# Patient Record
Sex: FEMALE | Race: WHITE | NOT HISPANIC OR LATINO | Employment: OTHER | URBAN - METROPOLITAN AREA
[De-identification: names, ages, dates, MRNs, and addresses within clinical notes are randomized per-mention and may not be internally consistent; named-entity substitution may affect disease eponyms.]

---

## 2017-01-04 ENCOUNTER — COMMUNICATION - HEALTHEAST (OUTPATIENT)
Dept: NURSING | Facility: CLINIC | Age: 62
End: 2017-01-04

## 2017-01-04 ENCOUNTER — AMBULATORY - HEALTHEAST (OUTPATIENT)
Dept: LAB | Facility: CLINIC | Age: 62
End: 2017-01-04

## 2017-01-04 DIAGNOSIS — Z95.2 S/P AVR: ICD-10-CM

## 2017-01-04 DIAGNOSIS — Z95.2 S/P AVR (AORTIC VALVE REPLACEMENT): ICD-10-CM

## 2017-01-05 ENCOUNTER — AMBULATORY - HEALTHEAST (OUTPATIENT)
Dept: NURSING | Facility: CLINIC | Age: 62
End: 2017-01-05

## 2017-01-05 DIAGNOSIS — Z95.2 S/P AVR: ICD-10-CM

## 2017-01-16 ENCOUNTER — COMMUNICATION - HEALTHEAST (OUTPATIENT)
Dept: FAMILY MEDICINE | Facility: CLINIC | Age: 62
End: 2017-01-16

## 2017-01-16 DIAGNOSIS — Z95.2 S/P AVR (AORTIC VALVE REPLACEMENT): ICD-10-CM

## 2017-01-16 DIAGNOSIS — Z79.01 LONG TERM (CURRENT) USE OF ANTICOAGULANTS: ICD-10-CM

## 2017-01-25 ENCOUNTER — COMMUNICATION - HEALTHEAST (OUTPATIENT)
Dept: NURSING | Facility: CLINIC | Age: 62
End: 2017-01-25

## 2017-01-25 ENCOUNTER — AMBULATORY - HEALTHEAST (OUTPATIENT)
Dept: LAB | Facility: CLINIC | Age: 62
End: 2017-01-25

## 2017-01-25 DIAGNOSIS — Z95.2 S/P AVR: ICD-10-CM

## 2017-01-25 DIAGNOSIS — Z95.2 S/P AVR (AORTIC VALVE REPLACEMENT): ICD-10-CM

## 2017-01-28 ENCOUNTER — COMMUNICATION - HEALTHEAST (OUTPATIENT)
Dept: FAMILY MEDICINE | Facility: CLINIC | Age: 62
End: 2017-01-28

## 2017-01-28 DIAGNOSIS — E03.9 HYPOTHYROIDISM: ICD-10-CM

## 2017-02-01 ENCOUNTER — COMMUNICATION - HEALTHEAST (OUTPATIENT)
Dept: FAMILY MEDICINE | Facility: CLINIC | Age: 62
End: 2017-02-01

## 2017-02-01 DIAGNOSIS — E78.5 HYPERLIPIDEMIA: ICD-10-CM

## 2017-02-10 ENCOUNTER — COMMUNICATION - HEALTHEAST (OUTPATIENT)
Dept: FAMILY MEDICINE | Facility: CLINIC | Age: 62
End: 2017-02-10

## 2017-02-10 DIAGNOSIS — G43.909 MIGRAINE: ICD-10-CM

## 2017-02-23 ENCOUNTER — AMBULATORY - HEALTHEAST (OUTPATIENT)
Dept: LAB | Facility: CLINIC | Age: 62
End: 2017-02-23

## 2017-02-23 ENCOUNTER — COMMUNICATION - HEALTHEAST (OUTPATIENT)
Dept: NURSING | Facility: CLINIC | Age: 62
End: 2017-02-23

## 2017-02-23 DIAGNOSIS — Z95.2 S/P AVR (AORTIC VALVE REPLACEMENT): ICD-10-CM

## 2017-02-23 DIAGNOSIS — Z95.2 S/P AVR: ICD-10-CM

## 2017-03-07 ENCOUNTER — COMMUNICATION - HEALTHEAST (OUTPATIENT)
Dept: FAMILY MEDICINE | Facility: CLINIC | Age: 62
End: 2017-03-07

## 2017-03-07 DIAGNOSIS — Z79.01 LONG TERM (CURRENT) USE OF ANTICOAGULANTS: ICD-10-CM

## 2017-03-07 DIAGNOSIS — Z95.2 S/P AVR: ICD-10-CM

## 2017-03-07 DIAGNOSIS — I10 ESSENTIAL HYPERTENSION, MALIGNANT: ICD-10-CM

## 2017-03-08 ENCOUNTER — COMMUNICATION - HEALTHEAST (OUTPATIENT)
Dept: FAMILY MEDICINE | Facility: CLINIC | Age: 62
End: 2017-03-08

## 2017-03-14 ENCOUNTER — OFFICE VISIT - HEALTHEAST (OUTPATIENT)
Dept: FAMILY MEDICINE | Facility: CLINIC | Age: 62
End: 2017-03-14

## 2017-03-14 DIAGNOSIS — J02.9 SORE THROAT: ICD-10-CM

## 2017-03-16 ENCOUNTER — COMMUNICATION - HEALTHEAST (OUTPATIENT)
Dept: NURSING | Facility: CLINIC | Age: 62
End: 2017-03-16

## 2017-03-24 ENCOUNTER — AMBULATORY - HEALTHEAST (OUTPATIENT)
Dept: LAB | Facility: CLINIC | Age: 62
End: 2017-03-24

## 2017-03-24 ENCOUNTER — OFFICE VISIT - HEALTHEAST (OUTPATIENT)
Dept: NURSING | Facility: CLINIC | Age: 62
End: 2017-03-24

## 2017-03-24 ENCOUNTER — COMMUNICATION - HEALTHEAST (OUTPATIENT)
Dept: NURSING | Facility: CLINIC | Age: 62
End: 2017-03-24

## 2017-03-24 DIAGNOSIS — Z95.2 S/P AVR (AORTIC VALVE REPLACEMENT): ICD-10-CM

## 2017-03-24 DIAGNOSIS — I05.9 MITRAL VALVE DISORDER: ICD-10-CM

## 2017-03-24 DIAGNOSIS — Z95.2 S/P AVR: ICD-10-CM

## 2017-03-24 DIAGNOSIS — E78.2 MIXED HYPERLIPIDEMIA: ICD-10-CM

## 2017-03-24 DIAGNOSIS — I71.019 DISSECTION OF THORACIC AORTA (H): ICD-10-CM

## 2017-03-24 DIAGNOSIS — G44.209 TENSION-TYPE HEADACHE, NOT INTRACTABLE, UNSPECIFIED CHRONICITY PATTERN: ICD-10-CM

## 2017-03-29 ENCOUNTER — COMMUNICATION - HEALTHEAST (OUTPATIENT)
Dept: FAMILY MEDICINE | Facility: CLINIC | Age: 62
End: 2017-03-29

## 2017-03-29 DIAGNOSIS — Z95.2 S/P AVR (AORTIC VALVE REPLACEMENT): ICD-10-CM

## 2017-04-05 ENCOUNTER — COMMUNICATION - HEALTHEAST (OUTPATIENT)
Dept: NURSING | Facility: CLINIC | Age: 62
End: 2017-04-05

## 2017-04-05 DIAGNOSIS — Z95.2 S/P AVR: ICD-10-CM

## 2017-04-10 ENCOUNTER — COMMUNICATION - HEALTHEAST (OUTPATIENT)
Dept: NURSING | Facility: CLINIC | Age: 62
End: 2017-04-10

## 2017-04-18 ENCOUNTER — COMMUNICATION - HEALTHEAST (OUTPATIENT)
Dept: NURSING | Facility: CLINIC | Age: 62
End: 2017-04-18

## 2017-04-18 ENCOUNTER — AMBULATORY - HEALTHEAST (OUTPATIENT)
Dept: LAB | Facility: CLINIC | Age: 62
End: 2017-04-18

## 2017-04-18 DIAGNOSIS — Z95.2 S/P AVR: ICD-10-CM

## 2017-04-25 ENCOUNTER — AMBULATORY - HEALTHEAST (OUTPATIENT)
Dept: LAB | Facility: CLINIC | Age: 62
End: 2017-04-25

## 2017-04-25 ENCOUNTER — COMMUNICATION - HEALTHEAST (OUTPATIENT)
Dept: NURSING | Facility: CLINIC | Age: 62
End: 2017-04-25

## 2017-04-25 DIAGNOSIS — Z95.2 S/P AVR: ICD-10-CM

## 2017-04-27 ENCOUNTER — COMMUNICATION - HEALTHEAST (OUTPATIENT)
Dept: FAMILY MEDICINE | Facility: CLINIC | Age: 62
End: 2017-04-27

## 2017-04-27 DIAGNOSIS — G43.909 MIGRAINE: ICD-10-CM

## 2017-05-02 ENCOUNTER — COMMUNICATION - HEALTHEAST (OUTPATIENT)
Dept: NURSING | Facility: CLINIC | Age: 62
End: 2017-05-02

## 2017-05-02 ENCOUNTER — AMBULATORY - HEALTHEAST (OUTPATIENT)
Dept: LAB | Facility: CLINIC | Age: 62
End: 2017-05-02

## 2017-05-02 DIAGNOSIS — Z95.2 S/P AVR: ICD-10-CM

## 2017-05-16 ENCOUNTER — COMMUNICATION - HEALTHEAST (OUTPATIENT)
Dept: FAMILY MEDICINE | Facility: CLINIC | Age: 62
End: 2017-05-16

## 2017-05-16 ENCOUNTER — COMMUNICATION - HEALTHEAST (OUTPATIENT)
Dept: NURSING | Facility: CLINIC | Age: 62
End: 2017-05-16

## 2017-05-16 ENCOUNTER — AMBULATORY - HEALTHEAST (OUTPATIENT)
Dept: LAB | Facility: CLINIC | Age: 62
End: 2017-05-16

## 2017-05-16 DIAGNOSIS — Z95.2 S/P AVR: ICD-10-CM

## 2017-05-16 DIAGNOSIS — Z79.01 LONG TERM (CURRENT) USE OF ANTICOAGULANTS: ICD-10-CM

## 2017-05-30 ENCOUNTER — COMMUNICATION - HEALTHEAST (OUTPATIENT)
Dept: NURSING | Facility: CLINIC | Age: 62
End: 2017-05-30

## 2017-05-30 ENCOUNTER — AMBULATORY - HEALTHEAST (OUTPATIENT)
Dept: LAB | Facility: CLINIC | Age: 62
End: 2017-05-30

## 2017-05-30 DIAGNOSIS — Z95.2 S/P AVR: ICD-10-CM

## 2017-06-08 ENCOUNTER — AMBULATORY - HEALTHEAST (OUTPATIENT)
Dept: LAB | Facility: CLINIC | Age: 62
End: 2017-06-08

## 2017-06-08 ENCOUNTER — COMMUNICATION - HEALTHEAST (OUTPATIENT)
Dept: NURSING | Facility: CLINIC | Age: 62
End: 2017-06-08

## 2017-06-08 DIAGNOSIS — Z95.2 S/P AVR: ICD-10-CM

## 2017-06-21 ENCOUNTER — COMMUNICATION - HEALTHEAST (OUTPATIENT)
Dept: FAMILY MEDICINE | Facility: CLINIC | Age: 62
End: 2017-06-21

## 2017-06-21 DIAGNOSIS — E03.9 HYPOTHYROIDISM: ICD-10-CM

## 2017-06-23 ENCOUNTER — COMMUNICATION - HEALTHEAST (OUTPATIENT)
Dept: FAMILY MEDICINE | Facility: CLINIC | Age: 62
End: 2017-06-23

## 2017-06-23 DIAGNOSIS — G43.909 MIGRAINE: ICD-10-CM

## 2017-06-26 ENCOUNTER — AMBULATORY - HEALTHEAST (OUTPATIENT)
Dept: LAB | Facility: CLINIC | Age: 62
End: 2017-06-26

## 2017-06-26 ENCOUNTER — COMMUNICATION - HEALTHEAST (OUTPATIENT)
Dept: NURSING | Facility: CLINIC | Age: 62
End: 2017-06-26

## 2017-06-26 DIAGNOSIS — Z95.2 S/P AVR: ICD-10-CM

## 2017-07-11 ENCOUNTER — COMMUNICATION - HEALTHEAST (OUTPATIENT)
Dept: NURSING | Facility: CLINIC | Age: 62
End: 2017-07-11

## 2017-07-11 ENCOUNTER — AMBULATORY - HEALTHEAST (OUTPATIENT)
Dept: LAB | Facility: CLINIC | Age: 62
End: 2017-07-11

## 2017-07-11 DIAGNOSIS — Z95.2 S/P AVR: ICD-10-CM

## 2017-08-02 ENCOUNTER — AMBULATORY - HEALTHEAST (OUTPATIENT)
Dept: LAB | Facility: CLINIC | Age: 62
End: 2017-08-02

## 2017-08-02 ENCOUNTER — COMMUNICATION - HEALTHEAST (OUTPATIENT)
Dept: NURSING | Facility: CLINIC | Age: 62
End: 2017-08-02

## 2017-08-02 DIAGNOSIS — Z95.2 S/P AVR: ICD-10-CM

## 2017-08-09 ENCOUNTER — COMMUNICATION - HEALTHEAST (OUTPATIENT)
Dept: FAMILY MEDICINE | Facility: CLINIC | Age: 62
End: 2017-08-09

## 2017-08-09 DIAGNOSIS — Z79.01 LONG TERM (CURRENT) USE OF ANTICOAGULANTS: ICD-10-CM

## 2017-08-09 DIAGNOSIS — Z95.2 S/P AVR: ICD-10-CM

## 2017-08-21 ENCOUNTER — AMBULATORY - HEALTHEAST (OUTPATIENT)
Dept: LAB | Facility: CLINIC | Age: 62
End: 2017-08-21

## 2017-08-21 ENCOUNTER — COMMUNICATION - HEALTHEAST (OUTPATIENT)
Dept: NURSING | Facility: CLINIC | Age: 62
End: 2017-08-21

## 2017-08-21 DIAGNOSIS — Z95.2 S/P AVR: ICD-10-CM

## 2017-08-26 ENCOUNTER — COMMUNICATION - HEALTHEAST (OUTPATIENT)
Dept: FAMILY MEDICINE | Facility: CLINIC | Age: 62
End: 2017-08-26

## 2017-08-26 DIAGNOSIS — G43.909 MIGRAINE: ICD-10-CM

## 2017-09-01 ENCOUNTER — COMMUNICATION - HEALTHEAST (OUTPATIENT)
Dept: FAMILY MEDICINE | Facility: CLINIC | Age: 62
End: 2017-09-01

## 2017-09-01 DIAGNOSIS — I10 HTN (HYPERTENSION): ICD-10-CM

## 2017-09-08 ENCOUNTER — AMBULATORY - HEALTHEAST (OUTPATIENT)
Dept: LAB | Facility: CLINIC | Age: 62
End: 2017-09-08

## 2017-09-08 ENCOUNTER — COMMUNICATION - HEALTHEAST (OUTPATIENT)
Dept: NURSING | Facility: CLINIC | Age: 62
End: 2017-09-08

## 2017-09-08 DIAGNOSIS — Z95.2 S/P AVR: ICD-10-CM

## 2017-10-06 ENCOUNTER — AMBULATORY - HEALTHEAST (OUTPATIENT)
Dept: NURSING | Facility: CLINIC | Age: 62
End: 2017-10-06

## 2017-10-06 ENCOUNTER — AMBULATORY - HEALTHEAST (OUTPATIENT)
Dept: LAB | Facility: CLINIC | Age: 62
End: 2017-10-06

## 2017-10-06 ENCOUNTER — COMMUNICATION - HEALTHEAST (OUTPATIENT)
Dept: NURSING | Facility: CLINIC | Age: 62
End: 2017-10-06

## 2017-10-06 DIAGNOSIS — Z95.2 S/P AVR: ICD-10-CM

## 2017-11-03 ENCOUNTER — COMMUNICATION - HEALTHEAST (OUTPATIENT)
Dept: NURSING | Facility: CLINIC | Age: 62
End: 2017-11-03

## 2017-11-03 ENCOUNTER — AMBULATORY - HEALTHEAST (OUTPATIENT)
Dept: LAB | Facility: CLINIC | Age: 62
End: 2017-11-03

## 2017-11-03 DIAGNOSIS — Z95.2 S/P AVR: ICD-10-CM

## 2017-11-05 ENCOUNTER — COMMUNICATION - HEALTHEAST (OUTPATIENT)
Dept: FAMILY MEDICINE | Facility: CLINIC | Age: 62
End: 2017-11-05

## 2017-11-05 DIAGNOSIS — G43.909 MIGRAINE: ICD-10-CM

## 2017-11-10 ENCOUNTER — COMMUNICATION - HEALTHEAST (OUTPATIENT)
Dept: FAMILY MEDICINE | Facility: CLINIC | Age: 62
End: 2017-11-10

## 2017-11-10 DIAGNOSIS — E78.5 HYPERLIPIDEMIA: ICD-10-CM

## 2017-11-13 ENCOUNTER — COMMUNICATION - HEALTHEAST (OUTPATIENT)
Dept: NURSING | Facility: CLINIC | Age: 62
End: 2017-11-13

## 2017-11-13 ENCOUNTER — AMBULATORY - HEALTHEAST (OUTPATIENT)
Dept: LAB | Facility: CLINIC | Age: 62
End: 2017-11-13

## 2017-11-13 DIAGNOSIS — Z95.2 S/P AVR: ICD-10-CM

## 2017-11-14 ENCOUNTER — OFFICE VISIT - HEALTHEAST (OUTPATIENT)
Dept: PODIATRY | Age: 62
End: 2017-11-14

## 2017-11-14 DIAGNOSIS — M20.5X1 HALLUX LIMITUS, RIGHT: ICD-10-CM

## 2017-11-14 ASSESSMENT — MIFFLIN-ST. JEOR: SCORE: 1092

## 2017-11-29 ENCOUNTER — COMMUNICATION - HEALTHEAST (OUTPATIENT)
Dept: NURSING | Facility: CLINIC | Age: 62
End: 2017-11-29

## 2017-11-29 ENCOUNTER — AMBULATORY - HEALTHEAST (OUTPATIENT)
Dept: LAB | Facility: CLINIC | Age: 62
End: 2017-11-29

## 2017-11-29 DIAGNOSIS — Z95.2 S/P AVR: ICD-10-CM

## 2017-12-01 ENCOUNTER — COMMUNICATION - HEALTHEAST (OUTPATIENT)
Dept: FAMILY MEDICINE | Facility: CLINIC | Age: 62
End: 2017-12-01

## 2017-12-01 DIAGNOSIS — Z79.01 LONG TERM CURRENT USE OF ANTICOAGULANT THERAPY: ICD-10-CM

## 2017-12-01 DIAGNOSIS — Z95.2 S/P AVR: ICD-10-CM

## 2017-12-04 ENCOUNTER — COMMUNICATION - HEALTHEAST (OUTPATIENT)
Dept: NURSING | Facility: CLINIC | Age: 62
End: 2017-12-04

## 2017-12-04 ENCOUNTER — AMBULATORY - HEALTHEAST (OUTPATIENT)
Dept: SCHEDULING | Facility: CLINIC | Age: 62
End: 2017-12-04

## 2017-12-04 ENCOUNTER — OFFICE VISIT - HEALTHEAST (OUTPATIENT)
Dept: FAMILY MEDICINE | Facility: CLINIC | Age: 62
End: 2017-12-04

## 2017-12-04 DIAGNOSIS — Z95.2 S/P AVR: ICD-10-CM

## 2017-12-04 DIAGNOSIS — E03.9 HYPOTHYROID: ICD-10-CM

## 2017-12-04 DIAGNOSIS — I10 ESSENTIAL HYPERTENSION: ICD-10-CM

## 2017-12-04 DIAGNOSIS — Z78.0 POST-MENOPAUSAL: ICD-10-CM

## 2017-12-04 DIAGNOSIS — G44.209 TENSION-TYPE HEADACHE, NOT INTRACTABLE, UNSPECIFIED CHRONICITY PATTERN: ICD-10-CM

## 2017-12-04 DIAGNOSIS — N83.202 CYST OF LEFT OVARY: ICD-10-CM

## 2017-12-04 DIAGNOSIS — E04.1 THYROID NODULE: ICD-10-CM

## 2017-12-04 DIAGNOSIS — Z12.31 VISIT FOR SCREENING MAMMOGRAM: ICD-10-CM

## 2017-12-04 DIAGNOSIS — Z00.00 HEALTHCARE MAINTENANCE: ICD-10-CM

## 2017-12-04 DIAGNOSIS — E03.9 HYPOTHYROIDISM, UNSPECIFIED TYPE: ICD-10-CM

## 2017-12-04 DIAGNOSIS — E78.2 MIXED HYPERLIPIDEMIA: ICD-10-CM

## 2017-12-04 DIAGNOSIS — M81.0 OSTEOPOROSIS WITHOUT CURRENT PATHOLOGICAL FRACTURE, UNSPECIFIED OSTEOPOROSIS TYPE: ICD-10-CM

## 2017-12-04 LAB
CHOLEST SERPL-MCNC: 161 MG/DL
FASTING STATUS PATIENT QL REPORTED: YES
HDLC SERPL-MCNC: 42 MG/DL
LDLC SERPL CALC-MCNC: 100 MG/DL
TRIGL SERPL-MCNC: 93 MG/DL

## 2017-12-04 ASSESSMENT — MIFFLIN-ST. JEOR: SCORE: 1025.21

## 2017-12-05 LAB — CANCER AG125 SERPL-ACNC: 33.6 U/ML (ref 0–35)

## 2017-12-06 ENCOUNTER — COMMUNICATION - HEALTHEAST (OUTPATIENT)
Dept: ENDOCRINOLOGY | Facility: CLINIC | Age: 62
End: 2017-12-06

## 2017-12-06 ENCOUNTER — HOSPITAL ENCOUNTER (OUTPATIENT)
Dept: ULTRASOUND IMAGING | Facility: HOSPITAL | Age: 62
Discharge: HOME | End: 2017-12-06
Attending: FAMILY MEDICINE

## 2017-12-06 DIAGNOSIS — N83.202 CYST OF LEFT OVARY: ICD-10-CM

## 2017-12-06 DIAGNOSIS — E03.9 HYPOTHYROIDISM, UNSPECIFIED TYPE: ICD-10-CM

## 2017-12-06 DIAGNOSIS — E04.1 THYROID NODULE: ICD-10-CM

## 2017-12-07 ENCOUNTER — COMMUNICATION - HEALTHEAST (OUTPATIENT)
Dept: FAMILY MEDICINE | Facility: CLINIC | Age: 62
End: 2017-12-07

## 2017-12-07 DIAGNOSIS — E04.1 THYROID NODULE: ICD-10-CM

## 2017-12-08 ENCOUNTER — COMMUNICATION - HEALTHEAST (OUTPATIENT)
Dept: FAMILY MEDICINE | Facility: CLINIC | Age: 62
End: 2017-12-08

## 2017-12-08 DIAGNOSIS — Z95.2 S/P AVR: ICD-10-CM

## 2017-12-08 DIAGNOSIS — Z79.01 LONG TERM CURRENT USE OF ANTICOAGULANT THERAPY: ICD-10-CM

## 2017-12-11 ENCOUNTER — COMMUNICATION - HEALTHEAST (OUTPATIENT)
Dept: FAMILY MEDICINE | Facility: CLINIC | Age: 62
End: 2017-12-11

## 2017-12-11 DIAGNOSIS — Z95.2 S/P AVR (AORTIC VALVE REPLACEMENT): ICD-10-CM

## 2017-12-13 ENCOUNTER — COMMUNICATION - HEALTHEAST (OUTPATIENT)
Dept: FAMILY MEDICINE | Facility: CLINIC | Age: 62
End: 2017-12-13

## 2017-12-13 DIAGNOSIS — E03.9 HYPOTHYROIDISM: ICD-10-CM

## 2017-12-14 ENCOUNTER — AMBULATORY - HEALTHEAST (OUTPATIENT)
Dept: SCHEDULING | Facility: CLINIC | Age: 62
End: 2017-12-14

## 2017-12-14 ENCOUNTER — OFFICE VISIT - HEALTHEAST (OUTPATIENT)
Dept: CARDIOLOGY | Facility: CLINIC | Age: 62
End: 2017-12-14

## 2017-12-14 ENCOUNTER — COMMUNICATION - HEALTHEAST (OUTPATIENT)
Dept: FAMILY MEDICINE | Facility: CLINIC | Age: 62
End: 2017-12-14

## 2017-12-14 DIAGNOSIS — I35.9 AORTIC VALVE DISORDER: ICD-10-CM

## 2017-12-14 DIAGNOSIS — Z95.2 S/P AVR: ICD-10-CM

## 2017-12-14 DIAGNOSIS — G43.909 MIGRAINE HEADACHE: ICD-10-CM

## 2017-12-14 DIAGNOSIS — Z47.1 AFTERCARE FOLLOWING JOINT REPLACEMENT: ICD-10-CM

## 2017-12-14 DIAGNOSIS — Z96.619: ICD-10-CM

## 2017-12-14 DIAGNOSIS — I71.019 DISSECTION OF THORACIC AORTA (H): ICD-10-CM

## 2017-12-14 ASSESSMENT — MIFFLIN-ST. JEOR: SCORE: 1027.93

## 2017-12-15 ENCOUNTER — HOSPITAL ENCOUNTER (OUTPATIENT)
Dept: ULTRASOUND IMAGING | Facility: HOSPITAL | Age: 62
Discharge: HOME | End: 2017-12-15
Attending: FAMILY MEDICINE

## 2017-12-15 DIAGNOSIS — E03.9 HYPOTHYROIDISM, UNSPECIFIED TYPE: ICD-10-CM

## 2017-12-15 DIAGNOSIS — E04.1 THYROID NODULE: ICD-10-CM

## 2017-12-18 ENCOUNTER — HOSPITAL ENCOUNTER (OUTPATIENT)
Dept: MAMMOGRAPHY | Facility: HOSPITAL | Age: 62
Discharge: HOME | End: 2017-12-18
Attending: FAMILY MEDICINE

## 2017-12-18 DIAGNOSIS — Z12.31 VISIT FOR SCREENING MAMMOGRAM: ICD-10-CM

## 2017-12-19 LAB
LAB AP CHARGES (HE HISTORICAL CONVERSION): ABNORMAL
LAB AP INITIAL CYTO EVAL (HE HISTORICAL CONVERSION): ABNORMAL
LAB MED GENERAL PATH INTERP (HE HISTORICAL CONVERSION): ABNORMAL
PATH REPORT.COMMENTS IMP SPEC: ABNORMAL
PATH REPORT.FINAL DX SPEC: ABNORMAL
PATH REPORT.MICROSCOPIC SPEC OTHER STN: ABNORMAL
PATH REPORT.RELEVANT HX SPEC: ABNORMAL
SPECIMEN DESCRIPTION: ABNORMAL

## 2017-12-21 ENCOUNTER — COMMUNICATION - HEALTHEAST (OUTPATIENT)
Dept: FAMILY MEDICINE | Facility: CLINIC | Age: 62
End: 2017-12-21

## 2017-12-21 DIAGNOSIS — E04.1 THYROID NODULE: ICD-10-CM

## 2017-12-22 ENCOUNTER — COMMUNICATION - HEALTHEAST (OUTPATIENT)
Dept: NURSING | Facility: CLINIC | Age: 62
End: 2017-12-22

## 2017-12-22 ENCOUNTER — AMBULATORY - HEALTHEAST (OUTPATIENT)
Dept: LAB | Facility: CLINIC | Age: 62
End: 2017-12-22

## 2017-12-22 DIAGNOSIS — Z95.2 S/P AVR: ICD-10-CM

## 2017-12-26 ENCOUNTER — HOSPITAL ENCOUNTER (OUTPATIENT)
Dept: CARDIOLOGY | Facility: HOSPITAL | Age: 62
Discharge: HOME | End: 2017-12-26
Attending: INTERNAL MEDICINE

## 2017-12-26 ENCOUNTER — HOSPITAL ENCOUNTER (OUTPATIENT)
Dept: CT IMAGING | Facility: HOSPITAL | Age: 62
Discharge: HOME | End: 2017-12-26
Attending: INTERNAL MEDICINE

## 2017-12-26 DIAGNOSIS — I35.9 AORTIC VALVE DISORDER: ICD-10-CM

## 2017-12-26 DIAGNOSIS — I71.019 DISSECTION OF THORACIC AORTA (H): ICD-10-CM

## 2017-12-26 DIAGNOSIS — Z95.2 S/P AVR: ICD-10-CM

## 2017-12-26 LAB
AORTIC VALVE MEAN VELOCITY: 221 CM/S
AV DIMENSIONLESS INDEX VTI: 0.3
AV MEAN GRADIENT: 22 MMHG
AV PEAK GRADIENT: 42 MMHG
AV VALVE AREA: 0.7 CM2
AV VELOCITY RATIO: 0.3
BSA FOR ECHO PROCEDURE: 1.53 M2
CV BLOOD PRESSURE: NORMAL MMHG
CV ECHO HEIGHT: 62.3 IN
CV ECHO WEIGHT: 117 LBS
DOP CALC AO PEAK VEL: 324 CM/S
DOP CALC AO VTI: 72.1 CM
DOP CALC LVOT AREA: 2.01 CM2
DOP CALC LVOT DIAMETER: 1.6 CM
DOP CALC LVOT PEAK VEL: 91.6 CM/S
DOP CALC LVOT STROKE VOLUME: 48.8 CM3
DOP CALCLVOT PEAK VEL VTI: 24.3 CM
EJECTION FRACTION: 52 % (ref 55–75)
FRACTIONAL SHORTENING: 35.2 % (ref 28–44)
INTERVENTRICULAR SEPTUM IN END DIASTOLE: 1.1 CM (ref 0.6–0.9)
IVS/PW RATIO: 1
LA AREA 1: 12.1 CM2
LA AREA 2: 13.4 CM2
LEFT ATRIUM LENGTH: 4.1 CM
LEFT ATRIUM SIZE: 3.6 CM
LEFT ATRIUM VOLUME INDEX: 22 ML/M2
LEFT ATRIUM VOLUME: 33.6 CM3
LEFT VENTRICLE DIASTOLIC VOLUME INDEX: 31.2 CM3/M2 (ref 34–74)
LEFT VENTRICLE DIASTOLIC VOLUME: 47.7 CM3 (ref 46–106)
LEFT VENTRICLE MASS INDEX: 89.3 G/M2
LEFT VENTRICLE SYSTOLIC VOLUME INDEX: 15 CM3/M2 (ref 11–31)
LEFT VENTRICLE SYSTOLIC VOLUME: 23 CM3 (ref 14–42)
LEFT VENTRICULAR INTERNAL DIMENSION IN DIASTOLE: 3.92 CM (ref 3.8–5.2)
LEFT VENTRICULAR INTERNAL DIMENSION IN SYSTOLE: 2.54 CM (ref 2.2–3.5)
LEFT VENTRICULAR MASS: 136.6 G
LEFT VENTRICULAR OUTFLOW TRACT MEAN GRADIENT: 2 MMHG
LEFT VENTRICULAR OUTFLOW TRACT MEAN VELOCITY: 65.4 CM/S
LEFT VENTRICULAR OUTFLOW TRACT PEAK GRADIENT: 3 MMHG
LEFT VENTRICULAR POSTERIOR WALL IN END DIASTOLE: 1.05 CM (ref 0.6–0.9)
LV STROKE VOLUME INDEX: 31.9 ML/M2
MITRAL VALVE E/A RATIO: 2.1
MV AVERAGE E/E' RATIO: 12.1 CM/S
MV DECELERATION TIME: 169 MS
MV E'TISSUE VEL-LAT: 8.03 CM/S
MV E'TISSUE VEL-MED: 5.8 CM/S
MV LATERAL E/E' RATIO: 10.4
MV MEDIAL E/E' RATIO: 14.4
MV PEAK A VELOCITY: 39.8 CM/S
MV PEAK E VELOCITY: 83.4 CM/S
NUC REST DIASTOLIC VOLUME INDEX: 1872 LBS
NUC REST SYSTOLIC VOLUME INDEX: 62.25 IN
TRICUSPID REGURGITATION PEAK PRESSURE GRADIENT: 19.5 MMHG
TRICUSPID VALVE ANULAR PLANE SYSTOLIC EXCURSION: 1.9 CM
TRICUSPID VALVE PEAK REGURGITANT VELOCITY: 221 CM/S

## 2017-12-26 ASSESSMENT — MIFFLIN-ST. JEOR: SCORE: 1027.93

## 2017-12-28 ENCOUNTER — AMBULATORY - HEALTHEAST (OUTPATIENT)
Dept: CARDIOLOGY | Facility: CLINIC | Age: 62
End: 2017-12-28

## 2017-12-28 DIAGNOSIS — I71.00 DISSECTION OF AORTA (H): ICD-10-CM

## 2018-01-02 ENCOUNTER — OFFICE VISIT - HEALTHEAST (OUTPATIENT)
Dept: ENDOCRINOLOGY | Facility: CLINIC | Age: 63
End: 2018-01-02

## 2018-01-02 ENCOUNTER — COMMUNICATION - HEALTHEAST (OUTPATIENT)
Dept: FAMILY MEDICINE | Facility: CLINIC | Age: 63
End: 2018-01-02

## 2018-01-02 ENCOUNTER — COMMUNICATION - HEALTHEAST (OUTPATIENT)
Dept: ENDOCRINOLOGY | Facility: CLINIC | Age: 63
End: 2018-01-02

## 2018-01-02 DIAGNOSIS — E04.1 THYROID NODULE: ICD-10-CM

## 2018-01-02 ASSESSMENT — MIFFLIN-ST. JEOR: SCORE: 1029.74

## 2018-01-05 ENCOUNTER — COMMUNICATION - HEALTHEAST (OUTPATIENT)
Dept: NURSING | Facility: CLINIC | Age: 63
End: 2018-01-05

## 2018-01-05 ENCOUNTER — AMBULATORY - HEALTHEAST (OUTPATIENT)
Dept: LAB | Facility: CLINIC | Age: 63
End: 2018-01-05

## 2018-01-05 DIAGNOSIS — Z95.2 S/P AVR: ICD-10-CM

## 2018-01-05 LAB — INR PPP: 3 (ref 0.9–1.1)

## 2018-01-11 ENCOUNTER — COMMUNICATION - HEALTHEAST (OUTPATIENT)
Dept: FAMILY MEDICINE | Facility: CLINIC | Age: 63
End: 2018-01-11

## 2018-01-11 DIAGNOSIS — G43.909 MIGRAINE: ICD-10-CM

## 2018-01-19 ENCOUNTER — AMBULATORY - HEALTHEAST (OUTPATIENT)
Dept: LAB | Facility: CLINIC | Age: 63
End: 2018-01-19

## 2018-01-19 ENCOUNTER — COMMUNICATION - HEALTHEAST (OUTPATIENT)
Dept: NURSING | Facility: CLINIC | Age: 63
End: 2018-01-19

## 2018-01-19 DIAGNOSIS — Z95.2 S/P AVR: ICD-10-CM

## 2018-01-19 LAB — INR PPP: 1.6 (ref 0.9–1.1)

## 2018-01-29 ENCOUNTER — COMMUNICATION - HEALTHEAST (OUTPATIENT)
Dept: INTERNAL MEDICINE | Facility: CLINIC | Age: 63
End: 2018-01-29

## 2018-01-29 ENCOUNTER — AMBULATORY - HEALTHEAST (OUTPATIENT)
Dept: LAB | Facility: CLINIC | Age: 63
End: 2018-01-29

## 2018-01-29 DIAGNOSIS — Z95.2 S/P AVR: ICD-10-CM

## 2018-01-29 LAB — INR PPP: 2.2 (ref 0.9–1.1)

## 2018-02-08 ENCOUNTER — COMMUNICATION - HEALTHEAST (OUTPATIENT)
Dept: FAMILY MEDICINE | Facility: CLINIC | Age: 63
End: 2018-02-08

## 2018-02-08 DIAGNOSIS — E78.5 HYPERLIPIDEMIA: ICD-10-CM

## 2018-02-12 ENCOUNTER — AMBULATORY - HEALTHEAST (OUTPATIENT)
Dept: LAB | Facility: CLINIC | Age: 63
End: 2018-02-12

## 2018-02-12 ENCOUNTER — COMMUNICATION - HEALTHEAST (OUTPATIENT)
Dept: NURSING | Facility: CLINIC | Age: 63
End: 2018-02-12

## 2018-02-12 DIAGNOSIS — Z95.2 S/P AVR: ICD-10-CM

## 2018-02-12 LAB — INR PPP: 1.7 (ref 0.9–1.1)

## 2018-02-22 ENCOUNTER — COMMUNICATION - HEALTHEAST (OUTPATIENT)
Dept: NURSING | Facility: CLINIC | Age: 63
End: 2018-02-22

## 2018-02-22 ENCOUNTER — AMBULATORY - HEALTHEAST (OUTPATIENT)
Dept: LAB | Facility: CLINIC | Age: 63
End: 2018-02-22

## 2018-02-22 DIAGNOSIS — Z95.2 S/P AVR: ICD-10-CM

## 2018-02-22 LAB — INR PPP: 2.2 (ref 0.9–1.1)

## 2018-03-12 ENCOUNTER — COMMUNICATION - HEALTHEAST (OUTPATIENT)
Dept: FAMILY MEDICINE | Facility: CLINIC | Age: 63
End: 2018-03-12

## 2018-03-12 DIAGNOSIS — I10 ESSENTIAL HYPERTENSION, MALIGNANT: ICD-10-CM

## 2018-03-15 ENCOUNTER — COMMUNICATION - HEALTHEAST (OUTPATIENT)
Dept: INTERNAL MEDICINE | Facility: CLINIC | Age: 63
End: 2018-03-15

## 2018-03-15 ENCOUNTER — COMMUNICATION - HEALTHEAST (OUTPATIENT)
Dept: NURSING | Facility: CLINIC | Age: 63
End: 2018-03-15

## 2018-03-15 ENCOUNTER — AMBULATORY - HEALTHEAST (OUTPATIENT)
Dept: LAB | Facility: CLINIC | Age: 63
End: 2018-03-15

## 2018-03-15 DIAGNOSIS — Z95.2 S/P AVR: ICD-10-CM

## 2018-03-15 LAB — INR PPP: 2.3 (ref 0.9–1.1)

## 2018-04-01 ENCOUNTER — COMMUNICATION - HEALTHEAST (OUTPATIENT)
Dept: FAMILY MEDICINE | Facility: CLINIC | Age: 63
End: 2018-04-01

## 2018-04-01 DIAGNOSIS — G43.909 MIGRAINE: ICD-10-CM

## 2018-04-12 ENCOUNTER — COMMUNICATION - HEALTHEAST (OUTPATIENT)
Dept: ANTICOAGULATION | Facility: CLINIC | Age: 63
End: 2018-04-12

## 2018-04-12 ENCOUNTER — AMBULATORY - HEALTHEAST (OUTPATIENT)
Dept: LAB | Facility: CLINIC | Age: 63
End: 2018-04-12

## 2018-04-12 DIAGNOSIS — Z95.2 S/P AVR: ICD-10-CM

## 2018-04-12 LAB — INR PPP: 2.2 (ref 0.9–1.1)

## 2018-05-08 ENCOUNTER — COMMUNICATION - HEALTHEAST (OUTPATIENT)
Dept: FAMILY MEDICINE | Facility: CLINIC | Age: 63
End: 2018-05-08

## 2018-05-08 DIAGNOSIS — Z79.01 LONG TERM CURRENT USE OF ANTICOAGULANT THERAPY: ICD-10-CM

## 2018-05-08 DIAGNOSIS — Z95.2 S/P AVR: ICD-10-CM

## 2018-05-10 ENCOUNTER — COMMUNICATION - HEALTHEAST (OUTPATIENT)
Dept: ANTICOAGULATION | Facility: CLINIC | Age: 63
End: 2018-05-10

## 2018-05-10 ENCOUNTER — AMBULATORY - HEALTHEAST (OUTPATIENT)
Dept: LAB | Facility: CLINIC | Age: 63
End: 2018-05-10

## 2018-05-10 DIAGNOSIS — Z95.2 S/P AVR: ICD-10-CM

## 2018-05-10 LAB — INR PPP: 2.9 (ref 0.9–1.1)

## 2018-05-14 ENCOUNTER — COMMUNICATION - HEALTHEAST (OUTPATIENT)
Dept: FAMILY MEDICINE | Facility: CLINIC | Age: 63
End: 2018-05-14

## 2018-05-14 DIAGNOSIS — I10 HTN (HYPERTENSION): ICD-10-CM

## 2018-05-22 ENCOUNTER — COMMUNICATION - HEALTHEAST (OUTPATIENT)
Dept: FAMILY MEDICINE | Facility: CLINIC | Age: 63
End: 2018-05-22

## 2018-05-22 DIAGNOSIS — Z95.2 S/P AVR (AORTIC VALVE REPLACEMENT): ICD-10-CM

## 2018-05-22 DIAGNOSIS — Z79.01 LONG TERM CURRENT USE OF ANTICOAGULANT THERAPY: ICD-10-CM

## 2018-05-22 DIAGNOSIS — Z95.2 S/P AVR: ICD-10-CM

## 2018-05-24 ENCOUNTER — COMMUNICATION - HEALTHEAST (OUTPATIENT)
Dept: ANTICOAGULATION | Facility: CLINIC | Age: 63
End: 2018-05-24

## 2018-05-24 ENCOUNTER — AMBULATORY - HEALTHEAST (OUTPATIENT)
Dept: LAB | Facility: CLINIC | Age: 63
End: 2018-05-24

## 2018-05-24 DIAGNOSIS — Z95.2 S/P AVR: ICD-10-CM

## 2018-05-24 LAB — INR PPP: 3.5 (ref 0.9–1.1)

## 2018-06-06 ENCOUNTER — AMBULATORY - HEALTHEAST (OUTPATIENT)
Dept: LAB | Facility: CLINIC | Age: 63
End: 2018-06-06

## 2018-06-06 ENCOUNTER — COMMUNICATION - HEALTHEAST (OUTPATIENT)
Dept: ANTICOAGULATION | Facility: CLINIC | Age: 63
End: 2018-06-06

## 2018-06-06 DIAGNOSIS — Z95.2 S/P AVR: ICD-10-CM

## 2018-06-06 LAB — INR PPP: 1.6 (ref 0.9–1.1)

## 2018-06-10 ENCOUNTER — COMMUNICATION - HEALTHEAST (OUTPATIENT)
Dept: FAMILY MEDICINE | Facility: CLINIC | Age: 63
End: 2018-06-10

## 2018-06-10 DIAGNOSIS — I10 ESSENTIAL HYPERTENSION, MALIGNANT: ICD-10-CM

## 2018-06-15 ENCOUNTER — OFFICE VISIT - HEALTHEAST (OUTPATIENT)
Dept: FAMILY MEDICINE | Facility: CLINIC | Age: 63
End: 2018-06-15

## 2018-06-15 DIAGNOSIS — M77.41 METATARSALGIA OF RIGHT FOOT: ICD-10-CM

## 2018-06-15 DIAGNOSIS — M21.611 BUNION, RIGHT: ICD-10-CM

## 2018-06-18 ENCOUNTER — AMBULATORY - HEALTHEAST (OUTPATIENT)
Dept: LAB | Facility: CLINIC | Age: 63
End: 2018-06-18

## 2018-06-18 ENCOUNTER — COMMUNICATION - HEALTHEAST (OUTPATIENT)
Dept: ANTICOAGULATION | Facility: CLINIC | Age: 63
End: 2018-06-18

## 2018-06-18 DIAGNOSIS — Z95.2 S/P AVR: ICD-10-CM

## 2018-06-18 LAB — INR PPP: 3.2 (ref 0.9–1.1)

## 2018-06-20 ENCOUNTER — RECORDS - HEALTHEAST (OUTPATIENT)
Dept: ADMINISTRATIVE | Facility: OTHER | Age: 63
End: 2018-06-20

## 2018-07-02 ENCOUNTER — COMMUNICATION - HEALTHEAST (OUTPATIENT)
Dept: ANTICOAGULATION | Facility: CLINIC | Age: 63
End: 2018-07-02

## 2018-07-02 ENCOUNTER — AMBULATORY - HEALTHEAST (OUTPATIENT)
Dept: LAB | Facility: CLINIC | Age: 63
End: 2018-07-02

## 2018-07-02 DIAGNOSIS — Z95.2 S/P AVR: ICD-10-CM

## 2018-07-02 LAB — INR PPP: 2 (ref 0.9–1.1)

## 2018-07-16 ENCOUNTER — COMMUNICATION - HEALTHEAST (OUTPATIENT)
Dept: FAMILY MEDICINE | Facility: CLINIC | Age: 63
End: 2018-07-16

## 2018-07-16 ENCOUNTER — AMBULATORY - HEALTHEAST (OUTPATIENT)
Dept: LAB | Facility: CLINIC | Age: 63
End: 2018-07-16

## 2018-07-16 ENCOUNTER — COMMUNICATION - HEALTHEAST (OUTPATIENT)
Dept: ANTICOAGULATION | Facility: CLINIC | Age: 63
End: 2018-07-16

## 2018-07-16 DIAGNOSIS — Z95.2 S/P AVR: ICD-10-CM

## 2018-07-16 DIAGNOSIS — G43.909 MIGRAINE: ICD-10-CM

## 2018-07-16 LAB — INR PPP: 5.2 (ref 0.9–1.1)

## 2018-07-20 ENCOUNTER — AMBULATORY - HEALTHEAST (OUTPATIENT)
Dept: LAB | Facility: CLINIC | Age: 63
End: 2018-07-20

## 2018-07-20 ENCOUNTER — COMMUNICATION - HEALTHEAST (OUTPATIENT)
Dept: ANTICOAGULATION | Facility: CLINIC | Age: 63
End: 2018-07-20

## 2018-07-20 DIAGNOSIS — Z95.2 S/P AVR: ICD-10-CM

## 2018-07-20 LAB — INR PPP: 1.5 (ref 0.9–1.1)

## 2018-07-24 ENCOUNTER — OFFICE VISIT - HEALTHEAST (OUTPATIENT)
Dept: PODIATRY | Facility: CLINIC | Age: 63
End: 2018-07-24

## 2018-07-24 ENCOUNTER — RECORDS - HEALTHEAST (OUTPATIENT)
Dept: GENERAL RADIOLOGY | Age: 63
End: 2018-07-24

## 2018-07-24 DIAGNOSIS — M20.10 HALLUX VALGUS, UNSPECIFIED LATERALITY: ICD-10-CM

## 2018-07-24 DIAGNOSIS — M20.10 HALLUX VALGUS (ACQUIRED), UNSPECIFIED FOOT: ICD-10-CM

## 2018-07-27 ENCOUNTER — AMBULATORY - HEALTHEAST (OUTPATIENT)
Dept: LAB | Facility: CLINIC | Age: 63
End: 2018-07-27

## 2018-07-27 ENCOUNTER — COMMUNICATION - HEALTHEAST (OUTPATIENT)
Dept: ANTICOAGULATION | Facility: CLINIC | Age: 63
End: 2018-07-27

## 2018-07-27 DIAGNOSIS — Z95.2 S/P AVR: ICD-10-CM

## 2018-07-27 LAB — INR PPP: 2 (ref 0.9–1.1)

## 2018-08-06 ENCOUNTER — COMMUNICATION - HEALTHEAST (OUTPATIENT)
Dept: ANTICOAGULATION | Facility: CLINIC | Age: 63
End: 2018-08-06

## 2018-08-06 ENCOUNTER — AMBULATORY - HEALTHEAST (OUTPATIENT)
Dept: LAB | Facility: CLINIC | Age: 63
End: 2018-08-06

## 2018-08-06 DIAGNOSIS — Z95.2 S/P AVR: ICD-10-CM

## 2018-08-06 LAB — INR PPP: 3.1 (ref 0.9–1.1)

## 2018-08-20 ENCOUNTER — COMMUNICATION - HEALTHEAST (OUTPATIENT)
Dept: ANTICOAGULATION | Facility: CLINIC | Age: 63
End: 2018-08-20

## 2018-08-20 ENCOUNTER — AMBULATORY - HEALTHEAST (OUTPATIENT)
Dept: LAB | Facility: CLINIC | Age: 63
End: 2018-08-20

## 2018-08-20 DIAGNOSIS — Z95.2 S/P AVR: ICD-10-CM

## 2018-08-20 LAB — INR PPP: 2.5 (ref 0.9–1.1)

## 2018-09-04 ENCOUNTER — AMBULATORY - HEALTHEAST (OUTPATIENT)
Dept: LAB | Facility: CLINIC | Age: 63
End: 2018-09-04

## 2018-09-04 ENCOUNTER — COMMUNICATION - HEALTHEAST (OUTPATIENT)
Dept: ANTICOAGULATION | Facility: CLINIC | Age: 63
End: 2018-09-04

## 2018-09-04 DIAGNOSIS — Z95.2 S/P AVR: ICD-10-CM

## 2018-09-04 LAB — INR PPP: 1.4 (ref 0.9–1.1)

## 2018-09-17 ENCOUNTER — COMMUNICATION - HEALTHEAST (OUTPATIENT)
Dept: ANTICOAGULATION | Facility: CLINIC | Age: 63
End: 2018-09-17

## 2018-09-17 ENCOUNTER — AMBULATORY - HEALTHEAST (OUTPATIENT)
Dept: LAB | Facility: CLINIC | Age: 63
End: 2018-09-17

## 2018-09-17 DIAGNOSIS — Z95.2 S/P AVR: ICD-10-CM

## 2018-09-17 LAB — INR PPP: 1.3 (ref 0.9–1.1)

## 2018-09-21 ENCOUNTER — AMBULATORY - HEALTHEAST (OUTPATIENT)
Dept: LAB | Facility: CLINIC | Age: 63
End: 2018-09-21

## 2018-09-21 ENCOUNTER — COMMUNICATION - HEALTHEAST (OUTPATIENT)
Dept: ANTICOAGULATION | Facility: CLINIC | Age: 63
End: 2018-09-21

## 2018-09-21 DIAGNOSIS — Z95.2 S/P AVR: ICD-10-CM

## 2018-09-21 LAB — INR PPP: 1.6 (ref 0.9–1.1)

## 2018-10-01 ENCOUNTER — COMMUNICATION - HEALTHEAST (OUTPATIENT)
Dept: ANTICOAGULATION | Facility: CLINIC | Age: 63
End: 2018-10-01

## 2018-10-01 ENCOUNTER — AMBULATORY - HEALTHEAST (OUTPATIENT)
Dept: LAB | Facility: CLINIC | Age: 63
End: 2018-10-01

## 2018-10-01 DIAGNOSIS — Z95.2 S/P AVR: ICD-10-CM

## 2018-10-01 LAB — INR PPP: 1.6 (ref 0.9–1.1)

## 2018-10-08 ENCOUNTER — AMBULATORY - HEALTHEAST (OUTPATIENT)
Dept: LAB | Facility: CLINIC | Age: 63
End: 2018-10-08

## 2018-10-08 ENCOUNTER — COMMUNICATION - HEALTHEAST (OUTPATIENT)
Dept: ANTICOAGULATION | Facility: CLINIC | Age: 63
End: 2018-10-08

## 2018-10-08 DIAGNOSIS — Z95.2 S/P AVR: ICD-10-CM

## 2018-10-08 LAB — INR PPP: 3.5 (ref 0.9–1.1)

## 2018-10-18 ENCOUNTER — COMMUNICATION - HEALTHEAST (OUTPATIENT)
Dept: ANTICOAGULATION | Facility: CLINIC | Age: 63
End: 2018-10-18

## 2018-10-18 ENCOUNTER — AMBULATORY - HEALTHEAST (OUTPATIENT)
Dept: LAB | Facility: CLINIC | Age: 63
End: 2018-10-18

## 2018-10-18 DIAGNOSIS — Z95.2 S/P AVR: ICD-10-CM

## 2018-10-18 LAB — INR PPP: 1.8 (ref 0.9–1.1)

## 2018-10-29 ENCOUNTER — AMBULATORY - HEALTHEAST (OUTPATIENT)
Dept: LAB | Facility: CLINIC | Age: 63
End: 2018-10-29

## 2018-10-29 ENCOUNTER — COMMUNICATION - HEALTHEAST (OUTPATIENT)
Dept: ANTICOAGULATION | Facility: CLINIC | Age: 63
End: 2018-10-29

## 2018-10-29 ENCOUNTER — COMMUNICATION - HEALTHEAST (OUTPATIENT)
Dept: FAMILY MEDICINE | Facility: CLINIC | Age: 63
End: 2018-10-29

## 2018-10-29 DIAGNOSIS — I10 HTN (HYPERTENSION): ICD-10-CM

## 2018-10-29 DIAGNOSIS — Z95.2 S/P AVR: ICD-10-CM

## 2018-10-29 DIAGNOSIS — E78.5 HYPERLIPIDEMIA: ICD-10-CM

## 2018-10-29 LAB — INR PPP: 1.7 (ref 0.9–1.1)

## 2018-11-01 ENCOUNTER — COMMUNICATION - HEALTHEAST (OUTPATIENT)
Dept: FAMILY MEDICINE | Facility: CLINIC | Age: 63
End: 2018-11-01

## 2018-11-01 DIAGNOSIS — G43.909 MIGRAINE: ICD-10-CM

## 2018-11-05 ENCOUNTER — COMMUNICATION - HEALTHEAST (OUTPATIENT)
Dept: ANTICOAGULATION | Facility: CLINIC | Age: 63
End: 2018-11-05

## 2018-11-05 ENCOUNTER — AMBULATORY - HEALTHEAST (OUTPATIENT)
Dept: LAB | Facility: CLINIC | Age: 63
End: 2018-11-05

## 2018-11-05 DIAGNOSIS — Z95.2 S/P AVR: ICD-10-CM

## 2018-11-05 LAB — INR PPP: 1.9 (ref 0.9–1.1)

## 2018-11-12 ENCOUNTER — AMBULATORY - HEALTHEAST (OUTPATIENT)
Dept: LAB | Facility: CLINIC | Age: 63
End: 2018-11-12

## 2018-11-12 ENCOUNTER — COMMUNICATION - HEALTHEAST (OUTPATIENT)
Dept: ANTICOAGULATION | Facility: CLINIC | Age: 63
End: 2018-11-12

## 2018-11-12 DIAGNOSIS — Z95.2 S/P AVR: ICD-10-CM

## 2018-11-12 LAB — INR PPP: 2.3 (ref 0.9–1.1)

## 2018-11-23 ENCOUNTER — AMBULATORY - HEALTHEAST (OUTPATIENT)
Dept: LAB | Facility: CLINIC | Age: 63
End: 2018-11-23

## 2018-11-23 ENCOUNTER — COMMUNICATION - HEALTHEAST (OUTPATIENT)
Dept: ANTICOAGULATION | Facility: CLINIC | Age: 63
End: 2018-11-23

## 2018-11-23 DIAGNOSIS — Z95.2 S/P AVR: ICD-10-CM

## 2018-11-23 LAB — INR PPP: 2.1 (ref 0.9–1.1)

## 2018-12-01 ENCOUNTER — COMMUNICATION - HEALTHEAST (OUTPATIENT)
Dept: FAMILY MEDICINE | Facility: CLINIC | Age: 63
End: 2018-12-01

## 2018-12-01 DIAGNOSIS — Z79.01 LONG TERM CURRENT USE OF ANTICOAGULANT THERAPY: ICD-10-CM

## 2018-12-01 DIAGNOSIS — Z95.2 S/P AVR: ICD-10-CM

## 2018-12-17 ENCOUNTER — AMBULATORY - HEALTHEAST (OUTPATIENT)
Dept: LAB | Facility: CLINIC | Age: 63
End: 2018-12-17

## 2018-12-17 ENCOUNTER — COMMUNICATION - HEALTHEAST (OUTPATIENT)
Dept: ANTICOAGULATION | Facility: CLINIC | Age: 63
End: 2018-12-17

## 2018-12-17 DIAGNOSIS — Z95.2 S/P AVR: ICD-10-CM

## 2018-12-17 LAB — INR PPP: 2.1 (ref 0.9–1.1)

## 2018-12-20 ENCOUNTER — OFFICE VISIT - HEALTHEAST (OUTPATIENT)
Dept: FAMILY MEDICINE | Facility: CLINIC | Age: 63
End: 2018-12-20

## 2018-12-20 ENCOUNTER — COMMUNICATION - HEALTHEAST (OUTPATIENT)
Dept: FAMILY MEDICINE | Facility: CLINIC | Age: 63
End: 2018-12-20

## 2018-12-20 DIAGNOSIS — M47.812 SPONDYLOSIS OF CERVICAL REGION WITHOUT MYELOPATHY OR RADICULOPATHY: ICD-10-CM

## 2018-12-20 DIAGNOSIS — G43.509 PERSISTENT MIGRAINE AURA WITHOUT CEREBRAL INFARCTION AND WITHOUT STATUS MIGRAINOSUS, NOT INTRACTABLE: ICD-10-CM

## 2018-12-26 ENCOUNTER — OFFICE VISIT - HEALTHEAST (OUTPATIENT)
Dept: PHYSICAL THERAPY | Facility: REHABILITATION | Age: 63
End: 2018-12-26

## 2018-12-26 DIAGNOSIS — M54.2 NECK PAIN ON LEFT SIDE: ICD-10-CM

## 2018-12-26 DIAGNOSIS — R29.898 DECREASED ROM OF NECK: ICD-10-CM

## 2018-12-26 DIAGNOSIS — R29.3 ABNORMAL POSTURE: ICD-10-CM

## 2018-12-28 ENCOUNTER — COMMUNICATION - HEALTHEAST (OUTPATIENT)
Dept: ADMINISTRATIVE | Facility: CLINIC | Age: 63
End: 2018-12-28

## 2018-12-30 ENCOUNTER — COMMUNICATION - HEALTHEAST (OUTPATIENT)
Dept: FAMILY MEDICINE | Facility: CLINIC | Age: 63
End: 2018-12-30

## 2019-01-09 ENCOUNTER — COMMUNICATION - HEALTHEAST (OUTPATIENT)
Dept: FAMILY MEDICINE | Facility: CLINIC | Age: 64
End: 2019-01-09

## 2019-01-09 DIAGNOSIS — Z79.01 LONG TERM CURRENT USE OF ANTICOAGULANT THERAPY: ICD-10-CM

## 2019-01-09 DIAGNOSIS — Z95.2 S/P AVR (AORTIC VALVE REPLACEMENT): ICD-10-CM

## 2019-01-10 ENCOUNTER — RECORDS - HEALTHEAST (OUTPATIENT)
Dept: ADMINISTRATIVE | Facility: OTHER | Age: 64
End: 2019-01-10

## 2019-01-14 ENCOUNTER — AMBULATORY - HEALTHEAST (OUTPATIENT)
Dept: LAB | Facility: CLINIC | Age: 64
End: 2019-01-14

## 2019-01-14 ENCOUNTER — HOSPITAL ENCOUNTER (OUTPATIENT)
Dept: PHYSICAL MEDICINE AND REHAB | Facility: CLINIC | Age: 64
Discharge: HOME | End: 2019-01-14
Attending: FAMILY MEDICINE

## 2019-01-14 ENCOUNTER — COMMUNICATION - HEALTHEAST (OUTPATIENT)
Dept: ANTICOAGULATION | Facility: CLINIC | Age: 64
End: 2019-01-14

## 2019-01-14 DIAGNOSIS — M48.00 MULTILEVEL FORAMINAL STENOSIS: ICD-10-CM

## 2019-01-14 DIAGNOSIS — M47.812 ARTHROPATHY OF CERVICAL FACET JOINT: ICD-10-CM

## 2019-01-14 DIAGNOSIS — Z95.2 S/P AVR: ICD-10-CM

## 2019-01-14 DIAGNOSIS — M50.30 DDD (DEGENERATIVE DISC DISEASE), CERVICAL: ICD-10-CM

## 2019-01-14 DIAGNOSIS — M54.2 NECK PAIN ON LEFT SIDE: ICD-10-CM

## 2019-01-14 LAB — INR PPP: 3.1 (ref 0.9–1.1)

## 2019-01-16 ENCOUNTER — RECORDS - HEALTHEAST (OUTPATIENT)
Dept: ADMINISTRATIVE | Facility: OTHER | Age: 64
End: 2019-01-16

## 2019-01-18 ENCOUNTER — COMMUNICATION - HEALTHEAST (OUTPATIENT)
Dept: ENDOCRINOLOGY | Facility: CLINIC | Age: 64
End: 2019-01-18

## 2019-01-18 DIAGNOSIS — E04.1 THYROID NODULE: ICD-10-CM

## 2019-01-19 ENCOUNTER — COMMUNICATION - HEALTHEAST (OUTPATIENT)
Dept: FAMILY MEDICINE | Facility: CLINIC | Age: 64
End: 2019-01-19

## 2019-01-19 DIAGNOSIS — G43.909 MIGRAINE: ICD-10-CM

## 2019-01-22 ENCOUNTER — OFFICE VISIT - HEALTHEAST (OUTPATIENT)
Dept: ENDOCRINOLOGY | Facility: CLINIC | Age: 64
End: 2019-01-22

## 2019-01-22 DIAGNOSIS — E03.9 HYPOTHYROIDISM, UNSPECIFIED TYPE: ICD-10-CM

## 2019-01-22 DIAGNOSIS — E04.1 THYROID NODULE: ICD-10-CM

## 2019-01-22 LAB
T4 FREE SERPL-MCNC: 1.1 NG/DL (ref 0.7–1.8)
TSH SERPL DL<=0.005 MIU/L-ACNC: 3.88 UIU/ML (ref 0.3–5)

## 2019-01-22 ASSESSMENT — MIFFLIN-ST. JEOR: SCORE: 1067.85

## 2019-01-25 ENCOUNTER — HOSPITAL ENCOUNTER (OUTPATIENT)
Dept: ULTRASOUND IMAGING | Facility: HOSPITAL | Age: 64
Discharge: HOME | End: 2019-01-25
Attending: INTERNAL MEDICINE

## 2019-01-25 DIAGNOSIS — E03.9 HYPOTHYROIDISM, UNSPECIFIED TYPE: ICD-10-CM

## 2019-01-29 ENCOUNTER — COMMUNICATION - HEALTHEAST (OUTPATIENT)
Dept: ANTICOAGULATION | Facility: CLINIC | Age: 64
End: 2019-01-29

## 2019-02-05 ENCOUNTER — COMMUNICATION - HEALTHEAST (OUTPATIENT)
Dept: FAMILY MEDICINE | Facility: CLINIC | Age: 64
End: 2019-02-05

## 2019-02-05 DIAGNOSIS — I10 HTN (HYPERTENSION): ICD-10-CM

## 2019-02-11 ENCOUNTER — COMMUNICATION - HEALTHEAST (OUTPATIENT)
Dept: ANTICOAGULATION | Facility: CLINIC | Age: 64
End: 2019-02-11

## 2019-02-11 ENCOUNTER — AMBULATORY - HEALTHEAST (OUTPATIENT)
Dept: LAB | Facility: CLINIC | Age: 64
End: 2019-02-11

## 2019-02-11 DIAGNOSIS — Z95.2 S/P AVR: ICD-10-CM

## 2019-02-11 LAB — INR PPP: 2.4 (ref 0.9–1.1)

## 2019-02-18 ENCOUNTER — COMMUNICATION - HEALTHEAST (OUTPATIENT)
Dept: LAB | Facility: CLINIC | Age: 64
End: 2019-02-18

## 2019-02-22 ENCOUNTER — HOSPITAL ENCOUNTER (OUTPATIENT)
Dept: MAMMOGRAPHY | Facility: CLINIC | Age: 64
Discharge: HOME | End: 2019-02-22
Attending: FAMILY MEDICINE

## 2019-02-22 DIAGNOSIS — Z12.31 VISIT FOR SCREENING MAMMOGRAM: ICD-10-CM

## 2019-03-04 ENCOUNTER — AMBULATORY - HEALTHEAST (OUTPATIENT)
Dept: LAB | Facility: CLINIC | Age: 64
End: 2019-03-04

## 2019-03-04 ENCOUNTER — COMMUNICATION - HEALTHEAST (OUTPATIENT)
Dept: ANTICOAGULATION | Facility: CLINIC | Age: 64
End: 2019-03-04

## 2019-03-04 DIAGNOSIS — Z95.2 S/P AVR: ICD-10-CM

## 2019-03-04 LAB — INR PPP: 2 (ref 0.9–1.1)

## 2019-03-13 ENCOUNTER — COMMUNICATION - HEALTHEAST (OUTPATIENT)
Dept: FAMILY MEDICINE | Facility: CLINIC | Age: 64
End: 2019-03-13

## 2019-03-13 DIAGNOSIS — I10 ESSENTIAL HYPERTENSION, MALIGNANT: ICD-10-CM

## 2019-03-15 ENCOUNTER — OFFICE VISIT - HEALTHEAST (OUTPATIENT)
Dept: CARDIOLOGY | Facility: CLINIC | Age: 64
End: 2019-03-15

## 2019-03-15 DIAGNOSIS — Z95.2 S/P AVR: ICD-10-CM

## 2019-03-15 DIAGNOSIS — I71.03 DISSECTION OF THORACOABDOMINAL AORTA (H): ICD-10-CM

## 2019-03-15 DIAGNOSIS — Q87.40 MARFAN'S SYNDROME: ICD-10-CM

## 2019-03-15 DIAGNOSIS — I35.9 AORTIC VALVE DISORDER: ICD-10-CM

## 2019-03-15 ASSESSMENT — MIFFLIN-ST. JEOR: SCORE: 1058.77

## 2019-03-25 ENCOUNTER — HOSPITAL ENCOUNTER (OUTPATIENT)
Dept: CT IMAGING | Facility: HOSPITAL | Age: 64
Discharge: HOME | End: 2019-03-25
Attending: INTERNAL MEDICINE

## 2019-03-25 DIAGNOSIS — I71.03 DISSECTION OF THORACOABDOMINAL AORTA (H): ICD-10-CM

## 2019-03-25 DIAGNOSIS — Q87.40 MARFAN'S SYNDROME: ICD-10-CM

## 2019-03-25 LAB
POC GFR AMER AF HE - HISTORICAL: >60 ML/MIN/1.73M2
POC GFR NON AMER AF HE - HISTORICAL: >60 ML/MIN/1.73M2

## 2019-03-26 ENCOUNTER — OFFICE VISIT - HEALTHEAST (OUTPATIENT)
Dept: FAMILY MEDICINE | Facility: CLINIC | Age: 64
End: 2019-03-26

## 2019-03-26 ENCOUNTER — AMBULATORY - HEALTHEAST (OUTPATIENT)
Dept: LAB | Facility: CLINIC | Age: 64
End: 2019-03-26

## 2019-03-26 ENCOUNTER — COMMUNICATION - HEALTHEAST (OUTPATIENT)
Dept: ANTICOAGULATION | Facility: CLINIC | Age: 64
End: 2019-03-26

## 2019-03-26 DIAGNOSIS — Z95.2 S/P AVR: ICD-10-CM

## 2019-03-26 DIAGNOSIS — S32.2XXA CLOSED FRACTURE OF COCCYX, INITIAL ENCOUNTER (H): ICD-10-CM

## 2019-03-26 LAB — INR PPP: 2.3 (ref 0.9–1.1)

## 2019-04-08 ENCOUNTER — OFFICE VISIT - HEALTHEAST (OUTPATIENT)
Dept: FAMILY MEDICINE | Facility: CLINIC | Age: 64
End: 2019-04-08

## 2019-04-08 DIAGNOSIS — R68.89: ICD-10-CM

## 2019-04-08 DIAGNOSIS — M79.10 MYALGIA: ICD-10-CM

## 2019-04-09 ENCOUNTER — COMMUNICATION - HEALTHEAST (OUTPATIENT)
Dept: FAMILY MEDICINE | Facility: CLINIC | Age: 64
End: 2019-04-09

## 2019-04-15 ENCOUNTER — COMMUNICATION - HEALTHEAST (OUTPATIENT)
Dept: FAMILY MEDICINE | Facility: CLINIC | Age: 64
End: 2019-04-15

## 2019-04-15 DIAGNOSIS — G43.009 MIGRAINE WITHOUT AURA AND WITHOUT STATUS MIGRAINOSUS, NOT INTRACTABLE: ICD-10-CM

## 2019-04-23 ENCOUNTER — COMMUNICATION - HEALTHEAST (OUTPATIENT)
Dept: ANTICOAGULATION | Facility: CLINIC | Age: 64
End: 2019-04-23

## 2019-04-23 ENCOUNTER — AMBULATORY - HEALTHEAST (OUTPATIENT)
Dept: LAB | Facility: CLINIC | Age: 64
End: 2019-04-23

## 2019-04-23 DIAGNOSIS — Z95.2 S/P AVR: ICD-10-CM

## 2019-04-23 LAB — INR PPP: 2.8 (ref 0.9–1.1)

## 2019-04-28 ENCOUNTER — COMMUNICATION - HEALTHEAST (OUTPATIENT)
Dept: LAB | Facility: CLINIC | Age: 64
End: 2019-04-28

## 2019-04-28 DIAGNOSIS — I10 HTN (HYPERTENSION): ICD-10-CM

## 2019-05-04 ENCOUNTER — COMMUNICATION - HEALTHEAST (OUTPATIENT)
Dept: FAMILY MEDICINE | Facility: CLINIC | Age: 64
End: 2019-05-04

## 2019-05-04 DIAGNOSIS — Z95.2 S/P AVR: ICD-10-CM

## 2019-05-04 DIAGNOSIS — Z79.01 LONG TERM CURRENT USE OF ANTICOAGULANT THERAPY: ICD-10-CM

## 2019-05-08 ENCOUNTER — COMMUNICATION - HEALTHEAST (OUTPATIENT)
Dept: ANTICOAGULATION | Facility: CLINIC | Age: 64
End: 2019-05-08

## 2019-05-08 ENCOUNTER — OFFICE VISIT - HEALTHEAST (OUTPATIENT)
Dept: FAMILY MEDICINE | Facility: CLINIC | Age: 64
End: 2019-05-08

## 2019-05-08 DIAGNOSIS — I10 ESSENTIAL HYPERTENSION: ICD-10-CM

## 2019-05-08 DIAGNOSIS — E78.2 MIXED HYPERLIPIDEMIA: ICD-10-CM

## 2019-05-08 DIAGNOSIS — L85.3 DRY SKIN: ICD-10-CM

## 2019-05-08 DIAGNOSIS — Z00.00 HEALTHCARE MAINTENANCE: ICD-10-CM

## 2019-05-08 DIAGNOSIS — Z95.2 S/P AVR: ICD-10-CM

## 2019-05-08 LAB
ALBUMIN SERPL-MCNC: 4.5 G/DL (ref 3.5–5)
ALP SERPL-CCNC: 82 U/L (ref 45–120)
ALT SERPL W P-5'-P-CCNC: 23 U/L (ref 0–45)
ANION GAP SERPL CALCULATED.3IONS-SCNC: 12 MMOL/L (ref 5–18)
AST SERPL W P-5'-P-CCNC: 29 U/L (ref 0–40)
BASOPHILS # BLD AUTO: 0 THOU/UL (ref 0–0.2)
BASOPHILS NFR BLD AUTO: 0 % (ref 0–2)
BILIRUB SERPL-MCNC: 0.5 MG/DL (ref 0–1)
BUN SERPL-MCNC: 18 MG/DL (ref 8–22)
CALCIUM SERPL-MCNC: 10.3 MG/DL (ref 8.5–10.5)
CHLORIDE BLD-SCNC: 103 MMOL/L (ref 98–107)
CHOLEST SERPL-MCNC: 171 MG/DL
CO2 SERPL-SCNC: 27 MMOL/L (ref 22–31)
CREAT SERPL-MCNC: 0.87 MG/DL (ref 0.6–1.1)
EOSINOPHIL # BLD AUTO: 0.1 THOU/UL (ref 0–0.4)
EOSINOPHIL NFR BLD AUTO: 2 % (ref 0–6)
ERYTHROCYTE [DISTWIDTH] IN BLOOD BY AUTOMATED COUNT: 12.6 % (ref 11–14.5)
FASTING STATUS PATIENT QL REPORTED: YES
GFR SERPL CREATININE-BSD FRML MDRD: >60 ML/MIN/1.73M2
GLUCOSE BLD-MCNC: 86 MG/DL (ref 70–125)
HCT VFR BLD AUTO: 38.1 % (ref 35–47)
HDLC SERPL-MCNC: 50 MG/DL
HGB BLD-MCNC: 12.9 G/DL (ref 12–16)
INR PPP: 2.5 (ref 0.9–1.1)
LDLC SERPL CALC-MCNC: 99 MG/DL
LYMPHOCYTES # BLD AUTO: 1.1 THOU/UL (ref 0.8–4.4)
LYMPHOCYTES NFR BLD AUTO: 29 % (ref 20–40)
MCH RBC QN AUTO: 27.2 PG (ref 27–34)
MCHC RBC AUTO-ENTMCNC: 34 G/DL (ref 32–36)
MCV RBC AUTO: 80 FL (ref 80–100)
MONOCYTES # BLD AUTO: 0.4 THOU/UL (ref 0–0.9)
MONOCYTES NFR BLD AUTO: 10 % (ref 2–10)
NEUTROPHILS # BLD AUTO: 2.3 THOU/UL (ref 2–7.7)
NEUTROPHILS NFR BLD AUTO: 59 % (ref 50–70)
PLATELET # BLD AUTO: 168 THOU/UL (ref 140–440)
PMV BLD AUTO: 8.6 FL (ref 7–10)
POTASSIUM BLD-SCNC: 3.7 MMOL/L (ref 3.5–5)
PROT SERPL-MCNC: 7.3 G/DL (ref 6–8)
RBC # BLD AUTO: 4.76 MILL/UL (ref 3.8–5.4)
SODIUM SERPL-SCNC: 142 MMOL/L (ref 136–145)
TRIGL SERPL-MCNC: 111 MG/DL
WBC: 3.8 THOU/UL (ref 4–11)

## 2019-05-08 ASSESSMENT — MIFFLIN-ST. JEOR: SCORE: 1049.36

## 2019-05-10 ENCOUNTER — COMMUNICATION - HEALTHEAST (OUTPATIENT)
Dept: FAMILY MEDICINE | Facility: CLINIC | Age: 64
End: 2019-05-10

## 2019-05-13 ENCOUNTER — COMMUNICATION - HEALTHEAST (OUTPATIENT)
Dept: FAMILY MEDICINE | Facility: CLINIC | Age: 64
End: 2019-05-13

## 2019-05-14 ENCOUNTER — COMMUNICATION - HEALTHEAST (OUTPATIENT)
Dept: FAMILY MEDICINE | Facility: CLINIC | Age: 64
End: 2019-05-14

## 2019-05-15 ENCOUNTER — COMMUNICATION - HEALTHEAST (OUTPATIENT)
Dept: FAMILY MEDICINE | Facility: CLINIC | Age: 64
End: 2019-05-15

## 2019-05-15 DIAGNOSIS — Z12.73 SCREENING FOR OVARIAN CANCER: ICD-10-CM

## 2019-05-20 ENCOUNTER — COMMUNICATION - HEALTHEAST (OUTPATIENT)
Dept: FAMILY MEDICINE | Facility: CLINIC | Age: 64
End: 2019-05-20

## 2019-05-20 ENCOUNTER — HOSPITAL ENCOUNTER (OUTPATIENT)
Dept: ULTRASOUND IMAGING | Facility: HOSPITAL | Age: 64
Discharge: HOME | End: 2019-05-20

## 2019-05-20 DIAGNOSIS — Z12.73 SCREENING FOR OVARIAN CANCER: ICD-10-CM

## 2019-05-29 ENCOUNTER — COMMUNICATION - HEALTHEAST (OUTPATIENT)
Dept: FAMILY MEDICINE | Facility: CLINIC | Age: 64
End: 2019-05-29

## 2019-05-29 DIAGNOSIS — G43.909 MIGRAINE: ICD-10-CM

## 2019-06-04 ENCOUNTER — RECORDS - HEALTHEAST (OUTPATIENT)
Dept: ADMINISTRATIVE | Facility: OTHER | Age: 64
End: 2019-06-04

## 2019-06-05 ENCOUNTER — COMMUNICATION - HEALTHEAST (OUTPATIENT)
Dept: ANTICOAGULATION | Facility: CLINIC | Age: 64
End: 2019-06-05

## 2019-06-05 ENCOUNTER — AMBULATORY - HEALTHEAST (OUTPATIENT)
Dept: LAB | Facility: CLINIC | Age: 64
End: 2019-06-05

## 2019-06-05 DIAGNOSIS — Z95.2 S/P AVR: ICD-10-CM

## 2019-06-05 LAB — INR PPP: 3.3 (ref 0.9–1.1)

## 2019-06-19 ENCOUNTER — COMMUNICATION - HEALTHEAST (OUTPATIENT)
Dept: ANTICOAGULATION | Facility: CLINIC | Age: 64
End: 2019-06-19

## 2019-06-19 ENCOUNTER — AMBULATORY - HEALTHEAST (OUTPATIENT)
Dept: LAB | Facility: CLINIC | Age: 64
End: 2019-06-19

## 2019-06-19 DIAGNOSIS — Z95.2 S/P AVR: ICD-10-CM

## 2019-06-19 LAB — INR PPP: 3.1 (ref 0.9–1.1)

## 2019-07-03 ENCOUNTER — COMMUNICATION - HEALTHEAST (OUTPATIENT)
Dept: ANTICOAGULATION | Facility: CLINIC | Age: 64
End: 2019-07-03

## 2019-07-03 ENCOUNTER — AMBULATORY - HEALTHEAST (OUTPATIENT)
Dept: LAB | Facility: CLINIC | Age: 64
End: 2019-07-03

## 2019-07-03 DIAGNOSIS — Z95.2 S/P AVR: ICD-10-CM

## 2019-07-03 LAB — INR PPP: 2.9 (ref 0.9–1.1)

## 2019-07-15 ENCOUNTER — COMMUNICATION - HEALTHEAST (OUTPATIENT)
Dept: LAB | Facility: CLINIC | Age: 64
End: 2019-07-15

## 2019-07-15 DIAGNOSIS — E78.5 HYPERLIPIDEMIA: ICD-10-CM

## 2019-07-17 ENCOUNTER — AMBULATORY - HEALTHEAST (OUTPATIENT)
Dept: LAB | Facility: CLINIC | Age: 64
End: 2019-07-17

## 2019-07-17 ENCOUNTER — COMMUNICATION - HEALTHEAST (OUTPATIENT)
Dept: ANTICOAGULATION | Facility: CLINIC | Age: 64
End: 2019-07-17

## 2019-07-17 DIAGNOSIS — Z95.2 S/P AVR: ICD-10-CM

## 2019-07-17 LAB — INR PPP: 3.1 (ref 0.9–1.1)

## 2019-08-06 ENCOUNTER — COMMUNICATION - HEALTHEAST (OUTPATIENT)
Dept: ANTICOAGULATION | Facility: CLINIC | Age: 64
End: 2019-08-06

## 2019-08-06 ENCOUNTER — AMBULATORY - HEALTHEAST (OUTPATIENT)
Dept: LAB | Facility: CLINIC | Age: 64
End: 2019-08-06

## 2019-08-06 DIAGNOSIS — Z95.2 S/P AVR: ICD-10-CM

## 2019-08-06 LAB — INR PPP: 3.8 (ref 0.9–1.1)

## 2019-08-10 ENCOUNTER — COMMUNICATION - HEALTHEAST (OUTPATIENT)
Dept: FAMILY MEDICINE | Facility: CLINIC | Age: 64
End: 2019-08-10

## 2019-08-10 DIAGNOSIS — I10 HTN (HYPERTENSION): ICD-10-CM

## 2019-08-10 DIAGNOSIS — Z79.01 LONG TERM CURRENT USE OF ANTICOAGULANT THERAPY: ICD-10-CM

## 2019-08-10 DIAGNOSIS — Z95.2 S/P AVR (AORTIC VALVE REPLACEMENT): ICD-10-CM

## 2019-08-20 ENCOUNTER — AMBULATORY - HEALTHEAST (OUTPATIENT)
Dept: LAB | Facility: CLINIC | Age: 64
End: 2019-08-20

## 2019-08-20 ENCOUNTER — COMMUNICATION - HEALTHEAST (OUTPATIENT)
Dept: ANTICOAGULATION | Facility: CLINIC | Age: 64
End: 2019-08-20

## 2019-08-20 DIAGNOSIS — Z95.2 S/P AVR: ICD-10-CM

## 2019-08-20 LAB — INR PPP: 3.1 (ref 0.9–1.1)

## 2019-09-02 ENCOUNTER — COMMUNICATION - HEALTHEAST (OUTPATIENT)
Dept: FAMILY MEDICINE | Facility: CLINIC | Age: 64
End: 2019-09-02

## 2019-09-02 DIAGNOSIS — I10 ESSENTIAL HYPERTENSION, MALIGNANT: ICD-10-CM

## 2019-09-13 ENCOUNTER — AMBULATORY - HEALTHEAST (OUTPATIENT)
Dept: LAB | Facility: CLINIC | Age: 64
End: 2019-09-13

## 2019-09-13 ENCOUNTER — COMMUNICATION - HEALTHEAST (OUTPATIENT)
Dept: ANTICOAGULATION | Facility: CLINIC | Age: 64
End: 2019-09-13

## 2019-09-13 DIAGNOSIS — Z95.2 S/P AVR: ICD-10-CM

## 2019-09-13 LAB — INR PPP: 2.2 (ref 0.9–1.1)

## 2019-09-27 ENCOUNTER — AMBULATORY - HEALTHEAST (OUTPATIENT)
Dept: LAB | Facility: CLINIC | Age: 64
End: 2019-09-27

## 2019-09-27 ENCOUNTER — COMMUNICATION - HEALTHEAST (OUTPATIENT)
Dept: FAMILY MEDICINE | Facility: CLINIC | Age: 64
End: 2019-09-27

## 2019-09-27 ENCOUNTER — COMMUNICATION - HEALTHEAST (OUTPATIENT)
Dept: ANTICOAGULATION | Facility: CLINIC | Age: 64
End: 2019-09-27

## 2019-09-27 DIAGNOSIS — Z95.2 S/P AVR: ICD-10-CM

## 2019-09-27 LAB — INR PPP: 3.5 (ref 0.9–1.1)

## 2019-10-11 ENCOUNTER — COMMUNICATION - HEALTHEAST (OUTPATIENT)
Dept: ANTICOAGULATION | Facility: CLINIC | Age: 64
End: 2019-10-11

## 2019-10-11 ENCOUNTER — AMBULATORY - HEALTHEAST (OUTPATIENT)
Dept: LAB | Facility: CLINIC | Age: 64
End: 2019-10-11

## 2019-10-11 DIAGNOSIS — Z95.2 S/P AVR: ICD-10-CM

## 2019-10-11 LAB — INR PPP: 2.7 (ref 0.9–1.1)

## 2019-10-17 ENCOUNTER — COMMUNICATION - HEALTHEAST (OUTPATIENT)
Dept: FAMILY MEDICINE | Facility: CLINIC | Age: 64
End: 2019-10-17

## 2019-10-17 DIAGNOSIS — G43.909 MIGRAINE: ICD-10-CM

## 2019-10-17 DIAGNOSIS — E78.5 HYPERLIPIDEMIA: ICD-10-CM

## 2019-10-25 ENCOUNTER — COMMUNICATION - HEALTHEAST (OUTPATIENT)
Dept: FAMILY MEDICINE | Facility: CLINIC | Age: 64
End: 2019-10-25

## 2019-10-25 ENCOUNTER — AMBULATORY - HEALTHEAST (OUTPATIENT)
Dept: LAB | Facility: CLINIC | Age: 64
End: 2019-10-25

## 2019-10-25 ENCOUNTER — COMMUNICATION - HEALTHEAST (OUTPATIENT)
Dept: ANTICOAGULATION | Facility: CLINIC | Age: 64
End: 2019-10-25

## 2019-10-25 DIAGNOSIS — Z95.2 S/P AVR: ICD-10-CM

## 2019-10-25 DIAGNOSIS — I10 HTN (HYPERTENSION): ICD-10-CM

## 2019-10-25 LAB — INR PPP: 1.8 (ref 0.9–1.1)

## 2019-11-11 ENCOUNTER — COMMUNICATION - HEALTHEAST (OUTPATIENT)
Dept: ANTICOAGULATION | Facility: CLINIC | Age: 64
End: 2019-11-11

## 2019-11-11 ENCOUNTER — AMBULATORY - HEALTHEAST (OUTPATIENT)
Dept: LAB | Facility: CLINIC | Age: 64
End: 2019-11-11

## 2019-11-11 DIAGNOSIS — Z95.2 S/P AVR: ICD-10-CM

## 2019-11-11 LAB — INR PPP: 1.9 (ref 0.9–1.1)

## 2019-11-25 ENCOUNTER — AMBULATORY - HEALTHEAST (OUTPATIENT)
Dept: LAB | Facility: CLINIC | Age: 64
End: 2019-11-25

## 2019-11-25 ENCOUNTER — COMMUNICATION - HEALTHEAST (OUTPATIENT)
Dept: ANTICOAGULATION | Facility: CLINIC | Age: 64
End: 2019-11-25

## 2019-11-25 DIAGNOSIS — Z95.2 S/P AVR: ICD-10-CM

## 2019-11-25 LAB — INR PPP: 2.5 (ref 0.9–1.1)

## 2019-12-03 ENCOUNTER — COMMUNICATION - HEALTHEAST (OUTPATIENT)
Dept: FAMILY MEDICINE | Facility: CLINIC | Age: 64
End: 2019-12-03

## 2019-12-03 DIAGNOSIS — Z95.2 S/P AVR: ICD-10-CM

## 2019-12-04 ENCOUNTER — RECORDS - HEALTHEAST (OUTPATIENT)
Dept: ADMINISTRATIVE | Facility: OTHER | Age: 64
End: 2019-12-04

## 2019-12-07 ENCOUNTER — COMMUNICATION - HEALTHEAST (OUTPATIENT)
Dept: FAMILY MEDICINE | Facility: CLINIC | Age: 64
End: 2019-12-07

## 2019-12-07 DIAGNOSIS — Z79.01 LONG TERM CURRENT USE OF ANTICOAGULANT THERAPY: ICD-10-CM

## 2019-12-07 DIAGNOSIS — Z95.2 S/P AVR: ICD-10-CM

## 2019-12-10 ENCOUNTER — AMBULATORY - HEALTHEAST (OUTPATIENT)
Dept: LAB | Facility: CLINIC | Age: 64
End: 2019-12-10

## 2019-12-10 ENCOUNTER — COMMUNICATION - HEALTHEAST (OUTPATIENT)
Dept: ANTICOAGULATION | Facility: CLINIC | Age: 64
End: 2019-12-10

## 2019-12-10 DIAGNOSIS — Z95.2 S/P AVR: ICD-10-CM

## 2019-12-10 LAB — INR PPP: 2.1 (ref 0.9–1.1)

## 2020-01-07 ENCOUNTER — COMMUNICATION - HEALTHEAST (OUTPATIENT)
Dept: FAMILY MEDICINE | Facility: CLINIC | Age: 65
End: 2020-01-07

## 2020-01-07 ENCOUNTER — COMMUNICATION - HEALTHEAST (OUTPATIENT)
Dept: SCHEDULING | Facility: CLINIC | Age: 65
End: 2020-01-07

## 2020-01-08 ENCOUNTER — COMMUNICATION - HEALTHEAST (OUTPATIENT)
Dept: ANTICOAGULATION | Facility: CLINIC | Age: 65
End: 2020-01-08

## 2020-01-13 ENCOUNTER — COMMUNICATION - HEALTHEAST (OUTPATIENT)
Dept: ANTICOAGULATION | Facility: CLINIC | Age: 65
End: 2020-01-13

## 2020-01-13 DIAGNOSIS — Z95.2 S/P AVR: ICD-10-CM

## 2020-01-15 ENCOUNTER — OFFICE VISIT - HEALTHEAST (OUTPATIENT)
Dept: FAMILY MEDICINE | Facility: CLINIC | Age: 65
End: 2020-01-15

## 2020-01-15 ENCOUNTER — COMMUNICATION - HEALTHEAST (OUTPATIENT)
Dept: ANTICOAGULATION | Facility: CLINIC | Age: 65
End: 2020-01-15

## 2020-01-15 DIAGNOSIS — E04.1 THYROID NODULE: ICD-10-CM

## 2020-01-15 DIAGNOSIS — I10 ESSENTIAL HYPERTENSION: ICD-10-CM

## 2020-01-15 DIAGNOSIS — M81.6 LOCALIZED OSTEOPOROSIS WITHOUT CURRENT PATHOLOGICAL FRACTURE: ICD-10-CM

## 2020-01-15 DIAGNOSIS — E03.9 HYPOTHYROIDISM, UNSPECIFIED TYPE: ICD-10-CM

## 2020-01-15 DIAGNOSIS — M85.80 OSTEOPENIA, UNSPECIFIED LOCATION: ICD-10-CM

## 2020-01-15 DIAGNOSIS — I71.03 DISSECTION OF THORACOABDOMINAL AORTA (H): ICD-10-CM

## 2020-01-15 DIAGNOSIS — D32.9 MENINGIOMA (H): ICD-10-CM

## 2020-01-15 DIAGNOSIS — Z95.2 S/P AVR: ICD-10-CM

## 2020-01-15 DIAGNOSIS — Z12.31 VISIT FOR SCREENING MAMMOGRAM: ICD-10-CM

## 2020-01-15 LAB
INR PPP: 1.7 (ref 0.9–1.1)
SODIUM SERPL-SCNC: 135 MMOL/L (ref 136–145)
TSH SERPL DL<=0.005 MIU/L-ACNC: 4.03 UIU/ML (ref 0.3–5)
WBC: 4.4 THOU/UL (ref 4–11)

## 2020-01-15 ASSESSMENT — ANXIETY QUESTIONNAIRES
4. TROUBLE RELAXING: NOT AT ALL
IF YOU CHECKED OFF ANY PROBLEMS ON THIS QUESTIONNAIRE, HOW DIFFICULT HAVE THESE PROBLEMS MADE IT FOR YOU TO DO YOUR WORK, TAKE CARE OF THINGS AT HOME, OR GET ALONG WITH OTHER PEOPLE: NOT DIFFICULT AT ALL
6. BECOMING EASILY ANNOYED OR IRRITABLE: NOT AT ALL
2. NOT BEING ABLE TO STOP OR CONTROL WORRYING: NOT AT ALL
7. FEELING AFRAID AS IF SOMETHING AWFUL MIGHT HAPPEN: NOT AT ALL
5. BEING SO RESTLESS THAT IT IS HARD TO SIT STILL: NOT AT ALL
GAD7 TOTAL SCORE: 0
3. WORRYING TOO MUCH ABOUT DIFFERENT THINGS: NOT AT ALL
1. FEELING NERVOUS, ANXIOUS, OR ON EDGE: NOT AT ALL

## 2020-01-15 ASSESSMENT — PATIENT HEALTH QUESTIONNAIRE - PHQ9: SUM OF ALL RESPONSES TO PHQ QUESTIONS 1-9: 0

## 2020-01-16 LAB
25(OH)D3 SERPL-MCNC: 59.7 NG/ML (ref 30–80)
25(OH)D3 SERPL-MCNC: 59.7 NG/ML (ref 30–80)

## 2020-01-17 ENCOUNTER — AMBULATORY - HEALTHEAST (OUTPATIENT)
Dept: FAMILY MEDICINE | Facility: CLINIC | Age: 65
End: 2020-01-17

## 2020-01-24 ENCOUNTER — COMMUNICATION - HEALTHEAST (OUTPATIENT)
Dept: FAMILY MEDICINE | Facility: CLINIC | Age: 65
End: 2020-01-24

## 2020-01-25 ENCOUNTER — COMMUNICATION - HEALTHEAST (OUTPATIENT)
Dept: FAMILY MEDICINE | Facility: CLINIC | Age: 65
End: 2020-01-25

## 2020-01-25 DIAGNOSIS — G43.909 MIGRAINE: ICD-10-CM

## 2020-01-26 ENCOUNTER — COMMUNICATION - HEALTHEAST (OUTPATIENT)
Dept: FAMILY MEDICINE | Facility: CLINIC | Age: 65
End: 2020-01-26

## 2020-01-26 DIAGNOSIS — Z95.2 S/P AVR (AORTIC VALVE REPLACEMENT): ICD-10-CM

## 2020-01-26 DIAGNOSIS — Z79.01 LONG TERM CURRENT USE OF ANTICOAGULANT THERAPY: ICD-10-CM

## 2020-01-29 ENCOUNTER — AMBULATORY - HEALTHEAST (OUTPATIENT)
Dept: LAB | Facility: CLINIC | Age: 65
End: 2020-01-29

## 2020-01-29 ENCOUNTER — COMMUNICATION - HEALTHEAST (OUTPATIENT)
Dept: ANTICOAGULATION | Facility: CLINIC | Age: 65
End: 2020-01-29

## 2020-01-29 DIAGNOSIS — Z95.2 S/P AVR: ICD-10-CM

## 2020-01-29 LAB — INR PPP: 1.6 (ref 0.9–1.1)

## 2020-02-10 ENCOUNTER — COMMUNICATION - HEALTHEAST (OUTPATIENT)
Dept: ENDOCRINOLOGY | Facility: CLINIC | Age: 65
End: 2020-02-10

## 2020-02-10 DIAGNOSIS — E04.1 THYROID NODULE: ICD-10-CM

## 2020-02-11 ENCOUNTER — COMMUNICATION - HEALTHEAST (OUTPATIENT)
Dept: ANTICOAGULATION | Facility: CLINIC | Age: 65
End: 2020-02-11

## 2020-02-11 ENCOUNTER — AMBULATORY - HEALTHEAST (OUTPATIENT)
Dept: LAB | Facility: CLINIC | Age: 65
End: 2020-02-11

## 2020-02-11 DIAGNOSIS — Z95.2 S/P AVR: ICD-10-CM

## 2020-02-11 LAB — INR PPP: 2.4 (ref 0.9–1.1)

## 2020-02-22 ENCOUNTER — COMMUNICATION - HEALTHEAST (OUTPATIENT)
Dept: FAMILY MEDICINE | Facility: CLINIC | Age: 65
End: 2020-02-22

## 2020-02-25 ENCOUNTER — COMMUNICATION - HEALTHEAST (OUTPATIENT)
Dept: ANTICOAGULATION | Facility: CLINIC | Age: 65
End: 2020-02-25

## 2020-02-25 ENCOUNTER — AMBULATORY - HEALTHEAST (OUTPATIENT)
Dept: LAB | Facility: CLINIC | Age: 65
End: 2020-02-25

## 2020-02-25 DIAGNOSIS — Z95.2 S/P AVR: ICD-10-CM

## 2020-02-25 LAB — INR PPP: 2.1 (ref 0.9–1.1)

## 2020-02-26 ENCOUNTER — COMMUNICATION - HEALTHEAST (OUTPATIENT)
Dept: FAMILY MEDICINE | Facility: CLINIC | Age: 65
End: 2020-02-26

## 2020-02-26 DIAGNOSIS — Z95.2 S/P AVR (AORTIC VALVE REPLACEMENT): ICD-10-CM

## 2020-02-26 DIAGNOSIS — Z95.2 S/P AVR: ICD-10-CM

## 2020-02-26 DIAGNOSIS — Z79.01 LONG TERM CURRENT USE OF ANTICOAGULANT THERAPY: ICD-10-CM

## 2020-02-26 DIAGNOSIS — E78.5 HYPERLIPIDEMIA: ICD-10-CM

## 2020-02-26 DIAGNOSIS — E04.1 THYROID NODULE: ICD-10-CM

## 2020-02-26 DIAGNOSIS — I10 ESSENTIAL HYPERTENSION, MALIGNANT: ICD-10-CM

## 2020-02-27 ENCOUNTER — COMMUNICATION - HEALTHEAST (OUTPATIENT)
Dept: FAMILY MEDICINE | Facility: CLINIC | Age: 65
End: 2020-02-27

## 2020-02-27 DIAGNOSIS — I10 ESSENTIAL HYPERTENSION, MALIGNANT: ICD-10-CM

## 2020-02-27 DIAGNOSIS — Z95.2 S/P AVR (AORTIC VALVE REPLACEMENT): ICD-10-CM

## 2020-02-27 DIAGNOSIS — E78.5 HYPERLIPIDEMIA: ICD-10-CM

## 2020-02-27 DIAGNOSIS — E04.1 THYROID NODULE: ICD-10-CM

## 2020-02-27 DIAGNOSIS — Z95.2 S/P AVR: ICD-10-CM

## 2020-02-27 DIAGNOSIS — Z79.01 LONG TERM CURRENT USE OF ANTICOAGULANT THERAPY: ICD-10-CM

## 2020-03-02 ENCOUNTER — HOSPITAL ENCOUNTER (OUTPATIENT)
Dept: MAMMOGRAPHY | Facility: CLINIC | Age: 65
Discharge: HOME | End: 2020-03-02
Attending: FAMILY MEDICINE

## 2020-03-02 DIAGNOSIS — Z12.31 VISIT FOR SCREENING MAMMOGRAM: ICD-10-CM

## 2020-03-03 ENCOUNTER — COMMUNICATION - HEALTHEAST (OUTPATIENT)
Dept: FAMILY MEDICINE | Facility: CLINIC | Age: 65
End: 2020-03-03

## 2020-03-03 DIAGNOSIS — G43.909 MIGRAINE: ICD-10-CM

## 2020-03-03 DIAGNOSIS — I10 HTN (HYPERTENSION): ICD-10-CM

## 2020-03-04 ENCOUNTER — RECORDS - HEALTHEAST (OUTPATIENT)
Dept: BONE DENSITY | Facility: CLINIC | Age: 65
End: 2020-03-04

## 2020-03-04 DIAGNOSIS — M81.6 LOCALIZED OSTEOPOROSIS (LEQUESNE): ICD-10-CM

## 2020-03-05 ENCOUNTER — RECORDS - HEALTHEAST (OUTPATIENT)
Dept: ADMINISTRATIVE | Facility: OTHER | Age: 65
End: 2020-03-05

## 2020-03-09 ENCOUNTER — HOSPITAL ENCOUNTER (OUTPATIENT)
Dept: MAMMOGRAPHY | Facility: CLINIC | Age: 65
Discharge: HOME | End: 2020-03-09
Attending: FAMILY MEDICINE

## 2020-03-09 DIAGNOSIS — N64.89 BREAST ASYMMETRY: ICD-10-CM

## 2020-03-12 ENCOUNTER — COMMUNICATION - HEALTHEAST (OUTPATIENT)
Dept: CARDIOLOGY | Facility: CLINIC | Age: 65
End: 2020-03-12

## 2020-03-16 ENCOUNTER — COMMUNICATION - HEALTHEAST (OUTPATIENT)
Dept: CARDIOLOGY | Facility: CLINIC | Age: 65
End: 2020-03-16

## 2020-03-24 ENCOUNTER — COMMUNICATION - HEALTHEAST (OUTPATIENT)
Dept: ANTICOAGULATION | Facility: CLINIC | Age: 65
End: 2020-03-24

## 2020-03-24 ENCOUNTER — AMBULATORY - HEALTHEAST (OUTPATIENT)
Dept: LAB | Facility: CLINIC | Age: 65
End: 2020-03-24

## 2020-03-24 DIAGNOSIS — Z95.2 S/P AVR: ICD-10-CM

## 2020-03-24 LAB — INR PPP: 2.7 (ref 0.9–1.1)

## 2020-05-05 ENCOUNTER — AMBULATORY - HEALTHEAST (OUTPATIENT)
Dept: LAB | Facility: CLINIC | Age: 65
End: 2020-05-05

## 2020-05-05 ENCOUNTER — COMMUNICATION - HEALTHEAST (OUTPATIENT)
Dept: ANTICOAGULATION | Facility: CLINIC | Age: 65
End: 2020-05-05

## 2020-05-05 DIAGNOSIS — Z95.2 S/P AVR: ICD-10-CM

## 2020-05-05 LAB — INR PPP: 3.1 (ref 0.9–1.1)

## 2020-05-19 ENCOUNTER — AMBULATORY - HEALTHEAST (OUTPATIENT)
Dept: LAB | Facility: CLINIC | Age: 65
End: 2020-05-19

## 2020-05-19 ENCOUNTER — COMMUNICATION - HEALTHEAST (OUTPATIENT)
Dept: ANTICOAGULATION | Facility: CLINIC | Age: 65
End: 2020-05-19

## 2020-05-19 DIAGNOSIS — Z95.2 S/P AVR: ICD-10-CM

## 2020-05-19 LAB — INR PPP: 2.5 (ref 0.9–1.1)

## 2020-05-27 ENCOUNTER — RECORDS - HEALTHEAST (OUTPATIENT)
Dept: ADMINISTRATIVE | Facility: OTHER | Age: 65
End: 2020-05-27

## 2020-06-09 ENCOUNTER — AMBULATORY - HEALTHEAST (OUTPATIENT)
Dept: LAB | Facility: CLINIC | Age: 65
End: 2020-06-09

## 2020-06-09 ENCOUNTER — COMMUNICATION - HEALTHEAST (OUTPATIENT)
Dept: ANTICOAGULATION | Facility: CLINIC | Age: 65
End: 2020-06-09

## 2020-06-09 DIAGNOSIS — Z95.2 S/P AVR: ICD-10-CM

## 2020-06-09 LAB — INR PPP: 3.4 (ref 0.9–1.1)

## 2020-06-23 ENCOUNTER — COMMUNICATION - HEALTHEAST (OUTPATIENT)
Dept: ANTICOAGULATION | Facility: CLINIC | Age: 65
End: 2020-06-23

## 2020-06-23 ENCOUNTER — AMBULATORY - HEALTHEAST (OUTPATIENT)
Dept: LAB | Facility: CLINIC | Age: 65
End: 2020-06-23

## 2020-06-23 DIAGNOSIS — Z95.2 S/P AVR: ICD-10-CM

## 2020-06-23 LAB — INR PPP: 2.1 (ref 0.9–1.1)

## 2020-07-07 ENCOUNTER — COMMUNICATION - HEALTHEAST (OUTPATIENT)
Dept: ANTICOAGULATION | Facility: CLINIC | Age: 65
End: 2020-07-07

## 2020-07-07 ENCOUNTER — AMBULATORY - HEALTHEAST (OUTPATIENT)
Dept: LAB | Facility: CLINIC | Age: 65
End: 2020-07-07

## 2020-07-07 DIAGNOSIS — Z95.2 S/P AVR: ICD-10-CM

## 2020-07-07 LAB — INR PPP: 2.1 (ref 0.9–1.1)

## 2020-07-08 ENCOUNTER — COMMUNICATION - HEALTHEAST (OUTPATIENT)
Dept: FAMILY MEDICINE | Facility: CLINIC | Age: 65
End: 2020-07-08

## 2020-07-28 ENCOUNTER — COMMUNICATION - HEALTHEAST (OUTPATIENT)
Dept: FAMILY MEDICINE | Facility: CLINIC | Age: 65
End: 2020-07-28

## 2020-07-29 ENCOUNTER — OFFICE VISIT - HEALTHEAST (OUTPATIENT)
Dept: FAMILY MEDICINE | Facility: CLINIC | Age: 65
End: 2020-07-29

## 2020-07-29 ENCOUNTER — COMMUNICATION - HEALTHEAST (OUTPATIENT)
Dept: ANTICOAGULATION | Facility: CLINIC | Age: 65
End: 2020-07-29

## 2020-07-29 DIAGNOSIS — E78.2 MIXED HYPERLIPIDEMIA: ICD-10-CM

## 2020-07-29 DIAGNOSIS — E03.9 HYPOTHYROIDISM, UNSPECIFIED TYPE: ICD-10-CM

## 2020-07-29 DIAGNOSIS — Z51.81 ANTICOAGULATION MANAGEMENT ENCOUNTER: ICD-10-CM

## 2020-07-29 DIAGNOSIS — Z95.2 S/P AVR: ICD-10-CM

## 2020-07-29 DIAGNOSIS — Z86.19 HISTORY OF HEPATITIS C: ICD-10-CM

## 2020-07-29 DIAGNOSIS — I10 ESSENTIAL HYPERTENSION: ICD-10-CM

## 2020-07-29 DIAGNOSIS — L85.3 DRY SKIN: ICD-10-CM

## 2020-07-29 DIAGNOSIS — Z00.00 ROUTINE GENERAL MEDICAL EXAMINATION AT A HEALTH CARE FACILITY: ICD-10-CM

## 2020-07-29 DIAGNOSIS — Z23 IMMUNIZATION DUE: ICD-10-CM

## 2020-07-29 DIAGNOSIS — Z79.01 ANTICOAGULATION MANAGEMENT ENCOUNTER: ICD-10-CM

## 2020-07-29 DIAGNOSIS — Z11.4 SCREENING FOR HUMAN IMMUNODEFICIENCY VIRUS WITHOUT PRESENCE OF RISK FACTORS: ICD-10-CM

## 2020-07-29 DIAGNOSIS — R68.89 COLD INTOLERANCE: ICD-10-CM

## 2020-07-29 LAB
ALBUMIN SERPL-MCNC: 4.3 G/DL (ref 3.5–5)
ALP SERPL-CCNC: 73 U/L (ref 45–120)
ALT SERPL W P-5'-P-CCNC: 20 U/L (ref 0–45)
ANION GAP SERPL CALCULATED.3IONS-SCNC: 9 MMOL/L (ref 5–18)
AST SERPL W P-5'-P-CCNC: 28 U/L (ref 0–40)
BILIRUB SERPL-MCNC: 0.5 MG/DL (ref 0–1)
BUN SERPL-MCNC: 18 MG/DL (ref 8–22)
CALCIUM SERPL-MCNC: 9.6 MG/DL (ref 8.5–10.5)
CHLORIDE BLD-SCNC: 103 MMOL/L (ref 98–107)
CHOLEST SERPL-MCNC: 170 MG/DL
CO2 SERPL-SCNC: 29 MMOL/L (ref 22–31)
CREAT SERPL-MCNC: 0.89 MG/DL (ref 0.6–1.1)
FASTING STATUS PATIENT QL REPORTED: YES
GFR SERPL CREATININE-BSD FRML MDRD: >60 ML/MIN/1.73M2
GLUCOSE BLD-MCNC: 93 MG/DL (ref 70–125)
HDLC SERPL-MCNC: 49 MG/DL
HIV 1+2 AB+HIV1 P24 AG SERPL QL IA: NEGATIVE
INR PPP: 2.5 (ref 0.9–1.1)
LDLC SERPL CALC-MCNC: 100 MG/DL
POTASSIUM BLD-SCNC: 4.1 MMOL/L (ref 3.5–5)
PROT SERPL-MCNC: 7.1 G/DL (ref 6–8)
SODIUM SERPL-SCNC: 141 MMOL/L (ref 136–145)
TRIGL SERPL-MCNC: 106 MG/DL
TSH SERPL DL<=0.005 MIU/L-ACNC: 3.01 UIU/ML (ref 0.3–5)

## 2020-07-29 ASSESSMENT — MIFFLIN-ST. JEOR: SCORE: 1048.91

## 2020-07-29 ASSESSMENT — PATIENT HEALTH QUESTIONNAIRE - PHQ9: SUM OF ALL RESPONSES TO PHQ QUESTIONS 1-9: 0

## 2020-08-26 ENCOUNTER — COMMUNICATION - HEALTHEAST (OUTPATIENT)
Dept: ANTICOAGULATION | Facility: CLINIC | Age: 65
End: 2020-08-26

## 2020-08-26 ENCOUNTER — AMBULATORY - HEALTHEAST (OUTPATIENT)
Dept: LAB | Facility: CLINIC | Age: 65
End: 2020-08-26

## 2020-08-26 DIAGNOSIS — Z95.2 S/P AVR: ICD-10-CM

## 2020-08-26 LAB — INR PPP: 2.4 (ref 0.9–1.1)

## 2020-09-12 ENCOUNTER — COMMUNICATION - HEALTHEAST (OUTPATIENT)
Dept: CARDIOLOGY | Facility: CLINIC | Age: 65
End: 2020-09-12

## 2020-09-12 DIAGNOSIS — I05.9 MITRAL VALVE DISORDER: ICD-10-CM

## 2020-09-12 DIAGNOSIS — I35.9 AORTIC VALVE DISORDER: ICD-10-CM

## 2020-09-12 DIAGNOSIS — I71.00 DISSECTION OF AORTA (H): ICD-10-CM

## 2020-09-22 ENCOUNTER — AMBULATORY - HEALTHEAST (OUTPATIENT)
Dept: SCHEDULING | Facility: CLINIC | Age: 65
End: 2020-09-22

## 2020-09-22 DIAGNOSIS — I71.00 DISSECTION OF AORTA (H): ICD-10-CM

## 2020-09-28 ENCOUNTER — COMMUNICATION - HEALTHEAST (OUTPATIENT)
Dept: FAMILY MEDICINE | Facility: CLINIC | Age: 65
End: 2020-09-28

## 2020-09-28 DIAGNOSIS — Z95.2 S/P AVR (AORTIC VALVE REPLACEMENT): ICD-10-CM

## 2020-09-28 DIAGNOSIS — Z79.01 LONG TERM CURRENT USE OF ANTICOAGULANT THERAPY: ICD-10-CM

## 2020-10-07 ENCOUNTER — AMBULATORY - HEALTHEAST (OUTPATIENT)
Dept: LAB | Facility: CLINIC | Age: 65
End: 2020-10-07

## 2020-10-07 ENCOUNTER — COMMUNICATION - HEALTHEAST (OUTPATIENT)
Dept: ANTICOAGULATION | Facility: CLINIC | Age: 65
End: 2020-10-07

## 2020-10-07 DIAGNOSIS — Z95.2 S/P AVR: ICD-10-CM

## 2020-10-07 LAB — INR PPP: 1.8 (ref 0.9–1.1)

## 2020-10-16 ENCOUNTER — AMBULATORY - HEALTHEAST (OUTPATIENT)
Dept: LAB | Facility: CLINIC | Age: 65
End: 2020-10-16

## 2020-10-16 ENCOUNTER — HOSPITAL ENCOUNTER (OUTPATIENT)
Dept: CARDIOLOGY | Facility: HOSPITAL | Age: 65
Discharge: HOME | End: 2020-10-16
Attending: INTERNAL MEDICINE

## 2020-10-16 ENCOUNTER — COMMUNICATION - HEALTHEAST (OUTPATIENT)
Dept: ANTICOAGULATION | Facility: CLINIC | Age: 65
End: 2020-10-16

## 2020-10-16 DIAGNOSIS — I05.9 MITRAL VALVE DISORDER: ICD-10-CM

## 2020-10-16 DIAGNOSIS — Z95.2 S/P AVR: ICD-10-CM

## 2020-10-16 DIAGNOSIS — I35.9 AORTIC VALVE DISORDER: ICD-10-CM

## 2020-10-16 LAB
AORTIC ROOT: 2.2 CM
AORTIC VALVE MEAN VELOCITY: 193 CM/S
AV DIMENSIONLESS INDEX VTI: 0.5
AV MEAN GRADIENT: 17 MMHG
AV PEAK GRADIENT: 32.3 MMHG
AV VALVE AREA: 1.2 CM2
AV VELOCITY RATIO: 0.5
BSA FOR ECHO PROCEDURE: 1.55 M2
CV ECHO HEIGHT: 61 IN
CV ECHO WEIGHT: 123 LBS
DOP CALC AO PEAK VEL: 284 CM/S
DOP CALC AO VTI: 57.6 CM
DOP CALC LVOT AREA: 2.54 CM2
DOP CALC LVOT DIAMETER: 1.8 CM
DOP CALC LVOT PEAK VEL: 128 CM/S
DOP CALC LVOT STROKE VOLUME: 69.4 CM3
DOP CALCLVOT PEAK VEL VTI: 27.3 CM
ECHO EJECTION FRACTION ESTIMATED: 65 %
EJECTION FRACTION: 65 % (ref 55–75)
FRACTIONAL SHORTENING: 28.2 % (ref 28–44)
INR PPP: 2.5 (ref 0.9–1.1)
INTERVENTRICULAR SEPTUM IN END DIASTOLE: 1.47 CM (ref 0.6–0.9)
IVS/PW RATIO: 1.5
LA AREA 1: 12.5 CM2
LA AREA 2: 8.8 CM2
LEFT ATRIUM LENGTH: 3.4 CM
LEFT ATRIUM SIZE: 3 CM
LEFT ATRIUM TO AORTIC ROOT RATIO: 1.36 NO UNITS
LEFT ATRIUM VOLUME INDEX: 17.7 ML/M2
LEFT ATRIUM VOLUME: 27.5 ML
LEFT VENTRICLE DIASTOLIC VOLUME INDEX: 26.1 CM3/M2 (ref 29–61)
LEFT VENTRICLE DIASTOLIC VOLUME: 40.5 CM3 (ref 46–106)
LEFT VENTRICLE MASS INDEX: 75.1 G/M2
LEFT VENTRICLE SYSTOLIC VOLUME INDEX: 9 CM3/M2 (ref 8–24)
LEFT VENTRICLE SYSTOLIC VOLUME: 14 CM3 (ref 14–42)
LEFT VENTRICULAR INTERNAL DIMENSION IN DIASTOLE: 3.08 CM (ref 3.8–5.2)
LEFT VENTRICULAR INTERNAL DIMENSION IN SYSTOLE: 2.21 CM (ref 2.2–3.5)
LEFT VENTRICULAR MASS: 116.5 G
LEFT VENTRICULAR OUTFLOW TRACT MEAN GRADIENT: 3 MMHG
LEFT VENTRICULAR OUTFLOW TRACT MEAN VELOCITY: 76.7 CM/S
LEFT VENTRICULAR OUTFLOW TRACT PEAK GRADIENT: 6 MMHG
LEFT VENTRICULAR POSTERIOR WALL IN END DIASTOLE: 0.97 CM (ref 0.6–0.9)
LV STROKE VOLUME INDEX: 44.8 ML/M2
MITRAL VALVE E/A RATIO: 1.2
MV AVERAGE E/E' RATIO: 11.1 CM/S
MV DECELERATION TIME: 197 MS
MV E'TISSUE VEL-LAT: 7.35 CM/S
MV E'TISSUE VEL-MED: 5.32 CM/S
MV LATERAL E/E' RATIO: 9.6
MV MEDIAL E/E' RATIO: 13.3
MV PEAK A VELOCITY: 58.3 CM/S
MV PEAK E VELOCITY: 70.6 CM/S
NUC REST DIASTOLIC VOLUME INDEX: 1968 LBS
NUC REST SYSTOLIC VOLUME INDEX: 61 IN
TRICUSPID REGURGITATION PEAK PRESSURE GRADIENT: 16.5 MMHG
TRICUSPID VALVE ANULAR PLANE SYSTOLIC EXCURSION: 1.5 CM
TRICUSPID VALVE PEAK REGURGITANT VELOCITY: 203 CM/S

## 2020-10-16 ASSESSMENT — MIFFLIN-ST. JEOR: SCORE: 1035.3

## 2020-10-19 ENCOUNTER — COMMUNICATION - HEALTHEAST (OUTPATIENT)
Dept: FAMILY MEDICINE | Facility: CLINIC | Age: 65
End: 2020-10-19

## 2020-10-19 DIAGNOSIS — E78.5 HYPERLIPIDEMIA: ICD-10-CM

## 2020-10-20 ENCOUNTER — OFFICE VISIT - HEALTHEAST (OUTPATIENT)
Dept: CARDIOLOGY | Facility: CLINIC | Age: 65
End: 2020-10-20

## 2020-10-20 DIAGNOSIS — Q87.40 MARFAN'S SYNDROME: ICD-10-CM

## 2020-10-20 DIAGNOSIS — I71.03 DISSECTION OF THORACOABDOMINAL AORTA (H): ICD-10-CM

## 2020-10-20 DIAGNOSIS — Z95.2 S/P AVR: ICD-10-CM

## 2020-10-30 ENCOUNTER — AMBULATORY - HEALTHEAST (OUTPATIENT)
Dept: LAB | Facility: CLINIC | Age: 65
End: 2020-10-30

## 2020-10-30 ENCOUNTER — COMMUNICATION - HEALTHEAST (OUTPATIENT)
Dept: ANTICOAGULATION | Facility: CLINIC | Age: 65
End: 2020-10-30

## 2020-10-30 DIAGNOSIS — Z95.2 S/P AVR: ICD-10-CM

## 2020-10-30 LAB — INR PPP: 1.7 (ref 0.9–1.1)

## 2020-11-13 ENCOUNTER — COMMUNICATION - HEALTHEAST (OUTPATIENT)
Dept: ANTICOAGULATION | Facility: CLINIC | Age: 65
End: 2020-11-13

## 2020-11-13 ENCOUNTER — AMBULATORY - HEALTHEAST (OUTPATIENT)
Dept: LAB | Facility: CLINIC | Age: 65
End: 2020-11-13

## 2020-11-13 DIAGNOSIS — Z95.2 S/P AVR: ICD-10-CM

## 2020-11-13 LAB — INR PPP: 2.2 (ref 0.9–1.1)

## 2020-11-17 ENCOUNTER — COMMUNICATION - HEALTHEAST (OUTPATIENT)
Dept: FAMILY MEDICINE | Facility: CLINIC | Age: 65
End: 2020-11-17

## 2020-11-17 DIAGNOSIS — E04.1 THYROID NODULE: ICD-10-CM

## 2020-11-19 ENCOUNTER — OFFICE VISIT - HEALTHEAST (OUTPATIENT)
Dept: FAMILY MEDICINE | Facility: CLINIC | Age: 65
End: 2020-11-19

## 2020-11-19 DIAGNOSIS — N95.1 VAGINAL DRYNESS, MENOPAUSAL: ICD-10-CM

## 2020-11-19 ASSESSMENT — MIFFLIN-ST. JEOR: SCORE: 1054.02

## 2020-11-23 ENCOUNTER — COMMUNICATION - HEALTHEAST (OUTPATIENT)
Dept: FAMILY MEDICINE | Facility: CLINIC | Age: 65
End: 2020-11-23

## 2020-11-28 ENCOUNTER — COMMUNICATION - HEALTHEAST (OUTPATIENT)
Dept: FAMILY MEDICINE | Facility: CLINIC | Age: 65
End: 2020-11-28

## 2020-11-28 DIAGNOSIS — G43.909 MIGRAINE: ICD-10-CM

## 2020-12-01 ENCOUNTER — RECORDS - HEALTHEAST (OUTPATIENT)
Dept: ADMINISTRATIVE | Facility: OTHER | Age: 65
End: 2020-12-01

## 2020-12-04 ENCOUNTER — AMBULATORY - HEALTHEAST (OUTPATIENT)
Dept: LAB | Facility: CLINIC | Age: 65
End: 2020-12-04

## 2020-12-04 ENCOUNTER — COMMUNICATION - HEALTHEAST (OUTPATIENT)
Dept: ANTICOAGULATION | Facility: CLINIC | Age: 65
End: 2020-12-04

## 2020-12-04 DIAGNOSIS — Z95.2 S/P AVR: ICD-10-CM

## 2020-12-04 LAB — INR PPP: 1.5 (ref 0.9–1.1)

## 2020-12-18 ENCOUNTER — COMMUNICATION - HEALTHEAST (OUTPATIENT)
Dept: ANTICOAGULATION | Facility: CLINIC | Age: 65
End: 2020-12-18

## 2020-12-18 ENCOUNTER — AMBULATORY - HEALTHEAST (OUTPATIENT)
Dept: LAB | Facility: CLINIC | Age: 65
End: 2020-12-18

## 2020-12-18 DIAGNOSIS — Z95.2 S/P AVR: ICD-10-CM

## 2020-12-18 LAB — INR PPP: 2 (ref 0.9–1.1)

## 2020-12-19 ENCOUNTER — COMMUNICATION - HEALTHEAST (OUTPATIENT)
Dept: FAMILY MEDICINE | Facility: CLINIC | Age: 65
End: 2020-12-19

## 2020-12-19 DIAGNOSIS — E04.1 THYROID NODULE: ICD-10-CM

## 2020-12-19 DIAGNOSIS — I10 HTN (HYPERTENSION): ICD-10-CM

## 2020-12-19 DIAGNOSIS — G43.009 MIGRAINE WITHOUT AURA AND WITHOUT STATUS MIGRAINOSUS, NOT INTRACTABLE: ICD-10-CM

## 2020-12-19 DIAGNOSIS — Z95.2 S/P AVR: ICD-10-CM

## 2020-12-19 DIAGNOSIS — I10 ESSENTIAL HYPERTENSION, MALIGNANT: ICD-10-CM

## 2020-12-19 DIAGNOSIS — E78.5 HYPERLIPIDEMIA: ICD-10-CM

## 2020-12-19 DIAGNOSIS — Z79.01 LONG TERM CURRENT USE OF ANTICOAGULANT THERAPY: ICD-10-CM

## 2020-12-19 DIAGNOSIS — Z95.2 S/P AVR (AORTIC VALVE REPLACEMENT): ICD-10-CM

## 2020-12-28 ENCOUNTER — AMBULATORY - HEALTHEAST (OUTPATIENT)
Dept: ANTICOAGULATION | Facility: CLINIC | Age: 65
End: 2020-12-28

## 2020-12-28 DIAGNOSIS — Z95.2 S/P AVR: ICD-10-CM

## 2021-01-02 ENCOUNTER — COMMUNICATION - HEALTHEAST (OUTPATIENT)
Dept: FAMILY MEDICINE | Facility: CLINIC | Age: 66
End: 2021-01-02

## 2021-01-02 DIAGNOSIS — G43.009 MIGRAINE WITHOUT AURA AND WITHOUT STATUS MIGRAINOSUS, NOT INTRACTABLE: ICD-10-CM

## 2021-01-04 ENCOUNTER — COMMUNICATION - HEALTHEAST (OUTPATIENT)
Dept: FAMILY MEDICINE | Facility: CLINIC | Age: 66
End: 2021-01-04

## 2021-01-04 ENCOUNTER — COMMUNICATION - HEALTHEAST (OUTPATIENT)
Dept: ANTICOAGULATION | Facility: CLINIC | Age: 66
End: 2021-01-04

## 2021-01-04 ENCOUNTER — RECORDS - HEALTHEAST (OUTPATIENT)
Dept: ADMINISTRATIVE | Facility: OTHER | Age: 66
End: 2021-01-04

## 2021-01-04 ENCOUNTER — AMBULATORY - HEALTHEAST (OUTPATIENT)
Dept: LAB | Facility: CLINIC | Age: 66
End: 2021-01-04

## 2021-01-04 DIAGNOSIS — Z95.2 S/P AVR: ICD-10-CM

## 2021-01-04 DIAGNOSIS — G43.909 MIGRAINE: ICD-10-CM

## 2021-01-04 LAB — INR PPP: 1.6 (ref 0.9–1.1)

## 2021-01-04 RX ORDER — LEVOTHYROXINE SODIUM 75 UG/1
TABLET ORAL
Qty: 48 TABLET | Refills: 3 | Status: SHIPPED | OUTPATIENT
Start: 2021-01-04 | End: 2022-02-01

## 2021-01-04 RX ORDER — TRIAMTERENE AND HYDROCHLOROTHIAZIDE 75; 50 MG/1; MG/1
TABLET ORAL
Qty: 45 TABLET | Refills: 3 | Status: SHIPPED | OUTPATIENT
Start: 2021-01-04 | End: 2022-02-01

## 2021-01-04 RX ORDER — METOPROLOL SUCCINATE 200 MG/1
200 TABLET, EXTENDED RELEASE ORAL DAILY
Qty: 90 TABLET | Refills: 3 | Status: SHIPPED | OUTPATIENT
Start: 2021-01-04 | End: 2022-02-17

## 2021-01-04 RX ORDER — WARFARIN SODIUM 1 MG/1
TABLET ORAL
Qty: 168 TABLET | Refills: 4 | Status: SHIPPED | OUTPATIENT
Start: 2021-01-04 | End: 2022-04-27

## 2021-01-04 RX ORDER — RIZATRIPTAN BENZOATE 5 MG/1
TABLET ORAL
Qty: 10 TABLET | Refills: 9 | Status: SHIPPED | OUTPATIENT
Start: 2021-01-04 | End: 2022-06-20

## 2021-01-04 RX ORDER — LEVOTHYROXINE SODIUM 88 UG/1
TABLET ORAL
Qty: 39 TABLET | Refills: 3 | Status: SHIPPED | OUTPATIENT
Start: 2021-01-04 | End: 2022-02-01

## 2021-01-04 RX ORDER — WARFARIN SODIUM 5 MG/1
TABLET ORAL
Qty: 100 TABLET | Refills: 1 | Status: SHIPPED | OUTPATIENT
Start: 2021-01-04 | End: 2021-09-15

## 2021-01-04 RX ORDER — SIMVASTATIN 20 MG
TABLET ORAL
Qty: 90 TABLET | Refills: 3 | Status: SHIPPED | OUTPATIENT
Start: 2021-01-04 | End: 2021-10-06

## 2021-01-07 ENCOUNTER — AMBULATORY - HEALTHEAST (OUTPATIENT)
Dept: FAMILY MEDICINE | Facility: CLINIC | Age: 66
End: 2021-01-07

## 2021-01-07 DIAGNOSIS — G43.909 MIGRAINE: ICD-10-CM

## 2021-01-07 RX ORDER — BUTALBITAL, ACETAMINOPHEN AND CAFFEINE 50; 325; 40 MG/1; MG/1; MG/1
TABLET ORAL
Qty: 60 TABLET | Refills: 3 | Status: SHIPPED | OUTPATIENT
Start: 2021-01-07 | End: 2023-01-09

## 2021-01-13 ENCOUNTER — COMMUNICATION - HEALTHEAST (OUTPATIENT)
Dept: FAMILY MEDICINE | Facility: CLINIC | Age: 66
End: 2021-01-13

## 2021-01-18 ENCOUNTER — COMMUNICATION - HEALTHEAST (OUTPATIENT)
Dept: ANTICOAGULATION | Facility: CLINIC | Age: 66
End: 2021-01-18

## 2021-01-18 ENCOUNTER — AMBULATORY - HEALTHEAST (OUTPATIENT)
Dept: LAB | Facility: CLINIC | Age: 66
End: 2021-01-18

## 2021-01-18 DIAGNOSIS — Z95.2 S/P AVR: ICD-10-CM

## 2021-01-18 LAB — INR PPP: 2 (ref 0.9–1.1)

## 2021-02-01 ENCOUNTER — COMMUNICATION - HEALTHEAST (OUTPATIENT)
Dept: ANTICOAGULATION | Facility: CLINIC | Age: 66
End: 2021-02-01

## 2021-02-01 ENCOUNTER — AMBULATORY - HEALTHEAST (OUTPATIENT)
Dept: LAB | Facility: CLINIC | Age: 66
End: 2021-02-01

## 2021-02-01 DIAGNOSIS — Z95.2 S/P AVR: ICD-10-CM

## 2021-02-01 LAB — INR PPP: 2 (ref 0.9–1.1)

## 2021-03-01 ENCOUNTER — AMBULATORY - HEALTHEAST (OUTPATIENT)
Dept: LAB | Facility: CLINIC | Age: 66
End: 2021-03-01

## 2021-03-01 ENCOUNTER — COMMUNICATION - HEALTHEAST (OUTPATIENT)
Dept: ANTICOAGULATION | Facility: CLINIC | Age: 66
End: 2021-03-01

## 2021-03-01 DIAGNOSIS — Z95.2 S/P AVR: ICD-10-CM

## 2021-03-01 LAB — INR PPP: 1.9 (ref 0.9–1.1)

## 2021-03-15 ENCOUNTER — COMMUNICATION - HEALTHEAST (OUTPATIENT)
Dept: ANTICOAGULATION | Facility: CLINIC | Age: 66
End: 2021-03-15

## 2021-03-15 ENCOUNTER — AMBULATORY - HEALTHEAST (OUTPATIENT)
Dept: LAB | Facility: CLINIC | Age: 66
End: 2021-03-15

## 2021-03-15 DIAGNOSIS — Z95.2 S/P AVR: ICD-10-CM

## 2021-03-15 LAB — INR PPP: 2.3 (ref 0.9–1.1)

## 2021-04-05 ENCOUNTER — COMMUNICATION - HEALTHEAST (OUTPATIENT)
Dept: ANTICOAGULATION | Facility: CLINIC | Age: 66
End: 2021-04-05

## 2021-04-05 ENCOUNTER — AMBULATORY - HEALTHEAST (OUTPATIENT)
Dept: LAB | Facility: CLINIC | Age: 66
End: 2021-04-05

## 2021-04-05 DIAGNOSIS — Z95.2 S/P AVR: ICD-10-CM

## 2021-04-05 LAB — INR PPP: 2.1 (ref 0.9–1.1)

## 2021-05-03 ENCOUNTER — COMMUNICATION - HEALTHEAST (OUTPATIENT)
Dept: ANTICOAGULATION | Facility: CLINIC | Age: 66
End: 2021-05-03

## 2021-05-03 ENCOUNTER — AMBULATORY - HEALTHEAST (OUTPATIENT)
Dept: LAB | Facility: CLINIC | Age: 66
End: 2021-05-03

## 2021-05-03 DIAGNOSIS — Z95.2 S/P AVR: ICD-10-CM

## 2021-05-03 LAB — INR PPP: 2 (ref 0.9–1.1)

## 2021-05-14 ENCOUNTER — HOSPITAL ENCOUNTER (OUTPATIENT)
Dept: MAMMOGRAPHY | Facility: CLINIC | Age: 66
Discharge: HOME | End: 2021-05-14
Attending: FAMILY MEDICINE

## 2021-05-14 DIAGNOSIS — Z12.31 VISIT FOR SCREENING MAMMOGRAM: ICD-10-CM

## 2021-05-24 ENCOUNTER — RECORDS - HEALTHEAST (OUTPATIENT)
Dept: ADMINISTRATIVE | Facility: CLINIC | Age: 66
End: 2021-05-24

## 2021-05-27 ENCOUNTER — RECORDS - HEALTHEAST (OUTPATIENT)
Dept: ADMINISTRATIVE | Facility: CLINIC | Age: 66
End: 2021-05-27

## 2021-05-27 ASSESSMENT — PATIENT HEALTH QUESTIONNAIRE - PHQ9
SUM OF ALL RESPONSES TO PHQ QUESTIONS 1-9: 0
SUM OF ALL RESPONSES TO PHQ QUESTIONS 1-9: 0

## 2021-05-27 NOTE — PATIENT INSTRUCTIONS - HE
Rest, elevation, Ice 20 minutes with light towel to protect the skin every 1-2 hours of the next 48 hours. Float ring for baths and sitting.

## 2021-05-27 NOTE — PROGRESS NOTES
HPI:  Alyssa Higginbotham is a 64 y.o. female who is seen for   Chief Complaint   Patient presents with     Fall     Last week on ice. fell on her tailbone. hard to walk, from sitting to standing its hard to do. Sharp pain. Has not taken anything for pain   Alyssa Higginbotham is seen for sharp pain in the lumbar back due to fall on the ice while walking last week.  Pain is worse on the right side.  She has no bruising as she has been able to see.  Very tender with sitting, rising to standing position, difficulty sleeping at night, 0 out of 10 when resting, 10 out of 10 with supine on the right side and moving from sitting to standing or using a bathtub.  She has a history of and thoracic aneurysm, has been told never to fall.  Did have a recent CTA and everything is stable.    No results found for: HGBA1C  Lab Results   Component Value Date    LDLCALC 100 2017    CREATININE 0.97 2017     Patient Active Problem List   Diagnosis     Osteoporosis     Mitral Valve Disorder     Dissection Of The Aorta     Anxiety     Tension-type Headache     Marfan Syndrome     Hepatitis, C Virus     Hypothyroidism     Hyperlipidemia     Recurrent Major Depression In Partial Remission     Migraine     Hypertension     Aortic Valve Disorder     Myalgia And Myositis     S/P AVR     Meningioma (H)     Concussion syndrome     Vertigo     PTSD (post-traumatic stress disorder)     Thyroid nodule     Acute chest pain     Family history of ovarian cancer     Family History   Problem Relation Age of Onset     Pancreatic cancer Father 70        history of smoking     Ovarian cancer Sister 67        stage III,  at age 70     Skin cancer Sister      Heart disease Mother      Diabetes Neg Hx      Alzheimer's disease Neg Hx      Social History     Socioeconomic History     Marital status:      Spouse name: None     Number of children:  0     Years of education: None     Highest education level: None   Occupational History      Employer: [employer]   Social Needs     Financial resource strain: None     Food insecurity:     Worry: None     Inability: None     Transportation needs:     Medical: None     Non-medical: None   Tobacco Use     Smoking status: Never Smoker     Smokeless tobacco: Never Used   Substance and Sexual Activity     Alcohol use: No     Drug use: No     Sexual activity: Not Currently   Lifestyle     Physical activity:     Days per week: None     Minutes per session: None     Stress: None   Relationships     Social connections:     Talks on phone: None     Gets together: None     Attends Caodaism service: None     Active member of club or organization: None     Attends meetings of clubs or organizations: None     Relationship status: None     Intimate partner violence:     Fear of current or ex partner: None     Emotionally abused: None     Physically abused: None     Forced sexual activity: None   Other Topics Concern     None   Social History Narrative     None     Past Surgical History:   Procedure Laterality Date     AORTIC VALVE REPLACEMENT   2000     BREAST BIOPSY Left 2005    benign     COLONOSCOPY  2011     REPAIR THORACIC AORTA   2000     Current Outpatient Medications on File Prior to Visit   Medication Sig Dispense Refill     amitriptyline (ELAVIL) 10 MG tablet Take 10 mg by mouth bedtime.       butalbital-acetaminophen-caffeine (FIORICET, ESGIC) -40 mg per tablet TAKE 1 TO 2 TABLETS BY MOUTH EVERY 4 HOURS AS NEEDED FOR PAIN. MAX OF 4000 MG ACETAMINOPHEN PER 24 HOURS 30 tablet 0     calcium carbonate-vitamin D3 (CALCIUM 500 + D) 500 mg(1,250mg) -400 unit Tab Take 1 tablet by mouth 2 (two) times a day.        clonazePAM (KLONOPIN) 0.5 MG tablet Take 0.5 mg by mouth bedtime as needed for anxiety.       levothyroxine (SYNTHROID, LEVOTHROID) 75 MCG tablet Take 1 tablet on wednesday, Thursday, Saturday and Sunday. 36 tablet 3     levothyroxine (SYNTHROID, LEVOTHROID) 88 MCG tablet Take 1 tablet on Monday,  Tuesday and Friday. 36 tablet 3     metoprolol succinate (TOPROL-XL) 200 MG 24 hr tablet Take 1 tablet (200 mg total) by mouth daily. 90 tablet 1     multivitamin (ONE A DAY) per tablet Take 1 tablet by mouth daily.        rizatriptan (MAXALT) 5 MG tablet Take 1 tablet (5 mg total) by mouth as needed for migraine. May repeat in 2 hours if needed 10 tablet 0     simvastatin (ZOCOR) 20 MG tablet TAKE 1 TABLET AT BEDTIME. DUE FOR AN OFFICE VISIT. 90 tablet 2     TiZANidine (ZANAFLEX) 2 MG capsule Take 1 capsule (2 mg total) by mouth 3 (three) times a day. 30 capsule 0     triamterene-hydrochlorothiazide (MAXZIDE) 75-50 mg per tablet TAKE ONE-HALF (1/2) TABLET DAILY 45 tablet 0     warfarin (COUMADIN/JANTOVEN) 1 MG tablet TAKE 1- 2 TABLETS ALONG WITH 5MG TABLET ( 6 - 7 MG) DAILY AS DIRECTED ADJUST DOSE PER INR RESULTS. 140 tablet 1     warfarin (COUMADIN/JANTOVEN) 5 MG tablet Take 1 tablet (5 mg total) by mouth See Admin Instructions. Take along with 1 mg tablet ( 6-7 mg daily) as directed. Adjust per INR result 110 tablet 1     No current facility-administered medications on file prior to visit.      Allergies   Allergen Reactions     Codeine Other (See Comments)     Faints     Demerol [Meperidine] Other (See Comments)     Reaction not reported by patient.  Patient states she felt awful.     OB History      Para Term  AB Living        0          SAB TAB Ectopic Multiple Live Births                     I have reviewed the patient's medical history in detail and updated the computerized patient record.  OBJECTIVE:  Wt Readings from Last 3 Encounters:   03/15/19 123 lb 12.8 oz (56.2 kg)   19 125 lb 12.8 oz (57.1 kg)   19 122 lb (55.3 kg)     Temp Readings from Last 3 Encounters:   18 98.4  F (36.9  C) (Temporal)   06/15/18 97.8  F (36.6  C) (Oral)   17 97.8  F (36.6  C) (Oral)     BP Readings from Last 3 Encounters:   19 122/80   03/15/19 100/70   19 120/70     Pulse  Readings from Last 3 Encounters:   03/26/19 63   03/15/19 72   01/14/19 67     There is no height or weight on file to calculate BMI.     Alert, cooperative, well-hydrated. Appears well.  Eyes: Pupils equal, round, reactive to light.  HEENT: Sclera white, nares patent, MMM,   Lungs: Clear to auscultation. No retractions, no increased work of respiration, equal chest rise.   Heart: Regular rate and rhythm, no murmurs, clicks,   Gallops.  Extremities: Normal bilateral leg lift.  Tenderness palpation at junction between sacrum and coccyx, no ecchymoses, no edema.    Labs:  Lab on 03/26/2019   Component Date Value     INR 03/26/2019 2.30*   Hospital Outpatient Visit on 03/25/2019   Component Date Value     POC GFR AMER AF HE 03/25/2019 >60       POC GFR NON AMER AF 03/25/2019 >60     Lab on 03/04/2019   Component Date Value     INR 03/04/2019 2.00*   Lab on 02/11/2019   Component Date Value     INR 02/11/2019 2.40*   Office Visit on 01/22/2019   Component Date Value     Free T4 01/22/2019 1.1      TSH 01/22/2019 3.88    Lab on 01/14/2019   Component Date Value     INR 01/14/2019 3.10*     ASSESMENT/PLAN:  1. Closed fracture of coccyx, initial encounter (H)  HYDROcodone-acetaminophen 5-325 mg per tablet    XR Sacrum and Coccyx 2 or More VWS    XR Sacrum and Coccyx 2 or More VWS     Reviewed recent CTA and answered patient's questions about this continuing to be stable.  Currently acutely painful, will give minimal amount of Norco's for pain, patient states that she is able to tolerate this although she is allergic to codeine, has had Norco in the past.  Advised on limited use, she will mostly uses at night since she is having pain with sleeping, will also use float ring for bathtub and sitting.  Explained that tailbone injury takes very long time to heal, should slowly improve.  Continue ice, rest.  Follow-up with primary care if symptoms do not improve.  Kimi Mcpherson, MS, PA-C 03/28/19

## 2021-05-27 NOTE — TELEPHONE ENCOUNTER
ANTICOAGULATION  MANAGEMENT    Assessment     Today's INR result of 2.3 is Therapeutic (goal INR of 2.0-3.0)        Warfarin taken as previously instructed    No new diet changes affecting INR    No new medication/supplements affecting INR    Continues to tolerate warfarin with no reported s/s of bleeding or thromboembolism     Previous INR was Therapeutic    Plan:     Left a detailed message for Alyssa regarding INR result and instructed:     Warfarin Dosing Instructions:  Continue current warfarin dose    6 mg every Sun; 7 mg all other days      (0 % change)    Instructed patient to follow up no later than: 4 weeks.    Education provided: importance of therapeutic range, target INR goal and significance of current INR result and when to seek medical attention/emergency care      Instructed to call the [facility] for any changes, questions or concerns. (#[phone])   ?   Nuvia Valenzuela RN    Subjective/Objective:      Alyssa Higginbotham, a 64 y.o. female is on warfarin.     Alyssa reports:     Home warfarin dose: as updated on anticoagulation calendar per template     Missed doses: No     Medication changes:  No     S/S of bleeding or thromboembolism:  No     New Injury or illness:  Yes: fell on ice  lower back- instructed to seek medical attention for increasing pain.     Changes in diet or alcohol consumption:  No     Upcoming surgery, procedure or cardioversion:  No    Anticoagulation Episode Summary     Current INR goal:   2.0-3.0   TTR:   36.0 % (4.3 y)   Next INR check:   4/23/2019   INR from last check:   2.30 (3/26/2019)   Weekly max warfarin dose:      Target end date:      INR check location:      Preferred lab:      Send INR reminders to:   ANTICOAGULATION POOL C (DTN,VAD,CGR,GAV)    Indications    S/P AVR [Z95.2]           Comments:   Goal range changed 2/20/19 per cardiology         Anticoagulation Care Providers     Provider Role Specialty Phone number    Rafaela Woods MD Referring  Family Medicine [phone]

## 2021-05-27 NOTE — TELEPHONE ENCOUNTER
Refill Approved    Rx renewed per Medication Renewal Policy. Medication was last renewed on 12/20/18.    Lissette Benjamin, [facility] Triage/Med Refill 4/17/2019     Requested Prescriptions   Pending Prescriptions Disp Refills     rizatriptan (MAXALT) 5 MG tablet [Pharmacy Med Name: RIZATRIPTAN 5MG TABLETS] 10 tablet 0     Sig: TAKE 1 TABLET BY MOUTH AS NEEDED FOR MIGRAINE. MAY REPEAT IN 2 HOURS IF NEEDED       Triptans Refill Protocol Passed - 4/15/2019 10:47 AM        Passed - PCP or prescribing provider visit in past 12 months       Last office visit with prescriber/PCP: 12/20/2018 Babs Jin MD OR same dept: 3/26/2019 Kimi Mcpherson PA-C OR same specialty: 4/8/2019 Michelle Jeffrey MD  Last physical: Visit date not found Last MTM visit: Visit date not found   Next visit within 3 mo: Visit date not found  Next physical within 3 mo: Visit date not found  Prescriber OR PCP: Babs Jin MD  Last diagnosis associated with med order: There are no diagnoses linked to this encounter.  If protocol passes may refill for 12 months if within 3 months of last provider visit (or a total of 15 months).

## 2021-05-27 NOTE — PROGRESS NOTES
Assessment & Plan:  1. Suspected deep tissue injury of unknown depth of ischial region  Patient with bruising and soft tissue injury of the left ischial region.  Recommend heat and massage.  X-ray did not show any fracture.  Plain that it could take a couple months for the pain to completely go away.  If no improvement could consider physical therapy.  Will give a few more tablets of hydrocodone to use if the pain is bad.  Recommend she use Tylenol and daily basis.  Because she is on anticoagulation cannot use Aleve or ibuprofen  - HYDROcodone-acetaminophen 5-325 mg per tablet; Take 1 tablet by mouth every 8 (eight) hours as needed for pain.  Dispense: 20 tablet; Refill: 0    2. Myalgia  Patient with total body myalgias.  I suspect she has a viral syndrome.  If no improvement recommend return for further evaluation        No orders of the defined types were placed in this encounter.    Medications Discontinued During This Encounter   Medication Reason     HYDROcodone-acetaminophen 5-325 mg per tablet Reorder       No follow-ups on file.          This note was created use Dragon Dictation.  There may be sound alike errors present.       Chief Complaint:   Chief Complaint   Patient presents with     Follow-up     fell on 3/21 experiencing pain in back, concerns with history of torn aorta artery     Concerns     achy all over x3 days in a row       History of Present Illness:  Alyssa is a 64 y.o. female presenting to the clinic today for concerns of continued left buttock and pelvic pain from a fall.  Patient fell on March 21.  Slipped on a patch of ice and landed on her butt.  She ended up sitting up and landing hard on her left side.  It was an unwitnessed fall and she does not believe there is any loss of consciousness.  She was seen on March 26 and had x-rays of pelvis and coccyx.  It showed no fracture.  Patient also on March 25 had a CTA for her history of aortic dissection.  The CTA was stable and did not  show any changes.  Patient is here today because the pain seems to get worse.  When she is sitting and it hurts to walk and less she is holding her left buttock.  States it feels better when she puts pressure on the sore area.  Patient denies any weakness numbness or tingling.  Her bladder.  Because of her history of a dissected aorta her and her  are concerned that something could be wrong.    Patient also has 3 days of body aches.  She denies any fever or chills but just all of her arthritis is achy.  Patient has no known exposure but her  had surgery last week and so she was at the hospital a lot.  No nausea vomiting diarrhea.  No weakness numbness or tingling.     Review of Systems:  All other systems are negative.     PFSH:  Patient had history of an aortic dissection 19 years ago.  The thoracic area was fixed and rest continues monitored annually.  Patient is on warfarin for this    Tobacco Use:  Social History     Tobacco Use   Smoking Status Never Smoker   Smokeless Tobacco Never Used       Vitals:  Vitals:    04/08/19 1314   BP: (!) 145/99   Patient Site: Left Arm   Patient Position: Sitting   Cuff Size: Adult Regular   Pulse: 82   Resp: 16   Temp: 97  F (36.1  C)   TempSrc: Oral   Weight: 125 lb (56.7 kg)     Wt Readings from Last 3 Encounters:   04/08/19 125 lb (56.7 kg)   03/15/19 123 lb 12.8 oz (56.2 kg)   01/22/19 125 lb 12.8 oz (57.1 kg)     Body mass index is 22.68 kg/m .      Physical Exam:  Constitutional:  Reveals an alert, cooperative,  female in no acute distress.  Vitals:  Per nursing notes.  Cardiac:  Regular rate and rhythm with III/VI LSB murmurs,  Legs without edema.   Lungs: Clear.  Respiratory effort normal.  Back -no pain with palpation over bilateral pelvic brims.  Pain with palpation along the lumbar spine.  No pain with palpation over the coccyx.  Patient is tender to palpation with the left ischial tuberosity.  No tenderness over the trochanteric bursa  Neurologic:  No  gross focal deficits.    Psychiatric:  Mood appropriate, memory intact.     Data Reviewed:  Additional history summarized (from old records or history from someone other than the patient or another healthcare provider) (2 TOTAL): 2 reviewed walk-in clinic note and reviewed cardiology note.     Decision to obtain extra information (old records requested or history from another person or accessing Care Everywhere) (1 TOTAL): None.     Radiology tests summarized or ordered (XRAY/CT/MRI/DXA) (1 TOTAL): 1 actually reviewed the images of her CTA with patient and her .  Showed that the angiogram went beyond the area of her pain.  And that there was no dissection in the area of her pain.    Labs reviewed or ordered (1 TOTAL): None.    Medicine tests summarized or ordered (EKG/ECHO) (1 TOTAL): None.    Independent review of EKG or X-Ray (2 EACH): None.       The visit lasted a total of 25 minutes face to face with the patient. Over 50% of the time was spent counseling and educating the patient about contusion of her initial tuberosity.        Medications:  Current Outpatient Medications   Medication Sig Dispense Refill     amitriptyline (ELAVIL) 10 MG tablet Take 10 mg by mouth bedtime.       butalbital-acetaminophen-caffeine (FIORICET, ESGIC) -40 mg per tablet TAKE 1 TO 2 TABLETS BY MOUTH EVERY 4 HOURS AS NEEDED FOR PAIN. MAX OF 4000 MG ACETAMINOPHEN PER 24 HOURS 30 tablet 0     calcium carbonate-vitamin D3 (CALCIUM 500 + D) 500 mg(1,250mg) -400 unit Tab Take 1 tablet by mouth 2 (two) times a day.        clonazePAM (KLONOPIN) 0.5 MG tablet Take 0.5 mg by mouth bedtime as needed for anxiety.       HYDROcodone-acetaminophen 5-325 mg per tablet Take 1 tablet by mouth every 8 (eight) hours as needed for pain. 20 tablet 0     levothyroxine (SYNTHROID, LEVOTHROID) 75 MCG tablet Take 1 tablet on wednesday, Thursday, Saturday and Sunday. 36 tablet 3     levothyroxine (SYNTHROID, LEVOTHROID) 88 MCG tablet Take 1 tablet  on Monday, Tuesday and Friday. 36 tablet 3     metoprolol succinate (TOPROL-XL) 200 MG 24 hr tablet Take 1 tablet (200 mg total) by mouth daily. 90 tablet 1     multivitamin (ONE A DAY) per tablet Take 1 tablet by mouth daily.        rizatriptan (MAXALT) 5 MG tablet Take 1 tablet (5 mg total) by mouth as needed for migraine. May repeat in 2 hours if needed 10 tablet 0     simvastatin (ZOCOR) 20 MG tablet TAKE 1 TABLET AT BEDTIME. DUE FOR AN OFFICE VISIT. 90 tablet 2     TiZANidine (ZANAFLEX) 2 MG capsule Take 1 capsule (2 mg total) by mouth 3 (three) times a day. 30 capsule 0     triamterene-hydrochlorothiazide (MAXZIDE) 75-50 mg per tablet TAKE ONE-HALF (1/2) TABLET DAILY 45 tablet 0     warfarin (COUMADIN/JANTOVEN) 1 MG tablet TAKE 1- 2 TABLETS ALONG WITH 5MG TABLET ( 6 - 7 MG) DAILY AS DIRECTED ADJUST DOSE PER INR RESULTS. 140 tablet 1     warfarin (COUMADIN/JANTOVEN) 5 MG tablet Take 1 tablet (5 mg total) by mouth See Admin Instructions. Take along with 1 mg tablet ( 6-7 mg daily) as directed. Adjust per INR result 110 tablet 1     No current facility-administered medications for this visit.        Total Data Points: 3

## 2021-05-28 ASSESSMENT — ANXIETY QUESTIONNAIRES: GAD7 TOTAL SCORE: 0

## 2021-05-28 NOTE — TELEPHONE ENCOUNTER
RN cannot approve Refill Request    RN can NOT refill this medication med is not covered by policy/route to provider     . Last office visit: 10/17/2016 Rafaela Woods MD Last Physical: 12/1/2016 Last MTM visit: Visit date not found Last visit same specialty: Visit date not found.  Next visit within 3 mo: Visit date not found  Next physical within 3 mo: Visit date not found      Lissette Benjamin, [facility] Triage/Med Refill 4/29/2019    Requested Prescriptions   Pending Prescriptions Disp Refills     triamterene-hydrochlorothiazide (MAXZIDE) 75-50 mg per tablet [Pharmacy Med Name: TRIAMTERENE HCTZ TABS 75/50MG] 45 tablet 0     Sig: TAKE ONE-HALF (1/2) TABLET DAILY       There is no refill protocol information for this order

## 2021-05-28 NOTE — TELEPHONE ENCOUNTER
----- Message from Chelsea Cruz CNP sent at 5/20/2019 12:55 PM CDT -----  Please call Jennifer and let her know that her pelvic ultrasound was normal. Can reassess next year at her physical.     Thanks Chelsea

## 2021-05-28 NOTE — TELEPHONE ENCOUNTER
First Attempt: I LM for patient to call back to schedule a physical if she is due or a med check appt.

## 2021-05-28 NOTE — PATIENT INSTRUCTIONS - HE
Low carb high fat diet   -Whole real foods. Avoid processed foods especially foods high in processed carbohydrates and sugars.   - 50-70 grams of carbs in a day,   -4 palm sized protein in a day,   -4 or more servings of veggies/day. Avoid starchy vegetables.    - Minimize sugars and fruits. (except berries; strawberries, blueberries, blackberries, raspberries).  -Good healthy fats; avocados, whole milk, cheese, full fat yogurt, nuts, natural nut butters, cream cheese, heavy cream.   -eat protein and healthy fats meals for breakfast and lunch; avoid carbohydrates for these meals.       Ketogenic diet    Diet [email or web address] as a online resource for eating a low carbohydrate high fat diet.  The Obesity Code by Dr Butch Tai  The Diabetes Code by Dr Butch Tai  The Case of Sugar by Cristhian Barksdale  Science of a Long Life

## 2021-05-28 NOTE — TELEPHONE ENCOUNTER
Reason contacted:  Results  Information relayed:  Provider message below  Additional questions:  No  Further follow-up needed:  No  Okay to leave a detailed message:  No

## 2021-05-28 NOTE — PROGRESS NOTES
Assessment/Plan:   1. Healthcare maintenance  - HM1(CBC and Differential)  - 1 (CBC with Diff)  - Ambulatory referral to Dermatology  Counseled on nutrition, sleep, stress, alcohol, exercise  Pap smear up to date 2016, due 2021  Mammogram done 2/2019  Colonoscopy up to date 2016, due 2021     2. Hypertension  - Comprehensive Metabolic Panel  -recommend LCHF diet for good BP control  -continue current meds, monitor for BP lowering changes with the dietary changes    3. Mixed hyperlipidemia  - Lipid Cascade FASTING  -recommend LCHF diet    4. Dry skin/skin check  - Ambulatory referral to Dermatology    5. Hypothryoidism  -checked in with a specialist in Feb; TSH and medications are up to date  Follow up in 1 year          Subjective:      Alyssa Higginbotham is a 64 y.o. female who presents for an annual exam. The patient is sexually active. The patient participates in regular exercise: yes. The patient reports that there is not domestic violence in her life.     No complaints feels well, low stress, is retired. Sleeps well. Recently started eating a low carb diet with her  who has diabetes and is overweight.     Healthy Habits:   Regular Exercise: Yes 60 minutes, walking, biking  Sunscreen Use: Yes  Healthy Diet: Yes low carb/high fat diet  Dental Visits Regularly: Yes  Seat Belt: Yes  Sexually active: Yes  Self Breast Exam Monthly:Yes  Hemoccults: No  Flex Sig: N/A  Colonoscopy: 2016, need one every 5 years due 2021  Lipid Profile: Yes  Glucose Screen: Yes  Prevention of Osteoporosis: Yes  Last Dexa: 2018 next year 2020 will be due  Guns at Home:  N/A  Sleep: 8 hours of interrupted of sleep but feels rested.   Stress: none  Alcohol: none    Immunization History   Administered Date(s) Administered     Hep A, historic 10/29/2002, 04/25/2003     Hep B, historic 10/29/2002, 12/27/2002, 04/25/2003     Influenza, inj, historic,unspecified 10/29/2002, 10/30/2006, 10/21/2008, 09/27/2016     Influenza, seasonal,quad  inj 36+ mos 2015     Influenza, seasonal,quad inj 6-35 mos 2009, 2010     Influenza,seasonal quad, PF, 36+MOS 10/06/2017, 10/07/2018     Influenza,seasonal, Inj IIV3 10/21/2008, 2009, 10/17/2011, 10/15/2012, 2013, 2014     Pneumo Polysac 23-V 01/10/2008     Td,adult,historic,unspecified 1988, 1991, 2003     Tdap 2009     ZOSTER, LIVE 2011     Immunization status: due today.    No exam data present    Gynecologic History  No LMP recorded. Patient is postmenopausal.  Contraception: none  Last Pap: . Results were: normal  Last mammogram: . Results were: normal      OB History    Para Term  AB Living       0         SAB TAB Ectopic Multiple Live Births                   Current Outpatient Medications   Medication Sig Dispense Refill     amitriptyline (ELAVIL) 10 MG tablet Take 10 mg by mouth bedtime.       butalbital-acetaminophen-caffeine (FIORICET, ESGIC) -40 mg per tablet TAKE 1 TO 2 TABLETS BY MOUTH EVERY 4 HOURS AS NEEDED FOR PAIN. MAX OF 4000 MG ACETAMINOPHEN PER 24 HOURS 30 tablet 0     calcium carbonate-vitamin D3 (CALCIUM 500 + D) 500 mg(1,250mg) -400 unit Tab Take 1 tablet by mouth 2 (two) times a day.        clonazePAM (KLONOPIN) 0.5 MG tablet Take 0.5 mg by mouth bedtime as needed for anxiety.       levothyroxine (SYNTHROID, LEVOTHROID) 75 MCG tablet Take 1 tablet on wednesday, Thursday, Saturday and . 36 tablet 3     levothyroxine (SYNTHROID, LEVOTHROID) 88 MCG tablet Take 1 tablet on Monday, Tuesday and Friday. 36 tablet 3     metoprolol succinate (TOPROL-XL) 200 MG 24 hr tablet Take 1 tablet (200 mg total) by mouth daily. 90 tablet 1     multivitamin (ONE A DAY) per tablet Take 1 tablet by mouth daily.        rizatriptan (MAXALT) 5 MG tablet TAKE 1 TABLET BY MOUTH AS NEEDED FOR MIGRAINE. MAY REPEAT IN 2 HOURS IF NEEDED 10 tablet 9     simvastatin (ZOCOR) 20 MG tablet TAKE 1 TABLET AT BEDTIME. DUE FOR AN  OFFICE VISIT. 90 tablet 2     TiZANidine (ZANAFLEX) 2 MG capsule Take 1 capsule (2 mg total) by mouth 3 (three) times a day. 30 capsule 0     triamterene-hydrochlorothiazide (MAXZIDE) 75-50 mg per tablet TAKE ONE-HALF (1/2) TABLET DAILY 45 tablet 0     warfarin (COUMADIN/JANTOVEN) 1 MG tablet TAKE 1- 2 TABLETS ALONG WITH 5MG TABLET ( 6 - 7 MG) DAILY AS DIRECTED ADJUST DOSE PER INR RESULTS 168 tablet 1     warfarin (COUMADIN/JANTOVEN) 5 MG tablet Take 1 tablet (5 mg total) by mouth See Admin Instructions. Take along with 1 mg tablet ( 6-7 mg daily) as directed. Adjust per INR result 110 tablet 1     HYDROcodone-acetaminophen 5-325 mg per tablet Take 1 tablet by mouth every 8 (eight) hours as needed for pain. 20 tablet 0     No current facility-administered medications for this visit.      Past Medical History:   Diagnosis Date     Accidental fall 2015     Anxiety      Aortic dissection (H)      Asthma      Benign Adenomatous Polyp Of The Large Intestine     Created by Conversion      Benign meningioma of brain (H) 2015    initially seen on CT of head that was obtained after a fall. confirmed on an MRI     Bunion      Chronic headache      Colon polyp      Depression      Hallux Limitus Of The Right First Toe     Created by Conversion      Hepatitis C      HTN (hypertension)      Hyperlipidemia      Hypothyroid      Irregular heart rate      Nasal fracture 2015    fell face first on sideache     Osteoporosis      Painful swelling of joint      SOB (shortness of breath)      Stroke (H)     Found on CT scan     Past Surgical History:   Procedure Laterality Date     AORTIC VALVE REPLACEMENT   2000     BREAST BIOPSY Left 2005    benign     COLONOSCOPY       REPAIR THORACIC AORTA        Codeine and Demerol [meperidine]  Family History   Problem Relation Age of Onset     Pancreatic cancer Father 70        history of smoking     Ovarian cancer Sister 67        stage III,  at age 70     Skin  cancer Sister      Heart disease Mother      Diabetes Neg Hx      Alzheimer's disease Neg Hx      Social History     Socioeconomic History     Marital status:      Spouse name: Not on file     Number of children:  0     Years of education: Not on file     Highest education level: Not on file   Occupational History     Employer: [employer]   Social Needs     Financial resource strain: Not on file     Food insecurity:     Worry: Not on file     Inability: Not on file     Transportation needs:     Medical: Not on file     Non-medical: Not on file   Tobacco Use     Smoking status: Never Smoker     Smokeless tobacco: Never Used   Substance and Sexual Activity     Alcohol use: No     Drug use: No     Sexual activity: Not Currently   Lifestyle     Physical activity:     Days per week: Not on file     Minutes per session: Not on file     Stress: Not on file   Relationships     Social connections:     Talks on phone: Not on file     Gets together: Not on file     Attends Mandaeism service: Not on file     Active member of club or organization: Not on file     Attends meetings of clubs or organizations: Not on file     Relationship status: Not on file     Intimate partner violence:     Fear of current or ex partner: Not on file     Emotionally abused: Not on file     Physically abused: Not on file     Forced sexual activity: Not on file   Other Topics Concern     Not on file   Social History Narrative     Not on file       Review of Systems  Review of Systems   HENT: Positive for tinnitus.    Eyes:        Cataracts   Respiratory: Positive for shortness of breath.    Endocrine: Positive for cold intolerance.   Genitourinary: Positive for frequency and urgency.   Musculoskeletal: Positive for arthralgias.   Skin:        Skin dryness   Neurological: Positive for headaches.   All other systems reviewed and are negative.            Objective:         Vitals:    05/08/19 0846   BP: 142/75   Pulse: 62   Resp: 16   Temp:  "97.5  F (36.4  C)   TempSrc: Oral   Weight: 122 lb 9.6 oz (55.6 kg)   Height: 5' 2\" (1.575 m)     Body mass index is 22.42 kg/m .    Physical  Physical Exam     General Appearance:  Alert, cooperative, no distress, appears stated age   Head:  Normocephalic, without obvious abnormality, atraumatic   Eyes:  PERRL, conjunctiva/corneas clear, EOM's intact   Ears:  Normal TM's and external ear canals, both ears   Nose: Nares normal, septum midline, mucosa normal, no drainage   Throat: Lips, mucosa, and tongue normal; teeth and gums normal   Neck: Supple, symmetrical, trachea midline, no adenopathy, thyroid: not enlarged, symmetric, no tenderness/mass/nodules   Back:   Symmetric, no curvature, ROM normal, no CVA tenderness   Lungs:   Clear to auscultation bilaterally, respirations unlabored   Chest Wall:  No tenderness or deformity   Heart:  Regular rate and rhythm, S1, S2 normal, no murmur, rub or gallop   Abdomen:   Soft, non-tender, bowel sounds active all four quadrants,  no masses, no organomegaly   Breast: No nodules, skin rashes, nipple discharge or nipple changes, no axillary lymph node enlargement   Genital:  deferred   Extremities: Extremities normal, atraumatic, no cyanosis or edema   Skin: Skin color, texture, turgor normal, no rashes or lesions   Lymph nodes: Cervical and supraclavicular normal   Neurologic: Normal,Coordinated, smooth gait, balance, rapid alternating movements, sensory functioning, and cranial nerves II-XII grossly intact. DTR's+2 bilaterally brachioradialis, knee, ankle.          "

## 2021-05-28 NOTE — TELEPHONE ENCOUNTER
Patient Returning Call  Reason for call:  Patient called back regarding her test results.  She would like to speak with Chelsea Cruz  Information relayed to patient:  A message will be sent to the provider  Patient has additional questions:  Yes  If YES, what are your questions/concerns:  Discuss results  Okay to leave a detailed message?: Yes

## 2021-05-28 NOTE — TELEPHONE ENCOUNTER
ANTICOAGULATION  MANAGEMENT    Assessment     Today's INR result of 2.5 is Therapeutic (goal INR of 2.0-3.0)        Warfarin taken as previously instructed    No new diet changes affecting INR     Annual visit today with no new medication changes noted.    No new medication/supplements affecting INR    Continues to tolerate warfarin with no reported s/s of bleeding or thromboembolism     Previous INR was Therapeutic    Plan:     Spoke with Alyssa regarding INR result and instructed:     Warfarin Dosing Instructions:  Continue current warfarin dose    6 mg every Sun; 7 mg all other days       (0 % change)    Instructed patient to follow up no later than: 4-6 weeks.Noted that the patient already made appointment for 6/5/19 - made aware to come in sooner if signs and symptoms of bleeding is noted.    Education provided: importance of therapeutic range, target INR goal and significance of current INR result and monitoring for bleeding signs and symptoms    Alyssa verbalizes understanding and agrees to warfarin dosing plan.    Instructed to call the [facility] for any changes, questions or concerns. (#[phone])   ?   Nuvia Valenzuela RN    Subjective/Objective:      Alyssa Higginbotham, a 64 y.o. female is on warfarin.     Alyssa reports:     Home warfarin dose: as updated on anticoagulation calendar per template     Missed doses: No     Medication changes:  No     S/S of bleeding or thromboembolism:  No     New Injury or illness:  No     Changes in diet or alcohol consumption:  No     Upcoming surgery, procedure or cardioversion:  No    Anticoagulation Episode Summary     Current INR goal:   2.0-3.0   TTR:   37.7 % (4.4 y)   Next INR check:   6/19/2019   INR from last check:   2.50 (5/8/2019)   Weekly max warfarin dose:      Target end date:      INR check location:      Preferred lab:      Send INR reminders to:   ANTICOAGULATION POOL C (DTN,VAD,CGR,GAV)    Indications    S/P AVR [Z95.2]           Comments:    Goal range changed 2/20/19 per cardiology         Anticoagulation Care Providers     Provider Role Specialty Phone number    Rafaela Woods MD Referring Family Medicine [phone]

## 2021-05-28 NOTE — TELEPHONE ENCOUNTER
Spoke with patient regarding lab results. Patient has had a history of hepatitis C in 2004. Has concern for watching liver levels closely. Recommend that she continues a low carb/high fat diet to improve and reduce her risk NAFLD/BOOGIE and AST/ALT ratio. We can consider at any point to further work up liver with checking hepatitis labs/imaging, etc. May reconsider in 3-6 months when labs are checked again.     Also spoke briefly on insulin resistance, hyperinsulinemia and the importance of monitoring for prevention of prediabetes, DMT2, and metabolic syndrome. Next visit consider testing fasting insulin, Zora-IR levels, hemoglobin A1c.     Spoke to her regarding Dr Woods's recommendations to check pelvic u/s instead of  for ovarian cancer screening.     Patient verbalized understanding.  Chelsea Marcus, BETSY, CNP

## 2021-05-28 NOTE — TELEPHONE ENCOUNTER
Left VM regarding lab results. Which show some concern for both AST/ALT ratio elevation indicating she may need further testing for NAFLD/BOOGIE. And her TG/HDL ratio was also elevated indicating she does have some insulin resistance likely a contributing factor to her hyperlipidemia and hypertension. She recently started a low carb/high fat diet which ultimately will treat her insulin resistance and likely improve NAFLD/BOOGIE if she has it. My recommendation is to retest in 6 months after she has been on a low carb/high fat diet for that long to see if any improvement in her numbers. At that time should consider checking fasting insulin, and Glucose and check at Zora-IR.    Chelsea Marcus, APRN, CNP

## 2021-05-28 NOTE — TELEPHONE ENCOUNTER
ANTICOAGULATION  MANAGEMENT    Assessment     Today's INR result of 2.8 is Therapeutic (goal INR of 2.0-3.0)        Warfarin taken as previously instructed    No new diet changes affecting INR    No new medication/supplements affecting INR    Continues to tolerate warfarin with no reported s/s of bleeding or thromboembolism     Previous INR was Therapeutic    Plan:     Left a detailed message for Alyssa regarding INR result and instructed:     Warfarin Dosing Instructions:  Continue current warfarin dose    6 mg every Sun; 7 mg all other days     (0 % change)    Instructed patient to follow up no later than: 4-6 weeks.  Instructed to have INR checked sooner if signs and symptoms of bleeding is noted.    Education provided: importance of therapeutic range, target INR goal and significance of current INR result and monitoring for bleeding signs and symptoms  Instructed to call the [facility] for any changes, questions or concerns. (#[phone])   ?   Nuvia Valenzuela RN    Subjective/Objective:      Alyssa Higginbotham, a 64 y.o. female is on warfarin.     Alyssa reports:     Home warfarin dose: as updated on anticoagulation calendar per template     Missed doses: No     Medication changes:  No     S/S of bleeding or thromboembolism:  No     New Injury or illness:  No     Changes in diet or alcohol consumption:  No     Upcoming surgery, procedure or cardioversion:  No    Anticoagulation Episode Summary     Current INR goal:   2.0-3.0   TTR:   37.1 % (4.4 y)   Next INR check:   6/4/2019   INR from last check:   2.80 (4/23/2019)   Weekly max warfarin dose:      Target end date:      INR check location:      Preferred lab:      Send INR reminders to:   ANTICOAGULATION POOL C (DTN,VAD,CGR,GAV)    Indications    S/P AVR [Z95.2]           Comments:   Goal range changed 2/20/19 per cardiology         Anticoagulation Care Providers     Provider Role Specialty Phone number    Rafaela Woods MD Referring Family  Medicine [phone]

## 2021-05-29 NOTE — TELEPHONE ENCOUNTER
Controlled Substance Refill Request  Medication:   Requested Prescriptions     Pending Prescriptions Disp Refills     butalbital-acetaminophen-caffeine (FIORICET, ESGIC) -40 mg per tablet [Pharmacy Med Name: BUTALBITAL/ACETAMINOPHEN/CAFF TABS] 30 tablet 0     Sig: TAKE 1 TO 2 TABLETS BY MOUTH EVERY 4 HOURS AS NEEDED FOR PAIN. MAX OF 4000 MG ACETAMINOPHEN PER 24 HOURS     Date Last Fill: 1/19/19  Pharmacy: [id number]   Submit electronically to pharmacy  Controlled Substance Agreement on File:   Encounter-Level CSA Scan Date:    There are no encounter-level csa scan date.       Last office visit: Last office visit pertaining to requested medication was 5/8/19.

## 2021-05-29 NOTE — TELEPHONE ENCOUNTER
ANTICOAGULATION  MANAGEMENT    Assessment     Today's INR result of 3.1 is Supratherapeutic (goal INR of 2.0-3.0)        Warfarin taken as previously instructed    No new diet changes affecting INR    No new medication/supplements affecting INR    Continues to tolerate warfarin with no reported s/s of bleeding or thromboembolism     Previous INR was Supratherapeutic    Plan:     Spoke with Alyssa regarding INR result and instructed:     Warfarin Dosing Instructions:  Continue current warfarin dose 6 mg daily on Sun; and 7 mg daily rest of week  (0 % change)    Instructed patient to follow up no later than: two weeks    Education provided: impact of vitamin K foods on INR  Alyssa agrees to increase greens by adding 1-2 servings a week.  Alyssa verbalizes understanding and agrees to warfarin dosing plan.    Instructed to call the [facility] for any changes, questions or concerns. (#[phone])   ?   Lynette Manzano RN    Subjective/Objective:      Alyssa Higginbotham, a 64 y.o. female is on warfarin.     Alyssa reports:     Home warfarin dose: as updated on anticoagulation calendar per template     Missed doses: No     Medication changes:  No     S/S of bleeding or thromboembolism:  No     New Injury or illness:  No     Changes in diet or alcohol consumption:  No     Upcoming surgery, procedure or cardioversion:  No    Anticoagulation Episode Summary     Current INR goal:   2.0-3.0   TTR:   37.8 % (4.5 y)   Next INR check:   7/3/2019   INR from last check:   3.10! (6/19/2019)   Weekly max warfarin dose:      Target end date:      INR check location:      Preferred lab:      Send INR reminders to:   [facility]    Indications    S/P AVR [Z95.2]           Comments:   Goal range changed 2/20/19 per cardiology         Anticoagulation Care Providers     Provider Role Specialty Phone number    Rafaela Woods MD Referring Family Medicine [phone]

## 2021-05-29 NOTE — TELEPHONE ENCOUNTER
Who is calling:  Patient   Reason for Call:  Returning [facility] call  Date of last appointment with primary care: n/a  Okay to leave a detailed message: Yes

## 2021-05-29 NOTE — TELEPHONE ENCOUNTER
ANTICOAGULATION  MANAGEMENT    Assessment     Today's INR result of 3.3 is Supratherapeutic (goal INR of 2.0-3.0)        Warfarin taken as previously instructed    Increased greens lately not decreased    No new medication/supplements affecting INR    Continues to tolerate warfarin with no reported s/s of bleeding or thromboembolism     Previous INR was Therapeutic    Plan:     Spoke with Alyssa regarding INR result and instructed:     Warfarin Dosing Instructions:  one time lowering dose of 5mg tonight then continue current warfarin dose 6 mg daily on Sun; and 7 mg daily rest of week  (0 % change)    Instructed patient to follow up no later than: two weeks    Education provided: importance of therapeutic range    Alyssa verbalizes understanding and agrees to warfarin dosing plan.    Instructed to call the [facility] for any changes, questions or concerns. (#[phone])   ?   Lynette Manzano RN    Subjective/Objective:      Alyssa Higginbotham, a 64 y.o. female is on warfarin.     Alyssa reports:     Home warfarin dose: as updated on anticoagulation calendar per template     Missed doses: No     Medication changes:  No     S/S of bleeding or thromboembolism:  No     New Injury or illness:  No     Changes in diet or alcohol consumption:  Yes: increased greens vs decreased greens     Upcoming surgery, procedure or cardioversion:  No    Anticoagulation Episode Summary     Current INR goal:   2.0-3.0   TTR:   38.1 % (4.5 y)   Next INR check:   6/19/2019   INR from last check:   3.30! (6/5/2019)   Weekly max warfarin dose:      Target end date:      INR check location:      Preferred lab:      Send INR reminders to:   ANTICOAGULATION POOL C (DTN,VAD,CGR,GAV)    Indications    S/P AVR [Z95.2]           Comments:   Goal range changed 2/20/19 per cardiology         Anticoagulation Care Providers     Provider Role Specialty Phone number    Rafaela Woods MD Referring Family Medicine [phone]

## 2021-05-30 VITALS — BODY MASS INDEX: 24.15 KG/M2 | WEIGHT: 132.06 LBS

## 2021-05-30 NOTE — TELEPHONE ENCOUNTER
ANTICOAGULATION  MANAGEMENT    Assessment     Today's INR result of 3.1 is Supratherapeutic (goal INR of 2.0-3.0)        Warfarin taken as previously instructed    No new diet changes affecting INR    No new medication/supplements affecting INR    Continues to tolerate warfarin with no reported s/s of bleeding or thromboembolism     Previous INR was Therapeutic at 2.9, 3.1 and 3.3 prior to that    Plan:     Spoke with Alyssa regarding INR result and instructed:     Warfarin Dosing Instructions:  Change warfarin dose to 6 mg daily on Sun/Wed; and 7 mg daily rest of week  (2 % change, patient agreed to a small dose change as her INRS have been trending on the high side of therapeutic or supratherapeutic despite an increase in greens)    Instructed patient to follow up no later than: two weeks    Education provided: importance of therapeutic range and target INR goal and significance of current INR result    Alyssa verbalizes understanding and agrees to warfarin dosing plan.    Instructed to call the [facility] for any changes, questions or concerns. (#[phone])   ?   Lynette Manzano RN    Subjective/Objective:      Alyssa Higginbotham, a 64 y.o. female is on warfarin.     Alyssa reports:     Home warfarin dose: as updated on anticoagulation calendar per template     Missed doses: No     Medication changes:  No     S/S of bleeding or thromboembolism:  No     New Injury or illness:  No     Changes in diet or alcohol consumption:  No     Upcoming surgery, procedure or cardioversion:  No      Anticoagulation Episode Summary     Current INR goal:   2.0-3.0   TTR:   38.0 % (4.6 y)   Next INR check:   7/31/2019   INR from last check:   3.10! (7/17/2019)   Weekly max warfarin dose:      Target end date:      INR check location:      Preferred lab:      Send INR reminders to:   [facility]    Indications    S/P AVR [Z95.2]           Comments:   Goal range changed 2/20/19 per cardiology          Anticoagulation Care Providers     Provider Role Specialty Phone number    Rafaela Woods MD Referring Family Medicine [phone]

## 2021-05-30 NOTE — TELEPHONE ENCOUNTER
ANTICOAGULATION  MANAGEMENT    Assessment     Today's INR result of 2.9 is Therapeutic (goal INR of 2.0-3.0)        Warfarin taken as previously instructed    No new diet changes affecting INR    No new medication/supplements affecting INR    Continues to tolerate warfarin with no reported s/s of bleeding or thromboembolism     Previous INR was Supratherapeutic    Plan:     Spoke with Alyssa regarding INR result and instructed:     Warfarin Dosing Instructions:  Continue current warfarin dose 6 mg daily on Sun; and 7 mg daily rest of week  (0 % change)    Instructed patient to follow up no later than: two weeks    Education provided: importance of therapeutic range    Alyssa verbalizes understanding and agrees to warfarin dosing plan.    Instructed to call the [facility] for any changes, questions or concerns. (#[phone])   ?   Lynette Manzano RN    Subjective/Objective:      Alyssa Higginbotham, a 64 y.o. female is on warfarin.     Alyssa reports:     Home warfarin dose: as updated on anticoagulation calendar per template     Missed doses: No     Medication changes:  No     S/S of bleeding or thromboembolism:  No     New Injury or illness:  No     Changes in diet or alcohol consumption:  No     Upcoming surgery, procedure or cardioversion:  No    Anticoagulation Episode Summary     Current INR goal:   2.0-3.0   TTR:   37.9 % (4.6 y)   Next INR check:   7/17/2019   INR from last check:   2.90 (7/3/2019)   Weekly max warfarin dose:      Target end date:      INR check location:      Preferred lab:      Send INR reminders to:   [facility]    Indications    S/P AVR [Z95.2]           Comments:   Goal range changed 2/20/19 per cardiology         Anticoagulation Care Providers     Provider Role Specialty Phone number    Rafaela Woods MD Referring Family Medicine [phone]

## 2021-05-30 NOTE — TELEPHONE ENCOUNTER
FYI - Status Update  Who is Calling: Patient  Update: Patient returning ACN call, ACN not available at the time of the call. Please call the patient back, thank you.  Okay to leave a detailed message?:  No

## 2021-05-30 NOTE — TELEPHONE ENCOUNTER
RN cannot approve Refill Request    RN can NOT refill this medication med is not covered by policy/route to provider. Last office visit: 10/17/2016 Rafaela Woods MD Last Physical: 12/1/2016 Last MTM visit: Visit date not found Last visit same specialty: Visit date not found.  Next visit within 3 mo: Visit date not found  Next physical within 3 mo: Visit date not found      Christa Charles, [facility] Triage/Med Refill 7/15/2019    Requested Prescriptions   Pending Prescriptions Disp Refills     simvastatin (ZOCOR) 20 MG tablet [Pharmacy Med Name: SIMVASTATIN TABS 20MG] 90 tablet 2     Sig: TAKE 1 TABLET AT BEDTIME. DUE FOR AN OFFICE VISIT.       There is no refill protocol information for this order

## 2021-05-31 VITALS — HEIGHT: 62 IN | BODY MASS INDEX: 21.53 KG/M2 | WEIGHT: 117 LBS

## 2021-05-31 VITALS — HEIGHT: 62 IN | BODY MASS INDEX: 21.6 KG/M2 | WEIGHT: 117.4 LBS

## 2021-05-31 VITALS — WEIGHT: 132 LBS | BODY MASS INDEX: 24.29 KG/M2 | HEIGHT: 62 IN

## 2021-05-31 VITALS — BODY MASS INDEX: 21.42 KG/M2 | WEIGHT: 116.4 LBS | HEIGHT: 62 IN

## 2021-05-31 VITALS — BODY MASS INDEX: 21.53 KG/M2 | WEIGHT: 117 LBS | HEIGHT: 62 IN

## 2021-05-31 NOTE — TELEPHONE ENCOUNTER
ANTICOAGULATION  MANAGEMENT    Assessment     Today's INR result of 3.80 is Supratherapeutic (goal INR of 2.0-3.0)        Warfarin taken as previously instructed    handfuls of peanuts everyday may be affecting diet and INR    No new medication/supplements affecting INR    Continues to tolerate warfarin with no reported s/s of bleeding or thromboembolism     Previous INR was Supratherapeutic at 3.10 on 7/17/19.    Plan:     Spoke with Jennifer regarding INR result and instructed:     Warfarin Dosing Instructions:   (evenings.  has 1mg and 5mg tabs)   - tonight, advised to HOLD warfarin dose,    - then change warfarin dose to 7 mg daily on Tuesday / Thursday / Saturday; and 6 mg daily rest of week.   - (4.3 % change)    Instructed patient to follow up no later than:  1-2 wks.   - scheduled on 8/20/19 @ [facility].    Education provided: importance of consistent vitamin K intake, target INR goal and significance of current INR result and importance of notifying clinic for changes in medications    Jennifer verbalizes understanding and agrees to warfarin dosing plan.    Instructed to call the [facility] for any changes, questions or concerns. (#[phone])   ?   Aylin Pablo RN    Subjective/Objective:      Alyssa TYLER Neftaly, a 64 y.o. female is on warfarin.     Alyssa reports:     Home warfarin dose: as updated on anticoagulation calendar per template     Missed doses: No     Medication changes:  No     S/S of bleeding or thromboembolism:  No     New Injury or illness:  No   - on warfarin therapy for Hx of AVR (mechanical).     Changes in diet or alcohol consumption:  Yes.  While on vacation and ate handfuls of Peanuts everyday.       Upcoming surgery, procedure or cardioversion:  No    Anticoagulation Episode Summary     Current INR goal:   2.0-3.0   TTR:   37.6 % (4.7 y)   Next INR check:   8/20/2019   INR from last check:   3.80! (8/6/2019)   Weekly max warfarin dose:      Target end date:      INR check location:       Preferred lab:      Send INR reminders to:   [facility]    Indications    S/P AVR [Z95.2]           Comments:   Goal range changed 2/20/19 per cardiology         Anticoagulation Care Providers     Provider Role Specialty Phone number    Rafaela Woods MD Referring Family Medicine [phone]

## 2021-05-31 NOTE — TELEPHONE ENCOUNTER
ANTICOAGULATION  MANAGEMENT    Assessment     Today's INR result of 3.1 is Supratherapeutic (goal INR of 2.0-3.0)        Warfarin taken as previously instructed    No new diet changes affecting INR    No new medication/supplements affecting INR    Continues to tolerate warfarin with no reported s/s of bleeding or thromboembolism     Previous INR was Supratherapeutic    Plan:     Spoke with Alyssa regarding INR result and instructed:     Warfarin Dosing Instructions:  Continue current warfarin dose 7 mg daily on Tues/Thurs/Sat; and 6 mg daily rest of week  (0 % change)    Instructed patient to follow up no later than: two weeks    Education provided: importance of therapeutic range and target INR goal and significance of current INR result    Alyssa verbalizes understanding and agrees to warfarin dosing plan.    Instructed to call the [facility] for any changes, questions or concerns. (#[phone])   ?   Lynette Manzano RN    Subjective/Objective:      Alyssa Fraziereller, a 64 y.o. female is on warfarin.     Alyssa reports:     Home warfarin dose: template incorrect; verbally confirmed home dose with Alyssa and updated on anticoagulation calendar     Missed doses: No     Medication changes:  No     S/S of bleeding or thromboembolism:  No     New Injury or illness:  No     Changes in diet or alcohol consumption:  No     Upcoming surgery, procedure or cardioversion:  No    Anticoagulation Episode Summary     Current INR goal:   2.0-3.0   TTR:   37.2 % (4.7 y)   Next INR check:   9/3/2019   INR from last check:   3.10! (8/20/2019)   Weekly max warfarin dose:      Target end date:      INR check location:      Preferred lab:      Send INR reminders to:   [facility]    Indications    S/P AVR [Z95.2]           Comments:   Goal range changed 2/20/19 per cardiology         Anticoagulation Care Providers     Provider Role Specialty Phone number    Rafaela Woods MD Referring Family Medicine  [phone]

## 2021-05-31 NOTE — TELEPHONE ENCOUNTER
Refill Approved    Rx renewed per Medication Renewal Policy. Medication was last renewed on 3/17/19.    Loly Nunez, [facility] Connection Triage/Med Refill 9/2/2019     Requested Prescriptions   Pending Prescriptions Disp Refills     metoprolol succinate (TOPROL-XL) 200 MG 24 hr tablet [Pharmacy Med Name: METOPROLOL SUCCINATE ER TABS 200MG] 90 tablet 4     Sig: Take 1 tablet (200 mg total) by mouth daily.       Beta-Blockers Refill Protocol Passed - 9/2/2019  9:27 AM        Passed - PCP or prescribing provider visit in past 12 months or next 3 months     Last office visit with prescriber/PCP: 10/17/2016 Rafaela Woods MD OR same dept: 4/8/2019 Michelle Jeffrey MD OR same specialty: 4/8/2019 Michelle Jeffrey MD  Last physical: 12/1/2016 Last MTM visit: Visit date not found   Next visit within 3 mo: Visit date not found  Next physical within 3 mo: Visit date not found  Prescriber OR PCP: Rafaela Woods MD  Last diagnosis associated with med order: 1. Essential hypertension, malignant  - metoprolol succinate (TOPROL-XL) 200 MG 24 hr tablet [Pharmacy Med Name: METOPROLOL SUCCINATE ER TABS 200MG]; TAKE 1 TABLET DAILY  Dispense: 90 tablet; Refill: 4    If protocol passes may refill for 12 months if within 3 months of last provider visit (or a total of 15 months).             Passed - Blood pressure filed in past 12 months     BP Readings from Last 1 Encounters:   05/08/19 142/75

## 2021-05-31 NOTE — TELEPHONE ENCOUNTER
RN cannot approve Refill Request    RN can NOT refill this medication routed to anticoagulation pool. Last office visit: 10/17/2016 Rafaela Woods MD Last Physical: 12/1/2016 Last MTM visit: Visit date not found Last visit same specialty: 4/8/2019 Michelle Jeffrey MD.  Next visit within 3 mo: Visit date not found  Next physical within 3 mo: Visit date not found      Mily Garner, [facility] Triage/Med Refill 8/11/2019    Requested Prescriptions   Pending Prescriptions Disp Refills     warfarin (COUMADIN/JANTOVEN) 5 MG tablet [Pharmacy Med Name: WARFARIN TABS 5MG] 110 tablet 1     Sig: TAKE 1 TABLET ALONG WITH 1 MG TABLET ( 6 TO 7 MG DAILY ) AS DIRECTED. ADJUST PER INR RESULT       Warfarin Refill Protocol  Failed - 8/10/2019 10:10 AM        Failed -  Route to appropriate pool/provider     Last Anticoagulation Summary:   Anticoagulation Episode Summary     Current INR goal:   2.0-3.0   TTR:   37.6 % (4.7 y)   Next INR check:   8/20/2019   INR from last check:   3.80! (8/6/2019)   Weekly max warfarin dose:      Target end date:      INR check location:      Preferred lab:      Send INR reminders to:   [facility]    Indications    S/P AVR [Z95.2]           Comments:   Goal range changed 2/20/19 per cardiology         Anticoagulation Care Providers     Provider Role Specialty Phone number    Rafaela Woods MD Referring Family Medicine [phone]                Passed - Provider visit in last year     Last office visit with prescriber/PCP: 10/17/2016 Rafaela Woods MD OR same dept: 4/8/2019 Michelle Jeffrey MD OR same specialty: 4/8/2019 Michelle Jeffrey MD  Last physical: 12/1/2016 Last MTM visit: Visit date not found    Next appt within 3 mo: Visit date not found Next physical within 3 mo: Visit date not found  Prescriber OR PCP: Rafaela Woods MD  Last diagnosis associated with med order: 1. S/P AVR (aortic valve replacement)  - warfarin (COUMADIN/JANTOVEN) 5 MG tablet  [Pharmacy Med Name: WARFARIN TABS 5MG]; TAKE 1 TABLET ALONG WITH 1 MG TABLET ( 6 TO 7 MG DAILY ) AS DIRECTED. ADJUST PER INR RESULT  Dispense: 110 tablet; Refill: 1    2. Long term current use of anticoagulant therapy  - warfarin (COUMADIN/JANTOVEN) 5 MG tablet [Pharmacy Med Name: WARFARIN TABS 5MG]; TAKE 1 TABLET ALONG WITH 1 MG TABLET ( 6 TO 7 MG DAILY ) AS DIRECTED. ADJUST PER INR RESULT  Dispense: 110 tablet; Refill: 1    3. HTN (hypertension)  - triamterene-hydrochlorothiazide (MAXZIDE) 75-50 mg per tablet [Pharmacy Med Name: TRIAMTERENE HCTZ TABS 75/50MG]; TAKE ONE-HALF (1/2) TABLET DAILY  Dispense: 45 tablet; Refill: 0    If protocol passes may refill for 6 months if within 3 months of last provider visit (or a total of 9 months).          triamterene-hydrochlorothiazide (MAXZIDE) 75-50 mg per tablet [Pharmacy Med Name: TRIAMTERENE HCTZ TABS 75/50MG] 45 tablet 0     Sig: TAKE ONE-HALF (1/2) TABLET DAILY       Diuretics/Combination Diuretics Refill Protocol  Passed - 8/10/2019 10:10 AM        Passed - Visit with PCP or prescribing provider visit in past 12 months     Last office visit with prescriber/PCP: 10/17/2016 Rafaela Woods MD OR same dept: 4/8/2019 Michelle Jeffrey MD OR same specialty: 4/8/2019 Michelle Jeffrey MD  Last physical: 12/1/2016 Last MTM visit: Visit date not found   Next visit within 3 mo: Visit date not found  Next physical within 3 mo: Visit date not found  Prescriber OR PCP: Rafaela Woods MD  Last diagnosis associated with med order: 1. S/P AVR (aortic valve replacement)  - warfarin (COUMADIN/JANTOVEN) 5 MG tablet [Pharmacy Med Name: WARFARIN TABS 5MG]; TAKE 1 TABLET ALONG WITH 1 MG TABLET ( 6 TO 7 MG DAILY ) AS DIRECTED. ADJUST PER INR RESULT  Dispense: 110 tablet; Refill: 1    2. Long term current use of anticoagulant therapy  - warfarin (COUMADIN/JANTOVEN) 5 MG tablet [Pharmacy Med Name: WARFARIN TABS 5MG]; TAKE 1 TABLET ALONG WITH 1 MG TABLET ( 6 TO 7 MG DAILY ) AS  DIRECTED. ADJUST PER INR RESULT  Dispense: 110 tablet; Refill: 1    3. HTN (hypertension)  - triamterene-hydrochlorothiazide (MAXZIDE) 75-50 mg per tablet [Pharmacy Med Name: TRIAMTERENE HCTZ TABS 75/50MG]; TAKE ONE-HALF (1/2) TABLET DAILY  Dispense: 45 tablet; Refill: 0    If protocol passes may refill for 12 months if within 3 months of last provider visit (or a total of 15 months).             Passed - Serum Potassium in past 12 months      Lab Results   Component Value Date    Potassium 3.7 05/08/2019             Passed - Serum Sodium in past 12 months      Lab Results   Component Value Date    Sodium 142 05/08/2019             Passed - Blood pressure on file in past 12 months     BP Readings from Last 1 Encounters:   05/08/19 142/75             Passed - Serum Creatinine in past 12 months      Creatinine   Date Value Ref Range Status   05/08/2019 0.87 0.60 - 1.10 mg/dL Final

## 2021-06-01 ENCOUNTER — RECORDS - HEALTHEAST (OUTPATIENT)
Dept: ADMINISTRATIVE | Facility: CLINIC | Age: 66
End: 2021-06-01

## 2021-06-01 ENCOUNTER — COMMUNICATION - HEALTHEAST (OUTPATIENT)
Dept: ANTICOAGULATION | Facility: CLINIC | Age: 66
End: 2021-06-01

## 2021-06-01 ENCOUNTER — AMBULATORY - HEALTHEAST (OUTPATIENT)
Dept: LAB | Facility: CLINIC | Age: 66
End: 2021-06-01

## 2021-06-01 VITALS — WEIGHT: 119 LBS | BODY MASS INDEX: 21.59 KG/M2

## 2021-06-01 DIAGNOSIS — Z95.2 S/P AVR: ICD-10-CM

## 2021-06-01 LAB — INR PPP: 3.1 (ref 0.9–1.1)

## 2021-06-01 NOTE — TELEPHONE ENCOUNTER
Neither the Pneumovax 23 or Prevnar 13 are due now.  At age 65 the Prevnar 13 will be due and at age 66 the Pneumovax 23.  Thank you.

## 2021-06-01 NOTE — TELEPHONE ENCOUNTER
Pt had pneumovax 23 on 01/10/08. Please review and sign whichever order pt needs. Both prevnar and pneumovax 23 are pended.

## 2021-06-01 NOTE — TELEPHONE ENCOUNTER
ANTICOAGULATION  MANAGEMENT    Assessment     Today's INR result of 2.2 is Therapeutic (goal INR of 2.0-3.0)        Warfarin taken as previously instructed    No new diet changes affecting INR    No new medication/supplements affecting INR    Continues to tolerate warfarin with no reported s/s of bleeding or thromboembolism     Previous INR was Supratherapeutic    Plan:     Spoke with Alyssa regarding INR result and instructed:     Warfarin Dosing Instructions:  Continue current warfarin dose    7 mg every Tue, Thu, Sat; 6 mg all other days         Instructed patient to follow up no later than: 2 weeks.     Education provided: importance of taking warfarin as instructed    Alyssa verbalizes understanding and agrees to warfarin dosing plan.    Instructed to call the [facility] for any changes, questions or concerns. (#[phone])   ?   Chris Bolton RN    Subjective/Objective:      Alyssa Higginbotham, a 64 y.o. female is on warfarin.     Alyssa reports:     Home warfarin dose: verbally confirmed home dose with pt and updated on anticoagulation calendar     Missed doses: No     Medication changes:  No     S/S of bleeding or thromboembolism:  No     New Injury or illness:  No     Changes in diet or alcohol consumption:  No     Upcoming surgery, procedure or cardioversion:  No    Anticoagulation Episode Summary     Current INR goal:   2.0-3.0   TTR:   54.5 %   Next INR check:   9/27/2019   INR from last check:   2.20 (9/13/2019)   Weekly max warfarin dose:      Target end date:      INR check location:      Preferred lab:      Send INR reminders to:   [facility]    Indications    S/P AVR [Z95.2]           Comments:   Goal range changed 2/20/19 per cardiology         Anticoagulation Care Providers     Provider Role Specialty Phone number    Rafaela Woods MD Referring Family Medicine [phone]

## 2021-06-01 NOTE — TELEPHONE ENCOUNTER
ANTICOAGULATION  MANAGEMENT    Assessment     Today's INR result of 3.5 is Supratherapeutic (goal INR of 2.0-3.0)        Warfarin taken as previously instructed    No new diet changes affecting INR    No new medication/supplements affecting INR    Continues to tolerate warfarin with no reported s/s of bleeding or thromboembolism     Previous INR was Therapeutic    Plan:     Spoke with Alyssa regarding INR result and instructed:     Warfarin Dosing Instructions: one time dose of 3mg tonight then Change warfarin dose to 6 mg daily(6.7 % change)    Instructed patient to follow up no later than: two weeks    Education provided: importance of therapeutic range    Alyssa verbalizes understanding and agrees to warfarin dosing plan.    Instructed to call the [facility] for any changes, questions or concerns. (#[phone])   ?   Lynette Manzano RN    Subjective/Objective:      Alyssa Higginbotham, a 64 y.o. female is on warfarin.     Alyssa reports:     Home warfarin dose: as updated on anticoagulation calendar per template     Missed doses: No     Medication changes:  No     S/S of bleeding or thromboembolism:  No     New Injury or illness:  No     Changes in diet or alcohol consumption:  No     Upcoming surgery, procedure or cardioversion:  No    Anticoagulation Episode Summary     Current INR goal:   2.0-3.0   TTR:   56.8 %   Next INR check:   10/11/2019   INR from last check:   3.50! (9/27/2019)   Weekly max warfarin dose:      Target end date:      INR check location:      Preferred lab:      Send INR reminders to:   [facility]    Indications    S/P AVR [Z95.2]           Comments:   Goal range changed 2/20/19 per cardiology         Anticoagulation Care Providers     Provider Role Specialty Phone number    Rafaela Woods MD Referring Family Medicine [phone]

## 2021-06-02 VITALS — WEIGHT: 122 LBS | BODY MASS INDEX: 22.14 KG/M2

## 2021-06-02 VITALS — WEIGHT: 125 LBS | BODY MASS INDEX: 22.68 KG/M2

## 2021-06-02 VITALS — WEIGHT: 123.8 LBS | BODY MASS INDEX: 22.78 KG/M2 | HEIGHT: 62 IN

## 2021-06-02 VITALS — HEIGHT: 62 IN | WEIGHT: 125.8 LBS | BODY MASS INDEX: 23.15 KG/M2

## 2021-06-02 VITALS — BODY MASS INDEX: 22.23 KG/M2 | WEIGHT: 122.5 LBS

## 2021-06-02 NOTE — TELEPHONE ENCOUNTER
Controlled Substance Refill Request  Medication:   Requested Prescriptions     Pending Prescriptions Disp Refills     butalbital-acetaminophen-caffeine (FIORICET, ESGIC) -40 mg per tablet [Pharmacy Med Name: BUTALBITAL/ACETAMINOPHEN/CAFF TABS] 30 tablet 0     Sig: TAKE 1 TO 2 TABLETS BY MOUTH EVERY 4 HOURS AS NEEDED FOR PAIN. MAX OF 4000 MG ACETAMINOPHEN PER 24 HOURS     Date Last Fill: 5/30/19  Pharmacy: [id number]   Submit electronically to pharmacy  Controlled Substance Agreement on File:   Encounter-Level CSA Scan Date:    There are no encounter-level csa scan date.       Last office visit: Last office visit pertaining to requested medication was 5/8/19.

## 2021-06-02 NOTE — TELEPHONE ENCOUNTER
Refill Approved    Rx renewed per Medication Renewal Policy. Medication was last renewed on 7/15/19.    Lissette Benjamin, [facility] Triage/Med Refill 10/18/2019     Requested Prescriptions   Pending Prescriptions Disp Refills     simvastatin (ZOCOR) 20 MG tablet [Pharmacy Med Name: SIMVASTATIN TABS 20MG] 90 tablet 4     Sig: TAKE 1 TABLET AT BEDTIME. DUE FOR AN OFFICE VISIT.       Statins Refill Protocol (Hmg CoA Reductase Inhibitors) Passed - 10/17/2019  9:01 AM        Passed - PCP or prescribing provider visit in past 12 months      Last office visit with prescriber/PCP: 10/17/2016 Rafaela Woods MD OR same dept: 4/8/2019 Michelle Jeffrey MD OR same specialty: 4/8/2019 Michelle Jeffrey MD  Last physical: 12/1/2016 Last MTM visit: Visit date not found   Next visit within 3 mo: Visit date not found  Next physical within 3 mo: Visit date not found  Prescriber OR PCP: Rafaela Woods MD  Last diagnosis associated with med order: 1. Hyperlipidemia  - simvastatin (ZOCOR) 20 MG tablet [Pharmacy Med Name: SIMVASTATIN TABS 20MG]; TAKE 1 TABLET AT BEDTIME. DUE FOR AN OFFICE VISIT.  Dispense: 90 tablet; Refill: 4    If protocol passes may refill for 12 months if within 3 months of last provider visit (or a total of 15 months).

## 2021-06-02 NOTE — TELEPHONE ENCOUNTER
Medication: butalbital-acetaminophen-caffeine     Last Date Filled 05/30/19     pulled: Unable to pull       Only PCP Prescribing?:    Taken as prescribed from physician notes?:    CSA in last year: NO    Random Utox in last year: NO    Opioids + benzodiazepines? NO

## 2021-06-02 NOTE — TELEPHONE ENCOUNTER
ANTICOAGULATION  MANAGEMENT    Assessment     Today's INR result of 1.80 is Subtherapeutic (goal INR of 2.0-3.0)        Warfarin taken as previously instructed    No new diet changes affecting INR    No new medication/supplements affecting INR    Continues to tolerate warfarin with no reported s/s of bleeding or thromboembolism     Previous INR was Therapeutic at 2.70 on     Plan:     Spoke with Jennifer regarding INR result and instructed:    1.  Sent patient diet information (Vitamin-K food listings).    Warfarin Dosing Instructions:  (evening has 1mg and 5mg tabs)   - today, advised one time booster with 8mg warfarin dose,   - then continue current warfarin dose 6 mg daily.    Instructed patient to follow up no later than:  1-2 wks.   - scheduled on 11/8/19 @ [facility].    Education provided: importance of consistent vitamin K intake, target INR goal and significance of current INR result, importance of notifying clinic for changes in medications and monitoring for clotting signs and symptoms    Jennifer verbalizes understanding and agrees to warfarin dosing plan.    Instructed to call the [facility] for any changes, questions or concerns. (#[phone])   ?   Aylin Pablo RN    Subjective/Objective:      Alyssa Higginbotham, a 64 y.o. female is on warfarin.     Alyssa reports:     Home warfarin dose: as updated on anticoagulation calendar per template     Missed doses: No     Medication changes:  No     S/S of bleeding or thromboembolism:  No     New Injury or illness:  No     Changes in diet or alcohol consumption:  No     Upcoming surgery, procedure or cardioversion:  No    Anticoagulation Episode Summary     Current INR goal:   2.0-3.0   TTR:   59.9 %   Next INR check:   11/8/2019   INR from last check:   1.80! (10/25/2019)   Weekly max warfarin dose:      Target end date:      INR check location:      Preferred lab:      Send INR reminders to:   [facility]    Indications    S/P AVR [Z95.2]            Comments:   Goal range changed 2/20/19 per cardiology         Anticoagulation Care Providers     Provider Role Specialty Phone number    Rafaela Woods MD Referring Family Medicine [phone]

## 2021-06-02 NOTE — TELEPHONE ENCOUNTER
ANTICOAGULATION  MANAGEMENT    Assessment     Today's INR result of 2.7 is Therapeutic (goal INR of 2.0-3.0)        Warfarin taken as previously instructed    No new diet changes affecting INR    No new medication/supplements affecting INR    Continues to tolerate warfarin with no reported s/s of bleeding or thromboembolism     Previous INR was Supratherapeutic    Plan:     Left a detailed message for Alyssa regarding INR result and instructed:     Warfarin Dosing Instructions:  Continue current warfarin dose 6 mg daily  Instructed patient to follow up no later than: two weeks    Education provided: importance of therapeutic range    Instructed to call the [facility] for any changes, questions or concerns. (#[phone])   ?   Lynette Manzano RN    Subjective/Objective:      Alyssa Higginbotham, a 64 y.o. female is on warfarin.     Alyssa reports:     Home warfarin dose: as updated on anticoagulation calendar per template     Missed doses: No     Medication changes:  No     S/S of bleeding or thromboembolism:  No     New Injury or illness:  No     Changes in diet or alcohol consumption:  No     Upcoming surgery, procedure or cardioversion:  No    Anticoagulation Episode Summary     Current INR goal:   2.0-3.0   TTR:   57.8 %   Next INR check:   10/25/2019   INR from last check:   2.70 (10/11/2019)   Weekly max warfarin dose:      Target end date:      INR check location:      Preferred lab:      Send INR reminders to:   [facility]    Indications    S/P AVR [Z95.2]           Comments:   Goal range changed 2/20/19 per cardiology         Anticoagulation Care Providers     Provider Role Specialty Phone number    Rafaela Woods MD Referring Family Medicine [phone]

## 2021-06-02 NOTE — TELEPHONE ENCOUNTER
Refill Approved    Rx renewed per Medication Renewal Policy. Medication was last renewed on 8/12/19.    Tamar Lewis, [facility] Triage/Med Refill 10/25/2019     Requested Prescriptions   Pending Prescriptions Disp Refills     triamterene-hydrochlorothiazide (MAXZIDE) 75-50 mg per tablet [Pharmacy Med Name: TRIAMTERENE HCTZ TABS 75/50MG] 45 tablet 4     Sig: TAKE ONE-HALF (1/2) TABLET DAILY       Diuretics/Combination Diuretics Refill Protocol  Passed - 10/25/2019  2:57 PM        Passed - Visit with PCP or prescribing provider visit in past 12 months     Last office visit with prescriber/PCP: 10/17/2016 Rafaela Woods MD OR same dept: 4/8/2019 Michelle Jeffrey MD OR same specialty: 4/8/2019 Michelle Jeffrey MD  Last physical: 12/1/2016 Last MTM visit: Visit date not found   Next visit within 3 mo: Visit date not found  Next physical within 3 mo: Visit date not found  Prescriber OR PCP: Rafaela Woods MD  Last diagnosis associated with med order: 1. HTN (hypertension)  - triamterene-hydrochlorothiazide (MAXZIDE) 75-50 mg per tablet [Pharmacy Med Name: TRIAMTERENE HCTZ TABS 75/50MG]; TAKE ONE-HALF (1/2) TABLET DAILY  Dispense: 45 tablet; Refill: 4    If protocol passes may refill for 12 months if within 3 months of last provider visit (or a total of 15 months).             Passed - Serum Potassium in past 12 months      Lab Results   Component Value Date    Potassium 3.7 05/08/2019             Passed - Serum Sodium in past 12 months      Lab Results   Component Value Date    Sodium 142 05/08/2019             Passed - Blood pressure on file in past 12 months     BP Readings from Last 1 Encounters:   05/08/19 142/75             Passed - Serum Creatinine in past 12 months      Creatinine   Date Value Ref Range Status   05/08/2019 0.87 0.60 - 1.10 mg/dL Final

## 2021-06-03 VITALS — WEIGHT: 122.6 LBS | BODY MASS INDEX: 22.56 KG/M2 | HEIGHT: 62 IN

## 2021-06-03 NOTE — TELEPHONE ENCOUNTER
ANTICOAGULATION  MANAGEMENT    Assessment     Today's INR result of 1.9 is Subtherapeutic (goal INR of 2.0-3.0)        Warfarin taken as previously instructed    No new diet changes affecting INR    No new medication/supplements affecting INR    Continues to tolerate warfarin with no reported s/s of bleeding or thromboembolism     Previous INR was Subtherapeutic    Plan:     Spoke with Alyssa regarding INR result and instructed:     Warfarin Dosing Instructions:  Change warfarin dose to 7 mg daily on Mon/Fri; and 6 mg daily rest of week  (5 % change)    Instructed patient to follow up no later than: two weeks    Education provided: importance of therapeutic range    Alyssa verbalizes understanding and agrees to warfarin dosing plan.    Instructed to call the [facility] for any changes, questions or concerns. (#[phone])   ?   Lynette Manzano RN    Subjective/Objective:      Alyssa Higginbotham, a 64 y.o. female is on warfarin.     Alyssa reports:     Home warfarin dose: as updated on anticoagulation calendar per template     Missed doses: No     Medication changes:  No     S/S of bleeding or thromboembolism:  No     New Injury or illness:  No     Changes in diet or alcohol consumption:  No     Upcoming surgery, procedure or cardioversion:  No    Anticoagulation Episode Summary     Current INR goal:   2.0-3.0   TTR:   58.9 %   Next INR check:   11/25/2019   INR from last check:   1.90! (11/11/2019)   Weekly max warfarin dose:      Target end date:      INR check location:      Preferred lab:      Send INR reminders to:   [facility]    Indications    S/P AVR [Z95.2]           Comments:   Goal range changed 2/20/19 per cardiology         Anticoagulation Care Providers     Provider Role Specialty Phone number    Rafaela Woods MD Referring Family Medicine [phone]

## 2021-06-03 NOTE — TELEPHONE ENCOUNTER
Who is calling:  Patient  Reason for Call:  Patient missed her coumadin dose yesterday 12/02, is asking ACN how she should take dose today, should she split up doses over 3 nights?   Date of last appointment with primary care: na  Okay to leave a detailed message: Yes

## 2021-06-03 NOTE — TELEPHONE ENCOUNTER
ANTICOAGULATION  MANAGEMENT    Assessment     Today's INR result of 2.5 is Therapeutic (goal INR of 2.0-3.0)        Warfarin taken differently than instructed, but no impact to total weekly dose    No new diet changes affecting INR    No new medication/supplements affecting INR    Continues to tolerate warfarin with no reported s/s of bleeding or thromboembolism     Previous INR was Subtherapeutic    Plan:     Spoke with Alyssa regarding INR result and instructed:     Warfarin Dosing Instructions:  Continue current warfarin dose 7 mg daily on Mon/Thurs; and 6 mg daily rest of week  (0 % change)    Instructed patient to follow up no later than: two weeks    Education provided: importance of therapeutic range    Alyssa verbalizes understanding and agrees to warfarin dosing plan.    Instructed to call the [facility] for any changes, questions or concerns. (#[phone])   ?   Lynette Manzano RN    Subjective/Objective:      Alyssa Higginbotham, a 64 y.o. female is on warfarin.     Alyssa reports:     Home warfarin dose: as updated on anticoagulation calendar per template     Missed doses: No     Medication changes:  No     S/S of bleeding or thromboembolism:  No     New Injury or illness:  No     Changes in diet or alcohol consumption:  No     Upcoming surgery, procedure or cardioversion:  No    Anticoagulation Episode Summary     Current INR goal:   2.0-3.0   TTR:   58.3 % (1 y)   Next INR check:   12/9/2019   INR from last check:   2.50 (11/25/2019)   Weekly max warfarin dose:      Target end date:      INR check location:      Preferred lab:      Send INR reminders to:   [facility]    Indications    S/P AVR [Z95.2]           Comments:   Goal range changed 2/20/19 per cardiology         Anticoagulation Care Providers     Provider Role Specialty Phone number    Rafaela Woods MD Referring Family Medicine [phone]

## 2021-06-03 NOTE — TELEPHONE ENCOUNTER
Spoke with Jennifer and instructed her to continue her same dose and recheck INR in a week. Jennifer verbalizes understanding to plan.

## 2021-06-04 VITALS
HEIGHT: 62 IN | BODY MASS INDEX: 22.7 KG/M2 | HEART RATE: 64 BPM | DIASTOLIC BLOOD PRESSURE: 74 MMHG | SYSTOLIC BLOOD PRESSURE: 139 MMHG | WEIGHT: 123.38 LBS

## 2021-06-04 VITALS
BODY MASS INDEX: 22.93 KG/M2 | RESPIRATION RATE: 16 BRPM | HEART RATE: 68 BPM | WEIGHT: 125.38 LBS | SYSTOLIC BLOOD PRESSURE: 144 MMHG | DIASTOLIC BLOOD PRESSURE: 75 MMHG

## 2021-06-04 NOTE — TELEPHONE ENCOUNTER
RN cannot approve Refill Request    RN can NOT refill this medication med is not covered by policy/route to provider. Last office visit: 10/17/2016 Rafaela Woods MD Last Physical: 12/1/2016 Last MTM visit: Visit date not found Last visit same specialty: 4/8/2019 Michelle Jeffrey MD.  Next visit within 3 mo: Visit date not found  Next physical within 3 mo: Visit date not found      Rubi Corbin, [facility] Triage/Med Refill 12/7/2019    Requested Prescriptions   Pending Prescriptions Disp Refills     warfarin ANTICOAGULANT (COUMADIN/JANTOVEN) 1 MG tablet [Pharmacy Med Name: WARFARIN TABS 1MG] 168 tablet 4     Sig: TAKE 1 TO 2 TABLETS ALONG WITH 5 MG TABLET (6 TO 7 MG ) DAILY AS DIRECTED ADJUST DOSE PER INR RESULTS       Warfarin Refill Protocol  Failed - 12/7/2019  9:46 AM        Failed -  Route to appropriate pool/provider     Last Anticoagulation Summary:   Anticoagulation Episode Summary     Current INR goal:   2.0-3.0   TTR:   56.8 % (11.8 mo)   Next INR check:   12/10/2019   INR from last check:      Weekly max warfarin dose:      Target end date:      INR check location:      Preferred lab:      Send INR reminders to:   [facility]    Indications    S/P AVR [Z95.2]           Comments:   Goal range changed 2/20/19 per cardiology         Anticoagulation Care Providers     Provider Role Specialty Phone number    Rafaela Woods MD Referring Family Medicine [phone]                Passed - Provider visit in last year     Last office visit with prescriber/PCP: 10/17/2016 Rafaela Woods MD OR same dept: 4/8/2019 Michelle Jeffrey MD OR same specialty: 4/8/2019 Michelle Jeffrey MD  Last physical: 12/1/2016 Last MTM visit: Visit date not found    Next appt within 3 mo: Visit date not found Next physical within 3 mo: Visit date not found  Prescriber OR PCP: Rafaela Woods MD  Last diagnosis associated with med order: 1. S/P AVR  - warfarin ANTICOAGULANT (COUMADIN/JANTOVEN)  1 MG tablet [Pharmacy Med Name: WARFARIN TABS 1MG]; TAKE 1 TO 2 TABLETS ALONG WITH 5 MG TABLET (6 TO 7 MG ) DAILY AS DIRECTED ADJUST DOSE PER INR RESULTS  Dispense: 168 tablet; Refill: 4    2. Long term current use of anticoagulant therapy  - warfarin ANTICOAGULANT (COUMADIN/JANTOVEN) 1 MG tablet [Pharmacy Med Name: WARFARIN TABS 1MG]; TAKE 1 TO 2 TABLETS ALONG WITH 5 MG TABLET (6 TO 7 MG ) DAILY AS DIRECTED ADJUST DOSE PER INR RESULTS  Dispense: 168 tablet; Refill: 4    If protocol passes may refill for 6 months if within 3 months of last provider visit (or a total of 9 months).

## 2021-06-04 NOTE — TELEPHONE ENCOUNTER
FYI - Status Update  Who is Calling: Patient  Update: Returning call from ACN, please call her again.  Okay to leave a detailed message?:  Yes

## 2021-06-04 NOTE — TELEPHONE ENCOUNTER
ANTICOAGULATION  MANAGEMENT    Assessment     Today's INR result of 2.10 is Therapeutic (goal INR of 2.0-3.0)        Warfarin taken as previously instructed    No new diet changes affecting INR    No new medication/supplements affecting INR    Continues to tolerate warfarin with no reported s/s of bleeding or thromboembolism     Previous INR was Therapeutic at 2.50 on 11/25/19.    Reported doing well.    Plan:     Spoke with Jennifer regarding INR result and instructed:     Warfarin Dosing Instructions:   (has 1mg and 5mg tabs)   - Continue current warfarin dose 7 mg daily on Mon/Thurs; and 6 mg daily rest of week.    Instructed patient to follow up no later than:  4 wks.   - scheduled on 1/15/20 during f/u with Dr. Woods    Education provided: importance of consistent vitamin K intake, target INR goal and significance of current INR result, importance of taking warfarin as instructed, importance of notifying clinic for changes in medications, monitoring for bleeding signs and symptoms and importance of notifying clinic for diarrhea, nausea/vomiting, reduced intake and/or illness    Jennifer verbalizes understanding and agrees to warfarin dosing plan.    Instructed to call the [facility] for any changes, questions or concerns. (#[phone])   ?   Aylin Pablo RN    Subjective/Objective:      Alyssa TYLER Neftaly, a 64 y.o. female is on warfarin.     Alyssa reports:     Home warfarin dose: as updated on anticoagulation calendar per template     Missed doses: No     Medication changes:  No     S/S of bleeding or thromboembolism:  No     New Injury or illness:  No     Changes in diet or alcohol consumption:  No     Upcoming surgery, procedure or cardioversion:  No    Anticoagulation Episode Summary     Current INR goal:   2.0-3.0   TTR:   58.3 % (1 y)   Next INR check:   1/16/2020   INR from last check:   2.10 (12/10/2019)   Weekly max warfarin dose:      Target end date:      INR check location:      Preferred  lab:      Send INR reminders to:   [facility]    Indications    S/P AVR [Z95.2]           Comments:   Goal range changed 2/20/19 per cardiology         Anticoagulation Care Providers     Provider Role Specialty Phone number    Rafaela Woods MD Referring Family Medicine [phone]

## 2021-06-05 ENCOUNTER — RECORDS - HEALTHEAST (OUTPATIENT)
Dept: FAMILY MEDICINE | Facility: CLINIC | Age: 66
End: 2021-06-05

## 2021-06-05 VITALS — HEIGHT: 61 IN | BODY MASS INDEX: 23.22 KG/M2 | WEIGHT: 123 LBS

## 2021-06-05 VITALS
SYSTOLIC BLOOD PRESSURE: 140 MMHG | BODY MASS INDEX: 24 KG/M2 | HEART RATE: 69 BPM | DIASTOLIC BLOOD PRESSURE: 72 MMHG | WEIGHT: 127.13 LBS | HEIGHT: 61 IN

## 2021-06-05 DIAGNOSIS — Q61.9 CONGENITAL CYSTIC KIDNEY DISEASE: ICD-10-CM

## 2021-06-05 NOTE — TELEPHONE ENCOUNTER
Controlled Substance Refill Request  Medication Name:   Requested Prescriptions     Pending Prescriptions Disp Refills     butalbital-acetaminophen-caffeine (FIORICET, ESGIC) -40 mg per tablet [Pharmacy Med Name: BUTALBITAL/ACETAMINOPHEN/CAFF TABS] 30 tablet 0     Sig: TAKE 1 TO 2 TABLETS BY MOUTH EVERY 4 HOURS AS NEEDED FOR PAIN. MAX OF 4000 MG ACETAMINOPHEN PER 24 HOURS     Date Last Fill:   butalbital-acetaminophen-caffeine (FIORICET, ESGIC) -40 mg per tablet 30 tablet 0 10/18/2019  No   Sig: TAKE 1 TO 2 TABLETS BY MOUTH EVERY 4 HOURS AS NEEDED FOR PAIN. MAX OF 4000 MG ACETAMINOPHEN PER 24 HOURS   Sent to pharmacy as: butalbital-acetaminophen-caffeine 50 mg-325 mg-40 mg tablet (FIORICET, ESGIC)   E-Prescribing Status: Receipt confirmed by pharmacy (10/18/2019 10:36 PM CDT)     Requested Pharmacy: [facility]  Submit electronically to pharmacy  Controlled Substance Agreement on file:   Encounter-Level CSA Scan Date:    There are no encounter-level csa scan date.        Last office visit:  1/15/2020

## 2021-06-05 NOTE — TELEPHONE ENCOUNTER
ANTICOAGULATION  MANAGEMENT    Assessment     Today's INR result of 1.7 is Subtherapeutic (goal INR of 2.0-3.0)        Patient states she believes she took warfarin as instructed, but could have missed a dose in the last week. Does not use a pillbox.     ED for leg pain on 1/7.  Denies any current leg pain and INR therapeutic on that day.  MD visit today, no changes.     No new diet changes affecting INR    No new medication/supplements affecting INR    Continues to tolerate warfarin with no reported s/s of bleeding or thromboembolism     Previous INR was Therapeutic on current dose x 2    Plan:     Spoke with Jennifer regarding INR result and instructed:     Warfarin Dosing Instructions:  9mg warfarin dose today 1/15 then continue current warfarin dose 7 mg daily on Mon, Thurs; and 6 mg daily rest of week  (0 % change)    Instructed patient to follow up no later than: 2 weeks     Education provided: importance of therapeutic range, target INR goal and significance of current INR result and when to seek medical attention/emergency care. Taking warfarin as instructed     Jennifer verbalizes understanding and agrees to warfarin dosing plan.    Instructed to call the [facility] for any changes, questions or concerns. (#[phone])   ?   Viviana Guzmán RN    Subjective/Objective:      Alyssa NAYELI Higginbotham, a 64 y.o. female is on warfarin.     Alyssa reports:     Home warfarin dose: verbally confirmed home dose with Jennifer and updated on anticoagulation calendar     Missed doses: Unknown if missed dose      Medication changes:  No     S/S of bleeding or thromboembolism:  No     New Injury or illness:  Leg pain 1/7     Changes in diet or alcohol consumption:  No     Upcoming surgery, procedure or cardioversion:  No    Anticoagulation Episode Summary     Current INR goal:   2.0-3.0   TTR:   62.1 % (1 y)   Next INR check:   1/29/2020   INR from last check:   1.70! (1/15/2020)   Weekly max warfarin dose:      Target end date:       INR check location:      Preferred lab:      Send INR reminders to:   [facility]    Indications    S/P AVR [Z95.2]           Comments:   Goal range changed 2/20/19 per cardiology         Anticoagulation Care Providers     Provider Role Specialty Phone number    Rafaela Woods MD Referring Family Medicine [phone]

## 2021-06-05 NOTE — TELEPHONE ENCOUNTER
Provider Review: Anticoagulation Annual Referral Renewal    ACM Renewal Decision:  Renew ACM warfarin management      INR Range:   Continue management at current INR goal   Anticipated Duration of Therapy (from today):  Long-term anticoagulation      Rafaela Woods MD  5:37 PM

## 2021-06-05 NOTE — TELEPHONE ENCOUNTER
Patient has not had INR in 4+weeks.  She is worried about her blood clotting.    Staring a little while ago she got a sharp pin in her inner thigh.  Hurts worse with walking.  She is concerned it is a clot.  Denies chest pain or shortness of breath. She has already talked to INR team.    She is going to the ED now.    Christa Charles RN Triage and refills    Reason for Disposition    Thigh or calf pain in only one leg and present > 1 hour    Protocols used: LEG PAIN-A-OH

## 2021-06-05 NOTE — TELEPHONE ENCOUNTER
"Anticoagulation Annual Referral Renewal Review    Alyssa Higginbotham's chart reviewed for annual renewal of referral to anticoagulation monitoring.        Criteria for anticoagulation nurse and/or pharmacist renewal met   Warfarin indication: Mechanical AVR Yes, per indication   Current with INR monitoring/compliant Yes Yes   Date of last office visit 5/8/19 Yes, had office visit within last year   Time in Therapeutic Range (TTR) 58 % No, TTR < 60 %       Alyssa Higginbotham did NOT meet all criteria for anticoagulation management program initiated renewal and requires provider review. Using dot phrase, \".acmrenewalprovider\", please advise if Alyssa's anticoagulation management referral should be renewed or if patient should be seen in office to review anticoagulation therapy      Lynette Manzano RN  2:57 PM      "

## 2021-06-05 NOTE — TELEPHONE ENCOUNTER
RN cannot approve Refill Request    RN can NOT refill this medication Protocol failed and NO refill given.     Mer Morales, [facility] Triage/Med Refill 1/26/2020    Requested Prescriptions   Pending Prescriptions Disp Refills     warfarin ANTICOAGULANT (COUMADIN/JANTOVEN) 5 MG tablet [Pharmacy Med Name: WARFARIN TABS 5MG] 110 tablet 4     Sig: TAKE 1 TABLET ALONG WITH 1 MG TABLET ( 6 TO 7 MG DAILY ) AS DIRECTED. ADJUST PER INR RESULT       Warfarin Refill Protocol  Failed - 1/26/2020  9:14 PM        Failed -  Route to appropriate pool/provider     Last Anticoagulation Summary:   Anticoagulation Episode Summary     Current INR goal:   2.0-3.0   TTR:   64.0 % (11.8 mo)   Next INR check:   1/29/2020   INR from last check:   1.70! (1/15/2020)   Weekly max warfarin dose:      Target end date:      INR check location:      Preferred lab:      Send INR reminders to:   [facility]    Indications    S/P AVR [Z95.2]           Comments:   Goal range changed 2/20/19 per cardiology         Anticoagulation Care Providers     Provider Role Specialty Phone number    Rafaela Woods MD Referring Family Medicine [phone]                Passed - Provider visit in last year     Last office visit with prescriber/PCP: 1/15/2020 Rafaela Woods MD OR same dept: 1/15/2020 Rafaela Woods MD OR same specialty: 1/15/2020 Rafaela Woods MD  Last physical: 12/1/2016 Last MTM visit: Visit date not found    Next appt within 3 mo: Visit date not found Next physical within 3 mo: Visit date not found  Prescriber OR PCP: Rafaela Woods MD  Last diagnosis associated with med order: 1. S/P AVR (aortic valve replacement)  - warfarin ANTICOAGULANT (COUMADIN/JANTOVEN) 5 MG tablet [Pharmacy Med Name: WARFARIN TABS 5MG]; TAKE 1 TABLET ALONG WITH 1 MG TABLET ( 6 TO 7 MG DAILY ) AS DIRECTED. ADJUST PER INR RESULT  Dispense: 110 tablet; Refill: 4    2. Long term current use of anticoagulant therapy  -  warfarin ANTICOAGULANT (COUMADIN/JANTOVEN) 5 MG tablet [Pharmacy Med Name: WARFARIN TABS 5MG]; TAKE 1 TABLET ALONG WITH 1 MG TABLET ( 6 TO 7 MG DAILY ) AS DIRECTED. ADJUST PER INR RESULT  Dispense: 110 tablet; Refill: 4    If protocol passes may refill for 6 months if within 3 months of last provider visit (or a total of 9 months).

## 2021-06-05 NOTE — PROGRESS NOTES
Assessment:   1. Osteopenia, unspecified location     2. Dissection of thoracoabdominal aorta (H)  Ambulatory referral to Cardiology   3. Meningioma (H)     4. Thyroid nodule     5. Localized osteoporosis without current pathological fracture  DXA Bone Density Scan    Vitamin D, Total (25-Hydroxy)   6. Visit for screening mammogram  Mammo Screening Bilateral   7. Hypothyroidism, unspecified type  Thyroid Cascade   8. Hypertension  Sodium    White Blood Count (WBC)   9. S/P AVR  Ambulatory referral to Cardiology    INR       Plan:   Continue to monitor home BP, want under 140/90.  Glad you have checked your cuff against ours.    [facility]onazepam [facility]    Sees cardiology with history of valve replacement and aortic dissection.    No follow-up needed for meningioma unless pain in front of head.    Follow-up in 6 months for physical.    Ordered DEXA scan and mammogram.    Colonoscopy due Jan. 2021.    Thyroid nodule stable form 2017 to 2019. No further imaging needed unless compressive symptoms.    Cardiology due in March.    Recommend 3 dairy servings a day or calcium with vitamin D twice a day with food.    Twenty-five minutes spent with this patient, at least 50% in coordination of care or counseling reguarding the topics discussed in note.    Subjective:  Chief Complaint   Patient presents with     Medication Management     not fasting      Alyssa Higginbotham, a 64 y.o. year old, comes in to clinic for medication check.    History of aortic dissection: This was 20 years ago.  She is been followed by cardiology routinely.    History of aortic valve replacement: She is stable and on warfarin.  She is followed by cardiology.    Hypertension: Blood pressure at home is stable under 140/90.  She has brought her home cuff in and compared to our cuffs and they are the same.  She said she was running around today and that is why could be a little elevated.  Does not complain of any chest pain or shortness of  breath.    Frontal lobe meningioma: This was found incidentally on a CT scan in March 2015.  It was confirmed with MRI.    Thyroid nodule: Thyroid nodules biopsied and negative.  Ultrasounds have been stable.  She has no compressive symptoms.    History of osteoporosis: On her last bone density and had improved osteopenia.  She is due for follow-up scan.    Thyroid: She is agreeable to blood work today.  No other concerns.    Current Outpatient Medications   Medication Sig     amitriptyline (ELAVIL) 10 MG tablet Take 10 mg by mouth bedtime.     butalbital-acetaminophen-caffeine (FIORICET, ESGIC) -40 mg per tablet TAKE 1 TO 2 TABLETS BY MOUTH EVERY 4 HOURS AS NEEDED FOR PAIN. MAX OF 4000 MG ACETAMINOPHEN PER 24 HOURS     clonazePAM (KLONOPIN) 0.5 MG tablet Take 0.5 mg by mouth bedtime as needed for anxiety.     levothyroxine (SYNTHROID, LEVOTHROID) 75 MCG tablet Take 1 tablet on wednesday, Thursday, Saturday and Sunday.     levothyroxine (SYNTHROID, LEVOTHROID) 88 MCG tablet Take 1 tablet on Monday, Tuesday and Friday.     metoprolol succinate (TOPROL-XL) 200 MG 24 hr tablet Take 1 tablet (200 mg total) by mouth daily.     multivitamin (ONE A DAY) per tablet Take 1 tablet by mouth daily.      rizatriptan (MAXALT) 5 MG tablet TAKE 1 TABLET BY MOUTH AS NEEDED FOR MIGRAINE. MAY REPEAT IN 2 HOURS IF NEEDED     simvastatin (ZOCOR) 20 MG tablet TAKE 1 TABLET AT BEDTIME. DUE FOR AN OFFICE VISIT.     TiZANidine (ZANAFLEX) 2 MG capsule Take 1 capsule (2 mg total) by mouth 3 (three) times a day.     triamterene-hydrochlorothiazide (MAXZIDE) 75-50 mg per tablet TAKE ONE-HALF (1/2) TABLET DAILY     warfarin (COUMADIN/JANTOVEN) 5 MG tablet TAKE 1 TABLET ALONG WITH 1 MG TABLET ( 6 TO 7 MG DAILY ) AS DIRECTED. ADJUST PER INR RESULT     warfarin ANTICOAGULANT (COUMADIN/JANTOVEN) 1 MG tablet TAKE 1 TO 2 TABLETS ALONG WITH 5 MG TABLET (6 TO 7 MG ) DAILY AS DIRECTED ADJUST DOSE PER INR RESULTS       Patient Active Problem List    Diagnosis     Osteoporosis     Mitral Valve Disorder     Dissection Of The Aorta     Anxiety     Tension-type Headache     Marfan Syndrome     Hepatitis, C Virus     Hypothyroidism     Hyperlipidemia     Recurrent Major Depression In Partial Remission     Migraine     Hypertension     Aortic Valve Disorder     Myalgia And Myositis     S/P AVR     Meningioma (H)-initially seen on CT of head that was obtained after a fall. Frontal lobe, confirmed on an MRI Mar 2015, 3 mm     Concussion syndrome     Vertigo     PTSD (post-traumatic stress disorder)     Thyroid nodule     Acute chest pain     Family history of ovarian cancer       ROS: No fevers, chills, chest pain, or shortness of breath    Objective:  /75   Pulse 68   Resp 16   Wt 125 lb 6 oz (56.9 kg)   BMI 22.93 kg/m    General: No apparent distress   Cardiovascular: Regular rate and rhythm without murmurs, rubs, or gallops  Lungs: Clear to auscultation bilaterally, no wheezes, crackles, or rhonchi

## 2021-06-05 NOTE — TELEPHONE ENCOUNTER
Orders being requested: DEXA order  Reason service is needed/diagnosis: bone density   When are orders needed by: as soon as possible   Where to send Orders: [facility]   Okay to leave detailed message?  Yes  [phone]

## 2021-06-05 NOTE — TELEPHONE ENCOUNTER
ANTICOAGULATION  MANAGEMENT: Discharge Review    Alyssa Higginbotham chart reviewed for anticoagulation continuity of care    Emergency room visit on  1/7/20 for leg cramping.    Discharge disposition: Home    INR Results:       Recent labs: (last 7 days)     01/07/20  1352   INR 2.37*       Warfarin inpatient management: not applicable    Warfarin discharge instructions: home regimen continued     Medication Changes Affecting Anticoagulation: No    Additional Factors Affecting Anticoagulation: Yes: pain    Plan     No adjustment to anticoagulation plan needed      Recommended follow up is scheduled      Lynette Manzano RN

## 2021-06-05 NOTE — PATIENT INSTRUCTIONS - HE
Continue to monitor home BP, want under 140/90.  Glad you have checked your cuff against ours.    [facility]    Sees cardiology with history of valve replacement and aortic dissection.    No follow-up needed for meningioma unless pain in front of head.    Follow-up in 6 months for physical.    Ordered DEXA scan and mammogram.    Colonoscopy due Jan. 2021.    Thyroid nodule stable form 2017 to 2019. No further imaging needed unless compressive symptoms.    Cardiology due in March.    Recommend 3 dairy servings a day or calcium with vitamin D twice a day with food.

## 2021-06-05 NOTE — TELEPHONE ENCOUNTER
ANTICOAGULATION  MANAGEMENT    Assessment     Today's INR result of 1.6 is Subtherapeutic (goal INR of 2.0-3.0)        Warfarin taken as previously instructed    No new diet changes affecting INR    Interaction between Multivitamin and warfarin may be affecting INR. Same brand but patient states the color of the medication has changed. This occurred about 3-4 weeks ago    Continues to tolerate warfarin with no reported s/s of bleeding or thromboembolism     Previous INR was Subtherapeutic    Plan:     Spoke with Alyssa regarding INR result and instructed:     Warfarin Dosing Instructions:  Change warfarin dose to 6 mg daily on tues/Fri; and 7 mg daily rest of week  (7 % change)    Instructed patient to follow up no later than: two weeks    Education provided: importance of therapeutic range    Alyssa verbalizes understanding and agrees to warfarin dosing plan.    Instructed to call the [facility] for any changes, questions or concerns. (#[phone])   ?   Lynette Manzano RN    Subjective/Objective:      Alyssa Higginbotham, a 64 y.o. female is on warfarin.     Alyssa reports:     Home warfarin dose: as updated on anticoagulation calendar per template     Missed doses: No     Medication changes:  Multivitamin tablet color change     S/S of bleeding or thromboembolism:  No     New Injury or illness:  No     Changes in diet or alcohol consumption:  No     Upcoming surgery, procedure or cardioversion:  No    Anticoagulation Episode Summary     Current INR goal:   2.0-3.0   TTR:   62.1 % (1 y)   Next INR check:   2/12/2020   INR from last check:   1.60! (1/29/2020)   Weekly max warfarin dose:      Target end date:      INR check location:      Preferred lab:      [facility]    Indications    S/P AVR [Z95.2]           Comments:   Goal range changed 2/20/19 per cardiology         Anticoagulation Care Providers     Provider Role Specialty Phone number    Rafaela Woods  MD [facility] [phone]

## 2021-06-06 NOTE — TELEPHONE ENCOUNTER
Patient Returning Call  Reason for call:  Return call   Information relayed to patient:  Caller was informed of message below.   Patient has additional questions:  Yes  If YES, what are your questions/concerns:  Caller stated that as of 03/01/20 she will be using [employer] mail order and would like for all her medications sent over to [facility].   Okay to leave a detailed message?: No call back needed

## 2021-06-06 NOTE — TELEPHONE ENCOUNTER
Last refills:      warfarin ANTICOAGULANT (COUMADIN/JANTOVEN) 5 MG tablet 110 tablet 0 1/27/2020     warfarin ANTICOAGULANT (COUMADIN/JANTOVEN) 1 MG tablet 168 tablet 4 12/7/2019     simvastatin (ZOCOR) 20 MG tablet 90 tablet 1 10/18/2019     metoprolol succinate (TOPROL-XL) 200 MG 24 hr tablet 90 tablet 1 9/2/2019     levothyroxine (SYNTHROID, LEVOTHROID) 88 MCG tablet 45 tablet 3 2/10/2020     levothyroxine (SYNTHROID, LEVOTHROID) 75 MCG tablet 45 tablet 3 2/10/2020     Last OV: 01/15/20

## 2021-06-06 NOTE — TELEPHONE ENCOUNTER
Called and spoke with patient. Requesting Dr Woods to fill these medications.  Had labs done last month.

## 2021-06-06 NOTE — TELEPHONE ENCOUNTER
ANTICOAGULATION  MANAGEMENT    Assessment     Today's INR result of 2.4 is Therapeutic (goal INR of 2.0-3.0)        Warfarin taken as previously instructed    No new diet changes affecting INR    No new medication/supplements affecting INR    Continues to tolerate warfarin with no reported s/s of bleeding or thromboembolism     Previous INR was Subtherapeutic    Plan:     Spoke with Alyssa regarding INR result and instructed:     Warfarin Dosing Instructions:  Continue current warfarin dose 6 mg daily on Tues/Fri; and 7 mg daily rest of week  (0 % change)    Instructed patient to follow up no later than: two weeks    Education provided: importance of therapeutic range    Alyssa verbalizes understanding and agrees to warfarin dosing plan.    Instructed to call the [facility] for any changes, questions or concerns. (#[phone])   ?   Lynette Manzano RN    Subjective/Objective:      Alyssa Higginbotham, a 64 y.o. female is on warfarin.     Alyssa reports:     Home warfarin dose: as updated on anticoagulation calendar per template     Missed doses: No     Medication changes:  No     S/S of bleeding or thromboembolism:  No     New Injury or illness:  No     Changes in diet or alcohol consumption:  No     Upcoming surgery, procedure or cardioversion:  No    Anticoagulation Episode Summary     Current INR goal:   2.0-3.0   TTR:   62.9 % (1 y)   Next INR check:   2/25/2020   INR from last check:   2.40 (2/11/2020)   Weekly max warfarin dose:      Target end date:      INR check location:      Preferred lab:      Send INR reminders to:   [facility]    Indications    S/P AVR [Z95.2]           Comments:   Goal range changed 2/20/19 per cardiology         Anticoagulation Care Providers     Provider Role Specialty Phone number    Rafaela Woods MD Referring Family Medicine [phone]

## 2021-06-06 NOTE — TELEPHONE ENCOUNTER
ANTICOAGULATION  MANAGEMENT    Assessment     Today's INR result of 2.1 is Therapeutic (goal INR of 2.0-3.0)        Warfarin taken as previously instructed    No new diet changes affecting INR    No new medication/supplements affecting INR    Continues to tolerate warfarin with no reported s/s of bleeding or thromboembolism     Previous INR was Therapeutic    Plan:     Left a detailed message for Alyssa regarding INR result and instructed:     Warfarin Dosing Instructions:  Continue current warfarin dose 6 mg daily on Tues/FRi; and 7 mg daily rest of week  (0 % change)    Instructed patient to follow up no later than: 4 weeks    Education provided: importance of therapeutic range    Instructed to call the [facility] for any changes, questions or concerns. (#[phone])   ?   Lynette Manzano RN    Subjective/Objective:      Alyssa Higginbotham, a 64 y.o. female is on warfarin.     Alyssa reports:     Home warfarin dose: as updated on anticoagulation calendar per template     Missed doses: No     Medication changes:  No     S/S of bleeding or thromboembolism:  No     New Injury or illness:  No     Changes in diet or alcohol consumption:  No     Upcoming surgery, procedure or cardioversion:  No    Anticoagulation Episode Summary     Current INR goal:   2.0-3.0   TTR:   62.9 % (1 y)   Next INR check:   3/24/2020   INR from last check:   2.10 (2/25/2020)   Weekly max warfarin dose:      Target end date:      INR check location:      Preferred lab:      Send INR reminders to:   [facility]    Indications    S/P AVR [Z95.2]           Comments:   Goal range changed 2/20/19 per cardiology         Anticoagulation Care Providers     Provider Role Specialty Phone number    Rafaela Woods MD Referring Family Medicine [phone]

## 2021-06-06 NOTE — TELEPHONE ENCOUNTER
Left message for patient to return call. Medications were sent to [facility] on 2/26/2020 and we received pharmacy requests from [employer]. Would like clarification as to where to send her prescriptions.

## 2021-06-06 NOTE — TELEPHONE ENCOUNTER
Mail order requesting 90 day prescription    Controlled Substance Refill Request  Medication Name:   Requested Prescriptions     Pending Prescriptions Disp Refills     butalbital-acetaminophen-caffeine (FIORICET, ESGIC) -40 mg per tablet 30 tablet 3     Sig: TAKE 1 TO 2 TABLETS BY MOUTH EVERY 4 HOURS AS NEEDED FOR PAIN. MAX OF 4000 MG ACETAMINOPHEN PER 24 HOURS     Date Last Fill: unknown date  Is patient out of medication?: N/A - electronic request  Patient notified refills processed within 3 business days: N/A - electronic request  Requested Pharmacy: [facility]  Submit electronically to pharmacy  Controlled Substance Agreement on file:   Encounter-Level CSA Scan Date:    There are no encounter-level csa scan date.        Last office visit:  1.15.2020

## 2021-06-06 NOTE — TELEPHONE ENCOUNTER
Patient Returning Call  Reason for call:  Patient calling back on denied refill.  Information relayed to patient:  The patient is confused as to the denial, as she was seen 1/15/20 with labs done  At the visit.  Please advise patient  Of refill request.  Patient has additional questions:  No  If YES, what are your questions/concerns:  NA  Okay to leave a detailed message?: Yes

## 2021-06-06 NOTE — TELEPHONE ENCOUNTER
Refill Approved    Rx renewed per Medication Renewal Policy. Medication was last renewed on 10/25/19.    Lissette Benjamin, [facility] Triage/Med Refill 3/3/2020     Requested Prescriptions   Pending Prescriptions Disp Refills     triamterene-hydrochlorothiazide (MAXZIDE) 75-50 mg per tablet 45 tablet 1       Diuretics/Combination Diuretics Refill Protocol  Passed - 3/3/2020  1:06 PM        Passed - Visit with PCP or prescribing provider visit in past 12 months     Last office visit with prescriber/PCP: 1/15/2020 Rafaela Woods MD OR same dept: 1/15/2020 Rafaela Woods MD OR same specialty: 1/15/2020 Rafaela Woods MD  Last physical: 12/1/2016 Last MTM visit: Visit date not found   Next visit within 3 mo: Visit date not found  Next physical within 3 mo: Visit date not found  Prescriber OR PCP: Rafaela Woods MD  Last diagnosis associated with med order: 1. HTN (hypertension)  - triamterene-hydrochlorothiazide (MAXZIDE) 75-50 mg per tablet  Dispense: 45 tablet; Refill: 1    If protocol passes may refill for 12 months if within 3 months of last provider visit (or a total of 15 months).             Passed - Serum Potassium in past 12 months      Lab Results   Component Value Date    Potassium 3.8 01/07/2020             Passed - Serum Sodium in past 12 months      Lab Results   Component Value Date    Sodium 135 (L) 01/15/2020             Passed - Blood pressure on file in past 12 months     BP Readings from Last 1 Encounters:   01/15/20 144/75             Passed - Serum Creatinine in past 12 months      Creatinine   Date Value Ref Range Status   01/07/2020 1.02 0.60 - 1.10 mg/dL Final

## 2021-06-07 NOTE — TELEPHONE ENCOUNTER
ANTICOAGULATION  MANAGEMENT    Assessment     Today's INR result of 3.1 is Supratherapeutic (goal INR of 2.0-3.0)        Warfarin taken as previously instructed    No new diet changes affecting INR    No new medication/supplements affecting INR    Continues to tolerate warfarin with no reported s/s of bleeding or thromboembolism     Previous INR was Therapeutic    Plan:     Spoke with Alyssa regarding INR result and instructed:     Warfarin Dosing Instructions:  Continue current warfarin dose 6 mg daily on Tues/fri; and 7 mg daily rest of week  (0 % change)    Instructed patient to follow up no later than: two weeks    Education provided: importance of therapeutic range    Alyssa verbalizes understanding and agrees to warfarin dosing plan.    Instructed to call the [facility] for any changes, questions or concerns. (#[phone])   ?   Lynette Manzano RN    Subjective/Objective:      Alyssa Higginbotham, a 65 y.o. female is on warfarin.     Alyssa reports:     Home warfarin dose: as updated on anticoagulation calendar per template     Missed doses: No     Medication changes:  No     S/S of bleeding or thromboembolism:  No     New Injury or illness:  No     Changes in diet or alcohol consumption:  No     Upcoming surgery, procedure or cardioversion:  No    Anticoagulation Episode Summary     Current INR goal:   2.0-3.0   TTR:   60.0 % (1 y)   Next INR check:   5/19/2020   INR from last check:   3.10! (5/5/2020)   Weekly max warfarin dose:      Target end date:      INR check location:      Preferred lab:      Send INR reminders to:   [facility]    Indications    S/P AVR [Z95.2]           Comments:   Goal range changed 2/20/19 per cardiology         Anticoagulation Care Providers     Provider Role Specialty Phone number    Rafaela Woods MD Referring Family Medicine [phone]

## 2021-06-08 NOTE — TELEPHONE ENCOUNTER
ANTICOAGULATION  MANAGEMENT    Assessment     Today's INR result of 2.5 is Therapeutic (goal INR of 2.0-3.0)        Warfarin taken as previously instructed    No new diet changes affecting INR    No new medication/supplements affecting INR    Continues to tolerate warfarin with no reported s/s of bleeding or thromboembolism     Previous INR was Supratherapeutic at 3.1, no dose change    Plan:     Left a detailed message for Alyssa regarding INR result and instructed:     Warfarin Dosing Instructions:  Continue current warfarin dose 6 mg daily on Tues/Fri; and 7 mg daily rest of week  (0 % change)    Instructed patient to follow up no later than: 2-3 weeks    Education provided: importance of therapeutic range    Instructed to call the [facility] for any changes, questions or concerns. (#[phone])   ?   Lynette Manzano RN    Subjective/Objective:      Alyssa Higginbotham, a 65 y.o. female is on warfarin.     Alyssa reports:     Home warfarin dose: as updated on anticoagulation calendar per template     Missed doses: No     Medication changes:  No     S/S of bleeding or thromboembolism:  No     New Injury or illness:  No     Changes in diet or alcohol consumption:  No     Upcoming surgery, procedure or cardioversion:  No    Anticoagulation Episode Summary     Current INR goal:   2.0-3.0   TTR:   59.4 % (1 y)   Next INR check:   6/9/2020   INR from last check:   2.50 (5/19/2020)   Weekly max warfarin dose:      Target end date:      INR check location:      Preferred lab:      Send INR reminders to:   [facility]    Indications    S/P AVR [Z95.2]           Comments:   Goal range changed 2/20/19 per cardiology         Anticoagulation Care Providers     Provider Role Specialty Phone number    Rafaela Woods MD Referring Family Medicine [phone]

## 2021-06-08 NOTE — TELEPHONE ENCOUNTER
Who is calling:  Jennifer  Reason for Call:  Patient returning call to Nuvia Gandara  Date of last appointment with primary care: 1/15/2020  Okay to leave a detailed message: Yes

## 2021-06-08 NOTE — TELEPHONE ENCOUNTER
ANTICOAGULATION  MANAGEMENT    Assessment     Today's INR result of 3.4 is Supratherapeutic (goal INR of 2.0-3.0)        Warfarin taken as previously instructed    No new diet changes affecting INR    No new medication/supplements affecting INR    Continues to tolerate warfarin with no reported s/s of bleeding or thromboembolism     Previous INR was Therapeutic    Plan:     Spoke with Alyssa regarding INR result and instructed:     Warfarin Dosing Instructions:  5 mg lower dose today then change warfarin dose to    7 mg every Mon, Thu; 6 mg all other days      (6 % change)    Instructed patient to follow up no later than: 1-2 weeks, appointment made.    Education provided: importance of therapeutic range and target INR goal and significance of current INR result    Alyssa verbalizes understanding and agrees to warfarin dosing plan.    Instructed to call the [facility] for any changes, questions or concerns. (#[phone])   ?   Nuvia Valenzuela RN    Subjective/Objective:      Alyssa Higginbotham, a 65 y.o. female is on warfarin.     Alyssa reports:     Home warfarin dose: as updated on anticoagulation calendar per template     Missed doses: No     Medication changes:  No     S/S of bleeding or thromboembolism:  No     New Injury or illness:  No     Changes in diet or alcohol consumption:  No     Upcoming surgery, procedure or cardioversion:  No    Anticoagulation Episode Summary     Current INR goal:   2.0-3.0   TTR:   60.9 % (1 y)   Next INR check:   6/23/2020   INR from last check:   3.40! (6/9/2020)   Weekly max warfarin dose:      Target end date:      INR check location:      Preferred lab:      Send INR reminders to:   [facility]    Indications    S/P AVR [Z95.2]           Comments:   Goal range changed 2/20/19 per cardiology         Anticoagulation Care Providers     Provider Role Specialty Phone number    Rafaela Woods MD Referring Family Medicine [phone]         Routing refill request to provider for review/approval because:  Drug not on the [facility] refill protocol   Phil Johnson RN

## 2021-06-09 NOTE — TELEPHONE ENCOUNTER
LMTCB. Please ask patient if she is Ok switching her AWV on 8/5/20 to video visit. If patient prefers face to face visit please help patient reschedule.

## 2021-06-09 NOTE — TELEPHONE ENCOUNTER
LMTCB. Please relay message to patient, if patient is willing to change appointment to virtual seeing as provider is not in clinic 8/5. If patient does not want to change appointment to a virtual visit, please help schedule patient when provider is in clinic or with a partner provider if patient would like.

## 2021-06-09 NOTE — TELEPHONE ENCOUNTER
FYI - Status Update  Who is Calling: Patient  Update: Please call the patient back, [facility] not available at the time of call back.  Okay to leave a detailed message?:  Yes

## 2021-06-09 NOTE — TELEPHONE ENCOUNTER
----- Message from Lorena Peterson sent at 7/8/2020  9:03 AM CDT -----  Please help the  and call patient to schedule.    Lorena  ----- Message -----  From: Rafaela Woods MD  Sent: 7/7/2020   5:38 PM CDT  To: [facility] Scheduling Registration Pool    This patient has an annual wellness set up with me Aug. 5.  I am virtual that week Wed, Thurs and Fri.   If she is willing, you can change that to a virtual video visit.  Thanks. Ro

## 2021-06-09 NOTE — TELEPHONE ENCOUNTER
ANTICOAGULATION  MANAGEMENT    Assessment     Today's INR result of 2.10 is Therapeutic (goal INR of 2.0-3.0)        Warfarin taken as previously instructed    No new diet changes affecting INR    No new medication/supplements affecting INR    Continues to tolerate warfarin with no reported s/s of bleeding or thromboembolism     Previous INR was Supratherapeutic at 3.40 on 6/9/20.    Plan:     Left a detailed message for Jennifer regarding INR result and instructed:     Warfarin Dosing Instructions:    - Continue current warfarin dose 7 mg daily on Mon / Thurs; and 6 mg daily rest of week.    Instructed patient to follow up no later than:  2 wks.    Education provided: importance of consistent vitamin K intake and target INR goal and significance of current INR result    Instructed to call the [facility] for any changes, questions or concerns. (#[phone])   ?   Aylin Pablo RN    Subjective/Objective:      Alyssa NAYELI Higginbotham, a 65 y.o. female is on warfarin.     Alyssa reports:     Home warfarin dose: as updated on anticoagulation calendar per template     Missed doses: No     Medication changes:  No     S/S of bleeding or thromboembolism:  No     New Injury or illness:  No     Changes in diet or alcohol consumption:  No     Upcoming surgery, procedure or cardioversion:  No    Anticoagulation Episode Summary     Current INR goal:   2.0-3.0   TTR:   63.6 % (1 y)   Next INR check:   7/7/2020   INR from last check:   2.10 (6/23/2020)   Weekly max warfarin dose:      Target end date:      INR check location:      Preferred lab:      Send INR reminders to:   [facility]    Indications    S/P AVR [Z95.2]           Comments:   Goal range changed 2/20/19 per cardiology         Anticoagulation Care Providers     Provider Role Specialty Phone number    Rafaela Woods MD Referring Family Medicine [phone]

## 2021-06-09 NOTE — PROGRESS NOTES
MTM Encounter    Assessment/Plan  I had no strong recommendations for Suly today, but we did discuss a number of alternative options.  I encouraged her to clear them with her primary doctors if she wished to pursue them.    Chronic headaches: Somewhat uncontrolled.  Though diabetes headaches are likely tension headaches, she may have overlapping medication overuse headaches.  She should not use NSAIDs due to her concurrent use of warfarin.  Both her Fioricet and Imitrex have the potential for causing overuse headaches.  I think we should work on nonpharmacologic treatments and hope that these improve now that she is retired.  If not we could consider a short trial of tramadol in lieu of Fioricet to help her wean down off of it to see if her medications are part of what is causing her headache.  Alternatively we could try switching her metoprolol to propranolol.  This may be more effective in preventing migraines, but increases the likelihood of beta-blocker related side effects.  I advised her to discuss this switch with cardiology first given her hx of aortic dissection.  - Consider switching metoprolol to propranolol    Hypertension: Labs do not show dehydration as a likely cause of headaches, although we could consider switching her HCTZ and triamterene to amlodipine in order to see if this helps.  This may also help alleviate the xerostomia she is experiencing.  Since her BP is well controlled, I would suggest exploring other options to better control her headaches.  - Consider switching HCTZ/triamterene to amlodipine     Dyslipidemia: Review of Alyssa's cholesterol panels shows an LDL which ranges from .  Without her simvastatin I would not expect her baseline LDL to be greater than 150.  From a strictly hyperlipidemic perspective, a statin is not indicated.  It is unclear if she is on the statin to help reduce risk secondary to her aortic dissection.  We discussed that it may be possible for her to get  off this medication, but she should discuss this with her cardiologist.  - consider discontinuation of simvastatin    Osteoporosis: Discussed the ongoing debate about optimal duration of therapy for bisphosphates.  Suggested she follow the recommendation to stop her alendronate and continue calcium and vitamin D.  - stop alendronate x 1 year    Insomnia: Jennifer is interested in weaning off of her clonazepam if possible.  I suggested she seek out a prescription for the 0.125mg tablets, as even the 0.5mg tabs are hard to cut.  She will probably want to taper off over several months as she has been on this for almost two decades.  I also recommended OTC melatonin as something that she could take in place of clonazepam which is gentler.  - Consider melatonin 3mg QHS  - Consider taper off clonazepam    Chronic Anticoagulation: Since Jennifer has been on the same dose of 6mg alternating with 7.5mg for some time, we will send a prescription for 6 and  7.5 mg tablets today.  She has previously been using a combination of 5 mg and 1 mg tablets.  - warfarin 7.5mg   - warfarin 6mg    Follow Up  One year med review    Subjective/Objective  Alyssa Higginbotham is a 61 y.o. female here for a medication therapy management (MTM) appointment; her chief concern today is a full medication review.    Of note, Jennifer has a history of a dissected aorta and atrial valve replacement.  She is also recently retired and would like to get her medications in order.    Chronic Headaches: Having about three headaches per week, which are relieved by two Fioricet. She usually wakes up with these.  Ne fell two years ago and has had chronic neck pain since then.  Headaches have been worse since her fall.  Most of her headaches start at the base of her skull.  She has tried heat, which was not helpful.  She also uses cold packs,which do help.  For three months after her fall, she had a lot of spasming in her neck, but has had little since then.  She  noticed some improvement when she started on metoprolol , but has gotten worse again since.    Hypertension: Taking Maxzide and metoprolol XL 200mg.  Jennifer does not note any fatigue, but does note some exercise intolerance with the metoprolol.      Dyslipidemia:  Jennifer does not remember why she was started on simvastatin.  She thinks it may have just been because her cholesterol was high.    Osteoporosis: Had her last DEXA three months ago.  She was told to year holiday and stop her alendronate and continue her calcium and vitamin D.  She is supposed to have a repeat DEXA scan at this point.    Insomnia: Ne has been on amitriptyline since 2000. She sleeps better with this medication.  It was initially started for a headache she had after her aortic dissection.  She did a one week drug holiday last year and noticed no worsening headaches, but her sleep got worse.  Also taking Klonopin 0.5mg QHS for about 17 years.  She is potentially interested in getting off of this medication.  ----------------    Alyssa's medication list was reviewed with them, discussing reason for use, directions for use, and potential side effects of each medication as needed. Indication, safety, efficacy, and convenience was assessed for all medications addressed above.  No environmental factors were noted currently affecting patient.  This care plan was communicated via EMR with her primary care provider, Rafaela Woods MD, who is the authorizing prescriber for this visit.  Direct supervision was available by either the patient's PCP or other available provider.  Time and complexity billing metrics are included in the docflowsheet linked to this visit.  Time spent: 55 minutes      Geo Perrin, PharmD  [facility] Pharmacist  [place], [facility]  [phone]

## 2021-06-09 NOTE — PROGRESS NOTES
Assessment & Plan:  1. Sore throat  - Rapid Strep A Screen-Throat  - Group A Strep, RNA Direct Detection, Throat      Patient Instructions   Your sore throat is likely due to a viral illness since the rapid strep test is negative.  A rRNA test is pending and will return tomorrow.  If it is positive the clinic will notify you and we will begin antibiotics right away.  If it remains negative you will not hear anything.  For symptom relief you can use tylenol or ibuprofen as needed. Continue to push fluids to maintain hydration. If symptoms are not improving in the next 3-5 days or are increasing over time please call or return.          Orders Placed This Encounter   Procedures     Rapid Strep A Screen-Throat     Group A Strep, RNA Direct Detection, Throat     Medications Discontinued During This Encounter   Medication Reason     butalbital-acetaminophen-caffeine (FIORICET, ESGIC) -40 mg per tablet Duplicate order     metoprolol succinate (TOPROL-XL) 200 MG 24 hr tablet Duplicate order     simvastatin (ZOCOR) 20 MG tablet Duplicate order           Chief Complaint:   Chief Complaint   Patient presents with     POSSIBLE STREP       History of Present Illness:  Alyssa is a 61-year-old female here today with sore throat starting 4 days ago.  Associated symptoms include fever, body aches that began the following day.  She has had some occasional nausea as well but no vomiting.  She is in no known exposures to illness.  Straight symptoms she has not used anything over-the-counter thus far.  She rates the throat pain approximately 4-5 out of 10.  Occurs with swallowing and coughing.  She has had a slightly productive cough with this illness as well and some nasal congestion.    Review of Systems:  All other systems are negative except as noted above.    PFS:  reviewed and updated.     Tobacco Use:  History   Smoking Status     Never Smoker   Smokeless Tobacco     Never Used       Vitals:  Vitals:    03/14/17 1346    BP: 114/60   Patient Site: Left Arm   Patient Position: Sitting   Cuff Size: Adult Regular   Pulse: 62   Resp: 14   Temp: 98.2  F (36.8  C)   TempSrc: Oral   Weight: 132 lb 1 oz (59.9 kg)     Wt Readings from Last 3 Encounters:   03/14/17 132 lb 1 oz (59.9 kg)   12/01/16 129 lb (58.5 kg)   11/08/16 130 lb (59 kg)       Physical Exam:  Constitutional:  Reveals an alert, cooperative, 61 year old female in no acute distress.  Vitals:  Per nursing notes.  Eyes: PERRLA, funduscopic exam within normal limits.  HENT: TMs clear bilaterally,Oropharynx with slight erythema, tonsils +1 without exudates, no posterior nasal drainage or thrush. Neck supple,  thyroid not palpable.  Cardiovascular:  Regular rate and rhythm without murmurs, rubs, or gallops. Carotids without bruits. Legs without edema.   Respiratory: Clear.  Respiratory effort normal.  Lymph: slight anterior cervical lymphadenopathy.    Data Reviewed:  Additional History from Old Records or Another Person Summarized (2 total): None.     Decision to Obtain Extra information (1 total): None.     Radiology Tests Summarized and Ordered (XRAY/CT/MRI/DXA) (1 total): None.    Labs Reviewed and Ordered (1 total): rapid strep, rna detection    Medicine Tests Summarized and Ordered (EKG/ECHO/COLONOSCOPY/EGD) (1 total): None.    Independent Review of EKG or X-Ray (2 each): None.    The visit lasted a total of 20 minutes face to face with the patient. Over 50% of the time was spent counseling and educating the patient about plan of care.    Medications:  Current Outpatient Prescriptions   Medication Sig Dispense Refill     amitriptyline (ELAVIL) 10 MG tablet Take 10 mg by mouth bedtime.       butalbital-acetaminophen-caffeine (FIORICET, ESGIC) -40 mg per tablet TAKE 1 TO 2 TABLETS BY MOUTH EVERY 4 TO 6 HOURS AS NEEDED FOR PAIN, NO MORE THAN 3000MG OF ACETAMINOPHEN DAILY 30 tablet 0     calcium carbonate-vitamin D3 (CALCIUM 500 + D) 500 mg(1,250mg) -400 unit Tab Take 1  tablet by mouth 2 (two) times a day.        clonazePAM (KLONOPIN) 0.5 MG tablet Take 0.5 mg by mouth bedtime as needed for anxiety.       levothyroxine (SYNTHROID, LEVOTHROID) 75 MCG tablet Take 75 mcg by mouth Daily at 6:00 am.       levothyroxine (SYNTHROID, LEVOTHROID) 75 MCG tablet TAKE 1 TABLET DAILY (DUE FOR LABS IN NOVEMBER) 90 tablet 0     metoprolol succinate (TOPROL-XL) 200 MG 24 hr tablet TAKE 1 TABLET DAILY 90 tablet 3     multivitamin (ONE A DAY) per tablet Take 1 tablet by mouth daily.        simvastatin (ZOCOR) 20 MG tablet TAKE 1 TABLET AT BEDTIME 90 tablet 2     SUMAtriptan (IMITREX) 50 MG tablet TAKE 1 TABLET BY MOUTH EVERY 2 HOURS AS NEEDED FOR MIGRAINE, MAX 4 TABLETS DAILY 9 tablet 10     triamterene-hydrochlorothiazide (MAXZIDE) 75-50 mg per tablet Take 0.5 tablets by mouth daily.        warfarin (COUMADIN) 1 MG tablet Take 1 tab (1mg) along with 5mg warfarin tab (6mg) on Mondays and Thursdays by mouth, as directed.  Dose adjusted based on INR results. 90 tablet 0     warfarin (COUMADIN) 5 MG tablet Take 1&1/2 tabs (7.5mg) x5 days/wk by mouth, as directed.   Dose adjusted based on INR results. 120 tablet 3     warfarin (COUMADIN) 6 MG tablet Take 6 mg by mouth See Admin Instructions. Take 6mg on Monday and Thursday       alendronate (FOSAMAX) 70 MG tablet Take 70 mg by mouth every 7 days. Take in the morning on an empty stomach with a full glass of water 30 minutes before food       warfarin (COUMADIN) 7.5 MG tablet Take 7.5 mg by mouth See Admin Instructions. Take 7.5mg on Tuesday, Wednesday, Friday, Saturday, and Sunday       No current facility-administered medications for this visit.        Total Data Points: 1    BETSY Hdz, CNP    This note has been dictated using voice recognition software. Any grammatical or context distortions are unintentional and inherent to the software

## 2021-06-09 NOTE — TELEPHONE ENCOUNTER
ANTICOAGULATION  MANAGEMENT    Assessment     Today's INR result of 2.1 is Therapeutic (goal INR of 2.0-3.0)        Warfarin taken as previously instructed    No new diet changes affecting INR    No new medication/supplements affecting INR    Continues to tolerate warfarin with no reported s/s of bleeding or thromboembolism     Previous INR was Therapeutic    Plan:     Spoke with Alyssa regarding INR result and instructed:     Warfarin Dosing Instructions:  Continue current warfarin dose 7 mg daily on Mon/Thurs; and 6 mg daily rest of week  (0 % change)    Instructed patient to follow up no later than: 4 weeks    Education provided: importance of therapeutic range    Alyssa verbalizes understanding and agrees to warfarin dosing plan.    Instructed to call the [facility] for any changes, questions or concerns. (#[phone])   ?   Lynette Manzano RN    Subjective/Objective:      Alyssa Higginbotham, a 65 y.o. female is on warfarin.     Alyssa reports:     Home warfarin dose: as updated on anticoagulation calendar per template     Missed doses: No     Medication changes:  No     S/S of bleeding or thromboembolism:  No     New Injury or illness:  No     Changes in diet or alcohol consumption:  No     Upcoming surgery, procedure or cardioversion:  No    Anticoagulation Episode Summary     Current INR goal:   2.0-3.0   TTR:   64.2 % (1 y)   Next INR check:   8/4/2020   INR from last check:   2.10 (7/7/2020)   Weekly max warfarin dose:      Target end date:      INR check location:      Preferred lab:      Send INR reminders to:   [facility]    Indications    S/P AVR [Z95.2]           Comments:   Goal range changed 2/20/19 per cardiology         Anticoagulation Care Providers     Provider Role Specialty Phone number    Rafaela Woods MD Referring Family Medicine [phone]

## 2021-06-10 NOTE — TELEPHONE ENCOUNTER
Called pt and left VM. If pt calls back, please ask the following questions and document in the travel screening.  1. In the last month, have you been in contact with someone who was confirmed or suspected to have the Corona Virus/COVID-19?  2. Do you have Cough, Fever, Rash or shortness of breath?  3. Have you traveled internationally in the last month?  Please remind pt to arrive 15 min early to fill out paperwork and to wear a mask.

## 2021-06-10 NOTE — PROGRESS NOTES
Assessment and Plan:   Alyssa Higginbotham is a 65 y.o. female presenting today for medicare annual wellness exam.    1. Routine general medical examination at a health care facility  Reviewed health risk assessment form.  No identified risks.  No falls.  Dementia screen normal.  Reviewed health maintenance recommendations.  She is up-to-date except for PCV 13 and Shingrix vaccine.  She will check with insurance regarding coverage of shingles vaccine and either get second booster at pharmacy or schedule nurse only visit for administration.    2. Mixed hyperlipidemia  - Comprehensive Metabolic Panel  - Lipid Cascade FASTING    Jennifer is managed with simvastatin.  Will check cholesterol panel today as well as LFTs for medication management monitoring.    3. Hypothyroidism, unspecified type  4. Cold intolerance  5. Dry skin  - Thyroid Cascade    History of hypothyroidism, euthyroid as of January 2020.  Due to symptoms, will recheck thyroid level today.    6. Hypertension  - Comprehensive Metabolic Panel    Blood pressure is within range.  We will recheck kidney function and electrolytes due to medication monitoring.    7. History of hepatitis C  8. Screening for human immunodeficiency virus without presence of risk factors  - HIV Antigen/Antibody Screening [place]    Jennifer reports hepatitis C history, status post treatment.  No GI follow-up needed.  She does not remember if she was ever screened for HIV and it is still pending and health maintenance we will check today.    9. Anticoagulation management encounter  - INR    History of aortic dissection and status post mitral valve replacement.  Jennifer requests INR checked today as she is due for repeat INR on August 5 but we are checking labs today.  We will check INR a little early and result will go to anticoagulation clinic for management.    10. Immunization due  - Pneumococcal conjugate vaccine 13-valent 6wks-17yrs; >50yrs       The patient's current medical  problems were reviewed.    I have had an Advance Directives discussion with the patient.     The following health maintenance schedule was reviewed with the patient and provided in printed form in the after visit summary:   Health Maintenance   Topic Date Due     DEPRESSION ACTION PLAN  1955     HEPATITIS C SCREENING  1955     HIV SCREENING  03/25/1970     ZOSTER VACCINES (3 of 3) 07/03/2019     FALL RISK ASSESSMENT  03/25/2020     MEDICARE ANNUAL WELLNESS VISIT  05/08/2020     PNEUMOCOCCAL IMMUNIZATION 65+ LOW/MEDIUM RISK (1 of 2 - PCV13) 03/25/2020     INFLUENZA VACCINE RULE BASED (1) 08/01/2020     DXA SCAN  03/04/2021     MAMMOGRAM  03/09/2022     ADVANCE CARE PLANNING  12/04/2022     LIPID  05/08/2024     COLORECTAL CANCER SCREENING  01/14/2026     TD 18+ HE  05/08/2029     HEPATITIS B VACCINES  Completed     TDAP ADULT ONE TIME DOSE  Completed        Subjective:   Chief Complaint: Alyssa Higginbotham is an 65 y.o. female here for a Welcome to Medicare visit.     HPI:    Alyssa is doing well today.  She has no particular concerns.    Medicare health risk assessment:  Jennifer rates her overall health is good.  She is exercising regularly, reports walking an hour daily.  Does not regularly drink alcoholic beverages.  She is working on eating a more balanced diet, has no questions or concerns about diet today.  She does have working smoke and carbon monoxide detector at home.  She does not need any assistance with daily activities.  She has no concerns for hearing or urinary incontinence.  PHQ 2 screen is negative, rates her mental or emotional health is good.  She does not have a living will or advanced directive, but does not have any concerns about this.  He has not fallen in the last 12 months.    Aortic dissection: Status post atrial valve replacement on warfarin.  Chronic aortic dissection managed with beta-blocker.  She follows with cardiology.  She denies any symptoms of chest pain, shortness of  breath, or abdominal pain.  She would like to check INR today since we are drawing labs anyways.  She had a telephone encounter with cardiology in March with plans to follow-up with echocardiogram and CTA this fall.  Recommended she contact Dr. Richardson to discuss imaging orders.    Hx of hepatitis C: Status post treatment.  Denies any need for further follow-up with GI.  Denies any abdominal pain or lower extremity edema.  No jaundice.    History of hypothyroidism: Thyroid was last checked in January and normal.  She does report chronic dry skin, excessive sweating, and cold intolerance.    Health maintenance: Jennifer has received Shingrix No. 1.  She has had insurance change since then.  Unsure of cost coverage.  She will check with insurance coverage prior to repeat Shingrix.  She has history of colon polyp, due for repeat colonoscopy at 5 years, not due yet.    Review of Systems:   Please see above.  The rest of the review of systems are negative for all systems.    Patient Care Team:  Rafaela Woods MD as PCP - General  Rafaela Woods MD as Assigned PCP     Patient Active Problem List   Diagnosis     Osteoporosis     Mitral Valve Disorder     Dissection Of The Aorta     Anxiety     Tension-type Headache     Marfan Syndrome     Hepatitis, C Virus     Hypothyroidism     Hyperlipidemia     Recurrent Major Depression In Partial Remission     Migraine     Hypertension     Aortic Valve Disorder     Myalgia And Myositis     S/P AVR     Meningioma (H)-initially seen on CT of head that was obtained after a fall. Frontal lobe, confirmed on an MRI Mar 2015, 3 mm     Concussion syndrome     Vertigo     PTSD (post-traumatic stress disorder)     Thyroid nodule     Acute chest pain     Family history of ovarian cancer     Past Medical History:   Diagnosis Date     Accidental fall 5/27/2015     Anxiety      Aortic dissection (H)      Asthma      Benign Adenomatous Polyp Of The Large Intestine     Created by  Conversion      Benign meningioma of brain (H) 2015    initially seen on CT of head that was obtained after a fall. Frontal lobe, confirmed on an MRI Mar 2015, 3 mm     Bunion      Chronic headache      Colon polyp      Depression      Hallux Limitus Of The Right First Toe     Created by Conversion      Hepatitis C      HTN (hypertension)      Hyperlipidemia      Hypothyroid      Irregular heart rate      Nasal fracture 2015    fell face first on sideache     Osteoporosis      Painful swelling of joint      SOB (shortness of breath)      Stroke (H)     Found on CT scan      Past Surgical History:   Procedure Laterality Date     AORTIC VALVE REPLACEMENT   2000     BREAST BIOPSY Left 2005    benign     COLONOSCOPY  2011     REPAIR THORACIC AORTA         Family History   Problem Relation Age of Onset     Pancreatic cancer Father 70        history of smoking     Ovarian cancer Sister 67        stage III,  at age 70     Skin cancer Sister      Heart disease Mother      Diabetes Neg Hx      Alzheimer's disease Neg Hx       Social History     Tobacco Use     Smoking status: Never Smoker     Smokeless tobacco: Never Used   Substance Use Topics     Alcohol use: No     Drug use: No         Current Outpatient Medications   Medication Sig Dispense Refill     amitriptyline (ELAVIL) 10 MG tablet Take 10 mg by mouth bedtime.       butalbital-acetaminophen-caffeine (FIORICET, ESGIC) -40 mg per tablet TAKE 1 TO 2 TABLETS BY MOUTH EVERY 4 HOURS AS NEEDED FOR PAIN. MAX OF 4000 MG ACETAMINOPHEN PER 24 HOURS 30 tablet 3     clonazePAM (KLONOPIN) 0.5 MG tablet Take 0.5 mg by mouth bedtime as needed for anxiety.       levothyroxine (SYNTHROID, LEVOTHROID) 75 MCG tablet Take 1 tablet on wednesday, Thursday, Saturday and . 45 tablet 3     levothyroxine (SYNTHROID, LEVOTHROID) 88 MCG tablet Take 1 tablet on Monday, Tuesday and Friday. 45 tablet 3     metoprolol succinate (TOPROL-XL) 200 MG 24 hr tablet Take 1  "tablet (200 mg total) by mouth daily. 90 tablet 1     multivitamin (ONE A DAY) per tablet Take 1 tablet by mouth daily.        rizatriptan (MAXALT) 5 MG tablet TAKE 1 TABLET BY MOUTH AS NEEDED FOR MIGRAINE. MAY REPEAT IN 2 HOURS IF NEEDED 10 tablet 9     simvastatin (ZOCOR) 20 MG tablet TAKE 1 TABLET AT BEDTIME. DUE FOR AN OFFICE VISIT. 90 tablet 1     triamterene-hydrochlorothiazide (MAXZIDE) 75-50 mg per tablet TAKE ONE-HALF (1/2) TABLET DAILY 45 tablet 3     warfarin ANTICOAGULANT (COUMADIN/JANTOVEN) 1 MG tablet TAKE 1 TO 2 TABLETS ALONG WITH 5 MG TABLET (6 TO 7 MG ) DAILY AS DIRECTED ADJUST DOSE PER INR RESULTS 168 tablet 4     warfarin ANTICOAGULANT (COUMADIN/JANTOVEN) 5 MG tablet TAKE 1 TABLET ALONG WITH 1 MG TABLET ( 6 TO 7 MG DAILY ) AS DIRECTED. ADJUST PER INR RESULT 110 tablet 0     TiZANidine (ZANAFLEX) 2 MG capsule Take 1 capsule (2 mg total) by mouth 3 (three) times a day. 30 capsule 0     No current facility-administered medications for this visit.       Objective:   Vital Signs:   Visit Vitals  /74   Pulse 64   Ht 5' 1.75\" (1.568 m)   Wt 123 lb 6 oz (56 kg)   BMI 22.75 kg/m         EKG:  We did not complete EKG today as she follows with cardiology and has had prior EKGs.  No recent chest pain, palpitations, lightheadedness, dizziness, or shortness of breath.    VisionScreening:   Hearing Screening    125Hz 250Hz 500Hz 1000Hz 2000Hz 3000Hz 4000Hz 6000Hz 8000Hz   Right ear:            Left ear:               Visual Acuity Screening    Right eye Left eye Both eyes   Without correction:      With correction: 10/16 10/12.5 10/12.5        PHYSICAL EXAM  GENERAL: Jennifer is a pleasant, well-appearing female, no acute distress.  HEENT: Sclera white, ask in place, no thyromegaly or cervical lymphadenopathy.  HEART: Regular rate and rhythm, systolic murmur present.  LUNGS: Clear to auscultation bilaterally, unlabored.  ABDOMEN: Soft, nontender to palpation.  No appreciable abdominal distention or " hepatomegaly.  EXTREMITIES: No lower extremity edema.  Pulses intact.    Assessment Results 7/29/2020   Activities of Daily Living No help needed   Instrumental Activities of Daily Living No help needed   Mini Cog Total Score 5   Some recent data might be hidden     A Mini Cog score of 0-2 suggests the possibility of dementia, score of 3-5 suggests no dementia    Identified Health Risks:     The patient was counseled and encouraged to consider modifying their diet and eating habits. She was provided with information on recommended healthy diet options.  Information regarding advance directives (living ortiz), including where she can download the appropriate form, was provided to the patient via the AVS.

## 2021-06-10 NOTE — PROGRESS NOTES
The 10-year ASCVD risk score (Lohmandain MILLER Jr., et al., 2013) is: 8.4%, on simvastatin. HDL a touch low at 49, remaining labs all normal. Patient updated by [email or web address].  Renee Florez DO

## 2021-06-10 NOTE — TELEPHONE ENCOUNTER
ANTICOAGULATION  MANAGEMENT    Assessment     Today's INR result of 2.5 is Therapeutic (goal INR of 2.0-3.0)        Warfarin taken as previously instructed    No new diet changes affecting INR    No new medication/supplements affecting INR    Continues to tolerate warfarin with no reported s/s of bleeding or thromboembolism     Previous INR was Therapeutic    Plan:     Left a detailed message for Alyssa regarding INR result and instructed:     Warfarin Dosing Instructions:  Continue current warfarin dose    7 mg every Mon, Thu; 6 mg all other days      (0 % change)    Instructed patient to follow up no later than: 4-6 weeks    Education provided: importance of therapeutic range        Instructed to call the [facility] for any changes, questions or concerns. (#[phone])   ?   Nuvia Valenzuela RN    Subjective/Objective:      Alyssa NAYELI Higginbotham, a 65 y.o. female is on warfarin.     Alyssa reports:     Home warfarin dose: as updated on anticoagulation calendar per template     Missed doses: No     Medication changes:  No     S/S of bleeding or thromboembolism:  No     New Injury or illness:  No     Changes in diet or alcohol consumption:  No     Upcoming surgery, procedure or cardioversion:  No    Anticoagulation Episode Summary     Current INR goal:   2.0-3.0   TTR:   69.6 % (1 y)   Next INR check:   9/9/2020   INR from last check:   2.50 (7/29/2020)   Weekly max warfarin dose:      Target end date:      INR check location:      Preferred lab:      Send INR reminders to:   [facility]    Indications    S/P AVR [Z95.2]           Comments:   Goal range changed 2/20/19 per cardiology         Anticoagulation Care Providers     Provider Role Specialty Phone number    Rafaela Woods MD Referring Family Medicine [phone]

## 2021-06-10 NOTE — TELEPHONE ENCOUNTER
ANTICOAGULATION  MANAGEMENT    Assessment     Today's INR result of 2.40 is Therapeutic (goal INR of 2.0-3.0)        Warfarin taken as previously instructed    No new diet changes affecting INR    No new medication/supplements affecting INR    Continues to tolerate warfarin with no reported s/s of bleeding or thromboembolism     Previous INR was Therapeutic at 2.50 on 7/29/20.    Reported everything going well with no new complaints or concerns.    Plan:     Spoke on phone with Jennifer regarding INR result and instructed:     Warfarin Dosing Instructions:  (has 1mg and 5mg tabs)   - Continue current warfarin dose 7 mg daily on Mon/Thurs; and 6 mg daily rest of week.    Instructed patient to follow up no later than:  6 wks.   - INR scheduled on 10/7/20 @ [facility].    Education provided: importance of consistent vitamin K intake, target INR goal and significance of current INR result, importance of notifying clinic for changes in medications and monitoring for bleeding signs and symptoms    Jennifer verbalizes understanding and agrees to warfarin dosing plan.    Instructed to call the [facility] for any changes, questions or concerns. (#[phone])   ?   Aylin Pablo RN    Subjective/Objective:      Alyssa Higginbotham, a 65 y.o. female is on warfarin.     Alyssa reports:     Home warfarin dose: as updated on anticoagulation calendar per template     Missed doses: No     Medication changes:  No     S/S of bleeding or thromboembolism:  No     New Injury or illness:  No     Changes in diet or alcohol consumption:  No     Upcoming surgery, procedure or cardioversion:  No    Anticoagulation Episode Summary     Current INR goal:   2.0-3.0   TTR:   76.2 % (1 y)   Next INR check:   10/7/2020   INR from last check:   2.40 (8/26/2020)   Weekly max warfarin dose:      Target end date:      INR check location:      Preferred lab:      Send INR reminders to:   [facility]    Indications    S/P AVR [Z95.2]            Comments:   Goal range changed 2/20/19 per cardiology         Anticoagulation Care Providers     Provider Role Specialty Phone number    Rafaela Woods MD Referring Family Medicine [phone]

## 2021-06-11 NOTE — TELEPHONE ENCOUNTER
RN cannot approve Refill Request    RN can NOT refill this medication med is not covered by policy/route to provider. Last office visit: 1/15/2020 Rafaela Woods MD Last Physical: 12/1/2016 Last MTM visit: Visit date not found Last visit same specialty: 1/15/2020 Rafaela Woods MD.  Next visit within 3 mo: Visit date not found  Next physical within 3 mo: Visit date not found      Christy Henson, [facility] Triage/Med Refill 9/29/2020    Requested Prescriptions   Pending Prescriptions Disp Refills     warfarin ANTICOAGULANT (COUMADIN/JANTOVEN) 5 MG tablet [Pharmacy Med Name: WARFARIN SODIUM 5 MG Tablet] 110 tablet 0     Sig: TAKE 1 TABLET ALONG WITH 1 MG TABLET (6 TO 7 MG DAILY) AS DIRECTED. ADJUST PER INR RESULT       Warfarin Refill Protocol  Failed - 9/28/2020  8:29 AM        Failed -  Route to appropriate pool/provider     Last Anticoagulation Summary:   Anticoagulation Episode Summary     Current INR goal:   2.0-3.0   TTR:   76.2 % (11 mo)   Next INR check:   10/7/2020   INR from last check:   2.40 (8/26/2020)   Weekly max warfarin dose:      Target end date:      INR check location:      Preferred lab:      Send INR reminders to:   [facility]    Indications    S/P AVR [Z95.2]           Comments:   Goal range changed 2/20/19 per cardiology         Anticoagulation Care Providers     Provider Role Specialty Phone number    Rafaela Woods MD Referring Family Medicine [phone]                Passed - Provider visit in last year     Last office visit with prescriber/PCP: 1/15/2020 Rafaela Woods MD OR same dept: 1/15/2020 Rafaela Woods MD OR same specialty: 1/15/2020 Rafaela Woods MD  Last physical: 12/1/2016 Last MTM visit: Visit date not found    Next appt within 3 mo: Visit date not found Next physical within 3 mo: Visit date not found  Prescriber OR PCP: Rafaela Woods MD  Last diagnosis associated with med order: 1. S/P AVR (aortic valve  replacement)  - warfarin ANTICOAGULANT (COUMADIN/JANTOVEN) 5 MG tablet [Pharmacy Med Name: WARFARIN SODIUM 5 MG Tablet]; TAKE 1 TABLET ALONG WITH 1 MG TABLET (6 TO 7 MG DAILY) AS DIRECTED. ADJUST PER INR RESULT  Dispense: 110 tablet; Refill: 0    2. Long term current use of anticoagulant therapy  - warfarin ANTICOAGULANT (COUMADIN/JANTOVEN) 5 MG tablet [Pharmacy Med Name: WARFARIN SODIUM 5 MG Tablet]; TAKE 1 TABLET ALONG WITH 1 MG TABLET (6 TO 7 MG DAILY) AS DIRECTED. ADJUST PER INR RESULT  Dispense: 110 tablet; Refill: 0    If protocol passes may refill for 6 months if within 3 months of last provider visit (or a total of 9 months).

## 2021-06-12 NOTE — TELEPHONE ENCOUNTER
ANTICOAGULATION  MANAGEMENT    Assessment     Today's INR result of 2.5 is Therapeutic (goal INR of 2.0-3.0)        Warfarin taken as previously instructed    No new diet changes affecting INR    No new medication/supplements affecting INR    Continues to tolerate warfarin with no reported s/s of bleeding or thromboembolism     Previous INR was Subtherapeutic    Plan:     Spoke on phone with Alyssa regarding INR result and instructed:     Warfarin Dosing Instructions:  Continue current warfarin dose 7 mg daily on Mondays and Thursdays; and 6 mg daily rest of week  (0 % change)    Instructed patient to follow up no later than: two weeks    Education provided: importance of therapeutic range, importance of following up for INR monitoring at instructed interval and importance of taking warfarin as instructed    Jennifer verbalizes understanding and agrees to warfarin dosing plan.    Instructed to call the [facility] for any changes, questions or concerns. (#[phone])   ?   Jordana Tyler RN    Subjective/Objective:      Alyssa Higginbotham, a 65 y.o. female is on warfarin.     Alyssa reports:     Home warfarin dose: verbally confirmed home dose with Jennifer and updated on anticoagulation calendar     Missed doses: No     Medication changes:  No     S/S of bleeding or thromboembolism:  No     New Injury or illness:  No     Changes in diet or alcohol consumption:  No     Upcoming surgery, procedure or cardioversion:  No    Anticoagulation Episode Summary     Current INR goal:  2.0-3.0   TTR:  75.5 % (1 y)   Next INR check:  10/30/2020   INR from last check:  2.50 (10/16/2020)   Weekly max warfarin dose:     Target end date:     INR check location:     Preferred lab:     Send INR reminders to:  [facility]    Indications    S/P AVR [Z95.2]           Comments:  Goal range changed 2/20/19 per cardiology         Anticoagulation Care Providers     Provider Role Specialty Phone number    Rafaela Woods  MD [facility] Medicine [phone]

## 2021-06-12 NOTE — TELEPHONE ENCOUNTER
Refill Approved    Rx renewed per Medication Renewal Policy. Medication was last renewed on 2/27/20.    Lissette Benjamin, [facility] Triage/Med Refill 10/21/2020     Requested Prescriptions   Pending Prescriptions Disp Refills     simvastatin (ZOCOR) 20 MG tablet [Pharmacy Med Name: SIMVASTATIN 20 MG Tablet] 90 tablet 1     Sig: TAKE 1 TABLET AT BEDTIME. DUE FOR AN OFFICE VISIT.       Statins Refill Protocol (Hmg CoA Reductase Inhibitors) Passed - 10/19/2020  8:29 PM        Passed - PCP or prescribing provider visit in past 12 months      Last office visit with prescriber/PCP: 1/15/2020 Rafaela Woods MD OR same dept: 1/15/2020 Rafaela Woods MD OR same specialty: 1/15/2020 Rafaela Woods MD  Last physical: 12/1/2016 Last MTM visit: Visit date not found   Next visit within 3 mo: Visit date not found  Next physical within 3 mo: Visit date not found  Prescriber OR PCP: Rafaela Woods MD  Last diagnosis associated with med order: 1. Hyperlipidemia  - simvastatin (ZOCOR) 20 MG tablet [Pharmacy Med Name: SIMVASTATIN 20 MG Tablet]; TAKE 1 TABLET AT BEDTIME. DUE FOR AN OFFICE VISIT.  Dispense: 90 tablet; Refill: 1    If protocol passes may refill for 12 months if within 3 months of last provider visit (or a total of 15 months).

## 2021-06-13 NOTE — TELEPHONE ENCOUNTER
ANTICOAGULATION  MANAGEMENT    Assessment     Today's INR result of 2.00 is Therapeutic (goal INR of 2.0-3.0)        Warfarin taken differently than instructed, but no impact to total weekly dose    No new diet changes affecting INR    Potential interaction between and warfarin which may affect subsequent INRs     Continues to tolerate warfarin with no reported s/s of bleeding or thromboembolism     Previous INR was Subtherapeutic at 1.50 on 12/4/20.    Plan:     Spoke on phone with Jennifer regarding INR result and instructed:     Warfarin Dosing Instructions:    - Continue current warfarin dose 7 mg daily on Mon/Wed/Fri; and 6 mg daily rest of week.    Instructed patient to follow up no later than:  2 wks.   - INR scheduled on 1/4/2021 @ [facility].    Education provided: importance of consistent vitamin K intake, target INR goal and significance of current INR result and importance of notifying clinic for changes in medications    Jennifer verbalizes understanding and agrees to warfarin dosing plan.    Instructed to call the [facility] for any changes, questions or concerns. (#[phone])   ?   Aylin Pablo RN    Subjective/Objective:      Alyssa Higginbotham, a 65 y.o. female is on warfarin.     Alyssa reports:     Home warfarin dose: template incorrect; verbally confirmed home dose with Jennifer and updated on anticoagulation calendar     Missed doses: No     Medication changes:  No     S/S of bleeding or thromboembolism:  No     New Injury or illness:  No     Changes in diet or alcohol consumption:  No     Upcoming surgery, procedure or cardioversion:  No    Anticoagulation Episode Summary     Current INR goal:  2.0-3.0   TTR:  70.0 % (1 y)   Next INR check:  1/1/2021   INR from last check:  2.00 (12/18/2020)   Weekly max warfarin dose:     Target end date:     INR check location:     Preferred lab:     Send INR reminders to:  [facility]    Indications    S/P AVR [Z95.2]           Comments:  Goal  range changed 2/20/19 per cardiology         Anticoagulation Care Providers     Provider Role Specialty Phone number    Rafaela Woods MD Referring Family Medicine [phone]

## 2021-06-13 NOTE — PATIENT INSTRUCTIONS - HE
Lubricants may be water-based (eg, Astroglide, Slippery Stuff, K-Y Jelly), silicone-based (eg, Pjur, ID Millennium), or oil-based ([employer] Women's Lubricant); oil-based lubricants

## 2021-06-13 NOTE — TELEPHONE ENCOUNTER
Refill Approved    Rx renewed per Medication Renewal Policy. Medication was last renewed on 2/27/20.    Lissette Benjamin, [facility] Triage/Med Refill 11/19/2020     Requested Prescriptions   Pending Prescriptions Disp Refills     levothyroxine (SYNTHROID, LEVOTHROID) 88 MCG tablet [Pharmacy Med Name: LEVOTHYROXINE SODIUM 88 MCG Tablet] 39 tablet 0     Sig: TAKE 1 TABLET ON MONDAY, TUESDAY AND FRIDAY.       Thyroid Hormones Protocol Passed - 11/17/2020 10:06 PM        Passed - Provider visit in past 12 months or next 3 months     Last office visit with prescriber/PCP: 1/15/2020 Rafaela Woods MD OR same dept: 1/15/2020 Rafaela Woods MD OR same specialty: 1/15/2020 Rafaela Woods MD  Last physical: 12/1/2016 Last MTM visit: Visit date not found   Next visit within 3 mo: Visit date not found  Next physical within 3 mo: Visit date not found  Prescriber OR PCP: Rafaela Woods MD  Last diagnosis associated with med order: 1. Thyroid nodule  - levothyroxine (SYNTHROID, LEVOTHROID) 88 MCG tablet [Pharmacy Med Name: LEVOTHYROXINE SODIUM 88 MCG Tablet]; Take 1 tablet on Monday, Tuesday and Friday.  Dispense: 39 tablet; Refill: 0    If protocol passes may refill for 12 months if within 3 months of last provider visit (or a total of 15 months).             Passed - TSH on file in past 12 months for patient age 12 & older     TSH   Date Value Ref Range Status   07/29/2020 3.01 0.30 - 5.00 uIU/mL Final

## 2021-06-13 NOTE — PROGRESS NOTES
[employer] message sent recommending against estrogen derivative alternatives for vaginal dryness due to patient's risk factors.  If lubricants are ineffective, could refer patient to gynecology to discuss newer technologies (such as Tamiko Britt Laser therapy).    Cecille Knight, DO

## 2021-06-13 NOTE — TELEPHONE ENCOUNTER
RN cannot approve Refill Request    RN can NOT refill this medication PCP messaged that patient is overdue for Office Visit. Last office visit: 1/15/2020 Rafaela Woods MD Last Physical: 12/1/2016 Last MTM visit: 11/19/2020 Cecille Knight DO Last visit same specialty: 11/19/2020 Cecille Knight DO.  Next visit within 3 mo: Visit date not found  Next physical within 3 mo: Visit date not found      Rubi Corbin, [facility] Triage/Med Refill 11/28/2020    Requested Prescriptions   Pending Prescriptions Disp Refills     butalbital-acetaminophen-caffeine (FIORICET, ESGIC) -40 mg per tablet [Pharmacy Med Name: BUTALBITAL/ACETAMINOPHEN/CAFFEINE -40 MG Tablet] 30 tablet 3     Sig: TAKE 1 TO 2 TABLETS BY MOUTH EVERY 4 HOURS AS NEEDED FOR PAIN. MAX OF 4000 MG ACETAMINOPHEN PER 24 HOURS       Controlled Substances Refill Protocol Failed - 11/28/2020 10:19 AM        Failed - Patient has controlled substance agreement in past 12 months     Encounter-Level CSA Scan Date:    There are no encounter-level csa scan date.               Passed - Route all Controlled Substance Requests to Provider        Passed - Visit with PCP or prescribing provider visit in past 12 months      Last office visit with prescriber/PCP: 1/15/2020 Rafaela Woods MD OR same dept: 11/19/2020 Cecille Knight DO OR same specialty: 11/19/2020 Cecille Knight DO Last physical: 12/1/2016 Last MTM visit: 11/19/2020 Cecille Knight DO    Next visit within 3 mo: Visit date not found  Next physical within 3 mo: Visit date not found  Prescriber OR PCP: Rafaela Woods MD  Last diagnosis associated with med order: 1. Migraine  - butalbital-acetaminophen-caffeine (FIORICET, ESGIC) -40 mg per tablet [Pharmacy Med Name: BUTALBITAL/ACETAMINOPHEN/CAFFEINE -40 MG Tablet]; TAKE 1 TO 2 TABLETS BY MOUTH EVERY 4 HOURS AS NEEDED FOR PAIN. MAX OF 4000 MG ACETAMINOPHEN PER 24 HOURS  Dispense: 30 tablet; Refill: 3

## 2021-06-13 NOTE — PROGRESS NOTES
Assessment/plan   1. Vaginal dryness, menopausal  Vaginal dryness negatively impacting patient's ability to have intercourse.  Patient's past medical history (migraines with aura, stroke, FH of ovarian cancer, Marfans s/p aortic dissection and AVR) make her high risk for estrogen-based therapies including topical.  We reviewed various types of lubricants including water, silicone, and oil-based.  I will touch base with our pharmacist to see if there are other nonestrogen-based options that may benefit the patient.    Cecille Knight DO    Options for treatment and follow-up care were reviewed with the patient. Alyssa Higginbotham engaged in the decision making process and verbalized understanding of the options discussed and agreed with the final plan.    This note has been dictated using voice recognition software. Any grammatical or context distortions are unintentional and inherent to the software.    Subjective:      HPI: lAyssa Higginbotham is a 65 y.o. female who is here for:    Chief Complaint   Patient presents with     Post Menopausal     Would like premarin     Vaginal dryness:  Patient reports history of vaginal dryness that is progressively gotten worse.  She underwent menopause at the age of 52 and has not had any vaginal bleeding since that time.  She reports it is physically possible to have intercourse due to her vaginal dryness.  She denies lack of desire.  She has tried a lubricant called Replens once without success.  She did some research online, and is interested in discussing Premarin topical therapy.    She does report history of migraines with aura, family history of a sister who  from ovarian cancer, aortic valve disorder status post repair (currently on Coumadin) after a aortic dissection.  She also states she has had a stroke in the past, but no history of DVT/PE.    Review of Systems  Pertinent items are noted in HPI.     Medical History:  Patient Active Problem List   Diagnosis      Osteoporosis     Mitral Valve Disorder     Dissection Of The Aorta     Anxiety     Tension-type Headache     Marfan Syndrome     Hepatitis, C Virus     Hypothyroidism     Hyperlipidemia     Recurrent Major Depression In Partial Remission     Migraine     Hypertension     Aortic Valve Disorder     Myalgia And Myositis     S/P AVR     Meningioma (H)-initially seen on CT of head that was obtained after a fall. Frontal lobe, confirmed on an MRI Mar 2015, 3 mm     Concussion syndrome     Vertigo     PTSD (post-traumatic stress disorder)     Thyroid nodule     Acute chest pain     Family history of ovarian cancer     Past Medical History:   Diagnosis Date     Accidental fall 05/27/2015     Anxiety      Aortic dissection (H)      Asthma      Benign Adenomatous Polyp Of The Large Intestine      Benign meningioma of brain (H) 03/2015    initially seen on CT of head that was obtained after a fall. Frontal lobe, confirmed on an MRI Mar 2015, 3 mm     Bunion      Chronic headache      Colon polyp 2011     Depression      Hallux Limitus Of The Right First Toe      Hepatitis C      HTN (hypertension)      Hyperlipidemia      Hypothyroid      Irregular heart rate      Nasal fracture 02/28/2015    fell face first on sideache     Osteoporosis      Stroke (H)     on CT scan       Medications:  Current Outpatient Medications   Medication Sig Dispense Refill     amitriptyline (ELAVIL) 10 MG tablet Take 10 mg by mouth bedtime.       calcium carbonate 1250 MG capsule Take 1,250 mg by mouth 2 (two) times a day with meals.       clonazePAM (KLONOPIN) 0.5 MG tablet Take 0.5 mg by mouth bedtime as needed for anxiety.       levothyroxine (SYNTHROID, LEVOTHROID) 75 MCG tablet Take 1 tablet on wednesday, Thursday, Saturday and Sunday. 45 tablet 3     levothyroxine (SYNTHROID, LEVOTHROID) 88 MCG tablet Take 1 tablet on Monday, Tuesday and Friday. 45 tablet 3     metoprolol succinate (TOPROL-XL) 200 MG 24 hr tablet Take 1 tablet (200 mg total) by  "mouth daily. 90 tablet 1     multivitamin (ONE A DAY) per tablet Take 1 tablet by mouth daily.        simvastatin (ZOCOR) 20 MG tablet TAKE 1 TABLET AT BEDTIME. DUE FOR AN OFFICE VISIT. 90 tablet 2     TiZANidine (ZANAFLEX) 2 MG capsule Take 1 capsule (2 mg total) by mouth 3 (three) times a day. 30 capsule 0     warfarin ANTICOAGULANT (COUMADIN/JANTOVEN) 1 MG tablet TAKE 1 TO 2 TABLETS ALONG WITH 5 MG TABLET (6 TO 7 MG ) DAILY AS DIRECTED ADJUST DOSE PER INR RESULTS 168 tablet 4     warfarin ANTICOAGULANT (COUMADIN/JANTOVEN) 5 MG tablet TAKE 1 TABLET ALONG WITH 1 MG TABLET (6 TO 7 MG DAILY) AS DIRECTED. ADJUST PER INR RESULT 100 tablet 1     butalbital-acetaminophen-caffeine (FIORICET, ESGIC) -40 mg per tablet TAKE 1 TO 2 TABLETS BY MOUTH EVERY 4 HOURS AS NEEDED FOR PAIN. MAX OF 4000 MG ACETAMINOPHEN PER 24 HOURS 30 tablet 3     rizatriptan (MAXALT) 5 MG tablet TAKE 1 TABLET BY MOUTH AS NEEDED FOR MIGRAINE. MAY REPEAT IN 2 HOURS IF NEEDED 10 tablet 9     triamterene-hydrochlorothiazide (MAXZIDE) 75-50 mg per tablet TAKE ONE-HALF (1/2) TABLET DAILY (Patient taking differently: Take 0.5 tablets by mouth daily. ) 45 tablet 3     No current facility-administered medications for this visit.        Imaging & Labs reviewed this visit: none    Objective:    /72   Pulse 69   Ht 5' 1\" (1.549 m)   Wt 127 lb 2 oz (57.7 kg)   BMI 24.02 kg/m      Physical Exam:   General appearance: Alert, cooperative, no distress, appears stated age  Lungs: Clear to auscultation bilaterally, no wheezing or crackles present.  Respirations unlabored  Heart: Regular rate and rhythm, normal S1 and S2, 3+ diastolic murmur left sternal border  Neurologic: CN II through XII intact, normal strength.    No results found for this or any previous visit (from the past 24 hour(s)).  "

## 2021-06-13 NOTE — TELEPHONE ENCOUNTER
ANTICOAGULATION  MANAGEMENT    Assessment     Today's INR result of 2.20 is Therapeutic (goal INR of 2.0-3.0)        Warfarin taken as previously instructed    No new diet changes affecting INR    No new medication/supplements affecting INR    Continues to tolerate warfarin with no reported s/s of bleeding or thromboembolism     Previous INR was Subtherapeutic at 1.70 on 10/30/20.    Plan:     Left detailed message for Jennifer regarding INR result and instructed:     Warfarin Dosing Instructions:  (has 1mg / 5mg tabs)   - Continue current warfarin dose 7 mg daily on Mon/Thurs; and 6 mg daily rest of week.    Instructed patient to follow up no later than:  2 wks.    Education provided: importance of consistent vitamin K intake and target INR goal and significance of current INR result    Instructed to call the [facility] for any changes, questions or concerns. (#[phone])   ?   Aylin Pablo RN    Subjective/Objective:      Alyssa NAYELI Higginbotham, a 65 y.o. female is on warfarin.     Alyssa reports:     Home warfarin dose: as updated on anticoagulation calendar per template     Missed doses: No     Medication changes:  No     S/S of bleeding or thromboembolism:  No     New Injury or illness:  No     Changes in diet or alcohol consumption:  No     Upcoming surgery, procedure or cardioversion:  No    Anticoagulation Episode Summary     Current INR goal:  2.0-3.0   TTR:  77.9 % (1 y)   Next INR check:  11/27/2020   INR from last check:  2.20 (11/13/2020)   Weekly max warfarin dose:     Target end date:     INR check location:     Preferred lab:     Send INR reminders to:  [facility]    Indications    S/P AVR [Z95.2]           Comments:  Goal range changed 2/20/19 per cardiology         Anticoagulation Care Providers     Provider Role Specialty Phone number    Rafaela Woods MD Referring Family Medicine [phone]

## 2021-06-13 NOTE — TELEPHONE ENCOUNTER
Who is calling:  Patient   Reason for Call:  Patient returning missed call from [facility] team.   At time of patient's call [facility] was unavailable   Date of last appointment with primary care:   Okay to leave a detailed message: Yes

## 2021-06-14 ENCOUNTER — AMBULATORY - HEALTHEAST (OUTPATIENT)
Dept: LAB | Facility: CLINIC | Age: 66
End: 2021-06-14

## 2021-06-14 ENCOUNTER — COMMUNICATION - HEALTHEAST (OUTPATIENT)
Dept: ANTICOAGULATION | Facility: CLINIC | Age: 66
End: 2021-06-14

## 2021-06-14 DIAGNOSIS — Z95.2 S/P AVR: ICD-10-CM

## 2021-06-14 LAB — INR PPP: 2.1 (ref 0.9–1.1)

## 2021-06-14 NOTE — TELEPHONE ENCOUNTER
[place] APPROVED:    Approval start date: 12/16/2020  Approval end date:  01/15/2022    Pharmacy has been notified of approval and will contact patient when medication is ready for pickup.

## 2021-06-14 NOTE — TELEPHONE ENCOUNTER
ANTICOAGULATION  MANAGEMENT    Assessment     Today's INR result of 2.0 is Therapeutic (goal INR of 2.0-3.0)        Warfarin taken as previously instructed    No new diet changes affecting INR    No new medication/supplements affecting INR    Continues to tolerate warfarin with no reported s/s of bleeding or thromboembolism     Previous INR was Subtherapeutic    Plan:     Spoke on phone with Alyssa regarding INR result and instructed:     Warfarin Dosing Instructions:  Continue current warfarin dose    6 mg every Sun, Tue, Thu; 8 mg all other days       (0 % change)    Instructed patient to follow up no later than: 2 weeks  Appointment made.    Education provided: importance of therapeutic range    Jennifer verbalizes understanding and agrees to warfarin dosing plan.    Instructed to call the [facility] for any changes, questions or concerns. (#[phone])   ?   Nuvia Valenzuela RN    Subjective/Objective:      Alyssa Higginbotham, a 65 y.o. female is on warfarin.     Alyssa reports:     Home warfarin dose: as updated on anticoagulation calendar per template     Missed doses: No     Medication changes:  No     S/S of bleeding or thromboembolism:  No     New Injury or illness:  No     Changes in diet or alcohol consumption:  No     Upcoming surgery, procedure or cardioversion:  No    Anticoagulation Episode Summary     Current INR goal:  2.0-3.0   TTR:  63.4 % (1 y)   Next INR check:  2/1/2021   INR from last check:  2.00 (1/18/2021)   Weekly max warfarin dose:     Target end date:     INR check location:     Preferred lab:     Send INR reminders to:  [facility] [[email or web address]    Indications    S/P AVR [Z95.2]           Comments:  Goal range changed 2/20/19 per cardiology         Anticoagulation Care Providers     Provider Role Specialty Phone number    Rafaela Woods MD Referring Family Medicine [phone]

## 2021-06-14 NOTE — PROGRESS NOTES
ASSESSMENT & PLAN:    1. Post-menopausal osteoporosis  Patient has completed one year drug holiday from bisphosphonate.  Recheck bone density scan advised and ordered.  - DXA Bone Density Scan; Future    2. Visit for screening mammogram  - Mammo Screening Bilateral; Future    3. Healthcare maintenance  Fasting glucose for diabetes screening.  Colonoscopy up-to-date.  Immunizations up-to-date.  Mammogram advised and ordered.  - Lipid Cascade  - Comprehensive Metabolic Panel    4. Essential hypertension  Adequately controlled.  Continue current medication regimen.  CMP today.    5. Thyroid nodule, hypothyroidism  Continues on levothyroxine.  Thyroid lab today.  Repeat thyroid ultrasound ordered and patient instructed on reasons for this, and she will schedule  - US Thyroid; Future    6. Cyst of left ovary  Follow-up pelvic ultrasound based on ultrasound one year ago, patient advised of this and she will schedule.  Repeat  today due to elevation last year. Patient states previous evaluation by genetics about 3 years ago with negative BRCA 1 and 2 testing (done due to family history of sister's ovarian cancer).   -  (Cancer Antigen 125)  - US Pelvis With Transvaginal Non OB; Future    7. Tension-type headache, not intractable, unspecified chronicity pattern  Discussed risks of Imitrex use in light of age.  States she rarely uses this, typically uses Fioricet.  Reports increased use recently secondary to increased neck pain which causes more headaches for her.  She declines referral to spine clinic, states she has had injections in the past which caused significant flare of neck pain and she is not willing to do that again.  States surgery was advised previously but she was not willing to do that.  Offered referral to spine clinic but she declines.    8. Mixed hyperlipidemia  Fasting lipid panel and CMP today.  As long as adequately controlled continue simvastatin    9. S/P AVR  Continues to follow with  cardiology for history of aortic valve replacement, aortic dissection.  INR today.  - INR    10.  Insomnia  States she [facility], who wanted her to taper off of clonazepam and use melatonin instead.  Patient was concerned about interaction between melatonin and warfarin.  This was checked, and patient reassured that there should not be a major interaction.  Discussed how to potentially go about tapering the clonazepam.  She believes that her PCP had been occasionally prescribing this, but it looks like it is likely coming from her psychiatrist.  Amitriptyline also appears to be an outside prescription from her psychiatrist.  It is used for insomnia as well as headache prevention.    There are no Patient Instructions on file for this visit.    No orders of the defined types were placed in this encounter.    Medications Discontinued During This Encounter   Medication Reason     alendronate (FOSAMAX) 70 MG tablet Therapy completed     triamterene-hydrochlorothiazide (MAXZIDE) 75-50 mg per tablet Duplicate order     warfarin (COUMADIN) 7.5 MG tablet Dose adjustment       No Follow-up on file.    CHIEF COMPLAINT:  Chief Complaint   Patient presents with     Annual Exam     fasting labs today; no questions or concerns       HISTORY OF PRESENT ILLNESS:  Alyssa is a 62 y.o. female presenting to the clinic today for a physical examination. She is a regular patient of Dr. Woods.    Health Maintenance: She is up to date on pap. Her last pap was 16 and was normal and negative. She is up to date on colonoscopy, she is to return on a 5 year basis due to a personal history of colon polyps. Her last colonoscopy was 16, and was normal. She is up to date on all immunizations. She received a flu shot this year.  She is scheduled for a mammogram.    Ovarian Cyst: She has a family history of ovarian cancer. Her sister  at 70 from ovarian cancer. A pelvic US on 16 showed a 1.7 cm  cyst on the left ovary. It was recommended for her to return in one year for repeat ultrasound.  was mildly elevated, and she was to repeat in a few months. She has not yet had repeat.    Thyroid Nodule: A tyroid US on 12/14/16 showed slight growth in a nodule in the upper pole of the right thyroid. She is due for a sonographic follow-up.     AVR aortic dissection: She has previously had a aortic dissection and valve replacement. She continues to follow with cardiology, where she gets annual chest CTs and echos. She has an appointment next week with Dr. Richardson.    Hyperlipidemia: She is currently on 20 mg simvastatin.    Hypertension: She checks blood pressure at home. It averages lower than when she is at the clinic. She is currently taking metoprolol    Hypothyroidism: She is still on 25 mcg levothyroxine daily for her hypothyroidism.    Osteoporosis: She stopped her med a year ago. She is due for a DXA. Her last DXA on 12/14/16 showed osteopenia with left and right femoral neck T-scores of -1.8.    Headaches: She is on 50 mg Imitrex as needed. She uses Fioricet more often then Imitrex, as it works better. She is also taking 10 mg amitriptyline to prevent headaches and for sleep. She notes her neck issues are related to her headaches. She has seen a neck clinic and was suggested surgery but turned it down. She notes also receiving injections which made it worse.     Anxiety: She is still currently taking 0.5 mg clonazepam for her anxiety. Her psychiatrist wants her off of this and to take melatonin instead. She was told by Dr. Woods that melatonin and coumadin don't work well together. She questions the long term effects of clonazepam.     REVIEW OF SYSTEMS:   Positive for dry skin and excessive sweating. She notes ringing in the ears and seeing spots. She explains that she gets frequent nose bleeds and pain in fingers when it is cold out. She has neck pain and a history of thyroid problems. She notes SOB  with exercise and heart murmur. Positive for constipation and abdominal pain. She has swelling in joints, loss of memory, and chronic worrying. She also notes urinating excessively. She went through menopause at the age of 52. She denies any vaginal spotting or bleeding. She has her eyes checked regularly. She follows dermatology every 6 months for a skin test; she previously had a skin spot that was cancerous. All other systems are negative.    PFSH:  Family: Her sister had ovarian and skin cancer. Her sister  at age 70 from ovarian cancer. She and her sister both had genetic testing 3 years ago and were negative for BRCAI/II.  Medical: She was previously diagnosed with exercise induced asthma. She has not had any treatment for this. She notes being diagnosed with Hep C in ; she went through treatment in .  Social: She is a non smoker. She is retired. She previously worked at [employer] for 31 years doing .     Social History     Social History     Marital status:      Spouse name: N/A     Number of children:  0     Years of education: N/A     Occupational History      House[employer]     Social History Main Topics     Smoking status: Never Smoker     Smokeless tobacco: Never Used     Alcohol use No     Drug use: No     Sexual activity: Not on file     Other Topics Concern     Not on file     Social History Narrative       Past Medical History:   Diagnosis Date     Accidental fall 2015     Anxiety      Aortic dissection      Asthma      Benign Adenomatous Polyp Of The Large Intestine     Created by Conversion      Benign meningioma of brain 2015    initially seen on CT of head that was obtained after a fall. confirmed on an MRI     Bunion      Chronic headache      Colon polyp      Depression      Hallux Limitus Of The Right First Toe     Created by Conversion      Hepatitis C      HTN (hypertension)      Hyperlipidemia      Hypothyroid      Irregular heart rate       Nasal fracture 2/28/2015    fell face first on sideache     Osteoporosis      Painful swelling of joint      SOB (shortness of breath)      Stroke     Found on CT scan       Past Surgical History:   Procedure Laterality Date     AORTIC VALVE REPLACEMENT   2000     BREAST BIOPSY Left 2005    benign     COLONOSCOPY  2011     REPAIR THORACIC AORTA   2000       Allergies   Allergen Reactions     Codeine Other (See Comments)     Faints     Demerol [Meperidine] Other (See Comments)     Reaction not reported by patient.  Patient states she felt awful.       Active Ambulatory Problems     Diagnosis Date Noted     Osteoporosis      Mitral Valve Disorder      Dissection Of The Aorta      Anxiety      Tension-type Headache      Marfan Syndrome      Hepatitis, C Virus      Hypothyroidism      Hyperlipidemia      Recurrent Major Depression In Partial Remission      Migraine      Hypertension      Aortic Valve Disorder      Myalgia And Myositis      S/P AVR 11/03/2014     Meningioma 03/24/2015     Concussion syndrome 04/23/2015     Vertigo 05/04/2015     PTSD (post-traumatic stress disorder) 05/18/2015     Thyroid nodule 09/03/2015     Acute chest pain 10/11/2016     C. difficile diarrhea 11/14/2016     Family history of ovarian cancer 12/06/2016     Resolved Ambulatory Problems     Diagnosis Date Noted     Benign Adenomatous Polyp Of The Large Intestine      Shortness of breath      Hallux Limitus Of The Right First Toe      Dizziness      Accidental fall 05/27/2015     Past Medical History:   Diagnosis Date     Accidental fall 5/27/2015     Anxiety      Aortic dissection      Asthma      Benign Adenomatous Polyp Of The Large Intestine      Benign meningioma of brain march 2015     Bunion      Chronic headache      Colon polyp 2011     Depression      Hallux Limitus Of The Right First Toe      Hepatitis C      HTN (hypertension)      Hyperlipidemia      Hypothyroid      Irregular heart rate      Nasal fracture 2/28/2015      "Osteoporosis      Painful swelling of joint      SOB (shortness of breath)      Stroke        Family History   Problem Relation Age of Onset     Pancreatic cancer Father 70     history of smoking     Ovarian cancer Sister 67     stage III     Heart disease Mother      Breast cancer Maternal Grandmother       at age 63   rumored that she had breast ca       VITALS:  Vitals:    17 0814   BP: 124/70   Patient Site: Right Arm   Patient Position: Sitting   Cuff Size: Adult Regular   Pulse: 68   Resp: 12   Temp: 97.8  F (36.6  C)   TempSrc: Oral   Weight: 116 lb 6.4 oz (52.8 kg)   Height: 5' 2.25\" (1.581 m)     Wt Readings from Last 3 Encounters:   17 116 lb 6.4 oz (52.8 kg)   17 132 lb (59.9 kg)   17 132 lb 1 oz (59.9 kg)       PHYSICAL EXAM:  GENERAL: Pleasant, well-appearing woman in no acute distress.  VITAL SIGNS:  Reviewed.  HEENT: Pupils are equal, round, and reactive to light. Sclerae and  conjunctivae clear. TMs are clear bilaterally. Oropharynx is clear with moist mucous membranes.  NECK: Supple without lymphadenopathy or thyromegaly. No carotid bruits.  CARDIOVASCULAR:  Heart regular rate and rhythm without murmur. Normal S1 and S2.  LUNGS: Clear to auscultation bilaterally without wheezes or crackles. Good air movement throughout.  BREASTS: Normal contours. No skin dimpling, nipple retraction or nipple discharge. Palpation reveals no masses, no supraclavicular or axillary lymphadenopathy.    ABDOMEN:  Soft, nontender, and nondistended without guarding or rebound.  No organomegaly.  PELVIS: Normal female external genitalia.  No skin lesions.    Bimanual exam reveals no cervical motion tenderness, no uterine or adnexal masses or tenderness.  EXTREMITIES: Warm and well perfused without edema. Dorsalis pedis pulses easily palpable and symmetric bilaterally.  NEURO: Alert and oriented. Grossly nonfocal.  PSYCHIATRIC: Presents on time and well groomed.  Normal speech and thought content. " Full affect. No abnormal movements or behaviors noted.     DATA REVIEWED:  ADDITIONAL HISTORY SUMMARIZED (2): Reviewed 12/1/16 's note regarding past physical.  DECISION TO OBTAIN EXTRA INFORMATION (1): None.   RADIOLOGY TESTS SUMMARIZED OR ORDERED (1): Reviewed 12/14/16 mammogram and DXA. Ordered mammogram and DXA.  LABS REVIEWED OR ORDERED (1): Labs were ordered today.  MEDICINE TESTS SUMMARIZED OR ORDERED (1): None.  INDEPENDENT REVIEW OF EKG OR X-RAY (2 EACH): None.     The visit lasted a total of 30 minutes face to face with the patient. Over 50% of the time was spent counseling and educating the patient about health maintenance.    ICamila, am scribing for and in the presence of, Dr. Elliott.    I, Dr. Elliott, personally performed the services described in this documentation, as scribed by Camila Moore in my presence, and it is both accurate and complete.    MEDICATIONS:  Current Outpatient Prescriptions   Medication Sig Dispense Refill     amitriptyline (ELAVIL) 10 MG tablet Take 10 mg by mouth bedtime.       butalbital-acetaminophen-caffeine (FIORICET, ESGIC) -40 mg per tablet TAKE 1 TO 2 TABLETS BY MOUTH EVERY 4 TO 6 HOURS AS NEEDED FOR PAIN. NO MORE THAN 3,000MG OF ACETAMINOPHEN DAILY 30 tablet 0     calcium carbonate-vitamin D3 (CALCIUM 500 + D) 500 mg(1,250mg) -400 unit Tab Take 1 tablet by mouth 2 (two) times a day.        clonazePAM (KLONOPIN) 0.5 MG tablet Take 0.5 mg by mouth bedtime as needed for anxiety.       levothyroxine (SYNTHROID, LEVOTHROID) 75 MCG tablet Take 1 tablet (75 mcg total) by mouth Daily at 6:00 am. 90 tablet 1     metoprolol succinate (TOPROL-XL) 200 MG 24 hr tablet TAKE 1 TABLET DAILY 90 tablet 3     multivitamin (ONE A DAY) per tablet Take 1 tablet by mouth daily.        simvastatin (ZOCOR) 20 MG tablet TAKE 1 TABLET AT BEDTIME 90 tablet 0     SUMAtriptan (IMITREX) 50 MG tablet TAKE 1 TABLET BY MOUTH EVERY 2 HOURS AS NEEDED FOR MIGRAINE, MAX 4 TABLETS DAILY 9  tablet 10     triamterene-hydrochlorothiazide (MAXZIDE) 75-50 mg per tablet TAKE ONE-HALF (1/2) TABLET DAILY 45 tablet 2     warfarin (COUMADIN) 1 MG tablet Take 1 or 2 tablets along with 5 mg Warfarin daily as directed. Adjust dose per INR results as instructed. 160 tablet 0     warfarin (COUMADIN) 6 MG tablet 6mg x3 days on Mon/Wed/Fri as directed. Dose adjusted based on INR results 60 tablet 1     No current facility-administered medications for this visit.          Total Data Points:4

## 2021-06-14 NOTE — TELEPHONE ENCOUNTER
[facility] PA team  [phone]  Pool: HE PA MED ([id number])          PA has been initiated.       PA form completed and faxed insurance via Cover My Meds     Key:  [id number]     Medication:  Butalbital-APAP-Caffeine -40MG tablets    Insurance:  [employer]         Response will be received via fax and may take up to 5-10 business days depending on plan

## 2021-06-14 NOTE — TELEPHONE ENCOUNTER
ANTICOAGULATION  MANAGEMENT    Assessment     Today's INR result of 2.00 is Therapeutic (goal INR of 2.0-3.0)        Warfarin taken as previously instructed    No new diet changes affecting INR    No new medication/supplements affecting INR    Continues to tolerate warfarin with no reported s/s of bleeding or thromboembolism     Previous INR was Therapeutic at 2.00 on 1/18/21.    Plan:     Spoke on phone with Jennifer regarding INR result and instructed:      Warfarin Dosing Instructions:    - Change warfarin dose to 6 mg daily on Tues/Thurs; and 8 mg daily rest of week.   - (4 % change)    Instructed patient to follow up no later than:  3-4 wks.   - INR schedule don 3/1/21 @ [facility].    Education provided: importance of consistent vitamin K intake and target INR goal and significance of current INR result    Jennifer verbalizes understanding and agrees to warfarin dosing plan.    Instructed to call the [facility] for any changes, questions or concerns. (#[phone])   ?   Aylin Pablo RN    Subjective/Objective:      Aylssa TYLER Neftaly, a 65 y.o. female is on warfarin. Jennifer Rodriguez reports:     Home warfarin dose: as updated on anticoagulation calendar per template     Missed doses: No     Medication changes:  No     S/S of bleeding or thromboembolism:  No     New Injury or illness:  No     Changes in diet or alcohol consumption:  No     Upcoming surgery, procedure or cardioversion:  No    Anticoagulation Episode Summary     Current INR goal:  2.0-3.0   TTR:  67.3 % (1 y)   Next INR check:  2/22/2021   INR from last check:  2.00 (2/1/2021)   Weekly max warfarin dose:     Target end date:     INR check location:     Preferred lab:     Send IN[facility] reminders to:  [facility]    Indications    S/P AVR [Z95.2]           Comments:  Goal range changed 2/20/19 per cardiology         Anticoagulation Care Providers     Provider Role Specialty Phone number    Rafaela Woods MD Referring Family Medicine  [phone]

## 2021-06-14 NOTE — PROGRESS NOTES
"ASSESSMENT: Right hallux limitus    PLAN: We reviewed options for managing her arthritic right great toe joint.  She agreed to a cortisone injection today.  She was given 4 mg of dexamethasone sodium phosphate injected into the right first metatarsal phalangeal joint.  She tolerated that well.  At some point, she will likely need a right first metatarsal phalangeal joint fusion.  We reviewed the procedure and the expected recovery.  She will contact the clinic if she would like to schedule it.      SUBJECTIVE: New patient visit at the [place] clinic regarding pain at the right great toe.  Pain with increased recreational walking.  She does not have this every day.  Good supportive shoes feel significantly better.  She has had some spurring and bony enlargement around the great toe joint for years.  Her sister had a very large bunion deformity, and the patient hopes to avoid those kinds of troubles.  I have seen her in the past with synovial cyst at the same joint.  Surgery went well and the cyst has not returned.  She has had arthritic changes on x-rays dating back to 2011.    PHYSICAL EXAM:  /78  Pulse 60  Resp 18  Ht 5' 2\" (1.575 m)  Wt 132 lb (59.9 kg)  BMI 24.14 kg/m2  General: Pleasant 62 y.o. female in no acute distress.  Vascular: DP pulses are palpable. PT pulses are palpable. Pedal hair is present. Feet are warm to the touch.  Cardiac: Pulse is regular.  Lymphatic: No edema at the ankles.  Neuro: Sensation in the feet is grossly intact to light touch.  Derm: No open lesions.  Musculoskeletal: Bony prominences around the right first metatarsal phalangeal joints.  Poor dorsiflexion in that joint.  Still about 20  of plantarflexion.    Past Medical History:   Diagnosis Date     Accidental fall 5/27/2015     Anxiety      Aortic dissection      Asthma      Benign Adenomatous Polyp Of The Large Intestine      Benign meningioma of brain march 2015     Chronic headache      Colon polyp 2011     " Depression      Hallux Limitus Of The Right First Toe      Hepatitis C      HTN (hypertension)      Hyperlipidemia      Hypothyroid      Irregular heart rate      Nasal fracture 2/28/2015     Osteoporosis      Painful swelling of joint      SOB (shortness of breath)      Stroke        She has a past surgical history that includes Aortic valve replacement ( 2000); Colonoscopy (2011); Repair thoracic aorta ( 2000); and Breast biopsy (Left, 2005).    Allergies   Allergen Reactions     Codeine Other (See Comments)     Faints     Demerol [Meperidine] Other (See Comments)     Reaction not reported by patient.  Patient states she felt awful.       Current Outpatient Prescriptions   Medication Sig Dispense Refill     alendronate (FOSAMAX) 70 MG tablet Take 70 mg by mouth every 7 days. Take in the morning on an empty stomach with a full glass of water 30 minutes before food       amitriptyline (ELAVIL) 10 MG tablet Take 10 mg by mouth bedtime.       butalbital-acetaminophen-caffeine (FIORICET, ESGIC) -40 mg per tablet TAKE 1 TO 2 TABLETS BY MOUTH EVERY 4 TO 6 HOURS AS NEEDED FOR PAIN. NO MORE THAN 3,000MG OF ACETAMINOPHEN DAILY 30 tablet 0     calcium carbonate-vitamin D3 (CALCIUM 500 + D) 500 mg(1,250mg) -400 unit Tab Take 1 tablet by mouth 2 (two) times a day.        clonazePAM (KLONOPIN) 0.5 MG tablet Take 0.5 mg by mouth bedtime as needed for anxiety.       levothyroxine (SYNTHROID, LEVOTHROID) 75 MCG tablet Take 1 tablet (75 mcg total) by mouth Daily at 6:00 am. 90 tablet 1     metoprolol succinate (TOPROL-XL) 200 MG 24 hr tablet TAKE 1 TABLET DAILY 90 tablet 3     multivitamin (ONE A DAY) per tablet Take 1 tablet by mouth daily.        simvastatin (ZOCOR) 20 MG tablet TAKE 1 TABLET AT BEDTIME 90 tablet 0     SUMAtriptan (IMITREX) 50 MG tablet TAKE 1 TABLET BY MOUTH EVERY 2 HOURS AS NEEDED FOR MIGRAINE, MAX 4 TABLETS DAILY 9 tablet 10     triamterene-hydrochlorothiazide (MAXZIDE) 75-50 mg per tablet Take 0.5  tablets by mouth daily.        triamterene-hydrochlorothiazide (MAXZIDE) 75-50 mg per tablet TAKE ONE-HALF (1/2) TABLET DAILY 45 tablet 2     warfarin (COUMADIN) 1 MG tablet Take 1 or 2 tablets along with 5 mg Warfarin daily as directed. Adjust dose per INR results as instructed. 160 tablet 0     warfarin (COUMADIN) 6 MG tablet 6mg x3 days on Mon/Wed/Fri as directed. Dose adjusted based on INR results 60 tablet 1     warfarin (COUMADIN) 7.5 MG tablet Take one tablet daily 2-3 days per week. Adjust dose based on INR results as directed. 36 tablet 3     No current facility-administered medications for this visit.        Family History:  family history includes Breast cancer in her maternal grandmother; Heart disease in her mother; Ovarian cancer (age of onset: 67) in her sister; Pancreatic cancer (age of onset: 70) in her father.    Social History:  Reviewed, and she reports that she has never smoked. She has never used smokeless tobacco. She reports that she does not drink alcohol or use illicit drugs.    Review of Systems:  A 12 point comprehensive review of systems was negative except as noted.

## 2021-06-14 NOTE — TELEPHONE ENCOUNTER
Prior Authorization Request  Who s requesting:  Pharmacy  [facility]  Medication Name: Butalbital-acetaminophen-caffine -40  Insurance Plan: Insurance Member ID Number:    CoverMyMeds Key:   Informed patient that prior authorizations can take up to 10 business days for response:   NA  Okay to leave a detailed message: No    Case # [id number]

## 2021-06-15 NOTE — TELEPHONE ENCOUNTER
ANTICOAGULATION  MANAGEMENT    Assessment     Today's INR result of 2.30 is Therapeutic (goal INR of 2.0-3.0)        Warfarin taken as previously instructed    No new diet changes affecting INR    No new medication/supplements affecting INR    Continues to tolerate warfarin with no reported s/s of bleeding or thromboembolism     Previous INR was Subtherapeutic at 1.90 on 3/1/21.    Plan:     Spoke on phone with Jennifer regarding INR result and instructed:      Warfarin Dosing Instructions:    - Continue current warfarin dose 6 mg daily on Tuesdays; and 8 mg daily rest of week.    Instructed patient to follow up no later than:  2 wks.   - INR scheduled on 4/5/21 @ [facility].    Education provided: importance of consistent vitamin K intake, target INR goal and significance of current INR result and importance of notifying clinic for changes in medications    Jennifer verbalizes understanding and agrees to warfarin dosing plan.    Instructed to call the [facility] for any changes, questions or concerns. (#[phone])   ?   Aylin Pablo RN    Subjective/Objective:      Alyssa TYLER Neftaly, a 65 y.o. female is on warfarin. Jennifer Rodriguez reports:     Home warfarin dose: as updated on anticoagulation calendar per template     Missed doses: No     Medication changes:  No     S/S of bleeding or thromboembolism:  No     New Injury or illness:  No     Changes in diet or alcohol consumption:  No     Upcoming surgery, procedure or cardioversion:  No    Anticoagulation Episode Summary     Current INR goal:  2.0-3.0   TTR:  59.4 % (1 y)   Next INR check:  3/29/2021   INR from last check:  2.30 (3/15/2021)   Weekly max warfarin dose:     Target end date:     INR check location:     Preferred lab:     Send INR reminders to:  [facility]    Indications    S/P AVR [Z95.2]           Comments:  Goal range changed 2/20/19 per cardiology         Anticoagulation Care Providers     Provider Role Specialty Phone number     Rafaela Woods MD [facility] [phone]

## 2021-06-15 NOTE — PROGRESS NOTES
Progress Note    Reason for Visit:  Chief Complaint     Thyroid Problem          Progress Note:    HPI:    This pleasant 62-year-old female patient seen in consultation at the request of the primary care physician because of thyroid nodule.    Thank you for referring this pleasant 62-year-old female patient who had a fall 2 years ago and had MRI of her neck which showed that she had thyroid nodule.    The patient had similar ultrasound which showed that the nodule is increasing from 1.7-1.9-2.2.    The patient himself is not aware of any local neck symptoms she has no family history of thyroid disease and no history of head or neck irradiation as a child.    She has been on Synthroid for over 25 years 75 mcg daily.    She has a degree of fatigue and tiredness.        Component      Latest Ref Rng & Units 11/8/2016 12/1/2016 12/4/2017   Calcium      8.5 - 10.5 mg/dL 9.3     Chloride      98 - 107 mmol/L 100     Creatinine      0.60 - 1.10 mg/dL 1.01     CO2      22 - 31 mmol/L 26     GFR MDRD Non Af Amer      >60 mL/min/1.73m2 56 (L)     GFR MDRD Af Amer      >60 mL/min/1.73m2 >60     TSH      0.30 - 5.00 uIU/mL  3.55 3.93   Vitamin D, Total (25-Hydroxy)      30.0 - 80.0 ng/mL  41.1      ULTRASOUND-GUIDED NEEDLE ASPIRATION OF RIGHT THYROID GLAND NODULE:      - CYTOLOGIC FINDINGS MOST CONSISTENT WITH COLLOID NODULE WITH HURTHLE-CELL         METAPLASIA      - NEGATIVE FOR CYTOLOGICALLY MALIGNANT CELLS      - NO EVIDENCE OF PAPILLARY EPITHELIAL GROWTH   Electronically signed by Don Garcia MD on 12/19/2017 at 1713   Microscopic Description  Material examined consists of one monolayer preparation and eight smear preparations. These demonstrate numerous cohesive and partially cohesive clusters of epithelial cells, associated with a large amount of watery colloid. Scattered fragments of firm colloid are also noted. The epithelial cells demonstrate slight variability in nuclear size, but there are no nuclear grooves  or inclusions, and there is no thickening of nuclear membranes or peripheralization of nuclear chromatin. Small numbers of epithelial cells demonstrate Hurthle-type cytoplasmic metaplasia. A single microfollicle is identified on one smear preparation. Otherwise, the epithelial groups display neither microfollicular nor papillary architecture             Review of Systems:    Nervous System: No headache, dizziness, fainting or memory loss. No tingling sensation of hand or feet.  Ears: No hearing loss or ringing in the ears  Eyes: No blurring of vision, redness, itching or dryness.  Nose: No nosebleed or loss of smell  Mouth: No mouth sores or loss of taste  Throat: No hoarseness or difficulty swallowing  Neck: No enlarged thyroid or lymph nodes.  Heart: No chest pain, palpitation or irregular heartbeat. No swelling of hands or feet  Lungs: No shortness of breath, cough, night sweats, wheezing or hemoptysis.  Gastrointestinal: No nausea or vomiting, constipation or diarrhea.  No acid reflux, abdominal pain or blood in stools.  Kidney/Bladdr: No polyuria, polydipsia, nocturia or hematuria.  Genital/Sexual: No loss of libido  Skin: No rash, hair loss or hirsutism.  No abnormal striae  Muscles/Joints/Bones: No morning stiffness, muscle aches and pain or loss of height.    Current Medications:  Current Outpatient Prescriptions   Medication Sig     amitriptyline (ELAVIL) 10 MG tablet Take 10 mg by mouth bedtime.     butalbital-acetaminophen-caffeine (FIORICET, ESGIC) -40 mg per tablet TAKE 1 TO 2 TABLETS BY MOUTH EVERY 4 TO 6 HOURS AS NEEDED FOR PAIN. NO MORE THAN 3,000MG OF ACETAMINOPHEN DAILY     calcium carbonate-vitamin D3 (CALCIUM 500 + D) 500 mg(1,250mg) -400 unit Tab Take 1 tablet by mouth 2 (two) times a day.      clonazePAM (KLONOPIN) 0.5 MG tablet Take 0.5 mg by mouth bedtime as needed for anxiety.     levothyroxine (SYNTHROID, LEVOTHROID) 75 MCG tablet TAKE 1 TABLET DAILY AT 6:00 A.M.     metoprolol  succinate (TOPROL-XL) 200 MG 24 hr tablet TAKE 1 TABLET DAILY     multivitamin (ONE A DAY) per tablet Take 1 tablet by mouth daily.      simvastatin (ZOCOR) 20 MG tablet TAKE 1 TABLET AT BEDTIME     SUMAtriptan (IMITREX) 50 MG tablet TAKE 1 TABLET BY MOUTH EVERY 2 HOURS AS NEEDED FOR MIGRAINE. MAX 4 TABLETS DAILY.     triamterene-hydrochlorothiazide (MAXZIDE) 75-50 mg per tablet TAKE ONE-HALF (1/2) TABLET DAILY     warfarin (COUMADIN) 1 MG tablet Take 2 tablets along with 5 mg ( 7 mg) on Tues,Thur Sat then take one 7.5 mg tablet the rest of the week.     warfarin (COUMADIN) 5 MG tablet      warfarin (COUMADIN) 6 MG tablet 6mg x3 days on Mon/Wed/Fri as directed. Dose adjusted based on INR results       Patients Active Problems:  Patient Active Problem List   Diagnosis     Osteoporosis     Mitral Valve Disorder     Dissection Of The Aorta     Anxiety     Tension-type Headache     Marfan Syndrome     Hepatitis, C Virus     Hypothyroidism     Hyperlipidemia     Recurrent Major Depression In Partial Remission     Migraine     Hypertension     Aortic Valve Disorder     Myalgia And Myositis     S/P AVR     Meningioma     Concussion syndrome     Vertigo     PTSD (post-traumatic stress disorder)     Thyroid nodule     Acute chest pain     C. difficile diarrhea     Family history of ovarian cancer       History:   reports that she has never smoked. She has never used smokeless tobacco. She reports that she does not drink alcohol or use illicit drugs.   reports that she has never smoked. She has never used smokeless tobacco. She reports that she does not drink alcohol or use illicit drugs.  History   Smoking Status     Never Smoker   Smokeless Tobacco     Never Used      reports that she has never smoked. She has never used smokeless tobacco. She reports that she does not drink alcohol or use illicit drugs.  History   Sexual Activity     Sexual activity: Not on file     Past Medical History:   Diagnosis Date     Accidental  fall 2015     Anxiety      Aortic dissection      Asthma      Benign Adenomatous Polyp Of The Large Intestine     Created by Conversion      Benign meningioma of brain 2015    initially seen on CT of head that was obtained after a fall. confirmed on an MRI     Bunion      Chronic headache      Colon polyp      Depression      Hallux Limitus Of The Right First Toe     Created by Conversion      Hepatitis C      HTN (hypertension)      Hyperlipidemia      Hypothyroid      Irregular heart rate      Nasal fracture 2015    fell face first on sideache     Osteoporosis      Painful swelling of joint      SOB (shortness of breath)      Stroke     Found on CT scan     Family History   Problem Relation Age of Onset     Pancreatic cancer Father 70     history of smoking     Ovarian cancer Sister 67     stage III,  at age 70     Skin cancer Sister      Heart disease Mother      Breast cancer Maternal Grandmother       at age 63   rumored that she had breast ca     Diabetes Neg Hx      Alzheimer's disease Neg Hx      Past Medical History:   Diagnosis Date     Accidental fall 2015     Anxiety      Aortic dissection      Asthma      Benign Adenomatous Polyp Of The Large Intestine     Created by Conversion      Benign meningioma of brain 2015    initially seen on CT of head that was obtained after a fall. confirmed on an MRI     Bunion      Chronic headache      Colon polyp      Depression      Hallux Limitus Of The Right First Toe     Created by Conversion      Hepatitis C      HTN (hypertension)      Hyperlipidemia      Hypothyroid      Irregular heart rate      Nasal fracture 2015    fell face first on sideache     Osteoporosis      Painful swelling of joint      SOB (shortness of breath)      Stroke     Found on CT scan     Past Surgical History:   Procedure Laterality Date     AORTIC VALVE REPLACEMENT   2000     BREAST BIOPSY Left     benign     COLONOSCOPY  2011     REPAIR  "THORACIC AORTA   2000       Vitals   height is 5' 2.25\" (1.581 m) and weight is 117 lb 6.4 oz (53.3 kg). Her blood pressure is 112/62.         Exam  General appearance: The patient looked well, not in acute distress.  Eyes: no evidence of thyroid eye disease.   Retinal exam: No evidence of diabetic retinopathy.  Mouth and Throat: Normal  Neck: No evidence of thyromegaly, enlarged lymph node or tenderness  Chest: Trachea is central. Chest is clear to auscultation and percussion. Breat sounds are normal.  Cardiovascular exam: JVP is not raised. Heart sounds are normal, no murmurs or rub  Peripheral pulses are palpable.   Abdomen: No masses or tenderness.    Back: No vertebral tenderness or kyphosis.  Extremities: No evidence of leg edema.   Skin: Normal to touch.  No abnormal striae  Neurologic exam:  Visual fields are intact by confrontation, grossly intact. No evidence of peripheral neuropathy.  Detailed foot exam normal.        Diagnosis:  No diagnosis found.    Orders:   No orders of the defined types were placed in this encounter.        Assessment and Plan: Thyroid nodule I discussed the pathophysiology of the disease the patient had a biopsy which showed that this is a benign nodule with Hurthle cell metaplasia.  There is no evidence of thyroid cancer.    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    At this stage I did advise the patient that we there is no need for further actions will just do another ultrasound in a year.    She does have hypertension on Maxzide 75-50 she takes half a tablet a day blood pressures were controlled 112/62.    She takes calcium 2 tablets a day hyperlipidemia on Zocor 20 mg amitriptyline 10 mg.    The patient had a bone DEXA scan showed T score core " at the spine is -1.4 with improvement of 3.2% and at the left than the right hip is -1.8.    The patient was on bone was on Fosamax before but that was discontinued she is due to have a bone DEXA scan shortly.  This is managed by the primary care physician.    Her TSH tends to run high at 3.9.    I did advise the patient to increase Synthroid to, 88 mcg on Monday Wednesday Friday the rest of the day she will take the 75 mcg this will help suppress the TSH at the bed and slow down the growth of the nodule.    Otherwise the patient return to clinic in 1 year with ultrasound before.    I did spend 60 minutes with the patient more than 50% was spent on counseling and managing her care.

## 2021-06-16 NOTE — TELEPHONE ENCOUNTER
ANTICOAGULATION  MANAGEMENT    Assessment     Today's INR result of 2.1 is Therapeutic (goal INR of 2.0-3.0)        Warfarin taken as previously instructed    No new diet changes affecting INR    No new medication/supplements affecting INR    Continues to tolerate warfarin with no reported s/s of bleeding or thromboembolism     Previous INR was Therapeutic    Plan:     Left detailed message for Jennifer regarding INR result and instructed:      Warfarin Dosing Instructions:  Continue current warfarin dose 6 mg daily on Tuesdays; and 8 mg daily rest of week  (0 % change)    Instructed patient to follow up no later than: 4 weeks.    Education provided:     Instructed to call the [facility] for any changes, questions or concerns. (#[phone])   ?   Jordana Tyler RN    Subjective/Objective:      Alyssa Higginbotham, a 66 y.o. female is on warfarin. Jennifer Rodriguez reports:     Home warfarin dose: as updated on anticoagulation calendar per template     Missed doses: No     Medication changes:  No     S/S of bleeding or thromboembolism:  No     New Injury or illness:  No     Changes in diet or alcohol consumption:  No     Upcoming surgery, procedure or cardioversion:  No    Anticoagulation Episode Summary     Current INR goal:  2.0-3.0   TTR:  59.4 % (1 y)   Next INR check:  5/3/2021   INR from last check:  2.10 (4/5/2021)   Weekly max warfarin dose:     Target end date:     INR check location:     Preferred lab:     Send INR reminders to:  [facility]    Indications    S/P AVR [Z95.2]           Comments:  Goal range changed 2/20/19 per cardiology         Anticoagulation Care Providers     Provider Role Specialty Phone number    Rafaela Woods MD Referring Family Medicine [phone]

## 2021-06-16 NOTE — TELEPHONE ENCOUNTER
Telephone Encounter by Jonathon Richardson MD at 3/12/2020  3:10 PM     Author: Jonathon Richardson MD Service: -- Author Type: Physician    Filed: 3/12/2020  3:29 PM Encounter Date: 3/12/2020 Status: Signed    : Jonathon Richardson MD (Physician)               Assessment/Recommendations   This is a phone call visit which we instituted because of the coronavirus.  The patient is doing very well and she has no symptoms.  She is exercising and walking an hour almost every day without difficulties.  Her blood pressures at home are at goal ranging around 120/60.    We will plan on an echocardiogram and a CTA of her chest abdomen pelvis in September of this year for follow-up of her Marfan's and chronic dissection which is been stable for quite some time.    The phone call was initiated at 3:14 PM and ended at 3:22 PM       History of Present Illness/Subjective    Ms. Alyssa Higginbotham is a 64 y.o. female with known Marfan's and stable descending thoracic aortic dissection.  Her last CT was in March 2019 and the true and false lumen were stable and there was no changes on the CT.  This past year she is felt well.  She is retired which is taken a lot of stress out of her life.  She walks an hour almost every day.  She denies orthopnea, paroxysmal nocturnal dyspnea, peripheral edema, syncope, near syncope or chest discomfort.  She has not changed her medications.  Her blood pressures at home runs about 120/60.  She takes it periodically.         Physical Examination Review of Systems   [unfilled]  There is no height or weight on file to calculate BMI.  Wt Readings from Last 3 Encounters:   01/15/20 125 lb 6 oz (56.9 kg)   01/07/20 122 lb (55.3 kg)   05/08/19 122 lb 9.6 oz (55.6 kg)                                             Psychiatric: Appropriate affect.       @FLOW(9813:LAST)@  @FLOW(9814:LAST)@  @FLOW(9815:LAST)@  @FLOW(9816:LAST)@  @FLOW(9817:LAST)@  @FLOW(9818:LAST)@  @FLOW(9819:LAST)@  @FLOW(9820:LAST)@  @FLOW(9821:LAST)@  @FLOW(9822:LAST)@  @FLOW(9823:LAST)@  @FLOW(9824:LAST)@  @FLOW(9826:LAST)@       Medical History  Surgical History Family History Social History   Past Medical History:   Diagnosis Date   ? Accidental fall 2015   ? Anxiety    ? Aortic dissection (H)    ? Asthma    ? Benign Adenomatous Polyp Of The Large Intestine     Created by Conversion    ? Benign meningioma of brain (H) 2015    initially seen on CT of head that was obtained after a fall. Frontal lobe, confirmed on an MRI Mar 2015, 3 mm   ? Bunion    ? Chronic headache    ? Colon polyp    ? Depression    ? Hallux Limitus Of The Right First Toe     Created by Conversion    ? Hepatitis C    ? HTN (hypertension)    ? Hyperlipidemia    ? Hypothyroid    ? Irregular heart rate    ? Nasal fracture 2015    fell face first on sideache   ? Osteoporosis    ? Painful swelling of joint    ? SOB (shortness of breath)    ? Stroke (H)     Found on CT scan    Past Surgical History:   Procedure Laterality Date   ? AORTIC VALVE REPLACEMENT      ? BREAST BIOPSY Left     benign   ? COLONOSCOPY     ? REPAIR THORACIC AORTA       Family History   Problem Relation Age of Onset   ? Pancreatic cancer Father 70        history of smoking   ? Ovarian cancer Sister 67        stage III,  at age 70   ? Skin cancer Sister    ? Heart disease Mother    ? Diabetes Neg Hx    ? Alzheimer's disease Neg Hx     Social History     Socioeconomic History   ? Marital status:      Spouse name: Not on file   ? Number of children:  0   ? Years of education: Not on file   ? Highest education level: Not on file   Occupational History     Employer: [employer]   Social Needs   ? Financial resource strain: Not on file   ? Food insecurity     Worry: Not on file     Inability: Not on file   ?  Transportation needs     Medical: Not on file     Non-medical: Not on file   Tobacco Use   ? Smoking status: Never Smoker   ? Smokeless tobacco: Never Used   Substance and Sexual Activity   ? Alcohol use: No   ? Drug use: No   ? Sexual activity: Not Currently   Lifestyle   ? Physical activity     Days per week: Not on file     Minutes per session: Not on file   ? Stress: Not on file   Relationships   ? Social connections     Talks on phone: Not on file     Gets together: Not on file     Attends Baptism service: Not on file     Active member of club or organization: Not on file     Attends meetings of clubs or organizations: Not on file     Relationship status: Not on file   ? Intimate partner violence     Fear of current or ex partner: Not on file     Emotionally abused: Not on file     Physically abused: Not on file     Forced sexual activity: Not on file   Other Topics Concern   ? Not on file   Social History Narrative   ? Not on file          Medications  Allergies   Current Outpatient Medications   Medication Sig Dispense Refill   ? amitriptyline (ELAVIL) 10 MG tablet Take 10 mg by mouth bedtime.     ? butalbital-acetaminophen-caffeine (FIORICET, ESGIC) -40 mg per tablet TAKE 1 TO 2 TABLETS BY MOUTH EVERY 4 HOURS AS NEEDED FOR PAIN. MAX OF 4000 MG ACETAMINOPHEN PER 24 HOURS 30 tablet 3   ? clonazePAM (KLONOPIN) 0.5 MG tablet Take 0.5 mg by mouth bedtime as needed for anxiety.     ? levothyroxine (SYNTHROID, LEVOTHROID) 75 MCG tablet Take 1 tablet on wednesday, Thursday, Saturday and Sunday. 45 tablet 3   ? levothyroxine (SYNTHROID, LEVOTHROID) 88 MCG tablet Take 1 tablet on Monday, Tuesday and Friday. 45 tablet 3   ? metoprolol succinate (TOPROL-XL) 200 MG 24 hr tablet Take 1 tablet (200 mg total) by mouth daily. 90 tablet 1   ? multivitamin (ONE A DAY) per tablet Take 1 tablet by mouth daily.      ? simvastatin (ZOCOR) 20 MG tablet TAKE 1 TABLET AT BEDTIME. DUE FOR AN OFFICE VISIT. 90 tablet 1   ?  triamterene-hydrochlorothiazide (MAXZIDE) 75-50 mg per tablet TAKE ONE-HALF (1/2) TABLET DAILY 45 tablet 3   ? warfarin ANTICOAGULANT (COUMADIN/JANTOVEN) 1 MG tablet TAKE 1 TO 2 TABLETS ALONG WITH 5 MG TABLET (6 TO 7 MG ) DAILY AS DIRECTED ADJUST DOSE PER INR RESULTS 168 tablet 4   ? rizatriptan (MAXALT) 5 MG tablet TAKE 1 TABLET BY MOUTH AS NEEDED FOR MIGRAINE. MAY REPEAT IN 2 HOURS IF NEEDED 10 tablet 9   ? TiZANidine (ZANAFLEX) 2 MG capsule Take 1 capsule (2 mg total) by mouth 3 (three) times a day. 30 capsule 0   ? warfarin ANTICOAGULANT (COUMADIN/JANTOVEN) 5 MG tablet TAKE 1 TABLET ALONG WITH 1 MG TABLET ( 6 TO 7 MG DAILY ) AS DIRECTED. ADJUST PER INR RESULT 110 tablet 0     No current facility-administered medications for this visit.     Allergies   Allergen Reactions   ? Codeine Other (See Comments)     Faints   ? Demerol [Meperidine] Other (See Comments)     Reaction not reported by patient.  Patient states she felt awful.         Lab Results    Chemistry/lipid CBC Cardiac Enzymes/BNP/TSH/INR   Lab Results   Component Value Date    CHOL 171 05/08/2019    HDL 50 05/08/2019    LDLCALC 99 05/08/2019    TRIG 111 05/08/2019    CREATININE 1.02 01/07/2020    BUN 17 01/07/2020    K 3.8 01/07/2020     (L) 01/15/2020    CL 98 01/07/2020    CO2 28 01/07/2020    Lab Results   Component Value Date    WBC 4.4 01/15/2020    HGB 12.9 05/08/2019    HCT 38.1 05/08/2019    MCV 80 05/08/2019     05/08/2019    Lab Results   Component Value Date    TROPONINI <0.01 10/12/2016    BNP 22 10/11/2016    TSH 4.03 01/15/2020    INR 2.10 (H) 02/25/2020

## 2021-06-16 NOTE — TELEPHONE ENCOUNTER
Telephone Encounter by Nuvia Valenzuela RN at 2/19/2019  9:06 AM     Author: Nuvia Valenzuela RN Service: -- Author Type: Registered Nurse    Filed: 2/19/2019  9:07 AM Encounter Date: 2/11/2019 Status: Signed    : Nuvia Valenzuela RN (Registered Nurse)       Jonathon Richardson MD   You Yesterday (7:51 AM)      INR goal 2-3 is fine with me.     Jonathon Osei (Routing comment)

## 2021-06-16 NOTE — TELEPHONE ENCOUNTER
Telephone Encounter by Lynette Manzano RN at 3/24/2020  9:44 AM     Author: Lynette Manzano RN Service: -- Author Type: Registered Nurse    Filed: 3/24/2020  9:50 AM Encounter Date: 3/24/2020 Status: Attested    : Lynette Manzano RN (Registered Nurse) Cosigner: Rafaela Woods MD at 3/24/2020  9:56 AM    Attestation signed by Rafaela Woods MD at 3/24/2020  9:56 AM    .                ANTICOAGULATION  MANAGEMENT    Assessment     Today's INR result of 2.7 is Therapeutic (goal INR of 2.0-3.0)        Warfarin taken as previously instructed    No new diet changes affecting INR    No new medication/supplements affecting INR    Continues to tolerate warfarin with no reported s/s of bleeding or thromboembolism     Previous INR was Therapeutic    Plan:     Spoke with Alyssa regarding INR result and instructed:     Warfarin Dosing Instructions:  Continue current warfarin dose 6 mg daily on Tues/Fri; and 7 mg daily rest of week  (0 % change)    Instructed patient to follow up no later than: 4-6 weeks    Education provided: importance of therapeutic range    Alyssa verbalizes understanding and agrees to warfarin dosing plan.    Instructed to call the [facility] for any changes, questions or concerns. (#[phone])   ?   Lynette Manzano RN    Subjective/Objective:      Alyssa Higginbotham, a 64 y.o. female is on warfarin.     Alyssa reports:     Home warfarin dose: as updated on anticoagulation calendar per template     Missed doses: No     Medication changes:  No     S/S of bleeding or thromboembolism:  No     New Injury or illness:  No     Changes in diet or alcohol consumption:  No     Upcoming surgery, procedure or cardioversion:  No    Anticoagulation Episode Summary     Current INR goal:   2.0-3.0   TTR:   62.9 % (1 y)   Next INR check:   5/5/2020   INR from last check:   2.70 (3/24/2020)   Weekly max warfarin dose:      Target end date:      INR check location:      Preferred  lab:      Send INR reminders to:   [facility]    Indications    S/P AVR [Z95.2]           Comments:   Goal range changed 2/20/19 per cardiology         Anticoagulation Care Providers     Provider Role Specialty Phone number    Rafaela Woods MD Referring Family Medicine [phone]

## 2021-06-16 NOTE — TELEPHONE ENCOUNTER
Telephone Encounter by Izzy Lazaro CMA at 3/3/2020  2:57 PM     Author: Izzy Lazaro CMA Service: -- Author Type: Certified Medical Assistant    Filed: 3/3/2020  3:00 PM Encounter Date: 3/3/2020 Status: Signed    : Izzy Lazaro CMA (Certified Medical Assistant)       Medication: butalbital-acetaminophen-caffine    Last Date Filled 1/27/2020     pulled: YES      Only PCP Prescribing?: YES    Taken as prescribed from physician notes?: YES    CSA in last year: NO    Random Utox in last year: NO    Opioids + benzodiazepines? NO

## 2021-06-16 NOTE — TELEPHONE ENCOUNTER
Telephone Encounter by Aren Ackerman LPN at 1/27/2020 10:01 AM     Author: Aren Ackerman LPN Service: -- Author Type: Licensed Nurse    Filed: 1/29/2020  7:24 AM Encounter Date: 1/25/2020 Status: Signed    : Aren Ackerman LPN (Licensed Nurse)       Medication: butalbital-acetaminophen-caffine -40    Last Date Filled 10-     pulled: YES      Only PCP Prescribing?: YES    Taken as prescribed from physician notes?: YES    CSA in last year: YES    Random Utox in last year: YES    Opioids + benzodiazepines? YES

## 2021-06-17 NOTE — TELEPHONE ENCOUNTER
Telephone Encounter by Aylin Pablo RN at 10/30/2020  8:11 AM     Author: Aylin Pablo RN Service: -- Author Type: Registered Nurse    Filed: 10/30/2020 10:20 AM Encounter Date: 10/30/2020 Status: Signed    : Aylin Pablo RN (Registered Nurse)       ANTICOAGULATION  MANAGEMENT    Assessment     Today's INR result of 1.70 is Subtherapeutic (goal INR of 2.0-3.0)        Warfarin taken as previously instructed    No new diet changes affecting INR    No new medication/supplements affecting INR    Continues to tolerate warfarin with no reported s/s of bleeding or thromboembolism     Previous INR was Therapeutic at 2.50 on 10/16/20.    Plan:     Spoke on phone with Jennifer regarding INR result and instructed:     Warfarin Dosing Instructions:   (has 1mg / 5mg tabs)   - today, advised one time booster with 8mg warfarin dose, then continue current warfarin dose 7 mg daily on Mon/Thurs; and 6 mg daily rest of week.    Instructed patient to follow up no later than:  1-2 wks.   - INR scheduled on 11/13/20 @ [facility].    Education provided: importance of consistent vitamin K intake and target INR goal and significance of current INR result    Jennifer verbalizes understanding and agrees to warfarin dosing plan.    Instructed to call the [facility] for any changes, questions or concerns. (#[phone])   ?   Aylin Pablo RN    Subjective/Objective:      Alyssa Higginbotham, a 65 y.o. female is on warfarin.     Alyssa reports:     Home warfarin dose: as updated on anticoagulation calendar per template     Missed doses: No     Medication changes:  No     S/S of bleeding or thromboembolism:  No     New Injury or illness:  No     Changes in diet or alcohol consumption:  No     Upcoming surgery, procedure or cardioversion:  No    Anticoagulation Episode Summary     Current INR goal:  2.0-3.0   TTR:  76.5 % (1 y)   Next INR check:  11/13/2020   INR from last check:  1.70 (10/30/2020)   Weekly max warfarin  dose:     Target end date:     INR check location:     Preferred lab:     Send INR reminders to:  [facility]    Indications    S/P AVR [Z95.2]           Comments:  Goal range changed 2/20/19 per cardiology         Anticoagulation Care Providers     Provider Role Specialty Phone number    Rafaela Woods MD Referring Family Medicine [phone]

## 2021-06-17 NOTE — TELEPHONE ENCOUNTER
Telephone Encounter by Aylin Pablo RN at 1/4/2021  9:45 AM     Author: Aylin Pablo RN Service: -- Author Type: Registered Nurse    Filed: 1/4/2021 11:32 AM Encounter Date: 1/4/2021 Status: Signed    : Aylin Pablo RN (Registered Nurse)       ANTICOAGULATION  MANAGEMENT    Assessment     Today's INR result of 1.60 is Subtherapeutic (goal INR of 2.0-3.0)        Warfarin taken as previously instructed    No new diet changes affecting INR    No new medication/supplements affecting INR    Continues to tolerate warfarin with no reported s/s of bleeding or thromboembolism     Previous INR was Therapeutic at 2.00 on 12/18/20.    Plan:     Spoke on phone with Jennifer regarding INR result and instructed:     Warfarin Dosing Instructions:  (1mg and 5mg tabs)   - Change warfarin dose to 6 mg daily on Sun/Tues/Thurs; and 8 mg daily rest of week.   - (11.1 % change)    Instructed patient to follow up no later than:  1-2 wks.   - INR scheduled on 1/18/21 @ [facility].    Education provided: importance of consistent vitamin K intake and target INR goal and significance of current INR result    Jennifer verbalizes understanding and agrees to warfarin dosing plan.    Instructed to call the [facility] for any changes, questions or concerns. (#[phone])   ?   Aylin Pablo RN    Subjective/Objective:      Alyssa Higginbotham, a 65 y.o. female is on warfarin.     Alyssa reports:     Home warfarin dose: as updated on anticoagulation calendar per template     Missed doses: No     Medication changes:  No     S/S of bleeding or thromboembolism:  No     New Injury or illness:  No     Changes in diet or alcohol consumption:  No     Upcoming surgery, procedure or cardioversion:  No    Anticoagulation Episode Summary     Current INR goal:  2.0-3.0   TTR:  65.3 % (1 y)   Next INR check:  1/18/2021   INR from last check:  1.60 (1/4/2021)   Weekly max warfarin dose:     Target end date:     INR check location:      Preferred lab:     Send INR reminders to:  [facility]    Indications    S/P AVR [Z95.2]           Comments:  Goal range changed 2/20/19 per cardiology         Anticoagulation Care Providers     Provider Role Specialty Phone number    Rafaela Woods MD Referring Family Medicine [phone]

## 2021-06-17 NOTE — TELEPHONE ENCOUNTER
Telephone Encounter by Aylin Pablo RN at 10/7/2020  8:04 AM     Author: Aylin Pablo RN Service: -- Author Type: Registered Nurse    Filed: 10/7/2020 11:52 AM Encounter Date: 10/7/2020 Status: Signed    : Aylin Pablo RN (Registered Nurse)       ANTICOAGULATION  MANAGEMENT    Assessment     Today's INR result of 1.80 is Subtherapeutic (goal INR of 2.0-3.0)        Warfarin taken as previously instructed    No new diet changes affecting INR    No new medication/supplements affecting INR    Continues to tolerate warfarin with no reported s/s of bleeding or thromboembolism     Previous INR was Therapeutic at 2.40 on 8/26/20.    Plan:     Spoke on phone with Jennifer regarding INR result and instructed:     Warfarin Dosing Instructions:   (has 1mg and 5mg tabs)   - today, advised one time booster with 8mg warfarin dose,    - then continue current warfarin dose 7 mg daily on Mon/Thurs; and 6 mg daily rest of week.    Instructed patient to follow up no later than:  2 wks.   - INR scheduled on 10/16/20 @ [facility].    Education provided: importance of consistent vitamin K intake, target INR goal and significance of current INR result and importance of notifying clinic for changes in medications    Jennifer verbalizes understanding and agrees to warfarin dosing plan.    [facility], questions or concerns[facility] (#[phone])   ?   Aylin Pablo RN    Subjective/Objective:      Alyssa Higginbotham, a 65 y.o. female is on warfarin.     Alyssa reports:     Home warfarin dose: as updated on anticoagulation calendar per template     Missed doses: No     Medication changes:  No     S/S of bleeding or thromboembolism:  No     New Injury or illness:  No     Changes in diet or alcohol consumption:  No     Upcoming surgery, procedure or cardioversion:  No    Anticoagulation Episode Summary     Current INR goal:  2.0-3.0   TTR:  76.2 % (1 y)   Next INR check:  10/21/2020   INR  from last check:  1.80 (10/7/2020)   Weekly max warfarin dose:     Target end date:     INR check location:     Preferred lab:     Send INR reminders to:  [facility]    Indications    S/P AVR [Z95.2]           Comments:  Goal range changed 2/20/19 per cardiology         Anticoagulation Care Providers     Provider Role Specialty Phone number    Rafaela Woods MD Referring Family Medicine [phone]

## 2021-06-17 NOTE — TELEPHONE ENCOUNTER
ANTICOAGULATION  MANAGEMENT    Assessment     Today's INR result of 2.00 is Therapeutic (goal INR of 2.0-3.0)        Warfarin taken as previously instructed    No new diet changes affecting INR    No new medication/supplements affecting INR    Continues to tolerate warfarin with no reported s/s of bleeding or thromboembolism     Previous INR was Therapeutic at 2.10 on 4/5/21.    Plan:     Left detailed message for Jennifer regarding INR result and instructed:   - advised to call back with any questions.      Warfarin Dosing Instructions:   (1mg / 5mg tabs)   - Continue current warfarin dose 6 mg daily on Tuesdays; and 8 mg daily rest of week.    Instructed patient to follow up no later than:  4 wks.    Education provided: importance of consistent vitamin K intake, impact of vitamin K foods on INR and target INR goal and significance of current INR result    Instructed to call the [facility] for any changes, questions or concerns. (#[phone])   ?   Aylin Pablo RN    Subjective/Objective:      Alyssa Higginbotham, a 66 y.o. female is on warfarin. Jennifer Rodriguez reports:     Home warfarin dose: as updated on anticoagulation calendar per template     Missed doses: No     Medication changes:  No     S/S of bleeding or thromboembolism:  No     New Injury or illness:  No     Changes in diet or alcohol consumption:  No     Upcoming surgery, procedure or cardioversion:  No    Anticoagulation Episode Summary     Current INR goal:  2.0-3.0   TTR:  61.7 % (1 y)   Next INR check:  5/31/2021   INR from last check:  2.00 (5/3/2021)   Weekly max warfarin dose:     Target end date:     INR check location:     Preferred lab:     Send INR reminders to:  [facility]    Indications    S/P AVR [Z95.2]           Comments:  Goal range changed 2/20/19 per cardiology         Anticoagulation Care Providers     Provider Role Specialty Phone number    Rafaela Woods MD Referring Family Medicine [phone]

## 2021-06-17 NOTE — TELEPHONE ENCOUNTER
Telephone Encounter by Aylin Pablo RN at 3/1/2021  9:16 AM     Author: Aylin Pablo RN Service: -- Author Type: Registered Nurse    Filed: 3/1/2021 10:29 AM Encounter Date: 3/1/2021 Status: Signed    : Aylin Pablo RN (Registered Nurse)       ANTICOAGULATION  MANAGEMENT    Assessment     Today's INR result of 1.90 is Subtherapeutic (goal INR of 2.0-3.0)        Warfarin taken as previously instructed    No new diet changes affecting INR    No new medication/supplements affecting INR    Continues to tolerate warfarin with no reported s/s of bleeding or thromboembolism     Previous INR was Therapeutic at 2.00 on 2/1/21.    Plan:     Spoke on phone with Jennifer regarding INR result and instructed:      Warfarin Dosing Instructions:    - today, advised one time booster with 10mg warfarin dose,   - then change warfarin dose to 6 mg daily on Tuesdays; and 8 mg daily rest of week.   - (3.9 % change)    Instructed patient to follow up no later than: 1-2 wks.   - INR scheduled on 3/15/21 @ [facility]    Education provided: importance of consistent vitamin K intake, target INR goal and significance of current INR result and importance of notifying clinic for changes in medications    Jennifer verbalizes understanding and agrees to warfarin dosing plan.    Instructed to call the [facility] for any changes, questions or concerns. (#[phone])   ?   Aylin Pablo RN    Subjective/Objective:      Alyssa Higginbotham, a 65 y.o. female is on warfarin. Jennifer Rodriguez reports:     Home warfarin dose: verbally confirmed home dose with Jennifer and updated on anticoagulation calendar     Missed doses: No     Medication changes:  No     S/S of bleeding or thromboembolism:  No     New Injury or illness:  No     Changes in diet or alcohol consumption:  No     Upcoming surgery, procedure or cardioversion:  No    Anticoagulation Episode Summary     Current INR goal:  2.0-3.0   TTR:  60.4 % (1 y)   Next INR  check:  3/15/2021   INR from last check:  1.90 (3/1/2021)   Weekly max warfarin dose:     Target end date:     INR check location:     Preferred lab:     Send INR reminders to:  [facility]    Indications    S/P AVR [Z95.2]           Comments:  Goal range changed 2/20/19 per cardiology         Anticoagulation Care Providers     Provider Role Specialty Phone number    Rafaela Woods MD Referring Family Medicine [phone]

## 2021-06-17 NOTE — TELEPHONE ENCOUNTER
Telephone Encounter by Aylin Pablo RN at 12/4/2020  9:27 AM     Author: Aylin Pablo RN Service: -- Author Type: Registered Nurse    Filed: 12/4/2020 12:31 PM Encounter Date: 12/4/2020 Status: Signed    : Aylin Pablo RN (Registered Nurse)       ANTICOAGULATION  MANAGEMENT    Assessment     Today's INR result of 1.50 is Subtherapeutic (goal INR of 2.0-3.0)        Warfarin taken as previously instructed    - reported not missing any warfarin doses.    No new diet changes affecting INR    No new medication/supplements affecting INR    Continues to tolerate warfarin with no reported s/s of bleeding or thromboembolism     Previous INR was Therapeutic at 2.20 on 11/13/20.    Reviewed INR trend - shows the last 6 INR; subtherapeutic every other INR check since 8/26/20.    Plan:     Jennifer called back to confirm new warfarin orders.    Left detailed message for Jennifer regarding INR result and instructed:    - advised a call back, to verbalize back understanding and new warfarin dose.    Warfarin Dosing Instructions:   (has 1mg and 5mg tabs)   - today, advised one time booster with 8mg warfarin dose,   - then change warfarin dose to 7 mg daily on Mon/Wed/Fri; and 6 mg daily rest of week.   - (2.3 % change)    Instructed patient to follow up no later than:  1-2 wks.   - INR scheduled on 12/18/20 @ [facility].    Education provided: importance of consistent vitamin K intake, target INR goal and significance of current INR result and importance of taking warfarin as instructed    Jennifer verbalizes understanding and agrees to warfarin dosing plan.    Instructed to call the [facility] for any changes, questions or concerns. (#[phone])   ?   Aylin Pablo RN    Subjective/Objective:      Alyssa Higginbotham, a 65 y.o. female is on warfarin.     Alyssa reports:     Home warfarin dose: as updated on anticoagulation calendar per template     Missed doses: No     Medication changes:  No     S/S of  bleeding or thromboembolism:  No     New Injury or illness:  No     Changes in diet or alcohol consumption:  No     Upcoming surgery, procedure or cardioversion:  No    Anticoagulation Episode Summary     Current INR goal:  2.0-3.0   TTR:  73.8 % (1 y)   Next INR check:  12/18/2020   INR from last check:  1.50 (12/4/2020)   Weekly max warfarin dose:     Target end date:     INR check location:     Preferred lab:     Send INR reminders to:  [facility]    Indications    S/P AVR [Z95.2]           Comments:  Goal range changed 2/20/19 per cardiology         Anticoagulation Care Providers     Provider Role Specialty Phone number    Rafaela Woods MD Referring Family Medicine [phone]

## 2021-06-18 NOTE — PROGRESS NOTES
Assessment:     1. Metatarsalgia of right foot  Ambulatory referral to Podiatry   2. Bunion, right  Ambulatory referral to Podiatry        Bunion      Plan:      Natural history and expected course discussed. Questions answered.  Educational material distributed.  Rest, ice, compression, and elevation (RICE) therapy.  Referral to podiatry     Subjective:      Alyssa Higginbotham is a 63 y.o. female who presents with right toe pain. Onset of the symptoms was several months ago. Precipitating event: none known. Current symptoms include: ability to bear weight, but with some pain and redness. Aggravating factors: any weight bearing and walking. Symptoms have been well-controlled. Patient has had prior foot problems. Evaluation to date: Seen by podiatry and has received cortisone shots for bunion. Treatment to date: corticosteroid injection which was effective.    She feels the cortisone shot has worn off and she would like to get another one.  She was being followed with podiatry at this clinic.    The following portions of the patient's history were reviewed and updated as appropriate: allergies, current medications, past family history, past medical history, past social history, past surgical history and problem list.    Allergies   Allergen Reactions     Codeine Other (See Comments)     Faints     Demerol [Meperidine] Other (See Comments)     Reaction not reported by patient.  Patient states she felt awful.       Current Outpatient Prescriptions on File Prior to Visit   Medication Sig Dispense Refill     amitriptyline (ELAVIL) 10 MG tablet Take 10 mg by mouth bedtime.       butalbital-acetaminophen-caffeine (FIORICET, ESGIC) -40 mg per tablet Take 1-2 tablets by mouth every 4 (four) hours as needed for pain. Max of 4000 mg acetaminophen per 24 hours. 30 tablet 0     calcium carbonate-vitamin D3 (CALCIUM 500 + D) 500 mg(1,250mg) -400 unit Tab Take 1 tablet by mouth 2 (two) times a day.        clonazePAM  (KLONOPIN) 0.5 MG tablet Take 0.5 mg by mouth bedtime as needed for anxiety.       levothyroxine (SYNTHROID, LEVOTHROID) 75 MCG tablet Take 1 tablet on Tuesday, Thursday, Saturday and Sunday. 90 tablet 1     levothyroxine (SYNTHROID, LEVOTHROID) 88 MCG tablet Take 1 tablet on Monday, Tuesday and Friday. 90 tablet 1     metoprolol succinate (TOPROL-XL) 200 MG 24 hr tablet Take 1 tablet (200 mg total) by mouth daily. 90 tablet 2     multivitamin (ONE A DAY) per tablet Take 1 tablet by mouth daily.        simvastatin (ZOCOR) 20 MG tablet TAKE 1 TABLET AT BEDTIME. DUE FOR AN OFFICE VISIT. 90 tablet 2     SUMAtriptan (IMITREX) 50 MG tablet TAKE 1 TABLET BY MOUTH EVERY 2 HOURS AS NEEDED FOR MIGRAINE. MAX 4 TABLETS DAILY. 9 tablet 6     triamterene-hydrochlorothiazide (MAXZIDE) 75-50 mg per tablet TAKE ONE-HALF (1/2) TABLET DAILY 45 tablet 1     warfarin (COUMADIN) 1 MG tablet Take 2 tablets  along with 5 mg tablet ( 7  mg ) daily as directed. Adjust dose per INR results. 140 tablet 1     warfarin (COUMADIN) 5 MG tablet        warfarin (COUMADIN) 5 MG tablet Take 1-1.5 tablets (5-7.5 mg total) by mouth daily. Current dose is 7.5 mg on Tues, Fri 7 mg(  5 mg along with 1 mg tablets) all other days. 120 tablet 1     warfarin (COUMADIN) 6 MG tablet 6mg x3 days on Mon/Wed/Fri as directed. Dose adjusted based on INR results 60 tablet 1     No current facility-administered medications on file prior to visit.        Patient Active Problem List   Diagnosis     Osteoporosis     Mitral Valve Disorder     Dissection Of The Aorta     Anxiety     Tension-type Headache     Marfan Syndrome     Hepatitis, C Virus     Hypothyroidism     Hyperlipidemia     Recurrent Major Depression In Partial Remission     Migraine     Hypertension     Aortic Valve Disorder     Myalgia And Myositis     S/P AVR     Meningioma (H)     Concussion syndrome     Vertigo     PTSD (post-traumatic stress disorder)     Thyroid nodule     Acute chest pain     Family  history of ovarian cancer       Past Medical History:   Diagnosis Date     Accidental fall 2015     Anxiety      Aortic dissection (H)      Asthma      Benign Adenomatous Polyp Of The Large Intestine     Created by Conversion      Benign meningioma of brain (H) 2015    initially seen on CT of head that was obtained after a fall. confirmed on an MRI     Bunion      Chronic headache      Colon polyp      Depression      Hallux Limitus Of The Right First Toe     Created by Conversion      Hepatitis C      HTN (hypertension)      Hyperlipidemia      Hypothyroid      Irregular heart rate      Nasal fracture 2015    fell face first on sideache     Osteoporosis      Painful swelling of joint      SOB (shortness of breath)      Stroke (H)     Found on CT scan       Past Surgical History:   Procedure Laterality Date     AORTIC VALVE REPLACEMENT   2000     BREAST BIOPSY Left     benign     COLONOSCOPY       REPAIR THORACIC AORTA          Family History   Problem Relation Age of Onset     Pancreatic cancer Father 70     history of smoking     Ovarian cancer Sister 67     stage III,  at age 70     Skin cancer Sister      Heart disease Mother      Breast cancer Maternal Grandmother       at age 63   rumored that she had breast ca     Diabetes Neg Hx      Alzheimer's disease Neg Hx        Social History     Social History     Marital status:      Spouse name: N/A     Number of children:  0     Years of education: N/A     Occupational History      [employer]     Social History Main Topics     Smoking status: Never Smoker     Smokeless tobacco: Never Used     Alcohol use No     Drug use: No     Sexual activity: Not Currently     Other Topics Concern     None     Social History Narrative         Review of Systems  Constitutional: negative      Objective:      /64 (Patient Site: Left Arm, Patient Position: Sitting, Cuff Size: Adult Regular)  Pulse 61  Temp 97.8  F (36.6  C)  (Oral)   Wt 119 lb (54 kg)  SpO2 98%  BMI 21.59 kg/m2  Right foot:  bunion deformity noted and tenderness of the 1st metatarsal head

## 2021-06-18 NOTE — PATIENT INSTRUCTIONS - HE
Patient Instructions by eRnee Florez DO at 7/29/2020  9:40 AM     Author: Renee Florez DO Service: -- Author Type: Physician    Filed: 7/29/2020 10:17 AM Encounter Date: 7/29/2020 Status: Addendum    : Renee Florez DO (Physician)    Related Notes: Original Note by Renee Florez DO (Physician) filed at 7/29/2020 10:16 AM       Thank you for coming into clinic today. It was nice to meet you.    Check with insurance for cost of Shingrix vaccine. You can get this at the pharmacy, may be cheaper that way. Let me know if you would like to schedule a nurse visit to get this here.    Check with Dr. Richardson's staff for orders and timeline of echo and CTA imaging to follow up on aortic dissection. He was recommending follow up imaging in the fall.     We will be in touch with lab results.     Please reach out with any other questions or concerns.      Patient Education   Understanding [employer] MyPlate  The USDA (US Department of Agriculture) has guidelines to help you make healthy food choices. These are called MyPlate. MyPlate shows the food groups that make up healthy meals using the image of a place setting. Before you eat, think about the healthiest choices for what to put onto your plate or into your cup or bowl. To learn more about building a healthy plate, visit www.choosemyplate.gov.       The Food Groups    Fruits: Any fruit or 100% fruit juice counts as part of the Fruit Group. Fruits may be fresh, canned, frozen, or dried, and may be whole, cut-up, or pureed. Make half your plate fruits and vegetables.    Vegetables: Any vegetable or 100% vegetable juice counts as a member of the Vegetable Group. Vegetables may be fresh, frozen, canned, or dried. They can be served raw or cooked and may be whole, cut-up, or mashed. Make half your plate fruits and vegetables.     Grains: All foods made from grains are part of the Grains Group. These include wheat, rice, oats, cornmeal, and barley such as bread, pasta,  oatmeal, cereal, tortillas, and grits. Grains should be no more than a quarter of your plate. At least half of your grains should be whole grains.    Protein: This group includes meat, poultry, seafood, beans and peas, eggs, processed soy products (like tofu), nuts (including nut butters), and seeds. Make protein choices no more than a quarter of your plate. Meat and poultry choices should be lean or low fat.    Dairy: All fluid milk products and foods made from milk that contain calcium, like yogurt and cheese are part of the Dairy Group. (Foods that have little calcium, such as cream, butter, and cream cheese, are not part of the group.) Most dairy choices should be low-fat or fat-free.    Oils: These are fats that are liquid at room temperature. They include canola, corn, olive, soybean, and sunflower oil. Foods that are mainly oil include mayonnaise, certain salad dressings, and soft margarines. You should have only 5 to 7 teaspoons of oils a day. You probably already get this much from the food you eat.  Use [employer] to Help Build Your Meals  The [employer] can help you plan and track your meals and activity. You can look up individual foods to see or compare their nutritional value. You can get guidelines for what and how much you should eat. You can compare your food choices. And you can assess personal physical activities and see ways you can improve. Go to www.[employer].gov/[employer]log[employer]/.    6119-4870 The [employer]). [address] [place] [address], [place] [address]. All rights reserved. This information is not intended as a substitute for professional medical care. Always follow your healthcare professional's instructions.           Patient Education   Understanding Advance Care Planning  Advance care planning is the process of deciding ones own future medical care. It helps ensure that if you cant speak for yourself, your wishes can still be carried out. The plan is a series of legal  documents that note a persons wishes. The documents vary by state. Advance care planning may be done when a person has a serious illness that is expected to get worse. It may be done before major surgery. And it can help you and your family be prepared in case of a major illness or injury. Advance care planning helps with making decisions at these times.       A health care proxy is a person who acts as the voice of a patient when the patient cant speak for himself or herself. The name of this role varies by state. It may be called a Durable Medical Power of  or Durable Power of  for Healthcare. It may be called an agent, surrogate, or advocate. Or it may be called a representative or decision maker. It is an official duty that is identified by a legal document. The document also varies by state.    Why Is Advance Care Planning Important?  If a person communicates their healthcare wishes:    They will be given medical care that matches their values and goals.    Their family members will not be forced to make decisions in a crisis with no guidance.  Creating a Plan  Making an advance care plan is often done in 3 steps:    Thinking about ones wishes. To create an advance care plan, you should think about what kind of medical treatment you would want if you lose the ability to communicate. Are there any situations in which you would refuse or stop treatment? Are there therapies you would want or not want? And whom do you want to make decisions for you? There are many places to learn more about how to plan for your care. Ask your doctor or  for resources.    Picking a health care proxy. This means choosing a trusted person to speak for you only when you cant speak for yourself. When you cannot make medical decisions, your proxy makes sure the instructions in your advance care plan are followed. A proxy does not make decisions based on his or her own opinions. They must put aside those  opinions and values if needed, and carry out your wishes.    Filling out the legal documents. There are several kinds of legal documents for advance care planning. Each one tells health care providers your wishes. The documents may vary by state. They must be signed and may need to be witnessed or notarized. You can cancel or change them whenever you wish. Depending on your state, the documents may include a Healthcare Proxy form, Living Will, Durable Medical Power of , Advance Directive, or others.  The Familys Role  The best help a family can give is to support their loved ones wishes. Open and honest communication is vital. Family should express any concerns they have about the patients choices while the patient can still make decisions.    6340-1064 The [employer]. [address] [place] [address]. All rights reserved. This information is not intended as a substitute for professional medical care. Always follow your healthcare professional's instructions.         Also, [facility] offers a free, downloadable health care directive that allows you to share your treatment choices and personal preferences if you cannot communicate your wishes. It also allows you to appoint another person (called a health care agent) to make health care decisions if you are unable to do so. You can download an advance directive by going here: [email or web address]     Patient Education   Personalized Prevention Plan  You are due for the preventive services outlined below.  Your care team is available to assist you in scheduling these services.  If you have already completed any of these items, please share that information with your care team to update in your medical record.  Health Maintenance   Topic Date Due   ? DEPRESSION ACTION PLAN  1955   ? HEPATITIS C SCREENING  1955   ? HIV SCREENING  03/25/1970   ? ZOSTER VACCINES (3 of 3) 07/03/2019   ? FALL  RISK ASSESSMENT  03/25/2020   ? MEDICARE ANNUAL WELLNESS VISIT  05/08/2020   ? PNEUMOCOCCAL IMMUNIZATION 65+ LOW/MEDIUM RISK (1 of 2 - PCV13) 03/25/2020   ? INFLUENZA VACCINE RULE BASED (1) 08/01/2020   ? DXA SCAN  03/04/2021   ? MAMMOGRAM  03/09/2022   ? ADVANCE CARE PLANNING  12/04/2022   ? LIPID  05/08/2024   ? COLORECTAL CANCER SCREENING  01/14/2026   ? TD 18+ HE  05/08/2029   ? HEPATITIS B VACCINES  Completed   ? TDAP ADULT ONE TIME DOSE  Completed

## 2021-06-19 NOTE — PROGRESS NOTES
Subjective findings: The patient presented to the clinic today complaining of a painful bunion right foot.  The patient stated that she has had this problem for several years.  She has been receiving cortisone injections in the past which have given her some temporary relief.  The patient today is actually seeking another cortisone injection because she does not want to have surgery at this time.    Objective findings: Nails bilateral feet are normal length and color.  Skin bilateral feet warm supple and intact.  DP and PT pulses are +2/4 bilateral feet.  Capillary refill less than 2 seconds bilateral feet.  Negative clonus, negative Babinski bilateral feet.  Range of motion within normal limits bilateral feet.  Muscle power +5/5 bilaterally in all compartments.  There is a large firm subcutaneous mass on the medial aspect of the head of the first metatarsal right foot.  There is pain on palpation of the medial aspect of the first MPJ right foot.  There is no edema or erythema surrounding the first MPJ right foot.    Assessment: Hallux limitus right foot    Plan: An x-ray of the right foot was taken today.  I informed the patient she would require a bunionectomy with a first metatarsal phalangeal joint implant arthroplasty of the right  foot.  The patient was told this procedure is done on an outpatient basis under local anesthesia with IV sedation.  She was told the procedure takes approximately 30 minutes to perform.  She would then be discharged and able to weight-bear in a postop shoe for 2 weeks.  The patient was told she would have to go n.p.o. at midnight prior to the procedure and she was told she would have to see her primary care physician for preoperative consultation.  I do not recommend any additional cortisone injections at the level of the first MPJ right foot.

## 2021-06-20 NOTE — LETTER
Letter by Renee Le RN at      Author: Renee Le RN Service: -- Author Type: --    Filed:  Encounter Date: 3/16/2020 Status: (Other)         March 16, 2020     Patient: Alyssa Higginbotham   YOB: 1955   Date of Visit: 3/12/2020       To Whom it May Concern:    Alyssa Higginbotham was seen in my clinic on 3/12/2020.     If you have any questions or concerns, please don't hesitate to call.    Sincerely,         Electronically signed by Renee Le RN on behalf of Jonathon Richardson MD

## 2021-06-21 NOTE — LETTER
Letter by Cecille Knight DO at      Author: Cecille Knight DO Service: -- Author Type: --    Filed:  Encounter Date: 11/19/2020 Status: (Other)       Hi Jennifer,      I tried to call your phone number but it went to voicemail.  After discussing with my pharmacist, unfortunately there are not great alternatives for your vaginal dryness.  The other products are estrogen derivatives that turn to estrogen in the body so would pose the same risk to your health as discussed in clinic.  Please try the various lubricants we went over.  If unsuccessful, I could refer you to gynecology to discuss newer technologies (such as Tamiko Britt Laser therapy) that you may benefit from.    Please let me know if you have any questions,  Cecille Knight DO

## 2021-06-22 NOTE — TELEPHONE ENCOUNTER
Lab Results   Component Value Date    INR 2.10 (H) 12/17/2018    INR 2.10 (H) 11/23/2018    INR 2.30 (H) 11/12/2018       Patient's current Warfarin doses: 6 mg on Sunday then 7 mg all other days      Next INR check is on 1/14/19      Patient's last OV with PCP was on 12/20/18    Warfarin prescription 3 month supply and one refill sent to patient's pharmacy today.    Nuvia Valenzuela RN

## 2021-06-22 NOTE — PROGRESS NOTES
Family Medicine Office Visit  [facility]  Patient Name: Alyssa Higginbotham  Patient Age: 63 y.o.  YOB: 1955  MRN: [id number]    Date of Visit: 2018  Reason for Office Visit:   Chief Complaint   Patient presents with     Migraine     getting worse over the past 2 weeks, lasting longer, having more     Numbness     over a week, right pointer finger           Assessment / Plan / Medical Decision Makin. 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 Believes they have become more frequent and worse since 4 years ago when she had a fall and associated neck pain. Feels there is a component of neck pain. Previously underwent PT/OT with palliation. Will refer for another course of PT. Also, with MRI from 2015 indication cervical spondylosis. Will repeat and refer to [facility] for second opinion. Will also make medication changes per below.  - D/c sumitriptan  - Start Maxzide for migraines.   - Start Zanaflex for neck relaxation during headache.   - AVOID ASA FOR HEADACHES as you are currently taking warfarin and concomitant use increases risk for serious bleeding.   - Ambulatory referral to PT/OT  - Ambulatory referral to [facility]    2. Spondylosis of cervical region without myelopathy or radiculopathy  Neck pain likely contributing to HA symptoms. Previous evaluation in . Would like to pursue PT and second opinion.   - Ambulatory referral to PT/OT  - MR Cervical Spine With Without Contrast; Future  - Ambulatory referral to [facility]        Health Maintenance Review  Health Maintenance   Topic Date Due     ZOSTER VACCINES (2 of 3) 2011     DEPRESSION FOLLOW UP  12/15/2018     TD 18+ HE  2019     DXA SCAN  01/15/2019     MAMMOGRAM  2019     COLONOSCOPY  2021     PAP SMEAR  2021     ADVANCE DIRECTIVES DISCUSSED WITH PATIENT  2022  "    INFLUENZA VACCINE RULE BASED  Completed     TDAP ADULT ONE TIME DOSE  Completed         I have discontinued LORE Higginbotham's SUMAtriptan. I am also having her start on rizatriptan and TiZANidine. Additionally, I am having her maintain her clonazePAM, multivitamin, calcium carbonate-vitamin D3, amitriptyline, warfarin, warfarin, levothyroxine, levothyroxine, warfarin, metoprolol succinate, triamterene-hydrochlorothiazide, simvastatin, butalbital-acetaminophen-caffeine, and warfarin.      HPI:  Alyssa Higginbotham is a 63 y.o. year old who presents to the office today for evaluation of headaches. She has a long history of migraines which has gotten worse since a fall w/ neck injury 4 years ago. In the last 2 weeks, she has also noted an increase in symptoms and has had a HA nearly every day. She is currently managing her HA's with Fiorcet and ASA. She states \"it relaxes me and once my neck relaxes, my HA is better.\" She understands she is not supposed to take ASA on coumadin. She says that her headaches begin gin her neck and feel like a band around her head. She has associated sensitivities to noise, lights, and smells. She notes an aura with flashing lights in addition to L sided neck pain prior to these headaches. She has not taken the sumatriptan because \"it scares me.\" She does not like the way it makes her feel. She has previously been evaluated by a spinal surgeon and had MRI in 2015 which revealed cervical spondylosis as well as bulging discs. She was recommended surgery at that time. She also had an injection \"which made things 5000x worse!\" She has undergone PT which she noted helped \"but took a long time to get better.\" She is interested in pursuing this option again as well as a second opinion from a spine doctor.         Review of Systems- pertinent positive in bold:  Constitutional: Fever, chills, night sweats, fainting, weight change, fatigue, seizures, dizziness, sleeping difficulties, loud " snoring/pauses in breathing  Eyes: change in vision, blurred or double vision, redness/eye pain  Ears, nose, mouth, throat: change in hearing, ear pain, hoarseness, difficulty swallowing, sores in the mouth or throat  Respiratory: shortness of breath, cough, bloody sputum, wheezing  Cardiovascular: chest pain, palpitations   Gastrointestinal: abdominal pain, heartburn/indigestion, nausea/vomiting, change in appetite, change in bowel habits, constipation or diarrhea, rectal bleeding/dark stools, difficulty swallowing  Urinary: painful urination, frequent urination, urinary urgency/incontinence, blood in urine/dark urine, nocturia  Musculoskeletal: backache/back pain (new or increasing), neck pain/stiffness weakness, joint pain/stiffness (new or increasing), muscle cramps, swelling of hands, feet, ankles, leg pain/redness  Skin: change in moles/freckles, rash, nodules  Hematologic/lymphatic: swollen lymph glands, abnormal bruising/bleeding  Endocrine: excessive thirst/urination, cold or heat intolerance  Neurologic/emotional: worrisome memory change, numbness/tingling, anxiety, mood swings, migraine headache      Current Scheduled Meds:  Outpatient Encounter Medications as of 12/20/2018   Medication Sig Dispense Refill     amitriptyline (ELAVIL) 10 MG tablet Take 10 mg by mouth bedtime.       butalbital-acetaminophen-caffeine (FIORICET, ESGIC) -40 mg per tablet TAKE 1 TO 2 TABLETS BY MOUTH EVERY 4 HOURS AS NEEDED FOR PAIN. MAX OF 4000 MG ACETAMINOPHEN PER 24 HOURS 30 tablet 0     calcium carbonate-vitamin D3 (CALCIUM 500 + D) 500 mg(1,250mg) -400 unit Tab Take 1 tablet by mouth 2 (two) times a day.        clonazePAM (KLONOPIN) 0.5 MG tablet Take 0.5 mg by mouth bedtime as needed for anxiety.       levothyroxine (SYNTHROID, LEVOTHROID) 75 MCG tablet Take 1 tablet on Tuesday, Thursday, Saturday and Sunday. 90 tablet 1     levothyroxine (SYNTHROID, LEVOTHROID) 88 MCG tablet Take 1 tablet on Monday, Tuesday and  Friday. 90 tablet 1     metoprolol succinate (TOPROL-XL) 200 MG 24 hr tablet Take 1 tablet (200 mg total) by mouth daily. 90 tablet 2     multivitamin (ONE A DAY) per tablet Take 1 tablet by mouth daily.        simvastatin (ZOCOR) 20 MG tablet TAKE 1 TABLET AT BEDTIME. DUE FOR AN OFFICE VISIT. 90 tablet 2     triamterene-hydrochlorothiazide (MAXZIDE) 75-50 mg per tablet TAKE ONE-HALF (1/2) TABLET DAILY 45 tablet 0     warfarin (COUMADIN) 5 MG tablet        warfarin (COUMADIN) 5 MG tablet Take 1-1.5 tablets (5-7.5 mg total) by mouth daily. Current dose is 7.5 mg on Tues, Fri 7 mg(  5 mg along with 1 mg tablets) all other days. 120 tablet 1     warfarin (COUMADIN) 6 MG tablet 6mg x3 days on Mon/Wed/Fri as directed. Dose adjusted based on INR results 60 tablet 1     warfarin (COUMADIN/JANTOVEN) 1 MG tablet TAKE 1- 2 TABLETS ALONG WITH 5MG TABLET ( 6 - 7 MG) DAILY AS DIRECTED ADJUST DOSE PER INR RESULTS. 140 tablet 1     [DISCONTINUED] SUMAtriptan (IMITREX) 50 MG tablet TAKE 1 TABLET BY MOUTH EVERY 2 HOURS AS NEEDED FOR MIGRAINE. MAX 4 TABLETS DAILY. 9 tablet 6     rizatriptan (MAXALT) 5 MG tablet Take 1 tablet (5 mg total) by mouth as needed for migraine. May repeat in 2 hours if needed 10 tablet 0     TiZANidine (ZANAFLEX) 2 MG capsule Take 1 capsule (2 mg total) by mouth 3 (three) times a day. 30 capsule 0     No facility-administered encounter medications on file as of 12/20/2018.      Past Medical History:   Diagnosis Date     Accidental fall 5/27/2015     Anxiety      Aortic dissection (H)      Asthma      Benign Adenomatous Polyp Of The Large Intestine     Created by Conversion      Benign meningioma of brain (H) march 2015    initially seen on CT of head that was obtained after a fall. confirmed on an MRI     Bunion      Chronic headache      Colon polyp 2011     Depression      Hallux Limitus Of The Right First Toe     Created by Conversion      Hepatitis C      HTN (hypertension)      Hyperlipidemia       Hypothyroid      Irregular heart rate      Nasal fracture 2/28/2015    fell face first on sideache     Osteoporosis      Painful swelling of joint      SOB (shortness of breath)      Stroke (H)     Found on CT scan     Past Surgical History:   Procedure Laterality Date     AORTIC VALVE REPLACEMENT   2000     BREAST BIOPSY Left 2005    benign     COLONOSCOPY  2011     REPAIR THORACIC AORTA   2000     Social History     Tobacco Use     Smoking status: Never Smoker     Smokeless tobacco: Never Used   Substance Use Topics     Alcohol use: No     Drug use: No       Objective / Physical Examination:  Vitals:    12/20/18 0830   BP: 128/74   Pulse: 77   SpO2: 99%   Weight: 122 lb 8 oz (55.6 kg)     Wt Readings from Last 3 Encounters:   12/20/18 122 lb 8 oz (55.6 kg)   06/15/18 119 lb (54 kg)   01/02/18 117 lb 6.4 oz (53.3 kg)     BP Readings from Last 3 Encounters:   12/20/18 128/74   06/15/18 116/64   01/02/18 112/62     Body mass index is 22.23 kg/m .     General Appearance: Alert and oriented, cooperative, affect appropriate, speech clear, in no apparent distress  Head: Normocephalic, atraumatic  Ears: Tympanic membrane clear with landmarks well visualized bilaterally  Eyes: PERRL, fundi appear clear bilaterally. No retinal hemorrhage or papilledema. EOMI. Conjunctivae clear and sclerae non-icteric  Throat: Lips and mucosa moist. Teeth in good repair, pharynx without erythema or exudate  Neck: Supple, trachea midline. No cervical adenopathy. Full AROM.   Back: Symmetrical and nontender. No CVA tenderness  Lungs: Clear to auscultation bilaterally. Normal inspiratory and expiratory effort  Cardiovascular: Regular rate, normal S1, S2. No murmurs, rubs, or gallops  Abdomen: Bowel sounds active. Soft, non-tender. No hepatomegaly or splenomegaly. No bruits detected.   Extremities: Pulses 2+ and equal throughout. No edema.   Integumentary: Warm and dry. Without suspicious looking lesions  Neuro: Alert and oriented, follows  commands appropriately. Normal UE sensation and strength. Reflexes 2+ throughout.     Orders Placed This Encounter   Procedures     MR Cervical Spine With Without Contrast     Ambulatory referral to PT/OT     Ambulatory referral to [facility]   Followup: No Follow-up on file. earlier if needed.    Total time spent with patient was 30 min with >50% of time spent in face-to-face counseling regarding the above plan       Son Gee, MS3    I, Dr. Babs crawley , personally performed the services described in this documentation, as written by and performed by Juarez Gee in my presence, and it is both accurate and complete.

## 2021-06-23 NOTE — TELEPHONE ENCOUNTER
Provider Review: Anticoagulation Annual Referral Renewal    ACM Renewal Decision:  Renew ACM warfarin management      INR Range:   Change INR goal range to 2.0-3.0, or per cardiology recommendations   Anticipated Duration of Therapy (from today):  Long-term anticoagulation      Rafaela Woods MD  1:07 PM

## 2021-06-23 NOTE — TELEPHONE ENCOUNTER
ANTICOAGULATION  MANAGEMENT PROGRAM    Alyssa Higginbotham is overdue for INR check.  Reminder call made.    Left message for Alyssa. If returning call, please schedule INR check as soon as possible.    Nuvia Valenzuela RN

## 2021-06-23 NOTE — PATIENT INSTRUCTIONS - HE
Discussed the importance of core strengthening, ROM, stretching exercises with the patient and how each of these entities is important in decreasing pain.  Explained to the patient that the purpose of physical therapy is to teach the patient a home exercise program.  These exercises need to be performed every day in order to decrease pain and prevent future occurrences of pain.        ~Please call Nurse Navigation line [phone] with any questions or concerns about your treatment plan, if symptoms worsen and you would like to be seen urgently, or if you have problems controlling bladder and bowel function.  ~Follow Up Appointment time slots with Shani Jensen CNP with the [facility], are also available at the [facility] location near [facility] on the first and third THURSDAY afternoons of each month.

## 2021-06-23 NOTE — PROGRESS NOTES
Progress Note    Reason for Visit:  Chief Complaint     Thyroid Problem          Progress Note:    HPI:       Thyroid nodule I discussed the pathophysiology of the disease the patient had a biopsy which showed that this is a benign nodule with Hurthle cell metaplasia.  There is no evidence of thyroid cancer.     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     At this stage I did advise the patient that we there is no need for further actions will just do another ultrasound in a year.     She does have hypertension on Maxzide 75-50 she takes half a tablet a day blood pressures were controlled 112/62.     She takes calcium 2 tablets a day hyperlipidemia on Zocor 20 mg amitriptyline 10 mg.     The patient had a bone DEXA scan showed T score core at the spine is -1.4 with improvement of 3.2% and at the left than the right hip is -1.8.     The patient was on bone was on Fosamax before but that was discontinued she is due to have a bone DEXA scan shortly.  This is managed by the primary care physician.     Her TSH tends to run high at 3.9.     I did advise the patient to increase Synthroid to, 88 mcg on Monday Wednesday Friday the rest of the day she will take the 75 mcg this will help suppress the TSH at the bed and slow down the growth of the nodule.      Component      Latest Ref Rng & Units 11/9/2015 12/1/2016 12/4/2017   Sodium      136 - 145 mmol/L 139 142 142   Potassium      3.5 - 5.0 mmol/L 3.9 4.4 3.6   Chloride      98 - 107 mmol/L 104 104 104   CO2      22 - 31 mmol/L 25 28 29   Anion Gap, Calculation      5 - 18 mmol/L 10 10 9   Glucose      70 - 125 mg/dL 90 90 84   BUN      8 - 22 mg/dL 23 (H) 19 25 (H)   Creatinine      0.60 - 1.10 mg/dL 0.92 0.83 0.97   GFR MDRD Af Amer       >60 mL/min/1.73m2 >60 >60 >60   GFR MDRD Non Af Amer      >60 mL/min/1.73m2 >60 >60 58 (L)   Bilirubin, Total      0.0 - 1.0 mg/dL 0.4 0.3 0.6   Calcium      8.5 - 10.5 mg/dL 10.6 (H) 10.3 9.8   Protein, Total      6.0 - 8.0 g/dL 7.4 6.6 7.2   ALBUMIN      3.5 - 5.0 g/dL 4.4 4.1 4.1   Alkaline Phosphatase      45 - 120 U/L 69 53 69   AST      0 - 40 U/L 26 31 23   ALT      0 - 45 U/L 20 34 16   Cholesterol      <=199 mg/dL 170 168 161   Triglycerides      <=149 mg/dL 164 (H) 204 (H) 93   HDL Cholesterol      >=50 mg/dL 44 (L) 43 (L) 42 (L)   LDL Calculated      <=129 mg/dL 93 84 100   Patient Fasting > 8hrs?       Yes Yes Yes   TSH      0.30 - 5.00 uIU/mL 2.79 3.55 3.93           Patient Profile:  62 y.o. female, postmenopausal, is here for the follow up bone density test.   History of fractures - Yes; Foot. Family history of osteoporosis - None.  Family history of hip fracture: None. Smoking history - No. Osteoporosis treatment past -  Yes;  Bisphosphonates (Off for 1 year). Osteoporosis treatment current - No.  Chronic medical problems - Liver disease and Thyroid disease. High risk medications -  Blood thinner (Coumadin or Heparin);  Yes, Currently and Thyroid;  Yes, Currently.        Assessment:     1. The spine bone density L1-L4 with T-score -1.6.  2. Femoral bone densities show left femoral neck T- score -1.8 and right femoral neck T-score -1.7.  3. Trabecular bone score indicates good trabecular bone architecture.        62 y.o. female with LOW BONE DENSITY (OSTEOPENIA) and MODERATE fracture risk, adjusted for the TBS, with major osteoporotic fracture risk 12.9 % and hip fracture risk 1.6 %.       Review of Systems:    Nervous System: No headache, dizziness, fainting or memory loss. No tingling sensation of hand or feet.  Ears: No hearing loss or ringing in the ears  Eyes: No blurring of vision, redness, itching or dryness.  Nose: No nosebleed or loss of smell  Mouth: No mouth sores or loss of taste  Throat:  No hoarseness or difficulty swallowing  Neck: No enlarged thyroid or lymph nodes.  Heart: No chest pain, palpitation or irregular heartbeat. No swelling of hands or feet  Lungs: No shortness of breath, cough, night sweats, wheezing or hemoptysis.  Gastrointestinal: No nausea or vomiting, constipation or diarrhea.  No acid reflux, abdominal pain or blood in stools.  Kidney/Bladdr: No polyuria, polydipsia, nocturia or hematuria.  Genital/Sexual: No loss of libido  Skin: No rash, hair loss or hirsutism.  No abnormal striae  Muscles/Joints/Bones: No morning stiffness, muscle aches and pain or loss of height.    Current Medications:  Current Outpatient Medications   Medication Sig     amitriptyline (ELAVIL) 10 MG tablet Take 10 mg by mouth bedtime.     butalbital-acetaminophen-caffeine (FIORICET, ESGIC) -40 mg per tablet TAKE 1 TO 2 TABLETS BY MOUTH EVERY 4 HOURS AS NEEDED FOR PAIN. MAX OF 4000 MG ACETAMINOPHEN PER 24 HOURS     calcium carbonate-vitamin D3 (CALCIUM 500 + D) 500 mg(1,250mg) -400 unit Tab Take 1 tablet by mouth 2 (two) times a day.      clonazePAM (KLONOPIN) 0.5 MG tablet Take 0.5 mg by mouth bedtime as needed for anxiety.     levothyroxine (SYNTHROID, LEVOTHROID) 75 MCG tablet Take 1 tablet on Tuesday, Thursday, Saturday and Sunday. (Patient taking differently: Take 1 tablet on wednesday, Thursday, Saturday and Sunday.      )     levothyroxine (SYNTHROID, LEVOTHROID) 88 MCG tablet Take 1 tablet on Monday, Tuesday and Friday.     metoprolol succinate (TOPROL-XL) 200 MG 24 hr tablet Take 1 tablet (200 mg total) by mouth daily.     multivitamin (ONE A DAY) per tablet Take 1 tablet by mouth daily.      rizatriptan (MAXALT) 5 MG tablet Take 1 tablet (5 mg total) by mouth as needed for migraine. May repeat in 2 hours if needed     simvastatin (ZOCOR) 20 MG tablet TAKE 1 TABLET AT BEDTIME. DUE FOR AN OFFICE VISIT.     TiZANidine (ZANAFLEX) 2 MG capsule Take 1 capsule (2 mg total) by mouth 3 (three) times a  day.     triamterene-hydrochlorothiazide (MAXZIDE) 75-50 mg per tablet TAKE ONE-HALF (1/2) TABLET DAILY     warfarin (COUMADIN/JANTOVEN) 1 MG tablet TAKE 1- 2 TABLETS ALONG WITH 5MG TABLET ( 6 - 7 MG) DAILY AS DIRECTED ADJUST DOSE PER INR RESULTS.     warfarin (COUMADIN/JANTOVEN) 5 MG tablet Take 1 tablet (5 mg total) by mouth See Admin Instructions. Take along with 1 mg tablet ( 6-7 mg daily) as directed. Adjust per INR result       Patients Active Problems:  Patient Active Problem List   Diagnosis     Osteoporosis     Mitral Valve Disorder     Dissection Of The Aorta     Anxiety     Tension-type Headache     Marfan Syndrome     Hepatitis, C Virus     Hypothyroidism     Hyperlipidemia     Recurrent Major Depression In Partial Remission     Migraine     Hypertension     Aortic Valve Disorder     Myalgia And Myositis     S/P AVR     Meningioma (H)     Concussion syndrome     Vertigo     PTSD (post-traumatic stress disorder)     Thyroid nodule     Acute chest pain     Family history of ovarian cancer       History:   reports that  has never smoked. she has never used smokeless tobacco. She reports that she does not drink alcohol or use drugs.   reports that  has never smoked. she has never used smokeless tobacco. She reports that she does not drink alcohol or use drugs.  Social History     Tobacco Use   Smoking Status Never Smoker   Smokeless Tobacco Never Used      reports that  has never smoked. she has never used smokeless tobacco. She reports that she does not drink alcohol or use drugs.  Social History     Substance and Sexual Activity   Sexual Activity Not Currently     Past Medical History:   Diagnosis Date     Accidental fall 5/27/2015     Anxiety      Aortic dissection (H)      Asthma      Benign Adenomatous Polyp Of The Large Intestine     Created by Conversion      Benign meningioma of brain (H) march 2015    initially seen on CT of head that was obtained after a fall. confirmed on an MRI     Bunion       "Chronic headache      Colon polyp      Depression      Hallux Limitus Of The Right First Toe     Created by Conversion      Hepatitis C      HTN (hypertension)      Hyperlipidemia      Hypothyroid      Irregular heart rate      Nasal fracture 2015    fell face first on sideache     Osteoporosis      Painful swelling of joint      SOB (shortness of breath)      Stroke (H)     Found on CT scan     Family History   Problem Relation Age of Onset     Pancreatic cancer Father 70        history of smoking     Ovarian cancer Sister 67        stage III,  at age 70     Skin cancer Sister      Heart disease Mother      Breast cancer Maternal Grandmother          at age 63   rumored that she had breast ca     Diabetes Neg Hx      Alzheimer's disease Neg Hx      Past Medical History:   Diagnosis Date     Accidental fall 2015     Anxiety      Aortic dissection (H)      Asthma      Benign Adenomatous Polyp Of The Large Intestine     Created by Conversion      Benign meningioma of brain (H) 2015    initially seen on CT of head that was obtained after a fall. confirmed on an MRI     Bunion      Chronic headache      Colon polyp      Depression      Hallux Limitus Of The Right First Toe     Created by Conversion      Hepatitis C      HTN (hypertension)      Hyperlipidemia      Hypothyroid      Irregular heart rate      Nasal fracture 2015    fell face first on sideache     Osteoporosis      Painful swelling of joint      SOB (shortness of breath)      Stroke (H)     Found on CT scan     Past Surgical History:   Procedure Laterality Date     AORTIC VALVE REPLACEMENT        BREAST BIOPSY Left 2005    benign     COLONOSCOPY       REPAIR THORACIC AORTA          Vitals   height is 5' 2.25\" (1.581 m) and weight is 125 lb 12.8 oz (57.1 kg). Her blood pressure is 120/70.         Exam  General appearance: The patient looked well, not in acute distress.  Eyes: no evidence of thyroid eye disease. "   Retinal exam: No evidence of diabetic retinopathy.  Mouth and Throat: Normal  Neck: No evidence of thyromegaly, enlarged lymph node or tenderness  Chest: Trachea is central. Chest is clear to auscultation and percussion. Breat sounds are normal.  Cardiovascular exam: JVP is not raised. Heart sounds are normal, no murmurs or rub  Peripheral pulses are palpable.   Abdomen: No masses or tenderness.    Back: No vertebral tenderness or kyphosis.  Extremities: No evidence of leg edema.   Skin: Normal to touch.  No abnormal striae  Neurologic exam:  Visual fields are intact by confrontation, grossly intact. No evidence of peripheral neuropathy.  Detailed foot exam normal.        Diagnosis:  No diagnosis found.    Orders:   No orders of the defined types were placed in this encounter.        Assessment and Plan: This patient is here for follow-up because of hypothyroidism and thyroid nodule.    Hypothyroidism patient is currently on Synthroid 88 mcg on Monday Wednesday Friday rest of the days she takes 75 mcg I will renew her prescription will check her thyroid function the last TSH was over a year ago and it was 3.93.    She had aortic aneurysm with aortic valve replacement she has hypertension on metoprolol 200 mg oxide 75/45 and takes warfarin she takes amitriptyline calcium 2 tablets a day.    She has hyperlipidemia on Zocor 20 mg.    She did a bone DEXA scan which showed T score at the spine is -1.6 she lost 2.5% at the left hip -1.8 the right hip -1.7 there was 1/5 team correction there is a high risk of major osteoporotic fracture is 12.9% and hip fracture 1.6%    She did an ultrasound which showed that the thyroid as she has a thyroid nodule that is benign I would repeat the ultrasound to make sure it is stable there is no she has no trouble swallowing.  Patient will return to clinic in 1 year.    I have reviewed and ordered clinical lab test    I have reviewed and ordered her medication as required.    I have  reviewed her test results and advised with the performing physician.    I have reviewed the patient's old records.    I have reviewed and summarized the patient old records.

## 2021-06-23 NOTE — TELEPHONE ENCOUNTER
ACN called [facility] and spoke with Lucille to follow regarding warfarin 5 mg prescription refill sent on 1/9/19. She confirmed receipt of the request and that it should be mailed out today 1/14/19.    ACN called and updated patient of above information. She stated that she has enough warfarin tablets for her doses at this time.    Discussed regarding changes made that could have affected today's INR. Patient reported that she took 7 mg dose yesterday by mistake versus scheduled 6 mg dose and that she ate less greens/vit k per routine.    Updated that ACN will call her back after INR is discussed with [facility] Pharmacist.        Nuvia Valenzuela RN

## 2021-06-23 NOTE — PATIENT INSTRUCTIONS - HE
It is my pleasure seeing you in the clinic today.    Please do lab test today and thyroid ultrasound.    Renew prescription for Synthroid.    Follow-up in 1 year check creatinine potassium calcium 25-hydroxy vitamin D TSH free T4 before next visit.              Thank you for allowing me to participate in your care.        We work very hard to achieve the best quality of care.  We would be very grateful if you complete any survey you receive regarding this visit, so that we continue to improve our care.

## 2021-06-23 NOTE — TELEPHONE ENCOUNTER
"Anticoagulation Annual Referral Renewal Review    Alyssa Higginbotham's chart reviewed for annual renewal of referral to anticoagulation monitoring.        Criteria for anticoagulation nurse and/or pharmacist renewal met   Warfarin indication: Mechanical AVR Yes, per indication   Current with INR monitoring/compliant Yes Yes   Date of last office visit 12/20/18 Yes, had office visit within last year   Time in Therapeutic Range (TTR) 45.30 % No, TTR < 60 %       Alyssa Higginbotham did NOT meet all criteria for anticoagulation management program initiated renewal and requires provider review. Using dot phrase, \".acmrenewalprovider\", please advise if Alyssa's anticoagulation management referral should be renewed or if patient should be seen in office to review anticoagulation therapy      Nuvia Valenzuela RN  11:34 AM      "

## 2021-06-23 NOTE — TELEPHONE ENCOUNTER
ANTICOAGULATION  MANAGEMENT    Assessment     Today's INR result of 3.1 is Supratherapeutic (goal INR of 2.0-2.5)        More warfarin taken than instructed which may be affecting INR    Decreased greens/vitamin K intake may be affecting INR    No new medication/supplements affecting INR    Continues to tolerate warfarin with no reported s/s of bleeding or thromboembolism     Previous INR was Therapeutic    Plan:     Left a detailed message for Alyssa regarding INR result and instructed:     Warfarin Dosing Instructions:  take one time lower dose of 3 mg today then continue current warfarin dose    6 mg on Sun; 7 mg all other days     (0 % change)    Instructed patient to follow up no later than: 1-2 weeks.    Education provided: importance of consistent vitamin K intake, importance of therapeutic range, target INR goal and significance of current INR result and importance of taking warfarin as instructed      Instructed to call the [facility] for any changes, questions or concerns. (#[phone])   ?   Nuvia Valenzuela RN    Subjective/Objective:      Alyssa Higginbotham, a 63 y.o. female is on warfarin.     Alyssa reports:     Home warfarin dose: as updated on anticoagulation calendar per template - took 7 mg dose yesterday verus scheduled 6 mg dose.     Missed doses: No     Medication changes:  No     S/S of bleeding or thromboembolism:  No     New Injury or illness:  No     Changes in diet or alcohol consumption:  Yes: decreased greens/vit k intake     Upcoming surgery, procedure or cardioversion:  No    Anticoagulation Episode Summary     Current INR goal:   2.0-2.5   TTR:   34.6 % (4.1 y)   Next INR check:   1/28/2019   INR from last check:   3.10! (1/14/2019)   Weekly max warfarin dose:      Target end date:      INR check location:      Preferred lab:      Send INR reminders to:   ANTICOAGULATION POOL C (DTN,VAD,CGR,GAV)    Indications    S/P AVR [Z95.2]           Comments:            Anticoagulation Care  Providers     Provider Role Specialty Phone number    Rafaela Woods MD Referring Family Medicine [phone]

## 2021-06-23 NOTE — TELEPHONE ENCOUNTER
RN cannot approve Refill Request    RN can NOT refill this medication overdue for office visits and/or labs.    Ian De La Cruz, [facility] Triage/Med Refill 2/7/2019    Requested Prescriptions   Pending Prescriptions Disp Refills     triamterene-hydrochlorothiazide (MAXZIDE) 75-50 mg per tablet [Pharmacy Med Name: TRIAMTERENE HCTZ TABS 75/50MG] 45 tablet 0     Sig: TAKE ONE-HALF (1/2) TABLET DAILY    Diuretics/Combination Diuretics Refill Protocol  Failed - 2/5/2019 12:56 PM       Failed - Serum Potassium in past 12 months     No results found for: LN-POTASSIUM         Failed - Serum Sodium in past 12 months     No results found for: LN-SODIUM         Failed - Serum Creatinine in past 12 months     Creatinine   Date Value Ref Range Status   12/04/2017 0.97 0.60 - 1.10 mg/dL Final            Passed - Visit with PCP or prescribing provider visit in past 12 months    Last office visit with prescriber/PCP: 10/17/2016 Rafaela Woods MD OR same dept: 6/15/2018 Jose Manuel Hensley MD OR same specialty: 12/20/2018 Babs Jin MD  Last physical: 12/1/2016 Last MTM visit: Visit date not found   Next visit within 3 mo: Visit date not found  Next physical within 3 mo: Visit date not found  Prescriber OR PCP: Rafaela Woods MD  Last diagnosis associated with med order: 1. HTN (hypertension)  - triamterene-hydrochlorothiazide (MAXZIDE) 75-50 mg per tablet [Pharmacy Med Name: TRIAMTERENE HCTZ TABS 75/50MG]; TAKE ONE-HALF (1/2) TABLET DAILY  Dispense: 45 tablet; Refill: 0    If protocol passes may refill for 12 months if within 3 months of last provider visit (or a total of 15 months).            Passed - Blood pressure on file in past 12 months    BP Readings from Last 1 Encounters:   01/22/19 120/70

## 2021-06-23 NOTE — TELEPHONE ENCOUNTER
ANTICOAGULATION  MANAGEMENT    Assessment     Today's INR result of 2.4 is Therapeutic (goal INR of 2.0-2.5)        Warfarin taken as previously instructed    No new diet changes affecting INR    No new medication/supplements affecting INR    Continues to tolerate warfarin with no reported s/s of bleeding or thromboembolism     Previous INR was Supratherapeutic    Plan:     Spoke with Alyssa regarding INR result and instructed:     Warfarin Dosing Instructions:  Continue current warfarin dose    6 mg on Sun; 7 mg all other days     (0 % change)    Instructed patient to follow up no later than: 2-3 weeks, appointment made.    Education provided: importance of consistent vitamin K intake, importance of therapeutic range and target INR goal and significance of current INR result    Alyssa verbalizes understanding and agrees to warfarin dosing plan.    Instructed to call the [facility] for any changes, questions or concerns. (#[phone])   ?   Nuvia Valenzuela RN    Subjective/Objective:      Alyssa Higginbotham, a 63 y.o. female is on warfarin.     Alyssa reports:     Home warfarin dose: as updated on anticoagulation calendar per template     Missed doses: No     Medication changes:  No     S/S of bleeding or thromboembolism:  No     New Injury or illness:  No     Changes in diet or alcohol consumption:  No     Upcoming surgery, procedure or cardioversion:  No    Anticoagulation Episode Summary     Current INR goal:   2.0-2.5   TTR:   34.2 % (4.2 y)   Next INR check:   3/4/2019   INR from last check:   2.40 (2/11/2019)   Weekly max warfarin dose:      Target end date:      INR check location:      Preferred lab:      Send INR reminders to:   ANTICOAGULATION POOL C (DTN,VAD,CGR,GAV)    Indications    S/P AVR [Z95.2]           Comments:            Anticoagulation Care Providers     Provider Role Specialty Phone number    Rafaela Woods MD Referring Family Medicine [phone]

## 2021-06-23 NOTE — PROGRESS NOTES
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 Neck pain on left side     2. Decreased ROM of neck     3. Abnormal posture         Goals: NOT MET  Pt. will demonstrate/verbalize independence in self-management of condition in : 12 weeks  Pt. will be independent with home exercise program in : 12 weeks  Pt. will report decreased intensity, frequency of : Pain;Headache;in 12 weeks  Pt. will have improved quality of sleep: with less pain;in 12 weeks  Patient Turn Head: for driving;with full ROM;wiith no pain;in 12 weeks        Patient was seen for 1 visit on 12/26/2018.  The patient attended therapy initially, but did not finish the therapy sessions prescribed.  Goals were not fully achieved. Explanation for goals not achieved: Pt did not return after evaluation    Therapy will be discontinued at this time.  The patient will need a new referral to resume.    Thank you for your referral.  Nadege Saab  2/4/2019  2:47 PM

## 2021-06-23 NOTE — PROGRESS NOTES
ASSESSMENT: Alyssa Higginbotham is a 63 y.o. female presents for consultation at the request of [facility] PCP Rafaela Woods MD, with past medical history significant for hyperlipidemia, hypertension, hypothyroidism, osteoporosis, tension type headache, Marfan syndrome, major recurrent depression, migraine, concussions syndrome, PTSD, thyroid nodule, aortic valvular disorder/status post AVR, meningioma, who presents today for new patient evaluation of :     -Intermittent left-sided upper cervical spine neck pain localizing to the C3-4 and C4-5 level with radiating headaches that has improved with physical therapy.  No radicular symptoms noted.    -Thoracic spine scoliosis likely playing a role in myofascial pain as well.    Patient is neurologically intact on exam. No myelopathic or red flag symptoms.      NDI Score: 32    WHO 5: 21     PHQ-2: 0      Diagnoses and all orders for this visit:    Neck pain on left side    Arthropathy of cervical facet joint    DDD (degenerative disc disease), cervical    Multilevel foraminal stenosis       PLAN:  Reviewed spine anatomy and disease process. Discussed diagnosis and treatment options with the patient today. A shared decision making model was used. The patient's values and choices were respected. The following represents what was discussed and decided upon by the provider and the patient.     -DIAGNOSTIC TESTS:  Images were personally reviewed and interpreted and explained to patient today using spine model.   --Cervical spine MRI [facility] 1/10/2019 shows mild central canal stenosis C3-4 through C6-7 with slight increase on extension but no high-grade compression.  Multilevel foraminal stenosis that is moderate on the left at C3-4 and C4-5.  Multilevel cervical facet arthropathy and severe disc degeneration C4-5, C5-6, C6-7, moderate to severe at C3-4.    -PHYSICAL THERAPY: Discuss revisiting physical therapy which I highly would recommend if her pain worsens at any time  specifically for myofascial release as well as cervical core strengthening.  Currently she is doing well with her previous exercises.  Discussed the importance of core strengthening, ROM, stretching exercises with the patient and how each of these entities is important in decreasing pain.  Explained to the patient that the purpose of physical therapy is to teach the patient a home exercise program.  These exercises need to be performed every day in order to decrease pain and prevent future occurrences of pain.  Likened it to brushing one's teeth.      -MEDICATIONS: Advised patient continue tizanidine as needed for myofascial pain, no new medication prescribed today.  Discussed multiple medication options today with patient. Discussed risks, side effects, and proper use of medications. Patient verbalized understanding.    -INTERVENTIONS: Did discuss trialing a left C3-4 and C4-5 facet joint steroid injection if her flareup of left-sided neck pain becomes more significant as she does have facet arthropathy at this level.  Currently she would not be a good candidate for medial branch block as her pain comes and goes, if it becomes more constant on the road we could consider medial branch block.  **Coumadin/warfarin anticoagulation therapy  Discussed risks and benefits of injections with patient today.    -PATIENT EDUCATION: 45 minutes of total visit time was spent face to face with the patient today, greater than 50% of total time spent with patient was spent on counseling, education, and coordinating care.   -10 minutes spent outside of visit time, non-face-to-face time, reviewing chart.    -FOLLOW-UP:   Follow-up as needed at this time    Advised patient to call the [facility] if symptoms worsen or you have problems controlling bladder and bowel function.   ______________________________________________________________________    SUBJECTIVE:   Alyssa NAYELI Higginbotham  is a 63 y.o. female who presents today for new  patient evaluation of neck pain that is chronic in nature however in the last year or so she has had more intermittent neck pain in the left upper cervical spine that radiates to the temporal region and the top of her head however typically the pain is very intermittent again and she has flareups that last anywhere from a couple of days to a week however usually then the pain resolves and she can go months without having a flareup again.  She is uncertain of what brings on these flareups in her pain but does wonder if using the elliptical on a regular basis does aggravate but she is not completely sure.    Patient denies any current neck pain is her pain today is a 0/10 and denies any arm pain in general ever.  Patient denies any upper extremity weakness numbness or tingling, denies any difficulties with fine motor skills such as buttoning buttons or turning keys.  Patient denies any bowel or bladder dysfunction or urinary retention.  She denies any balance changes or recent trips or falls.    Patient was referred to Dr. Andrews previously 2015 who recommended cervical fusion but she did not move forward with it.  She is still not excited about considering any type of cervical spine surgery however.    -Treatment to Date: No prior spinal surgery.  Physical therapy eval 12/26/2018 left neck pain [facility].    C7-T1 IL MARGOTH 6/22/15 with NO benefit.  Did slightly aggravate her pain per patient.    -Medications:  Tizanidine 2 mg  Amitriptyline 10 mg    Current Outpatient Medications on File Prior to Encounter   Medication Sig Dispense Refill     amitriptyline (ELAVIL) 10 MG tablet Take 10 mg by mouth bedtime.       butalbital-acetaminophen-caffeine (FIORICET, ESGIC) -40 mg per tablet TAKE 1 TO 2 TABLETS BY MOUTH EVERY 4 HOURS AS NEEDED FOR PAIN. MAX OF 4000 MG ACETAMINOPHEN PER 24 HOURS 30 tablet 0     calcium carbonate-vitamin D3 (CALCIUM 500 + D) 500 mg(1,250mg) -400 unit Tab Take 1 tablet by mouth 2  (two) times a day.        clonazePAM (KLONOPIN) 0.5 MG tablet Take 0.5 mg by mouth bedtime as needed for anxiety.       levothyroxine (SYNTHROID, LEVOTHROID) 75 MCG tablet Take 1 tablet on Tuesday, Thursday, Saturday and Sunday. 90 tablet 1     levothyroxine (SYNTHROID, LEVOTHROID) 88 MCG tablet Take 1 tablet on Monday, Tuesday and Friday. 90 tablet 1     metoprolol succinate (TOPROL-XL) 200 MG 24 hr tablet Take 1 tablet (200 mg total) by mouth daily. 90 tablet 2     multivitamin (ONE A DAY) per tablet Take 1 tablet by mouth daily.        rizatriptan (MAXALT) 5 MG tablet Take 1 tablet (5 mg total) by mouth as needed for migraine. May repeat in 2 hours if needed 10 tablet 0     simvastatin (ZOCOR) 20 MG tablet TAKE 1 TABLET AT BEDTIME. DUE FOR AN OFFICE VISIT. 90 tablet 2     TiZANidine (ZANAFLEX) 2 MG capsule Take 1 capsule (2 mg total) by mouth 3 (three) times a day. 30 capsule 0     triamterene-hydrochlorothiazide (MAXZIDE) 75-50 mg per tablet TAKE ONE-HALF (1/2) TABLET DAILY 45 tablet 0     warfarin (COUMADIN/JANTOVEN) 1 MG tablet TAKE 1- 2 TABLETS ALONG WITH 5MG TABLET ( 6 - 7 MG) DAILY AS DIRECTED ADJUST DOSE PER INR RESULTS. 140 tablet 1     warfarin (COUMADIN/JANTOVEN) 5 MG tablet Take 1 tablet (5 mg total) by mouth See Admin Instructions. Take along with 1 mg tablet ( 6-7 mg daily) as directed. Adjust per INR result 110 tablet 1     No current facility-administered medications on file prior to encounter.        Allergies   Allergen Reactions     Codeine Other (See Comments)     Faints     Demerol [Meperidine] Other (See Comments)     Reaction not reported by patient.  Patient states she felt awful.       Past Medical History:   Diagnosis Date     Accidental fall 5/27/2015     Anxiety      Aortic dissection (H)      Asthma      Benign Adenomatous Polyp Of The Large Intestine     Created by Conversion      Benign meningioma of brain (H) march 2015    initially seen on CT of head that was obtained after a fall.  confirmed on an MRI     Bunion      Chronic headache      Colon polyp      Depression      Hallux Limitus Of The Right First Toe     Created by Conversion      Hepatitis C      HTN (hypertension)      Hyperlipidemia      Hypothyroid      Irregular heart rate      Nasal fracture 2015    fell face first on sideache     Osteoporosis      Painful swelling of joint      SOB (shortness of breath)      Stroke (H)     Found on CT scan        Patient Active Problem List   Diagnosis     Osteoporosis     Mitral Valve Disorder     Dissection Of The Aorta     Anxiety     Tension-type Headache     Marfan Syndrome     Hepatitis, C Virus     Hypothyroidism     Hyperlipidemia     Recurrent Major Depression In Partial Remission     Migraine     Hypertension     Aortic Valve Disorder     Myalgia And Myositis     S/P AVR     Meningioma (H)     Concussion syndrome     Vertigo     PTSD (post-traumatic stress disorder)     Thyroid nodule     Acute chest pain     Family history of ovarian cancer       Past Surgical History:   Procedure Laterality Date     AORTIC VALVE REPLACEMENT   2000     BREAST BIOPSY Left 2005    benign     COLONOSCOPY       REPAIR THORACIC AORTA          Family History   Problem Relation Age of Onset     Pancreatic cancer Father 70        history of smoking     Ovarian cancer Sister 67        stage III,  at age 70     Skin cancer Sister      Heart disease Mother      Breast cancer Maternal Grandmother          at age 63   rumored that she had breast ca     Diabetes Neg Hx      Alzheimer's disease Neg Hx        Reviewed past medical, surgical, and family history with patient found on new patient intake packet located in EMR Media tab.     SOCIAL HX: Patient is here with her .  Patient is retired.  Patient denies smoking/tobacco use, denies alcohol use, denies history being a heavy drinker, denies recreational drug use.    ROS: Positive for back pain however this manageable, headache,  heat/cold intolerance, easy bruising due to Coumadin, anxiety, irregular heart rate.  Specifically negative for bowel/bladder dysfunction, balance changes, headache, dizziness, foot drop, fevers, chills, appetite changes, nausea/vomiting, unexplained weight loss. Otherwise 13 systems reviewed are negative. Please see the patient's intake questionnaire from today for details.    OBJECTIVE:  /59 (Patient Site: Right Arm, Patient Position: Sitting)   Pulse 67   Wt 122 lb (55.3 kg)   SpO2 98%   BMI 22.14 kg/m      PHYSICAL EXAMINATION:  --CONSTITUTIONAL: Vital signs as above. No acute distress. The patient is well nourished and well groomed.  --PSYCHIATRIC: The patient is awake, alert, oriented to person, place, time and answering questions appropriately with clear speech. Appropriate mood and affect   --HEENT: Sclera are non-injected. Extraocular muscles are intact. Thyroid moves easily upon swallowing.  Moist oral mucosa.  --SKIN: Skin over the face, bilateral upper extremities, and posterior torso is clean, dry, intact without rashes.  --RESPIRATORY: Normal rhythm and effort. No abnormal accessory muscle breathing patterns noted.   --GROSS MOTOR: Easily arises from a seated position. Toe walking and heel walking are normal.    --CERVICAL SPINE: Inspection reveals no evidence of deformity. Range of motion is not limited in cervical flexion, extension, lateral rotation. No tenderness to palpation cervical spine.  Spurling maneuver negative bilaterally.  --SHOULDERS: Full range of motion bilaterally. Negative empty can.  --UPPER EXTREMITY MOTOR TESTING:  Wrist flexion left 5/5, right 5/5  Wrist extension left 5/5, right 5/5  Pronators left 5/5, right 5/5  Biceps left 5/5, right 5/5   Triceps left 5/5, right 5/5   Shoulder abduction left 5/5, right 5/5   left 5/5, right 5/5  --NEUROLOGIC: CN III-XII are grossly intact. 2/4 symmetric biceps, brachioradialis, triceps reflexes bilaterally. Sensation to upper  extremities is intact.  Negative Hernandez's bilaterally.    --VASCULAR: 2/4 radial pulses bilaterally. Warm upper limbs bilaterally. Capillary refill in the upper extremities is less than 1 second.    RESULTS: Prior medical records from [facility] 2015 to current and care everywhere were reviewed today.     Imaging:  Cervical spine Imaging was personally reviewed and interpreted today. The images were shown to the patient and the findings were explained using a spine model.      MR CERVICAL SPINE WITHOUT CONTRAST WITH EXTENSION  CDI-1/10/2019  CLINICAL INFORMATION: 63-year-old female with neck pain and headaches. History of fall and injury 4 years ago.  TECHNICAL INFORMATION: Sagittal T1, T2, axial MPGR and axial long TR/long TE noncontrast images. Sagittal T2 extension imaging performed on open upright scanner. Contrast:None. Sedation:None.  COMPARISON: Cervical MRI in 2015.  INTERPRETATION: Flattening of cervical lordotic curve with left convex cervical thoracic scoliosis. Multilevel spondylosis/disc degeneration with advanced disc narrowing C3-4 through C6-7. No osseous destructive lesion or vertebral fracture. No intrinsic cord lesion or mass. Normal craniocervical junction. T2 hyperintense cystic mass measuring up to 18 mm in the right lobe of the thyroid gland.  At C7-T1, no disc herniation with patent central canal. Mild left foraminal narrowing moderate facet arthropathy.  At C6-C7, severe disc degeneration with left paracentral bulge/shallow protrusion with cord abutment but no significant compression. Uncinate spurring and facet arthropathy with mild-to-moderate left foraminal stenosis.  At C5-C6, severe disc degeneration with dorsal bulge and osteophyte with mild the central sulcus canal stenosis with cord abutment, increasing on extension. Uncinate spurring/facet arthropathy with moderate left foraminal stenosis.  At C4-C5, severe disc degeneration with bulge and osteophyte abutting the cord, increasing  on extension. Moderate left foraminal stenosis. Patent right nerve root canal. Moderate left facet hypertrophic changes.  At C3-C4, moderate severe spondylosis/disc degeneration with bulge and osteophyte abutting the cord without compression. Uncinate spurring/facet arthropathy worse on the left with moderate left foraminal stenosis.  At C2-C3, moderate disc degeneration with no herniation, stenosis or neural impingement. Hypertrophic left greater than right facet arthropathy.  At C1-2, normal facet joints and ligamentous structures. No compression at the cervicomedullary junction.  Sections through the upper thoracic spine demonstrate moderate spondylosis with disc narrowing and ventral osteophytes at T2-3. Also, hypertrophic facet arthropathy bilaterally at T1-2.  CONCLUSION:  1. Mild central stenosis with spondylitic ridging abutting the cord C3-4 through C6-7, increasing on extension but no high-grade compression at any level.  2. Multilevel foraminal stenosis as reported above, moderate on the left at C3-4 and C4-5.  3. Multilevel facet arthropathy with no definite active inflammation seen on current study.  4. No significant interval changes compared to prior study, including moderately large cystic mass in the right lobe of the thyroid gland.

## 2021-06-23 NOTE — TELEPHONE ENCOUNTER
RN cannot approve Refill Request    RN can NOT refill this medication med is not covered by policy/route to provider. Last office visit: 10/17/2016 Rafaela Woods MD Last Physical: 12/1/2016 Last MTM visit: Visit date not found Last visit same specialty: 12/20/2018 Bbas Jin MD.  Next visit within 3 mo: Visit date not found  Next physical within 3 mo: Visit date not found      Rubi Corbin, [facility] Triage/Med Refill 1/19/2019    Requested Prescriptions   Pending Prescriptions Disp Refills     butalbital-acetaminophen-caffeine (FIORICET, ESGIC) -40 mg per tablet [Pharmacy Med Name: BUTALBITAL/ACETAMINOPHEN/CAFF TABS] 30 tablet 0     Sig: TAKE 1 TO 2 TABLETS BY MOUTH EVERY 4 HOURS AS NEEDED FOR PAIN. MAX OF 4000 MG ACETAMINOPHEN PER 24 HOURS    Controlled Substances Refill Protocol Failed - 1/19/2019  7:37 AM       Failed - Route all Controlled Substance Requests to Provider       Failed - Patient has controlled substance agreement in past 12 months    Encounter-Level CSA Scan Date:    There are no encounter-level csa scan date.              Passed - Visit with PCP or prescribing provider visit in past 12 months     Last office visit with prescriber/PCP: 10/17/2016 Rafaela Woods MD OR same dept: 6/15/2018 Jose Manuel Hensley MD OR same specialty: 12/20/2018 Babs Jin MD Last physical: 12/1/2016 Last MTM visit: Visit date not found    Next visit within 3 mo: Visit date not found  Next physical within 3 mo: Visit date not found  Prescriber OR PCP: Rafaela Woods MD  Last diagnosis associated with med order: 1. Migraine  - butalbital-acetaminophen-caffeine (FIORICET, ESGIC) -40 mg per tablet [Pharmacy Med Name: BUTALBITAL/ACETAMINOPHEN/CAFF TABS]; TAKE 1 TO 2 TABLETS BY MOUTH EVERY 4 HOURS AS NEEDED FOR PAIN. MAX OF 4000 MG ACETAMINOPHEN PER 24 HOURS  Dispense: 30 tablet; Refill: 0

## 2021-06-24 NOTE — TELEPHONE ENCOUNTER
ANTICOAGULATION  MANAGEMENT    Assessment     Today's INR result of 2.0 is Therapeutic (goal INR of 2.0-3.0)    Patient updated of new INR Goal Range per Dr. Richardson. Patient stated that she will discuss this again with Dr Richardson during OV on 3/15/19 due to previous symptoms of bleeding when her INR is closer to 3.0      Warfarin taken as previously instructed    No new diet changes affecting INR    No new medication/supplements affecting INR    Continues to tolerate warfarin with no reported s/s of bleeding or thromboembolism     Previous INR was Therapeutic    Plan:     Spoke with Alyssa regarding INR result and instructed:     Warfarin Dosing Instructions:  Continue current warfarin dose    6 mg on Sun; 7 mg all other days        (0 % change)    Instructed patient to follow up no later than: 3 weeks.Appointment made.Instructed to have INR sooner if signs and symptoms of bleeding is noted.    Education provided: importance of therapeutic range, target INR goal and significance of current INR result and monitoring for bleeding signs and symptoms    Alyssa verbalizes understanding and agrees to warfarin dosing plan.    Instructed to call the [facility] for any changes, questions or concerns. (#[phone])   ?   Nuvai Valenzuela RN    Subjective/Objective:      Alyssa Higginbotham, a 63 y.o. female is on warfarin.     Alyssa reports:     Home warfarin dose: as updated on anticoagulation calendar per template     Missed doses: No     Medication changes:  No     S/S of bleeding or thromboembolism:  No     New Injury or illness:  No     Changes in diet or alcohol consumption:  No     Upcoming surgery, procedure or cardioversion:  No    Anticoagulation Episode Summary     Current INR goal:   2.0-3.0   TTR:   35.1 % (4.2 y)   Next INR check:   3/4/2019   INR from last check:   No new INR was available at last check   Most recent INR:    2.00 (3/4/2019)   Weekly max warfarin dose:      Target end date:      INR check  location:      Preferred lab:      Send INR reminders to:   ANTICOAGULATION POOL C (DTN,VAD,CGR,GAV)    Indications    S/P AVR [Z95.2]           Comments:   Goal range changed 2/20/19 per cardiology         Anticoagulation Care Providers     Provider Role Specialty Phone number    Rafaela Woods MD Referring Family Medicine [phone]

## 2021-06-25 NOTE — PROGRESS NOTES
Progress Notes by Jonathon Richardson MD at 12/14/2017  3:10 PM     Author: Jonathon Richardson MD Service: -- Author Type: Physician    Filed: 12/14/2017  3:40 PM Encounter Date: 12/14/2017 Status: Signed    : Jonathon Richardson MD (Physician)           Click to link to [facility] HEART CARE NOTE    Thank you, Dr. Woods, for asking us to see Alyssa Higginbotham at the [facility].      Assessment/Recommendations   Patient with known history of aortic dissections, clinical history of Marfan's, who has annual evaluations of her thoracic abdominal and pelvic vasculature with CTA.  We will schedule that in the near future.  We will also check an echocardiogram to look at her mechanical valve.    Her functional capacity is excellent and I have encouraged her to maintain an active lifestyle.    We may ask her to have a one-time visit to the aortic center at the [facility] to ensure that we are not missing any follow-up items that should be regularly checked.    Inc. you for allowing us to participate in her care.         History of Present Illness    Ms. Alyssa Higginbotham is a 62 y.o. female with known history of aortic dissection with repair of the dissection and replacement of her aortic valve with a mechanical prosthesis.  Her CTA of her chest abdomen and pelvis last year was stable and echocardiogram was unremarkable as well.  She has been feeling well this past year.  She retired about 8 or 9 months ago and this is worked out very well.  She is delighted with [facility].  She exercises more, bicycling for 30 minutes at a time and doing treadmill and other exercise on a regular basis.  She denies any chest discomfort with the exception of some atypical pain which is brief which she has had since her youth.  She denies orthopnea, paroxysmal nocturnal dyspnea, peripheral edema, syncope, near syncope or palpitations.  She had a recent LDL cholesterol that was  100.  Has no side effects from her current medical regimen.         Physical Examination Review of Systems   Vitals:    12/14/17 1512   BP: 104/66   Pulse: 70   Resp: 16     Body mass index is 21.23 kg/(m^2).  Wt Readings from Last 3 Encounters:   12/14/17 117 lb (53.1 kg)   12/04/17 116 lb 6.4 oz (52.8 kg)   11/14/17 132 lb (59.9 kg)     General Appearance:   Alert, cooperative and in no acute distress.   ENT/Mouth: Oral mucuos membranes pink and moist .      EYES:  No scleral icterus. No Xanthelasma.    Neck: JVP normal. No Hepatojugular reflux. Thyroid not visualized   Chest/Lungs:   Lungs are clear to auscultation, equal chest wall expansion    Cardiovascular:   S1, mechanical S2 with 2/6 systolic murmur , no clicks or rubs. Brachial, radial and posterior tibial pulses are intact and symetric. No carotid bruits noted   Abdomen:  Nontender. BS+. No bruits.      Extremities: No cyanosis, clubbing or edema   Skin: no xanthelasma, warm.    Psych: Appropriate affect.   Neurologic: normal gait, normal  bilateral, no tremors        General: WNL  Eyes: WNL  Ears/Nose/Throat: WNL  Lungs: WNL  Heart: WNL  Stomach: WNL  Bladder: WNL  Muscle/Joints: Joint Pain  Skin: WNL  Nervous System: WNL  Mental Health: WNL     Blood: WNL     Medical History  Surgical History Family History Social History   Past Medical History:   Diagnosis Date   ? Accidental fall 5/27/2015   ? Anxiety    ? Aortic dissection    ? Asthma    ? Benign Adenomatous Polyp Of The Large Intestine     Created by Conversion    ? Benign meningioma of brain march 2015    initially seen on CT of head that was obtained after a fall. confirmed on an MRI   ? Bunion    ? Chronic headache    ? Colon polyp 2011   ? Depression    ? Hallux Limitus Of The Right First Toe     Created by Conversion    ? Hepatitis C    ? HTN (hypertension)    ? Hyperlipidemia    ? Hypothyroid    ? Irregular heart rate    ? Nasal fracture 2/28/2015    fell face first on sideache   ?  Osteoporosis    ? Painful swelling of joint    ? SOB (shortness of breath)    ? Stroke     Found on CT scan    Past Surgical History:   Procedure Laterality Date   ? AORTIC VALVE REPLACEMENT      ? BREAST BIOPSY Left     benign   ? COLONOSCOPY     ? REPAIR THORACIC AORTA       Family History   Problem Relation Age of Onset   ? Pancreatic cancer Father 70     history of smoking   ? Ovarian cancer Sister 67     stage III,  at age 70   ? Skin cancer Sister    ? Heart disease Mother    ? Breast cancer Maternal Grandmother       at age 63   rumored that she had breast ca   ? Diabetes Neg Hx    ? Alzheimer's disease Neg Hx     Social History     Social History   ? Marital status:      Spouse name: N/A   ? Number of children:  0   ? Years of education: N/A     Occupational History   ?  [employer]     Social History Main Topics   ? Smoking status: Never Smoker   ? Smokeless tobacco: Never Used   ? Alcohol use No   ? Drug use: No   ? Sexual activity: Not on file     Other Topics Concern   ? Not on file     Social History Narrative          Medications  Allergies   Current Outpatient Prescriptions   Medication Sig Dispense Refill   ? amitriptyline (ELAVIL) 10 MG tablet Take 10 mg by mouth bedtime.     ? butalbital-acetaminophen-caffeine (FIORICET, ESGIC) -40 mg per tablet TAKE 1 TO 2 TABLETS BY MOUTH EVERY 4 TO 6 HOURS AS NEEDED FOR PAIN. NO MORE THAN 3,000MG OF ACETAMINOPHEN DAILY 30 tablet 0   ? calcium carbonate-vitamin D3 (CALCIUM 500 + D) 500 mg(1,250mg) -400 unit Tab Take 1 tablet by mouth 2 (two) times a day.      ? clonazePAM (KLONOPIN) 0.5 MG tablet Take 0.5 mg by mouth bedtime as needed for anxiety.     ? levothyroxine (SYNTHROID, LEVOTHROID) 75 MCG tablet Take 1 tablet (75 mcg total) by mouth Daily at 6:00 am. 90 tablet 1   ? metoprolol succinate (TOPROL-XL) 200 MG 24 hr tablet TAKE 1 TABLET DAILY 90 tablet 3   ? multivitamin (ONE A DAY) per tablet Take 1 tablet by mouth  daily.      ? simvastatin (ZOCOR) 20 MG tablet TAKE 1 TABLET AT BEDTIME 90 tablet 0   ? SUMAtriptan (IMITREX) 50 MG tablet TAKE 1 TABLET BY MOUTH EVERY 2 HOURS AS NEEDED FOR MIGRAINE, MAX 4 TABLETS DAILY 9 tablet 10   ? triamterene-hydrochlorothiazide (MAXZIDE) 75-50 mg per tablet TAKE ONE-HALF (1/2) TABLET DAILY 45 tablet 2   ? warfarin (COUMADIN) 1 MG tablet Take 2 tablets along with 5 mg ( 7 mg) on Tues,Thur Sat then take one 7.5 mg tablet the rest of the week. 90 tablet 0   ? warfarin (COUMADIN) 5 MG tablet      ? warfarin (COUMADIN) 6 MG tablet 6mg x3 days on Mon/Wed/Fri as directed. Dose adjusted based on INR results 60 tablet 1     No current facility-administered medications for this visit.       Allergies   Allergen Reactions   ? Codeine Other (See Comments)     Faints   ? Demerol [Meperidine] Other (See Comments)     Reaction not reported by patient.  Patient states she felt awful.         Lab Results    Chemistry/lipid CBC Cardiac Enzymes/BNP/TSH/INR   Lab Results   Component Value Date    CHOL 161 12/04/2017    HDL 42 (L) 12/04/2017    LDLCALC 100 12/04/2017    TRIG 93 12/04/2017    CREATININE 0.97 12/04/2017    BUN 25 (H) 12/04/2017    K 3.6 12/04/2017     12/04/2017     12/04/2017    CO2 29 12/04/2017    Lab Results   Component Value Date    WBC 6.8 11/08/2016    HGB 12.5 11/08/2016    HCT 36.4 11/08/2016    MCV 80 11/08/2016     11/08/2016    Lab Results   Component Value Date    TROPONINI <0.01 10/12/2016    BNP 22 10/11/2016    TSH 3.93 12/04/2017    INR 1.50 (H) 12/04/2017

## 2021-06-25 NOTE — TELEPHONE ENCOUNTER
Refill Approved    Rx renewed per Medication Renewal Policy. Medication was last renewed on 6/12/18 for 90/2.  Last OV 12/20/18    Vira K Yadiel, [facility] Triage/Med Refill 3/17/2019     Requested Prescriptions   Pending Prescriptions Disp Refills     metoprolol succinate (TOPROL-XL) 200 MG 24 hr tablet [Pharmacy Med Name: METOPROLOL SUCCINATE ER TABS 200MG] 90 tablet 2     Sig: TAKE 1 TABLET DAILY    Beta-Blockers Refill Protocol Passed - 3/13/2019 11:47 AM       Passed - PCP or prescribing provider visit in past 12 months or next 3 months    Last office visit with prescriber/PCP: 10/17/2016 Rafaela Woods MD OR same dept: 6/15/2018 Jose Manuel Hensley MD OR same specialty: 12/20/2018 aBbs Jin MD  Last physical: 12/1/2016 Last MTM visit: Visit date not found   Next visit within 3 mo: Visit date not found  Next physical within 3 mo: Visit date not found  Prescriber OR PCP: Rafaela Woods MD  Last diagnosis associated with med order: 1. Essential hypertension, malignant  - metoprolol succinate (TOPROL-XL) 200 MG 24 hr tablet [Pharmacy Med Name: METOPROLOL SUCCINATE ER TABS 200MG]; TAKE 1 TABLET DAILY  Dispense: 90 tablet; Refill: 2    If protocol passes may refill for 12 months if within 3 months of last provider visit (or a total of 15 months).            Passed - Blood pressure filed in past 12 months    BP Readings from Last 1 Encounters:   03/15/19 100/70

## 2021-06-25 NOTE — TELEPHONE ENCOUNTER
FYI - Status Update  Who is Calling: Patient  Update: Patient returned ACN call, ACN not available at the time of the call, please call the patient back, thank you.  Okay to leave a detailed message?:  No

## 2021-06-25 NOTE — TELEPHONE ENCOUNTER
ANTICOAGULATION  MANAGEMENT    Assessment     Today's INR result of 2.10 is Therapeutic (goal INR of 2.0-3.0)        Warfarin taken as previously instructed    No new diet changes affecting INR    No new medication/supplements affecting INR    Continues to tolerate warfarin with no reported s/s of bleeding or thromboembolism     Previous INR was Supratherapeutic at 3.10 on 6/1/21.    Plan:     Spoke on phone with , Cecilio regarding INR result and instructed:   - went on a bike ride.  Cecilio will relay message.      Warfarin Dosing Instructions:   (1mg / 5mg tabs)   - Continue current warfarin dose 6 mg daily on Tuesdays; and 8 mg daily rest of week.    Instructed patient to follow up no later than:  2 wks.    Education provided: importance of consistent vitamin K intake and target INR goal and significance of current INR result    Cecilio verbalizes understanding and agrees to warfarin dosing plan.    Instructed to call the [facility] for any changes, questions or concerns. (#[phone])   ?   Aylin Pablo RN    Subjective/Objective:      Alyssa NAYELI Higginbotham, a 66 y.o. female is on warfarin. Jennifer Rodriguez reports:     Home warfarin dose: as updated on anticoagulation calendar per template     Missed doses: No     Medication changes:  No     S/S of bleeding or thromboembolism:  No     New Injury or illness:  No     Changes in diet or alcohol consumption:  No     Upcoming surgery, procedure or cardioversion:  No    Anticoagulation Episode Summary     Current INR goal:  2.0-3.0   TTR:  65.6 % (1 y)   Next INR check:  6/28/2021   INR from last check:  2.10 (6/14/2021)   Weekly max warfarin dose:     Target end date:     INR check location:     Preferred lab:     Send INR reminders to:  [facility]    Indications    S/P AVR [Z95.2]           Comments:  Goal range changed 2/20/19 per cardiology         Anticoagulation Care Providers     Provider Role Specialty Phone number    Rafaela Woods MD  [facility] [phone]

## 2021-06-27 NOTE — PROGRESS NOTES
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 Neck pain on left side M54.2    2. Decreased ROM of neck R29.898    3. Abnormal posture R29.3        Assessment:    This 63 year old female presents with intermittent left sided neck pain which started after a fall 4 years ago and which worsened after an injection into the area in 2015.  She states when she gets the kink it triggers a migraine to the frontal bone.  She had migraines before the fall but they were not frequent.  The kink comes on about 2x/month and pain lasts a week.  Pt has a moderate scoliosis with a rib hump on the right with forward bending,  She has had the scoliosis since she was a child.  Her symptoms are consistent with OA dysfunction with underlying scoliotic changes followed by fall and probable inflammation.  She reports the pain was worse after the left OA was injected in 2015.  Impairments in  pain, posture, ROM, joint mobility, strength  Prognosis to achieve goals is  good   Pt. is appropriate for skilled PT intervention as outlined in the Plan of Care (POC).    Goals:  Pt. will demonstrate/verbalize independence in self-management of condition in : 12 weeks  Pt. will be  "independent with home exercise program in : 12 weeks  Pt. will report decreased intensity, frequency of : Pain;Headache;in 12 weeks  Pt. will have improved quality of sleep: with less pain;in 12 weeks  Patient Turn Head: for driving;with full ROM;wiith no pain;in 12 weeks        Patient's expectations/goals are realistic.    Barriers to Learning or Achieving Goals:  No Barriers.       Plan / Patient Instructions:        Plan of Care:   Authorization / Certification Start Date: 12/26/18  Authorization / Certification End Date: 03/26/19  Authorization / Certification Number of Visits: 12  Communication with: Referral Source  Patient Related Instruction: Treatment plan and rationale;Nature of Condition;Self Care instruction;Basis of treatment;Body mechanics;Posture;Precautions;Next steps;Expected outcome  Times per Week: 1  Number of Visits: 12  Discharge Planning: to home program  Precautions / Restrictions : dissected aorta, osteoporosis, Marfan syndrome  Therapeutic Exercise: ROM;Stretching;Strengthening  Neuromuscular Reeducation: kinesio tape;posture;TNE;core  Manual Therapy: soft tissue mobilization;myofascial release;joint mobilization;muscle energy  Modalities: traction  Functional Training (ADL's): self care  Equipment: theraband      Plan for next visit:   Continue MT for increased ROM  Advance strengthening for neck and upper back; posture muscles     Subjective:            History of Present Illness:    Alyssa is a 63 y.o. female who presents to therapy today with complaints of Intermittent neck \"kink\" on the left side which triggers a migraine HA.. Date of onset/duration of symptoms is 4 years ago after a fall; injection in 2015 made it worse and they trigger a migraine. Onset was sudden and related to trauma. Symptoms are intermittent and getting worse. She denies history of similar symptoms prior to fall. She describes their previous level of function as not limited.  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    Pain Ratin  Pain rating at best: 0  Pain rating at worst: 10  Pain description: sharp, shooting and can't turn head and turns into frontal HA    Functional limitations are described as occurring with:   looking up, looking down and turning head  performing routine daily activities  sports or recreation (walking)    Patient reports benefit from:  rest  , pain medication, cold         Objective:      Note: Items left blank indicates the item was not performed or not indicated at the time of the evaluation.    Patient Outcome Measures :  Not done      Involved side: Left  Posture Observation:      General sitting posture is  fair.  General standing posture is fair.  Cervical:  Moderate forward head; left shoulder higher; scoliosis  ROM:  Lumbar: WNL  UE: WNL  Cervical ROM:  WNL except with AA rotation right which is limited as compare to the left.    Strength   WNL in UE; upper back muscles weak with extension    Flexibility:  WNL possibly hypermobile OA on the left  Palpation:  Slightly tender at left OA and left upper trap    Passive Mobility-Joint Integrity: WNL.      Treatment Today      TREATMENT MINUTES COMMENTS   Evaluation 30    Self-care/ Home management     Manual therapy 8 Supine:  STM/MFR to upper trap and neck; AAROM; manual distraction   Neuromuscular Re-education     Therapeutic Activity     Therapeutic Exercises 15 Discussed nature of condition, basis of treatment, POC, precautions.  Pt instructed in HEP:  Exercises:  Exercise #1: Chin Tuck  Comment #1: HEP  Exercise #2: Chin tuck with flexion  Comment #2: HEP  Exercise #3: Upper cervical mobilization  Comment #3: HEP  Exercise #4: Levator stretch  Comment #4: HEP  Exercise #5: Extension strengthening  Comment #5: HEP       Gait training     Modality__________________                Total 53    Blank areas are intentional and mean the treatment did not include these items.      PT Evaluation Code: (Please list factors)  Patient History/Comorbidities: 3  Examination: 1  Clinical Presentation: stable  Clinical Decision Making: low    Patient History/  Comorbidities Examination  (body structures and functions, activity limitations, and/or participation restrictions) Clinical Presentation Clinical Decision Making (Complexity)   No documented Comorbidities or personal factors 1-2 Elements Stable and/or uncomplicated Low   1-2 documented comorbidities or personal factor 3 Elements Evolving clinical presentation with changing characteristics Moderate   3-4 documented comorbidities or personal factors 4 or more Unstable and unpredictable High            Nadege Saab  12/26/2018  10:33 AM

## 2021-06-27 NOTE — PROGRESS NOTES
Progress Notes by Jonathon Richardson MD at 3/15/2019  3:30 PM     Author: Jonathon Richardson MD Service: -- Author Type: Physician    Filed: 3/15/2019  4:12 PM Encounter Date: 3/15/2019 Status: Signed    : Jonathon Richardson MD (Physician)           Click to link to [facility] NOTE    Thank you, Dr. Woods, for asking us to see Alyssa Higginbotham at the [facility].      Assessment/Recommendations   Patient with known aortic dissection in the past and carries a diagnosis of Marfan's from prior clinic.  We talked today about doing a thoracic abdominal pelvic MRA to evaluate her but she does have some claustrophobia and really is not interested.  I did recommend that we refer her for a least a one-time consultation at the aortic center at the [facility].  Given the stability of her dissection they may recommend delaying the follow-up surveillance CT scans?  We also talked about potential new genetic markers and she does report that she went through genetic testing with her family at the time that she was initially diagnosed many years ago.  She is not keen on the idea but I did proceed to bed and she will give it some thought.  She would however like to schedule a CTA to evaluate her dissection and if she decides to go to the [facility] she did if she does not go to the [facility] I will consult with our CV surgery colleagues who are part of that aortic center to get their advice on follow-up.    Her blood pressure is at goal, she is on a good medical regimen and she exercises on a regular basis so I have not made any changes in her medications.    Thank you for allowing us to participate in her care.         History of Present Illness    Ms. Alyssa Higginbotham is a 63 y.o. female with a diagnosis of Marfan's and a previous history of aortic dissection with replacement of the aortic valve with a mechanical prosthesis.  She has had  annual CT scans first through [facility] and more recently through us which have shown stability of the chronic dissection.  She has been feeling well this past year.  She has not had any chest discomfort, orthopnea, paroxysmal nocturnal dyspnea, peripheral edema, syncope or near syncopal episodes.  She gets occasional brief palpitations which are somewhat fleeting in nature and has had these for many years.  Last year we talked about having her seen at the aortic center at the [facility] but she declined, mostly because of the geographic location.         Physical Examination Review of Systems   Vitals:    03/15/19 1532   BP: 100/70   Pulse: 72   Resp: 20     Body mass index is 22.46 kg/m .  Wt Readings from Last 3 Encounters:   03/15/19 123 lb 12.8 oz (56.2 kg)   01/22/19 125 lb 12.8 oz (57.1 kg)   01/14/19 122 lb (55.3 kg)     General Appearance:   Alert, cooperative and in no acute distress.   ENT/Mouth: Oral mucuos membranes pink and moist .      EYES:  No scleral icterus. No Xanthelasma.    Neck: JVP normal. No Hepatojugular reflux. Thyroid not visualized   Chest/Lungs:   Lungs are clear to auscultation, equal chest wall expansion    Cardiovascular:   S1, telemetry S2 with 2/6 systolic murmur , no clicks or rubs. Brachial, radial and posterior tibial pulses are intact and symetric. No carotid bruits noted   Abdomen:  Nontender. BS+.       Extremities: No cyanosis, clubbing or edema   Skin: no xanthelasma, warm.    Psych: Appropriate affect.   Neurologic: normal gait, normal  bilateral, no tremors        General: WNL  Eyes: WNL  Ears/Nose/Throat: WNL  Lungs: WNL  Heart: WNL  Stomach: WNL  Bladder: WNL  Muscle/Joints: WNL  Skin: WNL  Nervous System: WNL  Mental Health: WNL     Blood: WNL     Medical History  Surgical History Family History Social History   Past Medical History:   Diagnosis Date   ? Accidental fall 5/27/2015   ? Anxiety    ? Aortic dissection (H)    ? Asthma    ? Benign  Adenomatous Polyp Of The Large Intestine     Created by Conversion    ? Benign meningioma of brain (H) 2015    initially seen on CT of head that was obtained after a fall. confirmed on an MRI   ? Bunion    ? Chronic headache    ? Colon polyp    ? Depression    ? Hallux Limitus Of The Right First Toe     Created by Conversion    ? Hepatitis C    ? HTN (hypertension)    ? Hyperlipidemia    ? Hypothyroid    ? Irregular heart rate    ? Nasal fracture 2015    fell face first on sideache   ? Osteoporosis    ? Painful swelling of joint    ? SOB (shortness of breath)    ? Stroke (H)     Found on CT scan    Past Surgical History:   Procedure Laterality Date   ? AORTIC VALVE REPLACEMENT      ? BREAST BIOPSY Left     benign   ? COLONOSCOPY     ? REPAIR THORACIC AORTA       Family History   Problem Relation Age of Onset   ? Pancreatic cancer Father 70        history of smoking   ? Ovarian cancer Sister 67        stage III,  at age 70   ? Skin cancer Sister    ? Heart disease Mother    ? Diabetes Neg Hx    ? Alzheimer's disease Neg Hx     Social History     Socioeconomic History   ? Marital status:      Spouse name: Not on file   ? Number of children:  0   ? Years of education: Not on file   ? Highest education level: Not on file   Occupational History     Employer: [employer]   Social Needs   ? Financial resource strain: Not on file   ? Food insecurity:     Worry: Not on file     Inability: Not on file   ? Transportation needs:     Medical: Not on file     Non-medical: Not on file   Tobacco Use   ? Smoking status: Never Smoker   ? Smokeless tobacco: Never Used   Substance and Sexual Activity   ? Alcohol use: No   ? Drug use: No   ? Sexual activity: Not Currently   Lifestyle   ? Physical activity:     Days per week: Not on file     Minutes per session: Not on file   ? Stress: Not on file   Relationships   ? Social connections:     Talks on phone: Not on file     Gets together: Not on  file     Attends Voodoo service: Not on file     Active member of club or organization: Not on file     Attends meetings of clubs or organizations: Not on file     Relationship status: Not on file   ? Intimate partner violence:     Fear of current or ex partner: Not on file     Emotionally abused: Not on file     Physically abused: Not on file     Forced sexual activity: Not on file   Other Topics Concern   ? Not on file   Social History Narrative   ? Not on file          Medications  Allergies   Current Outpatient Medications   Medication Sig Dispense Refill   ? amitriptyline (ELAVIL) 10 MG tablet Take 10 mg by mouth bedtime.     ? butalbital-acetaminophen-caffeine (FIORICET, ESGIC) -40 mg per tablet TAKE 1 TO 2 TABLETS BY MOUTH EVERY 4 HOURS AS NEEDED FOR PAIN. MAX OF 4000 MG ACETAMINOPHEN PER 24 HOURS 30 tablet 0   ? calcium carbonate-vitamin D3 (CALCIUM 500 + D) 500 mg(1,250mg) -400 unit Tab Take 1 tablet by mouth 2 (two) times a day.      ? clonazePAM (KLONOPIN) 0.5 MG tablet Take 0.5 mg by mouth bedtime as needed for anxiety.     ? levothyroxine (SYNTHROID, LEVOTHROID) 75 MCG tablet Take 1 tablet on wednesday, Thursday, Saturday and Sunday. 36 tablet 3   ? levothyroxine (SYNTHROID, LEVOTHROID) 88 MCG tablet Take 1 tablet on Monday, Tuesday and Friday. 36 tablet 3   ? metoprolol succinate (TOPROL-XL) 200 MG 24 hr tablet Take 1 tablet (200 mg total) by mouth daily. 90 tablet 2   ? multivitamin (ONE A DAY) per tablet Take 1 tablet by mouth daily.      ? rizatriptan (MAXALT) 5 MG tablet Take 1 tablet (5 mg total) by mouth as needed for migraine. May repeat in 2 hours if needed 10 tablet 0   ? simvastatin (ZOCOR) 20 MG tablet TAKE 1 TABLET AT BEDTIME. DUE FOR AN OFFICE VISIT. 90 tablet 2   ? TiZANidine (ZANAFLEX) 2 MG capsule Take 1 capsule (2 mg total) by mouth 3 (three) times a day. 30 capsule 0   ? triamterene-hydrochlorothiazide (MAXZIDE) 75-50 mg per tablet TAKE ONE-HALF (1/2) TABLET DAILY 45 tablet 0    ? warfarin (COUMADIN/JANTOVEN) 1 MG tablet TAKE 1- 2 TABLETS ALONG WITH 5MG TABLET ( 6 - 7 MG) DAILY AS DIRECTED ADJUST DOSE PER INR RESULTS. 140 tablet 1   ? warfarin (COUMADIN/JANTOVEN) 5 MG tablet Take 1 tablet (5 mg total) by mouth See Admin Instructions. Take along with 1 mg tablet ( 6-7 mg daily) as directed. Adjust per INR result 110 tablet 1     No current facility-administered medications for this visit.       Allergies   Allergen Reactions   ? Codeine Other (See Comments)     Faints   ? Demerol [Meperidine] Other (See Comments)     Reaction not reported by patient.  Patient states she felt awful.         Lab Results    Chemistry/lipid CBC Cardiac Enzymes/BNP/TSH/INR   Lab Results   Component Value Date    CHOL 161 12/04/2017    HDL 42 (L) 12/04/2017    LDLCALC 100 12/04/2017    TRIG 93 12/04/2017    CREATININE 0.97 12/04/2017    BUN 25 (H) 12/04/2017    K 3.6 12/04/2017     12/04/2017     12/04/2017    CO2 29 12/04/2017    Lab Results   Component Value Date    WBC 6.8 11/08/2016    HGB 12.5 11/08/2016    HCT 36.4 11/08/2016    MCV 80 11/08/2016     11/08/2016    Lab Results   Component Value Date    TROPONINI <0.01 10/12/2016    BNP 22 10/11/2016    TSH 3.88 01/22/2019    INR 2.00 (H) 03/04/2019

## 2021-06-29 NOTE — PROGRESS NOTES
"Progress Notes by Jonathon Richardson MD at 10/20/2020  1:10 PM     Author: Jonathon Richardson MD Service: -- Author Type: Physician    Filed: 10/21/2020  8:29 AM Encounter Date: 10/20/2020 Status: Signed    : Jonathon Richardson MD (Physician)           The patient has been notified of following:     \"This telephone visit will be conducted via a call between you and your physician/provider. We have found that certain health care needs can be provided without the need for a physical exam.  This service lets us provide the care you need with a phone conversation.  If a prescription is necessary we can send it directly to your pharmacy.  If lab work is needed we can place an order for that and you can then stop by our lab to have the test done at a later time. If during the course of the call the physician/provider feels a telephone visit is not appropriate, you will not be charged for this service.\" Verbal consent has been obtained for this service by care team member:         HEART CARE PHONE ENCOUNTER        The patient has chosen to have the visit conducted as a telephone visit, to reduce risk of exposure given the current status of Coronavirus in our community. This telephone visit is being conducted via a call between the patient and physician/provider. Health care needs are being provided without a physical exam.     Assessment/Recommendations   Assessment:      Patient with known aortic dissection, status post surgical repair with mechanical prosthesis in the aortic position.  She is feeling well and continues to walk for an hour each day without difficulties.  Her CT of the chest abdomen and pelvis were stable from a dissection standpoint with no growth of the size of her aorta.  Her echocardiogram showed normal left ventricular systolic function and normally functioning mechanical prosthesis in aortic position.    I am comforted by this and she is taking good dose of beta-blocker at 200 mg metoprolol " each day.    I have encouraged her to continue to exercise and to call us if she has any changes in her functional capacity.  We will plan on repeating the studies in 18 months        Follow Up Plan: Follow up in   I have reviewed the note as documented.  This accurately captures the substance of my conversation with the patient.    Total time of call between patient and provider was 5 minutes   Start Time: 12:53 PM  Stop Time: 12:58 PM       History of Present Illness/Subjective    Alyssa Higginbotham is a 65 y.o. female who is being evaluated via a billable telephone visit.  Patient reports that she has done well this past year and a half.  She walks almost every day and goes for an hour sometimes more.  She has not had any unusual changes in her functional capacity denies any shortness of breath, chest discomfort, orthopnea, paroxysmal nocturnal dyspnea, peripheral edema, syncope or near syncopal episodes.  She has had a good year from a functional capacity standpoint.  Her CT of chest abdomen pelvis did not show changes in her chronic aortic dissection and her echocardiogram showed stable findings with a normally functioning valve in the aortic position which is mechanical.  She had normal left ventricular systolic function.  She does denies any side effects from her medications.      I have reviewed and updated the patient's Past Medical History, Social History, Family History and Medication List.     Physical Examination not performed given phone encounter Review of Systems                                                Medical History  Surgical History Family History Social History   Past Medical History:   Diagnosis Date   ? Accidental fall 05/27/2015   ? Anxiety    ? Aortic dissection (H)    ? Asthma    ? Benign Adenomatous Polyp Of The Large Intestine    ? Benign meningioma of brain (H) 03/2015    initially seen on CT of head that was obtained after a fall. Frontal lobe, confirmed on an MRI Mar 2015, 3 mm    ? Bunion    ? Chronic headache    ? Colon polyp 2011   ? Depression    ? Hallux Limitus Of The Right First Toe    ? Hepatitis C    ? HTN (hypertension)    ? Hyperlipidemia    ? Hypothyroid    ? Irregular heart rate    ? Nasal fracture 02/28/2015    fell face first on sideache   ? Osteoporosis    ? Stroke (H)     on CT scan    Past Surgical History:   Procedure Laterality Date   ? AORTIC VALVE REPLACEMENT   2000   ? BREAST BIOPSY Left 2005    benign   ? COLONOSCOPY  2011   ? REPAIR THORACIC AORTA   2000    Family History   Problem Relation Age of Onset   ? Pancreatic cancer Father 70        history of smoking   ? Ovarian cancer Sister 67        stage III, fatal   ? Skin cancer Sister    ? Heart disease Mother    ? Diabetes Neg Hx    ? Alzheimer's disease Neg Hx     Social History     Socioeconomic History   ? Marital status:      Spouse name: Not on file   ? Number of children:  0   ? Years of education: Not on file   ? Highest education level: Not on file   Occupational History     Employer: [employer]   Social Needs   ? Financial resource strain: Not on file   ? Food insecurity     Worry: Not on file     Inability: Not on file   ? Transportation needs     Medical: Not on file     Non-medical: Not on file   Tobacco Use   ? Smoking status: Never Smoker   ? Smokeless tobacco: Never Used   Substance and Sexual Activity   ? Alcohol use: No   ? Drug use: No   ? Sexual activity: Not Currently   Lifestyle   ? Physical activity     Days per week: Not on file     Minutes per session: Not on file   ? Stress: Not on file   Relationships   ? Social connections     Talks on phone: Not on file     Gets together: Not on file     Attends Muslim service: Not on file     Active member of club or organization: Not on file     Attends meetings of clubs or organizations: Not on file     Relationship status: Not on file   ? Intimate partner violence     Fear of current or ex partner: Not on file     Emotionally abused: Not  on file     Physically abused: Not on file     Forced sexual activity: Not on file   Other Topics Concern   ? Not on file   Social History Narrative   ? Not on file          Medications  Allergies   Current Outpatient Medications   Medication Sig Dispense Refill   ? amitriptyline (ELAVIL) 10 MG tablet Take 10 mg by mouth bedtime.     ? butalbital-acetaminophen-caffeine (FIORICET, ESGIC) -40 mg per tablet TAKE 1 TO 2 TABLETS BY MOUTH EVERY 4 HOURS AS NEEDED FOR PAIN. MAX OF 4000 MG ACETAMINOPHEN PER 24 HOURS 30 tablet 3   ? calcium carbonate 1250 MG capsule Take 1,250 mg by mouth 2 (two) times a day with meals.     ? clonazePAM (KLONOPIN) 0.5 MG tablet Take 0.5 mg by mouth bedtime as needed for anxiety.     ? levothyroxine (SYNTHROID, LEVOTHROID) 75 MCG tablet Take 1 tablet on wednesday, Thursday, Saturday and Sunday. 45 tablet 3   ? levothyroxine (SYNTHROID, LEVOTHROID) 88 MCG tablet Take 1 tablet on Monday, Tuesday and Friday. 45 tablet 3   ? metoprolol succinate (TOPROL-XL) 200 MG 24 hr tablet Take 1 tablet (200 mg total) by mouth daily. 90 tablet 1   ? multivitamin (ONE A DAY) per tablet Take 1 tablet by mouth daily.      ? simvastatin (ZOCOR) 20 MG tablet TAKE 1 TABLET AT BEDTIME. DUE FOR AN OFFICE VISIT. 90 tablet 1   ? triamterene-hydrochlorothiazide (MAXZIDE) 75-50 mg per tablet TAKE ONE-HALF (1/2) TABLET DAILY (Patient taking differently: Take 0.5 tablets by mouth daily. ) 45 tablet 3   ? warfarin ANTICOAGULANT (COUMADIN/JANTOVEN) 1 MG tablet TAKE 1 TO 2 TABLETS ALONG WITH 5 MG TABLET (6 TO 7 MG ) DAILY AS DIRECTED ADJUST DOSE PER INR RESULTS 168 tablet 4   ? warfarin ANTICOAGULANT (COUMADIN/JANTOVEN) 5 MG tablet TAKE 1 TABLET ALONG WITH 1 MG TABLET (6 TO 7 MG DAILY) AS DIRECTED. ADJUST PER INR RESULT 100 tablet 1   ? rizatriptan (MAXALT) 5 MG tablet TAKE 1 TABLET BY MOUTH AS NEEDED FOR MIGRAINE. MAY REPEAT IN 2 HOURS IF NEEDED 10 tablet 9   ? TiZANidine (ZANAFLEX) 2 MG capsule Take 1 capsule (2 mg  total) by mouth 3 (three) times a day. 30 capsule 0     No current facility-administered medications for this visit.     Allergies   Allergen Reactions   ? Codeine Other (See Comments)     Faints   ? Demerol [Meperidine] Other (See Comments)     Reaction not reported by patient.  Patient states she felt awful.         Lab Results    Chemistry/lipid CBC Cardiac Enzymes/BNP/TSH/INR   Lab Results   Component Value Date    CHOL 170 07/29/2020    HDL 49 (L) 07/29/2020    LDLCALC 100 07/29/2020    TRIG 106 07/29/2020    CREATININE 0.89 07/29/2020    BUN 18 07/29/2020    K 4.1 07/29/2020     07/29/2020     07/29/2020    CO2 29 07/29/2020    Lab Results   Component Value Date    WBC 4.4 01/15/2020    HGB 12.9 05/08/2019    HCT 38.1 05/08/2019    MCV 80 05/08/2019     05/08/2019    Lab Results   Component Value Date    TROPONINI <0.01 10/12/2016    BNP 22 10/11/2016    TSH 3.01 07/29/2020    INR 2.50 (H) 10/16/2020        Jonathon Richardson

## 2021-06-30 NOTE — PROGRESS NOTES
Progress Notes by Liudmila Aldana RN at 12/28/2020  4:05 PM     Author: Liudmila Aldana RN Service: -- Author Type: Registered Nurse    Filed: 12/28/2020  4:10 PM Encounter Date: 12/28/2020 Status: Attested    : Liudmila Aldana RN (Registered Nurse) Cosigner: Rafaela Woods MD at 12/28/2020  4:34 PM    Attestation signed by Rafaela Woods MD at 12/28/2020  4:34 PM    Agree, thanks.                Anticoagulation Annual Referral Renewal Review    Alyssa Higginbotham's chart reviewed for annual renewal of referral to anticoagulation monitoring.        Criteria for anticoagulation nurse and/or pharmacist renewal met   Warfarin indication: Mechanical AVR Yes, per indication   Current with INR monitoring/compliant Yes Yes   Date of last office visit 11/19/20 Yes, had office visit within last year   Time in Therapeutic Range (TTR) 70 % Yes, TTR > 60%       Alyssa Higginbotham met all criteria for anticoagulation management program initiated renewal.  New INR standing orders and anticoagulation referral renewal placed.      Liudmila Aldana RN  4:05 PM

## 2021-07-03 NOTE — ADDENDUM NOTE
Addendum Note by Joy Montilla MD at 12/21/2017  4:49 PM     Author: Joy Montilla MD Service: -- Author Type: Physician    Filed: 12/21/2017  4:49 PM Encounter Date: 12/21/2017 Status: Signed    : Joy Montilla MD (Physician)    Addended by: JOY MONTILLA on: 12/21/2017 04:49 PM        Modules accepted: Orders

## 2021-07-03 NOTE — ADDENDUM NOTE
Addendum Note by Babs Elliott MD at 12/6/2017  1:21 PM     Author: Babs Elliott MD Service: -- Author Type: Physician    Filed: 12/6/2017  1:21 PM Encounter Date: 12/4/2017 Status: Signed    : Babs Elliott MD (Physician)    Addended by: BABS ELLIOTT on: 12/6/2017 01:21 PM        Modules accepted: Orders

## 2021-07-04 NOTE — TELEPHONE ENCOUNTER
Telephone Encounter by Aylin Pablo RN at 6/1/2021  3:28 PM     Author: Aylin Pablo RN Service: -- Author Type: Registered Nurse    Filed: 6/1/2021  4:18 PM Encounter Date: 6/1/2021 Status: Signed    : Aylin Pablo RN (Registered Nurse)       ANTICOAGULATION  MANAGEMENT    Assessment     Today's INR result of 3.10 is Supratherapeutic (goal INR of 2.0-3.0)        Warfarin taken as previously instructed    No new diet changes affecting INR    No new medication/supplements affecting INR    Continues to tolerate warfarin with no reported s/s of bleeding or thromboembolism     Previous INR was Therapeutic at 2.00 on 5/3/21.    Plan:     Spoke on phone with , Cecilio regarding INR result and instructed:   - Jennifer is on a bike ride, and  will give information to wife.   - if any questions to call back.      Warfarin Dosing Instructions:  (1mg / 5mg tabs)   - today, advised one time lower dose with 5mg warfarin,   - then continue current warfarin dose 6 mg daily on Tuesdays; and 8 mg daily rest of week.    Instructed patient to follow up no later than:  1-2 wks.    Education provided: importance of consistent vitamin K intake and target INR goal and significance of current INR result    Cecilio verbalizes understanding and agrees to warfarin dosing plan.    Instructed to call the [facility] for any changes, questions or concerns. (#[phone])   ?   Aylin Pablo RN    Subjective/Objective:      Alyssa TYLER Neftaly, a 66 y.o. female is on warfarin. Jennifer Rodriguez reports:     Home warfarin dose: as updated on anticoagulation calendar per template     Missed doses: No     Medication changes:  No     S/S of bleeding or thromboembolism:  No     New Injury or illness:  No     Changes in diet or alcohol consumption:  No     Upcoming surgery, procedure or cardioversion:  No    Anticoagulation Episode Summary     Current INR goal:  2.0-3.0   TTR:  62.5 % (1 y)   Next INR check:   6/15/2021   INR from last check:  3.10 (6/1/2021)   Weekly max warfarin dose:     Target end date:     INR check location:     Preferred lab:     Send INR reminders to:  CULLEN GALVEZ    Indications    S/P AVR [Z95.2]           Comments:  Goal range changed 2/20/19 per cardiology         Anticoagulation Care Providers     Provider Role Specialty Phone number    Rafaela Woods MD Referring Family Medicine [phone]

## 2021-07-06 ENCOUNTER — AMBULATORY - HEALTHEAST (OUTPATIENT)
Dept: LAB | Facility: CLINIC | Age: 66
End: 2021-07-06

## 2021-07-06 ENCOUNTER — COMMUNICATION - HEALTHEAST (OUTPATIENT)
Dept: ANTICOAGULATION | Facility: CLINIC | Age: 66
End: 2021-07-06

## 2021-07-06 DIAGNOSIS — Z95.2 S/P AVR: ICD-10-CM

## 2021-07-06 LAB — INR PPP: 2.6 (ref 0.9–1.1)

## 2021-07-06 NOTE — TELEPHONE ENCOUNTER
Telephone Encounter by Jordana Tyler RN at 7/6/2021  8:58 AM     Author: Jordana Tyler RN Service: -- Author Type: Registered Nurse    Filed: 7/6/2021  9:12 AM Encounter Date: 7/6/2021 Status: Signed    : Jordana Tyler RN (Registered Nurse)       ANTICOAGULATION MANAGEMENT     Alyssa Higginbotham 66 y.o., female is on warfarin with Therapeutic INR result (goal range 2.0-3.0)    Recent labs: (last 7 days)     07/06/21  0732   INR 2.60*       ASSESSMENT     Source: Chart Review and Patient/Caregiver Call      Warfarin dosing taken: Warfarin taken as instructed    Diet: No new diet changes affecting INR    Illness, Injury or hospitalization: No    Medication changes: None    Signs or symptoms of bleeding or clotting: No    Previous INR: therapeutic last visit; previously outside of goal range    Additional findings: None     PLAN     Recommended plan for no diet, medication or health factor changes affecting INR:     Dosing instructions: Continue your current warfarin dose 6 mg daily on Tuesdays; and 8 mg daily rest of week (0% change)    Follow up no later than: 4 weeks     Telephone call with Jennifer who verbalizes understanding and agrees to plan    Lab visit scheduled    Education provided: importance of therapeutic range and target INR goal and significance of current INR result    Plan made per [facility] anticoagulation protocol    Jordana Tyler  Anticoagulation Clinic   [phone]    Anticoagulation Episode Summary     Current INR goal:  2.0-3.0   TTR:  65.6 % (1 y)   Next INR check:  8/3/2021   INR from last check:  2.60 (7/6/2021)   Weekly max warfarin dose:     Target end date:     INR check location:     Preferred lab:     Send INR reminders to:  [facility]    Indications    S/P AVR [Z95.2]           Comments:  Goal range changed 2/20/19 per cardiology         Anticoagulation Care Providers     Provider Role Specialty Phone number    Rafaela Woods MD  [facility] [phone]

## 2021-07-10 ENCOUNTER — HEALTH MAINTENANCE LETTER (OUTPATIENT)
Age: 66
End: 2021-07-10

## 2021-07-13 ENCOUNTER — RECORDS - HEALTHEAST (OUTPATIENT)
Dept: ADMINISTRATIVE | Facility: CLINIC | Age: 66
End: 2021-07-13

## 2021-07-21 ENCOUNTER — RECORDS - HEALTHEAST (OUTPATIENT)
Dept: ADMINISTRATIVE | Facility: CLINIC | Age: 66
End: 2021-07-21

## 2021-07-29 ENCOUNTER — TELEPHONE (OUTPATIENT)
Dept: LAB | Facility: CLINIC | Age: 66
End: 2021-07-29

## 2021-07-29 DIAGNOSIS — Z95.2 S/P AVR: Primary | ICD-10-CM

## 2021-07-29 NOTE — PROGRESS NOTES
Jennifer has an INR order from [facility], will that work for the current INR work-flow?  If not please reorder.      I'd like to know the answer for the future so if you could respond to me also.    Thanks.

## 2021-08-03 ENCOUNTER — ANTICOAGULATION THERAPY VISIT (OUTPATIENT)
Dept: ANTICOAGULATION | Facility: CLINIC | Age: 66
End: 2021-08-03

## 2021-08-03 ENCOUNTER — LAB (OUTPATIENT)
Dept: LAB | Facility: CLINIC | Age: 66
End: 2021-08-03
Payer: COMMERCIAL

## 2021-08-03 DIAGNOSIS — Z95.2 S/P AVR: ICD-10-CM

## 2021-08-03 DIAGNOSIS — Z95.2 S/P AVR: Primary | ICD-10-CM

## 2021-08-03 LAB — INR BLD: 2.3 (ref 0.9–1.1)

## 2021-08-03 PROCEDURE — 36416 COLLJ CAPILLARY BLOOD SPEC: CPT

## 2021-08-03 PROCEDURE — 85610 PROTHROMBIN TIME: CPT

## 2021-08-03 NOTE — PROGRESS NOTES
ANTICOAGULATION MANAGEMENT     Jennifer Higginbotham 66 year old female is on warfarin with therapeutic INR result. (Goal INR 2.0-3.0)    Recent labs: (last 7 days)     08/03/21  0750   INR 2.3*       ASSESSMENT     Source(s): Chart Review and Template       Warfarin doses taken: Warfarin taken as instructed    Diet: No new diet changes identified    New illness, injury, or hospitalization: No    Medication/supplement changes: None noted    Signs or symptoms of bleeding or clotting: No    Previous INR: Therapeutic last 2(+) visits    Additional findings: None     PLAN     Recommended plan for no diet, medication or health factor changes affecting INR     Dosing Instructions: Continue your current warfarin dose with next INR in 4 weeks       Summary  As of 8/3/2021    Full warfarin instructions:  6 mg every Tue; 8 mg all other days   Next INR check:  8/31/2021             Detailed voice message left for Jennifer with dosing instructions and follow up date.     Contact [phone] to schedule and with any changes, questions or concerns.     Education provided: Importance of consistent vitamin K intake and Target INR goal and significance of current INR result    Plan made per [facility] anticoagulation protocol    Aylin Pablo RN  Anticoagulation Clinic  8/3/2021    _______________________________________________________________________     Anticoagulation Episode Summary     Current INR goal:  2.0-3.0   TTR:  65.6 % (1 y)   Target end date:     Send INR reminders to:  [facility]    Indications    S/P AVR [Z95.2]           Comments:  Goal range changed 2/20/19 per cardiology         Anticoagulation Care Providers     Provider Role Specialty Phone number    Rafaela Woods MD Referring Family Medicine [phone]

## 2021-08-31 ENCOUNTER — ANTICOAGULATION THERAPY VISIT (OUTPATIENT)
Dept: ANTICOAGULATION | Facility: CLINIC | Age: 66
End: 2021-08-31

## 2021-08-31 ENCOUNTER — LAB (OUTPATIENT)
Dept: LAB | Facility: CLINIC | Age: 66
End: 2021-08-31
Payer: COMMERCIAL

## 2021-08-31 DIAGNOSIS — Z95.2 S/P AVR: ICD-10-CM

## 2021-08-31 DIAGNOSIS — Z95.2 S/P AVR: Primary | ICD-10-CM

## 2021-08-31 LAB — INR BLD: 2.5 (ref 0.9–1.1)

## 2021-08-31 PROCEDURE — 85610 PROTHROMBIN TIME: CPT

## 2021-08-31 PROCEDURE — 36416 COLLJ CAPILLARY BLOOD SPEC: CPT

## 2021-08-31 NOTE — PROGRESS NOTES
ANTICOAGULATION MANAGEMENT     Jennifer Higginbotham 66 year old female is on warfarin with therapeutic INR result. (Goal INR 2.0-3.0)    Recent labs: (last 7 days)     08/31/21  1447   INR 2.5*       ASSESSMENT     Source(s): Chart Review and Template       Warfarin doses taken: Warfarin taken as instructed    Diet: No new diet changes identified    New illness, injury, or hospitalization: No    Medication/supplement changes: None noted    Signs or symptoms of bleeding or clotting: No    Previous INR: Therapeutic last 2(+) visits    Additional findings: None     PLAN     Recommended plan for no diet, medication or health factor changes affecting INR     Dosing Instructions: Continue your current warfarin dose with next INR in 4 weeks       Summary  As of 8/31/2021    Full warfarin instructions:  6 mg every Tue; 8 mg all other days   Next INR check:  9/28/2021             Detailed voice message left for Jennifer with dosing instructions and follow up date.     Contact [phone] to schedule and with any changes, questions or concerns.     Education provided: None required    Plan made per [facility] anticoagulation protocol    Eneida Suarez RN  Anticoagulation Clinic  8/31/2021    _______________________________________________________________________     Anticoagulation Episode Summary     Current INR goal:  2.0-3.0   TTR:  65.6 % (1 y)   Target end date:     Send INR reminders to:  [facility]    Indications    S/P AVR [Z95.2]           Comments:  Goal range changed 2/20/19 per cardiology         Anticoagulation Care Providers     Provider Role Specialty Phone number    Rafaela Woods MD Referring Family Medicine [phone]

## 2021-09-04 ENCOUNTER — HEALTH MAINTENANCE LETTER (OUTPATIENT)
Age: 66
End: 2021-09-04

## 2021-09-09 LAB — INR (EXTERNAL): 3.3 (ref 0.9–1.1)

## 2021-09-13 ENCOUNTER — MYC MEDICAL ADVICE (OUTPATIENT)
Dept: ANTICOAGULATION | Facility: CLINIC | Age: 66
End: 2021-09-13

## 2021-09-13 ENCOUNTER — ANTICOAGULATION THERAPY VISIT (OUTPATIENT)
Dept: FAMILY MEDICINE | Facility: CLINIC | Age: 66
End: 2021-09-13

## 2021-09-13 DIAGNOSIS — Z95.2 S/P AVR: Primary | ICD-10-CM

## 2021-09-13 LAB — INR (EXTERNAL): 1 (ref 0.9–1.1)

## 2021-09-13 NOTE — PROGRESS NOTES
Patient called and left  on Home care line requesting a call back as soon as possible.  Agustina Jamison RN

## 2021-09-13 NOTE — PROGRESS NOTES
ANTICOAGULATION MANAGEMENT     Jennifer Higginbotham 66 year old female is on warfarin with subtherapeutic INR result. (Goal INR 2.0-3.0)    Recent labs: (last 7 days)     09/13/21  1444   INR 1.0       ASSESSMENT     Source(s): Patient/Caregiver Call       Warfarin doses taken: Warfarin recently held for nose bleed which may be affecting INR    Diet: Pt started eating a keto-friendly bread that has herbs. She thinks it has chicory and potentially flax seed in it.    New illness, injury, or hospitalization: No    Medication/supplement changes: None noted    Signs or symptoms of bleeding or clotting: Yes: Pt was in ED in [place] 09/09 for nose bleed. It took four hours for bleeding to stop. She was instructed to hold warfarin and recheck INR today.    Previous INR: Supratherapeutic    Additional findings: None     Pt did not bridge for surgery in 2016.   PLAN     Recommended plan for temporary change(s) affecting INR     Dosing Instructions: Booster dose then continue your current warfarin dose with next INR in 4 days       Summary  As of 9/13/2021    Full warfarin instructions:  9/13: 16 mg; Otherwise 6 mg every Tue; 8 mg all other days   Next INR check:  9/17/2021             Telephone call with Jennifer who verbalizes understanding and agrees to plan    Lab visit scheduled    Education provided: Goal range and significance of current result    Plan made with [facility] Pharmacist Zoraida Butterfield RN  Anticoagulation Clinic  9/13/2021    _______________________________________________________________________     Anticoagulation Episode Summary     Current INR goal:  2.0-3.0   TTR:  64.1 % (1 y)   Target end date:     Send INR reminders to:  [facility]    Indications    S/P AVR [Z95.2]           Comments:  Goal range changed 2/20/19 per cardiology         Anticoagulation Care Providers     Provider Role Specialty Phone number    Rafaela Woods MD Referring Family Medicine [phone]

## 2021-09-17 ENCOUNTER — ANTICOAGULATION THERAPY VISIT (OUTPATIENT)
Dept: ANTICOAGULATION | Facility: CLINIC | Age: 66
End: 2021-09-17

## 2021-09-17 ENCOUNTER — LAB (OUTPATIENT)
Dept: LAB | Facility: CLINIC | Age: 66
End: 2021-09-17
Payer: COMMERCIAL

## 2021-09-17 DIAGNOSIS — Z95.2 S/P AVR: Primary | ICD-10-CM

## 2021-09-17 DIAGNOSIS — Z95.2 S/P AVR: ICD-10-CM

## 2021-09-17 LAB — INR BLD: 2 (ref 0.9–1.1)

## 2021-09-17 PROCEDURE — 36415 COLL VENOUS BLD VENIPUNCTURE: CPT

## 2021-09-17 PROCEDURE — 85610 PROTHROMBIN TIME: CPT

## 2021-09-17 NOTE — PROGRESS NOTES
ANTICOAGULATION MANAGEMENT     Jennifer Higginbotham 66 year old female is on warfarin with therapeutic INR result. (Goal INR 2.0-3.0)    Recent labs: (last 7 days)     09/17/21  1332   INR 2.0*       ASSESSMENT     Source(s): Chart Review, Patient/Caregiver Call and Template       Warfarin doses taken: Warfarin recently held for 4 which may be affecting INR    held warfarin for 4 days, while in [place] d/t epistaxis that lasted for 5 hrs.   Also reported skipping some warfarin doses, unsure of dates.    Diet: No new diet changes identified    Continues with Keto diet and has lost 16 lbs.    New illness, injury, or hospitalization: No    Medication/supplement changes: None noted    Signs or symptoms of bleeding or clotting: No    Previous INR: Subtherapeutic at 1.0 on 9/13/21.    Additional findings: None     PLAN     Recommended plan for no diet, medication or health factor changes affecting INR     Dosing Instructions:  Decrease your warfarin dose (9.3% change) with next INR in 1 week.    Summary  As of 9/17/2021    Full warfarin instructions:  7 mg every day   Next INR check:  10/15/2021             Telephone call with Jennifer ([phone]) who verbalizes understanding and agrees to plan   - Keto diet and WT loss of 16 lbs has affect INR reading.   - patient does not want to go thru the epistaxis events and wants wkly dose of warfarin decreased.   - will monitor INR closely for now, till stabilization with regiments.    Lab visit scheduled - INR on 9/28/21 [facility].    Education provided: Goal range and significance of current result, Monitoring for bleeding signs and symptoms and When to seek medical attention/emergency care    Plan made per [facility] anticoagulation protocol    Aylin Pablo, RN  Anticoagulation Clinic  9/17/2021    _______________________________________________________________________     Anticoagulation Episode Summary     Current INR goal:  2.0-3.0   TTR:  63.0 % (1 y)   Target end date:     [facility]    Indications    S/P AVR [Z95.2]           Comments:  Goal range changed 2/20/19 per cardiology         Anticoagulation Care Providers     Provider Role Specialty Phone number    Rafaela Woods MD Referring [facility] Medicine [phone]

## 2021-09-24 ENCOUNTER — ANTICOAGULATION THERAPY VISIT (OUTPATIENT)
Dept: ANTICOAGULATION | Facility: CLINIC | Age: 66
End: 2021-09-24

## 2021-09-24 ENCOUNTER — LAB (OUTPATIENT)
Dept: LAB | Facility: CLINIC | Age: 66
End: 2021-09-24
Payer: COMMERCIAL

## 2021-09-24 DIAGNOSIS — Z95.2 S/P AVR: Primary | ICD-10-CM

## 2021-09-24 DIAGNOSIS — Z95.2 S/P AVR: ICD-10-CM

## 2021-09-24 LAB — INR BLD: 3.5 (ref 0.9–1.1)

## 2021-09-24 PROCEDURE — 85610 PROTHROMBIN TIME: CPT

## 2021-09-24 PROCEDURE — 36416 COLLJ CAPILLARY BLOOD SPEC: CPT

## 2021-09-24 NOTE — PROGRESS NOTES
ANTICOAGULATION MANAGEMENT     Jennifer Higginbotham 66 year old female is on warfarin with supratherapeutic INR result. (Goal INR 2.0-3.0)    Recent labs: (last 7 days)     09/24/21  0745   INR 3.5*       ASSESSMENT     Source(s): Chart Review and Patient/Caregiver Call       Warfarin doses taken: Warfarin taken as instructed     Diet: No new diet changes identified    New illness, injury, or hospitalization: No    Medication/supplement changes: None noted     Signs or symptoms of bleeding or clotting: No   Reported no recurrence of epistaxis.   Had nose bleed while on vacation ([place]) which lasted for 4 hrs.  Was evaluated in ED. Warfarin dose was held for 3 days Does not want to experience that event again and requested to lower dose from her 7mg warfarin daily.    Previous INR: Therapeutic last visit; previously outside of goal range    Additional findings: None     PLAN     Recommended plan for no diet, medication or health factor changes affecting INR     Dosing Instructions: Hold dose then Decrease your warfarin dose (14.3% change) with next INR in 1 week       Summary  As of 9/24/2021    Full warfarin instructions:  9/24: Hold; Otherwise 7 mg every day   Next INR check:  10/8/2021             Telephone call with Jennifer ([phone]) who verbalizes understanding and agrees to plan   - Jennifer requested to decrease warfarin to 6mg for now, and we can always increase dose based on INR result.   - she is scared and concerned about previous experience with nose bleeds that could recure with supra INR.    Lab visit scheduled - INR on 9/28/21 @ [facility]    Education provided: Importance of consistent vitamin K intake, Goal range and significance of current result, Monitoring for bleeding signs and symptoms and When to seek medical attention/emergency care    Plan made per [facility] anticoagulation protocol    Aylin Pablo, RN  Anticoagulation  Clinic  9/24/2021    _______________________________________________________________________     Anticoagulation Episode Summary     Current INR goal:  2.0-3.0   TTR:  62.6 % (1 y)   Target end date:     Send INR reminders to:  [facility]    Indications    S/P AVR [Z95.2]           Comments:  Goal range changed 2/20/19 per cardiology         Anticoagulation Care Providers     Provider Role Specialty Phone number    Rafaela Woods MD Referring Family Medicine [phone]

## 2021-09-28 ENCOUNTER — LAB (OUTPATIENT)
Dept: LAB | Facility: CLINIC | Age: 66
End: 2021-09-28
Payer: COMMERCIAL

## 2021-09-28 ENCOUNTER — ANTICOAGULATION THERAPY VISIT (OUTPATIENT)
Dept: ANTICOAGULATION | Facility: CLINIC | Age: 66
End: 2021-09-28

## 2021-09-28 DIAGNOSIS — Z95.2 S/P AVR: ICD-10-CM

## 2021-09-28 DIAGNOSIS — Z95.2 S/P AVR: Primary | ICD-10-CM

## 2021-09-28 LAB — INR BLD: 1.3 (ref 0.9–1.1)

## 2021-09-28 PROCEDURE — 85610 PROTHROMBIN TIME: CPT

## 2021-09-28 PROCEDURE — 36415 COLL VENOUS BLD VENIPUNCTURE: CPT

## 2021-09-28 NOTE — PROGRESS NOTES
ANTICOAGULATION MANAGEMENT     Jennifer Higginbotham 66 year old female is on warfarin with subtherapeutic INR result. (Goal INR 2.0-3.0)    Recent labs: (last 7 days)     09/28/21  0755   INR 1.3*       ASSESSMENT     Source(s): Chart Review, Patient/Caregiver Call and Template       Warfarin doses taken: Less warfarin taken than planned which may be affecting INR    She held warfarin for 2 days, instead of 1 day.    Diet: No new diet changes identified.   Reported, she will continue Keto diet long term for now.    New illness, injury, or hospitalization: No    Medication/supplement changes: None noted    Signs or symptoms of bleeding or clotting: No   Reported no recurrence of epistaxis.    Previous INR: Supratherapeutic at 3.5 on 9/24/21.    Additional findings: None     PLAN     Recommended plan for ongoing change(s) affecting INR     Dosing Instructions:   -  Booster dose today with 8mg warfarin dose,   - then continue your current warfarin dose with 6mg daily,   - next INR in 3 days       Summary  As of 9/28/2021    Full warfarin instructions:  9/28: 8 mg; Otherwise 6 mg every day   Next INR check:  10/1/2021             Telephone call with Jennifer ([phone]) who verbalizes understanding and agrees to plan    Lab visit scheduled - INR on 9/10/1/21 @ [facility].    Education provided: Importance of consistent vitamin K intake, Goal range and significance of current result and Importance of taking warfarin as instructed    Plan made per [facility] anticoagulation protocol    Aylin Pablo RN  Anticoagulation Clinic  9/28/2021    _______________________________________________________________________     Anticoagulation Episode Summary     Current INR goal:  2.0-3.0   TTR:  63.1 % (1 y)   Target end date:     Send INR reminders to:  [facility]    Indications    S/P AVR [Z95.2]           Comments:  Goal range changed 2/20/19 per cardiology         Anticoagulation Care Providers     Provider Role Specialty  Phone number    Rafaela Woods MD Referring [facility] Medicine [phone]

## 2021-09-30 ENCOUNTER — OFFICE VISIT (OUTPATIENT)
Dept: FAMILY MEDICINE | Facility: CLINIC | Age: 66
End: 2021-09-30
Payer: COMMERCIAL

## 2021-09-30 VITALS
HEART RATE: 71 BPM | HEIGHT: 61 IN | SYSTOLIC BLOOD PRESSURE: 118 MMHG | BODY MASS INDEX: 21.17 KG/M2 | WEIGHT: 112.13 LBS | DIASTOLIC BLOOD PRESSURE: 60 MMHG

## 2021-09-30 DIAGNOSIS — M25.562 CHRONIC PAIN OF LEFT KNEE: Primary | ICD-10-CM

## 2021-09-30 DIAGNOSIS — L03.032 PARONYCHIA OF TOE, LEFT: ICD-10-CM

## 2021-09-30 DIAGNOSIS — Z23 NEED FOR PROPHYLACTIC VACCINATION AND INOCULATION AGAINST INFLUENZA: ICD-10-CM

## 2021-09-30 DIAGNOSIS — M25.552 HIP PAIN, LEFT: ICD-10-CM

## 2021-09-30 DIAGNOSIS — G89.29 CHRONIC PAIN OF LEFT KNEE: Primary | ICD-10-CM

## 2021-09-30 PROCEDURE — 99213 OFFICE O/P EST LOW 20 MIN: CPT | Mod: 25 | Performed by: FAMILY MEDICINE

## 2021-09-30 PROCEDURE — 90662 IIV NO PRSV INCREASED AG IM: CPT | Performed by: FAMILY MEDICINE

## 2021-09-30 PROCEDURE — G0008 ADMIN INFLUENZA VIRUS VAC: HCPCS | Performed by: FAMILY MEDICINE

## 2021-09-30 ASSESSMENT — MIFFLIN-ST. JEOR: SCORE: 985.98

## 2021-09-30 NOTE — PATIENT INSTRUCTIONS
Patient Education     Paronychia of the Finger or Toe  Paronychia is an infection near a fingernail or toenail. It usually occurs when an opening in the cuticle or an ingrown toenail lets bacteria under the skin.   The infection will need to be drained if pus is present. If the infection has been caught early, you may need only antibiotic treatment. Healing will take about 1 to 2 weeks.   Home care  Follow these guidelines when caring for yourself at home:     Clean and soak the toe or finger. Do this 2 times a day for the first 3 days. To do so:  ? Soak your foot or hand in a tub of warm water for 5 minutes. Or hold your toe or finger under a faucet of warm running water for 5 minutes.  ? Clean any crust away with soap and water using a cotton swab.  ? Put antibiotic ointment on the infected area.    Change the dressing daily or any time it gets dirty.    If you were given antibiotics, take them as directed until they are all gone.    If your infection is on a toe, wear comfortable shoes with a lot of toe room. You can also wear open-toed sandals while your toe heals.    You may use over-the-counter medicine (acetaminophen or ibuprofen) to help with pain, unless another medicine was prescribed. If you have chronic liver or kidney disease, talk with your healthcare provider before using these medicines. Also talk with your provider if you've had a stomach ulcer or gastrointestinal bleeding.  Prevention  The following can prevent paronychia:     Don't cut or play with your cuticles at home. A healthy cuticle maintains a seal between your skin and nail and keeps out infection.    Don't bite your nails.    Don't suck on your thumbs or fingers.  Follow-up care  Follow up with your healthcare provider, or as advised.   When to seek medical advice  Call your healthcare provider right away if any of these occur:     Redness, pain, or swelling of the finger or toe gets worse    You have trouble moving or bending the finger  or toe    Red streaks in the skin leading away from the wound    Pus or fluid draining from the nail area    Fever of 100.4 F (38 C) or higher, or as directed by your provider  Yajaira last reviewed this educational content on 7/1/2019 2000-2021 [employer]. All rights reserved. This information is not intended as a substitute for professional medical care. Always follow your healthcare professional's instructions.           Treatment for Iliotibial Band Syndrome  Iliotibial band syndrome, or IT band syndrome, is a condition that causes pain on the outside of the knee. It most often occurs in athletes, especially long-distance runners. It can happen if you cycle, ski, row, or play soccer. It can also occur in people who are starting to exercise.  Types of treatment  Treatment may include:    Avoiding any activity that makes your knee pain worse for a while (like running), and returning to this activity slowly over time    Icing the outside of your knee when it causes you pain    Taking over-the-counter pain medicines    Having corticosteroid injections, to reduce inflammation    Making changes to your activity, like lowering your bicycle seat for cycling or improving your running form    Practicing special exercises to stretch and strengthen the muscles around your hip and your knee  You may find it helpful to work with a physical therapist.  These treatments help most people with IT band syndrome. Your doctor may advise surgery if you still have severe symptoms after 6 months or more of other treatment. Your doctor will talk with you about the types of surgery.  Preventing IT band syndrome  You may be able to prevent IT band syndrome if you:    Run on even surfaces    Replace your running shoes often    Ease up on your training    On a track, make sure you run in both directions    Have an expert check your stance for running and other sporting activities    Stretch your outer thigh and hamstrings  often  If you are new to exercise, start slowly. Increase your activity over time.  Talk with your doctor or  for more advice.  When to call the healthcare provider  Call your healthcare provider right away if you have any of these:    Symptoms that get worse, or don t get better with treatment    New symptoms  Yajaira last reviewed this educational content on 5/1/2018 2000-2021 [employer]. All rights reserved. This information is not intended as a substitute for professional medical care. Always follow your healthcare professional's instructions.

## 2021-09-30 NOTE — PROGRESS NOTES
Assessment & Plan     Chronic pain of left knee  I suspect based on history and imaging that this represents IT band syndrome as opposed to degenerative change in the knee.  Recommended PT.  If no improvement, consider referral to Orthopedics.  - XR Knee Left 1/2 Views; Future  - Physical Therapy Referral; Future    Hip pain, left  Suspect trochanteric bursitis.  No significant degenerative change on XR.  Recommended PT, consider injection if no improvement.  - XR Hip Left 2-3 Views; Future  - Physical Therapy Referral; Future    Paronychia of toe, left  Discussed soaking the toe as often as possible, application of antibacterial ointment.    Need for prophylactic vaccination and inoculation against influenza  - INFLUENZA, QUAD, HIGH DOSE, PF, 65YR + (FLUZONE HD)                 Return in about 4 weeks (around 10/28/2021) for Routine preventive.    Gayle Richardson MD  [facility]    Palmer Rodriguez is a 66 year old who presents for the following health issues     HPI     Patient presents today for evaluation of left knee and hip pain.  She also mentions that yesterday her toe became red.  In terms of her knee and hip, this seems to have started with knee pain.  The discomfort comes and goes, and she walks 1 hour daily.  Her walking makes the pain worse, and she points to the lateral aspect of the knee just beneath the patella as the site of pain.  She had no injury, denies swelling in the knee, clicking or popping.  She wears a knee sleeve and this helps.  In terms of her hip, this hurts more with walking as well, and she points to the lateral aspect of her upper leg as the site of discomfort here.  She also has had no hip injury.  Yesterday after her walk, wearing smaller than usual shoes, her left 3rd toe became red, swollen and sore.    Review of Systems   Constitutional, HEENT, cardiovascular, pulmonary, gi and gu systems are negative, except as otherwise noted.      Objective  "   /60 (BP Location: Left arm, Patient Position: Sitting, Cuff Size: Adult Regular)   Pulse 71   Ht 1.549 m (5' 1\")   Wt 50.9 kg (112 lb 2 oz)   Breastfeeding No   BMI 21.19 kg/m    Body mass index is 21.19 kg/m .  Physical Exam   GENERAL: healthy, alert and no distress  RESP: lungs clear to auscultation - no rales, rhonchi or wheezes  CV: regular rate and rhythm, mechanical valve audible, no murmur auscultated  MS: left knee normal to inspection and palpation; no joint line tenderness, no obvious effusion, no instability with Lachmann and Drawer testing, Jeremy testing negative also; left hip with normal and non-painful extension, flexion, internal and external rotation; some tenderness over the trochanteric bursa  NEURO: Normal strength and tone, mentation intact and speech normal  PSYCH: mentation appears normal, affect normal/bright  SKIN: mild erythema tip of right 3rd toe centering over the lateral aspect of the nail, no fluctuance, no drainage        Results for orders placed or performed in visit on 09/30/21   XR Hip Left 2-3 Views     Status: None    Narrative    EXAM: XR HIP LEFT 2-3 VIEWS  LOCATION: [facility]  DATE/TIME: 9/30/2021 1:37 PM    INDICATION: left hip pain, suspect trochanteric bursitis, no injury  COMPARISON: None.      Impression    IMPRESSION: The left hip is negative for fracture or dislocation. The visualized portion of the left hemipelvis is negative.    Results for orders placed or performed in visit on 09/30/21   XR Knee Left 1/2 Views     Status: None    Narrative    EXAM: XR KNEE LT 1/2 VW  LOCATION: [facility]  DATE/TIME: 9/30/2021 1:37 PM    INDICATION: lateral knee pain, suspect IT band syndrome, no injury  COMPARISON: None.      Impression    IMPRESSION: The left knee is negative for fracture. Tiny effusion. No compartmental narrowing.         "

## 2021-10-01 ENCOUNTER — ANTICOAGULATION THERAPY VISIT (OUTPATIENT)
Dept: ANTICOAGULATION | Facility: CLINIC | Age: 66
End: 2021-10-01

## 2021-10-01 ENCOUNTER — LAB (OUTPATIENT)
Dept: LAB | Facility: CLINIC | Age: 66
End: 2021-10-01
Payer: COMMERCIAL

## 2021-10-01 DIAGNOSIS — Z95.2 S/P AVR: Primary | ICD-10-CM

## 2021-10-01 LAB — INR BLD: 1.8 (ref 0.9–1.1)

## 2021-10-01 PROCEDURE — 85610 PROTHROMBIN TIME: CPT

## 2021-10-01 PROCEDURE — 36416 COLLJ CAPILLARY BLOOD SPEC: CPT

## 2021-10-01 NOTE — PROGRESS NOTES
ANTICOAGULATION MANAGEMENT     Jennifer Higginbotham 66 year old female is on warfarin with subtherapeutic INR result. (Goal INR 2.0-3.0)    Recent labs: (last 7 days)     10/01/21  0729   INR 1.8*       ASSESSMENT     Source(s): Chart Review, Patient/Caregiver Call and Template       Warfarin doses taken: Warfarin taken as instructed    Diet: Yes.  she had been thinking back what might have caused her INR to go supra and experience epistaxis.  She recalled eating a bunch of sunflower seeds , which she normally does not eat.    New illness, injury, or hospitalization: No    Medication/supplement changes: None noted    Signs or symptoms of bleeding or clotting: No   No recurrences of epistaxis events.    Previous INR: Subtherapeutic at 1.3 on 9/28/21 d/t held warfarin doses    Additional findings: None     PLAN     Recommended plan for temporary change(s) affecting INR     Dosing Instructions:  (1mg / 5mg tabs)   - Booster dose with 7mg warfarin   - then continue your current warfarin dose with 6mg daily   - next INR in 4-5 days       Summary  As of 10/1/2021    Full warfarin instructions:  10/1: 7 mg; Otherwise 6 mg every day   Next INR check:  10/5/2021             Telephone call with Jennifer ([phone]) who verbalizes understanding and agrees to plan   - informed, Canola oil falls in the same category as sunflower seeds and great source of Vitamin-E; which can increase risk for bleeding.   - Jennifer stated, she will now stay away from Sunflower seeds, d/t her bad experience of epistaxis while she was in [place].    Lab visit scheduled - INR on 10/6/21 @ [facility].    Education provided: Importance of consistent vitamin K intake, Impact of vitamin K foods on INR, Vitamin K content of foods, Goal range and significance of current result, Monitoring for bleeding signs and symptoms and When to seek medical attention/emergency care    Plan made per [facility] anticoagulation protocol    Aylin Pablo, RN  Anticoagulation  Clinic  10/1/2021    _______________________________________________________________________     Anticoagulation Episode Summary     Current INR goal:  2.0-3.0   TTR:  63.1 % (1 y)   Target end date:     Send INR reminders to:  CULLEN GALVEZ    Indications    S/P AVR [Z95.2]           Comments:  Goal range changed 2/20/19 per cardiology         Anticoagulation Care Providers     Provider Role Specialty Phone number    Rafaela Woods MD Referring Family Medicine [phone]

## 2021-10-05 ENCOUNTER — MYC MEDICAL ADVICE (OUTPATIENT)
Dept: FAMILY MEDICINE | Facility: CLINIC | Age: 66
End: 2021-10-05

## 2021-10-05 DIAGNOSIS — E78.5 HYPERLIPIDEMIA: ICD-10-CM

## 2021-10-05 DIAGNOSIS — E78.5 HYPERLIPIDEMIA LDL GOAL <130: Primary | ICD-10-CM

## 2021-10-06 ENCOUNTER — ANTICOAGULATION THERAPY VISIT (OUTPATIENT)
Dept: ANTICOAGULATION | Facility: CLINIC | Age: 66
End: 2021-10-06

## 2021-10-06 ENCOUNTER — LAB (OUTPATIENT)
Dept: LAB | Facility: CLINIC | Age: 66
End: 2021-10-06
Payer: COMMERCIAL

## 2021-10-06 DIAGNOSIS — Z95.2 S/P AVR: Primary | ICD-10-CM

## 2021-10-06 DIAGNOSIS — Z95.2 S/P AVR: ICD-10-CM

## 2021-10-06 LAB — INR BLD: 2.5 (ref 0.9–1.1)

## 2021-10-06 PROCEDURE — 85610 PROTHROMBIN TIME: CPT

## 2021-10-06 PROCEDURE — 36416 COLLJ CAPILLARY BLOOD SPEC: CPT

## 2021-10-06 RX ORDER — SIMVASTATIN 20 MG
TABLET ORAL
Qty: 90 TABLET | Refills: 0 | Status: SHIPPED | OUTPATIENT
Start: 2021-10-06 | End: 2022-04-14

## 2021-10-06 NOTE — PROGRESS NOTES
ANTICOAGULATION MANAGEMENT     Jennifer Higginbotham 66 year old female is on warfarin with therapeutic INR result. (Goal INR 2.0-3.0)    Recent labs: (last 7 days)     10/06/21  0744   INR 2.5*       ASSESSMENT     Source(s): Chart Review, Patient/Caregiver Call and Template       Warfarin doses taken: Warfarin taken as instructed    Diet: No new diet changes identified    Continues Keto diet, long term.    New illness, injury, or hospitalization: No    Medication/supplement changes: None noted    Signs or symptoms of bleeding or clotting: No   Has had no recurrence of epistaxis.    Previous INR: Subtherapeutic at 1.8 on 10/1/21.    Additional findings: None     PLAN     Recommended plan for no diet, medication or health factor changes affecting INR     Dosing Instructions:  (1mg / 5mg tabs)    Continue your current warfarin dose with next INR in 1 week       Summary  As of 10/6/2021    Full warfarin instructions:  6 mg every day   Next INR check:  10/13/2021             Telephone call with Jennifer ([phone]) who verbalizes understanding and agrees to plan    Lab visit scheduled - INR on 10/13/21 @ [facility].    Education provided: Importance of consistent vitamin K intake, Goal range and significance of current result and Monitoring for bleeding signs and symptoms    Plan made per [facility] anticoagulation protocol    Aylin Pablo RN  Anticoagulation Clinic  10/6/2021    _______________________________________________________________________     Anticoagulation Episode Summary     Current INR goal:  2.0-3.0   TTR:  64.0 % (1 y)   Target end date:     Send INR reminders to:  [facility]    Indications    S/P AVR [Z95.2]           Comments:  Goal range changed 2/20/19 per cardiology         Anticoagulation Care Providers     Provider Role Specialty Phone number    Rafaela Woods MD Referring Family Medicine [phone]

## 2021-10-06 NOTE — TELEPHONE ENCOUNTER
Pending Prescriptions:                       Disp   Refills    simvastatin (ZOCOR) 20 MG tablet          90 tab*0            Sig: [SIMVASTATIN (ZOCOR) 20 MG TABLET] TAKE 1 TABLET           AT BEDTIME. DUE FOR AN OFFICE VISIT.

## 2021-10-13 ENCOUNTER — ANTICOAGULATION THERAPY VISIT (OUTPATIENT)
Dept: ANTICOAGULATION | Facility: CLINIC | Age: 66
End: 2021-10-13

## 2021-10-13 ENCOUNTER — LAB (OUTPATIENT)
Dept: LAB | Facility: CLINIC | Age: 66
End: 2021-10-13
Payer: COMMERCIAL

## 2021-10-13 DIAGNOSIS — Z95.2 S/P AVR: ICD-10-CM

## 2021-10-13 DIAGNOSIS — Z95.2 S/P AVR: Primary | ICD-10-CM

## 2021-10-13 LAB — INR BLD: 2.9 (ref 0.9–1.1)

## 2021-10-13 PROCEDURE — 85610 PROTHROMBIN TIME: CPT

## 2021-10-13 PROCEDURE — 36416 COLLJ CAPILLARY BLOOD SPEC: CPT

## 2021-10-13 NOTE — PROGRESS NOTES
ANTICOAGULATION MANAGEMENT     Jennifer Higginbotham 66 year old female is on warfarin with therapeutic INR result. (Goal INR 2.0-3.0)    Recent labs: (last 7 days)     10/13/21  0741   INR 2.9*       ASSESSMENT     Source(s): Chart Review, Patient/Caregiver Call and Template       Warfarin doses taken: Warfarin taken as instructed    Diet: No new diet changes identified    Continues on long term Keto diet.    New illness, injury, or hospitalization: No    Medication/supplement changes: None noted    Signs or symptoms of bleeding or clotting: No   Denied any epistaxis events    Previous INR: Therapeutic last visit at 2.5; previously outside of goal range at 1.9    Additional findings: None     PLAN     Recommended plan for no diet, medication or health factor changes affecting INR     Dosing Instructions:  (1mg / 5mg tabs)   -  Decrease your warfarin dose to 5mg on Wed/Sat and 6mg all other days.   - (4.8% change)  next INR in 1-2 weeks       Summary  As of 10/13/2021    Full warfarin instructions:  5 mg every Wed, Sat; 6 mg all other days   Next INR check:  10/20/2021             Telephone call with Ne  ([phone] who verbalizes understanding and agrees to plan   - Ne would like INR to be in the range of 2.0 - 2.5 preferably; so as not to experience recurrence of acute nosebleed; despite INR was at 3.1 during this event.    Lab visit scheduled - INR on 10/21/21 @ [facility].    Education provided: Importance of consistent vitamin K intake, Goal range and significance of current result, Monitoring for bleeding signs and symptoms and When to seek medical attention/emergency care    Plan made per [facility] anticoagulation protocol    Aylin Pablo RN  Anticoagulation Clinic  10/13/2021    _______________________________________________________________________     Anticoagulation Episode Summary     Current INR goal:  2.0-3.0   TTR:  65.1 % (1 y)   Target end date:     Send INR reminders to:  [facility]     Indications    S/P AVR [Z95.2]           Comments:  Goal range changed 2/20/19 per cardiology         Anticoagulation Care Providers     Provider Role Specialty Phone number    Rafaela Woods MD Referring [facility] Medicine [phone]

## 2021-10-20 ENCOUNTER — ANTICOAGULATION THERAPY VISIT (OUTPATIENT)
Dept: ANTICOAGULATION | Facility: CLINIC | Age: 66
End: 2021-10-20

## 2021-10-20 ENCOUNTER — LAB (OUTPATIENT)
Dept: LAB | Facility: CLINIC | Age: 66
End: 2021-10-20
Payer: COMMERCIAL

## 2021-10-20 DIAGNOSIS — Z95.2 S/P AVR: Primary | ICD-10-CM

## 2021-10-20 DIAGNOSIS — Z95.2 S/P AVR: ICD-10-CM

## 2021-10-20 LAB — INR BLD: 2.3 (ref 0.9–1.1)

## 2021-10-20 PROCEDURE — 36415 COLL VENOUS BLD VENIPUNCTURE: CPT

## 2021-10-20 PROCEDURE — 85610 PROTHROMBIN TIME: CPT

## 2021-10-20 NOTE — PROGRESS NOTES
ANTICOAGULATION MANAGEMENT     Jennifer Higginbotham 66 year old female is on warfarin with therapeutic INR result. (Goal INR 2.0-3.0)    Recent labs: (last 7 days)     10/20/21  0755   INR 2.3*       ASSESSMENT     Source(s): Chart Review, Patient/Caregiver Call and Template       Warfarin doses taken: Warfarin taken as instructed    Diet: No new diet changes identified    Long term Keto diet.    New illness, injury, or hospitalization: No    Medication/supplement changes: None noted    Signs or symptoms of bleeding or clotting: No   Denied anymore episodes of epistaxis.    Previous INR: Therapeutic last 2 visits    Additional findings: None     PLAN     Recommended plan for no diet, medication or health factor changes affecting INR     Dosing Instructions:   - Continue your current warfarin dose with next INR in 2 weeks       Summary  As of 10/20/2021    Full warfarin instructions:  5 mg every Wed, Sat; 6 mg all other days   Next INR check:  11/3/2021             Telephone call with Jennifer who verbalizes understanding and agrees to plan    Lab visit scheduled - INR on 10/29/21 @ [facility].    Education provided: Importance of consistent vitamin K intake, Goal range and significance of current result, Monitoring for bleeding signs and symptoms and When to seek medical attention/emergency care    Plan made per [facility] anticoagulation protocol    Aylin Pablo RN  Anticoagulation Clinic  10/20/2021    _______________________________________________________________________     Anticoagulation Episode Summary     Current INR goal:  2.0-3.0   TTR:  65.1 % (1 y)   Target end date:     Send INR reminders to:  [facility]    Indications    S/P AVR [Z95.2]           Comments:  Goal range changed 2/20/19 per cardiology         Anticoagulation Care Providers     Provider Role Specialty Phone number    Rafaela Woods MD Referring Family Medicine [phone]

## 2021-10-29 ENCOUNTER — LAB (OUTPATIENT)
Dept: LAB | Facility: CLINIC | Age: 66
End: 2021-10-29
Payer: COMMERCIAL

## 2021-10-29 ENCOUNTER — ANTICOAGULATION THERAPY VISIT (OUTPATIENT)
Dept: ANTICOAGULATION | Facility: CLINIC | Age: 66
End: 2021-10-29

## 2021-10-29 DIAGNOSIS — Z95.2 S/P AVR: Primary | ICD-10-CM

## 2021-10-29 DIAGNOSIS — Z95.2 S/P AVR: ICD-10-CM

## 2021-10-29 LAB — INR BLD: 2.2 (ref 0.9–1.1)

## 2021-10-29 PROCEDURE — 85610 PROTHROMBIN TIME: CPT

## 2021-10-29 PROCEDURE — 36416 COLLJ CAPILLARY BLOOD SPEC: CPT

## 2021-10-29 NOTE — PROGRESS NOTES
ANTICOAGULATION MANAGEMENT     Jennifer Higginbotham 66 year old female is on warfarin with therapeutic INR result. (Goal INR 2.0-3.0)    Recent labs: (last 7 days)     10/29/21  0801   INR 2.2*       ASSESSMENT     Source(s): Chart Review, Patient/Caregiver Call and Template       Warfarin doses taken: Warfarin taken as instructed    Diet: No new diet changes identified    Follows keto diet - long term.    New illness, injury, or hospitalization: No    Medication/supplement changes: None noted    Signs or symptoms of bleeding or clotting: No    Previous INR: Therapeutic last 3 INR visits    Additional findings: None     PLAN     Recommended plan for no diet, medication or health factor changes affecting INR     Dosing Instructions:   (1mg / 5mg tabs)   Continue your current warfarin dose with next INR in 2-3 weeks       Summary  As of 10/29/2021    Full warfarin instructions:  5 mg every Wed, Sat; 6 mg all other days   Next INR check:  11/19/2021             Telephone call with Jennifer  ([phone]) who verbalizes understanding and agrees to plan.   - has denied any abnormal bleeding.  Feels comfortable extending next INR check.   - did advised to come in sooner for INR, if any abnormal bleeding.    Lab visit scheduled - INR on 11/19/21 @ [facility].    Education provided: Importance of consistent vitamin K intake, Impact of vitamin K foods on INR, Goal range and significance of current result, Monitoring for bleeding signs and symptoms and When to seek medical attention/emergency care    Plan made per [facility] anticoagulation protocol    Aylin Pablo, RN  Anticoagulation Clinic  10/29/2021    _______________________________________________________________________     Anticoagulation Episode Summary     Current INR goal:  2.0-3.0   TTR:  66.2 % (1 y)   Target end date:     Send INR reminders to:  CULLEN GALVEZ    Indications    S/P AVR [Z95.2]           Comments:  Goal range changed 2/20/19 per cardiology          Anticoagulation Care Providers     Provider Role Specialty Phone number    Rafaela Woods MD Referring Family Medicine [phone]

## 2021-11-01 ENCOUNTER — HOSPITAL ENCOUNTER (OUTPATIENT)
Dept: PHYSICAL THERAPY | Facility: REHABILITATION | Age: 66
End: 2021-11-01
Attending: FAMILY MEDICINE
Payer: COMMERCIAL

## 2021-11-01 DIAGNOSIS — M25.552 HIP PAIN, LEFT: ICD-10-CM

## 2021-11-01 DIAGNOSIS — G89.29 CHRONIC PAIN OF LEFT KNEE: ICD-10-CM

## 2021-11-01 DIAGNOSIS — M25.562 CHRONIC PAIN OF LEFT KNEE: ICD-10-CM

## 2021-11-01 PROCEDURE — 97161 PT EVAL LOW COMPLEX 20 MIN: CPT | Mod: GP

## 2021-11-01 PROCEDURE — 97110 THERAPEUTIC EXERCISES: CPT | Mod: GP

## 2021-11-02 NOTE — PROGRESS NOTES
[facility]          OUTPATIENT PHYSICAL THERAPY ORTHOPEDIC EVALUATION  PLAN OF TREATMENT FOR OUTPATIENT REHABILITATION  (COMPLETE FOR INITIAL CLAIMS ONLY)  Patient's Last Name, First Name, M.I.  YOB: 1955  Jennifer Higginbotham    Provider s Name:  [facility]   Medical Record No.  [id number]   Start of Care Date:  11/01/21   Onset Date:      Type:     _X__PT   ___OT   ___SLP Medical Diagnosis:  M25.562, G89.29 (ICD-10-CM) - Chronic pain of left knee     PT Diagnosis:  L knee and hip pain, L LE and hip weakness, L sided balance deficit    Visits from SOC:  1      _________________________________________________________________________________  Plan of Treatment/Functional Goals:  ADL retraining, balance training, joint mobilization, manual therapy, ROM, strengthening, stretching, neuromuscular re-education, gait training     Cryotherapy, Electrical stimulation, Hot packs, TENS, Traction, Ultrasound     Goals  Goal Identifier: HEP  Goal Description: Patient will demonstrate independence with home exercise program to facilitate improvement in function.   Target Date: 11/30/21    Goal Identifier: Pain  Goal Description: Patient will report pain levels no greater than 3/10 at worst to improve quality of life.   Target Date: 12/28/21    Goal Identifier: Strength  Goal Description: Patient will improve L sided LE strength to at least 4+/5 to improve functional mobility and off-load knee and hip joints.   Target Date: 12/28/21    Goal Identifier: Sitting  Goal Description: Patient will tolerate sitting for at least 1 hour without increase in symptoms to be able to complete seated ADLs such as driving.   Target Date: 12/28/21                 Therapy Frequency:  1 time/week  Predicted Duration of Therapy Intervention:  10 weeks     Scott Parmer, PT, DPT                 I CERTIFY THE NEED FOR THESE SERVICES FURNISHED UNDER        THIS PLAN OF TREATMENT  AND WHILE UNDER MY CARE     (Physician co-signature of this document indicates review and certification of the therapy plan).                       Certification Date From:  11/01/21   Certification Date To:  01/03/22    Referring Provider:  Gayle Richardson MD    Initial Assessment        See Epic Evaluation Start of Care Date: 11/01/21 11/01/21 1500   General Information   Type of Visit Initial OP Ortho PT Evaluation   Start of Care Date 11/01/21   Referring Physician Gayle Richardson MD   Patient/Family Goals Statement Decrease pain    Orders Evaluate and Treat   Date of Order 09/30/21   Certification Required? Yes   Medical Diagnosis M25.562, G89.29 (ICD-10-CM) - Chronic pain of left knee   Surgical/Medical history reviewed Yes   Precautions/Limitations no known precautions/limitations   General Information Comments Evaluation slightly abbreviated this date due to patient arriving about 10 minutes late.        Present No   Body Part(s)   Body Part(s) Hip;Knee   Presentation and Etiology   Pertinent history of current problem (include personal factors and/or comorbidities that impact the POC) Patient seems to have intermittent knee and hip pain. Patient reports that the cold weather seems to have brought on the knee pain. Functionally, patient has had difficulty with exercising, sitting in the car for long periods of time, and prolonged walking. Patient is fairly active and walks everyday as well as rides a stationary bike at home. Patient feels her hip is worse than the knee.    Impairments A. Pain;B. Decreased WB tolerance;F. Decreased strength and endurance;G. Impaired balance   Functional Limitations perform activities of daily living;perform desired leisure / sports activities   Symptom Location L buttock/hip and knee    How/Where did it occur With a fall;From insidious onset   Chronicity Chronic   Pain rating (0-10 point scale) Best (/10);Worst (/10)   Best (/10)  0   Worst (/10) 8   Pain quality B. Dull;C. Aching;A. Sharp   Frequency of pain/symptoms B. Intermittent   Pain/symptoms are:   (hard to tell)   Pain/symptoms exacerbated by A. Sitting;B. Walking;G. Certain positions   Pain/symptoms eased by I. OTC medication(s);C. Rest   Progression of symptoms since onset: Worsened   Current / Previous Interventions   Diagnostic Tests: X-ray   X-ray Results unremarkable  (L knee and hip)   Fall Risk Screen   Fall screen completed by PT   Have you fallen 2 or more times in the past year? No   Have you fallen and had an injury in the past year? No   Is patient a fall risk? No   Abuse Screen (yes response referral indicated)   Feels Unsafe at Home or Work/School no   Feels Threatened by Someone no   Does Anyone Try to Keep You From Having Contact with Others or Doing Things Outside Your Home? no   Physical Signs of Abuse Present no   System Outcome Measures   Outcome Measures   (LEFS: 58/80)   Knee Objective Findings   Side (if bilateral, select both right and left) Right;Left   Observation Decreased muscle mass to L quad   Integumentary  No swelling or ecchymosis noted    Posture Slight anterior pelvic tilt, forward shoulders, L shoulder sits higher due to scoliosis with L rib hump during forward bend test    Gait/Locomotion Slight trendelenburg lean to the L during stance, decreased arm swing on L    Balance/Proprioception (Single Leg Stance) R: 22s L: 25s (increased unsteadiness)    Foot Position In Standing B foot pronation noted    Knee ROM Comment Slightly limited extension on L with pain    Lachmans Test (-) B   Anterior Drawer Test (-) B   Posterior Drawer Test (-) B   Varus Stress Test (-) B    Valgus Stress Test (-) B    Jeremy's Test (-) B    Apprehension Test (+) L    Palpation No palpable tenderness to B knee joint lines, torres-patellar areas, quadriceps   Right Knee Extension AROM 0 degrees    Right Knee Extension PROM 0 degrees with overpressure   Right Knee Flexion  AROM 145 degrees    Right Knee Flexion Strength 5/5   Right Knee Extension Strength 5/5    Right Hip Abduction Strength 4/5 with hip flexor compensation   Right Quad Set Strength Able to complete SLR    Right Hamstring Flexibility 90/90: 0 degrees    Right Hip Flexor Flexibility Jonathon test: (-) for quad or hip flexor tightness    Right ITB Flexibility Jason's: (-)    Left Knee Extension AROM lacking about 2 degrees   Left Knee Extension PROM -2 degrees, slight pain with overpressure    Left Knee Flexion AROM 145 degrees, no pain    Left Knee Flexion PROM No pain with overpressure    Left Knee Flexion Strength 5/5    Left Knee Extension Strength 4/5    Left Hip Abduction Strength 3+/5 with hip flexor compensation    Left Quad Set Strength Able to complete SLR, but shaky    Left Hamstring Flexibility 90/90: 0 degrees    Left Hip Flexor Flexibility Jonathon test: (-) for quad or hip flexor tightness    Left ITB Flexibility Jason's: (-)    Hip Objective Findings   Side (if bilateral, select both right and left) Right;Left   Hip ROM Comments Assessed passively in supine: WNL B    Lumbar ROM Full lumbar ROM noted, noted L sided rib hump on forward bend test   Pelvic Screen ASIS assessed in supine: level    Scour Test Pain with scour on L    Right Hip Flexion Strength 5/5   Right Hip Extension Strength 4/5   Right Hip IR Strength 5/5   Right Hip ER Strength 5/5   Left Hip Flexion Strength 4/5   Left Hip Extension Strength 3+/5    Left Hip IR Strength 4/5   Left Hip ER Strength 4/5    Left Piriformis Flexibility Tightness noted B, L>R    Planned Therapy Interventions   Planned Therapy Interventions ADL retraining;balance training;joint mobilization;manual therapy;ROM;strengthening;stretching;neuromuscular re-education;gait training   Planned Modality Interventions   Planned Modality Interventions Cryotherapy;Electrical stimulation;Hot packs;TENS;Traction;Ultrasound   Clinical Impression   Criteria for Skilled Therapeutic  Interventions Met yes, treatment indicated   PT Diagnosis L knee and hip pain, L LE and hip weakness, L sided balance deficit    Functional limitations due to impairments Prolonged sitting, walking, standing, exercising    Clinical Presentation Stable/Uncomplicated   Clinical Decision Making (Complexity) Low complexity   Therapy Frequency 1 time/week   Predicted Duration of Therapy Intervention (days/wks) 10 weeks    Risk & Benefits of therapy have been explained Yes   Patient, Family & other staff in agreement with plan of care Yes   Clinical Impression Comments Patient is a 66 year old female presenting to physical therapy with L sided knee and hip pain secondary to L sided weakness, OA, and some likely soft tissue and bursa involvement. Patient also has noted atrophy of L quad and hip musculature,  indicative of weakness. This is likely putting more stress on the hip/knee joints as well as other supporting soft tissue structures. Patient's prognosis to improve function and pain is good due to patient's current high activity levels and willingness to participate in physical therapy. Patient would benefit from physical therapy services to address noted impairments and improve function.    ORTHO GOALS   PT Ortho Eval Goals 1;2;3;4   Ortho Goal 1   Goal Identifier HEP   Goal Description Patient will demonstrate independence with home exercise program to facilitate improvement in function.    Target Date 11/30/21   Ortho Goal 2   Goal Identifier Pain   Goal Description Patient will report pain levels no greater than 3/10 at worst to improve quality of life.    Target Date 12/28/21   Ortho Goal 3   Goal Identifier Strength   Goal Description Patient will improve L sided LE strength to at least 4+/5 to improve functional mobility and off-load knee and hip joints.    Target Date 12/28/21   Ortho Goal 4   Goal Identifier Sitting   Goal Description Patient will tolerate sitting for at least 1 hour without increase in  symptoms to be able to complete seated ADLs such as driving.    Target Date 12/28/21   Total Evaluation Time   PT Eval, Low Complexity Minutes (21773) 25   Therapy Certification   Certification date from 11/01/21   Certification date to 01/03/22   Medical Diagnosis M25.562, G89.29 (ICD-10-CM) - Chronic pain of left knee     Scott Parmer, PT, DPT

## 2021-11-10 ENCOUNTER — HOSPITAL ENCOUNTER (OUTPATIENT)
Dept: PHYSICAL THERAPY | Facility: REHABILITATION | Age: 66
End: 2021-11-10
Payer: COMMERCIAL

## 2021-11-10 DIAGNOSIS — G89.29 CHRONIC PAIN OF LEFT KNEE: Primary | ICD-10-CM

## 2021-11-10 DIAGNOSIS — M25.562 CHRONIC PAIN OF LEFT KNEE: Primary | ICD-10-CM

## 2021-11-10 DIAGNOSIS — M25.552 HIP PAIN, LEFT: ICD-10-CM

## 2021-11-10 PROCEDURE — 97110 THERAPEUTIC EXERCISES: CPT | Mod: GP

## 2021-11-19 ENCOUNTER — LAB (OUTPATIENT)
Dept: LAB | Facility: CLINIC | Age: 66
End: 2021-11-19
Payer: COMMERCIAL

## 2021-11-19 ENCOUNTER — DOCUMENTATION ONLY (OUTPATIENT)
Dept: FAMILY MEDICINE | Facility: CLINIC | Age: 66
End: 2021-11-19

## 2021-11-19 ENCOUNTER — ANTICOAGULATION THERAPY VISIT (OUTPATIENT)
Dept: ANTICOAGULATION | Facility: CLINIC | Age: 66
End: 2021-11-19

## 2021-11-19 DIAGNOSIS — Z95.2 S/P AVR (AORTIC VALVE REPLACEMENT): Primary | ICD-10-CM

## 2021-11-19 DIAGNOSIS — Z95.2 S/P AVR: ICD-10-CM

## 2021-11-19 DIAGNOSIS — Z79.01 LONG TERM CURRENT USE OF ANTICOAGULANT THERAPY: ICD-10-CM

## 2021-11-19 DIAGNOSIS — Z95.2 S/P AVR: Primary | ICD-10-CM

## 2021-11-19 LAB — INR BLD: 2.8 (ref 0.9–1.1)

## 2021-11-19 PROCEDURE — 36416 COLLJ CAPILLARY BLOOD SPEC: CPT

## 2021-11-19 PROCEDURE — 85610 PROTHROMBIN TIME: CPT

## 2021-11-19 NOTE — PROGRESS NOTES
ANTICOAGULATION MANAGEMENT      Jennifer Higginbotham due for annual renewal of referral to anticoagulation monitoring. Order pended for your review and signature.      ANTICOAGULATION SUMMARY      Warfarin indication(s)     Heart Valve Replacement    Heart valve present?  Mechanical AVR-  Lanre-Shiley (Tilting disc), [place] (Caged Ball) or Monoleaflet valve       Current goal range   INR: 2.0-3.0     Goal appropriate for indication? Yes, INR 2-3 appropriate for hx of DVT, PE, hypercoagulable state, Afib, LVAD, or bileaflet AVR without risk factors     Current duration of therapy Indefinite/long term therapy   Time in Therapeutic Range (TTR)  (Goal > 60%) 68.8%       Office visit with referring provider's group within last year yes on 9/30/21       Aylin Pablo RN

## 2021-11-19 NOTE — PROGRESS NOTES
ANTICOAGULATION MANAGEMENT     Jennifer Higginbotham 66 year old female is on warfarin with therapeutic INR result. (Goal INR 2.0-3.0)    Recent labs: (last 7 days)     11/19/21  0751   INR 2.8*       ASSESSMENT     Source(s): Chart Review, Patient/Caregiver Call and Template       Warfarin doses taken: Warfarin taken as instructed    Diet: No new diet changes identified    New illness, injury, or hospitalization: No    Medication/supplement changes: None noted    Signs or symptoms of bleeding or clotting: No    Previous INR: Therapeutic last 4 INR visits    Additional findings: None     PLAN     Recommended plan for no diet, medication or health factor changes affecting INR     Dosing Instructions:  Decrease your warfarin dose (2.5% change) with next INR in 2 weeks       Summary  As of 11/19/2021    Full warfarin instructions:  5 mg every Mon, Wed, Sat; 6 mg all other days   Next INR check:  12/3/2021             Telephone call with  Jennifer ([phone]) who verbalizes understanding and agrees to plan    - pt requested slighly lowered warfarin dose to keep INR 2.0 - 2.5  She had bad experience with bloody nose.   - also advised to add extra spoonful serving of Vitamin-K to her usual intake.    Lab visit scheduled - INR on 12/3/21 @ [facility].    Education provided: Importance of consistent vitamin K intake, Goal range and significance of current result and Monitoring for bleeding signs and symptoms    Plan made per [facility] anticoagulation protocol    Aylin Pablo RN  Anticoagulation Clinic  11/19/2021    _______________________________________________________________________     Anticoagulation Episode Summary     Current INR goal:  2.0-3.0   TTR:  68.8 % (1 y)   Target end date:     Send INR reminders to:  [facility]    Indications    S/P AVR [Z95.2]           Comments:  Goal range changed 2/20/19 per cardiology         Anticoagulation Care Providers     Provider Role Specialty Phone number    Chuck,  Rafaela SUBRAMANIAN MD [facility] [phone]

## 2021-11-26 ENCOUNTER — HOSPITAL ENCOUNTER (OUTPATIENT)
Dept: PHYSICAL THERAPY | Facility: REHABILITATION | Age: 66
End: 2021-11-26
Payer: COMMERCIAL

## 2021-11-26 DIAGNOSIS — M25.552 HIP PAIN, LEFT: ICD-10-CM

## 2021-11-26 DIAGNOSIS — G89.29 CHRONIC PAIN OF LEFT KNEE: Primary | ICD-10-CM

## 2021-11-26 DIAGNOSIS — M25.562 CHRONIC PAIN OF LEFT KNEE: Primary | ICD-10-CM

## 2021-11-26 PROCEDURE — 97110 THERAPEUTIC EXERCISES: CPT | Mod: GP

## 2021-11-26 PROCEDURE — 97140 MANUAL THERAPY 1/> REGIONS: CPT | Mod: GP

## 2021-12-03 ENCOUNTER — ANTICOAGULATION THERAPY VISIT (OUTPATIENT)
Dept: ANTICOAGULATION | Facility: CLINIC | Age: 66
End: 2021-12-03

## 2021-12-03 ENCOUNTER — LAB (OUTPATIENT)
Dept: LAB | Facility: CLINIC | Age: 66
End: 2021-12-03

## 2021-12-03 ENCOUNTER — HOSPITAL ENCOUNTER (OUTPATIENT)
Dept: PHYSICAL THERAPY | Facility: REHABILITATION | Age: 66
End: 2021-12-03
Payer: COMMERCIAL

## 2021-12-03 DIAGNOSIS — Z95.2 S/P AVR (AORTIC VALVE REPLACEMENT): ICD-10-CM

## 2021-12-03 DIAGNOSIS — Z79.01 LONG TERM CURRENT USE OF ANTICOAGULANT THERAPY: ICD-10-CM

## 2021-12-03 DIAGNOSIS — M25.562 CHRONIC PAIN OF LEFT KNEE: Primary | ICD-10-CM

## 2021-12-03 DIAGNOSIS — M25.552 HIP PAIN, LEFT: ICD-10-CM

## 2021-12-03 DIAGNOSIS — G89.29 CHRONIC PAIN OF LEFT KNEE: Primary | ICD-10-CM

## 2021-12-03 DIAGNOSIS — Z95.2 S/P AVR: Primary | ICD-10-CM

## 2021-12-03 LAB — INR BLD: 2.9 (ref 0.9–1.1)

## 2021-12-03 PROCEDURE — 85610 PROTHROMBIN TIME: CPT

## 2021-12-03 PROCEDURE — 97140 MANUAL THERAPY 1/> REGIONS: CPT | Mod: GP

## 2021-12-03 PROCEDURE — 97110 THERAPEUTIC EXERCISES: CPT | Mod: GP

## 2021-12-03 PROCEDURE — 36415 COLL VENOUS BLD VENIPUNCTURE: CPT

## 2021-12-03 NOTE — PROGRESS NOTES
ANTICOAGULATION MANAGEMENT     Jennifer Higginbotham 66 year old female is on warfarin with therapeutic INR result. (Goal INR 2.0-3.0)    Recent labs: (last 7 days)     12/03/21  0816   INR 2.9*       ASSESSMENT     Source(s): Chart Review, Patient/Caregiver Call and Template       Warfarin doses taken: Warfarin taken as instructed    Diet: No new diet changes identified.   Started 3 days ago a Keto cereal (which is all plant based)    Continues to follow Keto diet.  (this will be long term)    New illness, injury, or hospitalization: Yes:   attends PT for left knee and hip pain since 11/1/21.    Medication/supplement changes: None noted    Signs or symptoms of bleeding or clotting: Yes:    Did report nose bleed, but was able to stop bleeding immediately.    Previous INR: Therapeutic last 5 INR visits    Additional findings:  Yes.  INR is increasing, and would prefer INR to be closer to 2.0 - 2.5, d/t bad experience of epistaxis during her vacation - [place].     PLAN     Recommended plan for ongoing change(s) affecting INR     Dosing Instructions:   (evenings. 1mg and 5mg tabs)   -  Decrease your warfarin dose (5.1% change) with next INR in 1-2 weeks       Summary  As of 12/3/2021    Full warfarin instructions:  6 mg every Tue, Fri; 5 mg all other days   Next INR check:  12/17/2021             Telephone call with  Jennifer ([phone]) who verbalizes understanding and agrees to plan    Lab visit scheduled - INR on 12/10/21 @ [facility].    Education provided: Importance of consistent vitamin K intake, Goal range and significance of current result, Monitoring for bleeding signs and symptoms and When to seek medical attention/emergency care    Plan made per [facility] anticoagulation protocol    Aylin Pablo, RN  Anticoagulation Clinic  12/3/2021    _______________________________________________________________________     Anticoagulation Episode Summary     Current INR goal:  2.0-3.0   TTR:  72.6 % (1 y)   Target end date:   Indefinite   Send INR reminders to:  [facility]    Indications    S/P AVR [Z95.2]  S/P AVR (aortic valve replacement) [Z95.2]  Long term current use of anticoagulant therapy [Z79.01]           Comments:  Goal range changed 2/20/19 per cardiology         Anticoagulation Care Providers     Provider Role Specialty Phone number    Rafaela Woods MD Referring [facility] Medicine [phone]

## 2021-12-10 ENCOUNTER — LAB (OUTPATIENT)
Dept: LAB | Facility: CLINIC | Age: 66
End: 2021-12-10

## 2021-12-10 ENCOUNTER — ANTICOAGULATION THERAPY VISIT (OUTPATIENT)
Dept: ANTICOAGULATION | Facility: CLINIC | Age: 66
End: 2021-12-10

## 2021-12-10 ENCOUNTER — HOSPITAL ENCOUNTER (OUTPATIENT)
Dept: PHYSICAL THERAPY | Facility: REHABILITATION | Age: 66
End: 2021-12-10
Payer: COMMERCIAL

## 2021-12-10 DIAGNOSIS — Z95.2 S/P AVR (AORTIC VALVE REPLACEMENT): ICD-10-CM

## 2021-12-10 DIAGNOSIS — M25.562 CHRONIC PAIN OF LEFT KNEE: Primary | ICD-10-CM

## 2021-12-10 DIAGNOSIS — M25.552 HIP PAIN, LEFT: ICD-10-CM

## 2021-12-10 DIAGNOSIS — G89.29 CHRONIC PAIN OF LEFT KNEE: Primary | ICD-10-CM

## 2021-12-10 DIAGNOSIS — Z95.2 S/P AVR: Primary | ICD-10-CM

## 2021-12-10 DIAGNOSIS — Z79.01 LONG TERM CURRENT USE OF ANTICOAGULANT THERAPY: ICD-10-CM

## 2021-12-10 LAB — INR BLD: 2.7 (ref 0.9–1.1)

## 2021-12-10 PROCEDURE — 85610 PROTHROMBIN TIME: CPT

## 2021-12-10 PROCEDURE — 97140 MANUAL THERAPY 1/> REGIONS: CPT | Mod: GP

## 2021-12-10 PROCEDURE — 97110 THERAPEUTIC EXERCISES: CPT | Mod: GP

## 2021-12-10 PROCEDURE — 36416 COLLJ CAPILLARY BLOOD SPEC: CPT

## 2021-12-10 NOTE — PROGRESS NOTES
ANTICOAGULATION MANAGEMENT     Jennifer Higginbotham 66 year old female is on warfarin with therapeutic INR result. (Goal INR 2.0-3.0)    Recent labs: (last 7 days)     12/10/21  0817   INR 2.7*       ASSESSMENT     Source(s): Chart Review, Patient/Caregiver Call and Template       Warfarin doses taken: Warfarin taken as instructed    Diet: No new diet changes identified    New illness, injury, or hospitalization: No    Medication/supplement changes: None noted    Signs or symptoms of bleeding or clotting: No.   Denied any epistaxis events.    Previous INR: Therapeutic last 6 INR visits.    Additional findings: None     PLAN     Recommended plan for no diet, medication or health factor changes affecting INR     Dosing Instructions:   (evenings. 1mg / 5mg tabs)    Continue your current warfarin dose with next INR in 2 weeks       Summary  As of 12/10/2021    Full warfarin instructions:  6 mg every Tue, Fri; 5 mg all other days   Next INR check:  12/24/2021             Telephone call with  Jennifer ([phone]) who verbalizes understanding and agrees to plan    Lab visit scheduled - INR on 12/23/21 @ [facility].    Education provided: Importance of consistent vitamin K intake, Goal range and significance of current result and Monitoring for bleeding signs and symptoms    Plan made per [facility] anticoagulation protocol    Aylin Pablo RN  Anticoagulation Clinic  12/10/2021    _______________________________________________________________________     Anticoagulation Episode Summary     Current INR goal:  2.0-3.0   TTR:  74.5 % (1 y)   Target end date:  Indefinite   Send INR reminders to:  [facility]    Indications    S/P AVR [Z95.2]  S/P AVR (aortic valve replacement) [Z95.2]  Long term current use of anticoagulant therapy [Z79.01]           Comments:  Goal range changed 2/20/19 per cardiology         Anticoagulation Care Providers     Provider Role Specialty Phone number    Rafaela Woods MD Referring  Family Medicine [phone]

## 2021-12-13 ENCOUNTER — OFFICE VISIT (OUTPATIENT)
Dept: FAMILY MEDICINE | Facility: CLINIC | Age: 66
End: 2021-12-13
Payer: COMMERCIAL

## 2021-12-13 VITALS
BODY MASS INDEX: 20.86 KG/M2 | RESPIRATION RATE: 16 BRPM | HEART RATE: 58 BPM | TEMPERATURE: 97.7 F | SYSTOLIC BLOOD PRESSURE: 138 MMHG | HEIGHT: 62 IN | WEIGHT: 113.38 LBS | DIASTOLIC BLOOD PRESSURE: 76 MMHG

## 2021-12-13 DIAGNOSIS — Z95.2 S/P AVR (AORTIC VALVE REPLACEMENT): ICD-10-CM

## 2021-12-13 DIAGNOSIS — F32.A DEPRESSION, UNSPECIFIED DEPRESSION TYPE: ICD-10-CM

## 2021-12-13 DIAGNOSIS — Z00.00 ENCOUNTER FOR ANNUAL WELLNESS EXAM IN MEDICARE PATIENT: ICD-10-CM

## 2021-12-13 DIAGNOSIS — I10 ESSENTIAL HYPERTENSION: ICD-10-CM

## 2021-12-13 DIAGNOSIS — D12.6 ADENOMATOUS POLYP OF COLON, UNSPECIFIED PART OF COLON: ICD-10-CM

## 2021-12-13 DIAGNOSIS — Z12.11 SCREEN FOR COLON CANCER: ICD-10-CM

## 2021-12-13 DIAGNOSIS — Z78.0 MENOPAUSE: ICD-10-CM

## 2021-12-13 DIAGNOSIS — I71.03 DISSECTION OF THORACOABDOMINAL AORTA (H): ICD-10-CM

## 2021-12-13 DIAGNOSIS — Z12.11 SCREENING FOR COLON CANCER: ICD-10-CM

## 2021-12-13 DIAGNOSIS — Z00.00 ENCOUNTER FOR MEDICARE ANNUAL WELLNESS EXAM: Primary | ICD-10-CM

## 2021-12-13 DIAGNOSIS — E03.9 HYPOTHYROIDISM, UNSPECIFIED TYPE: ICD-10-CM

## 2021-12-13 DIAGNOSIS — E78.5 HYPERLIPIDEMIA, UNSPECIFIED HYPERLIPIDEMIA TYPE: ICD-10-CM

## 2021-12-13 DIAGNOSIS — M81.0 OSTEOPOROSIS, UNSPECIFIED OSTEOPOROSIS TYPE, UNSPECIFIED PATHOLOGICAL FRACTURE PRESENCE: ICD-10-CM

## 2021-12-13 DIAGNOSIS — E04.1 THYROID NODULE: ICD-10-CM

## 2021-12-13 DIAGNOSIS — D32.9 MENINGIOMA (H): ICD-10-CM

## 2021-12-13 DIAGNOSIS — Z79.01 LONG TERM CURRENT USE OF ANTICOAGULANT THERAPY: ICD-10-CM
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
## 2021-12-13 LAB
ALBUMIN SERPL-MCNC: 4.3 G/DL (ref 3.5–5)
ALP SERPL-CCNC: 62 U/L (ref 45–120)
ALT SERPL W P-5'-P-CCNC: 21 U/L (ref 0–45)
ANION GAP SERPL CALCULATED.3IONS-SCNC: 12 MMOL/L (ref 5–18)
AST SERPL W P-5'-P-CCNC: 28 U/L (ref 0–40)
BILIRUB SERPL-MCNC: 0.5 MG/DL (ref 0–1)
BUN SERPL-MCNC: 19 MG/DL (ref 8–22)
CALCIUM SERPL-MCNC: 9.6 MG/DL (ref 8.5–10.5)
CHLORIDE BLD-SCNC: 98 MMOL/L (ref 98–107)
CHOLEST SERPL-MCNC: 170 MG/DL
CO2 SERPL-SCNC: 27 MMOL/L (ref 22–31)
CREAT SERPL-MCNC: 0.84 MG/DL (ref 0.6–1.1)
ERYTHROCYTE [DISTWIDTH] IN BLOOD BY AUTOMATED COUNT: 13.2 % (ref 10–15)
FASTING STATUS PATIENT QL REPORTED: YES
GFR SERPL CREATININE-BSD FRML MDRD: 73 ML/MIN/1.73M2
GLUCOSE BLD-MCNC: 89 MG/DL (ref 70–125)
HCT VFR BLD AUTO: 40.5 % (ref 35–47)
HDLC SERPL-MCNC: 51 MG/DL
HGB BLD-MCNC: 13.1 G/DL (ref 11.7–15.7)
LDLC SERPL CALC-MCNC: 96 MG/DL
MCH RBC QN AUTO: 26.4 PG (ref 26.5–33)
MCHC RBC AUTO-ENTMCNC: 32.3 G/DL (ref 31.5–36.5)
MCV RBC AUTO: 82 FL (ref 78–100)
PLATELET # BLD AUTO: 171 10E3/UL (ref 150–450)
POTASSIUM BLD-SCNC: 3.7 MMOL/L (ref 3.5–5)
PROT SERPL-MCNC: 7 G/DL (ref 6–8)
RBC # BLD AUTO: 4.97 10E6/UL (ref 3.8–5.2)
SODIUM SERPL-SCNC: 137 MMOL/L (ref 136–145)
TRIGL SERPL-MCNC: 116 MG/DL
TSH SERPL DL<=0.005 MIU/L-ACNC: 4.77 UIU/ML (ref 0.3–5)
WBC # BLD AUTO: 4 10E3/UL (ref 4–11)

## 2021-12-13 PROCEDURE — 84443 ASSAY THYROID STIM HORMONE: CPT | Performed by: FAMILY MEDICINE

## 2021-12-13 PROCEDURE — 82306 VITAMIN D 25 HYDROXY: CPT | Performed by: FAMILY MEDICINE

## 2021-12-13 PROCEDURE — 99397 PER PM REEVAL EST PAT 65+ YR: CPT | Mod: 25 | Performed by: FAMILY MEDICINE

## 2021-12-13 PROCEDURE — 99213 OFFICE O/P EST LOW 20 MIN: CPT | Mod: 25 | Performed by: FAMILY MEDICINE

## 2021-12-13 PROCEDURE — 80061 LIPID PANEL: CPT | Performed by: FAMILY MEDICINE

## 2021-12-13 PROCEDURE — 85027 COMPLETE CBC AUTOMATED: CPT | Performed by: FAMILY MEDICINE

## 2021-12-13 PROCEDURE — 80053 COMPREHEN METABOLIC PANEL: CPT | Performed by: FAMILY MEDICINE

## 2021-12-13 PROCEDURE — G0009 ADMIN PNEUMOCOCCAL VACCINE: HCPCS | Performed by: FAMILY MEDICINE

## 2021-12-13 PROCEDURE — 90732 PPSV23 VACC 2 YRS+ SUBQ/IM: CPT | Performed by: FAMILY MEDICINE

## 2021-12-13 PROCEDURE — 36415 COLL VENOUS BLD VENIPUNCTURE: CPT | Performed by: FAMILY MEDICINE

## 2021-12-13 PROCEDURE — 87624 HPV HI-RISK TYP POOLED RSLT: CPT | Performed by: FAMILY MEDICINE

## 2021-12-13 PROCEDURE — G0123 SCREEN CERV/VAG THIN LAYER: HCPCS | Performed by: FAMILY MEDICINE

## 2021-12-13 ASSESSMENT — MIFFLIN-ST. JEOR: SCORE: 1003.55

## 2021-12-13 ASSESSMENT — ACTIVITIES OF DAILY LIVING (ADL): CURRENT_FUNCTION: NO ASSISTANCE NEEDED

## 2021-12-13 NOTE — PROGRESS NOTES
"SUBJECTIVE:   Jennifer Higginbotham is a 66 year old female who presents for Preventive Visit.      Patient has been advised of split billing requirements and indicates understanding: Yes   Are you in the first 12 months of your Medicare coverage?  No    Healthy Habits:     In general, how would you rate your overall health?  Good    Frequency of exercise:  6-7 days/week    Duration of exercise:  Greater than 60 minutes    Do you usually eat at least 4 servings of fruit and vegetables a day, include whole grains    & fiber and avoid regularly eating high fat or \"junk\" foods?  No    Taking medications regularly:  Yes    Medication side effects:  None    Ability to successfully perform activities of daily living:  No assistance needed    Home Safety:  No safety concerns identified    Hearing Impairment:  Difficulty understanding soft or whispered speech    In the past 6 months, have you been bothered by leaking of urine?  No    In general, how would you rate your overall mental or emotional health?  Good      PHQ-2 Total Score: 0    Additional concerns today:  No    HPI:  Fell and hit right side of forehead head 3 months ago.  Recent headache few days ago.  No recent fall or head trauma.  No headache today.  This headache is a new finding for her.  She is currently on Coumadin.    History of ruptured aortic aneurysm.  She does follow with cardiology and they do yearly CT scans.    Health Maintenance   Topic Date Due     ANNUAL REVIEW OF  ORDERS  Today     COLORECTAL CANCER SCREENING  01/14/2021, ordered and can set in the spring     FALL RISK ASSESSMENT  Discussed     Pneumococcal Vaccine: 65+ Years (2 of 2 - PPSV23) Today     DEXA  03/04/2022, ordered     MEDICARE ANNUAL WELLNESS VISIT  Today     MAMMO SCREENING  05/14/2022     LIPID  Today     ADVANCE CARE PLANNING  Declined     DTAP/TDAP/TD IMMUNIZATION (3 - Td or Tdap) 05/08/2029     PHQ-2  Completed     INFLUENZA VACCINE  Completed     ZOSTER IMMUNIZATION  Completed "     COVID-19 Vaccine  Completed     IPV IMMUNIZATION  Aged Out     MENINGITIS IMMUNIZATION  Aged Out     HEPATITIS B IMMUNIZATION  Completed     Pap-Today    Do you feel safe in your environment? Yes    Have you ever done Advance Care Planning? (For example, a Health Directive, POLST, or a discussion with a medical provider or your loved ones about your wishes): No, advance care planning information given to patient to review.  Patient declined advance care planning discussion at this time.       Fall risk  Fallen 2 or more times in the past year?: No  Any fall with injury in the past year?: No    Cognitive Screening   1) Repeat 3 items (Leader, Season, Table)    2) Clock draw: NORMAL  3) 3 item recall: Recalls 3 objects  Results: 3 items recalled: COGNITIVE IMPAIRMENT LESS LIKELY    Mini-CogTM Copyright S Linette. Licensed by the author for use in [facility] [employer]; reprinted with permission (caitlyn@[facility]). All rights reserved.      Do you have sleep apnea, excessive snoring or daytime drowsiness?: no    Reviewed and updated as needed this visit by clinical staff  Tobacco  Allergies  Meds             Reviewed and updated as needed this visit by Provider               Social History     Tobacco Use     Smoking status: Never Smoker     Smokeless tobacco: Never Used   Substance Use Topics     Alcohol use: No         Alcohol Use 12/13/2021   Prescreen: >3 drinks/day or >7 drinks/week? Not Applicable               Current providers sharing in care for this patient include:   Patient Care Team:  Rafaela Woods MD as PCP - General  Jonathon Richardson MD as Assigned Heart and Vascular Provider  Gayle Richardson MD as Assigned PCP    The following health maintenance items are reviewed in Epic and correct as of today:  Health Maintenance Due   Topic Date Due     ANNUAL REVIEW OF HM ORDERS  Never done     COLORECTAL CANCER SCREENING  01/14/2021     MEDICARE ANNUAL WELLNESS VISIT  07/29/2021     FALL RISK  "ASSESSMENT  11/19/2021     Pneumococcal Vaccine: 65+ Years (2 of 2 - PPSV23) 07/29/2021     Lab work is in process  See orders    FHS-7:   Breast CA Risk Assessment (FHS-7) 12/13/2021   Did any of your first-degree relatives have breast or ovarian cancer? Yes   Did any of your relatives have bilateral breast cancer? No   Did any man in your family have breast cancer? No   Did any woman in your family have breast and ovarian cancer? No   Did any woman in your family have breast cancer before age 50 y? No   Do you have 2 or more relatives with breast and/or ovarian cancer? No   Do you have 2 or more relatives with breast and/or bowel cancer? No       Mammogram Screening: Recommended mammography every 1-2 years with patient discussion and risk factor consideration  Pertinent mammograms are reviewed under the imaging tab.    Review of Systems  Constitutional, HEENT, cardiovascular, pulmonary, gi and gu systems are negative, except as otherwise noted.    OBJECTIVE:   /76 (BP Location: Right arm, Patient Position: Sitting, Cuff Size: Adult Regular)   Pulse 58   Temp 97.7  F (36.5  C) (Oral)   Resp 16   Ht 1.568 m (5' 1.75\")   Wt 51.4 kg (113 lb 6 oz)   BMI 20.90 kg/m   Estimated body mass index is 20.9 kg/m  as calculated from the following:    Height as of this encounter: 1.568 m (5' 1.75\").    Weight as of this encounter: 51.4 kg (113 lb 6 oz).  Physical Exam  GENERAL: healthy, alert and no distress  EYES: Eyes grossly normal to inspection, PERRL and conjunctivae and sclerae normal  HENT: ear canals and TM's normal, nose and mouth without ulcers or lesions  NECK: no adenopathy, no asymmetry, masses, or scars and thyroid normal to palpation  RESP: lungs clear to auscultation - no rales, rhonchi or wheezes  BREAST: normal without masses, tenderness or nipple discharge and no palpable axillary masses or adenopathy  CV: regular rates and rhythm, normal S1 S2, no S3 or S4, peripheral pulses strong, no peripheral " edema and 2 out of 6 systolic murmur at mid diastolic click  ABDOMEN: soft, nontender, no hepatosplenomegaly, no masses and bowel sounds normal  MS: no gross musculoskeletal defects noted, no edema  SKIN: no suspicious lesions or rashes  NEURO: Normal strength and tone, mentation intact and speech normal  PSYCH: mentation appears normal, affect normal/bright    Diagnostic Test Results:  Labs reviewed in Epic    ASSESSMENT / PLAN:       ICD-10-CM    1. Encounter for annual wellness exam in Medicare patient  Z00.00 Pap screen with HPV - recommended age 30 - 65 years   2. Screen for colon cancer  Z12.11    3. Dissection of thoracoabdominal aorta (H)  I71.03    4. Meningioma (H)  D32.9    5. Depression, unspecified depression type  F32.A    6. Thyroid nodule  E04.1    7. Hyperlipidemia, unspecified hyperlipidemia type  E78.5 Comprehensive metabolic panel     Lipid Profile     Comprehensive metabolic panel     Lipid Profile   8. Hypothyroidism, unspecified type  E03.9 TSH with free T4 reflex     TSH with free T4 reflex   9. Hepatitis C virus infection without hepatic coma, unspecified chronicity  B19.20    10. Screening for colon cancer  Z12.11    11. Osteoporosis, unspecified osteoporosis type, unspecified pathological fracture presence  M81.0 Vitamin D Deficiency     Vitamin D Deficiency   12. Essential hypertension  I10 CBC with platelets     CBC with platelets   13. Adenomatous polyp of colon, unspecified part of colon  D12.6 Adult Gastro Ref - Procedure Only   14. Fall, initial encounter  W19.XXXA CT Head w/o Contrast     CT Head w/o Contrast     CANCELED: CT Head w/o Contrast   15. Menopause  Z78.0 DX Hip/Pelvis/Spine   16. S/P AVR (aortic valve replacement)  Z95.2 CANCELED: INR point of care   17. Long term current use of anticoagulant therapy  Z79.01 CANCELED: INR point of care   18. Encounter for Medicare annual wellness exam  Z00.00      With your history of a fall and head injury 3 months ago and recent  "headache and the fact that you are on Coumadin I am ordering a head CT.  I will order this for [facility] in the [facility].  It is now called [facility].    Follows with cardiology for history of aortic dissection as well as status post aortic valve replacement and anticoagulation management.     Follows with dermatology for full body skin checks.    When needing a referral to podiatry for the right bunion and hammertoe please let me know.  Or joint replacement.    Patient has been advised of split billing requirements and indicates understanding: Yes  COUNSELING:  Reviewed preventive health counseling, as reflected in patient instructions       Regular exercise       Healthy diet/nutrition    Estimated body mass index is 20.9 kg/m  as calculated from the following:    Height as of this encounter: 1.568 m (5' 1.75\").    Weight as of this encounter: 51.4 kg (113 lb 6 oz).        She reports that she has never smoked. She has never used smokeless tobacco.      Appropriate preventive services were discussed with this patient, including applicable screening as appropriate for cardiovascular disease, diabetes, osteopenia/osteoporosis, and glaucoma.  As appropriate for age/gender, discussed screening for colorectal cancer, prostate cancer, breast cancer, and cervical cancer. Checklist reviewing preventive services available has been given to the patient.    Reviewed patients plan of care and provided an AVS. The Basic Care Plan (routine screening as documented in Health Maintenance) for Jennifer meets the Care Plan requirement. This Care Plan has been established and reviewed with the Patient.    Counseling Resources:  ATP IV Guidelines  Pooled Cohorts Equation Calculator  Breast Cancer Risk Calculator  Breast Cancer: Medication to Reduce Risk  FRAX Risk Assessment  ICSI Preventive Guidelines  Dietary Guidelines for Americans, 2010  USDA's MyPlate  ASA Prophylaxis  Lung CA Screening    Rafaela SUBRAMANIAN" MD [facility]  [facility]    Identified Health Risks:    The patient was counseled and encouraged to consider modifying their diet and eating habits. She was provided with information on recommended healthy diet options.  The patient was provided with written information regarding signs of hearing loss.

## 2021-12-13 NOTE — PATIENT INSTRUCTIONS
With your history of a fall and head injury 3 months ago and recent headache and the fact that you are on Coumadin I am ordering a head CT.  I will order this for [facility] in the [facility].  It is now called [facility].    Follows with cardiology and dermatology.    When needing a referral to podiatry for the right bunion and yajairaertoe please let me know.  Or joint replacement.      Health Maintenance   Topic Date Due     ANNUAL REVIEW OF HM ORDERS  Today     COLORECTAL CANCER SCREENING  01/14/2021, ordered and can set in the spring     FALL RISK ASSESSMENT  Discussed     Pneumococcal Vaccine: 65+ Years (2 of 2 - PPSV23) Today     DEXA  03/04/2022, ordered     MEDICARE ANNUAL WELLNESS VISIT  Today     MAMMO SCREENING  05/14/2022     LIPID  Today     ADVANCE CARE PLANNING  Declined     DTAP/TDAP/TD IMMUNIZATION (3 - Td or Tdap) 05/08/2029     PHQ-2  Completed     INFLUENZA VACCINE  Completed     ZOSTER IMMUNIZATION  Completed     COVID-19 Vaccine  Completed     IPV IMMUNIZATION  Aged Out     MENINGITIS IMMUNIZATION  Aged Out     HEPATITIS B IMMUNIZATION  Completed     Pap-Today  Patient Education   Personalized Prevention Plan  You are due for the preventive services outlined below.  Your care team is available to assist you in scheduling these services.  If you have already completed any of these items, please share that information with your care team to update in your medical record.  Health Maintenance Due   Topic Date Due     Colorectal Cancer Screening  01/14/2021     FALL RISK ASSESSMENT  11/19/2021       Understanding USDA MyPlate  The USDA has guidelines to help you make healthy food choices. These are called MyPlate. MyPlate shows the food groups that make up healthy meals using the image of a place setting. Before you eat, think about the healthiest choices for what to put on your plate or in your cup or bowl. To learn more about building a healthy plate, visit  www.choosemyplate.gov.    The food groups    Fruits. Any fruit or 100% fruit juice counts as part of the Fruit Group. Fruits may be fresh, canned, frozen, or dried, and may be whole, cut-up, or pureed. Make 1/2 of your plate fruits and vegetables.    Vegetables. Any vegetable or 100% vegetable juice counts as a member of the Vegetable Group. Vegetables may be fresh, frozen, canned, or dried. They can be served raw or cooked and may be whole, cut-up, or mashed. Make 1/2 of your plate fruits and vegetables.    Grains. All foods made from grains are part of the Grains Group. These include wheat, rice, oats, cornmeal, and barley. Grains are often used to make foods such as bread, pasta, oatmeal, cereal, tortillas, and grits. Grains should be no more than 1/4 of your plate. At least half of your grains should be whole grains.    Protein. This group includes meat, poultry, seafood, beans and peas, eggs, processed soy products (such as tofu), nuts (including nut butters), and seeds. Make protein choices no more than 1/4 of your plate. Meat and poultry choices should be lean or low fat.    Dairy. The Dairy Group includes all fluid milk products and foods made from milk that contain calcium, such as yogurt and cheese. (Foods that have little calcium, such as cream, butter, and cream cheese, are not part of this group.) Most dairy choices should be low-fat or fat-free.    Oils. Oils aren't a food group, but they do contain essential nutrients. However it's important to watch your intake of oils. These are fats that are liquid at room temperature. They include canola, corn, olive, soybean, vegetable, and sunflower oil. Foods that are mainly oil include mayonnaise, certain salad dressings, and soft margarines. You likely already get your daily oil allowance from the foods you eat.  Things to limit  Eating healthy also means limiting these things in your diet:       Salt (sodium). Many processed foods have a lot of sodium. To  keep sodium intake down, eat fresh vegetables, meats, poultry, and seafood when possible. Purchase low-sodium, reduced-sodium, or no-salt-added food products at the store. And don't add salt to your meals at home. Instead, season them with herbs and spices such as dill, oregano, cumin, and paprika. Or try adding flavor with lemon or lime zest and juice.    Saturated fat. Saturated fats are most often found in animal products such as beef, pork, and chicken. They are often solid at room temperature, such as butter. To reduce your saturated fat intake, choose leaner cuts of meat and poultry. And try healthier cooking methods such as grilling, broiling, roasting, or baking. For a simple lower-fat swap, use plain nonfat yogurt instead of mayonnaise when making potato salad or macaroni salad.    Added sugars. These are sugars added to foods. They are in foods such as ice cream, candy, soda, fruit drinks, sports drinks, energy drinks, cookies, pastries, jams, and syrups. Cut down on added sugars by sharing sweet treats with a family member or friend. You can also choose fruit for dessert, and drink water or other unsweetened beverages.     [employer] last reviewed this educational content on 6/1/2020 2000-2021 [employer]. All rights reserved. This information is not intended as a substitute for professional medical care. Always follow your healthcare professional's instructions.          Signs of Hearing Loss      Hearing much better with one ear can be a sign of hearing loss.   Hearing loss is a problem shared by many people. In fact, it is one of the most common health problems, particularly as people age. Most people age 65 and older have some hearing loss. By age 80, almost everyone does. Hearing loss often occurs slowly over the years. So you may not realize your hearing has gotten worse.  Have your hearing checked  Call your healthcare provider if you:    Have to strain to hear normal  conversation    Have to watch other people s faces very carefully to follow what they re saying    Need to ask people to repeat what they ve said    Often misunderstand what people are saying    Turn the volume of the television or radio up so high that others complain    Feel that people are mumbling when they re talking to you    Find that the effort to hear leaves you feeling tired and irritated    Notice, when using the phone, that you hear better with one ear than the other  Yajaira last reviewed this educational content on 1/1/2020 2000-2021 [employer]. All rights reserved. This information is not intended as a substitute for professional medical care. Always follow your healthcare professional's instructions.

## 2021-12-14 LAB — DEPRECATED CALCIDIOL+CALCIFEROL SERPL-MC: 73 UG/L (ref 30–80)

## 2021-12-16 LAB
HUMAN PAPILLOMA VIRUS 16 DNA: NEGATIVE
HUMAN PAPILLOMA VIRUS 18 DNA: NEGATIVE
HUMAN PAPILLOMA VIRUS FINAL DIAGNOSIS: NORMAL
HUMAN PAPILLOMA VIRUS OTHER HR: NEGATIVE

## 2021-12-17 ENCOUNTER — HOSPITAL ENCOUNTER (OUTPATIENT)
Dept: PHYSICAL THERAPY | Facility: REHABILITATION | Age: 66
End: 2021-12-17
Payer: COMMERCIAL

## 2021-12-17 DIAGNOSIS — G89.29 CHRONIC PAIN OF LEFT KNEE: Primary | ICD-10-CM

## 2021-12-17 DIAGNOSIS — M25.552 HIP PAIN, LEFT: ICD-10-CM

## 2021-12-17 DIAGNOSIS — M25.562 CHRONIC PAIN OF LEFT KNEE: Primary | ICD-10-CM

## 2021-12-17 PROCEDURE — 97110 THERAPEUTIC EXERCISES: CPT | Mod: GP

## 2021-12-17 PROCEDURE — 97140 MANUAL THERAPY 1/> REGIONS: CPT | Mod: GP

## 2021-12-21 LAB
BKR LAB AP GYN ADEQUACY: NORMAL
BKR LAB AP GYN INTERPRETATION: NORMAL
BKR LAB AP HPV REFLEX: NORMAL
BKR LAB AP PREVIOUS ABNL DX: NORMAL
BKR LAB AP PREVIOUS ABNORMAL: NORMAL
PATH REPORT.COMMENTS IMP SPEC: NORMAL
PATH REPORT.COMMENTS IMP SPEC: NORMAL
PATH REPORT.RELEVANT HX SPEC: NORMAL

## 2021-12-23 ENCOUNTER — HOSPITAL ENCOUNTER (OUTPATIENT)
Dept: PHYSICAL THERAPY | Facility: REHABILITATION | Age: 66
End: 2021-12-23
Payer: COMMERCIAL

## 2021-12-23 ENCOUNTER — LAB (OUTPATIENT)
Dept: LAB | Facility: CLINIC | Age: 66
End: 2021-12-23

## 2021-12-23 ENCOUNTER — ANTICOAGULATION THERAPY VISIT (OUTPATIENT)
Dept: ANTICOAGULATION | Facility: CLINIC | Age: 66
End: 2021-12-23

## 2021-12-23 DIAGNOSIS — M25.562 CHRONIC PAIN OF LEFT KNEE: Primary | ICD-10-CM

## 2021-12-23 DIAGNOSIS — Z95.2 S/P AVR: Primary | ICD-10-CM

## 2021-12-23 DIAGNOSIS — G89.29 CHRONIC PAIN OF LEFT KNEE: Primary | ICD-10-CM

## 2021-12-23 DIAGNOSIS — Z95.2 S/P AVR (AORTIC VALVE REPLACEMENT): ICD-10-CM

## 2021-12-23 DIAGNOSIS — M25.552 HIP PAIN, LEFT: ICD-10-CM

## 2021-12-23 DIAGNOSIS — Z79.01 LONG TERM CURRENT USE OF ANTICOAGULANT THERAPY: ICD-10-CM

## 2021-12-23 LAB — INR BLD: 2.1 (ref 0.9–1.1)

## 2021-12-23 PROCEDURE — 97110 THERAPEUTIC EXERCISES: CPT | Mod: GP

## 2021-12-23 PROCEDURE — 85610 PROTHROMBIN TIME: CPT

## 2021-12-23 PROCEDURE — 36415 COLL VENOUS BLD VENIPUNCTURE: CPT

## 2021-12-23 PROCEDURE — 97140 MANUAL THERAPY 1/> REGIONS: CPT | Mod: GP

## 2021-12-23 NOTE — PROGRESS NOTES
ANTICOAGULATION MANAGEMENT     Jennifer Higginbotham 66 year old female is on warfarin with therapeutic INR result. (Goal INR 2.0-3.0)    Recent labs: (last 7 days)     12/23/21  0808   INR 2.1*       ASSESSMENT     Source(s): Chart Review and Template       Warfarin doses taken: Warfarin taken as instructed    Diet: No new diet changes identified    New illness, injury, or hospitalization: No    Medication/supplement changes: None noted    Signs or symptoms of bleeding or clotting: No    Previous INR: Therapeutic last 2(+) visits    Additional findings: None     PLAN     Recommended plan for no diet, medication or health factor changes affecting INR     Dosing Instructions: Continue your current warfarin dose with next INR in 3 weeks       Summary  As of 12/23/2021    Full warfarin instructions:  6 mg every Tue, Fri; 5 mg all other days   Next INR check:  1/13/2022             Detailed voice message left for Jennifer with dosing instructions and follow up date.     Contact [phone] to schedule and with any changes, questions or concerns.     Education provided: None required    Plan made per [facility] anticoagulation protocol    Jordana Tyler RN  Anticoagulation Clinic  12/23/2021    _______________________________________________________________________     Anticoagulation Episode Summary     Current INR goal:  2.0-3.0   TTR:  78.0 % (1 y)   Target end date:  Indefinite   Send INR reminders to:  [facility]    Indications    S/P AVR [Z95.2]  S/P AVR (aortic valve replacement) [Z95.2]  Long term current use of anticoagulant therapy [Z79.01]           Comments:  Goal range changed 2/20/19 per cardiology         Anticoagulation Care Providers     Provider Role Specialty Phone number    Rafaela Woods MD Referring Family Medicine [phone]

## 2021-12-30 ENCOUNTER — HOSPITAL ENCOUNTER (OUTPATIENT)
Dept: PHYSICAL THERAPY | Facility: REHABILITATION | Age: 66
End: 2021-12-30
Payer: COMMERCIAL

## 2021-12-30 DIAGNOSIS — M25.562 CHRONIC PAIN OF LEFT KNEE: Primary | ICD-10-CM

## 2021-12-30 DIAGNOSIS — M25.552 HIP PAIN, LEFT: ICD-10-CM

## 2021-12-30 DIAGNOSIS — G89.29 CHRONIC PAIN OF LEFT KNEE: Primary | ICD-10-CM

## 2021-12-30 PROCEDURE — 97110 THERAPEUTIC EXERCISES: CPT | Mod: GP

## 2021-12-30 PROCEDURE — 97140 MANUAL THERAPY 1/> REGIONS: CPT | Mod: GP

## 2022-01-06 ENCOUNTER — HOSPITAL ENCOUNTER (OUTPATIENT)
Dept: PHYSICAL THERAPY | Facility: REHABILITATION | Age: 67
End: 2022-01-06
Payer: COMMERCIAL

## 2022-01-06 DIAGNOSIS — M25.552 HIP PAIN, LEFT: ICD-10-CM

## 2022-01-06 DIAGNOSIS — M25.562 CHRONIC PAIN OF LEFT KNEE: Primary | ICD-10-CM

## 2022-01-06 DIAGNOSIS — G89.29 CHRONIC PAIN OF LEFT KNEE: Primary | ICD-10-CM

## 2022-01-06 PROCEDURE — 97140 MANUAL THERAPY 1/> REGIONS: CPT | Mod: GP

## 2022-01-06 PROCEDURE — 97110 THERAPEUTIC EXERCISES: CPT | Mod: GP

## 2022-01-06 NOTE — PROGRESS NOTES
[facility] Rehabilitation Service    Outpatient Physical Therapy Progress Note  Patient: Jennifer Higginbohtam  : 1955    Beginning/End Dates of Reporting Period:  2021 to 2022    Referring Provider: Gayle Richardson MD    Therapy Diagnosis: L knee and hip pain; L LE weakness     Client Self Report: Patient has been doing well overall. Continues to get intermittent knee and hip pain depending on what she is doing.     Objective Measurements:                      Goals:  Goal Identifier HEP   Goal Description Patient will demonstrate independence with home exercise program to facilitate improvement in function.    Target Date 21   Date Met      Progress (detail required for progress note): MET      Goal Identifier Pain   Goal Description Patient will report pain levels no greater than 3/10 at worst to improve quality of life.    Target Date 21   Date Met      Progress (detail required for progress note): At worst: 5/10     Goal Identifier Strength   Goal Description Patient will improve L sided LE strength to at least 4+/5 to improve functional mobility and off-load knee and hip joints.    Target Date 21   Date Met      Progress (detail required for progress note): Progressing - 4/5 L hip abd      Goal Identifier Sitting   Goal Description Patient will tolerate sitting for at least 1 hour without increase in symptoms to be able to complete seated ADLs such as driving.    Target Date 21   Date Met      Progress (detail required for progress note): MET          Plan:  Continue therapy per current plan of care.    Discharge:  No    Scott Parmer, PT, DPT

## 2022-01-13 ENCOUNTER — LAB (OUTPATIENT)
Dept: LAB | Facility: CLINIC | Age: 67
End: 2022-01-13
Payer: COMMERCIAL

## 2022-01-13 ENCOUNTER — ANTICOAGULATION THERAPY VISIT (OUTPATIENT)
Dept: ANTICOAGULATION | Facility: CLINIC | Age: 67
End: 2022-01-13

## 2022-01-13 DIAGNOSIS — Z79.01 LONG TERM CURRENT USE OF ANTICOAGULANT THERAPY: ICD-10-CM

## 2022-01-13 DIAGNOSIS — Z95.2 S/P AVR (AORTIC VALVE REPLACEMENT): ICD-10-CM

## 2022-01-13 DIAGNOSIS — Z95.2 S/P AVR: Primary | ICD-10-CM

## 2022-01-13 LAB — INR BLD: 2.2 (ref 0.9–1.1)

## 2022-01-13 PROCEDURE — 36416 COLLJ CAPILLARY BLOOD SPEC: CPT

## 2022-01-13 PROCEDURE — 85610 PROTHROMBIN TIME: CPT

## 2022-01-13 NOTE — PROGRESS NOTES
ANTICOAGULATION MANAGEMENT     Jennifer Higginbotham 66 year old female is on warfarin with therapeutic INR result. (Goal INR 2.0-3.0)    Recent labs: (last 7 days)     01/13/22  0824   INR 2.2*       ASSESSMENT     Source(s): Chart Review, Patient/Caregiver Call and Template       Warfarin doses taken: Warfarin taken as instructed    Diet: No new diet changes identified    New illness, injury, or hospitalization: No    Medication/supplement changes: None noted    Signs or symptoms of bleeding or clotting: No    Previous INR: Therapeutic last 8 visits.    Additional findings: None     PLAN     Recommended plan for no diet, medication or health factor changes affecting INR     Dosing Instructions:    Continue your current warfarin dose with next INR in 4 weeks       Summary  As of 1/13/2022    Full warfarin instructions:  6 mg every Tue, Fri; 5 mg all other days   Next INR check:  2/10/2022             Telephone call with  Jennifer ([phone]) who verbalizes understanding and agrees to plan    Lab visit scheduled - INR on 2/10/22 @ [facility].    Education provided: Importance of consistent vitamin K intake, Goal range and significance of current result, Monitoring for bleeding signs and symptoms and When to seek medical attention/emergency care    Plan made per [facility] anticoagulation protocol    Aylin Pablo RN  Anticoagulation Clinic  1/13/2022    _______________________________________________________________________     Anticoagulation Episode Summary     Current INR goal:  2.0-3.0   TTR:  83.8 % (1 y)   Target end date:  Indefinite   Send INR reminders to:  [facility]    Indications    S/P AVR [Z95.2]  S/P AVR (aortic valve replacement) [Z95.2]  Long term current use of anticoagulant therapy [Z79.01]           Comments:  Goal range changed 2/20/19 per cardiology         Anticoagulation Care Providers     Provider Role Specialty Phone number    Rafaela Woods MD Referring Family Medicine  [phone]

## 2022-01-30 DIAGNOSIS — I10 HTN (HYPERTENSION): ICD-10-CM

## 2022-01-30 DIAGNOSIS — E04.1 THYROID NODULE: ICD-10-CM

## 2022-02-01 RX ORDER — TRIAMTERENE AND HYDROCHLOROTHIAZIDE 75; 50 MG/1; MG/1
TABLET ORAL
Qty: 45 TABLET | Refills: 3 | Status: SHIPPED | OUTPATIENT
Start: 2022-02-01 | End: 2022-12-30

## 2022-02-01 RX ORDER — LEVOTHYROXINE SODIUM 75 UG/1
TABLET ORAL
Qty: 52 TABLET | Refills: 3 | Status: SHIPPED | OUTPATIENT
Start: 2022-02-01 | End: 2023-02-13

## 2022-02-01 RX ORDER — LEVOTHYROXINE SODIUM 88 UG/1
TABLET ORAL
Qty: 39 TABLET | Refills: 3 | Status: SHIPPED | OUTPATIENT
Start: 2022-02-01 | End: 2023-02-13

## 2022-02-01 NOTE — TELEPHONE ENCOUNTER
"Last Written Prescription Date:  1/4/21  Last Fill Quantity: 39/48/45,  # refills: 3   Last office visit provider:  12/13/21     Requested Prescriptions   Pending Prescriptions Disp Refills     levothyroxine (SYNTHROID/LEVOTHROID) 75 MCG tablet [Pharmacy Med Name: L-THYROXINE (SYNTHROID) TABS 75MCG] 52 tablet 3     Sig: TAKE 1 TABLET ON WEDNESDAY, THURSDAY, SATURDAY AND SUNDAY       Thyroid Protocol Passed - 1/30/2022  7:39 AM        Passed - Patient is 12 years or older        Passed - Recent (12 mo) or future (30 days) visit within the authorizing provider's specialty     Patient has had an office visit with the authorizing provider or a provider within the authorizing providers department within the previous 12 mos or has a future within next 30 days. See \"Patient Info\" tab in inbasket, or \"Choose Columns\" in Meds & Orders section of the refill encounter.              Passed - Medication is active on med list        Passed - Normal TSH on file in past 12 months     Recent Labs   Lab Test 12/13/21  0940   TSH 4.77              Passed - No active pregnancy on record     If patient is pregnant or has had a positive pregnancy test, please check TSH.          Passed - No positive pregnancy test in past 12 months     If patient is pregnant or has had a positive pregnancy test, please check TSH.             levothyroxine (SYNTHROID/LEVOTHROID) 88 MCG tablet [Pharmacy Med Name: L-THYROXINE (SYNTHROID) TABS 88MCG] 39 tablet 3     Sig: TAKE 1 TABLET ON MONDAY, TUESDAY AND FRIDAY       Thyroid Protocol Passed - 1/30/2022  7:39 AM        Passed - Patient is 12 years or older        Passed - Recent (12 mo) or future (30 days) visit within the authorizing provider's specialty     Patient has had an office visit with the authorizing provider or a provider within the authorizing providers department within the previous 12 mos or has a future within next 30 days. See \"Patient Info\" tab in inbasket, or \"Choose Columns\" in Meds & " "Orders section of the refill encounter.              Passed - Medication is active on med list        Passed - Normal TSH on file in past 12 months     Recent Labs   Lab Test 12/13/21  0940   TSH 4.77              Passed - No active pregnancy on record     If patient is pregnant or has had a positive pregnancy test, please check TSH.          Passed - No positive pregnancy test in past 12 months     If patient is pregnant or has had a positive pregnancy test, please check TSH.             triamterene-HCTZ (MAXZIDE) 75-50 MG tablet [Pharmacy Med Name: TRIAMTERENE HCTZ TABS 75/50MG] 45 tablet 3     Sig: TAKE ONE-HALF (1/2) TABLET DAILY       Diuretics (Including Combos) Protocol Passed - 1/30/2022  7:39 AM        Passed - Blood pressure under 140/90 in past 12 months     BP Readings from Last 3 Encounters:   12/13/21 138/76   09/30/21 118/60   11/19/20 (!) 140/72                 Passed - Recent (12 mo) or future (30 days) visit within the authorizing provider's specialty     Patient has had an office visit with the authorizing provider or a provider within the authorizing providers department within the previous 12 mos or has a future within next 30 days. See \"Patient Info\" tab in inbasket, or \"Choose Columns\" in Meds & Orders section of the refill encounter.              Passed - Medication is active on med list        Passed - Patient is age 18 or older        Passed - No active pregancy on record        Passed - Normal serum creatinine on file in past 12 months     Recent Labs   Lab Test 12/13/21  0940   CR 0.84              Passed - Normal serum potassium on file in past 12 months     Recent Labs   Lab Test 12/13/21  0940   POTASSIUM 3.7                    Passed - Normal serum sodium on file in past 12 months     Recent Labs   Lab Test 12/13/21  0940                 Passed - No positive pregnancy test in past 12 months             Dick Ventura RN 02/01/22 7:51 AM  "

## 2022-02-10 ENCOUNTER — ANTICOAGULATION THERAPY VISIT (OUTPATIENT)
Dept: ANTICOAGULATION | Facility: CLINIC | Age: 67
End: 2022-02-10

## 2022-02-10 ENCOUNTER — LAB (OUTPATIENT)
Dept: LAB | Facility: CLINIC | Age: 67
End: 2022-02-10
Payer: COMMERCIAL

## 2022-02-10 DIAGNOSIS — Z79.01 LONG TERM CURRENT USE OF ANTICOAGULANT THERAPY: ICD-10-CM

## 2022-02-10 DIAGNOSIS — Z53.9 ERRONEOUS ENCOUNTER--DISREGARD: ICD-10-CM

## 2022-02-10 DIAGNOSIS — Z95.2 S/P AVR: Primary | ICD-10-CM

## 2022-02-10 DIAGNOSIS — Z95.2 S/P AVR (AORTIC VALVE REPLACEMENT): ICD-10-CM

## 2022-02-10 LAB — INR BLD: 1.3 (ref 0.9–1.1)

## 2022-02-10 PROCEDURE — 36415 COLL VENOUS BLD VENIPUNCTURE: CPT

## 2022-02-10 PROCEDURE — 85610 PROTHROMBIN TIME: CPT

## 2022-02-10 NOTE — PROGRESS NOTES
ANTICOAGULATION MANAGEMENT     Jennifer Higginbotham 66 year old female is on warfarin with subtherapeutic INR result. (Goal INR 2.0-3.0)    Recent labs: (last 7 days)     02/10/22  0804   INR 1.3*       ASSESSMENT     Source(s): Chart Review, Patient/Caregiver Call and Template       Warfarin doses taken: Warfarin taken as instructed    Diet: No new diet changes identified    New illness, injury, or hospitalization: No    Medication/supplement changes:  Yes.    Reported switching to a different Multivitamin (One a day 50+) which had more Vitamin-K content.  She has 2 wks left and wants finish off the bottle, and will switch back to her previous MVI, which had less Vitamin-K.    Signs or symptoms of bleeding or clotting: No    Previous INR: Therapeutic last 9 INR visits.    Additional findings: None     PLAN     Recommended plan for temporary change(s) affecting INR     Dosing Instructions:   (evenings. 1mg / 5mg tabs)    Booster dose then Increase your warfarin dose (5.4% change) with next INR in 5-7 days       Summary  As of 2/10/2022    Full warfarin instructions:  2/10: 8 mg; Otherwise 5 mg every Mon, Wed, Fri; 6 mg all other days   Next INR check:  2/17/2022             Telephone call with  Jennifer ([phone]) who verbalizes understanding and agrees to plan.   - with hx of bad experience of epistaxis, warfarin dose was only incraesed by 5.4%.   - Will closely monitor INR due to a recent change taking new MVI and wants to finish off the bottle, before switching back to her previous MVI.    Lab visit scheduled - INR on 2/17/22 @ [facility].    Education provided: Importance of consistent vitamin K intake, Impact of vitamin K foods on INR, Goal range and significance of current result and Monitoring for clotting signs and symptoms    Plan made per [facility] anticoagulation protocol    Aylin Pablo, RN  Anticoagulation Clinic  2/10/2022    _______________________________________________________________________      Anticoagulation Episode Summary     Current INR goal:  2.0-3.0   TTR:  81.7 % (1 y)   Target end date:  Indefinite   Send INR reminders to:  [facility]    Indications    S/P AVR [Z95.2]  S/P AVR (aortic valve replacement) [Z95.2]  Long term current use of anticoagulant therapy [Z79.01]           Comments:  Goal range changed 2/20/19 per cardiology         Anticoagulation Care Providers     Provider Role Specialty Phone number    Rafaela Woods MD Referring [facility] Medicine [phone]

## 2022-02-10 NOTE — PROGRESS NOTES
"ANTICOAGULATION MANAGEMENT     Jennifer Higginbotham 66 year old female is on warfarin with {Ther/Sub/Supra:252058::\"therapeutic\"} INR result. (Goal INR {INR Goal:341063})    Recent labs: (last 7 days)     02/10/22  0804   INR 1.3*       ASSESSMENT     Source(s): {[facility] assessment type:607311::\"Chart Review\"}       Warfarin doses taken: {Warfarin dose taken:296445::\"Warfarin taken as instructed\"}    Diet: {Diet changes:386556::\"No new diet changes identified\"}    New illness, injury, or hospitalization: {No/Yes:045073::\"No\"}    Medication/supplement changes: {Medication changes/interactions:604949::\"None noted\"}    Signs or symptoms of bleeding or clotting: {No/Yes:285644::\"No\"}    Previous INR: {Ther/Sub/Supra:268929}    Additional findings: {additional findings:970227::\"None\"}     PLAN     Recommended plan for {accassessment:763876::\"no diet, medication or health factor changes\"} affecting INR     Dosing Instructions: {Warfarin dose instructions:274954::\"Continue your current warfarin dose\"} with next INR in {INR REVISIT:976457}       Summary  As of 2/10/2022    Next INR check:               {Contact type and understandin}    {accappointmentoffer:995249}    Education provided: {ACCeducation:167056}    Plan made {Sherley protocol:645565::\"per [facility] anticoagulation protocol\"}    Aylin Pablo RN  Anticoagulation Clinic  2/10/2022    _______________________________________________________________________     Anticoagulation Episode Summary     Current INR goal:  2.0-3.0   TTR:  81.7 % (1 y)   Target end date:  Indefinite   Send INR reminders to:  [facility]    Indications    S/P AVR [Z95.2]  S/P AVR (aortic valve replacement) [Z95.2]  Long term current use of anticoagulant therapy [Z79.01]           Comments:  Goal range changed 19 per cardiology         Anticoagulation Care Providers     Provider Role Specialty Phone number    Rafaela Woods MD Referring Family Medicine [phone]    "

## 2022-02-17 ENCOUNTER — LAB (OUTPATIENT)
Dept: LAB | Facility: CLINIC | Age: 67
End: 2022-02-17
Payer: COMMERCIAL

## 2022-02-17 ENCOUNTER — ANTICOAGULATION THERAPY VISIT (OUTPATIENT)
Dept: ANTICOAGULATION | Facility: CLINIC | Age: 67
End: 2022-02-17

## 2022-02-17 DIAGNOSIS — I10 ESSENTIAL HYPERTENSION, MALIGNANT: ICD-10-CM

## 2022-02-17 DIAGNOSIS — Z79.01 LONG TERM CURRENT USE OF ANTICOAGULANT THERAPY: ICD-10-CM

## 2022-02-17 DIAGNOSIS — Z95.2 S/P AVR: Primary | ICD-10-CM

## 2022-02-17 DIAGNOSIS — Z95.2 S/P AVR (AORTIC VALVE REPLACEMENT): ICD-10-CM

## 2022-02-17 LAB — INR BLD: 1.7 (ref 0.9–1.1)

## 2022-02-17 PROCEDURE — 85610 PROTHROMBIN TIME: CPT

## 2022-02-17 PROCEDURE — 36416 COLLJ CAPILLARY BLOOD SPEC: CPT

## 2022-02-17 RX ORDER — METOPROLOL SUCCINATE 200 MG/1
TABLET, EXTENDED RELEASE ORAL
Qty: 90 TABLET | Refills: 3 | Status: SHIPPED | OUTPATIENT
Start: 2022-02-17 | End: 2023-02-13

## 2022-02-17 NOTE — PROGRESS NOTES
ANTICOAGULATION MANAGEMENT     Jennifer Higginbotham 66 year old female is on warfarin with subtherapeutic INR result. (Goal INR 2.0-3.0)    Recent labs: (last 7 days)     02/17/22  0713   INR 1.7*       ASSESSMENT     Source(s): Chart Review, Patient/Caregiver Call and Template       Warfarin doses taken: Warfarin taken as instructed    Diet: No new diet changes identified    New illness, injury, or hospitalization: No    Medication/supplement changes:  Yes.    Continuing with new MVI (one-a-day for 50+) that has more Vitamin-K.  She wants to finish up the bottle which has 4 wks left; then will resume her previous MVI with less Vitamin-K.    Signs or symptoms of bleeding or clotting: No    Previous INR: Subtherapeutic at 1.4 on 2/10/22.    Additional findings: None     PLAN     Recommended plan for temporary change(s) affecting INR     Dosing Instructions:   (evenings. 1mg / 5mg tabs)    Booster dose then Increase your warfarin dose (5.1% change) with next INR in 1 week       Summary  As of 2/17/2022    Full warfarin instructions:  2/17: 7 mg; Otherwise 5 mg every Wed; 6 mg all other days   Next INR check:  2/24/2022             Telephone call with  Jennifer ([phone]) who verbalizes understanding and agrees to plan    Lab visit scheduled - INR on 2/24/22 @ [facility].    Education provided: Importance of consistent vitamin K intake and Goal range and significance of current result    Plan made per [facility] anticoagulation protocol    Aylin Pablo RN  Anticoagulation Clinic  2/17/2022    _______________________________________________________________________     Anticoagulation Episode Summary     Current INR goal:  2.0-3.0   TTR:  81.7 % (1 y)   Target end date:  Indefinite   Send INR reminders to:  [facility]    Indications    S/P AVR [Z95.2]  S/P AVR (aortic valve replacement) [Z95.2]  Long term current use of anticoagulant therapy [Z79.01]           Comments:  Goal range changed 2/20/19 per cardiology          Anticoagulation Care Providers     Provider Role Specialty Phone number    Rafaela Woods MD Referring Family Medicine [phone]

## 2022-02-24 ENCOUNTER — ANTICOAGULATION THERAPY VISIT (OUTPATIENT)
Dept: ANTICOAGULATION | Facility: CLINIC | Age: 67
End: 2022-02-24

## 2022-02-24 ENCOUNTER — LAB (OUTPATIENT)
Dept: LAB | Facility: CLINIC | Age: 67
End: 2022-02-24
Payer: COMMERCIAL

## 2022-02-24 DIAGNOSIS — Z79.01 LONG TERM CURRENT USE OF ANTICOAGULANT THERAPY: ICD-10-CM

## 2022-02-24 DIAGNOSIS — Z95.2 S/P AVR (AORTIC VALVE REPLACEMENT): ICD-10-CM

## 2022-02-24 DIAGNOSIS — Z95.2 S/P AVR: Primary | ICD-10-CM

## 2022-02-24 LAB — INR BLD: 1.7 (ref 0.9–1.1)

## 2022-02-24 PROCEDURE — 85610 PROTHROMBIN TIME: CPT

## 2022-02-24 PROCEDURE — 36416 COLLJ CAPILLARY BLOOD SPEC: CPT

## 2022-02-24 NOTE — PROGRESS NOTES
ANTICOAGULATION MANAGEMENT     Jennifer Higginbotham 66 year old female is on warfarin with subtherapeutic INR result. (Goal INR 2.0-3.0)    Recent labs: (last 7 days)     02/24/22  0716   INR 1.7*       ASSESSMENT     Source(s): Chart Review, Patient/Caregiver Call and Template       Warfarin doses taken: Warfarin taken as instructed    Diet: No new diet changes identified    New illness, injury, or hospitalization: No.    Medication/supplement changes:  Yes.   1. Continuing with new MVI (one-a-day for 50+) that has more Vitamin-K.  2. She wants to finish up the bottle which has 2-3 wks left; then will resume her previous MVI with less Vitamin-K.    Signs or symptoms of bleeding or clotting: No    Previous INR: Subtherapeutic last 2 INR results.    Additional findings: None     PLAN     Recommended plan for temporary change for the next 4 wks affecting INR     Dosing Instructions:   (evenings. 1mg / 5mg tabs)     Increase your warfarin dose (7.3% change) with next INR in 1-2 weeks       Summary  As of 2/24/2022    Full warfarin instructions:  8 mg every Thu; 6 mg all other days   Next INR check:  3/3/2022             Telephone call with  Jennifer ([phone]) who verbalizes understanding and agrees to plan    Lab visit scheduled - INR on 3/3/22 @ [facility].   - preferred to check INR in one wk d/t nosebleed issues with higher warfarin dose.    Education provided: Importance of consistent vitamin K intake and Goal range and significance of current result    Plan made per [facility] anticoagulation protocol    Aylin Pablo RN  Anticoagulation Clinic  2/24/2022    _______________________________________________________________________     Anticoagulation Episode Summary     Current INR goal:  2.0-3.0   TTR:  81.7 % (1 y)   Target end date:  Indefinite   Send INR reminders to:  [facility]    Indications    S/P AVR [Z95.2]  S/P AVR (aortic valve replacement) [Z95.2]  Long term current use of anticoagulant therapy  [Z79.01]           Comments:  Goal range changed 2/20/19 per cardiology         Anticoagulation Care Providers     Provider Role Specialty Phone number    Rafaela Woods MD Referring Family Medicine [phone]

## 2022-03-03 ENCOUNTER — ANTICOAGULATION THERAPY VISIT (OUTPATIENT)
Dept: ANTICOAGULATION | Facility: CLINIC | Age: 67
End: 2022-03-03

## 2022-03-03 ENCOUNTER — LAB (OUTPATIENT)
Dept: LAB | Facility: CLINIC | Age: 67
End: 2022-03-03
Payer: COMMERCIAL

## 2022-03-03 DIAGNOSIS — Z95.2 S/P AVR (AORTIC VALVE REPLACEMENT): ICD-10-CM

## 2022-03-03 DIAGNOSIS — Z95.2 S/P AVR: Primary | ICD-10-CM

## 2022-03-03 DIAGNOSIS — Z79.01 LONG TERM CURRENT USE OF ANTICOAGULANT THERAPY: ICD-10-CM

## 2022-03-03 LAB — INR BLD: 1.9 (ref 0.9–1.1)

## 2022-03-03 PROCEDURE — 36416 COLLJ CAPILLARY BLOOD SPEC: CPT

## 2022-03-03 PROCEDURE — 85610 PROTHROMBIN TIME: CPT

## 2022-03-03 NOTE — PROGRESS NOTES
ANTICOAGULATION MANAGEMENT     Jennifer Higginbotham 66 year old female is on warfarin with subtherapeutic INR result. (Goal INR 2.0-3.0)    Recent labs: (last 7 days)     03/03/22  0721   INR 1.9*       ASSESSMENT       Source(s): Chart Review, Patient/Caregiver Call and Template       Warfarin doses taken: Warfarin taken as instructed and Template incorrect; verbally confirmed home dose with Jennifer   However, she stated back correct warfarin dose.    Diet: No new diet changes identified    New illness, injury, or hospitalization: No    Medication/supplement changes:  Yes.    Continuing with new MVI (one-a-day for 50+) that has more Vitamin-K.   She wants to finish up the bottle which has 2 wks left; then will resume her   previous MVI with less Vitamin-K.    Signs or symptoms of bleeding or clotting: No    Previous INR: Subtherapeutic last 3 INR results.    Additional findings: None       PLAN     Recommended plan for no diet, medication or health factor changes affecting INR     Dosing Instructions:   (evenings. 1mg / 5mg tabs)    Continue your current warfarin dose with next INR in 1-2 weeks       Summary  As of 3/3/2022    Full warfarin instructions:  8 mg every Thu; 6 mg all other days   Next INR check:  3/10/2022             Telephone call with  Jennifer ([phone]) who verbalizes understanding and agrees to plan    Lab visit scheduled - INR on 3/14/22 @ [facility].    Education provided: Importance of consistent vitamin K intake and Goal range and significance of current result    Plan made per [facility] anticoagulation protocol    Aylin Pablo, RN  Anticoagulation Clinic  3/3/2022    _______________________________________________________________________     Anticoagulation Episode Summary     Current INR goal:  2.0-3.0   TTR:  81.7 % (1 y)   Target end date:  Indefinite   Send INR reminders to:  [facility]    Indications    S/P AVR [Z95.2]  S/P AVR (aortic valve replacement) [Z95.2]  Long term current  use of anticoagulant therapy [Z79.01]           Comments:  Goal range changed 2/20/19 per cardiology         Anticoagulation Care Providers     Provider Role Specialty Phone number    Rafaela Woods MD Referring Family Medicine [phone]

## 2022-03-14 ENCOUNTER — ANTICOAGULATION THERAPY VISIT (OUTPATIENT)
Dept: ANTICOAGULATION | Facility: CLINIC | Age: 67
End: 2022-03-14

## 2022-03-14 ENCOUNTER — LAB (OUTPATIENT)
Dept: LAB | Facility: CLINIC | Age: 67
End: 2022-03-14
Payer: COMMERCIAL

## 2022-03-14 DIAGNOSIS — Z95.2 S/P AVR (AORTIC VALVE REPLACEMENT): ICD-10-CM

## 2022-03-14 DIAGNOSIS — Z79.01 LONG TERM CURRENT USE OF ANTICOAGULANT THERAPY: ICD-10-CM

## 2022-03-14 DIAGNOSIS — Z95.2 S/P AVR: Primary | ICD-10-CM

## 2022-03-14 LAB — INR BLD: 1.8 (ref 0.9–1.1)

## 2022-03-14 PROCEDURE — 36416 COLLJ CAPILLARY BLOOD SPEC: CPT

## 2022-03-14 PROCEDURE — 85610 PROTHROMBIN TIME: CPT

## 2022-03-14 NOTE — PROGRESS NOTES
ANTICOAGULATION MANAGEMENT     Jennifer Higginbotham 66 year old female is on warfarin with subtherapeutic INR result. (Goal INR 2.0-3.0)    Recent labs: (last 7 days)     03/14/22  0710   INR 1.8*       ASSESSMENT       Source(s): Chart Review, Patient/Caregiver Call and Template       Warfarin doses taken: Warfarin taken as instructed    Diet: No new diet changes identified    New illness, injury, or hospitalization: No    Medication/supplement changes: None noted    Has 5 days left of MVI and will complete on 3/18   She had switched and continuing with MVI that has higher Vitamin-K content.  Wants to take till all gone, then switch to her previous MVI.    Signs or symptoms of bleeding or clotting: No    Previous INR: Subtherapeutic last 4 INR results.    Additional findings: None       PLAN     Recommended plan for temporary change(s) affecting INR     Dosing Instructions:   (evenings. 1mg / 5mg tabs)     Increase your warfarin dose (4.5% change) with next INR in 1 week       Summary  As of 3/14/2022    Full warfarin instructions:  8 mg every Mon, Wed; 6 mg all other days   Next INR check:  3/21/2022             Telephone call with  Jennifer ([phone]) who verbalizes understanding and agrees to plan    Lab visit scheduled - INR on 3/21/21 @ [facility].    Education provided: Importance of consistent vitamin K intake and Goal range and significance of current result    Plan made per [facility] anticoagulation protocol    Aylin Pablo, RN  Anticoagulation Clinic  3/14/2022    _______________________________________________________________________     Anticoagulation Episode Summary     Current INR goal:  2.0-3.0   TTR:  79.2 % (1 y)   Target end date:  Indefinite   Send INR reminders to:  [facility]    Indications    S/P AVR [Z95.2]  S/P AVR (aortic valve replacement) [Z95.2]  Long term current use of anticoagulant therapy [Z79.01]           Comments:  Goal range changed 2/20/19 per cardiology          Anticoagulation Care Providers     Provider Role Specialty Phone number    Rafaela Woods MD Referring Family Medicine [phone]

## 2022-03-21 ENCOUNTER — LAB (OUTPATIENT)
Dept: LAB | Facility: CLINIC | Age: 67
End: 2022-03-21
Payer: COMMERCIAL

## 2022-03-21 ENCOUNTER — TELEPHONE (OUTPATIENT)
Dept: FAMILY MEDICINE | Facility: CLINIC | Age: 67
End: 2022-03-21

## 2022-03-21 ENCOUNTER — ANTICOAGULATION THERAPY VISIT (OUTPATIENT)
Dept: ANTICOAGULATION | Facility: CLINIC | Age: 67
End: 2022-03-21

## 2022-03-21 DIAGNOSIS — Z79.01 LONG TERM CURRENT USE OF ANTICOAGULANT THERAPY: ICD-10-CM

## 2022-03-21 DIAGNOSIS — Z95.2 S/P AVR (AORTIC VALVE REPLACEMENT): ICD-10-CM

## 2022-03-21 DIAGNOSIS — Z95.2 S/P AVR: Primary | ICD-10-CM

## 2022-03-21 LAB — INR BLD: 2 (ref 0.9–1.1)

## 2022-03-21 PROCEDURE — 85610 PROTHROMBIN TIME: CPT

## 2022-03-21 PROCEDURE — 36416 COLLJ CAPILLARY BLOOD SPEC: CPT

## 2022-03-21 NOTE — TELEPHONE ENCOUNTER
Reason for Call:  Other returning call    Detailed comments: inr    Phone Number Patient can be reached at: Home number on file [phone] (home)    Best Time: any    Can we leave a detailed message on this number? YES    Call taken on 3/21/2022 at 12:32 PM by Leann Courtney

## 2022-03-21 NOTE — PROGRESS NOTES
ANTICOAGULATION MANAGEMENT     Jennifer Higginbotham 66 year old female is on warfarin with therapeutic INR result. (Goal INR 2.0-3.0)    Recent labs: (last 7 days)     03/21/22  0737   INR 2.0*       ASSESSMENT       Source(s): Chart Review, Patient/Caregiver Call and Template       Warfarin doses taken: Warfarin taken as instructed    Diet: No new diet changes identified    New illness, injury, or hospitalization: No    Medication/supplement changes:  Yes.  Finally took last dose of higher vitamin-K MVI on 3/18.  Re-started on 3/19 previous MVI daily with 17gms of Vitamin-K.    Signs or symptoms of bleeding or clotting: No    Previous INR: Subtherapeutic last 5 INR results, since starting new MVI supplement higher vitamin-K content.  She requested would like to complete supplement till all gone, since it was expensive.  Then she will go back to her usual MVI.    Additional findings: None       PLAN     Recommended plan for ongoing change(s) affecting INR     Dosing Instructions:   (evenings. 1mg / 5mg tabs)    Continue your current warfarin dose with next INR in 1 week       Summary  As of 3/21/2022    Full warfarin instructions:  8 mg every Mon, Wed; 6 mg all other days   Next INR check:  3/25/2022             Telephone call with  Jennifer ([phone]) who verbalizes understanding and agrees to plan.   - Rationale for not change warfarin dose, INR was therapeutic at 2.0   - and will check INR sooner and base dose on next INR results.  She just stop MVI 3 days ago.   - advised to monitor for bleeding issues, and will come in sooner for INR.    Lab visit scheduled - INR on 3/25/22 @ [facility].    Education provided: Importance of consistent vitamin K intake, Goal range and significance of current result, Monitoring for bleeding signs and symptoms and Monitoring for clotting signs and symptoms    Plan made per [facility] anticoagulation protocol    Aylin Pablo RN  Anticoagulation  Clinic  3/21/2022    _______________________________________________________________________     Anticoagulation Episode Summary     Current INR goal:  2.0-3.0   TTR:  77.2 % (1 y)   Target end date:  Indefinite   Send INR reminders to:  [facility]    Indications    S/P AVR [Z95.2]  S/P AVR (aortic valve replacement) [Z95.2]  Long term current use of anticoagulant therapy [Z79.01]           Comments:  Goal range changed 2/20/19 per cardiology         Anticoagulation Care Providers     Provider Role Specialty Phone number    Rafaela Woods MD Referring Family Medicine [phone]

## 2022-03-25 ENCOUNTER — LAB (OUTPATIENT)
Dept: LAB | Facility: CLINIC | Age: 67
End: 2022-03-25
Payer: COMMERCIAL

## 2022-03-25 ENCOUNTER — ANTICOAGULATION THERAPY VISIT (OUTPATIENT)
Dept: ANTICOAGULATION | Facility: CLINIC | Age: 67
End: 2022-03-25

## 2022-03-25 DIAGNOSIS — Z79.01 LONG TERM CURRENT USE OF ANTICOAGULANT THERAPY: ICD-10-CM

## 2022-03-25 DIAGNOSIS — Z95.2 S/P AVR (AORTIC VALVE REPLACEMENT): ICD-10-CM

## 2022-03-25 DIAGNOSIS — Z95.2 S/P AVR: Primary | ICD-10-CM

## 2022-03-25 LAB — INR BLD: 2.3 (ref 0.9–1.1)

## 2022-03-25 PROCEDURE — 85610 PROTHROMBIN TIME: CPT

## 2022-03-25 PROCEDURE — 36416 COLLJ CAPILLARY BLOOD SPEC: CPT

## 2022-03-25 NOTE — PROGRESS NOTES
ANTICOAGULATION MANAGEMENT     Jennifer Higginbotham 67 year old female is on warfarin with therapeutic INR result. (Goal INR 2.0-3.0)    Recent labs: (last 7 days)     03/25/22  0739   INR 2.3*       ASSESSMENT       Source(s): Chart Review, Patient/Caregiver Call and Template       Warfarin doses taken: Warfarin taken as instructed    Diet: No new diet changes identified    New illness, injury, or hospitalization: Yes:    Resumed usual MVI on 3/19/22.    Medication/supplement changes: None noted    Signs or symptoms of bleeding or clotting: No    Previous INR: Therapeutic last visit at 2.0; previously outside of goal range last 5 INRs.    Additional findings: None       PLAN     Recommended plan for ongoing change(s) affecting INR     Dosing Instructions:   (evenings. 1mg / 5mg tabs)    Continue your current warfarin dose with next INR in 1 week       Summary  As of 3/25/2022    Full warfarin instructions:  8 mg every Mon, Wed; 6 mg all other days   Next INR check:  4/1/2022             Telephone call with  Jennifer ([phone]) who verbalizes understanding and agrees to plan    - advised sooner INR, if any s/sx of active bleeding.   - Rationale for monitoring INR closely is d/t switched back to her usual MVI and hx of bad nosebleed with increased warfarin doses, that she does not want to experience again.   - After buying new MVI w/ plus vitamin-K, several warfarin dose adjustments were made,     - she wanted to continue with new MVI till all gone due to expense she paid.    Lab visit scheduled - INR on 4/1/22 @ [facility].    Education provided: Importance of consistent vitamin K intake, Goal range and significance of current result and Monitoring for bleeding signs and symptoms    Plan made per [facility] anticoagulation protocol    Aylin Pablo, RN  Anticoagulation Clinic  3/25/2022    _______________________________________________________________________     Anticoagulation Episode Summary     Current INR goal:  2.0-3.0    TTR:  77.2 % (1 y)   Target end date:  Indefinite   [facility]    Indications    S/P AVR [Z95.2]  S/P AVR (aortic valve replacement) [Z95.2]  Long term current use of anticoagulant therapy [Z79.01]           Comments:  Goal range changed 2/20/19 per cardiology         Anticoagulation Care Providers     Provider Role Specialty Phone number    Rafaela Woods MD Referring [facility] Medicine [phone]

## 2022-04-01 ENCOUNTER — ANTICOAGULATION THERAPY VISIT (OUTPATIENT)
Dept: ANTICOAGULATION | Facility: CLINIC | Age: 67
End: 2022-04-01

## 2022-04-01 ENCOUNTER — LAB (OUTPATIENT)
Dept: LAB | Facility: CLINIC | Age: 67
End: 2022-04-01
Payer: COMMERCIAL

## 2022-04-01 DIAGNOSIS — Z79.01 LONG TERM CURRENT USE OF ANTICOAGULANT THERAPY: ICD-10-CM

## 2022-04-01 DIAGNOSIS — Z95.2 S/P AVR: Primary | ICD-10-CM

## 2022-04-01 DIAGNOSIS — Z95.2 S/P AVR (AORTIC VALVE REPLACEMENT): ICD-10-CM

## 2022-04-01 LAB — INR BLD: 2.4 (ref 0.9–1.1)

## 2022-04-01 PROCEDURE — 85610 PROTHROMBIN TIME: CPT

## 2022-04-01 PROCEDURE — 36416 COLLJ CAPILLARY BLOOD SPEC: CPT

## 2022-04-01 NOTE — PROGRESS NOTES
ANTICOAGULATION MANAGEMENT     Jennifer Higginbotham 67 year old female is on warfarin with therapeutic INR result. (Goal INR 2.0-3.0)    Recent labs: (last 7 days)     04/01/22  0733   INR 2.4*       ASSESSMENT       Source(s): Chart Review, Patient/Caregiver Call and Template       Warfarin doses taken: Warfarin taken as instructed    Diet: No new diet changes identified    New illness, injury, or hospitalization: No    Medication/supplement changes: None noted    Since 3/19/22, she has resumed and continues with usual MVI that had smaller vitamin-K content.    Signs or symptoms of bleeding or clotting: No    Previous INR: Therapeutic last 2 INR results.    Additional findings:  Yes. Have been monitoring INR closely, since switching back to her usual MVI.  Was on another MVI with higher vitamin-K content.       PLAN     Recommended plan for no diet, medication or health factor changes affecting INR     Dosing Instructions:   (evenings. 1mg / 5mg tabs)    continue your current warfarin dose with next INR in 2 weeks       Summary  As of 4/1/2022    Full warfarin instructions:  8 mg every Mon, Wed; 6 mg all other days   Next INR check:  4/15/2022             Telephone call with  Jennifer ([phone]) who verbalizes understanding and agrees to plan    - advised to come in sooner for INR if any s/sx of bleeding.    Lab visit scheduled - INR on 4/15/22 @ [facility].    Education provided: Importance of consistent vitamin K intake, Goal range and significance of current result and Monitoring for bleeding signs and symptoms    Plan made per [facility] anticoagulation protocol    Aylin Pablo RN  Anticoagulation Clinic  4/1/2022    _______________________________________________________________________     Anticoagulation Episode Summary     Current INR goal:  2.0-3.0   TTR:  77.2 % (1 y)   Target end date:  Indefinite   Send INR reminders to:  [facility]    Indications    S/P AVR [Z95.2]  S/P AVR (aortic valve  replacement) [Z95.2]  Long term current use of anticoagulant therapy [Z79.01]           Comments:  Goal range changed 2/20/19 per cardiology         Anticoagulation Care Providers     Provider Role Specialty Phone number    Rafaela Woods MD Referring Family Medicine [phone]

## 2022-04-04 ENCOUNTER — MYC MEDICAL ADVICE (OUTPATIENT)
Dept: FAMILY MEDICINE | Facility: CLINIC | Age: 67
End: 2022-04-04
Payer: COMMERCIAL

## 2022-04-04 ENCOUNTER — TELEPHONE (OUTPATIENT)
Dept: FAMILY MEDICINE | Facility: CLINIC | Age: 67
End: 2022-04-04
Payer: COMMERCIAL

## 2022-04-04 ENCOUNTER — TELEPHONE (OUTPATIENT)
Dept: CARDIOLOGY | Facility: CLINIC | Age: 67
End: 2022-04-04
Payer: COMMERCIAL

## 2022-04-04 DIAGNOSIS — I71.03 DISSECTION OF THORACOABDOMINAL AORTA (H): Primary | ICD-10-CM

## 2022-04-04 NOTE — TELEPHONE ENCOUNTER
Patient would like the DEXA Scan referral sent over to [facility] in [place]  Please Fax     Fax [phone]

## 2022-04-04 NOTE — TELEPHONE ENCOUNTER
[facility] Call Center    Phone Message    May a detailed message be left on voicemail: yes     Reason for Call: Order(s): Other:   Reason for requested: CT scan   Date needed: 04/04/2022  Provider name:      Patient called and spoke with writer, she states that she needs  to place an order for CT scan, patient states she has one done every year.           Action Taken: Message routed to:  [facility] Cardio     Travel Screening: Not Applicable

## 2022-04-05 NOTE — TELEPHONE ENCOUNTER
----- Message -----  From: Renee Le RN  Sent: 4/4/2022   4:17 PM CDT  To: Jonathon Richardson MD    ----- Message from Renee Le RN sent at 4/4/2022  4:17 PM CDT -----  Okay to order CT? Patient last had CTA chest, abd & pelvis in 2020. -bethany    ----- Message -----  From: Holly Aceves  Sent: 4/4/2022   4:00 PM CDT  To: [facility]      ----- Message -----  From: Jonathon Richardson MD  Sent: 4/4/2022   9:14 PM CDT  To: Renee Le, YESENIA    Yes      ==  Order placed per J. Left detailed message for patient that she can call to scheduled. -bethany

## 2022-04-06 ENCOUNTER — TELEPHONE (OUTPATIENT)
Dept: CARDIOLOGY | Facility: CLINIC | Age: 67
End: 2022-04-06
Payer: COMMERCIAL

## 2022-04-06 NOTE — TELEPHONE ENCOUNTER
[facility] Call Center    Phone Message    May a detailed message be left on voicemail: yes     Reason for Call: Other: Jennifer needs the order for the Ct scan faxed to [facility] , fax is [phone] as they have not recieved the fax yet     Action Taken: Message routed to:  [facility] Cardio    Travel Screening: Not Applicable           Noted. Orders faxed to [facility] as above. Pt was left a voicemail that this was done. -[facility]

## 2022-04-14 ENCOUNTER — TRANSFERRED RECORDS (OUTPATIENT)
Dept: HEALTH INFORMATION MANAGEMENT | Facility: CLINIC | Age: 67
End: 2022-04-14
Payer: COMMERCIAL

## 2022-04-14 DIAGNOSIS — E78.5 HYPERLIPIDEMIA LDL GOAL <130: ICD-10-CM

## 2022-04-14 RX ORDER — SIMVASTATIN 20 MG
TABLET ORAL
Qty: 90 TABLET | Refills: 3 | Status: SHIPPED | OUTPATIENT
Start: 2022-04-14 | End: 2023-07-01

## 2022-04-19 ENCOUNTER — TELEPHONE (OUTPATIENT)
Dept: CARDIOLOGY | Facility: CLINIC | Age: 67
End: 2022-04-19
Payer: COMMERCIAL

## 2022-04-19 NOTE — TELEPHONE ENCOUNTER
----- Message -----  From: Jonathon Richardson MD  Sent: 4/17/2022  11:56 AM CDT  To: YESENIA Worthy,    CT looks stable.  Suspect she is about due for follow-up?    Thanks,    Jonathon        Left detailed message for patient with Dr. Richardson's response and recommendations. Patient scheduled for echo and follow-up with Dr. Richardson. -juventinob

## 2022-04-20 ENCOUNTER — ANTICOAGULATION THERAPY VISIT (OUTPATIENT)
Dept: ANTICOAGULATION | Facility: CLINIC | Age: 67
End: 2022-04-20

## 2022-04-20 ENCOUNTER — LAB (OUTPATIENT)
Dept: LAB | Facility: CLINIC | Age: 67
End: 2022-04-20
Payer: COMMERCIAL

## 2022-04-20 DIAGNOSIS — Z95.2 S/P AVR (AORTIC VALVE REPLACEMENT): ICD-10-CM

## 2022-04-20 DIAGNOSIS — Z95.2 S/P AVR: Primary | ICD-10-CM

## 2022-04-20 DIAGNOSIS — Z79.01 LONG TERM CURRENT USE OF ANTICOAGULANT THERAPY: ICD-10-CM

## 2022-04-20 LAB — INR BLD: 2 (ref 0.9–1.1)

## 2022-04-20 PROCEDURE — 85610 PROTHROMBIN TIME: CPT

## 2022-04-20 PROCEDURE — 36416 COLLJ CAPILLARY BLOOD SPEC: CPT

## 2022-04-20 NOTE — PROGRESS NOTES
ANTICOAGULATION MANAGEMENT     Jennifer Higginbotham 67 year old female is on warfarin with therapeutic INR result. (Goal INR 2.0-3.0)    Recent labs: (last 7 days)     04/20/22  1552   INR 2.0*       ASSESSMENT       Source(s): Chart Review and Patient/Caregiver Call       Warfarin doses taken: Warfarin taken as instructed    Diet: No new diet changes identified    New illness, injury, or hospitalization: No    Medication/supplement changes: None noted    Signs or symptoms of bleeding or clotting: No    Previous INR: Therapeutic last 2(+) visits    Additional findings: None       PLAN     Recommended plan for no diet, medication or health factor changes affecting INR     Dosing Instructions: continue your current warfarin dose with next INR in 4 weeks       Summary  As of 4/20/2022    Full warfarin instructions:  8 mg every Mon, Wed; 6 mg all other days   Next INR check:  5/18/2022             Telephone call with Jennifer who verbalizes understanding and agrees to plan    Lab visit scheduled    Education provided: None required    Plan made per [facility] anticoagulation protocol    Eneida Suarez RN  Anticoagulation Clinic  4/20/2022    _______________________________________________________________________     Anticoagulation Episode Summary     Current INR goal:  2.0-3.0   TTR:  77.2 % (1 y)   Target end date:  Indefinite   Send INR reminders to:  [facility]    Indications    S/P AVR [Z95.2]  S/P AVR (aortic valve replacement) [Z95.2]  Long term current use of anticoagulant therapy [Z79.01]           Comments:  Goal range changed 2/20/19 per cardiology         Anticoagulation Care Providers     Provider Role Specialty Phone number    Rafaela Woods MD Referring Family Medicine [phone]

## 2022-04-21 ENCOUNTER — HOSPITAL ENCOUNTER (OUTPATIENT)
Dept: CARDIOLOGY | Facility: HOSPITAL | Age: 67
Discharge: HOME | End: 2022-04-21
Attending: INTERNAL MEDICINE | Admitting: INTERNAL MEDICINE
Payer: COMMERCIAL

## 2022-04-21 DIAGNOSIS — I71.03 DISSECTION OF THORACOABDOMINAL AORTA (H): ICD-10-CM

## 2022-04-21 DIAGNOSIS — Z95.2 S/P AVR: ICD-10-CM

## 2022-04-21 DIAGNOSIS — Q87.40 MARFAN'S SYNDROME: ICD-10-CM

## 2022-04-21 LAB — LVEF ECHO: NORMAL

## 2022-04-21 PROCEDURE — 93306 TTE W/DOPPLER COMPLETE: CPT | Mod: 26 | Performed by: INTERNAL MEDICINE

## 2022-04-21 PROCEDURE — 93306 TTE W/DOPPLER COMPLETE: CPT

## 2022-05-16 ENCOUNTER — ANCILLARY PROCEDURE (OUTPATIENT)
Dept: MAMMOGRAPHY | Facility: CLINIC | Age: 67
End: 2022-05-16
Attending: FAMILY MEDICINE
Payer: COMMERCIAL

## 2022-05-16 DIAGNOSIS — Z12.31 VISIT FOR SCREENING MAMMOGRAM: ICD-10-CM

## 2022-05-16 PROCEDURE — 77067 SCR MAMMO BI INCL CAD: CPT

## 2022-05-17 ENCOUNTER — LAB (OUTPATIENT)
Dept: LAB | Facility: CLINIC | Age: 67
End: 2022-05-17
Payer: COMMERCIAL

## 2022-05-17 ENCOUNTER — ANTICOAGULATION THERAPY VISIT (OUTPATIENT)
Dept: ANTICOAGULATION | Facility: CLINIC | Age: 67
End: 2022-05-17

## 2022-05-17 DIAGNOSIS — Z95.2 S/P AVR: Primary | ICD-10-CM

## 2022-05-17 DIAGNOSIS — Z79.01 LONG TERM CURRENT USE OF ANTICOAGULANT THERAPY: ICD-10-CM

## 2022-05-17 DIAGNOSIS — Z95.2 S/P AVR (AORTIC VALVE REPLACEMENT): ICD-10-CM

## 2022-05-17 LAB — INR BLD: 3.3 (ref 0.9–1.1)

## 2022-05-17 PROCEDURE — 85610 PROTHROMBIN TIME: CPT

## 2022-05-17 PROCEDURE — 36416 COLLJ CAPILLARY BLOOD SPEC: CPT

## 2022-05-17 NOTE — PROGRESS NOTES
ANTICOAGULATION MANAGEMENT     Alyssa Higginbotham 67 year old female is on warfarin with supratherapeutic INR result. (Goal INR 2.0-3.0)    Recent labs: (last 7 days)     05/17/22  0709   INR 3.3*       ASSESSMENT       Source(s): Chart Review, Patient/Caregiver Call and Template       Warfarin doses taken: Warfarin taken as instructed    Diet: No new diet changes identified    New illness, injury, or hospitalization: No    Medication/supplement changes:  Yes.    Reported has taken Fioricet within the last 2 wks.  (This may result in an increased risk of bleeding with warfarin. )   Has history of migraines and takes Rizatriptan (Maxalt) and takes this 2x per week.    Signs or symptoms of bleeding or clotting: Yes:    Reported some epistaxis events.    Previous INR: Therapeutic last 4 visits    Additional findings: None       PLAN     Recommended plan for temporary change(s) and ongoing change(s) affecting INR     Dosing Instructions:   (evenings. 1mg / 5mg tabs)    partial hold then decrease your warfarin dose (8.7% change) with next INR in 1-2 weeks       Summary  As of 5/17/2022    Full warfarin instructions:  5/17: 5 mg; Otherwise 6 mg every day   Next INR check:  5/31/2022             Telephone call with  Jennifer ([phone]) who verbalizes understanding and agrees to plan.   - prefers and requested INR to be less than 2.5, d/t symptoms of nose bleeds.   - also known, in the past when she takes Fiorcet, INR tends to increase.    Lab visit scheduled - INR on 5/27/22 @ [facility].    Education provided: Importance of consistent vitamin K intake, Goal range and significance of current result, Potential interaction between warfarin and Fiorect, Monitoring for bleeding signs and symptoms and When to seek medical attention/emergency care    Plan made per [facility] anticoagulation protocol    Aylin Pablo, RN  Anticoagulation Clinic  5/17/2022    _______________________________________________________________________      Anticoagulation Episode Summary     Current INR goal:  2.0-3.0   TTR:  75.5 % (1 y)   Target end date:  Indefinite   Send INR reminders to:  [facility]    Indications    S/P AVR [Z95.2]  S/P AVR (aortic valve replacement) [Z95.2]  Long term current use of anticoagulant therapy [Z79.01]           Comments:  Goal range changed 2/20/19 per cardiology         Anticoagulation Care Providers     Provider Role Specialty Phone number    Rafaela Woods MD Referring [facility] Medicine [phone]

## 2022-05-27 ENCOUNTER — LAB (OUTPATIENT)
Dept: LAB | Facility: CLINIC | Age: 67
End: 2022-05-27
Payer: COMMERCIAL

## 2022-05-27 ENCOUNTER — ANTICOAGULATION THERAPY VISIT (OUTPATIENT)
Dept: ANTICOAGULATION | Facility: CLINIC | Age: 67
End: 2022-05-27

## 2022-05-27 DIAGNOSIS — Z95.2 S/P AVR: Primary | ICD-10-CM

## 2022-05-27 DIAGNOSIS — Z79.01 LONG TERM CURRENT USE OF ANTICOAGULANT THERAPY: ICD-10-CM

## 2022-05-27 DIAGNOSIS — Z95.2 S/P AVR (AORTIC VALVE REPLACEMENT): ICD-10-CM

## 2022-05-27 LAB — INR BLD: 3 (ref 0.9–1.1)

## 2022-05-27 PROCEDURE — 85610 PROTHROMBIN TIME: CPT

## 2022-05-27 PROCEDURE — 36416 COLLJ CAPILLARY BLOOD SPEC: CPT

## 2022-05-27 NOTE — PROGRESS NOTES
ANTICOAGULATION MANAGEMENT     Alyssa Higginbotham 67 year old female is on warfarin with therapeutic INR result. (Goal INR 2.0-3.0)    Recent labs: (last 7 days)     05/27/22  0730   INR 3.0*       ASSESSMENT       Source(s): Chart Review, Patient/Caregiver Call and Template       Warfarin doses taken: Warfarin taken as instructed    Diet: No new diet changes identified    New illness, injury, or hospitalization: No    Medication/supplement changes: None noted    Signs or symptoms of bleeding or clotting: No    Previous INR: Supratherapeutic at 3.3 on 5/17/22.    Additional findings: None       PLAN     Recommended plan for no diet, medication or health factor changes affecting INR     Dosing Instructions:   (evenings. 1mg / 5mg tabs)    decrease your warfarin dose (4.8% change) with next INR in 1-2 weeks       Summary  As of 5/27/2022    Full warfarin instructions:  5 mg every Tue, Fri; 6 mg all other days   Next INR check:  6/10/2022             Telephone call with  Jennifer ([phone]) who verbalizes understanding and agrees to plan    - requested a lower warfarin dose, to be on the lower end of her target goal, d/t events of epistaxis.    Lab visit scheduled - INR on 6/14/22 during OV with Dr. Richardson @ [facility].    Education provided: Importance of consistent vitamin K intake, Impact of vitamin K foods on INR, Goal range and significance of current result and Monitoring for bleeding signs and symptoms    Plan made per [facility] anticoagulation protocol    Aylin Pablo RN  Anticoagulation Clinic  5/27/2022    _______________________________________________________________________     Anticoagulation Episode Summary     Current INR goal:  2.0-3.0   TTR:  72.8 % (1 y)   Target end date:  Indefinite   Send INR reminders to:  [facility]    Indications    S/P AVR [Z95.2]  S/P AVR (aortic valve replacement) [Z95.2]  Long term current use of anticoagulant therapy [Z79.01]           Comments:  Goal  range changed 2/20/19 per cardiology         Anticoagulation Care Providers     Provider Role Specialty Phone number    Rafaela Woods MD Referring Family Medicine [phone]

## 2022-06-14 ENCOUNTER — LAB (OUTPATIENT)
Dept: CARDIOLOGY | Facility: CLINIC | Age: 67
End: 2022-06-14

## 2022-06-14 ENCOUNTER — OFFICE VISIT (OUTPATIENT)
Dept: CARDIOLOGY | Facility: CLINIC | Age: 67
End: 2022-06-14
Payer: COMMERCIAL

## 2022-06-14 ENCOUNTER — ANTICOAGULATION THERAPY VISIT (OUTPATIENT)
Dept: ANTICOAGULATION | Facility: CLINIC | Age: 67
End: 2022-06-14

## 2022-06-14 VITALS
HEIGHT: 62 IN | WEIGHT: 114 LBS | DIASTOLIC BLOOD PRESSURE: 70 MMHG | HEART RATE: 61 BPM | RESPIRATION RATE: 16 BRPM | SYSTOLIC BLOOD PRESSURE: 126 MMHG | BODY MASS INDEX: 20.98 KG/M2

## 2022-06-14 DIAGNOSIS — Z79.01 LONG TERM CURRENT USE OF ANTICOAGULANT THERAPY: ICD-10-CM

## 2022-06-14 DIAGNOSIS — Z95.2 S/P AVR (AORTIC VALVE REPLACEMENT): ICD-10-CM

## 2022-06-14 DIAGNOSIS — I71.03 DISSECTION OF THORACOABDOMINAL AORTA (H): Primary | ICD-10-CM

## 2022-06-14 DIAGNOSIS — Z79.01 LONG TERM (CURRENT) USE OF ANTICOAGULANTS: ICD-10-CM

## 2022-06-14 DIAGNOSIS — I35.9 AORTIC VALVE DISORDER: ICD-10-CM

## 2022-06-14 DIAGNOSIS — Z95.2 S/P AVR: Primary | ICD-10-CM

## 2022-06-14 DIAGNOSIS — Z95.2 S/P AVR: ICD-10-CM

## 2022-06-14 LAB — INR POINT OF CARE: 1.9 (ref 0.9–1.1)

## 2022-06-14 PROCEDURE — 99213 OFFICE O/P EST LOW 20 MIN: CPT | Performed by: INTERNAL MEDICINE

## 2022-06-14 PROCEDURE — 36416 COLLJ CAPILLARY BLOOD SPEC: CPT

## 2022-06-14 PROCEDURE — 85610 PROTHROMBIN TIME: CPT

## 2022-06-14 NOTE — PROGRESS NOTES
[facility]  [phone]      Assessment/Recommendations   Patient with known coronary ascending aorta which extends into the carotid and down to the iliac and is status post repair with mechanical prosthesis in the aortic position.  She is quite stable from a functional capacity and does an excellent job of daily exercise.  Her CT both of her thorax and her abdomen does not show any changes in the size of the aorta or the dissection flaps, true and false lumens.  She is 22 years out from surgery.    I have not made any changes in her medications today.  Her blood pressure is good and I have encouraged her to continue an active lifestyle and to call if there is changes.  We have extended the follow-up visits with her and we will go about a year and a half.  We will repeat the CT prior to the visit.    Thank you for allowing us to precipitate in her care.       History of Present Illness/Subjective    Ms. Alyssa Higginbotham is a 67 year old female with known history of aortic dissection, status post repair in the year 2000.  She also at that time had placement of a mechanical prosthesis in the aortic position and is chronically anticoagulated.  She has been doing well this past year.  She walks an hour every day and bikes 30 minutes every day.  In the wintertime they walk at the [facility] and stationary bike in the summertime that walk outside and bicycle outside.  No changes in her functional capacity.  She does have difficulty biking up hills but this is not changed at all.  She has not had any changes in her breathing, orthopnea, paroxysmal nocturnal dyspnea, peripheral edema, syncope or near syncopal episodes and and occasionally will get some fluttering in her chest.  This is typically brief.  If she drinks more caffeine she will get more fluttering.  Echocardiogram recently showed stable gradient across the aortic valve prosthesis at 22.8mmHg mean.  Previous evaluations have been around  "20.           Physical Examination Review of Systems   /70 (BP Location: Right arm, Patient Position: Sitting, Cuff Size: Adult Small)   Pulse 61   Resp 16   Ht 1.575 m (5' 2\")   Wt 51.7 kg (114 lb)   BMI 20.85 kg/m    Body mass index is 20.85 kg/m .  Wt Readings from Last 3 Encounters:   06/14/22 51.7 kg (114 lb)   12/13/21 51.4 kg (113 lb 6 oz)   09/30/21 50.9 kg (112 lb 2 oz)     General Appearance:   Alert, cooperative and in no acute distress.   ENT/Mouth: Patient wearing a mask.      EYES:  no scleral icterus, normal conjunctivae   Neck: JVP normal. No Hepatojugular reflux. Thyroid not visualized.   Chest/Lungs:   Lungs are clear to auscultation, equal chest wall expansion.   Cardiovascular:   S1, metallic S2 with 2/6 systolic murmur , no clicks or rubs. Brachial, radial and posterior tibial pulses are intact and symetric. No carotid bruits noted   Abdomen:  Nontender. BS+.    Extremities: No cyanosis, clubbing or edema   Skin: no xanthelasma, warm.    Neurologic: normal arm movement bilateral, no tremors     Psychiatric: Appropriate affect.      Enc Vitals  BP: 126/70  Pulse: 61  Resp: 16  Weight: 51.7 kg (114 lb)  Height: 157.5 cm (5' 2\")                                           Medical History  Surgical History Family History Social History   Past Medical History:   Diagnosis Date     Accidental fall 05/27/2015     Anxiety      Aortic dissection (H)      Asthma      Benign meningioma of brain (H) 03/2015    initially seen on CT of head that was obtained after a fall. Frontal lobe, confirmed on an MRI Mar 2015, 3 mm     Benign neoplasm of colon      Bunion      Chronic headache      Colon polyp 2011     Depression      Hepatitis C      HTN (hypertension)      Hyperlipidemia      Hypothyroid      Irregular heart rate      Nasal fracture 02/28/2015    fell face first on sideache     Osteoporosis      Other acquired deformity of toe      Stroke (H)     on CT scan    Past Surgical History: "   Procedure Laterality Date     AORTIC VALVE REPLACEMENT   2000     BIOPSY BREAST Left 2005    benign     COLONOSCOPY  2011     REPAIR THORACIC AORTA   2000    Family History   Problem Relation Age of Onset     Pancreatic Cancer Father 70.00        history of smoking     Ovarian Cancer Sister 67.00        stage III, fatal     Skin Cancer Sister      Heart Disease Mother      Diabetes No family hx of      Alzheimer Disease No family hx of     Social History     Socioeconomic History     Marital status:      Spouse name: Not on file     Number of children:  0     Years of education: Not on file     Highest education level: Not on file   Occupational History     Not on file   Tobacco Use     Smoking status: Never Smoker     Smokeless tobacco: Never Used   Substance and Sexual Activity     Alcohol use: No     Drug use: No     Sexual activity: Not Currently   Other Topics Concern     Not on file   Social History Narrative     Not on file     Social Determinants of Health     Financial Resource Strain: Not on file   Food Insecurity: Not on file   Transportation Needs: Not on file   Physical Activity: Not on file   Stress: Not on file   Social Connections: Not on file   Intimate Partner Violence: Not on file   Housing Stability: Not on file          Medications  Allergies   Current Outpatient Medications   Medication Sig Dispense Refill     amitriptyline (ELAVIL) 10 MG tablet [AMITRIPTYLINE (ELAVIL) 10 MG TABLET] Take 10 mg by mouth bedtime.       butalbital-acetaminophen-caffeine (FIORICET, ESGIC) -40 mg per tablet [BUTALBITAL-ACETAMINOPHEN-CAFFEINE (FIORICET, ESGIC) -40 MG PER TABLET] TAKE 1 TO 2 TABLETS BY MOUTH EVERY 8 HOURS AS NEEDED FOR PAIN. MAX OF 4000 MG ACETAMINOPHEN PER 24 HOURS 60 tablet 3     clonazePAM (KLONOPIN) 0.5 MG tablet [CLONAZEPAM (KLONOPIN) 0.5 MG TABLET] Take 0.5 mg by mouth bedtime as needed for anxiety.       levothyroxine (SYNTHROID/LEVOTHROID) 75 MCG tablet TAKE 1 TABLET ON  WEDNESDAY, THURSDAY, SATURDAY AND SUNDAY 52 tablet 3     levothyroxine (SYNTHROID/LEVOTHROID) 88 MCG tablet TAKE 1 TABLET ON MONDAY, TUESDAY AND FRIDAY 39 tablet 3     metoprolol succinate ER (TOPROL-XL) 200 MG 24 hr tablet TAKE 1 TABLET DAILY 90 tablet 3     multivitamin (ONE A DAY) per tablet [MULTIVITAMIN (ONE A DAY) PER TABLET] Take 1 tablet by mouth daily.        rizatriptan (MAXALT) 5 MG tablet [RIZATRIPTAN (MAXALT) 5 MG TABLET] TAKE 1 TABLET BY MOUTH AS NEEDED FOR MIGRAINE. MAY REPEAT IN 2 HOURS IF NEEDED 10 tablet 9     simvastatin (ZOCOR) 20 MG tablet TAKE 1 TABLET AT BEDTIME (DUE FOR AN OFFICE VISIT) 90 tablet 3     triamterene-HCTZ (MAXZIDE) 75-50 MG tablet TAKE ONE-HALF (1/2) TABLET DAILY 45 tablet 3     warfarin ANTICOAGULANT (COUMADIN) 1 MG tablet TAKE 1 TO 2 TABLETS ALONG WITH 5 MG TABLET (6 TO 7 MG) DAILY AS DIRECTED, ADJUST DOSE PER INR RESULTS 168 tablet 3     warfarin ANTICOAGULANT (COUMADIN) 5 MG tablet TAKE 1 TABLET ALONG WITH 1 MG TABLET (6 TO 7 MG DAILY) AS DIRECTED, ADJUST PER INR RESULT 100 tablet 3    Allergies   Allergen Reactions     Codeine Other (See Comments)     Faints     Demerol [Meperidine] Other (See Comments)     Reaction not reported by patient., Patient states she felt awful.         Lab Results    Chemistry/lipid CBC Cardiac Enzymes/BNP/TSH/INR   Lab Results   Component Value Date    CHOL 170 12/13/2021    HDL 51 12/13/2021    TRIG 116 12/13/2021    BUN 19 12/13/2021     12/13/2021    CO2 27 12/13/2021    Lab Results   Component Value Date    WBC 4.0 12/13/2021    HGB 13.1 12/13/2021    HCT 40.5 12/13/2021    MCV 82 12/13/2021     12/13/2021    Lab Results   Component Value Date    TSH 4.77 12/13/2021    INR 1.9 (A) 06/14/2022

## 2022-06-14 NOTE — LETTER
6/14/2022    Rafaela Woods MD  [address]  [facility] [address]    RE: Alyssa Higginbotham       Dear Colleague,     I had the pleasure of seeing Alyssa Higginbotham in the [facility].      [facility]  [phone]      Assessment/Recommendations   Patient with known coronary ascending aorta which extends into the carotid and down to the iliac and is status post repair with mechanical prosthesis in the aortic position.  She is quite stable from a functional capacity and does an excellent job of daily exercise.  Her CT both of her thorax and her abdomen does not show any changes in the size of the aorta or the dissection flaps, true and false lumens.  She is 22 years out from surgery.    I have not made any changes in her medications today.  Her blood pressure is good and I have encouraged her to continue an active lifestyle and to call if there is changes.  We have extended the follow-up visits with her and we will go about a year and a half.  We will repeat the CT prior to the visit.    Thank you for allowing us to precipitate in her care.       History of Present Illness/Subjective    Ms. Alyssa Higginbotham is a 67 year old female with known history of aortic dissection, status post repair in the year 2000.  She also at that time had placement of a mechanical prosthesis in the aortic position and is chronically anticoagulated.  She has been doing well this past year.  She walks an hour every day and bikes 30 minutes every day.  In the wintertime they walk at the [facility] and stationary bike in the summertime that walk outside and bicycle outside.  No changes in her functional capacity.  She does have difficulty biking up hills but this is not changed at all.  She has not had any changes in her breathing, orthopnea, paroxysmal nocturnal dyspnea, peripheral edema, syncope or near syncopal episodes and and occasionally will get some fluttering in her chest.  This is typically  "brief.  If she drinks more caffeine she will get more fluttering.  Echocardiogram recently showed stable gradient across the aortic valve prosthesis at 22.8mmHg mean.  Previous evaluations have been around 20.           Physical Examination Review of Systems   /70 (BP Location: Right arm, Patient Position: Sitting, Cuff Size: Adult Small)   Pulse 61   Resp 16   Ht 1.575 m (5' 2\")   Wt 51.7 kg (114 lb)   BMI 20.85 kg/m    Body mass index is 20.85 kg/m .  Wt Readings from Last 3 Encounters:   06/14/22 51.7 kg (114 lb)   12/13/21 51.4 kg (113 lb 6 oz)   09/30/21 50.9 kg (112 lb 2 oz)     General Appearance:   Alert, cooperative and in no acute distress.   ENT/Mouth: Patient wearing a mask.      EYES:  no scleral icterus, normal conjunctivae   Neck: JVP normal. No Hepatojugular reflux. Thyroid not visualized.   Chest/Lungs:   Lungs are clear to auscultation, equal chest wall expansion.   Cardiovascular:   S1, metallic S2 with 2/6 systolic murmur , no clicks or rubs. Brachial, radial and posterior tibial pulses are intact and symetric. No carotid bruits noted   Abdomen:  Nontender. BS+.    Extremities: No cyanosis, clubbing or edema   Skin: no xanthelasma, warm.    Neurologic: normal arm movement bilateral, no tremors     Psychiatric: Appropriate affect.      Enc Vitals  BP: 126/70  Pulse: 61  Resp: 16  Weight: 51.7 kg (114 lb)  Height: 157.5 cm (5' 2\")                                           Medical History  Surgical History Family History Social History   Past Medical History:   Diagnosis Date     Accidental fall 05/27/2015     Anxiety      Aortic dissection (H)      Asthma      Benign meningioma of brain (H) 03/2015    initially seen on CT of head that was obtained after a fall. Frontal lobe, confirmed on an MRI Mar 2015, 3 mm     Benign neoplasm of colon      Bunion      Chronic headache      Colon polyp 2011     Depression      Hepatitis C      HTN (hypertension)      Hyperlipidemia      Hypothyroid  "     Irregular heart rate      Nasal fracture 02/28/2015    fell face first on sideache     Osteoporosis      Other acquired deformity of toe      Stroke (H)     on CT scan    Past Surgical History:   Procedure Laterality Date     AORTIC VALVE REPLACEMENT   2000     BIOPSY BREAST Left 2005    benign     COLONOSCOPY  2011     REPAIR THORACIC AORTA   2000    Family History   Problem Relation Age of Onset     Pancreatic Cancer Father 70.00        history of smoking     Ovarian Cancer Sister 67.00        stage III, fatal     Skin Cancer Sister      Heart Disease Mother      Diabetes No family hx of      Alzheimer Disease No family hx of     Social History     Socioeconomic History     Marital status:      Spouse name: Not on file     Number of children:  0     Years of education: Not on file     Highest education level: Not on file   Occupational History     Not on file   Tobacco Use     Smoking status: Never Smoker     Smokeless tobacco: Never Used   Substance and Sexual Activity     Alcohol use: No     Drug use: No     Sexual activity: Not Currently   Other Topics Concern     Not on file   Social History Narrative     Not on file     Social Determinants of Health     Financial Resource Strain: Not on file   Food Insecurity: Not on file   Transportation Needs: Not on file   Physical Activity: Not on file   Stress: Not on file   Social Connections: Not on file   Intimate Partner Violence: Not on file   Housing Stability: Not on file          Medications  Allergies   Current Outpatient Medications   Medication Sig Dispense Refill     amitriptyline (ELAVIL) 10 MG tablet [AMITRIPTYLINE (ELAVIL) 10 MG TABLET] Take 10 mg by mouth bedtime.       butalbital-acetaminophen-caffeine (FIORICET, ESGIC) -40 mg per tablet [BUTALBITAL-ACETAMINOPHEN-CAFFEINE (FIORICET, ESGIC) -40 MG PER TABLET] TAKE 1 TO 2 TABLETS BY MOUTH EVERY 8 HOURS AS NEEDED FOR PAIN. MAX OF 4000 MG ACETAMINOPHEN PER 24 HOURS 60 tablet 3      clonazePAM (KLONOPIN) 0.5 MG tablet [CLONAZEPAM (KLONOPIN) 0.5 MG TABLET] Take 0.5 mg by mouth bedtime as needed for anxiety.       levothyroxine (SYNTHROID/LEVOTHROID) 75 MCG tablet TAKE 1 TABLET ON WEDNESDAY, THURSDAY, SATURDAY AND SUNDAY 52 tablet 3     levothyroxine (SYNTHROID/LEVOTHROID) 88 MCG tablet TAKE 1 TABLET ON MONDAY, TUESDAY AND FRIDAY 39 tablet 3     metoprolol succinate ER (TOPROL-XL) 200 MG 24 hr tablet TAKE 1 TABLET DAILY 90 tablet 3     multivitamin (ONE A DAY) per tablet [MULTIVITAMIN (ONE A DAY) PER TABLET] Take 1 tablet by mouth daily.        rizatriptan (MAXALT) 5 MG tablet [RIZATRIPTAN (MAXALT) 5 MG TABLET] TAKE 1 TABLET BY MOUTH AS NEEDED FOR MIGRAINE. MAY REPEAT IN 2 HOURS IF NEEDED 10 tablet 9     simvastatin (ZOCOR) 20 MG tablet TAKE 1 TABLET AT BEDTIME (DUE FOR AN OFFICE VISIT) 90 tablet 3     triamterene-HCTZ (MAXZIDE) 75-50 MG tablet TAKE ONE-HALF (1/2) TABLET DAILY 45 tablet 3     warfarin ANTICOAGULANT (COUMADIN) 1 MG tablet TAKE 1 TO 2 TABLETS ALONG WITH 5 MG TABLET (6 TO 7 MG) DAILY AS DIRECTED, ADJUST DOSE PER INR RESULTS 168 tablet 3     warfarin ANTICOAGULANT (COUMADIN) 5 MG tablet TAKE 1 TABLET ALONG WITH 1 MG TABLET (6 TO 7 MG DAILY) AS DIRECTED, ADJUST PER INR RESULT 100 tablet 3    Allergies   Allergen Reactions     Codeine Other (See Comments)     Faints     Demerol [Meperidine] Other (See Comments)     Reaction not reported by patient., Patient states she felt awful.         Lab Results    Chemistry/lipid CBC Cardiac Enzymes/BNP/TSH/INR   Lab Results   Component Value Date    CHOL 170 12/13/2021    HDL 51 12/13/2021    TRIG 116 12/13/2021    BUN 19 12/13/2021     12/13/2021    CO2 27 12/13/2021    Lab Results   Component Value Date    WBC 4.0 12/13/2021    HGB 13.1 12/13/2021    HCT 40.5 12/13/2021    MCV 82 12/13/2021     12/13/2021    Lab Results   Component Value Date    TSH 4.77 12/13/2021    INR 1.9 (A) 06/14/2022              Thank you for allowing me to  participate in the care of your patient.      Sincerely,     Jonathon Richardson MD     [facility]  cc:   Jonathon Richardson MD  [address] [place] [address]

## 2022-06-14 NOTE — PROGRESS NOTES
ANTICOAGULATION MANAGEMENT     Alyssa Higginbotham 67 year old female is on warfarin with subtherapeutic INR result. (Goal INR 2.0-3.0)    Recent labs: (last 7 days)     06/14/22  0817   INR 1.9*       ASSESSMENT       Source(s): Chart Review and Patient/Caregiver Call       Warfarin doses taken: Warfarin taken as instructed    Diet: No new diet changes identified    New illness, injury, or hospitalization: No    Medication/supplement changes: None noted    Signs or symptoms of bleeding or clotting: No    Previous INR: Therapeutic last visit at 3.0; previously outside of goal range at 3.3    Additional findings:  Taking a trip to [place] and possibly be back on 6/27.       PLAN     Recommended plan for no diet, medication or health factor changes affecting INR     Dosing Instructions:    Increase your warfarin dose (2.5 change) with next INR in 2 weeks       Summary  As of 6/14/2022    Full warfarin instructions:  6/14: 6 mg; Otherwise 5 mg every Fri; 6 mg all other days   Next INR check:  6/28/2022             Telephone call with  Jennifer ([phone]) who verbalizes understanding and agrees to plan    Lab visit scheduled - INR on 6/28/22 @ [facility].    Education provided: Importance of consistent vitamin K intake, Impact of vitamin K foods on INR and Goal range and significance of current result    Plan made per [facility] anticoagulation protocol    Aylin Pablo RN  Anticoagulation Clinic  6/14/2022    _______________________________________________________________________     Anticoagulation Episode Summary     Current INR goal:  2.0-3.0   TTR:  72.8 % (1 y)   Target end date:  Indefinite   Send INR reminders to:  [facility]    Indications    S/P AVR [Z95.2]  S/P AVR (aortic valve replacement) [Z95.2]  Long term current use of anticoagulant therapy [Z79.01]           Comments:  Goal range changed 2/20/19 per cardiology         Anticoagulation Care Providers     Provider Role Specialty Phone number     Rafaela Woods MD [facility] [phone]

## 2022-06-20 DIAGNOSIS — G43.009 MIGRAINE WITHOUT AURA AND WITHOUT STATUS MIGRAINOSUS, NOT INTRACTABLE: ICD-10-CM

## 2022-06-20 RX ORDER — RIZATRIPTAN BENZOATE 5 MG/1
TABLET ORAL
Qty: 10 TABLET | Refills: 9 | Status: SHIPPED | OUTPATIENT
Start: 2022-06-20 | End: 2023-08-23

## 2022-06-20 NOTE — TELEPHONE ENCOUNTER
Received fax from pharmacy requesting refill for rizatriptan (MAXALT) 5 MG tablet    Last refill: 01/04/2021  Patient last seen: 12/13/2021 with PCP    Okay for refill?

## 2022-06-28 ENCOUNTER — ANTICOAGULATION THERAPY VISIT (OUTPATIENT)
Dept: ANTICOAGULATION | Facility: CLINIC | Age: 67
End: 2022-06-28

## 2022-06-28 ENCOUNTER — LAB (OUTPATIENT)
Dept: LAB | Facility: CLINIC | Age: 67
End: 2022-06-28
Payer: COMMERCIAL

## 2022-06-28 DIAGNOSIS — Z95.2 S/P AVR: Primary | ICD-10-CM

## 2022-06-28 DIAGNOSIS — Z79.01 LONG TERM CURRENT USE OF ANTICOAGULANT THERAPY: ICD-10-CM

## 2022-06-28 DIAGNOSIS — Z95.2 S/P AVR (AORTIC VALVE REPLACEMENT): ICD-10-CM

## 2022-06-28 LAB — INR BLD: 2.5 (ref 0.9–1.1)

## 2022-06-28 PROCEDURE — 85610 PROTHROMBIN TIME: CPT

## 2022-06-28 PROCEDURE — 36415 COLL VENOUS BLD VENIPUNCTURE: CPT

## 2022-06-28 NOTE — PROGRESS NOTES
ANTICOAGULATION MANAGEMENT     Alyssa Higginbotham 67 year old female is on warfarin with therapeutic INR result. (Goal INR 2.0-3.0)    Recent labs: (last 7 days)     06/28/22  0750   INR 2.5*       ASSESSMENT       Source(s): Chart Review and Patient/Caregiver Call       Warfarin doses taken: Warfarin taken as instructed    Diet: No new diet changes identified    New illness, injury, or hospitalization: No    Medication/supplement changes: None noted    Signs or symptoms of bleeding or clotting: No    Previous INR: Subtherapeutic at 1.9 on 6/14/22.    Additional findings: Just return from vacation - [place].       PLAN     Recommended plan for no diet, medication or health factor changes affecting INR     Dosing Instructions:   (evenings. 1mg / 5mg tabs)    continue your current warfarin dose with next INR in 1-2 weeks       Summary  As of 6/28/2022    Full warfarin instructions:  5 mg every Fri; 6 mg all other days   Next INR check:  7/12/2022             Telephone call with  Jennifer ([phone]) who verbalizes understanding and agrees to plan    Lab visit scheduled - INR on 7/12/22 @ [facility].    Education provided: Importance of consistent vitamin K intake and Goal range and significance of current result    Plan made per [facility] anticoagulation protocol    Aylin Pablo RN  Anticoagulation Clinic  6/28/2022    _______________________________________________________________________     Anticoagulation Episode Summary     Current INR goal:  2.0-3.0   TTR:  72.8 % (1 y)   Target end date:  Indefinite   Send INR reminders to:  [facility]    Indications    S/P AVR [Z95.2]  S/P AVR (aortic valve replacement) [Z95.2]  Long term current use of anticoagulant therapy [Z79.01]           Comments:  Goal range changed 2/20/19 per cardiology         Anticoagulation Care Providers     Provider Role Specialty Phone number    Rafaela Woods MD Referring [facility] Medicine [phone]

## 2022-07-12 ENCOUNTER — LAB (OUTPATIENT)
Dept: LAB | Facility: CLINIC | Age: 67
End: 2022-07-12
Payer: COMMERCIAL

## 2022-07-12 ENCOUNTER — ANTICOAGULATION THERAPY VISIT (OUTPATIENT)
Dept: ANTICOAGULATION | Facility: CLINIC | Age: 67
End: 2022-07-12

## 2022-07-12 DIAGNOSIS — Z95.2 S/P AVR (AORTIC VALVE REPLACEMENT): ICD-10-CM

## 2022-07-12 DIAGNOSIS — Z79.01 LONG TERM CURRENT USE OF ANTICOAGULANT THERAPY: ICD-10-CM

## 2022-07-12 DIAGNOSIS — Z95.2 S/P AVR: Primary | ICD-10-CM

## 2022-07-12 LAB — INR BLD: 2.6 (ref 0.9–1.1)

## 2022-07-12 PROCEDURE — 36416 COLLJ CAPILLARY BLOOD SPEC: CPT

## 2022-07-12 PROCEDURE — 85610 PROTHROMBIN TIME: CPT

## 2022-07-12 NOTE — PROGRESS NOTES
ANTICOAGULATION MANAGEMENT     Alyssa Higginbotham 67 year old female is on warfarin with therapeutic INR result. (Goal INR 2.0-3.0)    Recent labs: (last 7 days)     07/12/22  0716   INR 2.6*       ASSESSMENT       Source(s): Chart Review, Patient/Caregiver Call and Template       Warfarin doses taken: Warfarin taken as instructed    Diet: No new diet changes identified    New illness, injury, or hospitalization: No    Medication/supplement changes: None noted    Signs or symptoms of bleeding or clotting: No    Previous INR: Therapeutic last visit; previously outside of goal range    Additional findings: None       PLAN     Recommended plan for no diet, medication or health factor changes affecting INR     Dosing Instructions: continue your current warfarin dose with next INR in 3-4 weeks       Summary  As of 7/12/2022    Full warfarin instructions:  5 mg every Fri; 6 mg all other days   Next INR check:  8/9/2022             Telephone call with Jennifer who verbalizes understanding and agrees to plan    Lab visit scheduled    Education provided: Please call back if any changes to your diet, medications or how you've been taking warfarin and Importance of therapeutic range    Plan made per [facility] anticoagulation protocol    Jordana Tyler RN  Anticoagulation Clinic  7/12/2022    _______________________________________________________________________     Anticoagulation Episode Summary     Current INR goal:  2.0-3.0   TTR:  72.8 % (1 y)   Target end date:  Indefinite   Send INR reminders to:  [facility]    Indications    S/P AVR [Z95.2]  S/P AVR (aortic valve replacement) [Z95.2]  Long term current use of anticoagulant therapy [Z79.01]           Comments:  Goal range changed 2/20/19 per cardiology         Anticoagulation Care Providers     Provider Role Specialty Phone number    Rafaela Woods MD Referring Family Medicine [phone]

## 2022-08-08 ENCOUNTER — ANTICOAGULATION THERAPY VISIT (OUTPATIENT)
Dept: ANTICOAGULATION | Facility: CLINIC | Age: 67
End: 2022-08-08

## 2022-08-08 ENCOUNTER — LAB (OUTPATIENT)
Dept: LAB | Facility: CLINIC | Age: 67
End: 2022-08-08
Payer: COMMERCIAL

## 2022-08-08 DIAGNOSIS — Z95.2 S/P AVR: Primary | ICD-10-CM

## 2022-08-08 DIAGNOSIS — Z95.2 S/P AVR (AORTIC VALVE REPLACEMENT): ICD-10-CM

## 2022-08-08 DIAGNOSIS — Z79.01 LONG TERM CURRENT USE OF ANTICOAGULANT THERAPY: ICD-10-CM

## 2022-08-08 LAB — INR BLD: 2.8 (ref 0.9–1.1)

## 2022-08-08 PROCEDURE — 36415 COLL VENOUS BLD VENIPUNCTURE: CPT

## 2022-08-08 PROCEDURE — 85610 PROTHROMBIN TIME: CPT

## 2022-08-08 NOTE — PROGRESS NOTES
ANTICOAGULATION MANAGEMENT     Alyssa Higginbotham 67 year old female is on warfarin with therapeutic INR result. (Goal INR 2.0-3.0)    Recent labs: (last 7 days)     08/08/22  1433   INR 2.8*       ASSESSMENT       Source(s): Chart Review, Patient/Caregiver Call and Template       Warfarin doses taken: Warfarin taken as instructed    Diet: decreased appetite when she tested positive for covid-9 may be affecting diet and INR    Reported she is back to eating good now.   She did loose 3 lbs.     New illness, injury, or hospitalization: Yes.   Fully recovered from Covid and this is the 11th day she is feeling better.    Medication/supplement changes: None noted    Signs or symptoms of bleeding or clotting: No    Previous INR: Therapeutic last 2 visits    Additional findings:  Leaving for [place] this week and would like a decrease in her wkly dose of warfarin.  Does not want to have any events of epistaxis. Prefers INR to be closer to 2.5 or less.       PLAN     Recommended plan for temporary change(s) affecting INR     Dosing Instructions:    decrease your warfarin dose (2.4% change) with next INR in 2 weeks       Summary  As of 8/8/2022    Full warfarin instructions:  5 mg every Fri; 6 mg all other days   Next INR check:  8/29/2022             Telephone call with  Jennifer ([phone]) who verbalizes understanding and agrees to plan    Lab visit scheduled - INR on 8/9/22 @ [facility].    Education provided: Importance of consistent vitamin K intake, Impact of vitamin K foods on INR, Goal range and significance of current result and Monitoring for bleeding signs and symptoms    Plan made per [facility] anticoagulation protocol    Aylin Pablo RN  Anticoagulation Clinic  8/8/2022    _______________________________________________________________________     Anticoagulation Episode Summary     Current INR goal:  2.0-3.0   TTR:  72.8 % (1 y)   Target end date:  Indefinite   Send INR reminders to:  [facility]     Indications    S/P AVR [Z95.2]  S/P AVR (aortic valve replacement) [Z95.2]  Long term current use of anticoagulant therapy [Z79.01]           Comments:  Goal range changed 2/20/19 per cardiology         Anticoagulation Care Providers     Provider Role Specialty Phone number    Rafaela Woods MD Referring Family Medicine [phone]

## 2022-08-19 ENCOUNTER — LAB (OUTPATIENT)
Dept: LAB | Facility: CLINIC | Age: 67
End: 2022-08-19
Payer: COMMERCIAL

## 2022-08-19 ENCOUNTER — ANTICOAGULATION THERAPY VISIT (OUTPATIENT)
Dept: ANTICOAGULATION | Facility: CLINIC | Age: 67
End: 2022-08-19

## 2022-08-19 DIAGNOSIS — Z95.2 S/P AVR (AORTIC VALVE REPLACEMENT): ICD-10-CM

## 2022-08-19 DIAGNOSIS — Z79.01 LONG TERM CURRENT USE OF ANTICOAGULANT THERAPY: ICD-10-CM

## 2022-08-19 DIAGNOSIS — Z95.2 S/P AVR: Primary | ICD-10-CM

## 2022-08-19 LAB — INR BLD: 3.2 (ref 0.9–1.1)

## 2022-08-19 PROCEDURE — 85610 PROTHROMBIN TIME: CPT

## 2022-08-19 PROCEDURE — 36416 COLLJ CAPILLARY BLOOD SPEC: CPT

## 2022-08-19 NOTE — PROGRESS NOTES
ANTICOAGULATION MANAGEMENT     Alyssa Higginbotham 67 year old female is on warfarin with supratherapeutic INR result. (Goal INR 2.0-3.0)    Recent labs: (last 7 days)     08/19/22  0751   INR 3.2*       ASSESSMENT       Source(s): Chart Review, Patient/Caregiver Call and Template       Warfarin doses taken: Warfarin taken as instructed    Diet: No new diet changes identified    New illness, injury, or hospitalization: No   Recently recovered from Covid-19 end of July 2022.    Medication/supplement changes: None noted    Signs or symptoms of bleeding or clotting: No    Previous INR: Therapeutic last 3 visits    Additional findings: Yes. recently returned from vacation; [place] on 8/14/22.       PLAN     Recommended plan for temporary change(s) affecting INR     Dosing Instructions:   (evenings.  1mg / 5mg tabs)    hold dose then decrease your warfarin dose (2.5% change) with next INR in 1-2 weeks       Summary  As of 8/19/2022    Full warfarin instructions:  8/19: Hold; Otherwise 5 mg every Mon, Wed, Fri; 6 mg all other days   Next INR check:  8/29/2022             Telephone call with  Jennifer ([phone]) who verbalizes understanding and agrees to plan    Lab visit scheduled - INR on 8/29/22 @ [facility].    Education provided: Importance of consistent vitamin K intake, Goal range and significance of current result and Monitoring for bleeding signs and symptoms    Plan made per [facility] anticoagulation protocol    Aylin Pablo RN  Anticoagulation Clinic  8/19/2022    _______________________________________________________________________     Anticoagulation Episode Summary     Current INR goal:  2.0-3.0   TTR:  71.4 % (1 y)   Target end date:  Indefinite   Send INR reminders to:  [facility]    Indications    S/P AVR [Z95.2]  S/P AVR (aortic valve replacement) [Z95.2]  Long term current use of anticoagulant therapy [Z79.01]           Comments:  Goal range changed 2/20/19 per cardiology          Anticoagulation Care Providers     Provider Role Specialty Phone number    Rafaela Woods MD Referring Family Medicine [phone]

## 2022-08-26 ENCOUNTER — TRANSFERRED RECORDS (OUTPATIENT)
Dept: HEALTH INFORMATION MANAGEMENT | Facility: CLINIC | Age: 67
End: 2022-08-26

## 2022-08-29 ENCOUNTER — ANTICOAGULATION THERAPY VISIT (OUTPATIENT)
Dept: ANTICOAGULATION | Facility: CLINIC | Age: 67
End: 2022-08-29

## 2022-08-29 ENCOUNTER — LAB (OUTPATIENT)
Dept: LAB | Facility: CLINIC | Age: 67
End: 2022-08-29
Payer: COMMERCIAL

## 2022-08-29 DIAGNOSIS — Z79.01 LONG TERM CURRENT USE OF ANTICOAGULANT THERAPY: ICD-10-CM

## 2022-08-29 DIAGNOSIS — Z95.2 S/P AVR (AORTIC VALVE REPLACEMENT): ICD-10-CM

## 2022-08-29 DIAGNOSIS — Z95.2 S/P AVR: Primary | ICD-10-CM

## 2022-08-29 LAB — INR BLD: 3.1 (ref 0.9–1.1)

## 2022-08-29 PROCEDURE — 36415 COLL VENOUS BLD VENIPUNCTURE: CPT

## 2022-08-29 PROCEDURE — 85610 PROTHROMBIN TIME: CPT

## 2022-08-29 NOTE — PROGRESS NOTES
ANTICOAGULATION MANAGEMENT     Alyssa Higginbotham 67 year old female is on warfarin with supratherapeutic INR result. (Goal INR 2.0-3.0)    Recent labs: (last 7 days)     08/29/22  0715   INR 3.1*       ASSESSMENT       Source(s): Chart Review, Patient/Caregiver Call and Template       Warfarin doses taken: Warfarin taken as instructed    Diet: No new diet changes identified    New illness, injury, or hospitalization: No    Medication/supplement changes: None noted    Signs or symptoms of bleeding or clotting: No   Reported no epistaxis events.    Previous INR: Supratherapeutic at 3.2 on 8/19/22.    Additional findings: None       PLAN     Recommended plan for no diet, medication or health factor changes affecting INR     Dosing Instructions: decrease your warfarin dose (5.1% change) with next INR in 1-2 weeks       Summary  As of 8/29/2022    Full warfarin instructions:  6 mg every Sun, Wed; 5 mg all other days   Next INR check:  9/12/2022             Telephone call with  Jennifer ([phone]) who verbalizes understanding and agrees to plan    Lab visit scheduled - INR on 9/8/22 @ [facility].    Education provided: Importance of consistent vitamin K intake, Goal range and significance of current result and Monitoring for bleeding signs and symptoms    Plan made per [facility] anticoagulation protocol    Aylin Pablo RN  Anticoagulation Clinic  8/29/2022    _______________________________________________________________________     Anticoagulation Episode Summary     Current INR goal:  2.0-3.0   TTR:  68.6 % (1 y)   Target end date:  Indefinite   Send INR reminders to:  [facility]    Indications    S/P AVR [Z95.2]  S/P AVR (aortic valve replacement) [Z95.2]  Long term current use of anticoagulant therapy [Z79.01]           Comments:  Goal range changed 2/20/19 per cardiology         Anticoagulation Care Providers     Provider Role Specialty Phone number    Rafaela Woods MD Referring Family Medicine  [phone]

## 2022-09-08 ENCOUNTER — TELEPHONE (OUTPATIENT)
Dept: FAMILY MEDICINE | Facility: CLINIC | Age: 67
End: 2022-09-08

## 2022-09-08 ENCOUNTER — ANTICOAGULATION THERAPY VISIT (OUTPATIENT)
Dept: ANTICOAGULATION | Facility: CLINIC | Age: 67
End: 2022-09-08

## 2022-09-08 ENCOUNTER — LAB (OUTPATIENT)
Dept: LAB | Facility: CLINIC | Age: 67
End: 2022-09-08
Payer: COMMERCIAL

## 2022-09-08 DIAGNOSIS — Z95.2 S/P AVR: Primary | ICD-10-CM

## 2022-09-08 DIAGNOSIS — Z79.01 LONG TERM CURRENT USE OF ANTICOAGULANT THERAPY: ICD-10-CM

## 2022-09-08 DIAGNOSIS — Z95.2 S/P AVR (AORTIC VALVE REPLACEMENT): ICD-10-CM

## 2022-09-08 LAB — INR BLD: 2 (ref 0.9–1.1)

## 2022-09-08 PROCEDURE — 36415 COLL VENOUS BLD VENIPUNCTURE: CPT

## 2022-09-08 PROCEDURE — 85610 PROTHROMBIN TIME: CPT

## 2022-09-08 NOTE — PROGRESS NOTES
ANTICOAGULATION MANAGEMENT     Alyssa Higginbotham 67 year old female is on warfarin with therapeutic INR result. (Goal INR 2.0-3.0)    Recent labs: (last 7 days)     09/08/22  0715   INR 2.0*       ASSESSMENT       Source(s): Chart Review, Patient/Caregiver Call and Template       Warfarin doses taken: Warfarin taken as instructed    Diet: No new diet changes identified    New illness, injury, or hospitalization: No    Medication/supplement changes: None noted    Signs or symptoms of bleeding or clotting: No    Previous INR: Supratherapeutic last 2 INR results.    Additional findings:  Will be traveling to [place] one more time before the end of this year.       PLAN     Recommended plan for no diet, medication or health factor changes affecting INR     Dosing Instructions: Increase your warfarin dose (2.7% change) with next INR in 1-2 weeks       Summary  As of 9/8/2022    Full warfarin instructions:  6 mg every Sun, Wed, Fri; 5 mg all other days   Next INR check:  9/19/2022             Telephone call with  Jennifer ([phone]) who verbalizes understanding and agrees to plan    - requested slight increase with wkly warfarin dose.   - as originally planned by this writer a 7% decrease on last INR of 8/29/22 d/t supra INR.    Lab visit scheduled - INR on 9/19/22 @ [facility].    Education provided: Importance of consistent vitamin K intake, Goal range and significance of current result and Monitoring for bleeding signs and symptoms    Plan made per [facility] anticoagulation protocol    Aylin Pablo, RN  Anticoagulation Clinic  9/8/2022    _______________________________________________________________________     Anticoagulation Episode Summary     Current INR goal:  2.0-3.0   TTR:  68.9 % (1 y)   Target end date:  Indefinite   Send INR reminders to:  [facility]    Indications    S/P AVR [Z95.2]  S/P AVR (aortic valve replacement) [Z95.2]  Long term current use of anticoagulant therapy [Z79.01]            Comments:  Goal range changed 2/20/19 per cardiology         Anticoagulation Care Providers     Provider Role Specialty Phone number    Rafaela Woods MD Referring Family Medicine [phone]

## 2022-09-19 ENCOUNTER — LAB (OUTPATIENT)
Dept: LAB | Facility: CLINIC | Age: 67
End: 2022-09-19
Payer: COMMERCIAL

## 2022-09-19 ENCOUNTER — ANTICOAGULATION THERAPY VISIT (OUTPATIENT)
Dept: ANTICOAGULATION | Facility: CLINIC | Age: 67
End: 2022-09-19

## 2022-09-19 DIAGNOSIS — Z79.01 LONG TERM CURRENT USE OF ANTICOAGULANT THERAPY: ICD-10-CM

## 2022-09-19 DIAGNOSIS — Z95.2 S/P AVR: Primary | ICD-10-CM

## 2022-09-19 DIAGNOSIS — Z95.2 S/P AVR (AORTIC VALVE REPLACEMENT): ICD-10-CM

## 2022-09-19 LAB — INR BLD: 1.7 (ref 0.9–1.1)

## 2022-09-19 PROCEDURE — 36415 COLL VENOUS BLD VENIPUNCTURE: CPT

## 2022-09-19 PROCEDURE — 85610 PROTHROMBIN TIME: CPT

## 2022-09-19 NOTE — PROGRESS NOTES
ANTICOAGULATION MANAGEMENT     Alyssa Higginbotham 67 year old female is on warfarin with subtherapeutic INR result. (Goal INR 2.0-3.0)    Recent labs: (last 7 days)     09/19/22  0729   INR 1.7*       ASSESSMENT       Source(s): Chart Review, Patient/Caregiver Call and Template       Warfarin doses taken: Warfarin taken as instructed    Diet: cut down eating peanuts may be affecting diet and INR    Jennifer reported she cut down eating peanuts this week.   Patient was trying to recall why supra INR on 8/19 and 8/29, reported only changes, she was eating lots of Peanuts everyday, and today INR was subtherapeutic, since she stopped one week ago.    New illness, injury, or hospitalization: No    Medication/supplement changes: None noted    Signs or symptoms of bleeding or clotting: No    Previous INR: Therapeutic last visit at 2.0; previously outside of goal range at 3.1    Additional findings: reported being under stress due to cat was dying.       PLAN     Recommended plan for temporary change(s) affecting INR     Dosing Instructions: Increase your warfarin dose (5.3% change) with next INR in 1-2 weeks       Summary  As of 9/19/2022    Full warfarin instructions:  5 mg every Wed, Sat; 6 mg all other days   Next INR check:  10/3/2022             Telephone call with  Jennifer ([phone]) who verbalizes understanding and agrees to plan    - she will just eat and limit peanut intake 1-2x per week, instead of daily.   - Peanuts (considered legumes and does not contain vitamin-K, but monosaturated fats) and should not affect INR / warfarin.      Lab visit scheduled - INR on 10/3/22 @ [facility].    Education provided: Importance of consistent vitamin K intake, Goal range and significance of current result and Monitoring for bleeding signs and symptoms    Plan made per [facility] anticoagulation protocol    Aylin Pablo, RN  Anticoagulation Clinic  9/19/2022    _______________________________________________________________________      Anticoagulation Episode Summary     Current INR goal:  2.0-3.0   TTR:  68.0 % (1 y)   Target end date:  Indefinite   Send INR reminders to:  [facility]ULL[facility]AL[facility]    Indications    S/P AVR [Z95.2]  S/P AVR (aortic valve replacement) [Z95.2]  Long term current use of anticoagulant therapy [Z79.01]           Comments:  Goal range changed 2/20/19 per cardiology         Anticoagulation Care Providers     Provider Role Specialty Phone number    Rafaela Woods MD Referring [facility] Medicine [phone]

## 2022-10-05 ENCOUNTER — LAB (OUTPATIENT)
Dept: LAB | Facility: CLINIC | Age: 67
End: 2022-10-05
Payer: COMMERCIAL

## 2022-10-05 ENCOUNTER — ANTICOAGULATION THERAPY VISIT (OUTPATIENT)
Dept: ANTICOAGULATION | Facility: CLINIC | Age: 67
End: 2022-10-05

## 2022-10-05 DIAGNOSIS — Z79.01 LONG TERM CURRENT USE OF ANTICOAGULANT THERAPY: ICD-10-CM

## 2022-10-05 DIAGNOSIS — Z95.2 S/P AVR (AORTIC VALVE REPLACEMENT): ICD-10-CM

## 2022-10-05 DIAGNOSIS — Z95.2 S/P AVR: Primary | ICD-10-CM

## 2022-10-05 LAB — INR BLD: 1.8 (ref 0.9–1.1)

## 2022-10-05 PROCEDURE — 36415 COLL VENOUS BLD VENIPUNCTURE: CPT

## 2022-10-05 PROCEDURE — 85610 PROTHROMBIN TIME: CPT

## 2022-10-05 NOTE — PROGRESS NOTES
ANTICOAGULATION MANAGEMENT     Alyssa Higginbotham 67 year old female is on warfarin with subtherapeutic INR result. (Goal INR 2.0-3.0)    Recent labs: (last 7 days)     10/05/22  0715   INR 1.8*       ASSESSMENT       Source(s): Chart Review, Patient/Caregiver Call and Template       Warfarin doses taken: Warfarin taken as instructed    Diet: No new diet changes identified    New illness, injury, or hospitalization: No    Medication/supplement changes: None noted    Signs or symptoms of bleeding or clotting: No    Previous INR: Subtherapeutic at 1.7 on 919/22.    Additional findings:  Informed if she would be interested in getting Home INR monitor.  Advised to check with her [employer] insurance if they would cover machine.     She did report, just returned from short trip to [place].       PLAN     Recommended plan for no diet, medication or health factor changes affecting INR     Dosing Instructions: Increase your warfarin dose (5% change) with next INR in 1-2 weeks       Summary  As of 10/5/2022    Full warfarin instructions:  6 mg every day   Next INR check:  10/19/2022             Telephone call with  Jennifer ([phone]) who verbalizes understanding and agrees to plan    Lab visit scheduled - INR on 10/14/22 @ [facility]    Education provided: Importance of consistent vitamin K intake, Goal range and significance of current result and Monitoring for clotting signs and symptoms    Plan made per [facility] anticoagulation protocol    Aylin Pablo RN  Anticoagulation Clinic  10/5/2022    _______________________________________________________________________     Anticoagulation Episode Summary     Current INR goal:  2.0-3.0   TTR:  66.1 % (1 y)   Target end date:  Indefinite   Send INR reminders to:  [facility]    Indications    S/P AVR [Z95.2]  S/P AVR (aortic valve replacement) [Z95.2]  Long term current use of anticoagulant therapy [Z79.01]           Comments:  Goal range changed 2/20/19 per cardiology          Anticoagulation Care Providers     Provider Role Specialty Phone number    Rafaela Woods MD Referring Family Medicine [phone]

## 2022-10-14 ENCOUNTER — ANTICOAGULATION THERAPY VISIT (OUTPATIENT)
Dept: ANTICOAGULATION | Facility: CLINIC | Age: 67
End: 2022-10-14

## 2022-10-14 ENCOUNTER — DOCUMENTATION ONLY (OUTPATIENT)
Dept: FAMILY MEDICINE | Facility: CLINIC | Age: 67
End: 2022-10-14

## 2022-10-14 ENCOUNTER — LAB (OUTPATIENT)
Dept: LAB | Facility: CLINIC | Age: 67
End: 2022-10-14
Payer: COMMERCIAL

## 2022-10-14 DIAGNOSIS — Z95.2 S/P AVR: Primary | ICD-10-CM

## 2022-10-14 DIAGNOSIS — Z95.2 S/P AVR (AORTIC VALVE REPLACEMENT): ICD-10-CM

## 2022-10-14 DIAGNOSIS — Z79.01 LONG TERM CURRENT USE OF ANTICOAGULANT THERAPY: ICD-10-CM

## 2022-10-14 DIAGNOSIS — Z95.2 S/P AVR (AORTIC VALVE REPLACEMENT): Primary | ICD-10-CM

## 2022-10-14 LAB — INR BLD: 2.3 (ref 0.9–1.1)

## 2022-10-14 PROCEDURE — 85610 PROTHROMBIN TIME: CPT

## 2022-10-14 PROCEDURE — 36415 COLL VENOUS BLD VENIPUNCTURE: CPT

## 2022-10-14 NOTE — PROGRESS NOTES
ANTICOAGULATION CLINIC REFERRAL RENEWAL REQUEST       An annual renewal order is required for all patients referred to [facility].?  Please review and sign the pended referral order for Alyssa Higginbotham.       ANTICOAGULATION SUMMARY      Warfarin indication(s)   Mechanical AVR    Mechanical heart valve present?  Mechanical AVR-  Lanre-Shiley (Tilting disc), [place] (Caged Ball) or Monoleaflet valve       Current goal range   INR: 2.0-3.0     Goal appropriate for indication? Goal INR 2-3, standard for indication(s) above     Time in Therapeutic Range (TTR)  (Goal > 60%) 66.1 %       Office visit with referring provider's group within last year Yes on 12/13/2021       Aylin Pablo RN  [facility]

## 2022-10-14 NOTE — PROGRESS NOTES
ANTICOAGULATION MANAGEMENT     Alyssa Higginbotham 67 year old female is on warfarin with therapeutic INR result. (Goal INR 2.0-3.0)    Recent labs: (last 7 days)     10/14/22  0808   INR 2.3*       ASSESSMENT       Source(s): Chart Review, Patient/Caregiver Call and Template       Warfarin doses taken: Warfarin taken as instructed    Diet: No new diet changes identified    New illness, injury, or hospitalization: No    Medication/supplement changes: None noted    Signs or symptoms of bleeding or clotting: No    Previous INR: Subtherapeutic last 2 INR results.    Additional findings: None     SEE IF SHE IS INTERESTED IN HOME MONITOR.       PLAN     Recommended plan for no diet, medication or health factor changes affecting INR     Dosing Instructions: Continue your current warfarin dose with next INR in 2 weeks       Summary  As of 10/14/2022    Full warfarin instructions:  6 mg every day   Next INR check:  10/28/2022             Telephone call with  Jennifer ([phone]) who verbalizes understanding and agrees to plan    Lab visit scheduled - INR on 10/28/22 @ [facility].    Education provided: Importance of consistent vitamin K intake, Goal range and significance of current result and Monitoring for bleeding signs and symptoms    Plan made per [facility] anticoagulation protocol    Aylin Pablo RN  Anticoagulation Clinic  10/14/2022    _______________________________________________________________________     Anticoagulation Episode Summary     Current INR goal:  2.0-3.0   TTR:  65.1 % (1 y)   Target end date:  Indefinite   Send INR reminders to:  [facility]    Indications    S/P AVR [Z95.2]  S/P AVR (aortic valve replacement) [Z95.2]  Long term current use of anticoagulant therapy [Z79.01]           Comments:  Goal range changed 2/20/19 per cardiology         Anticoagulation Care Providers     Provider Role Specialty Phone number    Rafaela Woods MD Referring Family Medicine [phone]

## 2022-10-19 ENCOUNTER — TRANSFERRED RECORDS (OUTPATIENT)
Dept: HEALTH INFORMATION MANAGEMENT | Facility: CLINIC | Age: 67
End: 2022-10-19

## 2022-10-19 LAB — HEMOCCULT STL QL IA: NORMAL

## 2022-10-22 ENCOUNTER — HEALTH MAINTENANCE LETTER (OUTPATIENT)
Age: 67
End: 2022-10-22

## 2022-10-28 ENCOUNTER — ANTICOAGULATION THERAPY VISIT (OUTPATIENT)
Dept: ANTICOAGULATION | Facility: CLINIC | Age: 67
End: 2022-10-28

## 2022-10-28 ENCOUNTER — LAB (OUTPATIENT)
Dept: LAB | Facility: CLINIC | Age: 67
End: 2022-10-28
Payer: COMMERCIAL

## 2022-10-28 DIAGNOSIS — Z95.2 S/P AVR (AORTIC VALVE REPLACEMENT): ICD-10-CM

## 2022-10-28 DIAGNOSIS — Z95.2 S/P AVR: Primary | ICD-10-CM

## 2022-10-28 DIAGNOSIS — Z79.01 LONG TERM CURRENT USE OF ANTICOAGULANT THERAPY: ICD-10-CM

## 2022-10-28 LAB — INR BLD: 1.8 (ref 0.9–1.1)

## 2022-10-28 PROCEDURE — 85610 PROTHROMBIN TIME: CPT

## 2022-10-28 PROCEDURE — 36416 COLLJ CAPILLARY BLOOD SPEC: CPT

## 2022-10-28 NOTE — PROGRESS NOTES
ANTICOAGULATION MANAGEMENT     Alyssa Higginbotham 67 year old female is on warfarin with subtherapeutic INR result. (Goal INR 2.0-3.0)    Recent labs: (last 7 days)     10/28/22  0732   INR 1.8*       ASSESSMENT       Source(s): Chart Review and Template       Warfarin doses taken: Warfarin taken as instructed    Diet: No new diet changes identified    New illness, injury, or hospitalization: No    Medication/supplement changes: None noted    Signs or symptoms of bleeding or clotting: No    Previous INR: Therapeutic last visit; previously outside of goal range    Additional findings: None       PLAN     Recommended plan for no diet, medication or health factor changes affecting INR     Dosing Instructions: booster dose then continue your current warfarin dose with next INR in 2 weeks       Summary  As of 10/28/2022    Full warfarin instructions:  10/28: 10 mg; Otherwise 6 mg every day; Starting 10/28/2022   Next INR check:  11/11/2022             Detailed voice message left for Jennifer with dosing instructions and follow up date.     Contact [phone] to schedule and with any changes, questions or concerns.     Education provided:     Please call back if any changes to your diet, medications or how you've been taking warfarin    Plan made per [facility] anticoagulation protocol    Eneida Suarez RN  Anticoagulation Clinic  10/28/2022    _______________________________________________________________________     Anticoagulation Episode Summary     Current INR goal:  2.0-3.0   TTR:  63.5 % (1 y)   Target end date:  Indefinite   Send INR reminders to:  [facility]    Indications    S/P AVR [Z95.2]  S/P AVR (aortic valve replacement) [Z95.2]  Long term current use of anticoagulant therapy [Z79.01]           Comments:  Goal range changed 2/20/19 per cardiology         Anticoagulation Care Providers     Provider Role Specialty Phone number    Rafaela Woods MD Referring Family Medicine [phone]

## 2022-11-08 NOTE — PROGRESS NOTES
[facility]    Outpatient Physical Therapy Discharge Note  Patient: Alyssa Higginbotham  : 1955    Beginning/End Dates of Reporting Period:  21 to 22    Referring Provider: Gayle Richardson MD    Therapy Diagnosis: L Hip and low back pain     Client Self Report: Patient has been doing well overall. Continues to get intermittent knee and hip pain depending on what she is doing.       Goals:  Goal Identifier HEP   Goal Description Patient will demonstrate independence with home exercise program to facilitate improvement in function.    Target Date 21   Date Met      Progress (detail required for progress note): MET      Goal Identifier Pain   Goal Description Patient will report pain levels no greater than 3/10 at worst to improve quality of life.    Target Date 21   Date Met      Progress (detail required for progress note): At worst: 5/10     Goal Identifier Strength   Goal Description Patient will improve L sided LE strength to at least 4+/5 to improve functional mobility and off-load knee and hip joints.    Target Date 21   Date Met      Progress (detail required for progress note): Progressing - 4/5 L hip abd      Goal Identifier Sitting   Goal Description Patient will tolerate sitting for at least 1 hour without increase in symptoms to be able to complete seated ADLs such as driving.    Target Date 21   Date Met      Progress (detail required for progress note): MET              Plan:  Discharge from therapy.    Discharge:    Reason for Discharge: Patient chooses to discontinue therapy.    Equipment Issued: n/a    Discharge Plan: Patient to continue home program.    Scott Parmer, PT, DPT

## 2022-11-08 NOTE — ADDENDUM NOTE
Encounter addended by: Parmer, Scott, PT on: 11/8/2022 8:31 AM   Actions taken: Episode resolved, Clinical Note Signed

## 2022-11-11 ENCOUNTER — ANTICOAGULATION THERAPY VISIT (OUTPATIENT)
Dept: ANTICOAGULATION | Facility: CLINIC | Age: 67
End: 2022-11-11

## 2022-11-11 ENCOUNTER — LAB (OUTPATIENT)
Dept: LAB | Facility: CLINIC | Age: 67
End: 2022-11-11
Payer: COMMERCIAL

## 2022-11-11 DIAGNOSIS — Z79.01 LONG TERM CURRENT USE OF ANTICOAGULANT THERAPY: ICD-10-CM

## 2022-11-11 DIAGNOSIS — Z95.2 S/P AVR: Primary | ICD-10-CM

## 2022-11-11 DIAGNOSIS — Z95.2 S/P AVR (AORTIC VALVE REPLACEMENT): ICD-10-CM

## 2022-11-11 LAB — INR BLD: 2.2 (ref 0.9–1.1)

## 2022-11-11 PROCEDURE — 85610 PROTHROMBIN TIME: CPT

## 2022-11-11 PROCEDURE — 36416 COLLJ CAPILLARY BLOOD SPEC: CPT

## 2022-11-11 NOTE — PROGRESS NOTES
ANTICOAGULATION MANAGEMENT     Alyssa Higginbotham 67 year old female is on warfarin with therapeutic INR result. (Goal INR 2.0-3.0)    Recent labs: (last 7 days)     11/11/22  0731   INR 2.2*       ASSESSMENT       Source(s): Chart Review, Patient/Caregiver Call and Template       Warfarin doses taken: Warfarin taken as instructed    Diet: No new diet changes identified    New illness, injury, or hospitalization: No    Medication/supplement changes: None noted    Signs or symptoms of bleeding or clotting: No    Previous INR: Subtherapeutic at 1.8 on 10/28/22.    Additional findings:  patient checked with her insurance and home INR monitor not covered.         PLAN     Recommended plan for no diet, medication or health factor changes affecting INR     Dosing Instructions: Continue your current warfarin dose with next INR in 2 weeks       Summary  As of 11/11/2022    Full warfarin instructions:  6 mg every day; Starting 11/11/2022   Next INR check:  11/25/2022             Telephone call with  Jennifer ([phone]) who verbalizes understanding and agrees to plan    Check at provider office visit - INR on 11/28/22 at [facility] with Dr. Woods.    Education provided:     Taking warfarin: Importance of taking warfarin as instructed    Goal range and lab monitoring: goal range and significance of current result    Plan made per [facility] anticoagulation protocol    Aylin Pablo RN  Anticoagulation Clinic  11/11/2022    _______________________________________________________________________     Anticoagulation Episode Summary     Current INR goal:  2.0-3.0   TTR:  61.6 % (1 y)   Target end date:  Indefinite   Send INR reminders to:  [facility]    Indications    S/P AVR [Z95.2]  S/P AVR (aortic valve replacement) [Z95.2]  Long term current use of anticoagulant therapy [Z79.01]           Comments:  Goal range changed 2/20/19 per cardiology         Anticoagulation Care Providers     Provider Role Specialty Phone number     Rafaela Woods MD [facility] [phone]

## 2022-11-28 ENCOUNTER — ANTICOAGULATION THERAPY VISIT (OUTPATIENT)
Dept: ANTICOAGULATION | Facility: CLINIC | Age: 67
End: 2022-11-28

## 2022-11-28 ENCOUNTER — OFFICE VISIT (OUTPATIENT)
Dept: FAMILY MEDICINE | Facility: CLINIC | Age: 67
End: 2022-11-28
Payer: COMMERCIAL

## 2022-11-28 VITALS
HEIGHT: 62 IN | HEART RATE: 63 BPM | BODY MASS INDEX: 21.53 KG/M2 | RESPIRATION RATE: 16 BRPM | SYSTOLIC BLOOD PRESSURE: 171 MMHG | OXYGEN SATURATION: 99 % | WEIGHT: 117 LBS | TEMPERATURE: 96.4 F | DIASTOLIC BLOOD PRESSURE: 92 MMHG

## 2022-11-28 DIAGNOSIS — Z79.899 CONTROLLED SUBSTANCE AGREEMENT SIGNED: ICD-10-CM

## 2022-11-28 DIAGNOSIS — I10 ESSENTIAL HYPERTENSION: ICD-10-CM

## 2022-11-28 DIAGNOSIS — Z95.2 S/P AVR (AORTIC VALVE REPLACEMENT): ICD-10-CM

## 2022-11-28 DIAGNOSIS — Z23 NEED FOR PROPHYLACTIC VACCINATION AND INOCULATION AGAINST INFLUENZA: Primary | ICD-10-CM

## 2022-11-28 DIAGNOSIS — Z79.899 ENCOUNTER FOR LONG-TERM (CURRENT) USE OF MEDICATIONS: ICD-10-CM

## 2022-11-28 DIAGNOSIS — R22.31 MASS OF FINGER OF RIGHT HAND: ICD-10-CM

## 2022-11-28 DIAGNOSIS — Z79.01 LONG TERM CURRENT USE OF ANTICOAGULANT THERAPY: ICD-10-CM

## 2022-11-28 DIAGNOSIS — Z12.11 SCREEN FOR COLON CANCER: ICD-10-CM

## 2022-11-28 DIAGNOSIS — Z95.2 S/P AVR: Primary | ICD-10-CM

## 2022-11-28 DIAGNOSIS — Z12.11 SCREENING FOR COLON CANCER: ICD-10-CM

## 2022-11-28 DIAGNOSIS — F41.1 ANXIETY STATE: ICD-10-CM

## 2022-11-28 DIAGNOSIS — H93.13 TINNITUS, BILATERAL: ICD-10-CM

## 2022-11-28 DIAGNOSIS — M77.9 BONE SPUR: ICD-10-CM

## 2022-11-28 LAB
CREAT UR-MCNC: 50 MG/DL
INR BLD: 2 (ref 0.9–1.1)

## 2022-11-28 PROCEDURE — 85610 PROTHROMBIN TIME: CPT | Performed by: FAMILY MEDICINE

## 2022-11-28 PROCEDURE — 90662 IIV NO PRSV INCREASED AG IM: CPT | Performed by: FAMILY MEDICINE

## 2022-11-28 PROCEDURE — 80307 DRUG TEST PRSMV CHEM ANLYZR: CPT | Performed by: FAMILY MEDICINE

## 2022-11-28 PROCEDURE — G0008 ADMIN INFLUENZA VIRUS VAC: HCPCS | Performed by: FAMILY MEDICINE

## 2022-11-28 PROCEDURE — 99214 OFFICE O/P EST MOD 30 MIN: CPT | Mod: 25 | Performed by: FAMILY MEDICINE

## 2022-11-28 PROCEDURE — 36416 COLLJ CAPILLARY BLOOD SPEC: CPT | Performed by: FAMILY MEDICINE

## 2022-11-28 RX ORDER — CLONAZEPAM 0.5 MG/1
0.5 TABLET ORAL
Qty: 90 TABLET | Refills: 3 | Status: SHIPPED | OUTPATIENT
Start: 2022-11-28 | End: 2023-07-03

## 2022-11-28 RX ORDER — AMITRIPTYLINE HYDROCHLORIDE 10 MG/1
10 TABLET ORAL AT BEDTIME
Qty: 90 TABLET | Refills: 3 | Status: SHIPPED | OUTPATIENT
Start: 2022-11-28 | End: 2023-09-18

## 2022-11-28 ASSESSMENT — ANXIETY QUESTIONNAIRES
1. FEELING NERVOUS, ANXIOUS, OR ON EDGE: NOT AT ALL
7. FEELING AFRAID AS IF SOMETHING AWFUL MIGHT HAPPEN: NOT AT ALL
6. BECOMING EASILY ANNOYED OR IRRITABLE: NOT AT ALL
3. WORRYING TOO MUCH ABOUT DIFFERENT THINGS: NOT AT ALL
2. NOT BEING ABLE TO STOP OR CONTROL WORRYING: NOT AT ALL
GAD7 TOTAL SCORE: 0
5. BEING SO RESTLESS THAT IT IS HARD TO SIT STILL: NOT AT ALL
GAD7 TOTAL SCORE: 0

## 2022-11-28 ASSESSMENT — PATIENT HEALTH QUESTIONNAIRE - PHQ9: 5. POOR APPETITE OR OVEREATING: NOT AT ALL

## 2022-11-28 NOTE — PROGRESS NOTES
ANTICOAGULATION MANAGEMENT     Alyssa Higginbotham 67 year old female is on warfarin with therapeutic INR result. (Goal INR 2.0-3.0)    Recent labs: (last 7 days)     11/28/22  1130   INR 2.0*       ASSESSMENT       Source(s): Chart Review and Patient/Caregiver Call       Warfarin doses taken: Warfarin taken as instructed    Diet: No new diet changes identified    New illness, injury, or hospitalization: No    Medication/supplement changes: None noted    Signs or symptoms of bleeding or clotting: No    Previous INR: Therapeutic last visit; previously outside of goal range    Additional findings: discussed that she would like to increase dose for more consistent in range results.we will try this       PLAN     Recommended plan for no diet, medication or health factor changes affecting INR     Dosing Instructions: Increase your warfarin dose (4.8% change) with next INR in 2 weeks       Summary  As of 11/28/2022    Full warfarin instructions:  8 mg every Mon; 6 mg all other days; Starting 11/28/2022   Next INR check:  12/26/2022             Telephone call with Jennifer who verbalizes understanding and agrees to plan    Lab visit scheduled    Education provided:     Contact [phone] with any changes, questions or concerns.     Plan made per [facility] anticoagulation protocol    Eneida Suarez RN  Anticoagulation Clinic  11/28/2022    _______________________________________________________________________     Anticoagulation Episode Summary     Current INR goal:  2.0-3.0   TTR:  61.6 % (1 y)   Target end date:  Indefinite   Send INR reminders to:  [facility]    Indications    S/P AVR [Z95.2]  S/P AVR (aortic valve replacement) [Z95.2]  Long term current use of anticoagulant therapy [Z79.01]           Comments:  Goal range changed 2/20/19 per cardiology         Anticoagulation Care Providers     Provider Role Specialty Phone number    Rafaela Woods MD Referring Family Medicine [phone]

## 2022-11-28 NOTE — LETTER
[facility]  11/28/22  Patient: Alyssa Higginbotham  YOB: 1955  Medical Record Number: [id number]                                                                                  Non-Opioid Controlled Substance Agreement    This is an agreement between you and your provider regarding safe and appropriate use of controlled substances prescribed by your care team. Controlled substances are?medicines that can cause physical and mental dependence (abuse).     There are strict laws about having and using these medicines. We here at [facility] are  committed to working with you in your efforts to get better. To support you in this work, we'll help you schedule regular office appointments for medicine refills. If we must cancel or change your appointment for any reason, we'll make sure you have enough medicine to last until your next appointment.     As a Provider, I will:     Listen carefully to your concerns while treating you with respect.     Recommend a treatment plan that I believe is in your best interest and may involve therapies other than medicine.      Talk with you often about the possible benefits and the risk of harm of any medicine that we prescribe for you.    Assess the safety of this medicine and check how well it works.      Provide a plan on how to taper (discontinue or go off) using this medicine if the decision is made to stop its use.      ::  As a Patient, I understand controlled substances:       Are prescribed by my care provider to help me function or work and manage my condition(s).?    Are strong medicines and can cause serious side effects.       Need to be taken exactly as prescribed.?Combining controlled substances with certain medicines or chemicals (such as illegal drugs, alcohol, sedatives, sleeping pills, and benzodiazepines) can be dangerous or even fatal.? If I stop taking my medicines suddenly, I may have severe withdrawal symptoms.      The risks, benefits, and side effects of these medicine(s) were explained to me. I agree that:    1. I will take part in other treatments as advised by my care team. This may be psychiatry or counseling, physical therapy, behavioral therapy, group treatment or a referral to specialist.    2. I will keep all my appointments and understand this is part of the monitoring of controlled substances.?My care team may require an office visit for EVERY controlled substance refill. If I miss appointments or don t follow instructions, my care team may stop my medicine    3. I will take my medicines as prescribed. I will not change the dose or schedule unless my care team tells me to. There will be no refills if I run out early.      4. I may be asked to come to the clinic and complete a urine drug test or complete a pill count. If I don t give a urine sample or participate in a pill count, the care team may stop my medicine.    5. I will only receive controlled substance prescriptions from this clinic. If I am treated by another provider, I will tell them that I am taking controlled substances and that I have a treatment agreement with this provider. I will inform my [facility] care team within one business day if I am given a prescription for any controlled substance by another healthcare provider. My [facility] care team can contact other providers and pharmacists about my use of any medicines.    6. It is up to me to make sure that I don't run out of my medicines on weekends or holidays.?If my care team is willing to refill my prescription without a visit, I must request refills only during office hours. Refills may take up to 3 business days to process. I will use one pharmacy to fill all my controlled substance prescriptions. I will notify the clinic about any changes to my insurance or medicine availability.    7. I am responsible for my prescriptions. If the medicine/prescription is lost, stolen or  destroyed, it will not be replaced.?I also agree not to share controlled substance medicines with anyone.     8. I am aware I should not use any illegal or recreational drugs. I agree not to drink alcohol unless my care team says I can.     9. If I enroll in the [place] Medical Cannabis program, I will tell my care team before my next refill.    10. I will tell my care team right away if I become pregnant, have a new medical problem treated outside of my regular clinic, or have a change in my medicines.     11. I understand that this medicine can affect my thinking, judgment and reaction time.? Alcohol and drugs affect the brain and body, which can affect the safety of my driving. Being under the influence of alcohol or drugs can affect my decision-making, behaviors, personal safety and the safety of others. Driving while impaired (DWI) can occur if a person is driving, operating or in physical control of a car, motorcycle, boat, snowmobile, ATV, motorbike, off-road vehicle or any other motor vehicle ([place] Statute 169A.20). I understand the risk if I choose to drive or operate any vehicle or machinery.    I understand that if I do not follow any of the conditions above, my prescriptions or treatment may be stopped or changed.   I agree that my provider, clinic care team and pharmacy may work with any city, state or federal law enforcement agency that investigates the misuse, sale or other diversion of my controlled medicine. I will allow my provider to discuss my care with, or share a copy of, this agreement with any other treating provider, pharmacy or emergency room where I receive care.     I have read this agreement and have asked questions about anything I did not understand.    ________________________________________________________  Patient Signature - Alyssa Higginbotham     ___________________                   Date     ________________________________________________________  Provider Signature - Rafaela  A Chuck, MD       ___________________                   Date     ________________________________________________________  Witness Signature (required if provider not present while patient signing)          ___________________                   Date

## 2022-11-28 NOTE — PATIENT INSTRUCTIONS
INR today.    Since I will be taking over your medications from the psychiatrist we will do a controlled substance agreement and urine tox today.  This is for the clonazepam 0.5 mg at bedtime.  Controlled substance agreement also for Fioricet.    Podiatry consult for the bone spur on the right foot.    For the tinnitus I am placing an ENT and audiology consult.  They can assess if any amplification is needed and or any imaging.    Flu shot today.    We will help set a physical.    Mammogram due in May.    Colonoscopy ordered.    Ordered a hand surgery consult at [facility] for the lump on the right pinky.    Dermatology follow-up due August 2023.    Following with cardiology.    If you please check your blood pressure when you get home.  If blood pressure at home is over 140 on top or over 90 on the bottom please let me know.    Please bring your blood pressure cuff and when you have your physical and we will compare.    Prescription monitoring program reviewed and patient following the controlled substance agreement.

## 2022-11-28 NOTE — PROGRESS NOTES
Assessment & Plan       ICD-10-CM    1. Need for prophylactic vaccination and inoculation against influenza  Z23       2. Screen for colon cancer  Z12.11 Colonoscopy Screening  Referral      3. Screening for colon cancer  Z12.11 Colonoscopy Screening  Referral      4. Long term current use of anticoagulant therapy  Z79.01 INR point of care     CANCELED: INR      5. Anxiety  F41.1 amitriptyline (ELAVIL) 10 MG tablet     clonazePAM (KLONOPIN) 0.5 MG tablet      6. Essential hypertension  I10       7. Bone spur  M77.9 Orthopedic  Referral      8. Mass of finger of right hand  R22.31 Orthopedic  Referral      9. Tinnitus, bilateral  H93.13 Adult Audiology  Referral     Adult ENT  Referral      10. Controlled substance agreement signed-Clonazepam 0.5 mg, 90 per 90 days, and Fioricet 2 per week, CSA and urine tox 11-28-22  Z79.899 [id number] - Urine Drug Confirmation Panel (Comprehensive)     [id number] - Urine Drug Confirmation Panel (Comprehensive)      11. Encounter for long-term (current) use of medications  Z79.899       12. S/P AVR (aortic valve replacement)  Z95.2 INR point of care        INR today.    Since I will be taking over your medications from the psychiatrist we will do a controlled substance agreement and urine tox today.  This is for the clonazepam 0.5 mg at bedtime.  Controlled substance agreement also for Fioricet.    Podiatry consult for the bone spur on the right foot.    For the tinnitus I am placing an ENT and audiology consult.  They can assess if any amplification is needed and or any imaging.    Flu shot today.    We will help set a physical.    Mammogram due in May.    Colonoscopy ordered.    Ordered a hand surgery consult at [facility] for the lump on the right pinky.    Dermatology follow-up due August 2023.    Following with cardiology.    If you please check your blood pressure when you get home.  If blood pressure at home is over 140 on  "top or over 90 on the bottom please let me know.    Please bring your blood pressure cuff and when you have your physical and we will compare.      30 minutes spent on the date of the encounter doing chart review, history and exam, documentation and further activities per the note           No follow-ups on file.    Rafaela Woods MD  [facility]    Palmer Rodriguez is a 67 year old, presenting for the following health issues:  Recheck Medication (Switch med refills from psychiatrist ) and Imm/Inj (Flu Shot)    Lump right foot at [facility]: She has been told she has a bone spur.  She would like to see podiatry.    Tinnitus:  Both ears.  Hearing check 5-7 years ago. Not pulsatile tinnitus.    Lump:  Right 5th finger over the DIP joint.  Noticed last summer.  Not really painful.  Has gotten bigger.    Hypertension: This has been well controlled.  Today it is elevated but she said she had some caffeine this morning and a little anxious about coming in.  She does have a cuff at home and will monitor.  It was normal at cardiology in June.    Anxiety:  Psychiatrist retired.  We had previously discussed that primary care will be taking over medications.  She is agreeable to a controlled substance agreement and urine tox.    History of aortic dissection:   Stable and see cardiology.    History of Present Illness       Reason for visit:  Renew prescriptions    She eats 0-1 servings of fruits and vegetables daily.She consumes 1 sweetened beverage(s) daily.She exercises with enough effort to increase her heart rate 60 or more minutes per day.  She exercises with enough effort to increase her heart rate 7 days per week.   She is taking medications regularly.             Review of Systems         Objective    BP (!) 171/92 (BP Location: Left arm, Patient Position: Sitting, Cuff Size: Adult Regular)   Pulse 63   Temp (!) 96.4  F (35.8  C) (Oral)   Resp 16   Ht 1.565 m (5' 1.61\")   Wt 53.1 kg " (117 lb)   SpO2 99%   BMI 21.67 kg/m    Body mass index is 21.67 kg/m .  Physical Exam   GENERAL: healthy, alert and no distress  PSYCH: mentation appears normal, affect normal/bright

## 2022-11-30 LAB — 7AMINOCLONAZEPAM UR QL CFM: PRESENT

## 2022-12-05 ENCOUNTER — TRANSFERRED RECORDS (OUTPATIENT)
Dept: HEALTH INFORMATION MANAGEMENT | Facility: CLINIC | Age: 67
End: 2022-12-05

## 2022-12-08 ENCOUNTER — HOSPITAL ENCOUNTER (OUTPATIENT)
Dept: GENERAL RADIOLOGY | Facility: HOSPITAL | Age: 67
Discharge: HOME | End: 2022-12-08
Attending: PODIATRIST | Admitting: PODIATRIST
Payer: COMMERCIAL

## 2022-12-08 ENCOUNTER — TELEPHONE (OUTPATIENT)
Dept: PODIATRY | Facility: CLINIC | Age: 67
End: 2022-12-08

## 2022-12-08 ENCOUNTER — OFFICE VISIT (OUTPATIENT)
Dept: PODIATRY | Facility: CLINIC | Age: 67
End: 2022-12-08
Attending: FAMILY MEDICINE
Payer: COMMERCIAL

## 2022-12-08 VITALS — HEART RATE: 65 BPM | HEIGHT: 62 IN | OXYGEN SATURATION: 99 % | WEIGHT: 113 LBS | BODY MASS INDEX: 20.8 KG/M2

## 2022-12-08 DIAGNOSIS — M21.6X1 PRONATION DEFORMITY OF BOTH FEET: ICD-10-CM

## 2022-12-08 DIAGNOSIS — M20.5X1 HALLUX LIMITUS, RIGHT: Primary | ICD-10-CM

## 2022-12-08 DIAGNOSIS — M21.6X2 PRONATION DEFORMITY OF BOTH FEET: ICD-10-CM

## 2022-12-08 DIAGNOSIS — M20.5X1 HALLUX LIMITUS, RIGHT: ICD-10-CM

## 2022-12-08 PROCEDURE — 73630 X-RAY EXAM OF FOOT: CPT | Mod: RT

## 2022-12-08 PROCEDURE — 99204 OFFICE O/P NEW MOD 45 MIN: CPT | Performed by: PODIATRIST

## 2022-12-08 ASSESSMENT — PAIN SCALES - GENERAL: PAINLEVEL: MILD PAIN (3)

## 2022-12-08 NOTE — PATIENT INSTRUCTIONS
Alyssa,      Your surgery with [facility] has been scheduled. Please read thoroughly and follow instructions.     Procedure:    First metatarsal phalangeal joint implant arthroplasty of the right  foot.    Procedure Date :     *A surgery nurse will call you 1-2 days before surgery to inform you of the time of arrival for surgery.    Surgeon:   MD Chin Riojas    Admission Type:   Outpatient    Procedure Location:   [facility]:  [address] [place] [address] [address] [place] [address] (Fax: [phone])    Home Rapid COVID Test: To be done 1-2 days before surgery if done at [facility]. See below.     Post Operative Appointment:       Preparation Instructions to complete:    []  You will need a Pre-op Physical within 30 days before surgery with your primary care provider. This exam is required by the anesthesiologist to ensure a safe surgical experience.    Failure  to obtain your pre-op physical will lead to cancellation of your surgery  Call them right away to schedule this. Please ensure your Preoperative Physical is faxed to the Hospital (numbers listed above)    [] Preoperative Medication Instructions  We would like you to stop your anticoagulation medications 3-5 days before surgery HOWEVER contact your prescribing provider for instructions before discontinuing any medications. (Examples: Coumadin, Plavix, Xarelto, Eliquis, Pradaxa, Effient, Brilinta) If you are on Coumadin, we would like the goal INR ? 1.2.  IT IS OK TO STAY ON ASPIRIN PRIOR TO SURGERY.   Hold Ibuprofen, Herbal Supplements and Vitamin E 7 days before  Stop Cialis, Levitra and Viagra 2-3 days before surgery    [] Fasting Requirements  Nothing to eat for 8 hours before surgery unless instructed differently by the surgery nurse.  You may have clear liquids up to two hours before your arrival time (coffee, tea, water, or Gatorade. No cream or milk)  If your primary care provider has instructed  HISTORY OF PRESENT ILLNESS:    Andrew is a pleasant 60-year-old gentleman with statin associated necrotizing myopathy, here in the clinic for a follow-up visit.    He was last seen in the clinic in January 2019. He continues on CellCept 1.5 g twice daily. He has remained off prednisone since February 2018. He reports doing well and denies of any muscle weakness. He denies of muscle pain or soreness. No functional difficulties or limitations. He works at [employer] and reports regular lifting and a lot of physical activities. No dysphagia, dysarthria, or double vision. He is tolerating CellCept and denies of any reaction or side effects. No recurrent bouts of infection. He denies of any rash. No cough, chest pain, shortness of breath or cardiopulmonary symptoms. He continues on Alirocumab for hyperlipidemia. He sees Dr. Jc. He reports of occasional beer.    Medical history : Peak CPK levels in January 2016 was more than 13,000 when he presented with myalgia over his shoulders, arms and thighs. Medical history notable for hyperlipidemia and was on atorvastatin. He was taken off statin but symptoms persisted. He had an EMG on 2/5/16 by Dr Saleh, which showed proximal myopathy, especially right deltoid/biceps. He subsequently had a muscle biopsy left biceps on 2/23/16 which showed myofiber necrosis but without inflammatory infiltrates, favoring necrotizing autoimmune myopathy. CELINE and myositis panel all negative. Inflammatory markers were all within normal limits. He has high titer HMGCR antibody IgG (160 units - NR:0-19).    REVIEW OF SYSTEMS:   No fever, chills, cough, chest pain, shortness of breath, or  symptoms. No digital discoloration or Raynaud's phenomenon. No rash. No sicca symptoms. No lymphadenopathy.    ALLERGIES:   Allergen Reactions   • Lipitor [Atorvastatin] ARTHRALGIA   • Lisinopril RASH   • Statins MYALGIA     True autoimmune statin-induced myopathy.       Current Outpatient Medications    Medication Sig Dispense Refill   • mycophenolate (CELLCEPT) 500 MG tablet Take 3 tablets by mouth 2 times daily. 180 tablet 2   • Alirocumab 150 MG/ML Solution Pen-injector Inject 1 pen in to skin every 2 weeks 2 mL 24   • ezetimibe (ZETIA) 10 MG tablet Take 1 tablet by mouth daily. 90 tablet 3   • Omega-3 Fatty Acids (FISH OIL) 1200 MG capsule Take 1,200 mg by mouth 2 times daily.     • metFORMIN (GLUCOPHAGE) 500 MG tablet Take 2 tablets by mouth 2 times daily (with meals). 360 tablet 4   • aspirin 81 MG tablet Take 81 mg by mouth daily.     • calcium carbonate-vitamin D (CALCIUM 600 + D) 600-400 MG-UNIT per tablet Take 1 tablet by mouth 2 times daily. 180 tablet 1   • CENTRUM PO TABS 1 TABLET DAILY 0 0     No current facility-administered medications for this visit.        EXAM :   Visit Vitals  /68 (BP Location: [facility], Patient Position: Sitting, Cuff Size: Regular)   Pulse 72   Ht 6' (1.829 m)   Wt 84.4 kg   BMI 25.23 kg/m²     General: not in apparent distress   Skin: no rash or lesions, no digital discoloration, no heliotrope or Gottron's rash  HEENT: no iritis or conjunctivitis, no malar rash, no scalp lesions or tenderness, no glandular swelling, moist buccal mucosae, no mucosal ulcer   Neck: supple, no lymphadenopathy  C/L: equal chest expansion, clear breath sounds, no crackles, no wheeze   CVS: regular rhythm, normal heart sounds   Abdomen: soft, normoactive bowel sounds, non-tender, liver edge nonpalpable  Musculoskeletal: peripheral joints negative for synovitis.   Neurologic: Muscle strength 5/5 on hips and shoulders. Neck flexors 5 out of 5. DTRs 2+ equal. No muscle atrophy.    ASSESSMENT and PLAN:  1. Statin associated necrotizing myopathy  2. High risk medication (Cellcept - started 12/2016)  3. Transaminitis  4. Hyperlipidemia (sees Dr. Jc)    - med history: Onset of myalgia and muscle weakness involving proximal muscles in December 2015. CPK >6925. Medical history significant for  you to continue oral medications, you may take them on the morning of surgery with a small sip of water.    No alcohol or smoking after 12:00am the day of surgery    [] Home Rapid COVID-19 test is required only at [facility]  You will need a home rapid test done 1-2 days before surgery. Please take a photo of result on your phone and show to staff.   If you are experiencing COVID symptoms or have tested positive for COVID-19 within 14 days of procedure date, reach out to the care team to reschedule please.   If your surgery is located at the hospital, you will not need a COVID test.     [] Contact your insurance regarding coverage  If you would like a Good Sarai Estimate for your upcoming procedure at [facility], contact Cost of Care Estimates   Advocates are available Monday through Friday 8am - 5pm   [phone]  You may also submit a request online through [employer]    For all self-pay, estimate, or anesthesia billing questions at [facility], the contact information is below:    Who to contact: Janny GARSIA  [facility] number: [phone]  Prepay number: [phone]    [] DO NOT BRING FMLA WITH TO SURGERY.  These should be sent to the provider's office by fax to [phone].    [] Day of Surgery  Medications - Take as indicated with sips of water.   Wear comfortable loose fitting clothes. Wear your glasses-Not contacts. Do not wear jewelry and remove body piercing's. Surgery may be cancelled if they are not removed.   You may have 1 family member wait in the lobby during your surgery. Visitor restrictions are subject to change. Please verify with the surgery nurse when they call.   If same day surgery-Have a someone come with you to surgery that can help you understand the surgeon's instructions, drive you home and stay with you overnight the first night.    [] If the community sees a new COVID-19 surge, your procedure may need to be postponed. We  hyperlipidemia treated with atorvastatin x > 1 year. Myalgia and muscle symptoms persisted despite discontinuation of statin, and CPK continued to be elevated (>13,000). No history of alcohol or illicit drug use. No history of connective tissue disease. Examination notable for symmetric muscle weakness on hips and shoulders. No muscle atrophy or fasciculation and DTRs intact. No rash.  - work-up: normal TSH. CELINE negative. Myositis panel (PL-7, PL-12, Mi-2, Ku, SRP) negative. ESR at 7. CRP <0.3. Hep C negative. ACE within normal limits, and CXR negative for lymphadenopathy or any lesions. Lyme screen negative. EMG 2/5/16 right side showed proximal myopathy especially deltoid. Underwent left biceps muscle biopsy 2/23/16, which showed occasional necrotic myofibers, and regeneration/degeneration, without inflammatory infiltrates, favoring necrotizing autoimmune myopathy, likely statin-induced. Tested positive for HMGCR autoantibody IgG (very high titer 160 units).  - Treatment history : Initiated treatment with high dose steroids and methotrexate on 3/2/16. Switched from oral methotrexate to subcutaneous 25 mg weekly in September 2016, then increased to 30 mg once per week.  Switched to CellCept in September 2016 due to persistently elevated CPK, although no muscle weakness. CellCept was increased to 3 g per day in July 2017 due to a spike in his CPK to around 4000 in June 2017. Off prednisone since February 2018.    - Seen here today for follow-up visit. Last seen in the clinic January 2019.  - Presented today without any significant complaints related to myositis. No muscle weakness or myalgia. No systemic features. No rash, pulmonary symptoms, dysphagia, dysarthria, double vision. No Raynaud's phenomenon.  - Examination today revealed normal muscle strength.  - Recent monitoring labs 5/31/19 still shows elevated CPK of 1614 and aldolase of 10.9 and stable compared to previous. Peak CPK levels around 13,000. No  will contact you if this happens.    [] You will receive a call from a surgery nurse 1-3 days prior to surgery. They will go over more details with you.       Frequently Asked Questions    WHAT DO I DO WHEN I COME HOME FROM SURGERY?    After surgery and/ or your hospital stay you may be tired and drowsy. Rest, but make sure to get up and walk around every couple of hours to circulate your blood. If you are non-weight bearing do not put any weight on your foot.  Be sure to take your medications as directed. Try to get a restful sleep and begin more normal activities as tolerated.    HOW MUCH PAIN SHOULD I EXPECT AND WILL I HAVE PAIN MEDICATION?    Everyone tolerates pain differently. Still, each type of surgery involves a certain level and type of pain. Generally most people feel better within a few days but keep in mind that everyone has a different tolerance to pain. All medications should be taken as directed. All medications can have side effects such as bleeding, upset stomach, headaches, dizziness, constipation, etc. If you have any major problems or allergic reaction, stop the medication and call the office. If you only have a little pain, you may simply take Tylenol or Ibuprofen as directed on the label.     WHAT ABOUT MY DRESSING AND WHEN DO I COME BACK TO THE OFFICE?    The outer dressing stays in place for 7 days and when you come in for your first post-op we will remove it.  Some patients may have staples, zipper or sutures; these stay in for 10-14 days and will be removed at your 2nd post-op visit. After 24 hours you may shower using shower precautions.    WHAT ABOUT SWELLING, REDNESS, BRUISING OR DRAINAGE?    It is normal to have some swelling, and bruising after surgery. It gradually subsides but may be present for several weeks. Elevation and icing will help to reduce the swelling more rapidly.  If your bandage is really tight after 24 hours you may cut the bandage an inch by the toes or under your  cytopenia. Chronically elevated LFTs with AST 56 and ALT 75 with normal GGT of 21. Elevated LFTs are likely muscle in origin.  - In spite of his persistently elevated CPK, his myositis is clinically stable. Reviewed other treatment options such as rituximab, IVIG, but no clear indication to switch treatment at this time as he is doing well clinically.  - will continue on CellCept 1.5 g twice daily. Reviewed risks and side effects. Continue with regular monitoring labs. Standing orders in place.  - Recommend PPSV-23 vaccination through PCP  - RTC 4 mos or sooner if needed    Thank you for allowing me to participate in the care of Andrew . If you have any questions, please feel free to contact me.    Sincerely,    Nahum Sanchez M.D.   foot and by your ankle.  Ice therapy often works better than oral pain medication. Using cold therapy is recommended every hour for 20 minutes.    WHEN CAN I TAKE A BATH?    You can take a bath as long as the wound has no drainage and is fully healed without a scab. This generally takes about 2 weeks.  Unless you get the bandaged protector from above then you can take a shower when you feel up to it.    WHEN CAN I RETURN TO WORK?    You may return to work to an office-type job whenever you feel comfortable enough. The only restriction would be your own motivation and pain tolerance. If your job requires vigorous activities you may be off 1-4 weeks which is determined by what surgery you have and your operating physician.     WHAT ABOUT DRIVING OR FLYING?    As a general rule, you may resume driving as soon as you are comfortable and fully alert and off narcotic pain medications. If you are traveling by plane within 4 weeks of surgery, perform range of motion exercises of the legs and feet during the flight. Get up and walk around frequently to prevent blood clots.      WHEN CAN I EXERSICE?    You may return to normal activities such as exercising when your surgeon tells you usually 2 weeks. Walking, sitting, standing and going up the stairs are all fine after the 2-week zunilda. You may have restrictions for 1-4 weeks of no lifting, pushing, pulling in excess of 20 lbs. This is determined by what surgery you have and your surgeon.    WILL I BECOME ADDICTED TO MY PAIN MEDICATION?    Pain medications are safe and effective when used as directed. However, misuse of these products can be extremely harmful and even deadly. Pain medications must be taken only as directed by your surgeon. Do not change the dose of your pain medication without talking to your operating physician first.    POSTOPERATIVE INFORMATION: MANAGING PAIN  Measuring Your Pain    A pain scale helps you rate pain intensity. In the scale, 0 means no pain,  and 10 is the worst pain possible.  You should rate your pain every 4-6 hours. You may feel some pain even with medications. It is important to tell your health care provider if medications don't reduce the pain.        Managing Post-Op Pain at Home: Medications    Pain after an operation (post-op pain) is common and expected. These guidelines can help you stay as comfortable as possible.    Taking Pain Medications    Take any prescribed pain medications as directed by your doctor.  Take pain medications with some food to avoid an upset stomach.  Be aware that narcotic pain medications cause constipation. We recommend taking Milk of Magnesia   Eating high-fiber foods and drink plenty of water.  Don t drink alcohol while using pain medications.  Possible side effects include stomach upset, nausea, and itching.    Alternating Ibuprofen with your pain medication    Ibuprofen has three actions: it reduces fever, relieves pain and fights inflammation. Alternate between your prescribed pain medication and Ibuprofen every three hours (i.e., take a dose of Ibuprofen then three hours later take your prescribed pain medication then three hours later Ibuprofen, ect.) These two medications are safe to use together like this.    POSTOPERATIVE INFORMATION: CONSTIPATION    Constipation after surgery is a common problem and is often related to taking post-operative narcotic pain medication. Signs that you may be constipated include bloating, abdominal distention and/or pain.    Constipation Prevention & Treatment    Drink plenty of fluids! This is the most important thing you can do to help prevent constipation.  Increase physical activity as recommended by your surgeon.  Consume a high fiber diet. We recommend introducing high fiber foods pre-operatively. Some foods high in fiber include:    Oatmeal Bran  Flaxseed Dried Fruit    Carrots   Bananas Strawberries Oranges Whole Grain Bread     Bananas Prunes  Pears  Raspberries      Over the counter medication/supplements - in order of recommended treatment:   (Be sure to follow instructions on product packaging)    Fiber supplement   Bulk forming laxative - Can be used to prevent constipation  Great food sources of fiber include but are not limited to:  Colace (docusate sodium)  Osmotic stool softener - Can be used 2-3 times per day to prevent constipation  Milk of Magnesia  MIRALAX  Enema/Suppository    Patient can also  some probiotics such as Culturelle to help prevent Antibiotic associated diarrhea. They can take this 1 hour before or 2 hours after taking their antibiotic should not be taken at the same time as they can cancel out the effects.                          ** AFTER SURGERY INSTRUCTIONS **                          [] You are to remain LIMITED WEIGHT BEARING on your right foot     [] Prior to surgery you will be given a POST OP SHOE which you will need to WEAR THIS AT ALL TIMES EVEN WHILE IN BED Please bring this with you on the day of surgery.      [] During surgery   will apply a gauze dressing to your foot. This will remain intact until you are seen in clinic for follow up    [] It is NOT OKAY to get your surgical site wet while bathing, you will need to purchase a cast cover to protect your foot from getting wet. You can purchase this at [facility] or your local pharmacy.    [] It is important that you elevate your affected foot after surgery. Proper elevation is raising your foot above your waist. The fluid in your lower extremities needs to get back to your heart so it can get pumped to your kidneys and expelled through urination. So if you notice you have to go to the bathroom more frequently when you are elevating your leg this is a good sign that it is working.     [] It is important that you increase your protein intake after surgery. Protein is essential for wound healing. We recommend you take a protein supplement twice per day. This is  in addition to your normal diet. Examples of protein supplements are Ensure, Boost, Glucerna (if you are diabetic), or protein powder. You can purchase these at your local retailer or grocery store.       Notify our office right away, if you have any changes in your health status, or if you develop a cold, flu, diarrhea, infection, fever or sore throat before your scheduled surgery date. We can be reached at [phone]   Monday-Friday 8 am - 4:30 pm if you have any questions.     Thank you for trusting us with your care!      For informational purposes only. Not to replace the advice of your health care provider. Copyright   2020 [facility]. All rights reserved. Clinically reviewed by Infection [employer] and the [facility] COVID-19 Clinical Team. [employer] 448155 - Rev 09/09/21.                      - You will need to get an x-ray done before your follow up. This can be at any of the [facility] or at [facility] located downstairs on the first floor in the [facility]. No appointment is needed but depending on availability you may have to wait.    [address] [facility] [address], [place] [address]   Monday through Friday 7 am to 10 pm, Saturday and Sunday 8 am to 4:40 pm   P: [phone]        An X-ray uses a small amount of radiation to create images of your bones and internal organs. X-rays are most often used to detect bone or joint problems, or to check the heart and lungs (chest X-ray).   Let the technologist know   Tell the technologist if you:   Are or may be pregnant   Have had an X-ray of this part of your body before   Have metal in the part of your body being imaged      Before Your Test   You may be asked to remove your watch, jewelry, or garments with metal closures from the part of your body being imaged. These items can block part of the image.   You may be asked to put on a gown.   You may be asked about your overall health or any  medications you take.  During Your Test   You will be asked to lie on a table, sit, or stand.   A lead apron may be draped over part of your body to shield it from the X-rays.   With an X-ray of your chest or abdomen, you will have to take a deep breath and hold it for a few seconds.   Each exam usually requires at least 2 X-rays. You will need to move your body before each new X-ray.  After Your Test   Your doctor will discuss the test results with you during a follow-up appointment or over the phone.     3352-0045 [employer]. [address] [place] [address], [place] [address]. All rights reserved. This information is not intended as a substitute for professional medical care. Always follow your healthcare professional's instructions.

## 2022-12-08 NOTE — PROGRESS NOTES
FOOT AND ANKLE SURGERY/PODIATRY CONSULT NOTE         ASSESSMENT:   Hallux limitus right foot  Pronation deformity      TREATMENT:  X-rays of the right foot were taken today.  The x-rays were read and interpreted by this physician today.  I informed the patient she would require surgical correction of her deformity to alleviate her painful first metatarsal phalangeal joint right foot.  I have recommended a first metatarsal phalangeal joint implant arthroplasty of the right foot.  The patient was told that this procedure is performed on an outpatient basis under local anesthesia with IV sedation.  She was told the procedure takes approximately 30 minutes to perform.  She would then be discharged weightbearing in a postoperative shoe for 2 weeks.  The patient was asked to go n.p.o. at midnight prior to the procedure and she was asked to see her primary care physician for preoperative consultation.  All pertinent questions were invited and answered.  I have asked the patient to discontinue her warfarin 5 days prior to the procedure.        HPI: Alyssa Higginbotham presented to the clinic today complaining of a painful big toe joint right foot.  The patient has had a painful big toe joint for several years.  She stated that the pain has progressed.  She finds it difficult to weight-bear and ambulate without an aching as well as a sharp pain.  She has a large lump on the top of the joint which is aggravated by her shoes.  She has not had any redness or swelling.  The pain is relieved with nonweightbearing.  She denies any particular trauma to the foot.  She has not had any previous treatment..       Past Medical History:   Diagnosis Date     Accidental fall 05/27/2015     Anxiety      Aortic dissection (H)      Asthma      Benign meningioma of brain (H) 03/2015    initially seen on CT of head that was obtained after a fall. Frontal lobe, confirmed on an MRI Mar 2015, 3 mm     Benign neoplasm of colon      Bunion       Chronic headache      Colon polyp 2011     Depression      Hepatitis C      Hyperlipidemia      Hypothyroid      Irregular heart rate      Nasal fracture 02/28/2015    fell face first on sideache     Osteoporosis      Other acquired deformity of toe      Stroke (H)     on CT scan       Social History     Socioeconomic History     Marital status:      Spouse name: Not on file     Number of children:  0     Years of education: Not on file     Highest education level: Not on file   Occupational History     Not on file   Tobacco Use     Smoking status: Never     Smokeless tobacco: Never   Substance and Sexual Activity     Alcohol use: No     Drug use: No     Sexual activity: Not Currently   Other Topics Concern     Not on file   Social History Narrative     Not on file     Social Determinants of Health     Financial Resource Strain: Not on file   Food Insecurity: Not on file   Transportation Needs: Not on file   Physical Activity: Not on file   Stress: Not on file   Social Connections: Not on file   Intimate Partner Violence: Not on file   Housing Stability: Not on file          Allergies   Allergen Reactions     Codeine Other (See Comments)     Faints     Demerol [Meperidine] Other (See Comments)     Reaction not reported by patient., Patient states she felt awful.          Current Outpatient Medications:      amitriptyline (ELAVIL) 10 MG tablet, Take 1 tablet (10 mg) by mouth At Bedtime, Disp: 90 tablet, Rfl: 3     butalbital-acetaminophen-caffeine (FIORICET, ESGIC) -40 mg per tablet, [BUTALBITAL-ACETAMINOPHEN-CAFFEINE (FIORICET, ESGIC) -40 MG PER TABLET] TAKE 1 TO 2 TABLETS BY MOUTH EVERY 8 HOURS AS NEEDED FOR PAIN. MAX OF 4000 MG ACETAMINOPHEN PER 24 HOURS, Disp: 60 tablet, Rfl: 3     clonazePAM (KLONOPIN) 0.5 MG tablet, Take 1 tablet (0.5 mg) by mouth nightly as needed for anxiety or sleep, Disp: 90 tablet, Rfl: 3     levothyroxine (SYNTHROID/LEVOTHROID) 75 MCG tablet, TAKE 1 TABLET ON WEDNESDAY,  THURSDAY, SATURDAY AND SUNDAY, Disp: 52 tablet, Rfl: 3     levothyroxine (SYNTHROID/LEVOTHROID) 88 MCG tablet, TAKE 1 TABLET ON MONDAY, TUESDAY AND FRIDAY, Disp: 39 tablet, Rfl: 3     metoprolol succinate ER (TOPROL-XL) 200 MG 24 hr tablet, TAKE 1 TABLET DAILY, Disp: 90 tablet, Rfl: 3     multivitamin (ONE A DAY) per tablet, [MULTIVITAMIN (ONE A DAY) PER TABLET] Take 1 tablet by mouth daily. , Disp: , Rfl:      rizatriptan (MAXALT) 5 MG tablet, [RIZATRIPTAN (MAXALT) 5 MG TABLET] TAKE 1 TABLET BY MOUTH AS NEEDED FOR MIGRAINE. MAY REPEAT IN 2 HOURS IF NEEDED, Disp: 10 tablet, Rfl: 9     simvastatin (ZOCOR) 20 MG tablet, TAKE 1 TABLET AT BEDTIME (DUE FOR AN OFFICE VISIT), Disp: 90 tablet, Rfl: 3     triamterene-HCTZ (MAXZIDE) 75-50 MG tablet, TAKE ONE-HALF (1/2) TABLET DAILY, Disp: 45 tablet, Rfl: 3     warfarin ANTICOAGULANT (COUMADIN) 1 MG tablet, TAKE 1 TO 2 TABLETS ALONG WITH 5 MG TABLET (6 TO 7 MG) DAILY AS DIRECTED, ADJUST DOSE PER INR RESULTS, Disp: 168 tablet, Rfl: 3     warfarin ANTICOAGULANT (COUMADIN) 5 MG tablet, TAKE 1 TABLET ALONG WITH 1 MG TABLET (6 TO 7 MG DAILY) AS DIRECTED, ADJUST PER INR RESULT, Disp: 100 tablet, Rfl: 3     Family History   Problem Relation Age of Onset     Pancreatic Cancer Father 70.00        history of smoking     Ovarian Cancer Sister 67.00        stage III, fatal     Skin Cancer Sister      Heart Disease Mother      Diabetes No family hx of      Alzheimer Disease No family hx of         Social History     Socioeconomic History     Marital status:      Spouse name: Not on file     Number of children:  0     Years of education: Not on file     Highest education level: Not on file   Occupational History     Not on file   Tobacco Use     Smoking status: Never     Smokeless tobacco: Never   Substance and Sexual Activity     Alcohol use: No     Drug use: No     Sexual activity: Not Currently   Other Topics Concern     Not on file   Social History Narrative     Not on file  "    Social Determinants of Health     Financial Resource Strain: Not on file   Food Insecurity: Not on file   Transportation Needs: Not on file   Physical Activity: Not on file   Stress: Not on file   Social Connections: Not on file   Intimate Partner Violence: Not on file   Housing Stability: Not on file        Review of Systems - Patient denies fever, chills, rash, wound, stiffness, limping, numbness, weakness, heart burn, blood in stool, chest pain with activity, calf pain when walking, shortness of breath with activity, chronic cough, easy bleeding/bruising, swelling of ankles, excessive thirst, fatigue, depression, anxiety.  Patient admits to painful first metatarsophalangeal joint right foot.      OBJECTIVE:  Appearance: alert, well appearing, and in no distress.    Pulse 65   Ht 1.575 m (5' 2\")   Wt 51.3 kg (113 lb)   SpO2 99%   BMI 20.67 kg/m       Body mass index is 20.67 kg/m .     General appearance: Patient is alert and fully cooperative with history & exam.  No sign of distress is noted during the visit.  Psychiatric: Affect is pleasant & appropriate.  Patient appears motivated to improve health.  Respiratory: Breathing is regular & unlabored while sitting.  HEENT: Hearing is intact to spoken word.  Speech is clear.  No gross evidence of visual impairment that would impact ambulation.    Vascular: Dorsalis pedis and posterior tibial pulses are palpable. There is no pedal hair growth bilaterally.  CFT < 3 sec from anterior tibial surface to distal digits bilaterally. There is no appreciable edema noted.  Dermatologic: Turgor and texture are within normal limits. No coloration or temperature changes. No primary or secondary lesions noted.  Neurologic: All epicritic and proprioceptive sensations are grossly intact bilaterally.  Musculoskeletal: All active and passive ankle, subtalar, midtarsal, and 1st MPJ range of motion are grossly intact without pain or crepitus, with the exception of none. Manual " muscle strength is within normal limits bilaterally. All dorsiflexors, plantarflexors, invertors, evertors are intact bilaterally. Tenderness present to the first metatarsal phalangeal joint right foot on palpation. Tenderness to the first metatarsal phalangeal joint right foot with range of motion.  There is a large raised firm palpable subcutaneous mass on the dorsal medial aspect of the first metatarsal phalangeal joint right foot.  There is significant decrease in amount of dorsiflexion available at the first MPJ right foot.  Flattening of the medial longitudinal arch noted bilaterally.  Calf is soft/non-tender without warmth/induration    Imaging:       No images are attached to the encounter or orders placed in the encounter.     No results found.   No results found.       Chin Eduardo DPM  [facility] Foot & Ankle Surgery/Podiatry

## 2022-12-08 NOTE — LETTER
12/8/2022         RE: Alyssa Higginbotham  [address] [place] [address]        Dear Colleague,    Thank you for referring your patient, Alyssa Higginbotham, to the [facility]. Please see a copy of my visit note below.    FOOT AND ANKLE SURGERY/PODIATRY CONSULT NOTE         ASSESSMENT:   Hallux limitus right foot      TREATMENT:  X-rays of the right foot were taken today.  The x-rays were read and interpreted by this physician today.  I informed the patient she would require surgical correction of her deformity to alleviate her painful first metatarsal phalangeal joint right foot.  I have recommended a first metatarsal phalangeal joint implant arthroplasty of the right foot.  The patient was told that this procedure is performed on an outpatient basis under local anesthesia with IV sedation.  She was told the procedure takes approximately 30 minutes to perform.  She would then be discharged weightbearing in a postoperative shoe for 2 weeks.  The patient was asked to go n.p.o. at midnight prior to the procedure and she was asked to see her primary care physician for preoperative consultation.  All pertinent questions were invited and answered.  I have asked the patient to discontinue her warfarin 5 days prior to the procedure.        HPI: Alyssa Higginbotham presented to the clinic today complaining of a painful big toe joint right foot.  The patient has had a painful big toe joint for several years.  She stated that the pain has progressed.  She finds it difficult to weight-bear and ambulate without an aching as well as a sharp pain.  She has a large lump on the top of the joint which is aggravated by her shoes.  She has not had any redness or swelling.  The pain is relieved with nonweightbearing.  She denies any particular trauma to the foot.  She has not had any previous treatment..       Past Medical History:   Diagnosis Date     Accidental fall 05/27/2015     Anxiety      Aortic  dissection (H)      Asthma      Benign meningioma of brain (H) 03/2015    initially seen on CT of head that was obtained after a fall. Frontal lobe, confirmed on an MRI Mar 2015, 3 mm     Benign neoplasm of colon      Bunion      Chronic headache      Colon polyp 2011     Depression      Hepatitis C      Hyperlipidemia      Hypothyroid      Irregular heart rate      Nasal fracture 02/28/2015    fell face first on sideache     Osteoporosis      Other acquired deformity of toe      Stroke (H)     on CT scan       Social History     Socioeconomic History     Marital status:      Spouse name: Not on file     Number of children:  0     Years of education: Not on file     Highest education level: Not on file   Occupational History     Not on file   Tobacco Use     Smoking status: Never     Smokeless tobacco: Never   Substance and Sexual Activity     Alcohol use: No     Drug use: No     Sexual activity: Not Currently   Other Topics Concern     Not on file   Social History Narrative     Not on file     Social Determinants of Health     Financial Resource Strain: Not on file   Food Insecurity: Not on file   Transportation Needs: Not on file   Physical Activity: Not on file   Stress: Not on file   Social Connections: Not on file   Intimate Partner Violence: Not on file   Housing Stability: Not on file          Allergies   Allergen Reactions     Codeine Other (See Comments)     Faints     Demerol [Meperidine] Other (See Comments)     Reaction not reported by patient., Patient states she felt awful.          Current Outpatient Medications:      amitriptyline (ELAVIL) 10 MG tablet, Take 1 tablet (10 mg) by mouth At Bedtime, Disp: 90 tablet, Rfl: 3     butalbital-acetaminophen-caffeine (FIORICET, ESGIC) -40 mg per tablet, [BUTALBITAL-ACETAMINOPHEN-CAFFEINE (FIORICET, ESGIC) -40 MG PER TABLET] TAKE 1 TO 2 TABLETS BY MOUTH EVERY 8 HOURS AS NEEDED FOR PAIN. MAX OF 4000 MG ACETAMINOPHEN PER 24 HOURS, Disp: 60  tablet, Rfl: 3     clonazePAM (KLONOPIN) 0.5 MG tablet, Take 1 tablet (0.5 mg) by mouth nightly as needed for anxiety or sleep, Disp: 90 tablet, Rfl: 3     levothyroxine (SYNTHROID/LEVOTHROID) 75 MCG tablet, TAKE 1 TABLET ON WEDNESDAY, THURSDAY, SATURDAY AND SUNDAY, Disp: 52 tablet, Rfl: 3     levothyroxine (SYNTHROID/LEVOTHROID) 88 MCG tablet, TAKE 1 TABLET ON MONDAY, TUESDAY AND FRIDAY, Disp: 39 tablet, Rfl: 3     metoprolol succinate ER (TOPROL-XL) 200 MG 24 hr tablet, TAKE 1 TABLET DAILY, Disp: 90 tablet, Rfl: 3     multivitamin (ONE A DAY) per tablet, [MULTIVITAMIN (ONE A DAY) PER TABLET] Take 1 tablet by mouth daily. , Disp: , Rfl:      rizatriptan (MAXALT) 5 MG tablet, [RIZATRIPTAN (MAXALT) 5 MG TABLET] TAKE 1 TABLET BY MOUTH AS NEEDED FOR MIGRAINE. MAY REPEAT IN 2 HOURS IF NEEDED, Disp: 10 tablet, Rfl: 9     simvastatin (ZOCOR) 20 MG tablet, TAKE 1 TABLET AT BEDTIME (DUE FOR AN OFFICE VISIT), Disp: 90 tablet, Rfl: 3     triamterene-HCTZ (MAXZIDE) 75-50 MG tablet, TAKE ONE-HALF (1/2) TABLET DAILY, Disp: 45 tablet, Rfl: 3     warfarin ANTICOAGULANT (COUMADIN) 1 MG tablet, TAKE 1 TO 2 TABLETS ALONG WITH 5 MG TABLET (6 TO 7 MG) DAILY AS DIRECTED, ADJUST DOSE PER INR RESULTS, Disp: 168 tablet, Rfl: 3     warfarin ANTICOAGULANT (COUMADIN) 5 MG tablet, TAKE 1 TABLET ALONG WITH 1 MG TABLET (6 TO 7 MG DAILY) AS DIRECTED, ADJUST PER INR RESULT, Disp: 100 tablet, Rfl: 3     Family History   Problem Relation Age of Onset     Pancreatic Cancer Father 70.00        history of smoking     Ovarian Cancer Sister 67.00        stage III, fatal     Skin Cancer Sister      Heart Disease Mother      Diabetes No family hx of      Alzheimer Disease No family hx of         Social History     Socioeconomic History     Marital status:      Spouse name: Not on file     Number of children:  0     Years of education: Not on file     Highest education level: Not on file   Occupational History     Not on file   Tobacco Use      "Smoking status: Never     Smokeless tobacco: Never   Substance and Sexual Activity     Alcohol use: No     Drug use: No     Sexual activity: Not Currently   Other Topics Concern     Not on file   Social History Narrative     Not on file     Social Determinants of Health     Financial Resource Strain: Not on file   Food Insecurity: Not on file   Transportation Needs: Not on file   Physical Activity: Not on file   Stress: Not on file   Social Connections: Not on file   Intimate Partner Violence: Not on file   Housing Stability: Not on file        Review of Systems - Patient denies fever, chills, rash, wound, stiffness, limping, numbness, weakness, heart burn, blood in stool, chest pain with activity, calf pain when walking, shortness of breath with activity, chronic cough, easy bleeding/bruising, swelling of ankles, excessive thirst, fatigue, depression, anxiety.  Patient admits to painful first metatarsophalangeal joint right foot.      OBJECTIVE:  Appearance: alert, well appearing, and in no distress.    Pulse 65   Ht 1.575 m (5' 2\")   Wt 51.3 kg (113 lb)   SpO2 99%   BMI 20.67 kg/m       Body mass index is 20.67 kg/m .     General appearance: Patient is alert and fully cooperative with history & exam.  No sign of distress is noted during the visit.  Psychiatric: Affect is pleasant & appropriate.  Patient appears motivated to improve health.  Respiratory: Breathing is regular & unlabored while sitting.  HEENT: Hearing is intact to spoken word.  Speech is clear.  No gross evidence of visual impairment that would impact ambulation.    Vascular: Dorsalis pedis and posterior tibial pulses are palpable. There is no pedal hair growth bilaterally.  CFT < 3 sec from anterior tibial surface to distal digits bilaterally. There is no appreciable edema noted.  Dermatologic: Turgor and texture are within normal limits. No coloration or temperature changes. No primary or secondary lesions noted.  Neurologic: All epicritic and " proprioceptive sensations are grossly intact bilaterally.  Musculoskeletal: All active and passive ankle, subtalar, midtarsal, and 1st MPJ range of motion are grossly intact without pain or crepitus, with the exception of none. Manual muscle strength is within normal limits bilaterally. All dorsiflexors, plantarflexors, invertors, evertors are intact bilaterally. Tenderness present to the first metatarsal phalangeal joint right foot on palpation. Tenderness to the first metatarsal phalangeal joint right foot with range of motion.  There is a large raised firm palpable subcutaneous mass on the dorsal medial aspect of the first metatarsal phalangeal joint right foot.  There is significant decrease in amount of dorsiflexion available at the first MPJ right foot.  Calf is soft/non-tender without warmth/induration    Imaging:       No images are attached to the encounter or orders placed in the encounter.     No results found.   No results found.       Chin Eduardo DPM  [facility] Foot & Ankle Surgery/Podiatry         Again, thank you for allowing me to participate in the care of your patient.        Sincerely,        Chin Riojas DPM

## 2022-12-09 NOTE — TELEPHONE ENCOUNTER
Jennifer is calling back stating she would like to have surgery in Jan, the only day she is not able to is Jan 23.

## 2022-12-13 ENCOUNTER — ANTICOAGULATION THERAPY VISIT (OUTPATIENT)
Dept: ANTICOAGULATION | Facility: CLINIC | Age: 67
End: 2022-12-13

## 2022-12-13 ENCOUNTER — LAB (OUTPATIENT)
Dept: LAB | Facility: CLINIC | Age: 67
End: 2022-12-13
Payer: COMMERCIAL

## 2022-12-13 DIAGNOSIS — Z79.01 LONG TERM CURRENT USE OF ANTICOAGULANT THERAPY: ICD-10-CM

## 2022-12-13 DIAGNOSIS — Z95.2 S/P AVR: Primary | ICD-10-CM

## 2022-12-13 DIAGNOSIS — Z95.2 S/P AVR (AORTIC VALVE REPLACEMENT): ICD-10-CM

## 2022-12-13 PROBLEM — M20.5X1 HALLUX LIMITUS, RIGHT: Status: ACTIVE | Noted: 2022-12-13

## 2022-12-13 LAB — INR BLD: 2.8 (ref 0.9–1.1)

## 2022-12-13 PROCEDURE — 85610 PROTHROMBIN TIME: CPT

## 2022-12-13 PROCEDURE — 36416 COLLJ CAPILLARY BLOOD SPEC: CPT

## 2022-12-13 NOTE — PROGRESS NOTES
ANTICOAGULATION MANAGEMENT     Alyssa Higginbotham 67 year old female is on warfarin with therapeutic INR result. (Goal INR 2.0-3.0)    Recent labs: (last 7 days)     12/13/22  0913   INR 2.8*       ASSESSMENT       Source(s): Chart Review and Patient/Caregiver Call       Warfarin doses taken: Warfarin taken as instructed    Diet: No new diet changes identified    New illness, injury, or hospitalization: Yes:      12/8/22 Dr. Riojas for progressive pain big toe joint of right foot and requires surgical correction, first metatarsal phalangeal joint implant arthroplasty.    Medication/supplement changes: None noted    Signs or symptoms of bleeding or clotting: Yes.   Reported nose bleed (does not like having nosebleeds)    Previous INR: Therapeutic last 2 visits    Additional findings: None    - per Dr. Riojas, patient will need preop and needs to hold warfarin 5 days prior to procedure.  - planned surgery in Jan 2023.  February.       PLAN     Recommended plan for temporary change(s) and ongoing change(s) affecting INR     Dosing Instructions: partial hold then decrease your warfarin dose (2.3% change) with next INR in 2 weeks       Summary  As of 12/13/2022    Full warfarin instructions:  12/13: 3 mg; Otherwise 7 mg every Mon; 6 mg all other days; Starting 12/13/2022   Next INR check:  12/27/2022             Telephone call with  Jennifer ([phone]) who verbalizes understanding and agrees to plan.   - request to decrease wkly warfarin dose.  (whenever, INR is >2.5, she experiences nose bleeds).   - advised partial warfarin hold and take 3mg warfarin tonight; she took 4.5mg warfarin dose last night.    Lab visit scheduled - INR on 12/27/22 @ [facility].    Education provided:     Taking warfarin: Importance of taking warfarin as instructed    Goal range and lab monitoring: goal range and significance of current result    Symptom monitoring: monitoring for bleeding signs and symptoms and when to seek medical  attention/emergency care    Plan made per [facility] anticoagulation protocol    Aylin Pablo RN  Anticoagulation Clinic  12/13/2022    _______________________________________________________________________     Anticoagulation Episode Summary     Current INR goal:  2.0-3.0   TTR:  61.6 % (1 y)   Target end date:  Indefinite   Send INR reminders to:  [facility]    Indications    S/P AVR [Z95.2]  S/P AVR (aortic valve replacement) [Z95.2]  Long term current use of anticoagulant therapy [Z79.01]           Comments:  Goal range changed 2/20/19 per cardiology         Anticoagulation Care Providers     Provider Role Specialty Phone number    Rafaela Woods MD Referring Family Medicine [phone]

## 2022-12-14 NOTE — TELEPHONE ENCOUNTER
Surgery scheduled with Dr. Riojas at [facility] on 02/13/23.  First metatarsal phalangeal joint implant arthroplasty right foot. CPT 97578.    Email sent to Dianne.  Added to [facility].    Pre-op physical on 01/30/2023. Patient instructed on home COVID testing.    Post ops scheduled.  [employer] message sent to patient.

## 2022-12-16 ENCOUNTER — TELEPHONE (OUTPATIENT)
Dept: FAMILY MEDICINE | Facility: CLINIC | Age: 67
End: 2022-12-16

## 2022-12-16 NOTE — TELEPHONE ENCOUNTER
Fax received from [facility] requesting a 4 day hold of warfarin and bridging orders for a colonoscopy scheduled for 2/10/2023    [facility] Hold orders RN line: [phone]    Routing to [facility] for review.    JOJO BurnhamN, RN  Anticoagulation Clinic

## 2022-12-27 ENCOUNTER — TELEPHONE (OUTPATIENT)
Dept: ANTICOAGULATION | Facility: CLINIC | Age: 67
End: 2022-12-27

## 2022-12-27 ENCOUNTER — ANTICOAGULATION THERAPY VISIT (OUTPATIENT)
Dept: ANTICOAGULATION | Facility: CLINIC | Age: 67
End: 2022-12-27

## 2022-12-27 ENCOUNTER — LAB (OUTPATIENT)
Dept: LAB | Facility: CLINIC | Age: 67
End: 2022-12-27
Payer: COMMERCIAL

## 2022-12-27 DIAGNOSIS — Z95.2 S/P AVR (AORTIC VALVE REPLACEMENT): ICD-10-CM

## 2022-12-27 DIAGNOSIS — Z95.2 S/P AVR: Primary | ICD-10-CM

## 2022-12-27 DIAGNOSIS — Z79.01 LONG TERM CURRENT USE OF ANTICOAGULANT THERAPY: ICD-10-CM

## 2022-12-27 LAB — INR BLD: 2.9 (ref 0.9–1.1)

## 2022-12-27 PROCEDURE — 85610 PROTHROMBIN TIME: CPT

## 2022-12-27 PROCEDURE — 36416 COLLJ CAPILLARY BLOOD SPEC: CPT

## 2022-12-27 NOTE — TELEPHONE ENCOUNTER
Zoraida Dooley, [facility]            - Please review warfarin hold and potential bridge - 2 surgeries.    - scheduled on 2/10/23 Colonoscopy with [facility]    - Scheduled surgery 2/13/23, of metatarsal phalangeal joint implant arthroplasty, right foot.     - Per Dr. Riojas, needs to HOLD warfarin for 5 days.

## 2022-12-27 NOTE — PROGRESS NOTES
ANTICOAGULATION MANAGEMENT     Alyssa Higginbotham 67 year old female is on warfarin with therapeutic INR result. (Goal INR 2.0-3.0)    Recent labs: (last 7 days)     12/27/22  0712   INR 2.9*       ASSESSMENT       Source(s): Chart Review, Patient/Caregiver Call and Template       Warfarin doses taken: Warfarin taken as instructed    Diet: No new diet changes identified    New illness, injury, or hospitalization: Yes:    Right hallux limitus.   Screening for colon cancer.    Medication/supplement changes: None noted    Signs or symptoms of bleeding or clotting: No.   Denied any nose bleeds.    Previous INR: Therapeutic last 3 visits    Additional findings:  Yes.    Scheduled Colonoscopy on 2/10/23.   Scheduled surgery 2/13/23, of metatarsal phalangeal joint implant arthroplasty, right foot.   Per Dr. Riojas, needs to HOLD warfarin 5 days prior to surgery; (see OV of 12/8/22)   Sent to pharmacist to review warfarin hold / potential bridge.       PLAN     Recommended plan for no diet, medication or health factor changes affecting INR     Dosing Instructions: decrease your warfarin dose (4.7% change) with next INR in 2 weeks       Summary  As of 12/27/2022    Full warfarin instructions:  5 mg every Tue; 6 mg all other days   Next INR check:  1/10/2023             Telephone call with  Jennifer ([phone]) who verbalizes understanding and agrees to plan    - INR on the high side for her to cause nose bleed, request to lower warfarin dose.    Lab visit scheduled - INR on 1/10/23 @ [facility].    Education provided:     Taking warfarin: Importance of taking warfarin as instructed    Goal range and lab monitoring: goal range and significance of current result    Dietary considerations: importance of consistent vitamin K intake    Symptom monitoring: monitoring for bleeding signs and symptoms    Plan made per [facility] anticoagulation protocol    Aylin Pablo, RN  Anticoagulation  Clinic  12/27/2022    _______________________________________________________________________     Anticoagulation Episode Summary     Current INR goal:  2.0-3.0   TTR:  61.6 % (1 y)   Target end date:  Indefinite   Send INR reminders to:  [facility]    Indications    S/P AVR [Z95.2]  S/P AVR (aortic valve replacement) [Z95.2]  Long term current use of anticoagulant therapy [Z79.01]           Comments:  Goal range changed 2/20/19 per cardiology         Anticoagulation Care Providers     Provider Role Specialty Phone number    Rafaela Woods MD Referring Family Medicine [phone]

## 2022-12-29 DIAGNOSIS — I10 HTN (HYPERTENSION): ICD-10-CM

## 2022-12-30 RX ORDER — TRIAMTERENE AND HYDROCHLOROTHIAZIDE 75; 50 MG/1; MG/1
TABLET ORAL
Qty: 45 TABLET | Refills: 3 | Status: SHIPPED | OUTPATIENT
Start: 2022-12-30 | End: 2024-01-23

## 2022-12-30 NOTE — TELEPHONE ENCOUNTER
"Routing refill request to provider for review/approval because:  Labs out of range:  BP  Labs not current:  K, NA, CR    Last Written Prescription Date:  2/1/22  Last Fill Quantity: 45,  # refills: 3   Last office visit provider:   11/28/22    Requested Prescriptions   Pending Prescriptions Disp Refills     triamterene-HCTZ (MAXZIDE) 75-50 MG tablet [Pharmacy Med Name: TRIAMTERENE HCTZ TABS 75/50MG] 45 tablet 3     Sig: TAKE ONE-HALF (1/2) TABLET DAILY       Diuretics (Including Combos) Protocol Failed - 12/29/2022 10:55 AM        Failed - Blood pressure under 140/90 in past 12 months     BP Readings from Last 3 Encounters:   11/28/22 (!) 171/92   06/14/22 126/70   12/13/21 138/76                 Failed - Normal serum creatinine on file in past 12 months     Recent Labs   Lab Test 12/13/21  0940   CR 0.84              Failed - Normal serum potassium on file in past 12 months     Recent Labs   Lab Test 12/13/21  0940   POTASSIUM 3.7                    Failed - Normal serum sodium on file in past 12 months     Recent Labs   Lab Test 12/13/21  0940                 Passed - Recent (12 mo) or future (30 days) visit within the authorizing provider's specialty     Patient has had an office visit with the authorizing provider or a provider within the authorizing providers department within the previous 12 mos or has a future within next 30 days. See \"Patient Info\" tab in inbasket, or \"Choose Columns\" in Meds & Orders section of the refill encounter.              Passed - Medication is active on med list        Passed - Patient is age 18 or older        Passed - No active pregancy on record        Passed - No positive pregnancy test in past 12 months             Mar Briones RN 12/30/22 2:08 PM  "

## 2023-01-09 ENCOUNTER — MYC REFILL (OUTPATIENT)
Dept: FAMILY MEDICINE | Facility: CLINIC | Age: 68
End: 2023-01-09

## 2023-01-09 DIAGNOSIS — Z79.899 CONTROLLED SUBSTANCE AGREEMENT SIGNED: Primary | ICD-10-CM

## 2023-01-09 DIAGNOSIS — G43.909 MIGRAINE: ICD-10-CM

## 2023-01-10 ENCOUNTER — ANTICOAGULATION THERAPY VISIT (OUTPATIENT)
Dept: ANTICOAGULATION | Facility: CLINIC | Age: 68
End: 2023-01-10

## 2023-01-10 ENCOUNTER — LAB (OUTPATIENT)
Dept: LAB | Facility: CLINIC | Age: 68
End: 2023-01-10
Payer: COMMERCIAL

## 2023-01-10 DIAGNOSIS — Z79.01 LONG TERM CURRENT USE OF ANTICOAGULANT THERAPY: ICD-10-CM

## 2023-01-10 DIAGNOSIS — Z95.2 S/P AVR: Primary | ICD-10-CM

## 2023-01-10 DIAGNOSIS — Z95.2 S/P AVR (AORTIC VALVE REPLACEMENT): ICD-10-CM

## 2023-01-10 LAB — INR BLD: 3.1 (ref 0.9–1.1)

## 2023-01-10 PROCEDURE — 36416 COLLJ CAPILLARY BLOOD SPEC: CPT

## 2023-01-10 PROCEDURE — 85610 PROTHROMBIN TIME: CPT

## 2023-01-10 NOTE — PROGRESS NOTES
ANTICOAGULATION MANAGEMENT     Alyssa Higginbotham 67 year old female is on warfarin with supratherapeutic INR result. (Goal INR 2.0-3.0)    Recent labs: (last 7 days)     01/10/23  0809   INR 3.1*       ASSESSMENT       Source(s): Chart Review, Patient/Caregiver Call and Template       Warfarin doses taken: Warfarin taken as instructed    Diet: No new diet changes identified    New illness, injury, or hospitalization: Yes:    Right foot, hallux limitus.    Medication/supplement changes: None noted    Signs or symptoms of bleeding or clotting: No    Previous INR: Therapeutic last 4 visits.    Additional findings:  INR trending up.     Scheduled surgery on 2/13/23 - first metatarsal phalangeal joint implant arthroplasy, right foot.    Scheduled colonoscopy on 2/10/23.    Already sent to pharmacist for review on 12/27/22 -  warfarin hold / potential bridge.       PLAN     Recommended plan for no diet, medication or health factor changes affecting INR     Dosing Instructions: decrease your warfarin dose (7.3% change) with next INR in 1-2 weeks       Summary  As of 1/10/2023    Full warfarin instructions:  6 mg every Mon, Wed, Fri; 5 mg all other days   Next INR check:  1/24/2023             Telephone call with  Jennifer ([phone]) who verbalizes understanding and agrees to plan    - patient request more of a reduction with warfarin therapy.  She prefers to be closer to 2.0 - 2.5, to prevent epistaxis.    Lab visit scheduled - INR on 1/24/23 @ [facility].    Education provided:     Taking warfarin: Importance of taking warfarin as instructed    Goal range and lab monitoring: goal range and significance of current result    Symptom monitoring: monitoring for bleeding signs and symptoms    Plan made per [facility] anticoagulation protocol    Aylin Pablo, RN  Anticoagulation Clinic  1/10/2023    _______________________________________________________________________     Anticoagulation Episode Summary     Current INR goal:   2.0-3.0   TTR:  59.6 % (1 y)   Target end date:  Indefinite   [facility]    Indications    S/P AVR [Z95.2]  S/P AVR (aortic valve replacement) [Z95.2]  Long term current use of anticoagulant therapy [Z79.01]           Comments:  Goal range changed 2/20/19 per cardiology         Anticoagulation Care Providers     Provider Role Specialty Phone number    Rafaela Woods MD Referring [facility] Medicine [phone]

## 2023-01-11 ENCOUNTER — TELEPHONE (OUTPATIENT)
Dept: FAMILY MEDICINE | Facility: CLINIC | Age: 68
End: 2023-01-11
Payer: COMMERCIAL

## 2023-01-11 RX ORDER — BUTALBITAL, ACETAMINOPHEN AND CAFFEINE 50; 325; 40 MG/1; MG/1; MG/1
2 TABLET ORAL EVERY 8 HOURS PRN
Qty: 60 TABLET | Refills: 3 | Status: SHIPPED | OUTPATIENT
Start: 2023-01-11 | End: 2024-09-24

## 2023-01-11 NOTE — TELEPHONE ENCOUNTER
Incoming call from [facility] requesting hold orders:  Pt has colonoscopy scheduled Feb 10th [facility] needs orders for a 4 day hold on warfarin as well as any bridging orders if applicable.  Call Back [phone] ok to leave VM

## 2023-01-11 NOTE — TELEPHONE ENCOUNTER
"Routing refill request to provider for review/approval because:  Drug not on the [facility] refill protocol   Per patient: \"This  two years ago.  I can't wait until March 3 when I see you to refill it.\"    Last Written Prescription Date:  21  Last Fill Quantity: 60,  # refills: 3   Last office visit provider:  22 with Chuck    Requested Prescriptions   Pending Prescriptions Disp Refills     butalbital-acetaminophen-caffeine (ESGIC) -40 MG tablet 60 tablet 3       There is no refill protocol information for this order          JASON MERRILL RN 23 11:07 AM  "
Declines

## 2023-01-11 NOTE — TELEPHONE ENCOUNTER
Fax received from [facility] requesting a 4 day hold for colonoscopy on 2/10/23.    Agustina Jaimson RN

## 2023-01-12 ENCOUNTER — TELEPHONE (OUTPATIENT)
Dept: FAMILY MEDICINE | Facility: CLINIC | Age: 68
End: 2023-01-12

## 2023-01-12 NOTE — TELEPHONE ENCOUNTER
Covering MTM pool - it looks like this is being managed by anticoagulation - forwarded to that team.    Kinza LynchD, [facility]  Medication Therapy Management Provider  Pager: [phone]

## 2023-01-17 ENCOUNTER — TELEPHONE (OUTPATIENT)
Dept: FAMILY MEDICINE | Facility: CLINIC | Age: 68
End: 2023-01-17
Payer: COMMERCIAL

## 2023-01-17 ENCOUNTER — MYC MEDICAL ADVICE (OUTPATIENT)
Dept: FAMILY MEDICINE | Facility: CLINIC | Age: 68
End: 2023-01-17
Payer: COMMERCIAL

## 2023-01-17 DIAGNOSIS — Z79.01 LONG TERM CURRENT USE OF ANTICOAGULANT THERAPY: Primary | ICD-10-CM

## 2023-01-17 DIAGNOSIS — Z95.2 S/P AVR: ICD-10-CM

## 2023-01-17 NOTE — TELEPHONE ENCOUNTER
Prior Authorization Approval    Authorization Effective Date: 12/18/2022  Authorization Expiration Date: 1/17/2024  Medication: butalbital-acetaminophen-caffeine (ESGIC) -40 MG tablet  Approved Dose/Quantity:   Reference #: [id number]   Insurance Company: [employer] - Phone [phone] Fax [phone]    Which Pharmacy is filling the prescription (Not needed for infusion/clinic administered): [id number] - [place], [place] - [facility] AT [facility] [address]  Pharmacy Notified: Yes  Patient Notified: Yes

## 2023-01-17 NOTE — TELEPHONE ENCOUNTER
PA Initiation    Medication: butalbital-acetaminophen-caffeine (ESGIC) -40 MG tablet  Insurance Company: [employer] - Phone [phone] Fax [phone]  Ph[facility] the Rx[id number] [place], [place] - [facility] AT [facility] [address]  Filling Pharmacy Phone: [phone]  Filling Pharmacy Fax: [phone]  Start Date: 1/17/2023

## 2023-01-17 NOTE — TELEPHONE ENCOUNTER
REYNA-PROCEDURAL ANTICOAGULATION  MANAGEMENT    ASSESSMENT     Warfarin interruption plan for colonoscopy on 02/10/2023 and metatarsal phalangeal joint implant arthroplasty, right foot on 02/13/2023.    Indication for Anticoagulation: Mechanical AVR/Stroke      S/p aortic valve replacement in 2000 02/28/2015 ER Visit: CT shows history of stroke     09/15/2015 Family Medicine progress note by Dr. Woods: noted that for past procedures she has stopped warfarin for 4 days with no bridge orders     12/2022: Patient reports concerns for nosebleeds when INR >2.5      AVR: 2020 AHA/ACC Management of Valvular Heart disease guidelines suggests bridging is reasonable on individual basis with risk of bleeding weighed against risks of clotting for mechanical AVR patients with risk factors for thrombosis (Afib, previous thromboembolism, hypercoagulable condition, LV systolic dysfunction, older generation valves, or > 1 valve)     RECOMMENDATION       Pre-Procedure #1 (colonoscopy):  o Hold warfarin for 4 days, until after procedure starting: Monday, 02/06/2023   o Will request provider input on bridging decision.       Between Procedures:  - Continue to hold warfarin for metatarsal phalangeal joint implant arthroplasty        Post-Procedure #2 (arthoplasty):   o Resume warfarin dose if okay with provider doing procedure on night of procedure, 02/13/2023 PM: Take 10 mg x2, then resume home dosing.   o Recheck INR ~ 7 days after resuming warfarin      Plan routed to referring provider for approval  ?   Chante Soto    SUBJECTIVE/OBJECTIVE     Alyssa NAYELI Higginbotham, a 67 year old female    Goal INR Range: 2.0-3.0     Patient bridged in past: No      Wt Readings from Last 3 Encounters:   12/08/22 51.3 kg (113 lb)   11/28/22 53.1 kg (117 lb)   06/14/22 51.7 kg (114 lb)      Ideal body weight: 50.1 kg (110 lb 7.2 oz)  Adjusted ideal body weight: 50.6 kg (111 lb 7.5 oz)     Estimated body mass index is 20.67 kg/m  as calculated from the  "following:    Height as of 12/8/22: 1.575 m (5' 2\").    Weight as of 12/8/22: 51.3 kg (113 lb).    Lab Results   Component Value Date    INR 3.1 (H) 01/10/2023    INR 2.9 (H) 12/27/2022    INR 2.8 (H) 12/13/2022     Lab Results   Component Value Date    HGB 13.1 12/13/2021    HCT 40.5 12/13/2021     12/13/2021     Lab Results   Component Value Date    CR 0.84 12/13/2021    CR 0.89 07/29/2020    CR 1.02 01/07/2020     CrCl: 52 ml/min (using actual body weight and Scr of 0.84 mg/dL)    "

## 2023-01-17 NOTE — TELEPHONE ENCOUNTER
Incoming call from [facility]:  Pt has colonoscopy scheduled February 10th, requesting a 4 day hold on Warfarin and any bridging orders if needed.   Call [facility] with orders [phone]

## 2023-01-18 NOTE — TELEPHONE ENCOUNTER
I think with an artificial valve I would lean more towards bridging.  Just FYI, she is also had of massive aortic dissection in the past I believe.  If you agree with bridging I would appreciate help with that.  Thank you so much.

## 2023-01-20 NOTE — TELEPHONE ENCOUNTER
Pharmacy team: Do we need to reach out to the patient and schedule them or is it something you guys take care of?  Thank you so much for your help!  Also it looks like Dr. Richardson her cardiologist agrees with bridging.

## 2023-01-20 NOTE — TELEPHONE ENCOUNTER
Thank you very much I placed the referral but I cannot sign it because someone else is in the chart.  If you would be able to go forward in getting her scheduled that would be great.  Thank you.

## 2023-01-20 NOTE — TELEPHONE ENCOUNTER
Received input from Dr. Woods and Dr. Richardson both advising bridging.       Pre-Procedure #1 (colonoscopy)  o Check creatinine and INR on 01/24/2023 prior to hold.   o Hold warfarin for 4 days, until after procedure starting: Monday, 02/06/2023.   o Enoxaparin (Lovenox) 40 mg subq Q 12 hrs (0.75 mg/kg Q 12 hrs for CrCl 30-60 ml/min per [facility] P&T approved dose adjustment protocol)   - Start enoxaparin: Wednesday, 02/08/2023 AM  - Last dose of enoxaparin prior to procedure: Thursday, 02/09/2023 AM  (~24 hours prior to procedure)      Between Procedures:  - Continue to hold warfarin for metatarsal phalangeal joint implant arthroplasty  - Resume enoxaparin (Lovenox) ~ 24 hrs post procedure when okay with provider doing procedure. Call clinic prior to restarting if polyps removed.   - Last dose of enoxaparin prior to arthroplasty procedure: Sunday, 02/12/2023 AM  (~24 hours prior to procedure)      Post-Procedure #2 (arthroplasty):  o Resume warfarin dose if okay with provider doing procedure on night of procedure, Monday, 02/13/2023 PM: Take 10 mg x2, then resume home dosing.  o Resume enoxaparin (Lovenox) ~ 24 hrs post procedure when okay with provider doing procedure. Continue until INR >= 2.0  o Recheck INR 5-7 days after resuming warfarin   ?

## 2023-01-20 NOTE — TELEPHONE ENCOUNTER
REYNA-PROCEDURAL ANTICOAGULATION MANAGEMENT    Returned call to [facility] ([phone]) and relayed pre-procedure orders:      Okay to hold warfarin 4 days prior to procedure     Bridge with Lovenox; ACM to instruct      Chante Soto  [facility] Anticoagulation

## 2023-01-21 NOTE — TELEPHONE ENCOUNTER
Referral is placed, thanks MTM team.  If this is not already done, please call patient and have her connect in person or by phone with our pharmacist.

## 2023-01-23 NOTE — TELEPHONE ENCOUNTER
Left message to call back to discuss blood thinner dosing changes for upcoming procedures.  Izzy Gomez RN

## 2023-01-23 NOTE — TELEPHONE ENCOUNTER
MyChart Message sent letting Jennifer know we can discuss hold/bridge plan tomorrow after INR.  Izzy Gomez RN

## 2023-01-24 ENCOUNTER — ANTICOAGULATION THERAPY VISIT (OUTPATIENT)
Dept: ANTICOAGULATION | Facility: CLINIC | Age: 68
End: 2023-01-24

## 2023-01-24 ENCOUNTER — LAB (OUTPATIENT)
Dept: LAB | Facility: CLINIC | Age: 68
End: 2023-01-24
Payer: COMMERCIAL

## 2023-01-24 DIAGNOSIS — Z95.2 S/P AVR: Primary | ICD-10-CM

## 2023-01-24 DIAGNOSIS — Z79.01 LONG TERM CURRENT USE OF ANTICOAGULANT THERAPY: ICD-10-CM

## 2023-01-24 DIAGNOSIS — Z95.2 S/P AVR (AORTIC VALVE REPLACEMENT): ICD-10-CM

## 2023-01-24 LAB — INR BLD: 2.4 (ref 0.9–1.1)

## 2023-01-24 PROCEDURE — 36415 COLL VENOUS BLD VENIPUNCTURE: CPT

## 2023-01-24 PROCEDURE — 85610 PROTHROMBIN TIME: CPT

## 2023-01-24 RX ORDER — ENOXAPARIN SODIUM 100 MG/ML
40 INJECTION SUBCUTANEOUS EVERY 12 HOURS
Qty: 11.2 ML | Refills: 0 | Status: SHIPPED | OUTPATIENT
Start: 2023-01-24 | End: 2023-01-30

## 2023-01-24 NOTE — PROGRESS NOTES
ANTICOAGULATION MANAGEMENT     Alyssa Higginbotham 67 year old female is on warfarin with therapeutic INR result. (Goal INR 2.0-3.0)    Recent labs: (last 7 days)     01/24/23  0734   INR 2.4*       ASSESSMENT       Source(s): Chart Review, Patient/Caregiver Call and Template       Warfarin doses taken: Warfarin taken as instructed    Diet: No new diet changes identified    New illness, injury, or hospitalization: Yes:    Painful big toe joint right foot, progressing for several years.    Medication/supplement changes: Yes.    Prescribed Enoxaparin (Lovenox) 40mg syringes. [facility]    Signs or symptoms of bleeding or clotting: No   Reported gets nosebleed when INR is greater than 3.1    Previous INR: Supratherapeutic at 3.1 on 1/17/23.    Additional findings: Yes.     - 2/10/23 scheduled COLONOSCOPY:   - take last warfarin dose on Sunday, 2/5.   - HOLD warfarin 4 days from 2/6-8.  Begin warfarin HOLD on Monday, 2/6-12.   - start Enoxaparin 40mg q12 hrs the morning of Wedesdat. 2/8 and give LAST dose Enoxaparin injection the morning of Thurs. 2/9.   - resume Enoxaparin injection 24hrs post procedure, when OK'd by surgeon.                 (if any polyps removed to call clinic prior to restarting injections).  - 2/13/23 scheduled right hallux limitus surgery on 2/13/22        - will continue to HOLD warfarin for next surgery on 2/13/23        - give last dose of Enoxaparin injection the morning of Sunday, 12/12.  - Post procedure (arthroplasy):  Resume warfarin when OK'd by surgeon; (usually night of procedure.)         - once resumed - will take a 2 day booster dose with 10mg warfarin, then resume usual home dose.         - also get OK when to resume Enoxaparin injections every 12 hrs;  (usually 24hr after surgery post procedure).         - continue Enoxaparin injections every 12hrs until INR is >=2.0  - recheck INR in 5-7 days after resuming warfarin.          PLAN     Recommended plan for temporary  change(s) affecting INR     Dosing Instructions: Continue your current warfarin dose with next INR in 1 week  After resuming warfarin.    Summary  As of 1/24/2023    Full warfarin instructions:  2/6: Hold; 2/7: Hold; 2/8: Hold; 2/9: Hold; 2/10: Hold; 2/11: Hold; 2/12: Hold; Otherwise 6 mg every Mon, Wed, Fri; 5 mg all other days   Next INR check:  2/20/2023             Telephone call with  Jennifer ([phone]) who verbalizes understanding and agrees to plan    - will check creatinine level at preop appt on 1/30/23 with Dr. Woods.   - sent warfarin hold / bridge plan to [facility].  Advised to call back with any questions.   - will check INR in 5-7 days after resuming warfarin.    Patient offered & declined to schedule next visit    Education provided:     Taking warfarin: Importance of taking warfarin as instructed    Goal range and lab monitoring: goal range and significance of current result    Plan made per [facility] anticoagulation protocol    Aylin Pablo RN  Anticoagulation Clinic  1/24/2023    _______________________________________________________________________     Anticoagulation Episode Summary     Current INR goal:  2.0-3.0   TTR:  60.3 % (1 y)   Target end date:  Indefinite   Send INR reminders to:  [facility]    Indications    S/P AVR [Z95.2]  S/P AVR (aortic valve replacement) [Z95.2]  Long term current use of anticoagulant therapy [Z79.01]           Comments:  Goal range changed 2/20/19 per cardiology         Anticoagulation Care Providers     Provider Role Specialty Phone number    Rafaela Woods MD Referring Family Medicine [phone]

## 2023-01-24 NOTE — TELEPHONE ENCOUNTER
See telephone encounter with INR results.     - called and gave Jennifer instructions on warfarin hold / bridge plan, as outlined by pharmacist.

## 2023-01-25 ENCOUNTER — TELEPHONE (OUTPATIENT)
Dept: FAMILY MEDICINE | Facility: CLINIC | Age: 68
End: 2023-01-25
Payer: COMMERCIAL

## 2023-01-25 DIAGNOSIS — Z95.2 S/P AVR: ICD-10-CM

## 2023-01-25 DIAGNOSIS — Z79.01 LONG TERM CURRENT USE OF ANTICOAGULANT THERAPY: ICD-10-CM

## 2023-01-26 ENCOUNTER — TELEPHONE (OUTPATIENT)
Dept: FAMILY MEDICINE | Facility: CLINIC | Age: 68
End: 2023-01-26
Payer: COMMERCIAL

## 2023-01-26 ENCOUNTER — MYC MEDICAL ADVICE (OUTPATIENT)
Dept: FAMILY MEDICINE | Facility: CLINIC | Age: 68
End: 2023-01-26
Payer: COMMERCIAL

## 2023-01-26 NOTE — TELEPHONE ENCOUNTER
MTM referral from: [facility] visit (referral by provider) for Help with bridging for upcoming procedures    MTM referral outreach attempt #2 on January 26, 2023 at 9:54 AM      Outcome: Patient not reachable after several attempts, will route to MTM Pharmacist/Provider as an FYI.  [facility] scheduling number is [phone].  Thank you for the referral.    Kyara Capps, [facility]     Patient has [facility] Part D coverage

## 2023-01-28 ASSESSMENT — PATIENT HEALTH QUESTIONNAIRE - PHQ9
SUM OF ALL RESPONSES TO PHQ QUESTIONS 1-9: 0
SUM OF ALL RESPONSES TO PHQ QUESTIONS 1-9: 0

## 2023-01-28 ASSESSMENT — ANXIETY QUESTIONNAIRES
GAD7 TOTAL SCORE: 0
GAD7 TOTAL SCORE: 0
2. NOT BEING ABLE TO STOP OR CONTROL WORRYING: NOT AT ALL
1. FEELING NERVOUS, ANXIOUS, OR ON EDGE: NOT AT ALL
7. FEELING AFRAID AS IF SOMETHING AWFUL MIGHT HAPPEN: NOT AT ALL
GAD7 TOTAL SCORE: 0
6. BECOMING EASILY ANNOYED OR IRRITABLE: NOT AT ALL
4. TROUBLE RELAXING: NOT AT ALL
7. FEELING AFRAID AS IF SOMETHING AWFUL MIGHT HAPPEN: NOT AT ALL
3. WORRYING TOO MUCH ABOUT DIFFERENT THINGS: NOT AT ALL
5. BEING SO RESTLESS THAT IT IS HARD TO SIT STILL: NOT AT ALL

## 2023-01-29 NOTE — TELEPHONE ENCOUNTER
Central Prior Authorization Team   Phone: [phone]    PA Initiation    Medication:   Insurance Company:    Pharmacy Filling the Rx: [id number] [place], [place] - [facility] AT [facility] [address]  Filling Pharmacy Phone: [phone]  Filling Pharmacy Fax: [phone]  Start Date: 1/29/2023

## 2023-01-30 ENCOUNTER — OFFICE VISIT (OUTPATIENT)
Dept: FAMILY MEDICINE | Facility: CLINIC | Age: 68
End: 2023-01-30
Payer: COMMERCIAL

## 2023-01-30 ENCOUNTER — TELEPHONE (OUTPATIENT)
Dept: FAMILY MEDICINE | Facility: CLINIC | Age: 68
End: 2023-01-30

## 2023-01-30 VITALS
WEIGHT: 113 LBS | HEART RATE: 64 BPM | OXYGEN SATURATION: 97 % | TEMPERATURE: 98.5 F | DIASTOLIC BLOOD PRESSURE: 72 MMHG | HEIGHT: 62 IN | RESPIRATION RATE: 18 BRPM | BODY MASS INDEX: 20.8 KG/M2 | SYSTOLIC BLOOD PRESSURE: 110 MMHG

## 2023-01-30 DIAGNOSIS — Z01.818 PREOP GENERAL PHYSICAL EXAM: Primary | ICD-10-CM

## 2023-01-30 DIAGNOSIS — Z95.2 S/P AVR: ICD-10-CM

## 2023-01-30 DIAGNOSIS — I71.03 DISSECTION OF THORACOABDOMINAL AORTA (H): ICD-10-CM

## 2023-01-30 DIAGNOSIS — E83.52 HYPERCALCEMIA: Primary | ICD-10-CM

## 2023-01-30 DIAGNOSIS — M79.674 PAIN OF TOE OF RIGHT FOOT: ICD-10-CM

## 2023-01-30 DIAGNOSIS — Z79.01 LONG TERM CURRENT USE OF ANTICOAGULANT THERAPY: ICD-10-CM

## 2023-01-30 LAB
ANION GAP SERPL CALCULATED.3IONS-SCNC: 14 MMOL/L (ref 7–15)
BUN SERPL-MCNC: 18 MG/DL (ref 8–23)
CALCIUM SERPL-MCNC: 10.7 MG/DL (ref 8.8–10.2)
CHLORIDE SERPL-SCNC: 98 MMOL/L (ref 98–107)
CREAT SERPL-MCNC: 0.94 MG/DL (ref 0.51–0.95)
DEPRECATED HCO3 PLAS-SCNC: 27 MMOL/L (ref 22–29)
ERYTHROCYTE [DISTWIDTH] IN BLOOD BY AUTOMATED COUNT: 12.8 % (ref 10–15)
GFR SERPL CREATININE-BSD FRML MDRD: 66 ML/MIN/1.73M2
GLUCOSE SERPL-MCNC: 96 MG/DL (ref 70–99)
HCT VFR BLD AUTO: 41.2 % (ref 35–47)
HGB BLD-MCNC: 13.8 G/DL (ref 11.7–15.7)
MCH RBC QN AUTO: 26.7 PG (ref 26.5–33)
MCHC RBC AUTO-ENTMCNC: 33.5 G/DL (ref 31.5–36.5)
MCV RBC AUTO: 80 FL (ref 78–100)
PLATELET # BLD AUTO: 200 10E3/UL (ref 150–450)
POTASSIUM SERPL-SCNC: 3.8 MMOL/L (ref 3.4–5.3)
RBC # BLD AUTO: 5.17 10E6/UL (ref 3.8–5.2)
SODIUM SERPL-SCNC: 139 MMOL/L (ref 136–145)
WBC # BLD AUTO: 4.7 10E3/UL (ref 4–11)

## 2023-01-30 PROCEDURE — 93010 ELECTROCARDIOGRAM REPORT: CPT | Performed by: INTERNAL MEDICINE

## 2023-01-30 PROCEDURE — 36415 COLL VENOUS BLD VENIPUNCTURE: CPT | Performed by: STUDENT IN AN ORGANIZED HEALTH CARE EDUCATION/TRAINING PROGRAM

## 2023-01-30 PROCEDURE — 80048 BASIC METABOLIC PNL TOTAL CA: CPT | Performed by: STUDENT IN AN ORGANIZED HEALTH CARE EDUCATION/TRAINING PROGRAM

## 2023-01-30 PROCEDURE — 93005 ELECTROCARDIOGRAM TRACING: CPT | Performed by: STUDENT IN AN ORGANIZED HEALTH CARE EDUCATION/TRAINING PROGRAM

## 2023-01-30 PROCEDURE — 99215 OFFICE O/P EST HI 40 MIN: CPT | Performed by: STUDENT IN AN ORGANIZED HEALTH CARE EDUCATION/TRAINING PROGRAM

## 2023-01-30 PROCEDURE — 85027 COMPLETE CBC AUTOMATED: CPT | Performed by: STUDENT IN AN ORGANIZED HEALTH CARE EDUCATION/TRAINING PROGRAM

## 2023-01-30 RX ORDER — ENOXAPARIN SODIUM 100 MG/ML
40 INJECTION SUBCUTANEOUS EVERY 12 HOURS
Qty: 11.2 ML | Refills: 1 | Status: SHIPPED | OUTPATIENT
Start: 2023-01-30 | End: 2023-03-01

## 2023-01-30 RX ORDER — ENOXAPARIN SODIUM 100 MG/ML
40 INJECTION SUBCUTANEOUS EVERY 12 HOURS
Qty: 11.2 ML | Refills: 0 | Status: CANCELLED | OUTPATIENT
Start: 2023-01-30

## 2023-01-30 RX ORDER — ENOXAPARIN SODIUM 100 MG/ML
40 INJECTION SUBCUTANEOUS EVERY 12 HOURS
Qty: 11.2 ML | Refills: 0
Start: 2023-01-30 | End: 2023-01-30

## 2023-01-30 ASSESSMENT — PAIN SCALES - GENERAL: PAINLEVEL: NO PAIN (0)

## 2023-01-30 NOTE — PROGRESS NOTES
[facility]  [address]  [place] [address]  Phone: [phone]  Fax: [phone]  Primary Provider: Rafaela Woods  Pre-op Performing Provider: JUSTO GARNICA      PREOPERATIVE EVALUATION:  Today's date: 1/30/2023    Alyssa Higginbotham is a 67 year old female who presents for a preoperative evaluation.    Surgical Information:  Surgery/Procedure: First metatarsal phalangeal joint implant arthroplasty right foot + colonoscopy on 2/10/23  Surgery Location: [facility] OR  Surgeon: Chin Riojas DPM  Surgery Date: 2/13/2023  Time of Surgery: 7:00am  Where patient plans to recover: At home with family  Fax number for surgical facility: [phone]    Type of Anesthesia Anticipated: General    Assessment & Plan     The proposed surgical procedure is considered INTERMEDIATE risk.        ICD-10-CM    1. Preop general physical exam  Z01.818 EKG 12-lead, tracing only     Basic metabolic panel  (Ca, Cl, CO2, Creat, Gluc, K, Na, BUN)     CBC with platelets      2. Pain of toe of right foot  M79.674       3. Long term current use of anticoagulant therapy  Z79.01  enoxaparin ANTICOAGULANT (LOVENOX) 40 MG/0.4ML syringe      4. S/P AVR  Z95.2  enoxaparin ANTICOAGULANT (LOVENOX) 40 MG/0.4ML syringe     CANCELED: Creatinine      5. Dissection of thoracoabdominal aorta (H)  I71.03           RCRI 1  EKG today was consistent with no acute ischemic changes   Last saw cards on 6/2022 and has been stable for many years. Normotensive and without cardiac sxs   Transition to Lovenox already planned and schedule with AC clinic x 1 week. They will be calling her tomorrow to walk her through it   COVID test will be done at home   Will CBC and BMP for anesthesia purposes today.   For med mngmtn: see below   Will get CBC, BMP and INR preop   As long as bloodwork looks ok, she will be ok for both procedures.     Addendum: Cost of Lovenox was prohibitive and she cancelled the  colonoscopy. She will now hold the warfarin for 3 days. Bloodwork looks ok. She is all set for surgery.     Risks and Recommendations:  The patient has the following additional risks and recommendations for perioperative complications:   - No identified additional risk factors other than previously addressed    Medication Instructions:  For your medications:  For both the day of your colonoscopy and your foot surgery: Okay to continue Synthroid, metoprolol, simvastatin, triamterene-HCTZ  - Please bridge to Lovenox as discussed with the anticoagulation clinic  - Okay to take clonazepam at night the night before surgery.  Please avoid clonazepam use during the day of surgery  - Discontinue multivitamin t a week before surgery   -Would hold Fioricet 3 days prior to surgery    RECOMMENDATION:  APPROVAL GIVEN to proceed with proposed procedure, pending diagnostic evaluation.      45 minutes spent on the date of the encounter doing chart review, history and exam, documentation and further activities per the note      Subjective     HPI related to upcoming procedure:     Patient is a pleasant 67-year-old female with PMH aortic dissection s/p surgical repair with AVR, PADDY, history of hep C s/p treatment, hypothyroidism, HLD, MDD, migraines, vertigo, HTN who presents today for preop.    Patient is set to do 2 different procedures-1 on 2/10 for colonoscopy and first metatarsophalangeal joint arthroplasty of the right foot 2/13.  Patient's noting increased discomfort of her first metatarsal.  No changes in bowel habits.  No B type symptoms or unintentional weight loss.  No difficulty walking.    Patient does well on her warfarin.  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  The anticoagulation clinic is managing this.  This will be the first time that she will need a bridge.  In the past, she has generally held warfarin x5 days.    She will be doing a COVID test at  home.        Preop Questions 1/28/2023   1. Have you ever had a heart attack or stroke? YES - patient reported, during her aortic dissection repair    2. Have you ever had surgery on your heart or blood vessels, such as a stent placement, a coronary artery bypass, or surgery on an artery in your head, neck, heart, or legs? YES -  S/p AVR    3. Do you have chest pain with activity? No   4. Do you have a history of  heart failure? No   5. Do you currently have a cold, bronchitis or symptoms of other infection? No   6. Do you have a cough, shortness of breath, or wheezing? No   7. Do you or anyone in your family have previous history of blood clots? No   8. Do you or does anyone in your family have a serious bleeding problem such as prolonged bleeding following surgeries or cuts? No   9. Have you ever had problems with anemia or been told to take iron pills? No   10. Have you had any abnormal blood loss such as black, tarry or bloody stools, or abnormal vaginal bleeding? No   11. Have you ever had a blood transfusion? No   12. Are you willing to have a blood transfusion if it is medically needed before, during, or after your surgery? Yes   13. Have you or any of your relatives ever had problems with anesthesia? No   14. Do you have sleep apnea, excessive snoring or daytime drowsiness? No   15. Do you have any artifical heart valves or other implanted medical devices like a pacemaker, defibrillator, or continuous glucose monitor? YES - s/p AVR    15a. What type of device do you have? [employer]   15b. Name of the clinic that manages your device:  [facility]   16. Do you have artificial joints? No   17. Are you allergic to latex? No       Health Care Directive:  Patient does not have a Health Care Directive or Living Will: honoring choices paperwork given     Preoperative Review of :   reviewed - no record of controlled substances prescribed.    Review of Systems  CONSTITUTIONAL:  NEGATIVE for fever, chills, change in weight  INTEGUMENTARY/SKIN: NEGATIVE for worrisome rashes, moles or lesions  EYES: NEGATIVE for vision changes or irritation  ENT/MOUTH: NEGATIVE for ear, mouth and throat problems  RESP: NEGATIVE for significant cough or SOB  CV: NEGATIVE for chest pain, palpitations or peripheral edema  GI: NEGATIVE for nausea, abdominal pain, heartburn, or change in bowel habits  : NEGATIVE for frequency, dysuria, or hematuria  MUSCULOSKELETAL: NEGATIVE for significant arthralgias or myalgia  NEURO: NEGATIVE for weakness, dizziness or paresthesias  ENDOCRINE: NEGATIVE for temperature intolerance, skin/hair changes  HEME: NEGATIVE for bleeding problems  PSYCHIATRIC: NEGATIVE for changes in mood or affect    Patient Active Problem List    Diagnosis Date Noted     Hallux limitus, right 12/13/2022     Priority: Medium     Added automatically from request for surgery [id number]       Essential hypertension 11/28/2022     Priority: Medium     S/P AVR (aortic valve replacement) 11/19/2021     Priority: Medium     Family history of ovarian cancer 12/06/2016     Priority: Medium     Hyperlipidemia      Priority: Medium     Created by Conversion         Acute chest pain 10/11/2016     Priority: Medium     PADDY (generalized anxiety disorder) 10/07/2016     Priority: Medium     Osteoporosis      Priority: Medium     Created by Conversion  Replacement Utility updated for latest IMO load         Mitral Valve Disorder      Priority: Medium     Created by Conversion  Replacement Utility updated for latest IMO load         Anxiety      Priority: Medium     Created by Conversion  Replacement Utility updated for latest IMO load         Tension-type Headache      Priority: Medium     Created by Conversion  Replacement Utility updated for latest IMO load         Hypothyroidism      Priority: Medium     Created by Conversion  Replacement Utility updated for latest IMO load         Aortic Valve Disorder       Priority: Medium     Created by Conversion  Replacement Utility updated for latest IMO load         Migraine      Priority: Medium     Created by Conversion  [facility] Annotation: Mar  5 2009  6:43PM - Babs Elliott: Occular         Thyroid nodule 09/03/2015     Priority: Medium     Biopsy suggested benign nodule in March 2015, follow-up suggested.   Ultrasound in Sept 2015 with a little decrease in size.  Stable form 2017   to 2019. No further needed unless compressive symptoms.         PTSD (post-traumatic stress disorder) 05/18/2015     Priority: Medium     Vertigo 05/04/2015     Priority: Medium     Concussion syndrome 04/23/2015     Priority: Medium     Meningioma (H)-initially seen on CT of head that was obtained after a fall. Frontal lobe, confirmed on an MRI Mar 2015, 3 mm 03/24/2015     Priority: Medium     S/P AVR 11/03/2014     Priority: Medium     Myalgia And Myositis      Priority: Medium     Created by Conversion         Marfan Syndrome      Priority: Medium     Created by Conversion         Hepatitis, C Virus      Priority: Medium     Created by Conversion  [facility] Annotation: Jan 12 2008  8:24PM - Rafaela Woods: Told   resolved   by GI after treatment         Depression      Priority: Medium     Created by Conversion         Presbyopia 03/13/2009     Priority: Medium     Long term current use of anticoagulant therapy 03/19/2004     Priority: Medium     Formatting of this note might be different from the original.  Epic       Dissection Of The Aorta 04/09/2003     Priority: Medium     Created by [facility] Annotation: Jan 12 2008  8:24PM - [place], Anticoagulation:   2000  Replacement Utility updated for latest IMO load      Formatting of this note might be different from the original.  Created by [facility] Annotation: Jan 12 2008  8:24PM - [place], Anticoagulation: 2000    Replacement Utility updated for latest IMO load  Formatting of this note might be different  from the original.  Acute Type A dissection repair and AVR with [employer].    Stable Type B dissection extending to right iliac        Past Medical History:   Diagnosis Date     Accidental fall 05/27/2015     Anxiety      Aortic dissection (H)      Asthma      Benign meningioma of brain (H) 03/2015    initially seen on CT of head that was obtained after a fall. Frontal lobe, confirmed on an MRI Mar 2015, 3 mm     Benign neoplasm of colon      Bunion      Chronic headache      Colon polyp 2011     Depression      Hepatitis C      Hyperlipidemia      Hypothyroid      Irregular heart rate      Nasal fracture 02/28/2015    fell face first on sideache     Osteoporosis      Other acquired deformity of toe      Stroke (H)     on CT scan     Past Surgical History:   Procedure Laterality Date     AORTIC VALVE REPLACEMENT   2000     BIOPSY BREAST Left 2005    benign     COLONOSCOPY  2011     REPAIR THORACIC AORTA   2000     Current Outpatient Medications   Medication Sig Dispense Refill     amitriptyline (ELAVIL) 10 MG tablet Take 1 tablet (10 mg) by mouth At Bedtime 90 tablet 3     butalbital-acetaminophen-caffeine (ESGIC) -40 MG tablet Take 2 tablets by mouth every 8 hours as needed for headaches [BUTALBITAL-ACETAMINOPHEN-CAFFEINE (FIORICET, ESGIC) -40 MG PER TABLET] TAKE 1 TO 2 TABLETS BY MOUTH EVERY 8 HOURS AS NEEDED FOR PAIN. MAX OF 4000 MG ACETAMINOPHEN PER 24 HOURS Strength: -40 mg 60 tablet 3     clonazePAM (KLONOPIN) 0.5 MG tablet Take 1 tablet (0.5 mg) by mouth nightly as needed for anxiety or sleep 90 tablet 3     enoxaparin ANTICOAGULANT (LOVENOX) 40 MG/0.4ML syringe Inject 0.4 mLs (40 mg) Subcutaneous every 12 hours As directed before and after procedures 11.2 mL 0     levothyroxine (SYNTHROID/LEVOTHROID) 75 MCG tablet TAKE 1 TABLET ON WEDNESDAY, THURSDAY, SATURDAY AND SUNDAY 52 tablet 3     levothyroxine (SYNTHROID/LEVOTHROID) 88 MCG tablet TAKE 1 TABLET ON MONDAY, TUESDAY AND FRIDAY 39 tablet 3      metoprolol succinate ER (TOPROL-XL) 200 MG 24 hr tablet TAKE 1 TABLET DAILY 90 tablet 3     multivitamin (ONE A DAY) per tablet [MULTIVITAMIN (ONE A DAY) PER TABLET] Take 1 tablet by mouth daily.        rizatriptan (MAXALT) 5 MG tablet [RIZATRIPTAN (MAXALT) 5 MG TABLET] TAKE 1 TABLET BY MOUTH AS NEEDED FOR MIGRAINE. MAY REPEAT IN 2 HOURS IF NEEDED 10 tablet 9     simvastatin (ZOCOR) 20 MG tablet TAKE 1 TABLET AT BEDTIME (DUE FOR AN OFFICE VISIT) 90 tablet 3     triamterene-HCTZ (MAXZIDE) 75-50 MG tablet TAKE ONE-HALF (1/2) TABLET DAILY 45 tablet 3     warfarin ANTICOAGULANT (COUMADIN) 1 MG tablet TAKE 1 TO 2 TABLETS ALONG WITH 5 MG TABLET (6 TO 7 MG) DAILY AS DIRECTED, ADJUST DOSE PER INR RESULTS 168 tablet 3     warfarin ANTICOAGULANT (COUMADIN) 5 MG tablet TAKE 1 TABLET ALONG WITH 1 MG TABLET (6 TO 7 MG DAILY) AS DIRECTED, ADJUST PER INR RESULT 100 tablet 3       Allergies   Allergen Reactions     Codeine Other (See Comments)     Faints     Demerol [Meperidine] Other (See Comments)     Reaction not reported by patient., Patient states she felt awful.        Social History     Tobacco Use     Smoking status: Never     Smokeless tobacco: Never   Substance Use Topics     Alcohol use: No     Family History   Problem Relation Age of Onset     Pancreatic Cancer Father 70.00        history of smoking     Ovarian Cancer Sister 67.00        stage III, fatal     Skin Cancer Sister      Heart Disease Mother      Diabetes No family hx of      Alzheimer Disease No family hx of      History   Drug Use No         Objective     There were no vitals taken for this visit.    Physical Exam    GENERAL APPEARANCE: healthy, alert and no distress     EYES: EOMI, PERRL     HENT: ear canals and TM's normal and nose and mouth without ulcers or lesions     NECK: no adenopathy, no asymmetry, masses, or scars and thyroid normal to palpation     RESP: lungs clear to auscultation - no rales, rhonchi or wheezes     CV: regular rates and  rhythm, normal S1 S2, no S3 or S4 and no murmur, click or rub     ABDOMEN:  soft, nontender, no HSM or masses and bowel sounds normal     MS: extremities normal- no gross deformities noted, no evidence of inflammation in joints, FROM in all extremities.      SKIN: no suspicious lesions or rashes     NEURO: Normal strength and tone, sensory exam grossly normal, mentation intact and speech normal     PSYCH: mentation appears normal. and affect normal/bright     LYMPHATICS: No cervical adenopathy    Recent Labs   Lab Test 01/24/23  0734 01/10/23  0809 12/23/21  0808 12/13/21  0940   HGB  --   --   --  13.1   PLT  --   --   --  171   INR 2.4* 3.1*   < >  --    NA  --   --   --  137   POTASSIUM  --   --   --  3.7   CR  --   --   --  0.84    < > = values in this interval not displayed.        Diagnostics:  Labs pending at this time.  Results will be reviewed when available.   EKG: appears normal, NSR, normal axis, normal intervals, no acute ST/T changes c/w ischemia, no LVH by voltage criteria    Revised Cardiac Risk Index (RCRI):  The patient has the following serious cardiovascular risks for perioperative complications:   - Cerebrovascular Disease (TIA or CVA) = 1 point     RCRI Interpretation: 1 point: Class II (low risk - 0.9% complication rate)           Signed Electronically by: Holly Reddy DO  Copy of this evaluation report is provided to requesting physician.

## 2023-01-30 NOTE — H&P (VIEW-ONLY)
[facility]  [address]  [place] [address]  Phone: [phone]  Fax: [phone]  Primary Provider: Rafaela Woods  Pre-op Performing Provider: JUSTO GARNICA      PREOPERATIVE EVALUATION:  Today's date: 1/30/2023    Alyssa Higginbotham is a 67 year old female who presents for a preoperative evaluation.    Surgical Information:  Surgery/Procedure: First metatarsal phalangeal joint implant arthroplasty right foot + colonoscopy on 2/10/23  Surgery Location: [facility] OR  Surgeon: Chin Riojas DPM  Surgery Date: 2/13/2023  Time of Surgery: 7:00am  Where patient plans to recover: At home with family  Fax number for surgical facility: [phone]    Type of Anesthesia Anticipated: General    Assessment & Plan     The proposed surgical procedure is considered INTERMEDIATE risk.        ICD-10-CM    1. Preop general physical exam  Z01.818 EKG 12-lead, tracing only     Basic metabolic panel  (Ca, Cl, CO2, Creat, Gluc, K, Na, BUN)     CBC with platelets      2. Pain of toe of right foot  M79.674       3. Long term current use of anticoagulant therapy  Z79.01  enoxaparin ANTICOAGULANT (LOVENOX) 40 MG/0.4ML syringe      4. S/P AVR  Z95.2  enoxaparin ANTICOAGULANT (LOVENOX) 40 MG/0.4ML syringe     CANCELED: Creatinine      5. Dissection of thoracoabdominal aorta (H)  I71.03           RCRI 1  EKG today was consistent with no acute ischemic changes   Last saw cards on 6/2022 and has been stable for many years. Normotensive and without cardiac sxs   Transition to Lovenox already planned and schedule with AC clinic x 1 week. They will be calling her tomorrow to walk her through it   COVID test will be done at home   Will CBC and BMP for anesthesia purposes today.   For med mngmtn: see below   Will get CBC, BMP and INR preop   As long as bloodwork looks ok, she will be ok for both procedures.     Addendum: Cost of Lovenox was prohibitive and she cancelled the  colonoscopy. She will now hold the warfarin for 3 days. Bloodwork looks ok. She is all set for surgery.     Risks and Recommendations:  The patient has the following additional risks and recommendations for perioperative complications:   - No identified additional risk factors other than previously addressed    Medication Instructions:  For your medications:  For both the day of your colonoscopy and your foot surgery: Okay to continue Synthroid, metoprolol, simvastatin, triamterene-HCTZ  - Please bridge to Lovenox as discussed with the anticoagulation clinic  - Okay to take clonazepam at night the night before surgery.  Please avoid clonazepam use during the day of surgery  - Discontinue multivitamin t a week before surgery   -Would hold Fioricet 3 days prior to surgery    RECOMMENDATION:  APPROVAL GIVEN to proceed with proposed procedure, pending diagnostic evaluation.      45 minutes spent on the date of the encounter doing chart review, history and exam, documentation and further activities per the note      Subjective     HPI related to upcoming procedure:     Patient is a pleasant 67-year-old female with PMH aortic dissection s/p surgical repair with AVR, PADYD, history of hep C s/p treatment, hypothyroidism, HLD, MDD, migraines, vertigo, HTN who presents today for preop.    Patient is set to do 2 different procedures-1 on 2/10 for colonoscopy and first metatarsophalangeal joint arthroplasty of the right foot 2/13.  Patient's noting increased discomfort of her first metatarsal.  No changes in bowel habits.  No B type symptoms or unintentional weight loss.  No difficulty walking.    Patient does well on her warfarin.  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  The anticoagulation clinic is managing this.  This will be the first time that she will need a bridge.  In the past, she has generally held warfarin x5 days.    She will be doing a COVID test at  home.        Preop Questions 1/28/2023   1. Have you ever had a heart attack or stroke? YES - patient reported, during her aortic dissection repair    2. Have you ever had surgery on your heart or blood vessels, such as a stent placement, a coronary artery bypass, or surgery on an artery in your head, neck, heart, or legs? YES -  S/p AVR    3. Do you have chest pain with activity? No   4. Do you have a history of  heart failure? No   5. Do you currently have a cold, bronchitis or symptoms of other infection? No   6. Do you have a cough, shortness of breath, or wheezing? No   7. Do you or anyone in your family have previous history of blood clots? No   8. Do you or does anyone in your family have a serious bleeding problem such as prolonged bleeding following surgeries or cuts? No   9. Have you ever had problems with anemia or been told to take iron pills? No   10. Have you had any abnormal blood loss such as black, tarry or bloody stools, or abnormal vaginal bleeding? No   11. Have you ever had a blood transfusion? No   12. Are you willing to have a blood transfusion if it is medically needed before, during, or after your surgery? Yes   13. Have you or any of your relatives ever had problems with anesthesia? No   14. Do you have sleep apnea, excessive snoring or daytime drowsiness? No   15. Do you have any artifical heart valves or other implanted medical devices like a pacemaker, defibrillator, or continuous glucose monitor? YES - s/p AVR    15a. What type of device do you have? [employer]   15b. Name of the clinic that manages your device:  [facility]   16. Do you have artificial joints? No   17. Are you allergic to latex? No       Health Care Directive:  Patient does not have a Health Care Directive or Living Will: honoring choices paperwork given     Preoperative Review of :   reviewed - no record of controlled substances prescribed.    Review of Systems  CONSTITUTIONAL:  NEGATIVE for fever, chills, change in weight  INTEGUMENTARY/SKIN: NEGATIVE for worrisome rashes, moles or lesions  EYES: NEGATIVE for vision changes or irritation  ENT/MOUTH: NEGATIVE for ear, mouth and throat problems  RESP: NEGATIVE for significant cough or SOB  CV: NEGATIVE for chest pain, palpitations or peripheral edema  GI: NEGATIVE for nausea, abdominal pain, heartburn, or change in bowel habits  : NEGATIVE for frequency, dysuria, or hematuria  MUSCULOSKELETAL: NEGATIVE for significant arthralgias or myalgia  NEURO: NEGATIVE for weakness, dizziness or paresthesias  ENDOCRINE: NEGATIVE for temperature intolerance, skin/hair changes  HEME: NEGATIVE for bleeding problems  PSYCHIATRIC: NEGATIVE for changes in mood or affect    Patient Active Problem List    Diagnosis Date Noted     Hallux limitus, right 12/13/2022     Priority: Medium     Added automatically from request for surgery [id number]       Essential hypertension 11/28/2022     Priority: Medium     S/P AVR (aortic valve replacement) 11/19/2021     Priority: Medium     Family history of ovarian cancer 12/06/2016     Priority: Medium     Hyperlipidemia      Priority: Medium     Created by Conversion         Acute chest pain 10/11/2016     Priority: Medium     PADDY (generalized anxiety disorder) 10/07/2016     Priority: Medium     Osteoporosis      Priority: Medium     Created by Conversion  Replacement Utility updated for latest IMO load         Mitral Valve Disorder      Priority: Medium     Created by Conversion  Replacement Utility updated for latest IMO load         Anxiety      Priority: Medium     Created by Conversion  Replacement Utility updated for latest IMO load         Tension-type Headache      Priority: Medium     Created by Conversion  Replacement Utility updated for latest IMO load         Hypothyroidism      Priority: Medium     Created by Conversion  Replacement Utility updated for latest IMO load         Aortic Valve Disorder       Priority: Medium     Created by Conversion  Replacement Utility updated for latest IMO load         Migraine      Priority: Medium     Created by Conversion  [facility] Annotation: Mar  5 2009  6:43PM - Babs Elliott: Occular         Thyroid nodule 09/03/2015     Priority: Medium     Biopsy suggested benign nodule in March 2015, follow-up suggested.   Ultrasound in Sept 2015 with a little decrease in size.  Stable form 2017   to 2019. No further needed unless compressive symptoms.         PTSD (post-traumatic stress disorder) 05/18/2015     Priority: Medium     Vertigo 05/04/2015     Priority: Medium     Concussion syndrome 04/23/2015     Priority: Medium     Meningioma (H)-initially seen on CT of head that was obtained after a fall. Frontal lobe, confirmed on an MRI Mar 2015, 3 mm 03/24/2015     Priority: Medium     S/P AVR 11/03/2014     Priority: Medium     Myalgia And Myositis      Priority: Medium     Created by Conversion         Marfan Syndrome      Priority: Medium     Created by Conversion         Hepatitis, C Virus      Priority: Medium     Created by Conversion  [facility] Annotation: Jan 12 2008  8:24PM - Rafaela Woods: Told   resolved   by GI after treatment         Depression      Priority: Medium     Created by Conversion         Presbyopia 03/13/2009     Priority: Medium     Long term current use of anticoagulant therapy 03/19/2004     Priority: Medium     Formatting of this note might be different from the original.  Epic       Dissection Of The Aorta 04/09/2003     Priority: Medium     Created by [facility] Annotation: Jan 12 2008  8:24PM - [place], Anticoagulation:   2000  Replacement Utility updated for latest IMO load      Formatting of this note might be different from the original.  Created by [facility] Annotation: Jan 12 2008  8:24PM - [place], Anticoagulation: 2000    Replacement Utility updated for latest IMO load  Formatting of this note might be different  from the original.  Acute Type A dissection repair and AVR with [employer].    Stable Type B dissection extending to right iliac        Past Medical History:   Diagnosis Date     Accidental fall 05/27/2015     Anxiety      Aortic dissection (H)      Asthma      Benign meningioma of brain (H) 03/2015    initially seen on CT of head that was obtained after a fall. Frontal lobe, confirmed on an MRI Mar 2015, 3 mm     Benign neoplasm of colon      Bunion      Chronic headache      Colon polyp 2011     Depression      Hepatitis C      Hyperlipidemia      Hypothyroid      Irregular heart rate      Nasal fracture 02/28/2015    fell face first on sideache     Osteoporosis      Other acquired deformity of toe      Stroke (H)     on CT scan     Past Surgical History:   Procedure Laterality Date     AORTIC VALVE REPLACEMENT   2000     BIOPSY BREAST Left 2005    benign     COLONOSCOPY  2011     REPAIR THORACIC AORTA   2000     Current Outpatient Medications   Medication Sig Dispense Refill     amitriptyline (ELAVIL) 10 MG tablet Take 1 tablet (10 mg) by mouth At Bedtime 90 tablet 3     butalbital-acetaminophen-caffeine (ESGIC) -40 MG tablet Take 2 tablets by mouth every 8 hours as needed for headaches [BUTALBITAL-ACETAMINOPHEN-CAFFEINE (FIORICET, ESGIC) -40 MG PER TABLET] TAKE 1 TO 2 TABLETS BY MOUTH EVERY 8 HOURS AS NEEDED FOR PAIN. MAX OF 4000 MG ACETAMINOPHEN PER 24 HOURS Strength: -40 mg 60 tablet 3     clonazePAM (KLONOPIN) 0.5 MG tablet Take 1 tablet (0.5 mg) by mouth nightly as needed for anxiety or sleep 90 tablet 3     enoxaparin ANTICOAGULANT (LOVENOX) 40 MG/0.4ML syringe Inject 0.4 mLs (40 mg) Subcutaneous every 12 hours As directed before and after procedures 11.2 mL 0     levothyroxine (SYNTHROID/LEVOTHROID) 75 MCG tablet TAKE 1 TABLET ON WEDNESDAY, THURSDAY, SATURDAY AND SUNDAY 52 tablet 3     levothyroxine (SYNTHROID/LEVOTHROID) 88 MCG tablet TAKE 1 TABLET ON MONDAY, TUESDAY AND FRIDAY 39 tablet 3      metoprolol succinate ER (TOPROL-XL) 200 MG 24 hr tablet TAKE 1 TABLET DAILY 90 tablet 3     multivitamin (ONE A DAY) per tablet [MULTIVITAMIN (ONE A DAY) PER TABLET] Take 1 tablet by mouth daily.        rizatriptan (MAXALT) 5 MG tablet [RIZATRIPTAN (MAXALT) 5 MG TABLET] TAKE 1 TABLET BY MOUTH AS NEEDED FOR MIGRAINE. MAY REPEAT IN 2 HOURS IF NEEDED 10 tablet 9     simvastatin (ZOCOR) 20 MG tablet TAKE 1 TABLET AT BEDTIME (DUE FOR AN OFFICE VISIT) 90 tablet 3     triamterene-HCTZ (MAXZIDE) 75-50 MG tablet TAKE ONE-HALF (1/2) TABLET DAILY 45 tablet 3     warfarin ANTICOAGULANT (COUMADIN) 1 MG tablet TAKE 1 TO 2 TABLETS ALONG WITH 5 MG TABLET (6 TO 7 MG) DAILY AS DIRECTED, ADJUST DOSE PER INR RESULTS 168 tablet 3     warfarin ANTICOAGULANT (COUMADIN) 5 MG tablet TAKE 1 TABLET ALONG WITH 1 MG TABLET (6 TO 7 MG DAILY) AS DIRECTED, ADJUST PER INR RESULT 100 tablet 3       Allergies   Allergen Reactions     Codeine Other (See Comments)     Faints     Demerol [Meperidine] Other (See Comments)     Reaction not reported by patient., Patient states she felt awful.        Social History     Tobacco Use     Smoking status: Never     Smokeless tobacco: Never   Substance Use Topics     Alcohol use: No     Family History   Problem Relation Age of Onset     Pancreatic Cancer Father 70.00        history of smoking     Ovarian Cancer Sister 67.00        stage III, fatal     Skin Cancer Sister      Heart Disease Mother      Diabetes No family hx of      Alzheimer Disease No family hx of      History   Drug Use No         Objective     There were no vitals taken for this visit.    Physical Exam    GENERAL APPEARANCE: healthy, alert and no distress     EYES: EOMI, PERRL     HENT: ear canals and TM's normal and nose and mouth without ulcers or lesions     NECK: no adenopathy, no asymmetry, masses, or scars and thyroid normal to palpation     RESP: lungs clear to auscultation - no rales, rhonchi or wheezes     CV: regular rates and  rhythm, normal S1 S2, no S3 or S4 and no murmur, click or rub     ABDOMEN:  soft, nontender, no HSM or masses and bowel sounds normal     MS: extremities normal- no gross deformities noted, no evidence of inflammation in joints, FROM in all extremities.      SKIN: no suspicious lesions or rashes     NEURO: Normal strength and tone, sensory exam grossly normal, mentation intact and speech normal     PSYCH: mentation appears normal. and affect normal/bright     LYMPHATICS: No cervical adenopathy    Recent Labs   Lab Test 01/24/23  0734 01/10/23  0809 12/23/21  0808 12/13/21  0940   HGB  --   --   --  13.1   PLT  --   --   --  171   INR 2.4* 3.1*   < >  --    NA  --   --   --  137   POTASSIUM  --   --   --  3.7   CR  --   --   --  0.84    < > = values in this interval not displayed.        Diagnostics:  Labs pending at this time.  Results will be reviewed when available.   EKG: appears normal, NSR, normal axis, normal intervals, no acute ST/T changes c/w ischemia, no LVH by voltage criteria    Revised Cardiac Risk Index (RCRI):  The patient has the following serious cardiovascular risks for perioperative complications:   - Cerebrovascular Disease (TIA or CVA) = 1 point     RCRI Interpretation: 1 point: Class II (low risk - 0.9% complication rate)           Signed Electronically by: Holly Reddy DO  Copy of this evaluation report is provided to requesting physician.

## 2023-01-30 NOTE — PATIENT INSTRUCTIONS
For your medications:  For both the day of your colonoscopy and your foot surgery: Okay to continue Synthroid, metoprolol, simvastatin, triamterene-HCTZ  - Please bridge to Lovenox as discussed with the anticoagulation clinic  - Okay to take clonazepam at night the night before surgery.  Please avoid clonazepam use during the day of surgery  - Discontinue multivitamin t a week before surgery   -Would hold Fioricet 3 days prior to surgery    For informational purposes only. Not to replace the advice of your health care provider. Copyright   ,  [facility] [employer] [facility]. All rights reserved. Clinically reviewed by Hollei Enriquez MD. [employer] [id number] - REV .  Preparing for Your Surgery  Getting started  A nurse will call you to review your health history and instructions. They will give you an arrival time based on your scheduled surgery time. Please be ready to share:    Your doctor's clinic name and phone number    Your medical, surgical, and anesthesia history    A list of allergies and sensitivities    A list of medicines, including herbal treatments and over-the-counter drugs    Whether the patient has a legal guardian (ask how to send us the papers in advance)  Please tell us if you're pregnant--or if there's any chance you might be pregnant. Some surgeries may injure a fetus (unborn baby), so they require a pregnancy test. Surgeries that are safe for a fetus don't always need a test, and you can choose whether to have one.   If you have a child who's having surgery, please ask for a copy of Preparing for Your Child's Surgery.    Preparing for surgery  1. Within 10 to 30 days of surgery: Have a pre-op exam (sometimes called an H&P, or History and Physical). This can be done at a clinic or pre-operative center.  ? If you're having a , you may not need this exam. Talk to your care team.  2. At your pre-op exam, talk to your care team about all medicines you take. If you need to stop any medicines  before surgery, ask when to start taking them again.  ? We do this for your safety. Many medicines can make you bleed too much during surgery. Some change how well surgery (anesthesia) drugs work.  3. Call your insurance company to let them know you're having surgery. (If you don't have insurance, call [phone].)  4. Call your clinic if there's any change in your health. This includes signs of a cold or flu (sore throat, runny nose, cough, rash, fever). It also includes a scrape or scratch near the surgery site.  5. If you have questions on the day of surgery, call your hospital or surgery center.  Eating and drinking guidelines  For your safety: Unless your surgeon tells you otherwise, follow the guidelines below.    Eat and drink as usual until 8 hours before you arrive for surgery. After that, no food or milk.    Drink clear liquids until 2 hours before you arrive. These are liquids you can see through, like water, Gatorade, and Propel Water. They also include plain black coffee and tea (no cream or milk), candy, and breath mints. You can spit out gum when you arrive.    If you drink alcohol: Stop drinking it the night before surgery.    If your care team tells you to take medicine on the morning of surgery, it's okay to take it with a sip of water.  Preventing infection  1. Shower or bathe the night before and morning of your surgery. Follow the instructions your clinic gave you. (If no instructions, use regular soap.)  2. Don't shave or clip hair near your surgery site. We'll remove the hair if needed.  3. Don't smoke or vape the morning of surgery. You may chew nicotine gum up to 2 hours before surgery. A nicotine patch is okay.  ? Note: Some surgeries require you to completely quit smoking and nicotine. Check with your surgeon.  4. Your care team will make every effort to keep you safe from infection. We will:  ? Clean our hands often with soap and water (or an alcohol-based hand rub).  ? Clean the skin  at your surgery site with a special soap that kills germs.  ? Give you a special gown to keep you warm. (Cold raises the risk of infection.)  ? Wear special hair covers, masks, gowns and gloves during surgery.  ? Give antibiotic medicine, if prescribed. Not all surgeries need antibiotics.  What to bring on the day of surgery  1. Photo ID and insurance card  2. Copy of your health care directive, if you have one  3. Glasses and hearing aids (bring cases)  ? You can't wear contacts during surgery  4. Inhaler and eye drops, if you use them (tell us about these when you arrive)  5. CPAP machine or breathing device, if you use them  6. A few personal items, if spending the night  7. If you have . . .  ? A pacemaker, ICD (cardiac defibrillator) or other implant: Bring the ID card.  ? An implanted stimulator: Bring the remote control.  ? A legal guardian: Bring a copy of the certified (court-stamped) guardianship papers.  Please remove any jewelry, including body piercings. Leave jewelry and other valuables at home.  If you're going home the day of surgery    You must have a responsible adult drive you home. They should stay with you overnight as well.    If you don't have someone to stay with you, and you aren't safe to go home alone, we may keep you overnight. Insurance often won't pay for this.  After surgery  If it's hard to control your pain or you need more pain medicine, please call your surgeon's office.  Questions?   If you have any questions for your care team, list them here: _________________________________________________________________________________________________________________________________________________________________________ ____________________________________ ____________________________________ ____________________________________    For informational purposes only. Not to replace the advice of your health care provider. Copyright   2003, 2019 [facility]. All rights reserved.  Clinically reviewed by Hollie Enriquez MD. [employer] [id number] - REV .  Preparing for Your Surgery  Getting started  A nurse will call you to review your health history and instructions. They will give you an arrival time based on your scheduled surgery time. Please be ready to share:    Your doctor's clinic name and phone number    Your medical, surgical, and anesthesia history    A list of allergies and sensitivities    A list of medicines, including herbal treatments and over-the-counter drugs    Whether the patient has a legal guardian (ask how to send us the papers in advance)  Please tell us if you're pregnant--or if there's any chance you might be pregnant. Some surgeries may injure a fetus (unborn baby), so they require a pregnancy test. Surgeries that are safe for a fetus don't always need a test, and you can choose whether to have one.   If you have a child who's having surgery, please ask for a copy of Preparing for Your Child's Surgery.    Preparing for surgery    Within 10 to 30 days of surgery: Have a pre-op exam (sometimes called an H&P, or History and Physical). This can be done at a clinic or pre-operative center.  ? If you're having a , you may not need this exam. Talk to your care team.    At your pre-op exam, talk to your care team about all medicines you take. If you need to stop any medicines before surgery, ask when to start taking them again.  ? We do this for your safety. Many medicines can make you bleed too much during surgery. Some change how well surgery (anesthesia) drugs work.    Call your insurance company to let them know you're having surgery. (If you don't have insurance, call [phone].)    Call your clinic if there's any change in your health. This includes signs of a cold or flu (sore throat, runny nose, cough, rash, fever). It also includes a scrape or scratch near the surgery site.    If you have questions on the day of surgery, call your hospital or surgery  center.  Eating and drinking guidelines  For your safety: Unless your surgeon tells you otherwise, follow the guidelines below.    Eat and drink as usual until 8 hours before you arrive for surgery. After that, no food or milk.    Drink clear liquids until 2 hours before you arrive. These are liquids you can see through, like water, Gatorade, and Propel Water. They also include plain black coffee and tea (no cream or milk), candy, and breath mints. You can spit out gum when you arrive.    If you drink alcohol: Stop drinking it the night before surgery.    If your care team tells you to take medicine on the morning of surgery, it's okay to take it with a sip of water.  Preventing infection    Shower or bathe the night before and morning of your surgery. Follow the instructions your clinic gave you. (If no instructions, use regular soap.)    Don't shave or clip hair near your surgery site. We'll remove the hair if needed.    Don't smoke or vape the morning of surgery. You may chew nicotine gum up to 2 hours before surgery. A nicotine patch is okay.  ? Note: Some surgeries require you to completely quit smoking and nicotine. Check with your surgeon.    Your care team will make every effort to keep you safe from infection. We will:  ? Clean our hands often with soap and water (or an alcohol-based hand rub).  ? Clean the skin at your surgery site with a special soap that kills germs.  ? Give you a special gown to keep you warm. (Cold raises the risk of infection.)  ? Wear special hair covers, masks, gowns and gloves during surgery.  ? Give antibiotic medicine, if prescribed. Not all surgeries need antibiotics.  What to bring on the day of surgery    Photo ID and insurance card    Copy of your health care directive, if you have one    Glasses and hearing aids (bring cases)  ? You can't wear contacts during surgery    Inhaler and eye drops, if you use them (tell us about these when you arrive)    CPAP machine or breathing  device, if you use them    A few personal items, if spending the night    If you have . . .  ? A pacemaker, ICD (cardiac defibrillator) or other implant: Bring the ID card.  ? An implanted stimulator: Bring the remote control.  ? A legal guardian: Bring a copy of the certified (court-stamped) guardianship papers.  Please remove any jewelry, including body piercings. Leave jewelry and other valuables at home.  If you're going home the day of surgery    You must have a responsible adult drive you home. They should stay with you overnight as well.    If you don't have someone to stay with you, and you aren't safe to go home alone, we may keep you overnight. Insurance often won't pay for this.  After surgery  If it's hard to control your pain or you need more pain medicine, please call your surgeon's office.  Questions?   If you have any questions for your care team, list them here: _________________________________________________________________________________________________________________________________________________________________________ ____________________________________ ____________________________________ ____________________________________

## 2023-01-30 NOTE — TELEPHONE ENCOUNTER
Incoming call from pt:  Stated that pharmacy has not received prescription for   enoxaparin ANTICOAGULANT (LOVENOX) 40 MG/0.4ML syringe that was supposed to have been sent in this morning

## 2023-01-30 NOTE — TELEPHONE ENCOUNTER
Prior Authorization Approval    Authorization Effective Date: 12/30/2022  Authorization Expiration Date: 1/29/2024  Medication: ENOXAPARIN - APPROVED  Approved Dose/Quantity:    Reference #:     Insurance Company:    Expected CoPay:       CoPay Card Available:      Foundation Assistance Needed:    Which Pharmacy is filling the prescription (Not needed for infusion/clinic administered): [id number] [place] [id number] AT [facility] [address]  Pharmacy Notified: Yes  Patient Notified: Yes  **Instructed pharmacy to notify patient when script is ready to /ship.**

## 2023-01-30 NOTE — TELEPHONE ENCOUNTER
Called and spoke with Jennifer.     - sent RX for Enoxaparin to [facility] pharmacy.  They will call patient when ready to .     - prior authorization has been approved.

## 2023-01-30 NOTE — TELEPHONE ENCOUNTER
Anticoagulation follow-up    Updated lab results reviewed    Lab Results   Component Value Date    CR 0.94 01/30/2023    CR 0.84 12/13/2021     Estimated Creatinine Clearance: 47 mL/min (based on SCr of 0.94 mg/dL).    Recommendations:    CrCl remains 30-60 ml/min. No changes to hold plan needed    .Zoraida Dooley, [facility]

## 2023-01-30 NOTE — TELEPHONE ENCOUNTER
Entering new Enoxaparin RX 1/30/23.    (Called [facility], and it was reported RX was closed and cancelled on 1/26/23 by Dr. Woods).     - scheduled for colonoscopy on 2/10 and right foot surgery on 2/13/22.     - patient will be holding warfarin starting on 2/6-12.  Will need to bridge, as outlined by Zoraida [place], [facility].     - Prior auth approved for Enoxaparin on 1/25/23.     - reordered Enoxaparin and sent to [facility].

## 2023-01-31 LAB
ATRIAL RATE - MUSE: 54 BPM
DIASTOLIC BLOOD PRESSURE - MUSE: NORMAL MMHG
INTERPRETATION ECG - MUSE: NORMAL
P AXIS - MUSE: 81 DEGREES
PR INTERVAL - MUSE: 182 MS
QRS DURATION - MUSE: 76 MS
QT - MUSE: 414 MS
QTC - MUSE: 392 MS
R AXIS - MUSE: 12 DEGREES
SYSTOLIC BLOOD PRESSURE - MUSE: NORMAL MMHG
T AXIS - MUSE: -18 DEGREES
VENTRICULAR RATE- MUSE: 54 BPM

## 2023-01-31 NOTE — TELEPHONE ENCOUNTER
Called and left message with Jennifer,     - Called [facility] pharmacy and spoke with Ciara, she stated Lovenox injections are ready for  today.     - advised to call back with any problems.

## 2023-02-02 NOTE — TELEPHONE ENCOUNTER
Will you please reach out to the patient and help her with bridging if she is agreeable.  Thank you.  Rafaela Woods MD

## 2023-02-06 ENCOUNTER — TELEPHONE (OUTPATIENT)
Dept: PHARMACY | Facility: CLINIC | Age: 68
End: 2023-02-06
Payer: COMMERCIAL

## 2023-02-06 DIAGNOSIS — Z95.2 S/P AVR (AORTIC VALVE REPLACEMENT): ICD-10-CM

## 2023-02-06 DIAGNOSIS — Z79.01 LONG TERM CURRENT USE OF ANTICOAGULANT THERAPY: ICD-10-CM

## 2023-02-06 DIAGNOSIS — Z95.2 S/P AVR: Primary | ICD-10-CM

## 2023-02-06 NOTE — TELEPHONE ENCOUNTER
Forwarding to anticoagulation nurse and Zoraida.  It sounds like her colonoscopy was canceled and she will only be having her surgery on 2/13/2023.  If someone could contact the patient with updated instructions that would be great.

## 2023-02-06 NOTE — TELEPHONE ENCOUNTER
Rosemary,    Patient called in and indicated she needed to know how long she could be off Coumadin before her surgery this week. She indicated she tried asking her provider whom indicated pharmacist was working on this. She had decline our services prior to a referral as she indicated it was not needed for bridging. Please reach out to patient if needed or assist in directing as needed.    Thank you,    Kyara Capps [facility]

## 2023-02-07 ENCOUNTER — TELEPHONE (OUTPATIENT)
Dept: FAMILY MEDICINE | Facility: CLINIC | Age: 68
End: 2023-02-07
Payer: COMMERCIAL

## 2023-02-07 ENCOUNTER — TELEPHONE (OUTPATIENT)
Dept: PHARMACY | Facility: CLINIC | Age: 68
End: 2023-02-07
Payer: COMMERCIAL

## 2023-02-07 NOTE — TELEPHONE ENCOUNTER
"[facility]-PROCEDURAL ANTICOAGULATION  MANAGEMENT    ASSESSMENT     Warfarin interruption plan for Foot Surgery on 2/13.    Indication for Anticoagulation: Mechanical AVR and Stroke       Original plan 1/20/23 encounter; colonoscopy now cancelled      UPDATED PLAN       Pre-Procedure   ? Hold warfarin for 4 days, until after procedure starting: Thursday 2/9   ? Enoxaparin (Lovenox) 40 mg subq Q 12 hrs (0.75 mg/kg Q 12 hrs for CrCl 30-60 ml/min per [facility] P&T approved dose adjustment protocol)   - Start enoxaparin: Sat 2/11 AM  - Last dose of enoxaparin prior to procedure: Sunday 2/12 AM  (~24 hours prior to procedure)       Post-Procedure:  ? Resume warfarin dose if okay with provider doing procedure on night of procedure, Monday, 02/13/2023 PM: Take 10 mg x1, 6 mg x 1 then resume home dosing.  ? Resume enoxaparin (Lovenox) ~ 24-48 hrs post procedure when okay with provider doing procedure. Continue until INR >= 2.0  ? Recheck INR 5-7 days after resuming warfarin   ?   Zoraida Dooley, [facility]    SUBJECTIVE/OBJECTIVE     Alyssa Higginbotham, a 67 year old female    Goal INR Range: 2.0-3.0     Wt Readings from Last 3 Encounters:   01/30/23 51.3 kg (113 lb)   12/08/22 51.3 kg (113 lb)   11/28/22 53.1 kg (117 lb)      Ideal body weight: 50.1 kg (110 lb 7.2 oz)  Adjusted ideal body weight: 50.6 kg (111 lb 7.5 oz)     Estimated body mass index is 20.67 kg/m  as calculated from the following:    Height as of 1/30/23: 1.575 m (5' 2\").    Weight as of 1/30/23: 51.3 kg (113 lb).    Lab Results   Component Value Date    INR 2.4 (H) 01/24/2023    INR 3.1 (H) 01/10/2023    INR 2.9 (H) 12/27/2022     Lab Results   Component Value Date    HGB 13.8 01/30/2023    HCT 41.2 01/30/2023     01/30/2023     Lab Results   Component Value Date    CR 0.94 01/30/2023    CR 0.84 12/13/2021    CR 0.89 07/29/2020     Estimated Creatinine Clearance: 47 mL/min (based on SCr of 0.94 mg/dL).  "

## 2023-02-07 NOTE — TELEPHONE ENCOUNTER
Medication Question or Refill:     Patient just picked up her Lovenox from her pharmacy, but need instructions on how to use it.         What medication are you calling about: Lovenox injections, how to administer, and what days she goes on it, and what days to go off of it.       Preferred Pharmacy:N/A, has already picked up the Lovenox  [facility] [place] [address]  [address]  [place] [address]  Phone: [phone] Fax: [phone]      Controlled Substance Agreement on file:   CSA -- Patient Level:    CSA: None found at the patient level.       Who prescribed the medication?: Rafaela Nava    Do you need a refill? No  When did you use the medication last? Has not used it yet. Just picked it up and needs instructions    Patient offered an appointment? No    Do you have any questions or concerns?  Pharmacy told her to call to get instructions on how to use.      Could we send this information to you in [employer] or would you prefer to receive a phone call?:   Call Back before Thursday. So ASAP if possible. Thank you!

## 2023-02-07 NOTE — TELEPHONE ENCOUNTER
Rosemary,    Patient called in and indicated she needed a teaching for her Lovenox, I suggested scheduling an appointment this Friday which she declined. There was an encounter from you to anticoagulation team to reach out to patient which she indicated no one has called her.     Are you able to assist in clarifying if teaching will be completed by [facility] or anticoagulation team? Not sure if there is teaching video that can be provided to patient as she indicated she is suppose to start tomorrow. She would like someone to please reach out to her immediately as she needs this information at the soonest for medication start prior to surgery next week.    Thank you,    Kyara Capps, [facility]

## 2023-02-07 NOTE — TELEPHONE ENCOUNTER
Jennifer called again. She would like instructions on when to start the Lovenox for her podiatry procedure on Monday (her colonoscopy was cancelled). She did talk to podiatry and was told to hold for 4 days (I.e. last dose on 2/8, hold starting 2/9)    Could someone call her with an updated calender? She is mainly wanting to know when to start the Lovenox before and after the procedure.     I did review how to inject Lovenox/disposal over the phone -- she declined an in person appointment.

## 2023-02-08 NOTE — TELEPHONE ENCOUNTER
Talked with Jennifer, I will call her tomorrow at 10 to fully discuss bridge, and she is happy with this plan

## 2023-02-08 NOTE — TELEPHONE ENCOUNTER
Talked with Jennifer and reviewed lovenox education including rationale, injection procedure, timing and location for injections and bridge plan as below. We scheduled inr for 2/17.   She is comfortable with the plan and will call if questions arise.

## 2023-02-10 ENCOUNTER — ANESTHESIA EVENT (OUTPATIENT)
Dept: SURGERY | Facility: AMBULATORY SURGERY CENTER | Age: 68
End: 2023-02-10
Payer: COMMERCIAL

## 2023-02-11 DIAGNOSIS — I10 ESSENTIAL HYPERTENSION, MALIGNANT: ICD-10-CM

## 2023-02-11 DIAGNOSIS — E04.1 THYROID NODULE: ICD-10-CM

## 2023-02-13 ENCOUNTER — ANESTHESIA (OUTPATIENT)
Dept: SURGERY | Facility: AMBULATORY SURGERY CENTER | Age: 68
End: 2023-02-13
Payer: COMMERCIAL

## 2023-02-13 ENCOUNTER — HOSPITAL ENCOUNTER (OUTPATIENT)
Facility: AMBULATORY SURGERY CENTER | Age: 68
Discharge: HOME | End: 2023-02-13
Attending: PODIATRIST
Payer: COMMERCIAL

## 2023-02-13 VITALS
OXYGEN SATURATION: 97 % | RESPIRATION RATE: 16 BRPM | TEMPERATURE: 97.1 F | SYSTOLIC BLOOD PRESSURE: 142 MMHG | HEART RATE: 79 BPM | DIASTOLIC BLOOD PRESSURE: 80 MMHG

## 2023-02-13 DIAGNOSIS — M20.5X1 HALLUX LIMITUS, RIGHT: ICD-10-CM

## 2023-02-13 PROCEDURE — 28291 CORRJ HALUX RIGDUS W/IMPLT: CPT | Mod: RT | Performed by: PODIATRIST

## 2023-02-13 DEVICE — IMPLANTABLE DEVICE: Type: IMPLANTABLE DEVICE | Site: TOE | Status: FUNCTIONAL

## 2023-02-13 RX ORDER — ACETAMINOPHEN 325 MG/1
975 TABLET ORAL ONCE
Status: DISCONTINUED | OUTPATIENT
Start: 2023-02-13 | End: 2023-02-14 | Stop reason: HOSPADM

## 2023-02-13 RX ORDER — CEFAZOLIN SODIUM 2 G/100ML
2 INJECTION, SOLUTION INTRAVENOUS
Status: COMPLETED | OUTPATIENT
Start: 2023-02-13 | End: 2023-02-13

## 2023-02-13 RX ORDER — LEVOTHYROXINE SODIUM 75 UG/1
TABLET ORAL
Qty: 52 TABLET | Refills: 3 | Status: SHIPPED | OUTPATIENT
Start: 2023-02-13 | End: 2024-03-02

## 2023-02-13 RX ORDER — CLINDAMYCIN HCL 300 MG
300 CAPSULE ORAL 4 TIMES DAILY
Qty: 40 CAPSULE | Refills: 0 | Status: SHIPPED | OUTPATIENT
Start: 2023-02-13 | End: 2023-02-23

## 2023-02-13 RX ORDER — ONDANSETRON 2 MG/ML
4 INJECTION INTRAMUSCULAR; INTRAVENOUS EVERY 30 MIN PRN
Status: DISCONTINUED | OUTPATIENT
Start: 2023-02-13 | End: 2023-02-14 | Stop reason: HOSPADM

## 2023-02-13 RX ORDER — SODIUM CHLORIDE, SODIUM LACTATE, POTASSIUM CHLORIDE, CALCIUM CHLORIDE 600; 310; 30; 20 MG/100ML; MG/100ML; MG/100ML; MG/100ML
INJECTION, SOLUTION INTRAVENOUS CONTINUOUS
Status: DISCONTINUED | OUTPATIENT
Start: 2023-02-13 | End: 2023-02-14 | Stop reason: HOSPADM

## 2023-02-13 RX ORDER — FENTANYL CITRATE 0.05 MG/ML
25 INJECTION, SOLUTION INTRAMUSCULAR; INTRAVENOUS EVERY 5 MIN PRN
Status: DISCONTINUED | OUTPATIENT
Start: 2023-02-13 | End: 2023-02-14 | Stop reason: HOSPADM

## 2023-02-13 RX ORDER — ONDANSETRON 4 MG/1
4 TABLET, ORALLY DISINTEGRATING ORAL EVERY 30 MIN PRN
Status: DISCONTINUED | OUTPATIENT
Start: 2023-02-13 | End: 2023-02-14 | Stop reason: HOSPADM

## 2023-02-13 RX ORDER — HYDROMORPHONE HCL IN WATER/PF 6 MG/30 ML
0.4 PATIENT CONTROLLED ANALGESIA SYRINGE INTRAVENOUS EVERY 5 MIN PRN
Status: DISCONTINUED | OUTPATIENT
Start: 2023-02-13 | End: 2023-02-14 | Stop reason: HOSPADM

## 2023-02-13 RX ORDER — OXYCODONE HYDROCHLORIDE 5 MG/1
5 TABLET ORAL EVERY 4 HOURS PRN
Status: DISCONTINUED | OUTPATIENT
Start: 2023-02-13 | End: 2023-02-14 | Stop reason: HOSPADM

## 2023-02-13 RX ORDER — FENTANYL CITRATE 0.05 MG/ML
25 INJECTION, SOLUTION INTRAMUSCULAR; INTRAVENOUS
Status: DISCONTINUED | OUTPATIENT
Start: 2023-02-13 | End: 2023-02-14 | Stop reason: HOSPADM

## 2023-02-13 RX ORDER — METOPROLOL SUCCINATE 200 MG/1
TABLET, EXTENDED RELEASE ORAL
Qty: 90 TABLET | Refills: 3 | Status: SHIPPED | OUTPATIENT
Start: 2023-02-13 | End: 2024-01-19

## 2023-02-13 RX ORDER — OXYCODONE HYDROCHLORIDE 10 MG/1
10 TABLET ORAL EVERY 4 HOURS PRN
Status: DISCONTINUED | OUTPATIENT
Start: 2023-02-13 | End: 2023-02-14 | Stop reason: HOSPADM

## 2023-02-13 RX ORDER — PROPOFOL 10 MG/ML
INJECTION, EMULSION INTRAVENOUS CONTINUOUS PRN
Status: DISCONTINUED | OUTPATIENT
Start: 2023-02-13 | End: 2023-02-13

## 2023-02-13 RX ORDER — HYDROCODONE BITARTRATE AND ACETAMINOPHEN 5; 325 MG/1; MG/1
1 TABLET ORAL EVERY 6 HOURS PRN
Qty: 18 TABLET | Refills: 0 | Status: SHIPPED | OUTPATIENT
Start: 2023-02-13 | End: 2023-02-16

## 2023-02-13 RX ORDER — LIDOCAINE 40 MG/G
CREAM TOPICAL
Status: DISCONTINUED | OUTPATIENT
Start: 2023-02-13 | End: 2023-02-14 | Stop reason: HOSPADM

## 2023-02-13 RX ORDER — ONDANSETRON 2 MG/ML
INJECTION INTRAMUSCULAR; INTRAVENOUS PRN
Status: DISCONTINUED | OUTPATIENT
Start: 2023-02-13 | End: 2023-02-13

## 2023-02-13 RX ORDER — LEVOTHYROXINE SODIUM 88 UG/1
TABLET ORAL
Qty: 39 TABLET | Refills: 3 | Status: SHIPPED | OUTPATIENT
Start: 2023-02-13 | End: 2024-03-02

## 2023-02-13 RX ORDER — LIDOCAINE HYDROCHLORIDE 10 MG/ML
INJECTION, SOLUTION INFILTRATION; PERINEURAL PRN
Status: DISCONTINUED | OUTPATIENT
Start: 2023-02-13 | End: 2023-02-13

## 2023-02-13 RX ORDER — FENTANYL CITRATE 50 UG/ML
INJECTION, SOLUTION INTRAMUSCULAR; INTRAVENOUS PRN
Status: DISCONTINUED | OUTPATIENT
Start: 2023-02-13 | End: 2023-02-13

## 2023-02-13 RX ORDER — ACETAMINOPHEN 325 MG/1
975 TABLET ORAL ONCE
Status: COMPLETED | OUTPATIENT
Start: 2023-02-13 | End: 2023-02-13

## 2023-02-13 RX ORDER — GLYCOPYRROLATE 0.2 MG/ML
INJECTION, SOLUTION INTRAMUSCULAR; INTRAVENOUS PRN
Status: DISCONTINUED | OUTPATIENT
Start: 2023-02-13 | End: 2023-02-13

## 2023-02-13 RX ORDER — DEXAMETHASONE SODIUM PHOSPHATE 4 MG/ML
INJECTION, SOLUTION INTRA-ARTICULAR; INTRALESIONAL; INTRAMUSCULAR; INTRAVENOUS; SOFT TISSUE PRN
Status: DISCONTINUED | OUTPATIENT
Start: 2023-02-13 | End: 2023-02-13

## 2023-02-13 RX ORDER — FENTANYL CITRATE 0.05 MG/ML
50 INJECTION, SOLUTION INTRAMUSCULAR; INTRAVENOUS EVERY 5 MIN PRN
Status: DISCONTINUED | OUTPATIENT
Start: 2023-02-13 | End: 2023-02-14 | Stop reason: HOSPADM

## 2023-02-13 RX ORDER — HYDROMORPHONE HCL IN WATER/PF 6 MG/30 ML
0.2 PATIENT CONTROLLED ANALGESIA SYRINGE INTRAVENOUS EVERY 5 MIN PRN
Status: DISCONTINUED | OUTPATIENT
Start: 2023-02-13 | End: 2023-02-14 | Stop reason: HOSPADM

## 2023-02-13 RX ORDER — PROPOFOL 10 MG/ML
INJECTION, EMULSION INTRAVENOUS PRN
Status: DISCONTINUED | OUTPATIENT
Start: 2023-02-13 | End: 2023-02-13

## 2023-02-13 RX ADMIN — DEXAMETHASONE SODIUM PHOSPHATE 4 MG: 4 INJECTION, SOLUTION INTRA-ARTICULAR; INTRALESIONAL; INTRAMUSCULAR; INTRAVENOUS; SOFT TISSUE at 07:15

## 2023-02-13 RX ADMIN — FENTANYL CITRATE 25 MCG: 50 INJECTION, SOLUTION INTRAMUSCULAR; INTRAVENOUS at 07:02

## 2023-02-13 RX ADMIN — PROPOFOL 200 MCG/KG/MIN: 10 INJECTION, EMULSION INTRAVENOUS at 07:00

## 2023-02-13 RX ADMIN — PROPOFOL 20 MG: 10 INJECTION, EMULSION INTRAVENOUS at 07:02

## 2023-02-13 RX ADMIN — FENTANYL CITRATE 50 MCG: 50 INJECTION, SOLUTION INTRAMUSCULAR; INTRAVENOUS at 07:00

## 2023-02-13 RX ADMIN — ACETAMINOPHEN 975 MG: 325 TABLET ORAL at 06:22

## 2023-02-13 RX ADMIN — CEFAZOLIN SODIUM 2 G: 2 INJECTION, SOLUTION INTRAVENOUS at 06:57

## 2023-02-13 RX ADMIN — SODIUM CHLORIDE, SODIUM LACTATE, POTASSIUM CHLORIDE, CALCIUM CHLORIDE: 600; 310; 30; 20 INJECTION, SOLUTION INTRAVENOUS at 06:35

## 2023-02-13 RX ADMIN — GLYCOPYRROLATE 0.2 MG: 0.2 INJECTION, SOLUTION INTRAMUSCULAR; INTRAVENOUS at 07:00

## 2023-02-13 RX ADMIN — ONDANSETRON 4 MG: 2 INJECTION INTRAMUSCULAR; INTRAVENOUS at 07:15

## 2023-02-13 RX ADMIN — LIDOCAINE HYDROCHLORIDE 3 ML: 10 INJECTION, SOLUTION INFILTRATION; PERINEURAL at 07:00

## 2023-02-13 NOTE — INTERVAL H&P NOTE
I have reviewed the surgical (or preoperative) H&P that is linked to this encounter, and examined the patient. There are no significant changes    Clinical Conditions Present on Arrival:  Clinically Significant Risk Factors Present on Admission            # Hypercalcemia: Highest Ca = 10.7 mg/dL in last 30 days, will monitor as appropriate       # Drug Induced Coagulation Defect: home medication list includes an anticoagulant medication

## 2023-02-13 NOTE — ANESTHESIA POSTPROCEDURE EVALUATION
Patient: Alyssa Higginbotham    Procedure: Procedure(s):  First metatarsal phalangeal joint implant arthroplasty right foot       Anesthesia Type:  MAC    Note:  Disposition: Outpatient   Postop Pain Control: Uneventful            Sign Out: Well controlled pain   PONV: No   Neuro/Psych: Uneventful            Sign Out: Acceptable/Baseline neuro status   Airway/Respiratory: Uneventful            Sign Out: Acceptable/Baseline resp. status   CV/Hemodynamics: Uneventful            Sign Out: Acceptable CV status; No obvious hypovolemia; No obvious fluid overload   Other NRE: NONE   DID A NON-ROUTINE EVENT OCCUR? No           Last vitals:  Vitals Value Taken Time   /73 02/13/23 0801   Temp 97.1  F (36.2  C) 02/13/23 0755   Pulse 72 02/13/23 0812   Resp 16 02/13/23 0800   SpO2 99 % 02/13/23 0812   Vitals shown include unvalidated device data.    Electronically Signed By: Rik Vega MD  February 13, 2023  8:15 AM

## 2023-02-13 NOTE — TELEPHONE ENCOUNTER
"Routing refill request to provider for review/approval because:  Labs out of range:  Tsh, bp    Last Written Prescription Date:  2/1/22  Last Fill Quantity: 52,  # refills: 3   Last office visit provider:  1/30/23     Requested Prescriptions   Pending Prescriptions Disp Refills     metoprolol succinate ER (TOPROL XL) 200 MG 24 hr tablet [Pharmacy Med Name: METOPROLOL SUCCINATE ER TABS 200MG] 90 tablet 3     Sig: TAKE 1 TABLET DAILY       Beta-Blockers Protocol Passed - 2/11/2023  9:39 AM        Passed - Blood pressure under 140/90 in past 12 months     BP Readings from Last 3 Encounters:   02/13/23 (!) 142/80   01/30/23 110/72   11/28/22 (!) 171/92                 Passed - Patient is age 6 or older        Passed - Recent (12 mo) or future (30 days) visit within the authorizing provider's specialty     Patient has had an office visit with the authorizing provider or a provider within the authorizing providers department within the previous 12 mos or has a future within next 30 days. See \"Patient Info\" tab in inbasket, or \"Choose Columns\" in Meds & Orders section of the refill encounter.              Passed - Medication is active on med list           levothyroxine (SYNTHROID/LEVOTHROID) 75 MCG tablet [Pharmacy Med Name: L-THYROXINE (SYNTHROID) TABS 75MCG] 52 tablet 3     Sig: TAKE 1 TABLET ON WEDNESDAY, THURSDAY, SATURDAY AND SUNDAY       Thyroid Protocol Failed - 2/11/2023  9:39 AM        Failed - Normal TSH on file in past 12 months     Recent Labs   Lab Test 12/13/21  0940   TSH 4.77              Passed - Patient is 12 years or older        Passed - Recent (12 mo) or future (30 days) visit within the authorizing provider's specialty     Patient has had an office visit with the authorizing provider or a provider within the authorizing providers department within the previous 12 mos or has a future within next 30 days. See \"Patient Info\" tab in inbasket, or \"Choose Columns\" in Meds & Orders section of the refill " "encounter.              Passed - Medication is active on med list        Passed - No active pregnancy on record     If patient is pregnant or has had a positive pregnancy test, please check TSH.          Passed - No positive pregnancy test in past 12 months     If patient is pregnant or has had a positive pregnancy test, please check TSH.             levothyroxine (SYNTHROID/LEVOTHROID) 88 MCG tablet [Pharmacy Med Name: L-THYROXINE (SYNTHROID) TABS 88MCG] 39 tablet 3     Sig: TAKE 1 TABLET ON MONDAY, TUESDAY AND FRIDAY       Thyroid Protocol Failed - 2/11/2023  9:39 AM        Failed - Normal TSH on file in past 12 months     Recent Labs   Lab Test 12/13/21  0940   TSH 4.77              Passed - Patient is 12 years or older        Passed - Recent (12 mo) or future (30 days) visit within the authorizing provider's specialty     Patient has had an office visit with the authorizing provider or a provider within the authorizing providers department within the previous 12 mos or has a future within next 30 days. See \"Patient Info\" tab in inbasket, or \"Choose Columns\" in Meds & Orders section of the refill encounter.              Passed - Medication is active on med list        Passed - No active pregnancy on record     If patient is pregnant or has had a positive pregnancy test, please check TSH.          Passed - No positive pregnancy test in past 12 months     If patient is pregnant or has had a positive pregnancy test, please check TSH.               Lissette Benjamin RN 02/13/23 9:29 AM  "

## 2023-02-13 NOTE — ANESTHESIA PREPROCEDURE EVALUATION
Anesthesia Pre-Procedure Evaluation    Patient: Alyssa Higginbotham   MRN: [id number] : 1955        Procedure : Procedure(s):  First metatarsal phalangeal joint implant arthroplasty right foot          Past Medical History:   Diagnosis Date     Accidental fall 2015     Antiplatelet or antithrombotic long-term use      Anxiety      Aortic dissection (H)      Asthma      Benign meningioma of brain (H) 2015    initially seen on CT of head that was obtained after a fall. Frontal lobe, confirmed on an MRI Mar 2015, 3 mm     Benign neoplasm of colon      Bunion      Chronic headache      Colon polyp      Depression      Heart murmur      Hepatitis C      Hyperlipidemia      Hypertension      Hypothyroid      Irregular heart rate      Nasal fracture 2015    fell face first on sideache     Osteoporosis      Other acquired deformity of toe      Stroke (H)     on CT scan      Past Surgical History:   Procedure Laterality Date     AORTIC VALVE REPLACEMENT        BIOPSY BREAST Left     benign     COLONOSCOPY       REPAIR THORACIC AORTA         Allergies   Allergen Reactions     Codeine Other (See Comments)     Faints     Demerol [Meperidine] Other (See Comments)     Reaction not reported by patient., Patient states she felt awful.      Social History     Tobacco Use     Smoking status: Never     Smokeless tobacco: Never   Substance Use Topics     Alcohol use: No      Wt Readings from Last 1 Encounters:   23 51.3 kg (113 lb)        Anesthesia Evaluation   Pt has had prior anesthetic. Type: General.    No history of anesthetic complications       ROS/MED HX  ENT/Pulmonary:  - neg pulmonary ROS     Neurologic: Comment: Incidental meningioma - neg neurologic ROS   (+) migraines,     Cardiovascular: Comment:  - aortic dissection (related to her marfan syndrome), s/p repair with AVR. Doing well, good exercise tolerance without symptoms - neg cardiovascular ROS   (+) Dyslipidemia  hypertension-----Taking blood thinners Pt has received instructions: Instructions Given to patient: holding coumadin, bridging with lovenox (last dose yesterday AM).     METS/Exercise Tolerance: >4 METS    Hematologic:  - neg hematologic  ROS     Musculoskeletal:  - neg musculoskeletal ROS     GI/Hepatic:  - neg GI/hepatic ROS   (+) hepatitis resolved hepatitis type C,     Renal/Genitourinary:  - neg Renal ROS     Endo:  - neg endo ROS   (+) thyroid problem, hypothyroidism,     Psychiatric/Substance Use:  - neg psychiatric ROS   (+) psychiatric history anxiety and depression     Infectious Disease:  - neg infectious disease ROS     Malignancy:  - neg malignancy ROS     Other:  - neg other ROS          Physical Exam    Airway        Mallampati: II   TM distance: > 3 FB   Neck ROM: full   Mouth opening: > 3 cm    Respiratory Devices and Support         Dental         B=Bridge, C=Chipped, L=Loose, M=Missing    Cardiovascular          Rhythm and rate: regular and normal   (+) murmur       Pulmonary   pulmonary exam normal        breath sounds clear to auscultation           OUTSIDE LABS:  CBC:   Lab Results   Component Value Date    WBC 4.7 01/30/2023    WBC 4.0 12/13/2021    HGB 13.8 01/30/2023    HGB 13.1 12/13/2021    HCT 41.2 01/30/2023    HCT 40.5 12/13/2021     01/30/2023     12/13/2021     BMP:   Lab Results   Component Value Date     01/30/2023     12/13/2021    POTASSIUM 3.8 01/30/2023    POTASSIUM 3.7 12/13/2021    CHLORIDE 98 01/30/2023    CHLORIDE 98 12/13/2021    CO2 27 01/30/2023    CO2 27 12/13/2021    BUN 18.0 01/30/2023    BUN 19 12/13/2021    CR 0.94 01/30/2023    CR 0.84 12/13/2021    GLC 96 01/30/2023    GLC 89 12/13/2021     COAGS:   Lab Results   Component Value Date    INR 2.4 (H) 01/24/2023     POC: No results found for: BGM, HCG, HCGS  HEPATIC:   Lab Results   Component Value Date    ALBUMIN 4.3 12/13/2021    PROTTOTAL 7.0 12/13/2021    ALT 21 12/13/2021    AST 28  12/13/2021    ALKPHOS 62 12/13/2021    BILITOTAL 0.5 12/13/2021     OTHER:   Lab Results   Component Value Date    AMI 10.7 (H) 01/30/2023    MAG 1.7 (L) 01/07/2020    TSH 4.77 12/13/2021       Anesthesia Plan    ASA Status:  3   NPO Status:  NPO Appropriate    Anesthesia Type: MAC.     - Reason for MAC: immobility needed, straight local not clinically adequate   Induction: Propofol.   Maintenance: TIVA.        Consents    Anesthesia Plan(s) and associated risks, benefits, and realistic alternatives discussed. Questions answered and patient/representative(s) expressed understanding.    - Discussed:     - Discussed with:  Patient         Postoperative Care    Pain management: Multi-modal analgesia.   PONV prophylaxis: Ondansetron (or other 5HT-3), Dexamethasone or Solumedrol     Comments:    Other Comments: Toradol if OK with surgeon    Reviewed anesthetic options and risks. Patient agrees to proceed.             Rik Vega MD

## 2023-02-13 NOTE — ANESTHESIA CARE TRANSFER NOTE
Patient: Alyssa Higginbotham    Procedure: Procedure(s):  First metatarsal phalangeal joint implant arthroplasty right foot       Diagnosis: Hallux limitus, right [M20.5X1]  Diagnosis Additional Information: No value filed.    Anesthesia Type:   MAC     Note:    Oropharynx: oropharynx clear of all foreign objects  Level of Consciousness: awake  Oxygen Supplementation: room air    Independent Airway: airway patency satisfactory and stable  Dentition: dentition unchanged  Vital Signs Stable: post-procedure vital signs reviewed and stable  Report to RN Given: handoff report given  Patient transferred to: Phase II    Handoff Report: Identifed the Patient, Identified the Reponsible Provider, Reviewed the pertinent medical history, Discussed the surgical course, Reviewed Intra-OP anesthesia mangement and issues during anesthesia, Set expectations for post-procedure period and Allowed opportunity for questions and acknowledgement of understanding      Vitals:  Vitals Value Taken Time   /67 02/13/23 0755   Temp 36.2  C (97.1  F) 02/13/23 0755   Pulse 81 02/13/23 0755   Resp 16 02/13/23 0755   SpO2 97 % 02/13/23 0755       Electronically Signed By: BETSY Oquendo CRNA  February 13, 2023  7:56 AM

## 2023-02-13 NOTE — OP NOTE
Date of surgery: 2/13/2023    Surgeon: Chin Riojas D.P.M.    Preoperative diagnosis: Hallux limitus right foot    Postoperative diagnosis: Same    Procedure: First metatarsal phalangeal joint implant arthroplasty right foot    Anesthesia: Mac    Hemostasis: Pneumatic ankle tourniquet 250 mmHg    Blood loss: None    Specimens: None    Complications: None    Prescription: The patient was taken to the operating room and placed on the table in the supine position.  Under local anesthesia of 0.5% Marcaine plain and 2% Xylocaine plain in a 1:1 mixture along with IV sedation the right foot was prepped and draped in usual aseptic fashion. Following exsanguination of the right foot with a Colin's bandage the tourniquet was inflated and the final procedure was performed.    Attention was directed to the dorsal aspect of the right foot where a dorsal linear longitudinal skin incision was made approximately 4.0 cm in length. This incision was placed medial to the extensor hallucis longus tendon. The incision was then deepened via blunt and sharp dissection with care being taken to identify and retract all vital structures.  Next a periosteal incision was made the entire length of the original skin incision and the periosteum was reflected medially and laterally.  Next a dorsal capsulotomy was performed at the first metatarsal phalangeal joint.  This was followed by resection of the medial collateral ligaments from the attachment at the head of the first metatarsal and the head was delivered into the surgical site.  Next utilizing a sagittal saw the hypertrophic bone noted at the medial and dorsal aspect of the first metatarsal was resected. Next an osteotomy was performed at the head of the first metatarsal. This osteotomy was perpendicular to the long axis of the first metatarsal and the effective articular cartilage of the head of the first metatarsal was resected.  Next utilizing an oscillating saw an osteotomy was  performed at the base of the proximal phalanx.  This osteotomy was also perpendicular to the long axis of the proximal phalanx and the base of the proximal phalanx was removed.  The wound was then flushed with copious amounts of Kantrex solution.  Next the joint was measured to accept the appropriate sized  [id number] implant.  Utilizing the size it was determined very #3 implant would be utilized.  Next with the appropriate instrumentation the medullary canals of the first metatarsal and the proximal phalanx was reamed to accept the stems of the implant.  The wound was once again flushed with copious amounts of cancer solution.  The trial implant was placed within the joint space.  The joint was placed through normal range of motion and this was deemed to be satisfactory.  The trial implant was then removed and the permanent #3 implant was placed within the joint space.  The joint was once again placed through with normal range of motion and this was deemed to be satisfactory.  The wound was once again flushed with copious amounts of Kantrex solution.  Deep closure was then accomplished utilizing 2-0 and 3-0 Monocryl suture material.  Skin closure was accomplished utilizing 4-0 nylon suture material.  A sterile dressing was applied to the right foot comprising of Betadine ointment,adaptic,4 x 4 gauze, three-inch Peace and Coban.  The tourniquet was then deflated and normal color returned to all the digits of the right foot.  The patient appeared to tolerate the procedure and anesthesia well and was transported from the operating room to the recovery room with vital signs stable and neurovascular status intact.

## 2023-02-16 ENCOUNTER — TELEPHONE (OUTPATIENT)
Dept: PODIATRY | Facility: CLINIC | Age: 68
End: 2023-02-16
Payer: COMMERCIAL

## 2023-02-16 NOTE — TELEPHONE ENCOUNTER
[facility] Call Center    Phone Message    May a detailed message be left on voicemail: yes     Reason for Call: Other: Patient is requesting a call back needing to know what to do in the mean time since her appointment on 02/20 has been cancelled.     Action Taken: [facility]    Travel Screening: Not Applicable

## 2023-02-16 NOTE — TELEPHONE ENCOUNTER
Spoke to patient.  Keep dressing intact and the surgical site dry until the next appointment.  Patient states understanding.

## 2023-02-17 ENCOUNTER — LAB (OUTPATIENT)
Dept: LAB | Facility: CLINIC | Age: 68
End: 2023-02-17
Payer: COMMERCIAL

## 2023-02-17 ENCOUNTER — ANTICOAGULATION THERAPY VISIT (OUTPATIENT)
Dept: ANTICOAGULATION | Facility: CLINIC | Age: 68
End: 2023-02-17

## 2023-02-17 DIAGNOSIS — Z79.01 LONG TERM CURRENT USE OF ANTICOAGULANT THERAPY: ICD-10-CM

## 2023-02-17 DIAGNOSIS — Z95.2 S/P AVR (AORTIC VALVE REPLACEMENT): ICD-10-CM

## 2023-02-17 DIAGNOSIS — Z95.2 S/P AVR: Primary | ICD-10-CM

## 2023-02-17 LAB — INR BLD: 1.6 (ref 0.9–1.1)

## 2023-02-17 PROCEDURE — 36416 COLLJ CAPILLARY BLOOD SPEC: CPT

## 2023-02-17 PROCEDURE — 85610 PROTHROMBIN TIME: CPT

## 2023-02-17 NOTE — PROGRESS NOTES
ANTICOAGULATION MANAGEMENT     Alyssa Higginbotham 67 year old female is on warfarin with subtherapeutic INR result. (Goal INR 2.0-3.0)    Recent labs: (last 7 days)     02/17/23  0753   INR 1.6*       ASSESSMENT       Source(s): Chart Review and Patient/Caregiver Call       Warfarin doses taken: Warfarin taken as instructed    Warfarin resumed 2/14/23   Did she take 2 day booster dose with 10mg and 6mg, as instructed.    Diet: No new diet changes identified    New illness, injury, or hospitalization: Yes:    S/p 1st metatarsal phalangeal joint implant arthroplasty, right foot on 2/13/23.    Medication/supplement changes: Yes.    Bridging with Enoxaparin 40mg q12hrs, till INR is >=2.0   Clindamycin 300mg 4x/day for 10 days, from 2/13-23/23.   Hydrocodone-Acet. 5/325mg 1 tab q6hrs PRN for pain.    Signs or symptoms of bleeding or clotting: No    Previous INR: Therapeutic last visit at 2.4; previously outside of goal range at 3.1    Additional findings: None       PLAN     Recommended plan for temporary change(s) affecting INR     Dosing Instructions:   - continue Enoxaparin injections q12hrs until INR is >=2.0   - Continue your current warfarin dose with next INR in 3-4 days       Summary  As of 2/17/2023    Full warfarin instructions:  6 mg every Mon, Wed, Fri; 5 mg all other days   Next INR check:  2/21/2023             Telephone call with  Jennifer ([phone]) who verbalizes understanding and agrees to plan    Lab visit scheduled - [facility]21/23 [facility]    Education provided:     Taking warfarin: Importance of taking warfarin as instructed    Goal range and lab monitoring: goal range and significance of current result    Interaction IS anticipated between warfarin and Clindamycin and Hydrocodone-Acetaminophen    Plan made per [facility] anticoagulation protocol    Aylin Pablo, RN  Anticoagulation Clinic  2/17/2023    _______________________________________________________________________     Anticoagulation Episode  Summary     Current INR goal:  2.0-3.0   TTR:  63.6 % (1 y)   Target end date:  Indefinite   Send INR reminders to:  CULLEN GALVEZ    Indications    S/P AVR [Z95.2]  S/P AVR (aortic valve replacement) [Z95.2]  Long term current use of anticoagulant therapy [Z79.01]           Comments:  Goal range changed 2/20/19 per cardiology         Anticoagulation Care Providers     Provider Role Specialty Phone number    Rafaela Woods MD Referring [facility] [phone]

## 2023-02-19 ENCOUNTER — HOSPITAL ENCOUNTER (EMERGENCY)
Facility: HOSPITAL | Age: 68
Discharge: HOME | End: 2023-02-19
Attending: EMERGENCY MEDICINE | Admitting: EMERGENCY MEDICINE
Payer: COMMERCIAL

## 2023-02-19 ENCOUNTER — NURSE TRIAGE (OUTPATIENT)
Dept: NURSING | Facility: CLINIC | Age: 68
End: 2023-02-19
Payer: COMMERCIAL

## 2023-02-19 VITALS
TEMPERATURE: 97.7 F | SYSTOLIC BLOOD PRESSURE: 180 MMHG | HEIGHT: 61 IN | RESPIRATION RATE: 16 BRPM | DIASTOLIC BLOOD PRESSURE: 82 MMHG | HEART RATE: 85 BPM | OXYGEN SATURATION: 97 % | BODY MASS INDEX: 21.34 KG/M2 | WEIGHT: 113 LBS

## 2023-02-19 DIAGNOSIS — R79.1 SUBTHERAPEUTIC INTERNATIONAL NORMALIZED RATIO (INR): ICD-10-CM

## 2023-02-19 DIAGNOSIS — Z79.01 ON WARFARIN AT HOME: ICD-10-CM

## 2023-02-19 LAB
BASOPHILS # BLD AUTO: 0 10E3/UL (ref 0–0.2)
BASOPHILS NFR BLD AUTO: 0 %
EOSINOPHIL # BLD AUTO: 0 10E3/UL (ref 0–0.7)
EOSINOPHIL NFR BLD AUTO: 0 %
ERYTHROCYTE [DISTWIDTH] IN BLOOD BY AUTOMATED COUNT: 13.2 % (ref 10–15)
HCT VFR BLD AUTO: 40.7 % (ref 35–47)
HGB BLD-MCNC: 13.4 G/DL (ref 11.7–15.7)
IMM GRANULOCYTES # BLD: 0 10E3/UL
IMM GRANULOCYTES NFR BLD: 0 %
INR PPP: 1.74 (ref 0.85–1.15)
LYMPHOCYTES # BLD AUTO: 0.7 10E3/UL (ref 0.8–5.3)
LYMPHOCYTES NFR BLD AUTO: 12 %
MCH RBC QN AUTO: 26.9 PG (ref 26.5–33)
MCHC RBC AUTO-ENTMCNC: 32.9 G/DL (ref 31.5–36.5)
MCV RBC AUTO: 82 FL (ref 78–100)
MONOCYTES # BLD AUTO: 0.4 10E3/UL (ref 0–1.3)
MONOCYTES NFR BLD AUTO: 8 %
NEUTROPHILS # BLD AUTO: 4.4 10E3/UL (ref 1.6–8.3)
NEUTROPHILS NFR BLD AUTO: 80 %
NRBC # BLD AUTO: 0 10E3/UL
NRBC BLD AUTO-RTO: 0 /100
PLATELET # BLD AUTO: 227 10E3/UL (ref 150–450)
RBC # BLD AUTO: 4.99 10E6/UL (ref 3.8–5.2)
WBC # BLD AUTO: 5.6 10E3/UL (ref 4–11)

## 2023-02-19 PROCEDURE — 36415 COLL VENOUS BLD VENIPUNCTURE: CPT | Performed by: EMERGENCY MEDICINE

## 2023-02-19 PROCEDURE — 99283 EMERGENCY DEPT VISIT LOW MDM: CPT

## 2023-02-19 PROCEDURE — 85610 PROTHROMBIN TIME: CPT | Performed by: EMERGENCY MEDICINE

## 2023-02-19 PROCEDURE — 85025 COMPLETE CBC W/AUTO DIFF WBC: CPT | Performed by: EMERGENCY MEDICINE

## 2023-02-19 ASSESSMENT — ENCOUNTER SYMPTOMS
VOMITING: 0
ABDOMINAL PAIN: 0
DIARRHEA: 0
NAUSEA: 0
HEMATURIA: 0
SHORTNESS OF BREATH: 0
DYSURIA: 0

## 2023-02-19 NOTE — DISCHARGE INSTRUCTIONS
Read and follow discharge instructions.    Your white blood cell count was normal your platelets were normal and your white blood cell count is normal    Your INR is 1.74    Continue your current medication    Call your primary care doctor tomorrow for a follow-up appointment    Return for any concerns

## 2023-02-19 NOTE — TELEPHONE ENCOUNTER
She's on Lovenox and coumadin. She felt blood going down her throat. Needs INR done.  No injuries or bloody nose. She's really scared. Just wants to know where she can have an INR done? I told her urgent care or the ER. I advised she drink cold water and suck on ice cubes. Her  will be home in about 45 minutes. She seemed to calm down after I told her she'll be fine while waiting to go in.   Ashley Sears RN  [facility]       Reason for Disposition    General information question, no triage required and triager able to answer question    Additional Information    Negative: [1] Caller is not with the adult (patient) AND [2] reporting urgent symptoms    Negative: Lab result questions    Negative: Medication questions    Negative: Caller can't be reached by phone    Negative: Caller has already spoken to PCP or another triager    Negative: RN needs further essential information from caller in order to complete triage    Negative: Requesting regular office appointment    Negative: [1] Caller requesting NON-URGENT health information AND [2] PCP's office is the best resource    Negative: Health Information question, no triage required and triager able to answer question    Protocols used: INFORMATION ONLY CALL - NO TRIAGE-A-

## 2023-02-19 NOTE — ED TRIAGE NOTES
Patient had joint replacement surgery on right foot Monday. Has been taking both coumadin and lovenox and is concerned INR is elevated. States she noted small amount of blood drain from left nostril and felt blood running down back of throat at about 1000 this morning. No new bruising or bleeding gums, no blood in urine. Called nurse line and advised to come in for INR check.

## 2023-02-19 NOTE — ED PROVIDER NOTES
EMERGENCY DEPARTMENT ENCOUNTER      NAME: Alyssa Higginbotham  AGE: 67 year old female  YOB: 1955  MRN: [id number]  EVALUATION DATE & TIME: No admission date for patient encounter.    PCP: Rafaela Woods    ED PROVIDER: Irene Means M.D.      CHIEF COMPLAINT     Chief Complaint   Patient presents with     INR concern     Labs Only         FINAL IMPRESSION:     1. On warfarin at home    2. Subtherapeutic international normalized ratio (INR)          MEDICAL DECISION MAKING:       Pertinent Labs & Imaging studies reviewed. (See chart for details)    67 year old female presents to the Emergency Department for evaluation of wants her INR checked.    She underwent right foot surgery.  She has been chronically on warfarin for greater than 23 years has a history of aortic valve replacement.  She is bridging with Lovenox now that her surgery is over and had spontaneous epistasis and is concerned about her INR being high.    Denies any trauma no melena no bright red blood per rectum no hemoptysis.    Well-appearing on examination in no distress     right foot has a bandage the remaining of the toes appear pink with no cyanosis and intact sensation.    INR 1.74 normal hemoglobin normal platelets normal white blood cell count.    Patient was reassured that her INR is not currently therapeutic and to continue her current therapy as previously instructed.    Reviewed previous records or surgery worker records or previous history of AVR and prior dissection.plan is for her to her continue her Lovenox and warfarin and clindamycin    Encouraged her to follow primary care doctor and return for any concerns.       Vital Signs: Hypertension  EKG: None  Imaging: None  Home Meds: Reviewed  ED meds/abx: None  Fluids: None    Labs  inr 1.74  Wbc 5.6  hgb 13.4  Platelets 227    Medical Decision Making    History:    Supplemental history from: N/A    External Record(s) reviewed: Other: see previous records    Work  Up:    Chart documentation includes differential considered and any EKGs or imaging independently interpreted by provider, where specified.    In additional to work up documented, I considered the following work up: Documented in chart, if applicable.    External consultation:    Discussion of management with another provider: Documented in chart, if applicable    Complicating factors:    Care impacted by chronic illness: Hyperlipidemia, Hypertension, Mental Health and Other: stroke    Care affected by social determinants of health: N/A    Disposition considerations: Discharge. I prescribed additional prescription strength medication(s) as charted. I considered admission, but ultimately discharged patient subtherapeutic INR.          Review of Previous Records  On 2/1720 23 INR 1.6.  Surgery first metatarsophalangeal joint implant arthroplasty of the right foot on 2/13  primary care doctor note on 1/30/2023 status post AVR  History artery dissection    Consults      ED COURSE     12:19 PM I met with the patient, obtained history, performed an initial exam, and discussed options and plan for diagnostics and treatment here in the ED.  1:29 PM I updated the patient.    At the conclusion of the encounter I discussed the results of all of the tests and the disposition. The questions were answered. The patient and  acknowledged understanding and was agreeable with the care plan.         MEDICATIONS GIVEN IN THE EMERGENCY:   Medications - No data to display    NEW PRESCRIPTIONS STARTED AT TODAY'S ER VISIT     Discharge Medication List as of 2/19/2023  2:56 PM             =================================================================    HPI     Patient information was obtained from: patient    Use of : N/A       Alyssa Higginbotham is a 67 year old female with a pertinent history of a/p aortic dissection, mitral Valve disorder, s/p aortic valve replacement, s/p arthroplasty toe, right (2/13/23),  hypertension, hyperlipidemia, and anxiety who presents by walk-in for evaluation of INR check.    Today, she reports that she developed a nose bleed in the left nostril and felt the blood going down her throat. Noted the bleeding lasted for a couple of minutes and resolved. She does have a history of nose bleeds. The patient had an aortic valve replacement 23 years ago and anticoagulated on coumadin since then. She recently had a joint replacement surgery on her right foot on 2/13, and was told to stop taking coumadin temporarily during her surgery. She then resumed coumadin along with Lovenox, which she is concerned that taking the medications together is causing her INR to be elevated. She had her lovenoz injection at 8:00 am this morning. Denies chest pain, abdominal pain. shortness of breath, nausea, vomiting, diarrhea, dysuria, hematuria, and any other symptoms.      REVIEW OF SYSTEMS   Review of Systems   HENT: Positive for nosebleeds (left nare).    Respiratory: Negative for shortness of breath.    Cardiovascular: Negative for chest pain.   Gastrointestinal: Negative for abdominal pain, diarrhea, nausea and vomiting.   Genitourinary: Negative for dysuria and hematuria.   All other systems reviewed and are negative.       PAST MEDICAL HISTORY:     Past Medical History:   Diagnosis Date     Accidental fall 05/27/2015     Antiplatelet or antithrombotic long-term use      Anxiety      Aortic dissection (H)      Asthma      Benign meningioma of brain (H) 03/2015    initially seen on CT of head that was obtained after a fall. Frontal lobe, confirmed on an MRI Mar 2015, 3 mm     Benign neoplasm of colon      Bunion      Chronic headache      Colon polyp 2011     Depression      Heart murmur      Hepatitis C      Hyperlipidemia      Hypertension      Hypothyroid      Irregular heart rate      Nasal fracture 02/28/2015    fell face first on sideache     Osteoporosis      Other acquired deformity of toe      Stroke (H)      on CT scan       PAST SURGICAL HISTORY:     Past Surgical History:   Procedure Laterality Date     AORTIC VALVE REPLACEMENT   2000     ARTHROPLASTY TOE(S) Right 2/13/2023    Procedure: First metatarsal phalangeal joint implant arthroplasty right foot;  Surgeon: Chin Riojas DPM;  Location: [facility]     BIOPSY BREAST Left 2005    benign     COLONOSCOPY  2011     REPAIR THORACIC AORTA   2000         CURRENT MEDICATIONS:   amitriptyline (ELAVIL) 10 MG tablet  butalbital-acetaminophen-caffeine (ESGIC) -40 MG tablet  clindamycin (CLEOCIN) 300 MG capsule  clonazePAM (KLONOPIN) 0.5 MG tablet  enoxaparin ANTICOAGULANT (LOVENOX) 40 MG/0.4ML syringe  levothyroxine (SYNTHROID/LEVOTHROID) 75 MCG tablet  levothyroxine (SYNTHROID/LEVOTHROID) 88 MCG tablet  metoprolol succinate ER (TOPROL XL) 200 MG 24 hr tablet  multivitamin (ONE A DAY) per tablet  rizatriptan (MAXALT) 5 MG tablet  simvastatin (ZOCOR) 20 MG tablet  triamterene-HCTZ (MAXZIDE) 75-50 MG tablet  warfarin ANTICOAGULANT (COUMADIN) 1 MG tablet  warfarin ANTICOAGULANT (COUMADIN) 5 MG tablet         ALLERGIES:     Allergies   Allergen Reactions     Codeine Other (See Comments)     Faints     Demerol [Meperidine] Other (See Comments)     Reaction not reported by patient., Patient states she felt awful.       FAMILY HISTORY:     Family History   Problem Relation Age of Onset     Pancreatic Cancer Father 70.00        history of smoking     Ovarian Cancer Sister 67.00        stage III, fatal     Skin Cancer Sister      Heart Disease Mother      Diabetes No family hx of      Alzheimer Disease No family hx of        SOCIAL HISTORY:     Social History     Socioeconomic History     Marital status:      Number of children:  0   Tobacco Use     Smoking status: Never     Smokeless tobacco: Never   Vaping Use     Vaping Use: Never used   Substance and Sexual Activity     Alcohol use: No     Drug use: No     Sexual activity: Not Currently       VITALS:  "  BP (!) 180/82   Pulse 85   Temp 97.7  F (36.5  C) (Temporal)   Resp 16   Ht 1.549 m (5' 1\")   Wt 51.3 kg (113 lb)   SpO2 97%   BMI 21.35 kg/m      PHYSICAL EXAM     Physical Exam  Vitals and nursing note reviewed.   Skin:     Capillary Refill: Capillary refill takes less than 2 seconds.   Neurological:      General: No focal deficit present.      Mental Status: She is alert.         Physical Exam   Constitutional: Well-appearing in no distress    Head: Atraumatic.     Nose: Nose normal.  No epistasis.  Signs of recent trauma    Mouth/Throat: Oropharynx is clear and moist.     Eyes: EOM are normal. Pupils are equal, round, and reactive to light.  No hemotympanum    Ears: No hyphema    Neck: Normal range of motion. Neck supple.     Cardiovascular: Normal rate, regular rhythm and normal heart sounds.      Pulmonary/Chest: Normal effort  and breath sounds normal.     Abdominal: Soft    Musculoskeletal: Normal range of motion.  Surgical dressing on the right foot remaining of the toes are pink intact sensation    Neurological: Awake and alert    Lymphatics: No edema    : NA     Skin: Skin is warm and dry.  No ecchymosis    Psychiatric: Normal mood and affect. Behavior is normal.   Concern about Lovenox and warfarin interaction.      LAB:     All pertinent labs reviewed and interpreted.  Labs Ordered and Resulted from Time of ED Arrival to Time of ED Departure   INR - Abnormal       Result Value    INR 1.74 (*)    CBC WITH PLATELETS AND DIFFERENTIAL - Abnormal    WBC Count 5.6      RBC Count 4.99      Hemoglobin 13.4      Hematocrit 40.7      MCV 82      MCH 26.9      MCHC 32.9      RDW 13.2      Platelet Count 227      % Neutrophils 80      % Lymphocytes 12      % Monocytes 8      % Eosinophils 0      % Basophils 0      % Immature Granulocytes 0      NRBCs per 100 WBC 0      Absolute Neutrophils 4.4      Absolute Lymphocytes 0.7 (*)     Absolute Monocytes 0.4      Absolute Eosinophils 0.0      Absolute " Basophils 0.0      Absolute Immature Granulocytes 0.0      Absolute NRBCs 0.0          RADIOLOGY:     Reviewed all pertinent imaging. Please see official radiology report.  No orders to display        EKG:       I have independently reviewed and interpreted the EKG(s) documented above.      PROCEDURES:     Procedures      I, Melita, am serving as a scribe to document services personally performed by Dr. Means based on my observation and the provider's statements to me. I, Irene Means MD attest that MELITA is acting in a scribe capacity, has observed my performance of the services and has documented them in accordance with my direction.    Irene Means M.D.  Emergency Medicine  [facility] EMERGENCY DEPARTMENT  [facility] [facility] [id number] [phone]  Dept: [phone]       Irene Means MD  02/19/23 3060

## 2023-02-21 ENCOUNTER — ANTICOAGULATION THERAPY VISIT (OUTPATIENT)
Dept: ANTICOAGULATION | Facility: CLINIC | Age: 68
End: 2023-02-21

## 2023-02-21 ENCOUNTER — LAB (OUTPATIENT)
Dept: LAB | Facility: CLINIC | Age: 68
End: 2023-02-21
Payer: COMMERCIAL

## 2023-02-21 ENCOUNTER — DOCUMENTATION ONLY (OUTPATIENT)
Dept: ANTICOAGULATION | Facility: CLINIC | Age: 68
End: 2023-02-21

## 2023-02-21 DIAGNOSIS — Z79.01 LONG TERM CURRENT USE OF ANTICOAGULANT THERAPY: ICD-10-CM

## 2023-02-21 DIAGNOSIS — Z95.2 S/P AVR (AORTIC VALVE REPLACEMENT): ICD-10-CM

## 2023-02-21 DIAGNOSIS — Z95.2 S/P AVR: Primary | ICD-10-CM

## 2023-02-21 LAB — INR BLD: 2.1 (ref 0.9–1.1)

## 2023-02-21 PROCEDURE — 85610 PROTHROMBIN TIME: CPT

## 2023-02-21 PROCEDURE — 36416 COLLJ CAPILLARY BLOOD SPEC: CPT

## 2023-02-21 NOTE — PROGRESS NOTES
ANTICOAGULATION  MANAGEMENT: Discharge Review    Alyssa Higginbotham chart reviewed for anticoagulation continuity of care    Outpatient surgery/procedure on 2/13/23 for right foot hallux limitus.   - s/p First metatarsal phalangeal joint implant arthroplasty right foot    Discharge disposition: Home    Results:    Recent labs: (last 7 days)     02/17/23  0753 02/19/23  1353   INR 1.6* 1.74*     Anticoagulation inpatient management:     not applicable     Anticoagulation discharge instructions:     Warfarin dosing: home regimen continued and resumed on 2/14/23.   Bridging: bridging with enoxaparin (Lovenox)   INR goal change: No      Medication changes affecting anticoagulation: Yes:     Bridging with Enoxaparin 40mg q12hrs, till INR is >=2.0    Clindamycin 300mg 4x/day for 10 days, from 2/13-23/23.    Hydrocodone-Acet. 5/325mg 1 tab q6hrs PRN for pain.    Additional factors affecting anticoagulation: No     PLAN     Agree with discharge plan for follow up:   - will continue Lovenox bridging until INR in >=2.0 to stop.    Patient not contacted    No adjustment to Anticoagulation Calendar was required    Aylin Pablo RN  
02-Nov-2020 21:49

## 2023-02-21 NOTE — PROGRESS NOTES
ANTICOAGULATION MANAGEMENT     Alyssa Higginbotham 67 year old female is on warfarin with therapeutic INR result. (Goal INR 2.0-3.0)    Recent labs: (last 7 days)     02/21/23  0947   INR 2.1*       ASSESSMENT       Source(s): Chart Review, Patient/Caregiver Call and Template       Warfarin doses taken: Warfarin taken as instructed    Diet: No new diet changes identified    New illness, injury, or hospitalization: Yes:    Walking good after foot surgery.   Did present to ED on 2/19/23 d/t concerns of being off warfarin prior to surgery.  However, patient is bridging with Enoxaparin.   S/p 1st metatarsal phalangeal joint implant arthroplasty, right foot on 2/13/23.    Medication/supplement changes:  Yes.    Currently bridging with Enoxaparin 40mg q12hrs, until INR is>=2.0    Signs or symptoms of bleeding or clotting: Yes:    Reported bruising from Lovenox injections.    Previous INR: Subtherapeutic last 2 INR results.    Additional findings:  Very concerned with INR going to high and developing epistaxis, which she does not want to go through that experience again.       PLAN     Recommended plan for no diet, medication or health factor changes affecting INR     Dosing Instructions:   - advised to give last Enoxaparin injection tonight, then stop thereafter.   - Continue your current warfarin dose with next INR in 1 week       Summary  As of 2/21/2023    Full warfarin instructions:  6 mg every Mon, Wed, Fri; 5 mg all other days   Next INR check:  2/28/2023             Telephone call with  Jennifer ([phone]) who verbalizes understanding and agrees to plan    Lab visit scheduled - INR on 2/27/23 at follow-up with Dr. Riojas    Education provided:     Taking warfarin: Importance of taking warfarin as instructed    Goal range and lab monitoring: goal range and significance of current result    Plan made per [facility] anticoagulation protocol    Aylin Pablo, RN  Anticoagulation  Clinic  2/21/2023    _______________________________________________________________________     Anticoagulation Episode Summary     Current INR goal:  2.0-3.0   TTR:  63.7 % (1 y)   Target end date:  Indefinite   Send INR reminders to:  [facility]    Indications    S/P AVR [Z95.2]  S/P AVR (aortic valve replacement) [Z95.2]  Long term current use of anticoagulant therapy [Z79.01]           Comments:  Goal range changed 2/20/19 per cardiology         Anticoagulation Care Providers     Provider Role Specialty Phone number    Rafaela Woods MD Referring Family Medicine [phone]

## 2023-02-22 ENCOUNTER — LAB (OUTPATIENT)
Dept: FAMILY MEDICINE | Facility: CLINIC | Age: 68
End: 2023-02-22

## 2023-02-22 ENCOUNTER — VIRTUAL VISIT (OUTPATIENT)
Dept: FAMILY MEDICINE | Facility: CLINIC | Age: 68
End: 2023-02-22
Payer: COMMERCIAL

## 2023-02-22 DIAGNOSIS — Z12.11 SCREEN FOR COLON CANCER: ICD-10-CM

## 2023-02-22 DIAGNOSIS — Z09 HOSPITAL DISCHARGE FOLLOW-UP: Primary | ICD-10-CM

## 2023-02-22 DIAGNOSIS — Z12.11 SCREENING FOR COLON CANCER: ICD-10-CM

## 2023-02-22 PROBLEM — D12.6 ADENOMATOUS COLON POLYP: Status: ACTIVE | Noted: 2023-02-22

## 2023-02-22 PROCEDURE — 99213 OFFICE O/P EST LOW 20 MIN: CPT | Mod: VID | Performed by: FAMILY MEDICINE

## 2023-02-22 NOTE — PROGRESS NOTES
Jennifer is a 67 year old who is being evaluated via a billable video visit.      How would you like to obtain your AVS? MyChart  If the video visit is dropped, the invitation should be resent by: Text to cell phone: [phone]  Will anyone else be joining your video visit? No          Assessment & Plan       ICD-10-CM    1. Hospital discharge follow-up  Z09       2. Screen for colon cancer  Z12.11 COLOGUARD([employer])      3. Screening for colon cancer  Z12.11         Patient Instructions   INR per anticoagulation team.    With next INR will draw the labs for hypercalcemia.    Wellness exam set.    Check BP at home and call if elevated.    Cologaurd ordered, very difficult for patient to have colonoscopy with bridging needed with Lovenox.  She did have an adenomatous polyp in 2010, but normal colonoscopy in 2016.        20 minutes spent on the date of the encounter doing chart review, history and exam, documentation and further activities per the note     MED REC REQUIRED  Post Medication Reconciliation Status:       No follow-ups on file.    Rafaela Woods MD  [facility]    Palmer Rodriguez is a 67 year old, presenting for the following health issues:  ER F/U ([facility] ER 2/19/23 INR concerns)      [facility]       Hospital Follow-up Visit:    Hospital/Nursing Home/IP Rehab Facility: [facility]  Date of Admission: 2/19/23  Date of Discharge: 2/19/23  Reason(s) for Admission: INR Concerns    Was your hospitalization related to COVID-19? No   Problems taking medications regularly:  None  Medication changes since discharge: None  Problems adhering to non-medication therapy:  None    Summary of hospitalization:  [facility] discharge summary reviewed  Diagnostic Tests/Treatments reviewed.  Follow up needed: none  Other Healthcare Providers Involved in Patient s Care:         None  Update since discharge: improved.       Swallowing blood:   Lasted a few minutes and resolved.  Went to ER 2-19-22 since she is on Coumadin. No recent URI.  No blood in stool or black tarry stool.  NO more bleding concern.  INR goal 2-3, but trys to keep at 2.0 to 2.6.    Hypertension:  BP in /82 and was nevous then.  Will rechekc at home.    Foot surgery 2-13-23. Dong well without concerns of infection, on antibiotic. No diarrhea.    Hyperca;cienia:  To have follow-up labs.     Plan of care communicated with patient                 Review of Systems         Objective           Vitals:  No vitals were obtained today due to virtual visit.    Physical Exam   GENERAL: Healthy, alert and no distress                Video-Visit Details    Type of service:  Video Visit   Video Start Time: 8:50 AM  Video End Time:9:07 AM    Originating Location (pt. Location): Home    Distant Location (provider location):  On-site  Platform used for Video Visit: Kathryn

## 2023-02-22 NOTE — PATIENT INSTRUCTIONS
INR per anticoagulation team.    With next INR will draw the labs for hypercalcemia.    Wellness exam set.    Check BP at home and call if elevated.    Cologaurd ordered, very difficult for patient to have colonoscopy with bridging needed with Lovenox.  She did have an adenomatous polyp in 2010, but normal colonoscopy in 2016.

## 2023-02-23 ASSESSMENT — ACTIVITIES OF DAILY LIVING (ADL): CURRENT_FUNCTION: NO ASSISTANCE NEEDED

## 2023-02-23 ASSESSMENT — ENCOUNTER SYMPTOMS
NAUSEA: 0
HEADACHES: 1
ARTHRALGIAS: 1
BREAST MASS: 0
HEMATOCHEZIA: 0
FEVER: 0
ABDOMINAL PAIN: 0
DIZZINESS: 0
HEMATURIA: 0
PALPITATIONS: 0
SORE THROAT: 0
CHILLS: 0
WEAKNESS: 0
CONSTIPATION: 0
PARESTHESIAS: 0
HEARTBURN: 0
FREQUENCY: 0
MYALGIAS: 1
COUGH: 0
NERVOUS/ANXIOUS: 1
SHORTNESS OF BREATH: 0
DYSURIA: 0
DIARRHEA: 0
EYE PAIN: 0
JOINT SWELLING: 0

## 2023-02-27 ENCOUNTER — OFFICE VISIT (OUTPATIENT)
Dept: PODIATRY | Facility: CLINIC | Age: 68
End: 2023-02-27
Payer: COMMERCIAL

## 2023-02-27 ENCOUNTER — LAB (OUTPATIENT)
Dept: LAB | Facility: CLINIC | Age: 68
End: 2023-02-27
Payer: COMMERCIAL

## 2023-02-27 ENCOUNTER — HOSPITAL ENCOUNTER (OUTPATIENT)
Dept: GENERAL RADIOLOGY | Facility: HOSPITAL | Age: 68
Discharge: HOME | End: 2023-02-27
Attending: PODIATRIST | Admitting: PODIATRIST
Payer: COMMERCIAL

## 2023-02-27 ENCOUNTER — ANTICOAGULATION THERAPY VISIT (OUTPATIENT)
Dept: ANTICOAGULATION | Facility: CLINIC | Age: 68
End: 2023-02-27

## 2023-02-27 VITALS
OXYGEN SATURATION: 97 % | RESPIRATION RATE: 14 BRPM | WEIGHT: 113 LBS | BODY MASS INDEX: 21.35 KG/M2 | HEART RATE: 74 BPM

## 2023-02-27 DIAGNOSIS — M20.5X1 HALLUX LIMITUS, RIGHT: ICD-10-CM

## 2023-02-27 DIAGNOSIS — E83.52 HYPERCALCEMIA: ICD-10-CM

## 2023-02-27 DIAGNOSIS — Z79.01 LONG TERM CURRENT USE OF ANTICOAGULANT THERAPY: ICD-10-CM

## 2023-02-27 DIAGNOSIS — Z95.2 S/P AVR: Primary | ICD-10-CM

## 2023-02-27 DIAGNOSIS — M20.5X1 HALLUX LIMITUS, RIGHT: Primary | ICD-10-CM

## 2023-02-27 DIAGNOSIS — Z95.2 S/P AVR (AORTIC VALVE REPLACEMENT): ICD-10-CM

## 2023-02-27 LAB
CALCIUM SERPL-MCNC: 9.7 MG/DL (ref 8.8–10.2)
INR BLD: 1.9 (ref 0.9–1.1)
PTH-INTACT SERPL-MCNC: 32 PG/ML (ref 15–65)
TOTAL PROTEIN SERUM FOR ELP: 6.6 G/DL (ref 6.4–8.3)

## 2023-02-27 PROCEDURE — 73630 X-RAY EXAM OF FOOT: CPT | Mod: 26 | Performed by: PODIATRIST

## 2023-02-27 PROCEDURE — 73630 X-RAY EXAM OF FOOT: CPT | Mod: RT

## 2023-02-27 PROCEDURE — 82310 ASSAY OF CALCIUM: CPT

## 2023-02-27 PROCEDURE — 36415 COLL VENOUS BLD VENIPUNCTURE: CPT

## 2023-02-27 PROCEDURE — 99024 POSTOP FOLLOW-UP VISIT: CPT | Performed by: PODIATRIST

## 2023-02-27 PROCEDURE — 84155 ASSAY OF PROTEIN SERUM: CPT

## 2023-02-27 PROCEDURE — 82306 VITAMIN D 25 HYDROXY: CPT

## 2023-02-27 PROCEDURE — 85610 PROTHROMBIN TIME: CPT

## 2023-02-27 PROCEDURE — 84165 PROTEIN E-PHORESIS SERUM: CPT

## 2023-02-27 PROCEDURE — 83970 ASSAY OF PARATHORMONE: CPT

## 2023-02-27 ASSESSMENT — PAIN SCALES - GENERAL: PAINLEVEL: MILD PAIN (2)

## 2023-02-27 NOTE — PATIENT INSTRUCTIONS
- You will need to get an x-ray done before your follow up. This can be at any of the [facility] or at [facility] located downstairs on the first floor in the [facility]. No appointment is needed but depending on availability you may have to wait.    [address] [facility] [address],  [place] [address]   Monday through Friday 7 am to 10 pm, Saturday and Sunday 8 am to 4:40 pm   P: [phone]        An X-ray uses a small amount of radiation to create images of your bones and internal organs. X-rays are most often used to detect bone or joint problems, or to check the heart and lungs (chest X-ray).   Let the technologist know   Tell the technologist if you:   Are or may be pregnant   Have had an X-ray of this part of your body before   Have metal in the part of your body being imaged      Before Your Test   You may be asked to remove your watch, jewelry, or garments with metal closures from the part of your body being imaged. These items can block part of the image.   You may be asked to put on a gown.   You may be asked about your overall health or any medications you take.  During Your Test   You will be asked to lie on a table, sit, or stand.   A lead apron may be draped over part of your body to shield it from the X-rays.   With an X-ray of your chest or abdomen, you will have to take a deep breath and hold it for a few seconds.   Each exam usually requires at least 2 X-rays. You will need to move your body before each new X-ray.  After Your Test   Your doctor will discuss the test results with you during a follow-up appointment or over the phone.     8597-7161 [employer]. [address] [place] [address], [place] [address]. All rights reserved. This information is not intended as a substitute for professional medical care. Always follow your healthcare professional's instructions.

## 2023-02-27 NOTE — LETTER
2/27/2023         RE: Alyssa Higginbotham  [address] [place] [address]        Dear Colleague,    Thank you for referring your patient, Alyssa Higginbotham, to the [facility]. Please see a copy of my visit note below.    Subjective findings: The patient return to the clinic today for postop visit #1, 2 weeks status post first metatarsal phalangeal joint implant arthroplasty of the right foot.  The patient is in good spirits and she had no complaints.    Objective findings: The dressings were removed and the wound margins are well healed.  There is no edema, erythema, cellulitis, drainage or bleeding noted.  Neurovascular status is intact.  Vital signs stable.    Assessment: Hallux limitus right foot    Plan: Removed all sutures.  A postop x-ray was taken today.  The x-rays were read and interpreted by this physician.  The patient was instructed to gradually return to normal activities and normal shoe gear.  She is to return to the clinic as needed.      Again, thank you for allowing me to participate in the care of your patient.        Sincerely,        Chin Riojas DPM

## 2023-02-27 NOTE — PROGRESS NOTES
ANTICOAGULATION MANAGEMENT     Alyssa Higginbotham 67 year old female is on warfarin with subtherapeutic INR result. (Goal INR 2.0-3.0)    Recent labs: (last 7 days)     02/27/23  1234   INR 1.9*       ASSESSMENT       Source(s): Chart Review and Patient/Caregiver Call       Warfarin doses taken: Warfarin taken as instructed    Diet: Increased greens/vitamin K in diet; plans to resume previous intake     New illness, injury, or hospitalization: No.   Doing well after foot surgery.   S/p 1st metatarsal phalangeal joint implant arthroplasty, right foot on 2/13/23.    Medication/supplement changes: None noted    Signs or symptoms of bleeding or clotting: No    Previous INR: Therapeutic last visit at 2.1; previously outside of goal range at 1.74    Additional findings: None         PLAN     Recommended plan for temporary change(s) affecting INR     Dosing Instructions: booster dose then continue your current warfarin dose with next INR in 2 weeks       Summary  As of 2/27/2023    Full warfarin instructions:  2/27: 7 mg; Otherwise 6 mg every Mon, Wed, Fri; 5 mg all other days   Next INR check:  3/13/2023             Telephone call with  Jennifer ([phone]) who verbalizes understanding and agrees to plan    Lab visit scheduled  - INR on 3/8/23 at hearing test and new ENT consultation.    Education provided:     Taking warfarin: Importance of taking warfarin as instructed    Goal range and lab monitoring: goal range and significance of current result    Dietary considerations: importance of consistent vitamin K intake    Plan made per [facility] anticoagulation protocol    Aylin Pablo RN  Anticoagulation Clinic  2/27/2023    _______________________________________________________________________     Anticoagulation Episode Summary     Current INR goal:  2.0-3.0   TTR:  64.5 % (1 y)   Target end date:  Indefinite   [facility]    Indications    S/P AVR [Z95.2]  S/P AVR (aortic valve  replacement) [Z95.2]  Long term current use of anticoagulant therapy [Z79.01]           Comments:  Goal range changed 2/20/19 per cardiology         Anticoagulation Care Providers     Provider Role Specialty Phone number    Rafaela Woods MD Referring Family Medicine [phone]

## 2023-02-27 NOTE — PROGRESS NOTES
Subjective findings: The patient return to the clinic today for postop visit #1, 2 weeks status post first metatarsal phalangeal joint implant arthroplasty of the right foot.  The patient is in good spirits and she had no complaints.    Objective findings: The dressings were removed and the wound margins are well healed.  There is no edema, erythema, cellulitis, drainage or bleeding noted.  Neurovascular status is intact.  Vital signs stable.    Assessment: Hallux limitus right foot    Plan: Removed all sutures.  A postop x-ray was taken today.  The x-rays were read and interpreted by this physician.  The patient was instructed to gradually return to normal activities and normal shoe gear.  She is to return to the clinic as needed.

## 2023-02-28 LAB — DEPRECATED CALCIDIOL+CALCIFEROL SERPL-MC: 59 UG/L (ref 20–75)

## 2023-03-01 ENCOUNTER — OFFICE VISIT (OUTPATIENT)
Dept: FAMILY MEDICINE | Facility: CLINIC | Age: 68
End: 2023-03-01
Payer: COMMERCIAL

## 2023-03-01 VITALS
WEIGHT: 115.38 LBS | SYSTOLIC BLOOD PRESSURE: 134 MMHG | DIASTOLIC BLOOD PRESSURE: 71 MMHG | BODY MASS INDEX: 21.23 KG/M2 | RESPIRATION RATE: 16 BRPM | HEART RATE: 65 BPM | HEIGHT: 62 IN | OXYGEN SATURATION: 98 %

## 2023-03-01 DIAGNOSIS — I10 ESSENTIAL HYPERTENSION: ICD-10-CM

## 2023-03-01 DIAGNOSIS — Z00.00 ENCOUNTER FOR MEDICARE ANNUAL WELLNESS EXAM: Primary | ICD-10-CM

## 2023-03-01 DIAGNOSIS — Z12.11 SCREEN FOR COLON CANCER: ICD-10-CM

## 2023-03-01 DIAGNOSIS — D32.9 MENINGIOMA (H): ICD-10-CM

## 2023-03-01 DIAGNOSIS — Z12.31 VISIT FOR SCREENING MAMMOGRAM: ICD-10-CM

## 2023-03-01 DIAGNOSIS — E04.1 THYROID NODULE: ICD-10-CM

## 2023-03-01 DIAGNOSIS — I10 BENIGN ESSENTIAL HYPERTENSION: ICD-10-CM

## 2023-03-01 DIAGNOSIS — E78.5 HYPERLIPIDEMIA, UNSPECIFIED HYPERLIPIDEMIA TYPE: ICD-10-CM

## 2023-03-01 DIAGNOSIS — E03.9 HYPOTHYROIDISM, UNSPECIFIED TYPE: ICD-10-CM

## 2023-03-01 LAB
ALT SERPL W P-5'-P-CCNC: 24 U/L (ref 10–35)
AST SERPL W P-5'-P-CCNC: 29 U/L (ref 10–35)
CHOLEST SERPL-MCNC: 176 MG/DL
HDLC SERPL-MCNC: 54 MG/DL
LDLC SERPL CALC-MCNC: 104 MG/DL
NONHDLC SERPL-MCNC: 122 MG/DL
TRIGL SERPL-MCNC: 89 MG/DL
TSH SERPL DL<=0.005 MIU/L-ACNC: 3.22 UIU/ML (ref 0.3–4.2)

## 2023-03-01 PROCEDURE — G0438 PPPS, INITIAL VISIT: HCPCS | Performed by: FAMILY MEDICINE

## 2023-03-01 PROCEDURE — 36415 COLL VENOUS BLD VENIPUNCTURE: CPT | Performed by: FAMILY MEDICINE

## 2023-03-01 PROCEDURE — 84443 ASSAY THYROID STIM HORMONE: CPT | Performed by: FAMILY MEDICINE

## 2023-03-01 PROCEDURE — 80061 LIPID PANEL: CPT | Performed by: FAMILY MEDICINE

## 2023-03-01 PROCEDURE — 84450 TRANSFERASE (AST) (SGOT): CPT | Performed by: FAMILY MEDICINE

## 2023-03-01 PROCEDURE — 84460 ALANINE AMINO (ALT) (SGPT): CPT | Performed by: FAMILY MEDICINE

## 2023-03-01 ASSESSMENT — ENCOUNTER SYMPTOMS
NERVOUS/ANXIOUS: 1
NAUSEA: 0
ARTHRALGIAS: 1
PARESTHESIAS: 0
FREQUENCY: 0
HEMATOCHEZIA: 0
SORE THROAT: 0
CONSTIPATION: 0
FEVER: 0
ABDOMINAL PAIN: 0
CHILLS: 0
SHORTNESS OF BREATH: 0
DIZZINESS: 0
HEADACHES: 1
JOINT SWELLING: 0
MYALGIAS: 1
HEMATURIA: 0
HEARTBURN: 0
BREAST MASS: 0
PALPITATIONS: 0
COUGH: 0
WEAKNESS: 0
DIARRHEA: 0
DYSURIA: 0
EYE PAIN: 0

## 2023-03-01 ASSESSMENT — ACTIVITIES OF DAILY LIVING (ADL): CURRENT_FUNCTION: NO ASSISTANCE NEEDED

## 2023-03-01 NOTE — PROGRESS NOTES
"SUBJECTIVE:   Jennifer is a 67 year old who presents for Preventive Visit.  Patient has been advised of split billing requirements and indicates understanding: Yes  Are you in the first 12 months of your Medicare coverage?  No    Healthy Habits:     In general, how would you rate your overall health?  Good    Frequency of exercise:  6-7 days/week    Duration of exercise:  Greater than 60 minutes    Do you usually eat at least 4 servings of fruit and vegetables a day, include whole grains    & fiber and avoid regularly eating high fat or \"junk\" foods?  No    Taking medications regularly:  Yes    Ability to successfully perform activities of daily living:  No assistance needed    Home Safety:  No safety concerns identified    Hearing Impairment:  Difficulty following a conversation in a noisy restaurant or crowded room, feel that people are mumbling or not speaking clearly and difficulty understanding soft or whispered speech    In the past 6 months, have you been bothered by leaking of urine?  No    In general, how would you rate your overall mental or emotional health?  Good      PHQ-2 Total Score: 0    Additional concerns today:  No      Have you ever done Advance Care Planning?  Yes, patient states has an Advance Care Planning document and will bring a copy to the clinic.       Fall risk  Fallen 2 or more times in the past year?: No  Any fall with injury in the past year?: No    Cognitive Screening   1) Repeat 3 items (Leader, Season, Table)    2) Clock draw: NORMAL  3) 3 item recall: Recalls 2 objects   Results: NORMAL clock, 1-2 items recalled: COGNITIVE IMPAIRMENT LESS LIKELY    Mini-CogTM Copyright TRICIA Sue. Licensed by the author for use in [employer]; reprinted with permission (caitlyn@.[facility]). All rights reserved.          Reviewed and updated as needed this visit by clinical staff   Tobacco  Allergies  Meds              Reviewed and updated as needed this visit by Provider               "   Social History     Tobacco Use     Smoking status: Never     Smokeless tobacco: Never   Substance Use Topics     Alcohol use: No         Alcohol Use 2/23/2023   Prescreen: >3 drinks/day or >7 drinks/week? Not Applicable               Current providers sharing in care for this patient include: Patient Care Team:  Rafaela Woods MD as PCP - General  Rafaela Woods MD as Assigned PCP  Jonathon Richardson MD as Assigned Heart and Vascular Provider  Talia Marinelli AuD as Audiologist (Audiology)  Brenda Lao MD as MD (Otolaryngology)  Chin Riojas DPM as Assigned Surgical Provider    No concerns.    Thyroid nodule:  Thyroid ultrasound in 2019 was stable and has been stable over 2 years.  With that in a benign biopsy no further ultrasounds needed unless you are having compressive symptoms.    Meningioma: 3 mm seen on MRI in 2015.  She did have a head CT which was normal in 2021.  She saw neuro in 2015 and no follow-up mentioned in their plan.    The following health maintenance items are reviewed in Epic and correct as of today:  Health Maintenance   Topic Date Due     COLORECTAL CANCER SCREENING  Cologaurd at [place]     COVID-19 Vaccine (4 - Booster for Pfizer series) Declined     MEDICARE ANNUAL WELLNESS VISIT  Today     TSH W/FREE T4 REFLEX  Today     ANNUAL REVIEW OF HM ORDERS  Today     FALL RISK ASSESSMENT  Today     DEXA  04/14/2024     MAMMO SCREENING  05/16/2023, ordered     LIPID  Today     ADVANCE CARE PLANNING  Has packet at home     DTAP/TDAP/TD IMMUNIZATION (3 - Td or Tdap) 05/08/2029     PHQ-2 (once per calendar year)  Completed     INFLUENZA VACCINE  Completed     Pneumococcal Vaccine: 65+ Years  Completed     ZOSTER IMMUNIZATION  Completed     IPV IMMUNIZATION  Aged Out     MENINGITIS IMMUNIZATION  Aged Out     Lab work is in process      FHS-7:   Breast CA Risk Assessment (FHS-7) 12/13/2021 5/16/2022 1/28/2023 2/23/2023   Did any of your first-degree relatives have breast or  "ovarian cancer? Yes No Yes Yes   Did any of your relatives have bilateral breast cancer? No No No No   Did any man in your family have breast cancer? No No No No   Did any woman in your family have breast and ovarian cancer? No No Yes No   Did any woman in your family have breast cancer before age 50 y? No No No No   Do you have 2 or more relatives with breast and/or ovarian cancer? No No No No   Do you have 2 or more relatives with breast and/or bowel cancer? No No No No       Mammogram Screening: Recommended mammography every 1-2 years with patient discussion and risk factor consideration  Pertinent mammograms are reviewed under the imaging tab.    Review of Systems   Constitutional: Negative for chills and fever.   HENT: Negative for congestion, ear pain, hearing loss and sore throat.    Eyes: Negative for pain and visual disturbance.   Respiratory: Negative for cough and shortness of breath.    Cardiovascular: Negative for chest pain, palpitations and peripheral edema.   Gastrointestinal: Negative for abdominal pain, constipation, diarrhea, heartburn, hematochezia and nausea.   Breasts:  Positive for tenderness. Negative for breast mass and discharge.   Genitourinary: Negative for dysuria, frequency, genital sores, hematuria, pelvic pain, urgency, vaginal bleeding and vaginal discharge.   Musculoskeletal: Positive for arthralgias and myalgias. Negative for joint swelling.   Skin: Negative for rash.   Neurological: Positive for headaches. Negative for dizziness, weakness and paresthesias.   Psychiatric/Behavioral: Negative for mood changes. The patient is nervous/anxious.          OBJECTIVE:   BP (!) 145/76 (BP Location: Left arm, Patient Position: Sitting, Cuff Size: Adult Regular)   Pulse 65   Resp 16   Ht 1.562 m (5' 1.5\")   Wt 52.3 kg (115 lb 6 oz)   SpO2 98%   BMI 21.45 kg/m   Estimated body mass index is 21.45 kg/m  as calculated from the following:    Height as of this encounter: 1.562 m (5' 1.5\").   "  Weight as of this encounter: 52.3 kg (115 lb 6 oz).  Physical Exam  GENERAL APPEARANCE: healthy, alert and no distress  EYES: Eyes grossly normal to inspection, PERRL and conjunctivae and sclerae normal  HENT: ear canals and TM's normal, nose and mouth without ulcers or lesions, oropharynx clear and oral mucous membranes moist  NECK: no adenopathy, no asymmetry, masses, or scars and thyroid normal to palpation  RESP: lungs clear to auscultation - no rales, rhonchi or wheezes  BREAST: normal without masses, tenderness or nipple discharge and no palpable axillary masses or adenopathy  CV: regular rate and rhythm, normal S1 S2, no S3 or S4, no murmur, click or rub, no peripheral edema and peripheral pulses strong  ABDOMEN: soft, nontender, no hepatosplenomegaly, no masses and bowel sounds normal   (female): normal female external genitalia, normal urethral meatus, vaginal mucosal atrophy noted, normal cervix, adnexae, and uterus without masses or abnormal discharge  MS: no musculoskeletal defects are noted and gait is age appropriate without ataxia  SKIN: no suspicious lesions or rashes  NEURO: Normal strength and tone, sensory exam grossly normal, mentation intact and speech normal  PSYCH: mentation appears normal and affect normal/bright    Diagnostic Test Results:  Labs reviewed in Epic    ASSESSMENT / PLAN:       ICD-10-CM    1. Encounter for Medicare annual wellness exam  Z00.00 PRIMARY CARE FOLLOW-UP SCHEDULING      2. Screen for colon cancer  Z12.11       3. Thyroid nodule  E04.1 TSH WITH FREE T4 REFLEX     TSH WITH FREE T4 REFLEX      4. Benign essential hypertension  I10       5. Hypothyroidism, unspecified type  E03.9       6. Essential hypertension  I10       7. Hyperlipidemia, unspecified hyperlipidemia type  E78.5 Lipid Profile     AST     ALT     Lipid Profile     AST     ALT      8. Meningioma (H)-initially seen on CT of head that was obtained after a fall. Frontal lobe, confirmed on an MRI Mar 2015,  3 mm  D32.9       9. Visit for screening mammogram  Z12.31 MA Screen Bilateral w/Herb        Thyroid ultrasound in 2019 was stable and has been stable over 2 years.  With that in a benign biopsy no further ultrasounds needed unless you are having compressive symptoms.    Follows with cardiology.    Dermatology follow-up due in October 2023 for full body skin exam.    On an MRI in 2015 there is a 3 mm meningioma.  These are benign lesions.  No need to further image unless you have any concerns of headache, head pressure or neurologic changes.    Patient has been advised of split billing requirements and indicates understanding: Yes      COUNSELING:  Reviewed preventive health counseling, as reflected in patient instructions       Advanced Planning         She reports that she has never smoked. She has never used smokeless tobacco.      Appropriate preventive services were discussed with this patient, including applicable screening as appropriate for cardiovascular disease, diabetes, osteopenia/osteoporosis, and glaucoma.  As appropriate for age/gender, discussed screening for colorectal cancer, prostate cancer, breast cancer, and cervical cancer. Checklist reviewing preventive services available has been given to the patient.    Reviewed patients plan of care and provided an AVS. The Basic Care Plan (routine screening as documented in Health Maintenance) for Alyssa meets the Care Plan requirement. This Care Plan has been established and reviewed with the Patient.      Rafaela Woods MD  [facility]    Identified Health Risks:

## 2023-03-01 NOTE — PATIENT INSTRUCTIONS
Thyroid ultrasound in 2019 was stable and has been stable over 2 years.  With that in a benign biopsy no further ultrasounds needed unless you are having compressive symptoms.    Follows with cardiology.    Dermatology follow-up due in October 2023 for full body skin exam.    On an MRI in 2015 there is a 3 mm meningioma.  These are benign lesions.  No need to further image unless you have any concerns of headache, head pressure or neurologic changes.      Patient Education   Personalized Prevention Plan  Health Maintenance   Topic Date Due    COLORECTAL CANCER SCREENING  Cologaurd at [place]    COVID-19 Vaccine (4 - Booster for Pfizer series) Declined    MEDICARE ANNUAL WELLNESS VISIT  Today    TSH W/FREE T4 REFLEX  Today    ANNUAL REVIEW OF HM ORDERS  Today    FALL RISK ASSESSMENT  Today    DEXA  04/14/2024    MAMMO SCREENING  05/16/2023, ordered    LIPID  Today    ADVANCE CARE PLANNING  Has packet at home    DTAP/TDAP/TD IMMUNIZATION (3 - Td or Tdap) 05/08/2029    PHQ-2 (once per calendar year)  Completed    INFLUENZA VACCINE  Completed    Pneumococcal Vaccine: 65+ Years  Completed    ZOSTER IMMUNIZATION  Completed    IPV IMMUNIZATION  Aged Out    MENINGITIS IMMUNIZATION  Aged Out         Understanding USDA MyPlate  The USDA has guidelines to help you make healthy food choices. These are called MyPlate. MyPlate shows the food groups that make up healthy meals using the image of a place setting. Before you eat, think about the healthiest choices for what to put on your plate or in your cup or bowl. To learn more about building a healthy plate, visit www.choosemyplate.gov.    The food groups  Fruits. Any fruit or 100% fruit juice counts as part of the Fruit Group. Fruits may be fresh, canned, frozen, or dried, and may be whole, cut-up, or pureed. Make 1/2 of your plate fruits and vegetables.  Vegetables. Any vegetable or 100% vegetable juice counts as a member of the Vegetable Group. Vegetables may be fresh, frozen,  canned, or dried. They can be served raw or cooked and may be whole, cut-up, or mashed. Make 1/2 of your plate fruits and vegetables.  Grains. All foods made from grains are part of the Grains Group. These include wheat, rice, oats, cornmeal, and barley. Grains are often used to make foods such as bread, pasta, oatmeal, cereal, tortillas, and grits. Grains should be no more than 1/4 of your plate. At least half of your grains should be whole grains.  Protein. This group includes meat, poultry, seafood, beans and peas, eggs, processed soy products (such as tofu), nuts (including nut butters), and seeds. Make protein choices no more than 1/4 of your plate. Meat and poultry choices should be lean or low fat.  Dairy. The Dairy Group includes all fluid milk products and foods made from milk that contain calcium, such as yogurt and cheese. (Foods that have little calcium, such as cream, butter, and cream cheese, are not part of this group.) Most dairy choices should be low-fat or fat-free.  Oils. Oils aren't a food group, but they do contain essential nutrients. However it's important to watch your intake of oils. These are fats that are liquid at room temperature. They include canola, corn, olive, soybean, vegetable, and sunflower oil. Foods that are mainly oil include mayonnaise, certain salad dressings, and soft margarines. You likely already get your daily oil allowance from the foods you eat.  Things to limit  Eating healthy also means limiting these things in your diet:     Salt (sodium). Many processed foods have a lot of sodium. To keep sodium intake down, eat fresh vegetables, meats, poultry, and seafood when possible. Purchase low-sodium, reduced-sodium, or no-salt-added food products at the store. And don't add salt to your meals at home. Instead, season them with herbs and spices such as dill, oregano, cumin, and paprika. Or try adding flavor with lemon or lime zest and juice.  Saturated fat. Saturated fats  are most often found in animal products such as beef, pork, and chicken. They are often solid at room temperature, such as butter. To reduce your saturated fat intake, choose leaner cuts of meat and poultry. And try healthier cooking methods such as grilling, broiling, roasting, or baking. For a simple lower-fat swap, use plain nonfat yogurt instead of mayonnaise when making potato salad or macaroni salad.  Added sugars. These are sugars added to foods. They are in foods such as ice cream, candy, soda, fruit drinks, sports drinks, energy drinks, cookies, pastries, jams, and syrups. Cut down on added sugars by sharing sweet treats with a family member or friend. You can also choose fruit for dessert, and drink water or other unsweetened beverages.     [employer] last reviewed this educational content on 6/1/2020 2000-2021 [employer]. All rights reserved. This information is not intended as a substitute for professional medical care. Always follow your healthcare professional's instructions.          Signs of Hearing Loss      Hearing much better with one ear can be a sign of hearing loss.   Hearing loss is a problem shared by many people. In fact, it is one of the most common health problems, particularly as people age. Most people age 65 and older have some hearing loss. By age 80, almost everyone does. Hearing loss often occurs slowly over the years. So you may not realize your hearing has gotten worse.  Have your hearing checked  Call your healthcare provider if you:  Have to strain to hear normal conversation  Have to watch other people s faces very carefully to follow what they re saying  Need to ask people to repeat what they ve said  Often misunderstand what people are saying  Turn the volume of the television or radio up so high that others complain  Feel that people are mumbling when they re talking to you  Find that the effort to hear leaves you feeling tired and irritated  Notice, when  using the phone, that you hear better with one ear than the other  [employer] last reviewed this educational content on 1/1/2020 2000-2021 [employer]. All rights reserved. This information is not intended as a substitute for professional medical care. Always follow your healthcare professional's instructions.

## 2023-03-02 DIAGNOSIS — R77.8 ABNORMAL SPEP: Primary | ICD-10-CM

## 2023-03-02 LAB
ALBUMIN SERPL ELPH-MCNC: 4.2 G/DL (ref 3.7–5.1)
ALPHA1 GLOB SERPL ELPH-MCNC: 0.3 G/DL (ref 0.2–0.4)
ALPHA2 GLOB SERPL ELPH-MCNC: 0.6 G/DL (ref 0.5–0.9)
B-GLOBULIN SERPL ELPH-MCNC: 0.8 G/DL (ref 0.6–1)
GAMMA GLOB SERPL ELPH-MCNC: 0.7 G/DL (ref 0.7–1.6)
M PROTEIN SERPL ELPH-MCNC: 0.1 G/DL
PROT PATTERN SERPL ELPH-IMP: ABNORMAL

## 2023-03-08 ENCOUNTER — OFFICE VISIT (OUTPATIENT)
Dept: AUDIOLOGY | Facility: CLINIC | Age: 68
End: 2023-03-08
Attending: FAMILY MEDICINE
Payer: COMMERCIAL

## 2023-03-08 ENCOUNTER — ANTICOAGULATION THERAPY VISIT (OUTPATIENT)
Dept: ANTICOAGULATION | Facility: CLINIC | Age: 68
End: 2023-03-08

## 2023-03-08 ENCOUNTER — OFFICE VISIT (OUTPATIENT)
Dept: OTOLARYNGOLOGY | Facility: CLINIC | Age: 68
End: 2023-03-08
Attending: FAMILY MEDICINE
Payer: COMMERCIAL

## 2023-03-08 ENCOUNTER — LAB (OUTPATIENT)
Dept: LAB | Facility: CLINIC | Age: 68
End: 2023-03-08
Payer: COMMERCIAL

## 2023-03-08 DIAGNOSIS — Z95.2 S/P AVR (AORTIC VALVE REPLACEMENT): ICD-10-CM

## 2023-03-08 DIAGNOSIS — R77.8 ABNORMAL SPEP: ICD-10-CM

## 2023-03-08 DIAGNOSIS — H93.13 TINNITUS, BILATERAL: ICD-10-CM

## 2023-03-08 DIAGNOSIS — Z79.01 LONG TERM CURRENT USE OF ANTICOAGULANT THERAPY: ICD-10-CM

## 2023-03-08 DIAGNOSIS — Z95.2 S/P AVR: Primary | ICD-10-CM

## 2023-03-08 DIAGNOSIS — H90.3 SENSORINEURAL HEARING LOSS, BILATERAL: Primary | ICD-10-CM

## 2023-03-08 DIAGNOSIS — H61.23 BILATERAL IMPACTED CERUMEN: Primary | ICD-10-CM

## 2023-03-08 LAB — INR BLD: 2.6 (ref 0.9–1.1)

## 2023-03-08 PROCEDURE — 99203 OFFICE O/P NEW LOW 30 MIN: CPT | Mod: 25 | Performed by: OTOLARYNGOLOGY

## 2023-03-08 PROCEDURE — 36416 COLLJ CAPILLARY BLOOD SPEC: CPT

## 2023-03-08 PROCEDURE — 85610 PROTHROMBIN TIME: CPT

## 2023-03-08 PROCEDURE — 36415 COLL VENOUS BLD VENIPUNCTURE: CPT

## 2023-03-08 PROCEDURE — 92557 COMPREHENSIVE HEARING TEST: CPT | Performed by: AUDIOLOGIST

## 2023-03-08 PROCEDURE — 69210 REMOVE IMPACTED EAR WAX UNI: CPT | Performed by: OTOLARYNGOLOGY

## 2023-03-08 PROCEDURE — 92567 TYMPANOMETRY: CPT | Performed by: AUDIOLOGIST

## 2023-03-08 PROCEDURE — 86334 IMMUNOFIX E-PHORESIS SERUM: CPT

## 2023-03-08 NOTE — PROGRESS NOTES
AUDIOLOGY REPORT    SUMMARY: Audiology visit completed. See audiogram for results.     RECOMMENDATIONS: Retest hearing in 1-2 years or sooner if concerns arise. Will share results with ENT.    Hieu Storey, [facility]  [place] Licensed Audiologist #[id number]

## 2023-03-08 NOTE — PROGRESS NOTES
HPI: This patient is a 66yo F who presents for evaluation of tinnitus at the request of Dr. Woods. There is bilateral, non-pulsatile tinnitus, most noticeable when it is quiet. This has been present for years. There has been a slight gradual loss of hearing in both ears as well. Denies otalgia, otorrhea, vertigo, and other major medical issues.     Past medical history, surgical history, social history, family history, medications, and allergies have been reviewed with the patient and are documented above.    Review of Systems: a 10-system review was performed. Pertinent positives are noted in the HPI and on a separate scanned document in the chart.    PHYSICAL EXAMINATION:  GEN: no acute distress, normocephalic  EYES: extraocular movements are intact, pupils are equal and round. Sclera clear.   EARS: auricles are normally formed. The external auditory canals are cleared of bilateral cerumen impactions using forceps/pick. Tympanic membranes are intact bilaterally with no signs of infection, effusion, retractions, or perforations.  NOSE: anterior nares are patent.   OC/OP: clear, dentition is in good repair. The tongue and palate are fully mobile and symmetric. No masses or lesions.  NECK: soft and supple. No lymphadenopathy or masses. Airway is midline.   NEURO: CN VII and XII symmetric. alert and oriented. No spontaneous nystagmus. Gait is normal.  PULM: breathing comfortably on room air, normal chest expansion with respiration  CARDS: no cyanosis or clubbing, normal carotid pulses    AUDIOGRAM: mild to moderate SNHL bilaterally with type A tymps and symmetric WRS. Only minimal change when compared to 2015.    MEDICAL DECISION-MAKING: This patient is a 66yo F with HF sensorineural hearing loss and resultant tinnitus. Discussed hearing protection and masking techniques. Medically clear for hearing aids should the patient desire them. Cerumen cleared today.

## 2023-03-08 NOTE — LETTER
3/8/2023         RE: Alyssa Higginbotham  [address]  [place] [address]        Dear Colleague,    Thank you for referring your patient, Alyssa Higginbotham, to the [facility]. Please see a copy of my visit note below.    HPI: This patient is a 68yo F who presents for evaluation of tinnitus at the request of Dr. Woods. There is bilateral, non-pulsatile tinnitus, most noticeable when it is quiet. This has been present for years. There has been a slight gradual loss of hearing in both ears as well. Denies otalgia, otorrhea, vertigo, and other major medical issues.     Past medical history, surgical history, social history, family history, medications, and allergies have been reviewed with the patient and are documented above.    Review of Systems: a 10-system review was performed. Pertinent positives are noted in the HPI and on a separate scanned document in the chart.    PHYSICAL EXAMINATION:  GEN: no acute distress, normocephalic  EYES: extraocular movements are intact, pupils are equal and round. Sclera clear.   EARS: auricles are normally formed. The external auditory canals are cleared of bilateral cerumen impactions using forceps/pick. Tympanic membranes are intact bilaterally with no signs of infection, effusion, retractions, or perforations.  NOSE: anterior nares are patent.   OC/OP: clear, dentition is in good repair. The tongue and palate are fully mobile and symmetric. No masses or lesions.  NECK: soft and supple. No lymphadenopathy or masses. Airway is midline.   NEURO: CN VII and XII symmetric. alert and oriented. No spontaneous nystagmus. Gait is normal.  PULM: breathing comfortably on room air, normal chest expansion with respiration  CARDS: no cyanosis or clubbing, normal carotid pulses    AUDIOGRAM: mild to moderate SNHL bilaterally with type A tymps and symmetric WRS. Only minimal change when compared to 2015.    MEDICAL DECISION-MAKING: This patient is a 68yo F  with HF sensorineural hearing loss and resultant tinnitus. Discussed hearing protection and masking techniques. Medically clear for hearing aids should the patient desire them. Cerumen cleared today.           Again, thank you for allowing me to participate in the care of your patient.        Sincerely,        Brenda Lao MD

## 2023-03-08 NOTE — PROGRESS NOTES
ANTICOAGULATION MANAGEMENT     Alyssa Higginbotham 67 year old female is on warfarin with therapeutic INR result. (Goal INR 2.0-3.0)    Recent labs: (last 7 days)     03/08/23  0737   INR 2.6*       ASSESSMENT       Source(s): Chart Review and Patient/Caregiver Call       Warfarin doses taken: Warfarin taken as instructed    Diet: No new diet changes identified    New illness, injury, or hospitalization: No   ENT visit 3/8/23 - bilateral Tinnitus and hearing test.    Medication/supplement changes: None noted    Signs or symptoms of bleeding or clotting: No    Previous INR: Subtherapeutic at 1.9 on 2/27/23.    Additional findings: Yes.  Dental appt on 3/14/23.  (needs INR day prior to appt)         PLAN     Recommended plan for temporary change(s) affecting INR     Dosing Instructions: Continue your current warfarin dose with next INR in 1 week       Summary  As of 3/8/2023    Full warfarin instructions:  6 mg every Mon, Wed, Fri; 5 mg all other days   Next INR check:  3/15/2023             Telephone call with  Jennifer ([phone]) who verbalizes understanding and agrees to plan    Lab visit scheduled - INR on 3/13/23 @ [facility].    Education provided:     Taking warfarin: Importance of taking warfarin as instructed    Goal range and lab monitoring: goal range and significance of current result    Plan made per [facility] anticoagulation protocol    Aylin Pablo RN  Anticoagulation Clinic  3/8/2023    _______________________________________________________________________     Anticoagulation Episode Summary     Current INR goal:  2.0-3.0   TTR:  66.6 % (1 y)   Target end date:  Indefinite   Send INR reminders to:  [facility]    Indications    S/P AVR [Z95.2]  S/P AVR (aortic valve replacement) [Z95.2]  Long term current use of anticoagulant therapy [Z79.01]           Comments:  Goal range changed 2/20/19 per cardiology         Anticoagulation Care Providers     Provider Role Specialty Phone number    Chuck  Rafaela SUBRAMANIAN MD [facility] [phone]

## 2023-03-10 LAB — NONINV COLON CA DNA+OCC BLD SCRN STL QL: NEGATIVE

## 2023-03-13 ENCOUNTER — LAB (OUTPATIENT)
Dept: LAB | Facility: CLINIC | Age: 68
End: 2023-03-13
Payer: COMMERCIAL

## 2023-03-13 ENCOUNTER — ANTICOAGULATION THERAPY VISIT (OUTPATIENT)
Dept: ANTICOAGULATION | Facility: CLINIC | Age: 68
End: 2023-03-13

## 2023-03-13 DIAGNOSIS — Z79.01 LONG TERM CURRENT USE OF ANTICOAGULANT THERAPY: ICD-10-CM

## 2023-03-13 DIAGNOSIS — R77.8 ABNORMAL SPEP: Primary | ICD-10-CM

## 2023-03-13 DIAGNOSIS — Z95.2 S/P AVR (AORTIC VALVE REPLACEMENT): ICD-10-CM

## 2023-03-13 DIAGNOSIS — Z95.2 S/P AVR: Primary | ICD-10-CM

## 2023-03-13 LAB
INR BLD: 1.7 (ref 0.9–1.1)
PROT PATTERN SERPL IFE-IMP: NORMAL

## 2023-03-13 PROCEDURE — 85610 PROTHROMBIN TIME: CPT

## 2023-03-13 PROCEDURE — 36416 COLLJ CAPILLARY BLOOD SPEC: CPT

## 2023-03-13 NOTE — PROGRESS NOTES
ANTICOAGULATION MANAGEMENT     Alyssa Higginbotham 67 year old female is on warfarin with subtherapeutic INR result. (Goal INR 2.0-3.0)    Recent labs: (last 7 days)     03/13/23  0728   INR 1.7*       ASSESSMENT       Source(s): Chart Review and Patient/Caregiver Call       Warfarin doses taken: Missed dose(s) may be affecting INR    One missed dose, unsure of date.    Diet: No new diet changes identified    New illness, injury, or hospitalization: No    Medication/supplement changes: None noted    Signs or symptoms of bleeding or clotting: No    Previous INR: Therapeutic last visit at 2.6; previously outside of goal range at 1.7    Additional findings:  Dental appt on 3/14/23 - INR needed one day prior to appt. No procedure.         PLAN     Recommended plan for temporary change(s) affecting INR     Dosing Instructions: booster dose then continue your current warfarin dose with next INR in 1-2 weeks       Summary  As of 3/13/2023    Full warfarin instructions:  3/13: 8 mg; Otherwise 6 mg every Mon, Wed, Fri; 5 mg all other days   Next INR check:  3/27/2023             Telephone call with  Jennifer ([phone]) who verbalizes understanding and agrees to plan    Lab visit scheduled - INR on 3/23/23 @ [facility]    Education provided:     Taking warfarin: Importance of taking warfarin as instructed    Goal range and lab monitoring: goal range and significance of current result and Importance of therapeutic range    Plan made per [facility] anticoagulation protocol    Aylin Pablo, RN  Anticoagulation Clinic  3/13/2023    _______________________________________________________________________     Anticoagulation Episode Summary     Current INR goal:  2.0-3.0   TTR:  67.6 % (1 y)   Target end date:  Indefinite   Send INR reminders to:  CULLEN GALVEZ    Indications    S/P AVR [Z95.2]  S/P AVR (aortic valve replacement) [Z95.2]  Long term current use of anticoagulant therapy [Z79.01]           Comments:  Goal range  changed 2/20/19 per cardiology         Anticoagulation Care Providers     Provider Role Specialty Phone number    Rafaela Woods MD Referring Family Medicine [phone]

## 2023-03-14 ENCOUNTER — PATIENT OUTREACH (OUTPATIENT)
Dept: ONCOLOGY | Facility: CLINIC | Age: 68
End: 2023-03-14
Payer: COMMERCIAL

## 2023-03-14 NOTE — PROGRESS NOTES
[facility]: Cancer Care                                                                   Hem/Onc  Referral reviewed:  Referred By:  Rafaela Woods MD   [address]   [facility] [address]   Phone: [phone]   Fax: [phone]   Diagnoses: Abnormal SPEP   Order: Adult Oncology/Hematology  Referral     ASSESSMENT      Clinical History (per Nurse review of records provided):    67 year old female patient with abnormal SPEP.    Lives:  [address]  [place] [address]    Insurance:  Payor: [facility] / Plan: [facility] MEDICARE / Product Type: HMO /     PCP:   Rafaela Woods    Records Location: [facility]     Pertinent labs 2/19/23, 3/01/23, and 3/8/23 -- BOOKMARKED    Referring provider note(s) 3/01/23-- BOOKMARKED    INTERVENTION(S)                                                      Oncology Nurse Navigator called patient to introduce the role or the nurse navigator  and to inform them that a referral for [facility] Hematology/Oncology department has been received for dx of abnormal SPEP from Dr. Rafaela Woods.     Patient did not answer the phone so a detailed message was left for them including NPS number. Requested callback to speak to a  to set the consult date/time/location. Explained to patient in the message that they will receive a call from our new patient scheduling team (NPS number below, hours are Monday - Friday 8am - 4:30 pm) in the next 1-2 business days to schedule the consultation. Encouraged them to call back with any questions.     Records team will contact pt directly if ROIs needed for records, imaging, slides    PLAN                                                      Hem/Onc consult: Message sent to NPS to schedule consultation at next available slot at patients location of choice.    Laura Kincaid, RN, BSN  Hematology/Oncology New Patient Nurse Navigator   [facility] Cancer Care  [phone]

## 2023-03-23 ENCOUNTER — ANTICOAGULATION THERAPY VISIT (OUTPATIENT)
Dept: ANTICOAGULATION | Facility: CLINIC | Age: 68
End: 2023-03-23

## 2023-03-23 ENCOUNTER — LAB (OUTPATIENT)
Dept: LAB | Facility: CLINIC | Age: 68
End: 2023-03-23
Payer: COMMERCIAL

## 2023-03-23 DIAGNOSIS — Z95.2 S/P AVR: Primary | ICD-10-CM

## 2023-03-23 DIAGNOSIS — Z95.2 S/P AVR (AORTIC VALVE REPLACEMENT): ICD-10-CM

## 2023-03-23 DIAGNOSIS — Z79.01 LONG TERM CURRENT USE OF ANTICOAGULANT THERAPY: ICD-10-CM

## 2023-03-23 LAB — INR BLD: 2.8 (ref 0.9–1.1)

## 2023-03-23 PROCEDURE — 85610 PROTHROMBIN TIME: CPT

## 2023-03-23 PROCEDURE — 36415 COLL VENOUS BLD VENIPUNCTURE: CPT

## 2023-03-23 NOTE — PROGRESS NOTES
Patient returning call to ACN who was unavailable.  Please call patient back.    JOJO BurnhamN, RN  Anticoagulation Clinic

## 2023-03-23 NOTE — PROGRESS NOTES
ANTICOAGULATION MANAGEMENT     Alyssa Higginbotham 67 year old female is on warfarin with therapeutic INR result. (Goal INR 2.0-3.0)    Recent labs: (last 7 days)     03/23/23  0708   INR 2.8*       ASSESSMENT       Source(s): Chart Review and Patient/Caregiver Call       Warfarin doses taken: Warfarin taken as instructed    Diet: No new diet changes identified    New illness, injury, or hospitalization: No    Medication/supplement changes: None noted    Signs or symptoms of bleeding or clotting: No    Previous INR: Subtherapeutic at 1.7 on 3/13/23.    Additional findings:  Scheduled with Oncology on 4/12/23 for new abnormal SPEP.         PLAN     Recommended plan for ongoing change(s) affecting INR     Dosing Instructions: Continue your current warfarin dose with next INR in 1-2 weeks       Summary  As of 3/23/2023    Full warfarin instructions:  6 mg every Mon, Wed, Fri; 5 mg all other days   Next INR check:  4/6/2023             Telephone call with  Jennifer ([phone]) who verbalizes understanding and agrees to plan    Lab visit scheduled - INR on 4/3/23 @ [facility].    Education provided:     Taking warfarin: Importance of taking warfarin as instructed    Goal range and lab monitoring: goal range and significance of current result    Plan made per [facility] anticoagulation protocol    Aylin Pablo RN  Anticoagulation Clinic  3/23/2023    _______________________________________________________________________     Anticoagulation Episode Summary     Current INR goal:  2.0-3.0   TTR:  69.1 % (1 y)   Target end date:  Indefinite   Send INR reminders to:  [facility]    Indications    S/P AVR [Z95.2]  S/P AVR (aortic valve replacement) [Z95.2]  Long term current use of anticoagulant therapy [Z79.01]           Comments:  Goal range changed 2/20/19 per cardiology         Anticoagulation Care Providers     Provider Role Specialty Phone number    Rafaela Woods MD Referring [facility] Medicine [phone]

## 2023-04-04 ENCOUNTER — LAB (OUTPATIENT)
Dept: LAB | Facility: CLINIC | Age: 68
End: 2023-04-04
Payer: COMMERCIAL

## 2023-04-04 ENCOUNTER — ANTICOAGULATION THERAPY VISIT (OUTPATIENT)
Dept: ANTICOAGULATION | Facility: CLINIC | Age: 68
End: 2023-04-04

## 2023-04-04 DIAGNOSIS — Z79.01 LONG TERM CURRENT USE OF ANTICOAGULANT THERAPY: ICD-10-CM

## 2023-04-04 DIAGNOSIS — Z95.2 S/P AVR: Primary | ICD-10-CM

## 2023-04-04 DIAGNOSIS — Z95.2 S/P AVR (AORTIC VALVE REPLACEMENT): ICD-10-CM

## 2023-04-04 LAB — INR BLD: 3.9 (ref 0.9–1.1)

## 2023-04-04 PROCEDURE — 85610 PROTHROMBIN TIME: CPT

## 2023-04-04 PROCEDURE — 36416 COLLJ CAPILLARY BLOOD SPEC: CPT

## 2023-04-04 NOTE — PROGRESS NOTES
ANTICOAGULATION MANAGEMENT     Alyssa Higginbotham 68 year old female is on warfarin with supratherapeutic INR result. (Goal INR 2.0-3.0)    Recent labs: (last 7 days)     04/04/23  0700   INR 3.9*       ASSESSMENT       Source(s): Chart Review and Patient/Caregiver Call       Previous INR: Therapeutic last visit at 2.8; previously outside of goal range at 1.7    Additional findings: Scheduled with Oncology on 4/12/23 for new abnormal SPEP.        PLAN     Unable to reach Jennifer today.    Left message to hold warfarin tonight. Request call back for assessment.    Follow up required to confirm warfarin dose taken and assess for changes    Aylin Pablo RN  Anticoagulation Clinic  4/4/2023

## 2023-04-05 NOTE — PROGRESS NOTES
ANTICOAGULATION MANAGEMENT     Alyssa Higginbotham 68 year old female is on warfarin with supratherapeutic INR result. (Goal INR 2.0-3.0)    Recent labs: (last 7 days)     04/04/23  0700   INR 3.9*       ASSESSMENT       Source(s): Chart Review and Patient/Caregiver Call       Warfarin doses taken: Warfarin taken as instructed    Diet: No new diet changes identified    New illness, injury, or hospitalization: No    Medication/supplement changes:  Yes.    The only change in regimen was a switch of her usual Probiotic supplements which they were out of,  and bought a different brand.  Will use up what she has and switch back after.    Signs or symptoms of bleeding or clotting: No    Previous INR: Therapeutic last visit at 2.8; previously outside of goal range at 1.7    Additional findings: None         PLAN     Recommended plan for temporary change(s) affecting INR     Dosing Instructions: hold dose then decrease your warfarin dose (5.2% change) with next INR in 1 week       Summary  As of 4/4/2023    Full warfarin instructions:  4/4: Hold; Otherwise 6 mg every Mon; 5 mg all other days   Next INR check:  4/18/2023             Telephone call with  Jennifer ([phone]) who verbalizes understanding and agrees to plan    Lab visit scheduled - INR on 4/11/23 @ [facility].    Education provided:     Taking warfarin: Importance of taking warfarin as instructed    Goal range and lab monitoring: goal range and significance of current result    Plan made per [facility] anticoagulation protocol    Aylin Pablo RN  Anticoagulation Clinic  4/5/2023    _______________________________________________________________________     Anticoagulation Episode Summary     Current INR goal:  2.0-3.0   TTR:  64.9 % (1 y)   Target end date:  Indefinite   Send INR reminders to:  [facility]    Indications    S/P AVR [Z95.2]  S/P AVR (aortic valve replacement) [Z95.2]  Long term current use of anticoagulant therapy [Z79.01]            Comments:  Goal range changed 2/20/19 per cardiology         Anticoagulation Care Providers     Provider Role Specialty Phone number    Rafaela Woods MD Referring Family Medicine [phone]

## 2023-04-11 ENCOUNTER — LAB (OUTPATIENT)
Dept: LAB | Facility: CLINIC | Age: 68
End: 2023-04-11
Payer: COMMERCIAL

## 2023-04-11 ENCOUNTER — ANTICOAGULATION THERAPY VISIT (OUTPATIENT)
Dept: ANTICOAGULATION | Facility: CLINIC | Age: 68
End: 2023-04-11

## 2023-04-11 DIAGNOSIS — Z95.2 S/P AVR: Primary | ICD-10-CM

## 2023-04-11 DIAGNOSIS — Z95.2 S/P AVR (AORTIC VALVE REPLACEMENT): ICD-10-CM

## 2023-04-11 DIAGNOSIS — Z79.01 LONG TERM CURRENT USE OF ANTICOAGULANT THERAPY: ICD-10-CM

## 2023-04-11 LAB — INR BLD: 2.8 (ref 0.9–1.1)

## 2023-04-11 PROCEDURE — 36416 COLLJ CAPILLARY BLOOD SPEC: CPT

## 2023-04-11 PROCEDURE — 85610 PROTHROMBIN TIME: CPT

## 2023-04-11 NOTE — PROGRESS NOTES
ANTICOAGULATION MANAGEMENT     Alyssa Higginbotham 68 year old female is on warfarin with therapeutic INR result. (Goal INR 2.0-3.0)    Recent labs: (last 7 days)     04/11/23  0705   INR 2.8*       ASSESSMENT       Source(s): Chart Review and Patient/Caregiver Call       Warfarin doses taken: Held for 1 day  recently which may be affecting INR    Held warfarin dose on 4/4, as instructed.     And decreased weekly warfarin dose.    Diet: No new diet changes identified    New illness, injury, or hospitalization: No    Medication/supplement changes: None noted    Currently using up new brand Probiotic supplements; then will resume her previous usual brand.    Signs or symptoms of bleeding or clotting: No    Previous INR: Supratherapeutic at 3.9 on 4/4/23.    Additional findings: None         PLAN     Recommended plan for no diet, medication or health factor changes affecting INR     Dosing Instructions: Continue your current warfarin dose with next INR in 1-2 weeks       Summary  As of 4/11/2023    Full warfarin instructions:  6 mg every Mon; 5 mg all other days   Next INR check:  4/25/2023             Telephone call with  Jennifer ([phone]) who verbalizes understanding and agrees to plan    Lab visit scheduled - INR on 4/20/23 @ [facility].    Education provided:     Taking warfarin: Importance of taking warfarin as instructed    Goal range and lab monitoring: goal range and significance of current result    Plan made per [facility] anticoagulation protocol    Aylin Pablo RN  Anticoagulation Clinic  4/11/2023    _______________________________________________________________________     Anticoagulation Episode Summary     Current INR goal:  2.0-3.0   TTR:  64.9 % (1 y)   Target end date:  Indefinite   Send INR reminders to:  [facility]    Indications    S/P AVR [Z95.2]  S/P AVR (aortic valve replacement) [Z95.2]  Long term current use of anticoagulant therapy [Z79.01]           Comments:  Goal range changed  2/20/19 per cardiology         Anticoagulation Care Providers     Provider Role Specialty Phone number    Rafaela Woods MD Referring Family Medicine [phone]

## 2023-04-12 ENCOUNTER — ONCOLOGY VISIT (OUTPATIENT)
Dept: ONCOLOGY | Facility: HOSPITAL | Age: 68
End: 2023-04-12
Attending: FAMILY MEDICINE
Payer: COMMERCIAL

## 2023-04-12 ENCOUNTER — LAB (OUTPATIENT)
Dept: INFUSION THERAPY | Facility: HOSPITAL | Age: 68
End: 2023-04-12
Attending: INTERNAL MEDICINE
Payer: COMMERCIAL

## 2023-04-12 VITALS
BODY MASS INDEX: 21.57 KG/M2 | HEART RATE: 70 BPM | DIASTOLIC BLOOD PRESSURE: 77 MMHG | OXYGEN SATURATION: 97 % | TEMPERATURE: 98.6 F | SYSTOLIC BLOOD PRESSURE: 166 MMHG | HEIGHT: 62 IN | RESPIRATION RATE: 18 BRPM | WEIGHT: 117.2 LBS

## 2023-04-12 DIAGNOSIS — R77.8 ABNORMAL SPEP: ICD-10-CM

## 2023-04-12 PROCEDURE — 36415 COLL VENOUS BLD VENIPUNCTURE: CPT

## 2023-04-12 PROCEDURE — 99203 OFFICE O/P NEW LOW 30 MIN: CPT | Performed by: INTERNAL MEDICINE

## 2023-04-12 PROCEDURE — 83521 IG LIGHT CHAINS FREE EACH: CPT | Mod: 59

## 2023-04-12 PROCEDURE — G0463 HOSPITAL OUTPT CLINIC VISIT: HCPCS | Performed by: INTERNAL MEDICINE

## 2023-04-12 ASSESSMENT — PAIN SCALES - GENERAL: PAINLEVEL: NO PAIN (0)

## 2023-04-12 NOTE — LETTER
"    4/12/2023         RE: Alyssa Higginbotham  [address]  [place] [address]        Dear Colleague,    Thank you for referring your patient, Alyssa Higginbotham, to the [facility]CER [facility]. Please see a copy of my visit note below.    Oncology Rooming Note    April 12, 2023 2:56 PM   Alyssa Higginbotham is a 68 year old female who presents for:    Chief Complaint   Patient presents with     Hematology     Abnormal SEP     Initial Vitals: BP (!) 166/77   Pulse 70   Temp 98.6  F (37  C)   Resp 18   Ht 1.562 m (5' 1.5\")   Wt 53.2 kg (117 lb 3.2 oz)   SpO2 97%   BMI 21.79 kg/m   Estimated body mass index is 21.79 kg/m  as calculated from the following:    Height as of this encounter: 1.562 m (5' 1.5\").    Weight as of this encounter: 53.2 kg (117 lb 3.2 oz). Body surface area is 1.52 meters squared.  No Pain (0) Comment: Data Unavailable   No LMP recorded. Patient is postmenopausal.  Allergies reviewed: Yes  Medications reviewed: Yes    Medications: Medication refills not needed today.  Pharmacy name entered into [facility]:    [facility] #[id number] - [address] AT [facility] [employer] [place] [facility] [employer] [facility] [address]ARM[address]    Clinical concerns: None       Mari Flowers LPN              [facility] Hematology and Oncology Consult Note    Patient: Alyssa Higginbotham  MRN: [id number]  Date of Service: 04/12/2023      Reason for Visit    Chief Complaint   Patient presents with     Hematology     Abnormal SEP         Assessment/Plan    Problem List Items Addressed This Visit        Other    Abnormal SPEP    Relevant Orders    Kappa and lambda light chain (Completed)     Monoclonal gammopathy of unknown significance  Reviewed her history and labs in detail today.  She was noted to have " elevated calcium earlier this year.  Calcium level was 10.7.  Parathyroid hormone normal at 32.  SPEP showed small IgG lambda monoclonal protein with a peak of 0.1 g/dL.  Light chains were not done.  Normal CBC.  Normal lites.  No clinical signs or symptoms of myeloma.  I reviewed the diagnosis of MGUS today with her.  I do not think her hypercalcemia secondary to monoclonal gammopathy.  No concerns for clinical myeloma.  I will check her kappa and lambda free light chains today.  If they are all completely normal then I will see her back in 6 months with repeat SPEP and light chains.      ECOG Performance    0 - Independent    Problem List    Patient Active Problem List   Diagnosis     Mitral Valve Disorder     Dissection Of The Aorta     Anxiety     Tension-type Headache     Marfan Syndrome     Hypothyroidism     Hyperlipidemia     Depression     Migraine     Aortic Valve Disorder     Myalgia And Myositis     S/P AVR     Meningioma (H)-initially seen on CT of head that was obtained after a fall. Frontal lobe, confirmed on an MRI Mar 2015, 3 mm     Concussion syndrome     Vertigo     PTSD (post-traumatic stress disorder)     Thyroid nodule     Family history of ovarian cancer     PADDY (generalized anxiety disorder)     Presbyopia     Long term current use of anticoagulant therapy     S/P AVR (aortic valve replacement)     Essential hypertension     Hallux limitus, right     Adenomatous colon polyp-about 2010, scope clear in 2016     Benign essential hypertension     Abnormal SPEP     ______________________________________________________________________________    Staging History    Cancer Staging   No matching staging information was found for the patient.      History of presenting illness:  Alyssa Higginbotham is a 68-year-old female who has been referred by her primary care provider for further evaluation management of monoclonal gammopathy of unknown significance.    Recently she was noted to have hypercalcemia  with a calcium of 10.7.  As a part of evaluation for that an SPEP was ordered.  SPEP showed small monoclonal peak of 0.1 g/dL.  Which was thought to be IgG lambda on tumor fixation.  Light chains were not ordered.  Intact parathyroid hormone was normal at 32.  Other lab studies looking into cause for hypercalcemia were also within normal limits.  Denies any bone pain.  No weight loss.  CBC was normal except for mild lymphocytopenia.  No fever chills or night sweats.  Does have a family history of pancreatic cancer in her father and ovarian cancer in her sister.  Never smoker.      Past History    Past Medical History:   Diagnosis Date     Accidental fall 05/27/2015     Antiplatelet or antithrombotic long-term use      Anxiety      Aortic dissection (H)      Asthma      Benign meningioma of brain (H) 03/2015    initially seen on CT of head that was obtained after a fall. Frontal lobe, confirmed on an MRI Mar 2015, 3 mm     Bunion      Chronic headache      Depression      Heart murmur      Hepatitis C      Hyperlipidemia      Hypothyroid      Irregular heart rate      Nasal fracture 02/28/2015    fell face first on sideache     Osteoporosis      Other acquired deformity of toe      Stroke (H)     on CT scan    Family History   Problem Relation Age of Onset     Pancreatic Cancer Father 70.00        history of smoking     Ovarian Cancer Sister 67.00        stage III, fatal     Skin Cancer Sister      Heart Disease Mother      Diabetes No family hx of      Alzheimer Disease No family hx of       Past Surgical History:   Procedure Laterality Date     AORTIC VALVE REPLACEMENT   2000     ARTHROPLASTY TOE(S) Right 2/13/2023    Procedure: First metatarsal phalangeal joint implant arthroplasty right foot;  Surgeon: Chin Riojas DPM;  Location: [facility]     BIOPSY BREAST Left 2005    benign     COLONOSCOPY  2011     REPAIR THORACIC AORTA   2000    Social History     Socioeconomic History     Marital status:       Spouse name: Not on file     Number of children:  0     Years of education: Not on file     Highest education level: Not on file   Occupational History     Not on file   Tobacco Use     Smoking status: Never     Smokeless tobacco: Never   Vaping Use     Vaping status: Never Used   Substance and Sexual Activity     Alcohol use: No     Drug use: No     Sexual activity: Not Currently   Other Topics Concern     Not on file   Social History Narrative     Not on file     Social Determinants of Health     Financial Resource Strain: Not on file   Food Insecurity: Not on file   Transportation Needs: Not on file   Physical Activity: Not on file   Stress: Not on file   Social Connections: Not on file   Intimate Partner Violence: Not on file   Housing Stability: Not on file        Allergies    Allergies   Allergen Reactions     Codeine Other (See Comments)     Faints     Demerol [Meperidine] Other (See Comments)     Reaction not reported by patient., Patient states she felt awful.       Review of Systems    Pertinent items are noted in HPI.      Physical Exam        4/12/2023     2:56 PM   Oncology Vitals   Height 156 cm   Weight 53.162 kg   BSA (m2) 1.52 m2   /77   Pulse 70   Temp 98.6  F (37  C)   SpO2 97 %   Pain Score 0 (None)       General: alert and cooperative  Chest: Clear to auscultation bilaterally  Cardiac: S1, S2 normal, regular rate and rhythm  Extremities: atraumatic, no peripheral edema  Skin: No rashes or bruises  CNS: Alert and oriented x3, neurologic exam grossly normal.  Lymphatics: No bilateral cervical, axillary, supraclavicular or inguinal adenopathy noted      Lab Results    Recent Results (from the past 168 hour(s))   INR point of care   Result Value Ref Range    INR 3.1 (H) 0.9 - 1.1       Imaging Results    No results found.    A total of 30 minutes was spent today on this visit including face to face conversation with the patient, EMR review (labs, imaging studies, pathology reports  and outside records), counseling and care co-ordination and documentation.    Signed by: Katrin Cabrera MD      Again, thank you for allowing me to participate in the care of your patient.        Sincerely,        Katrin Cabrera MD

## 2023-04-12 NOTE — PROGRESS NOTES
"Oncology Rooming Note    April 12, 2023 2:56 PM   Alyssa Higginbotham is a 68 year old female who presents for:    Chief Complaint   Patient presents with     Hematology     Abnormal SEP     Initial Vitals: BP (!) 166/77   Pulse 70   Temp 98.6  F (37  C)   Resp 18   Ht 1.562 m (5' 1.5\")   Wt 53.2 kg (117 lb 3.2 oz)   SpO2 97%   BMI 21.79 kg/m   Estimated body mass index is 21.79 kg/m  as calculated from the following:    Height as of this encounter: 1.562 m (5' 1.5\").    Weight as of this encounter: 53.2 kg (117 lb 3.2 oz). Body surface area is 1.52 meters squared.  No Pain (0) Comment: Data Unavailable   No LMP recorded. Patient is postmenopausal.  Allergies reviewed: Yes  Medications reviewed: Yes    Medications: Medication refills not needed today.  Pharmacy name entered into [facility]:    [facility] #[id number] - [place], [place] - [address] [place] [address] AT [facility] [employer] [facility] [employer]  [employer] [facility] [facility] - [facility], [address] [place] [address] PHARMACY [facility] [address] [place] [address]    Clinical concerns: None       Mari Flowers LPN            "

## 2023-04-13 LAB
KAPPA LC FREE SER-MCNC: 1.84 MG/DL (ref 0.33–1.94)
KAPPA LC FREE/LAMBDA FREE SER NEPH: 0.95 {RATIO} (ref 0.26–1.65)
LAMBDA LC FREE SERPL-MCNC: 1.93 MG/DL (ref 0.57–2.63)

## 2023-04-20 ENCOUNTER — LAB (OUTPATIENT)
Dept: LAB | Facility: CLINIC | Age: 68
End: 2023-04-20
Payer: COMMERCIAL

## 2023-04-20 ENCOUNTER — ANTICOAGULATION THERAPY VISIT (OUTPATIENT)
Dept: ANTICOAGULATION | Facility: CLINIC | Age: 68
End: 2023-04-20

## 2023-04-20 DIAGNOSIS — Z95.2 S/P AVR (AORTIC VALVE REPLACEMENT): ICD-10-CM

## 2023-04-20 DIAGNOSIS — Z79.01 LONG TERM CURRENT USE OF ANTICOAGULANT THERAPY: ICD-10-CM

## 2023-04-20 LAB — INR BLD: 2.3 (ref 0.9–1.1)

## 2023-04-20 PROCEDURE — 36416 COLLJ CAPILLARY BLOOD SPEC: CPT

## 2023-04-20 PROCEDURE — 85610 PROTHROMBIN TIME: CPT

## 2023-04-20 NOTE — PROGRESS NOTES
ANTICOAGULATION MANAGEMENT     Alyssa Higginbotham 68 year old female is on warfarin with therapeutic INR result. (Goal INR 2.0-3.0)    Recent labs: (last 7 days)     04/20/23  0717   INR 2.3*       ASSESSMENT       Source(s): Chart Review and Patient/Caregiver Call       Warfarin doses taken: Reviewed in chart    Diet: No new diet changes identified    Medication/supplement changes: stopped previous probiotic which pt thinks made her INR level elevate    New illness, injury, or hospitalization: No    Signs or symptoms of bleeding or clotting: No    Previous INR: Therapeutic last visit; previously outside of goal range    Additional findings: Pt reports that she decided to just take 5 mg daily instead of what was advised on 4/11/23. Pt reports that she wants to continue this maintenance dose but would be willing to recheck INR level again in 1 week.         PLAN     Recommended plan for ongoing change(s) affecting INR     Dosing Instructions: Take 5 mg daily with next INR in 1 week       Summary  As of 4/20/2023    Full warfarin instructions:  5 mg every day   Next INR check:  4/27/2023             Telephone call with Jennifer who verbalizes understanding and agrees to plan    Lab visit scheduled    Education provided:     Contact [phone] with any changes, questions or concerns.     Plan made per [facility] anticoagulation protocol    Ivana Hercules RN  Anticoagulation Clinic  4/20/2023    _______________________________________________________________________     Anticoagulation Episode Summary     Current INR goal:  2.0-3.0   TTR:  64.8 % (1 y)   Target end date:  Indefinite   Send INR reminders to:  [facility]    Indications    S/P AVR [Z95.2]  S/P AVR (aortic valve replacement) [Z95.2]  Long term current use of anticoagulant therapy [Z79.01]           Comments:  Goal range changed 2/20/19 per cardiology         Anticoagulation Care Providers     Provider Role Specialty Phone number    Rafaela Woods  MD MARILYNN [facility] Medicine [phone]

## 2023-04-27 ENCOUNTER — APPOINTMENT (OUTPATIENT)
Dept: LAB | Facility: CLINIC | Age: 68
End: 2023-04-27
Payer: COMMERCIAL

## 2023-04-27 ENCOUNTER — ANTICOAGULATION THERAPY VISIT (OUTPATIENT)
Dept: ANTICOAGULATION | Facility: CLINIC | Age: 68
End: 2023-04-27

## 2023-04-27 LAB — INR BLD: 2.9 (ref 0.9–1.1)

## 2023-04-27 PROCEDURE — 85610 PROTHROMBIN TIME: CPT

## 2023-04-27 PROCEDURE — 36416 COLLJ CAPILLARY BLOOD SPEC: CPT

## 2023-04-27 NOTE — PROGRESS NOTES
ANTICOAGULATION MANAGEMENT     Alyssa Higginbotham 68 year old female is on warfarin with therapeutic INR result. (Goal INR 2.0-3.0)    Recent labs: (last 7 days)     04/27/23  0843   INR 2.9*       ASSESSMENT       Source(s): Chart Review    Previous INR was Therapeutic last 2(+) visits     Left a detailed voicemail message advising pt to continue taking 5 mg of warfarin daily and recheck INR in 2 weeks. Pt to call back if questions, updates or concerns.             PLAN       Dosing Instructions: Continue your current warfarin dose with next INR in 2 weeks       Summary  As of 4/27/2023    Full warfarin instructions:  5 mg every day   Next INR check:  5/11/2023             Detailed voice message left for Jennifer with dosing instructions and follow up date.     Contact [phone] to schedule and with any changes, questions or concerns.     Education provided:     Please call back if any changes to your diet, medications or how you've been taking warfarin    Contact [phone] with any changes, questions or concerns.     Plan made per [facility] anticoagulation protocol    Ivana Hercules RN  Anticoagulation Clinic  4/27/2023    _______________________________________________________________________     Anticoagulation Episode Summary     Current INR goal:  2.0-3.0   TTR:  64.9 % (1 y)   Target end date:  Indefinite   Send INR reminders to:  [facility]    Indications    S/P AVR [Z95.2]  S/P AVR (aortic valve replacement) [Z95.2]  Long term current use of anticoagulant therapy [Z79.01]           Comments:  Goal range changed 2/20/19 per cardiology         Anticoagulation Care Providers     Provider Role Specialty Phone number    Rafaela Woods MD Referring Family Medicine [phone]

## 2023-05-11 ENCOUNTER — ANTICOAGULATION THERAPY VISIT (OUTPATIENT)
Dept: ANTICOAGULATION | Facility: CLINIC | Age: 68
End: 2023-05-11

## 2023-05-11 ENCOUNTER — LAB (OUTPATIENT)
Dept: LAB | Facility: CLINIC | Age: 68
End: 2023-05-11
Payer: COMMERCIAL

## 2023-05-11 DIAGNOSIS — Z95.2 S/P AVR: Primary | ICD-10-CM

## 2023-05-11 DIAGNOSIS — Z79.01 LONG TERM CURRENT USE OF ANTICOAGULANT THERAPY: ICD-10-CM

## 2023-05-11 DIAGNOSIS — Z95.2 S/P AVR (AORTIC VALVE REPLACEMENT): ICD-10-CM

## 2023-05-11 LAB — INR BLD: 3.1 (ref 0.9–1.1)

## 2023-05-11 PROCEDURE — 85610 PROTHROMBIN TIME: CPT

## 2023-05-11 PROCEDURE — 36415 COLL VENOUS BLD VENIPUNCTURE: CPT

## 2023-05-11 NOTE — PROGRESS NOTES
[facility] Hematology and Oncology Consult Note    Patient: Alyssa Higginbotham  MRN: [id number]  Date of Service: 04/12/2023      Reason for Visit    Chief Complaint   Patient presents with     Hematology     Abnormal SEP         Assessment/Plan    Problem List Items Addressed This Visit        Other    Abnormal SPEP    Relevant Orders    Kappa and lambda light chain (Completed)     Monoclonal gammopathy of unknown significance  Reviewed her history and labs in detail today.  She was noted to have elevated calcium earlier this year.  Calcium level was 10.7.  Parathyroid hormone normal at 32.  SPEP showed small IgG lambda monoclonal protein with a peak of 0.1 g/dL.  Light chains were not done.  Normal CBC.  Normal lites.  No clinical signs or symptoms of myeloma.  I reviewed the diagnosis of MGUS today with her.  I do not think her hypercalcemia secondary to monoclonal gammopathy.  No concerns for clinical myeloma.  I will check her kappa and lambda free light chains today.  If they are all completely normal then I will see her back in 6 months with repeat SPEP and light chains.      ECOG Performance    0 - Independent    Problem List    Patient Active Problem List   Diagnosis     Mitral Valve Disorder     Dissection Of The Aorta     Anxiety     Tension-type Headache     Marfan Syndrome     Hypothyroidism     Hyperlipidemia     Depression     Migraine     Aortic Valve Disorder     Myalgia And Myositis     S/P AVR     Meningioma (H)-initially seen on CT of head that was obtained after a fall. Frontal lobe, confirmed on an MRI Mar 2015, 3 mm     Concussion syndrome     Vertigo     PTSD (post-traumatic stress disorder)     Thyroid nodule     Family history of ovarian cancer     PADDY (generalized anxiety disorder)     Presbyopia     Long term current use of anticoagulant therapy     S/P AVR (aortic valve replacement)     Essential hypertension     Hallux limitus, right     Adenomatous colon polyp-about 2010, scope  clear in 2016     Benign essential hypertension     Abnormal SPEP     ______________________________________________________________________________    Staging History    Cancer Staging   No matching staging information was found for the patient.      History of presenting illness:  Alyssa Higginbotham is a 68-year-old female who has been referred by her primary care provider for further evaluation management of monoclonal gammopathy of unknown significance.    Recently she was noted to have hypercalcemia with a calcium of 10.7.  As a part of evaluation for that an SPEP was ordered.  SPEP showed small monoclonal peak of 0.1 g/dL.  Which was thought to be IgG lambda on tumor fixation.  Light chains were not ordered.  Intact parathyroid hormone was normal at 32.  Other lab studies looking into cause for hypercalcemia were also within normal limits.  Denies any bone pain.  No weight loss.  CBC was normal except for mild lymphocytopenia.  No fever chills or night sweats.  Does have a family history of pancreatic cancer in her father and ovarian cancer in her sister.  Never smoker.      Past History    Past Medical History:   Diagnosis Date     Accidental fall 05/27/2015     Antiplatelet or antithrombotic long-term use      Anxiety      Aortic dissection (H)      Asthma      Benign meningioma of brain (H) 03/2015    initially seen on CT of head that was obtained after a fall. Frontal lobe, confirmed on an MRI Mar 2015, 3 mm     Bunion      Chronic headache      Depression      Heart murmur      Hepatitis C      Hyperlipidemia      Hypothyroid      Irregular heart rate      Nasal fracture 02/28/2015    fell face first on sideache     Osteoporosis      Other acquired deformity of toe      Stroke (H)     on CT scan    Family History   Problem Relation Age of Onset     Pancreatic Cancer Father 70.00        history of smoking     Ovarian Cancer Sister 67.00        stage III, fatal     Skin Cancer Sister      Heart Disease  Mother      Diabetes No family hx of      Alzheimer Disease No family hx of       Past Surgical History:   Procedure Laterality Date     AORTIC VALVE REPLACEMENT   2000     ARTHROPLASTY TOE(S) Right 2/13/2023    Procedure: First metatarsal phalangeal joint implant arthroplasty right foot;  Surgeon: Chin Riojas DPM;  Location: [facility]     BIOPSY BREAST Left 2005    benign     COLONOSCOPY  2011     REPAIR THORACIC AORTA   2000    Social History     Socioeconomic History     Marital status:      Spouse name: Not on file     Number of children:  0     Years of education: Not on file     Highest education level: Not on file   Occupational History     Not on file   Tobacco Use     Smoking status: Never     Smokeless tobacco: Never   Vaping Use     Vaping status: Never Used   Substance and Sexual Activity     Alcohol use: No     Drug use: No     Sexual activity: Not Currently   Other Topics Concern     Not on file   Social History Narrative     Not on file     Social Determinants of Health     Financial Resource Strain: Not on file   Food Insecurity: Not on file   Transportation Needs: Not on file   Physical Activity: Not on file   Stress: Not on file   Social Connections: Not on file   Intimate Partner Violence: Not on file   Housing Stability: Not on file        Allergies    Allergies   Allergen Reactions     Codeine Other (See Comments)     Faints     Demerol [Meperidine] Other (See Comments)     Reaction not reported by patient., Patient states she felt awful.       Review of Systems    Pertinent items are noted in HPI.      Physical Exam        4/12/2023     2:56 PM   Oncology Vitals   Height 156 cm   Weight 53.162 kg   BSA (m2) 1.52 m2   /77   Pulse 70   Temp 98.6  F (37  C)   SpO2 97 %   Pain Score 0 (None)       General: alert and cooperative  Chest: Clear to auscultation bilaterally  Cardiac: S1, S2 normal, regular rate and rhythm  Extremities: atraumatic, no peripheral edema  Skin: No  rashes or bruises  CNS: Alert and oriented x3, neurologic exam grossly normal.  Lymphatics: No bilateral cervical, axillary, supraclavicular or inguinal adenopathy noted      Lab Results    Recent Results (from the past 168 hour(s))   INR point of care   Result Value Ref Range    INR 3.1 (H) 0.9 - 1.1       Imaging Results    No results found.    A total of 30 minutes was spent today on this visit including face to face conversation with the patient, EMR review (labs, imaging studies, pathology reports and outside records), counseling and care co-ordination and documentation.    Signed by: Katrin Cabrera MD

## 2023-05-11 NOTE — PROGRESS NOTES
ANTICOAGULATION MANAGEMENT     Alyssa Higginbotham 68 year old female is on warfarin with supratherapeutic INR result. (Goal INR 2.0-3.0)    Recent labs: (last 7 days)     05/11/23  0707   INR 3.1*       ASSESSMENT       Source(s): Chart Review and Patient/Caregiver Call       Warfarin doses taken: Warfarin taken as instructed    Diet: No new diet changes identified    Reported, added a little Keto to her diet.    Medication/supplement changes: None noted    Currently resumed her usual probiotic supplements.  (was previously taking different probiotic supplements).    New illness, injury, or hospitalization: No    Signs or symptoms of bleeding or clotting: No    Previous result: Therapeutic last 3 visits    Additional findings:  INR trending up.  Prefers INR to be 2.0 - 2.5         PLAN     Recommended plan for ongoing change(s) affecting INR     Dosing Instructions: decrease your warfarin dose (8.6% change) with next INR in 1-2 weeks       Summary  As of 5/11/2023    Full warfarin instructions:  2 mg every Thu; 5 mg all other days   Next INR check:  5/22/2023             Telephone call with  Jennifer ([phone]) who verbalizes understanding and agrees to plan    Lab visit scheduled - INR on 5/19/23 at mammogram appt @ [facility].    Education provided:     Taking warfarin: Importance of taking warfarin as instructed    Goal range and lab monitoring: goal range and significance of current result    Plan made per [facility] anticoagulation protocol    Aylin Pablo RN  Anticoagulation Clinic  5/11/2023    _______________________________________________________________________     Anticoagulation Episode Summary     Current INR goal:  2.0-3.0   TTR:  62.9 % (1 y)   Target end date:  Indefinite   Send INR reminders to:  [facility]    Indications    S/P AVR [Z95.2]  S/P AVR (aortic valve replacement) [Z95.2]  Long term current use of anticoagulant therapy [Z79.01]           Comments:  Goal range changed 2/20/19  per cardiology         Anticoagulation Care Providers     Provider Role Specialty Phone number    Rafaela Woods MD Referring Family Medicine [phone]

## 2023-05-19 ENCOUNTER — TELEPHONE (OUTPATIENT)
Dept: FAMILY MEDICINE | Facility: CLINIC | Age: 68
End: 2023-05-19

## 2023-05-19 ENCOUNTER — ANCILLARY PROCEDURE (OUTPATIENT)
Dept: MAMMOGRAPHY | Facility: CLINIC | Age: 68
End: 2023-05-19
Attending: FAMILY MEDICINE
Payer: COMMERCIAL

## 2023-05-19 ENCOUNTER — LAB (OUTPATIENT)
Dept: LAB | Facility: CLINIC | Age: 68
End: 2023-05-19
Payer: COMMERCIAL

## 2023-05-19 ENCOUNTER — ANTICOAGULATION THERAPY VISIT (OUTPATIENT)
Dept: ANTICOAGULATION | Facility: CLINIC | Age: 68
End: 2023-05-19

## 2023-05-19 DIAGNOSIS — Z95.2 S/P AVR: Primary | ICD-10-CM

## 2023-05-19 DIAGNOSIS — Z95.2 S/P AVR (AORTIC VALVE REPLACEMENT): ICD-10-CM

## 2023-05-19 DIAGNOSIS — Z12.31 VISIT FOR SCREENING MAMMOGRAM: ICD-10-CM

## 2023-05-19 DIAGNOSIS — Z79.01 LONG TERM CURRENT USE OF ANTICOAGULANT THERAPY: ICD-10-CM

## 2023-05-19 LAB — INR BLD: 2.5 (ref 0.9–1.1)

## 2023-05-19 PROCEDURE — 77067 SCR MAMMO BI INCL CAD: CPT

## 2023-05-19 PROCEDURE — 85610 PROTHROMBIN TIME: CPT

## 2023-05-19 PROCEDURE — 36415 COLL VENOUS BLD VENIPUNCTURE: CPT

## 2023-05-19 NOTE — PROGRESS NOTES
ANTICOAGULATION MANAGEMENT     Alyssa Higginbotham 68 year old female is on warfarin with therapeutic INR result. (Goal INR 2.0-3.0)    Recent labs: (last 7 days)     05/19/23  0731   INR 2.5*       ASSESSMENT       Source(s): Chart Review and Patient/Caregiver Call       Warfarin doses taken: Warfarin taken as instructed    Weekly warfarin dose decreased on 5/11/23.    Diet: No new diet changes identified    Medication/supplement changes: None noted    New illness, injury, or hospitalization: No    Signs or symptoms of bleeding or clotting: No    Previous result: Supratherapeutic at 3.1 on 5/11/23.    Additional findings: None         PLAN     Recommended plan for no diet, medication or health factor changes affecting INR     Dosing Instructions: Continue your current warfarin dose with next INR in 1-2 weeks       Summary  As of 5/19/2023    Full warfarin instructions:  2 mg every Thu; 5 mg all other days   Next INR check:  6/2/2023             Telephone call with  Jennifer ([phone]) who verbalizes understanding and agrees to plan    Lab visit scheduled - INR on 5/26/23 @ [facility].   - likes to stay on top of her INR, to ensure INR stability.    Education provided:     Taking warfarin: Importance of taking warfarin as instructed    Goal range and lab monitoring: goal range and significance of current result    Dietary considerations: importance of consistent vitamin K intake    Plan made per [facility] anticoagulation protocol    Aylin Pablo RN  Anticoagulation Clinic  5/19/2023    _______________________________________________________________________     Anticoagulation Episode Summary     Current INR goal:  2.0-3.0   TTR:  64.7 % (1 y)   Target end date:  Indefinite   Send INR reminders to:  [facility]    Indications    S/P AVR [Z95.2]  S/P AVR (aortic valve replacement) [Z95.2]  Long term current use of anticoagulant therapy [Z79.01]           Comments:  Goal range changed 2/20/19 per cardiology          Anticoagulation Care Providers     Provider Role Specialty Phone number    Rafaela Woods MD Referring Family Medicine [phone]

## 2023-05-19 NOTE — TELEPHONE ENCOUNTER
Patient Returning Call    Reason for call:  INR     Information relayed to patient:  n/a    Patient has additional questions:  Yes    What are your questions/concerns:  Patient is calling back speak to the INR nurse     Who does the patient want to speak with:  RN    Is an  needed?:  No      Could we send this information to you in [facility] or would you prefer to receive a phone call?:   Patient would prefer a phone call   Okay to leave a detailed message?: Yes at Cell number on file:    Telephone Information:   Mobile [phone]

## 2023-05-26 ENCOUNTER — LAB (OUTPATIENT)
Dept: LAB | Facility: CLINIC | Age: 68
End: 2023-05-26
Payer: COMMERCIAL

## 2023-05-26 ENCOUNTER — ANTICOAGULATION THERAPY VISIT (OUTPATIENT)
Dept: ANTICOAGULATION | Facility: CLINIC | Age: 68
End: 2023-05-26

## 2023-05-26 ENCOUNTER — MYC MEDICAL ADVICE (OUTPATIENT)
Dept: FAMILY MEDICINE | Facility: CLINIC | Age: 68
End: 2023-05-26

## 2023-05-26 DIAGNOSIS — Z79.01 LONG TERM CURRENT USE OF ANTICOAGULANT THERAPY: ICD-10-CM

## 2023-05-26 DIAGNOSIS — Z95.2 S/P AVR: Primary | ICD-10-CM

## 2023-05-26 DIAGNOSIS — Z95.2 S/P AVR (AORTIC VALVE REPLACEMENT): ICD-10-CM

## 2023-05-26 DIAGNOSIS — Z95.2 S/P AVR: ICD-10-CM

## 2023-05-26 LAB — INR BLD: 2.7 (ref 0.9–1.1)

## 2023-05-26 PROCEDURE — 85610 PROTHROMBIN TIME: CPT

## 2023-05-26 PROCEDURE — 36415 COLL VENOUS BLD VENIPUNCTURE: CPT

## 2023-05-26 RX ORDER — WARFARIN SODIUM 1 MG/1
TABLET ORAL
Qty: 198 TABLET | Refills: 3 | Status: SHIPPED | OUTPATIENT
Start: 2023-05-26 | End: 2023-09-06

## 2023-05-26 NOTE — PROGRESS NOTES
ANTICOAGULATION MANAGEMENT     Alyssa Higginbotham 68 year old female is on warfarin with therapeutic INR result. (Goal INR 2.0-3.0)    Recent labs: (last 7 days)     05/26/23  0717   INR 2.7*       ASSESSMENT       Source(s): Chart Review and Patient/Caregiver Call       Warfarin doses taken: Warfarin taken as instructed    Diet: No new diet changes identified    Medication/supplement changes: None noted    New illness, injury, or hospitalization: No    Signs or symptoms of bleeding or clotting: No    Previous result: Therapeutic last visit at 2.5; previously outside of goal range at 3.1    Additional findings: None         PLAN     Recommended plan for no diet, medication or health factor changes affecting INR     Dosing Instructions: decrease your warfarin dose (9.4% change) with next INR in 2 weeks       Summary  As of 5/26/2023    Full warfarin instructions:  2 mg every Mon, Thu; 5 mg all other days   Next INR check:  6/9/2023             Telephone call with  Jennifer ([phone]) who verbalizes understanding and agrees to plan    - concerns with INR trending up again, with no regimen changes.   - prefers INR not to be higher than 2.5, due to bad experience with epistaxis.    Lab visit scheduled - INR on 6/6/23 @ [email or web address]    Education provided:     Taking warfarin: Importance of taking warfarin as instructed    Goal range and lab monitoring: goal range and significance of current result    Dietary considerations: importance of consistent vitamin K intake    Symptom monitoring: monitoring for bleeding signs and symptoms    Plan made per [facility] anticoagulation protocol    Aylin Pablo RN  Anticoagulation Clinic  5/26/2023    _______________________________________________________________________     Anticoagulation Episode Summary     Current INR goal:  2.0-3.0   TTR:  66.6 % (1 y)   Target end date:  Indefinite   Send INR reminders to:  [facility]    Indications    S/P AVR [Z95.2]  S/P AVR (aortic  valve replacement) [Z95.2]  Long term current use of anticoagulant therapy [Z79.01]           Comments:  Goal range changed 2/20/19 per cardiology         Anticoagulation Care Providers     Provider Role Specialty Phone number    Rafaela Woods MD Referring [facility] Medicine [phone]

## 2023-06-02 ENCOUNTER — LAB (OUTPATIENT)
Dept: LAB | Facility: CLINIC | Age: 68
End: 2023-06-02
Payer: COMMERCIAL

## 2023-06-02 ENCOUNTER — ANTICOAGULATION THERAPY VISIT (OUTPATIENT)
Dept: ANTICOAGULATION | Facility: CLINIC | Age: 68
End: 2023-06-02

## 2023-06-02 DIAGNOSIS — Z95.2 S/P AVR: Primary | ICD-10-CM

## 2023-06-02 DIAGNOSIS — Z95.2 S/P AVR (AORTIC VALVE REPLACEMENT): ICD-10-CM

## 2023-06-02 DIAGNOSIS — Z79.01 LONG TERM CURRENT USE OF ANTICOAGULANT THERAPY: ICD-10-CM

## 2023-06-02 LAB — INR BLD: 2.5 (ref 0.9–1.1)

## 2023-06-02 PROCEDURE — 85610 PROTHROMBIN TIME: CPT

## 2023-06-02 PROCEDURE — 36415 COLL VENOUS BLD VENIPUNCTURE: CPT

## 2023-06-02 NOTE — PROGRESS NOTES
ANTICOAGULATION MANAGEMENT     Alyssa Higginbotham 68 year old female is on warfarin with therapeutic INR result. (Goal INR 2.0-3.0)   - 5/26/23 INR target goal changed to 2.0 - 2.5    Recent labs: (last 7 days)     06/02/23  1234   INR 2.5*       ASSESSMENT     Warfarin Lab Questionnaire    Warfarin Doses Last 7 Days      6/2/2023     7:19 AM   Dose in Tablet or Mg   TAB or MG?- warfarin 1mg and 5mg tablets (evenings) milligram (mg)     Pt Rptd Dose SUNDAY MONDAY TUESDAY WED THURS FRIDAY SATURDAY 6/2/2023   7:19 AM 5 2 5 5 3 5 4         6/2/2023   Warfarin Lab Questionnaire   Missed doses within past 14 days? No - taken different, but does not change wkly warfarin dose.     Changes in diet or alcohol within past 14 days? No   Medication changes since last result? No   Injuries or illness since last result? No   New shortness of breath, severe headaches or sudden changes in vision since last result? No   Abnormal bleeding since last result? Yes - reported epistaxis and daily bleeding issues.   If yes, please explain: Nosebleed   Upcoming surgery, procedure? No        Previous result: Therapeutic last 2 visits    Additional findings: Yes.   - on 5/26/23 - Dr. Woods approve to decrease INR target goal to 2.0 - 2.5 due to daily bleeding issues.   - Jennifer going to vacation in [place] and returning on 6/10./23.       PLAN     Recommended plan for ongoing change(s) affecting INR     Dosing Instructions: decrease your warfarin dose (6.9% change) with next INR in 1-2 weeks       Summary  As of 6/2/2023    Full warfarin instructions:  2 mg every Mon, Fri; 3 mg every Wed; 5 mg all other days   Next INR check:  6/16/2023             Telephone call with  Jennifer ([phone]) who verbalizes understanding and agrees to plan    Check at provider office visit - INR on 6/14/23 at OV with Dr. Woods.    Education provided:     Taking warfarin: Importance of taking warfarin as instructed    Goal range and lab monitoring: goal  range and significance of current result    Symptom monitoring: monitoring for bleeding signs and symptoms    Plan made per [facility] anticoagulation protocol    Aylin Pablo, RN  Anticoagulation Clinic  6/2/2023    _______________________________________________________________________     Anticoagulation Episode Summary     Current INR goal:  2.0-2.5   TTR:  66.9 % (1 y)   Target end date:  Indefinite   Send INR reminders to:  [facility]    Indications    S/P AVR [Z95.2]  S/P AVR (aortic valve replacement) [Z95.2]  Long term current use of anticoagulant therapy [Z79.01]           Comments:  6/2/23 INR target goal changed to 2.0 - 2.5 d/t daily bleeding issues.  (Sensitivity)  Goal range changed 2/20/19 per cardiology         Anticoagulation Care Providers     Provider Role Specialty Phone number    Rafaela Woods MD Referring Family Medicine [phone]

## 2023-06-14 ENCOUNTER — OFFICE VISIT (OUTPATIENT)
Dept: FAMILY MEDICINE | Facility: CLINIC | Age: 68
End: 2023-06-14
Payer: COMMERCIAL

## 2023-06-14 ENCOUNTER — ANTICOAGULATION THERAPY VISIT (OUTPATIENT)
Dept: ANTICOAGULATION | Facility: CLINIC | Age: 68
End: 2023-06-14

## 2023-06-14 ENCOUNTER — HOSPITAL ENCOUNTER (OUTPATIENT)
Dept: GENERAL RADIOLOGY | Facility: HOSPITAL | Age: 68
Discharge: HOME | End: 2023-06-14
Attending: FAMILY MEDICINE | Admitting: FAMILY MEDICINE
Payer: COMMERCIAL

## 2023-06-14 VITALS
SYSTOLIC BLOOD PRESSURE: 128 MMHG | HEART RATE: 74 BPM | WEIGHT: 115.6 LBS | HEIGHT: 62 IN | DIASTOLIC BLOOD PRESSURE: 68 MMHG | RESPIRATION RATE: 22 BRPM | BODY MASS INDEX: 21.27 KG/M2 | OXYGEN SATURATION: 98 %

## 2023-06-14 DIAGNOSIS — Z95.2 S/P AVR: Primary | ICD-10-CM

## 2023-06-14 DIAGNOSIS — Z79.01 LONG TERM CURRENT USE OF ANTICOAGULANT THERAPY: ICD-10-CM

## 2023-06-14 DIAGNOSIS — Z95.2 S/P AVR (AORTIC VALVE REPLACEMENT): ICD-10-CM

## 2023-06-14 DIAGNOSIS — M79.672 LEFT FOOT PAIN: Primary | ICD-10-CM

## 2023-06-14 DIAGNOSIS — M79.672 LEFT FOOT PAIN: ICD-10-CM

## 2023-06-14 LAB — INR BLD: 1.7 (ref 0.9–1.1)

## 2023-06-14 PROCEDURE — 85610 PROTHROMBIN TIME: CPT | Performed by: FAMILY MEDICINE

## 2023-06-14 PROCEDURE — 36415 COLL VENOUS BLD VENIPUNCTURE: CPT | Performed by: FAMILY MEDICINE

## 2023-06-14 PROCEDURE — 73630 X-RAY EXAM OF FOOT: CPT | Mod: LT

## 2023-06-14 PROCEDURE — 99213 OFFICE O/P EST LOW 20 MIN: CPT | Performed by: FAMILY MEDICINE

## 2023-06-14 NOTE — PROGRESS NOTES
ANTICOAGULATION MANAGEMENT     Alyssa Higginbotham 68 year old female is on warfarin with subtherapeutic INR result. (Goal INR 2.0-2.5)   - 5/26/23 INR target goal changed to 2.0 - 2.5      Recent labs: (last 7 days)     06/14/23  0706   INR 1.7*       ASSESSMENT       Source(s): Chart Review and Patient/Caregiver Call       Warfarin doses taken: Warfarin taken as instructed    Warfarin dose was decreased on 6/2/23, d/t continued daily bleeding issues.    Diet: No new diet changes identified    Medication/supplement changes: None noted    New illness, injury, or hospitalization: No   OV today with Dr. Jin for ongoing left foot pain - Ortho referral.    Signs or symptoms of bleeding or clotting: No.   Denied any bleeding problems or epistaxis.    Previous result: Therapeutic last visit at 2.5; previously outside of goal range at 2.7    Additional findings:  Recent returned on 6/10/23 from [facility].         PLAN     Recommended plan for no diet, medication or health factor changes affecting INR     Dosing Instructions: Increase your warfarin dose (3.7% change) with next INR in 7-10 days       Summary  As of 6/14/2023    Full warfarin instructions:  2 mg every Wed; 3 mg every Mon, Fri; 5 mg all other days   Next INR check:  6/28/2023             Telephone call with  Jennifer ([phone]) who verbalizes understanding and agrees to plan    Lab visit scheduled - INR on 6/23/23 @ [facility].    Education provided:     Taking warfarin: Importance of taking warfarin as instructed    Goal range and lab monitoring: goal range and significance of current result    Dietary considerations: importance of consistent vitamin K intake    Symptom monitoring: monitoring for bleeding signs and symptoms    Plan made per [facility] anticoagulation protocol    Aylin Pablo, RN  Anticoagulation Clinic  6/14/2023    _______________________________________________________________________     Anticoagulation Episode Summary     Current INR  goal:  2.0-2.5   TTR:  66.2 % (1 y)   Target end date:  Indefinite   Send INR reminders to:  [facility]    Indications    S/P AVR [Z95.2]  S/P AVR (aortic valve replacement) [Z95.2]  Long term current use of anticoagulant therapy [Z79.01]           Comments:  6/2/23 INR target goal changed to 2.0 - 2.5 d/t daily bleeding issues.  (Sensitivity)  Goal range changed 2/20/19 per cardiology         Anticoagulation Care Providers     Provider Role Specialty Phone number    Rafaela Woods MD Referring Family Medicine [phone]

## 2023-06-14 NOTE — PROGRESS NOTES
"  Assessment & Plan     S/P AVR (aortic valve replacement)  - INR point of care    Long term current use of anticoagulant therapy  - INR point of care    Left foot pain  Will do XR and referral back to Dr Riojas  - Orthopedic  Referral  - XR Foot Left G/E 3 Views                   MD MESSI Georges [facility] JERONIMO Rodriguez is a 68 year old, presenting for the following health issues:  Mass (Has a bump on left foot that is hard)        6/14/2023     7:28 AM   Additional Questions   Roomed by Tami Seo    History of Present Illness       Reason for visit:  Bump on foot  Symptom onset:  3-4 weeks ago  Symptoms include:  Hard bump on left foot  Symptom intensity:  Mild  Symptom progression:  Worsening  Had these symptoms before:  No  What makes it worse:  No  What makes it better:  No    She eats 0-1 servings of fruits and vegetables daily.She consumes 0 sweetened beverage(s) daily.She exercises with enough effort to increase her heart rate 9 or less minutes per day.  She exercises with enough effort to increase her heart rate 3 or less days per week.   She is taking medications regularly.     No pain until the shoes start rubbing on it.  Hx of something similar on the right foot.  Seen by Dr Riojas in the past.  No numbness or tingling in the foot.  No swelling.            Review of Systems   Constitutional, HEENT, cardiovascular, pulmonary, gi and gu systems are negative, except as otherwise noted.      Objective    /68 (BP Location: Right arm, Patient Position: Sitting, Cuff Size: Adult Regular)   Pulse 74   Resp 22   Ht 1.575 m (5' 2\")   Wt 52.4 kg (115 lb 9.6 oz)   SpO2 98%   BMI 21.14 kg/m    Body mass index is 21.14 kg/m .  Physical Exam   GENERAL: healthy, alert and no distress  NECK: no adenopathy, no asymmetry, masses, or scars and thyroid normal to palpation  RESP: lungs clear to auscultation - no rales, rhonchi or wheezes  CV: regular rate and " rhythm, normal S1 S2, no S3 or S4, no murmur, click or rub, no peripheral edema and peripheral pulses strong  ABDOMEN: soft, nontender, no hepatosplenomegaly, no masses and bowel sounds normal  MS: no gross musculoskeletal defects noted, no edema, lump at the base of the first toe laterally, no pain on palpation, no erythema

## 2023-06-22 ENCOUNTER — OFFICE VISIT (OUTPATIENT)
Dept: PODIATRY | Facility: CLINIC | Age: 68
End: 2023-06-22
Attending: FAMILY MEDICINE
Payer: COMMERCIAL

## 2023-06-22 VITALS
HEART RATE: 59 BPM | SYSTOLIC BLOOD PRESSURE: 182 MMHG | HEIGHT: 62 IN | OXYGEN SATURATION: 99 % | WEIGHT: 114 LBS | DIASTOLIC BLOOD PRESSURE: 90 MMHG | BODY MASS INDEX: 20.98 KG/M2

## 2023-06-22 DIAGNOSIS — M67.472 GANGLION CYST OF LEFT FOOT: Primary | ICD-10-CM

## 2023-06-22 PROCEDURE — 99214 OFFICE O/P EST MOD 30 MIN: CPT | Performed by: PODIATRIST

## 2023-06-22 NOTE — Clinical Note
"    6/22/2023         RE: Alyssa Higginbotham  [address] [place] [address]        Dear Colleague,    Thank you for referring your patient, Alyssa Higginbotham, to the [facility]. Please see a copy of my visit note below.    FOOT AND ANKLE SURGERY/PODIATRY CONSULT NOTE         ASSESSMENT:   ***      TREATMENT:  ***        HPI: I was asked to see Alyssa Higginbotham today  ***.  The patient was seen in consultation at the request of *** for ***.     {PAST MEDICAL HISTORY:[id number]::\"***\"}    {SOCIAL HISTORY:850308787::\"***\"}       Allergies   Allergen Reactions     Codeine Other (See Comments)     Faints     Demerol [Meperidine] Other (See Comments)     Reaction not reported by patient., Patient states she felt awful.          Current Outpatient Medications:      amitriptyline (ELAVIL) 10 MG tablet, Take 1 tablet (10 mg) by mouth At Bedtime, Disp: 90 tablet, Rfl: 3     butalbital-acetaminophen-caffeine (ESGIC) -40 MG tablet, Take 2 tablets by mouth every 8 hours as needed for headaches [BUTALBITAL-ACETAMINOPHEN-CAFFEINE (FIORICET, ESGIC) -40 MG PER TABLET] TAKE 1 TO 2 TABLETS BY MOUTH EVERY 8 HOURS AS NEEDED FOR PAIN. MAX OF 4000 MG ACETAMINOPHEN PER 24 HOURS Strength: -40 mg, Disp: 60 tablet, Rfl: 3     clonazePAM (KLONOPIN) 0.5 MG tablet, Take 1 tablet (0.5 mg) by mouth nightly as needed for anxiety or sleep, Disp: 90 tablet, Rfl: 3     levothyroxine (SYNTHROID/LEVOTHROID) 75 MCG tablet, TAKE 1 TABLET ON WEDNESDAY, THURSDAY, SATURDAY AND SUNDAY, Disp: 52 tablet, Rfl: 3     levothyroxine (SYNTHROID/LEVOTHROID) 88 MCG tablet, TAKE 1 TABLET ON MONDAY, TUESDAY AND FRIDAY, Disp: 39 tablet, Rfl: 3     metoprolol succinate ER (TOPROL XL) 200 MG 24 hr tablet, TAKE 1 TABLET DAILY, Disp: 90 tablet, Rfl: 3     multivitamin (ONE A DAY) per tablet, [MULTIVITAMIN (ONE A DAY) PER TABLET] Take 1 tablet by mouth daily. , Disp: , Rfl:      rizatriptan (MAXALT) 5 MG tablet, " [RIZATRIPTAN (MAXALT) 5 MG TABLET] TAKE 1 TABLET BY MOUTH AS NEEDED FOR MIGRAINE. MAY REPEAT IN 2 HOURS IF NEEDED, Disp: 10 tablet, Rfl: 9     simvastatin (ZOCOR) 20 MG tablet, TAKE 1 TABLET AT BEDTIME (DUE FOR AN OFFICE VISIT), Disp: 90 tablet, Rfl: 3     triamterene-HCTZ (MAXZIDE) 75-50 MG tablet, TAKE ONE-HALF (1/2) TABLET DAILY, Disp: 45 tablet, Rfl: 3     warfarin ANTICOAGULANT (COUMADIN) 1 MG tablet, TAKE 1 TO 2 TABLETS ALONG WITH 5 MG TABLET (6 TO 7 MG) DAILY AS DIRECTED, ADJUST DOSE PER INR RESULTS, Disp: 198 tablet, Rfl: 3     warfarin ANTICOAGULANT (COUMADIN) 5 MG tablet, TAKE 1 TABLET ALONG WITH 1 MG TABLET (6 TO 7 MG DAILY) AS DIRECTED, ADJUST PER INR RESULT, Disp: 100 tablet, Rfl: 3     Family History   Problem Relation Age of Onset     Pancreatic Cancer Father 70.00        history of smoking     Ovarian Cancer Sister 67.00        stage III, fatal     Skin Cancer Sister      Heart Disease Mother      Diabetes No family hx of      Alzheimer Disease No family hx of         Social History     Socioeconomic History     Marital status:      Spouse name: Not on file     Number of children:  0     Years of education: Not on file     Highest education level: Not on file   Occupational History     Not on file   Tobacco Use     Smoking status: Never     Smokeless tobacco: Never   Vaping Use     Vaping Use: Never used   Substance and Sexual Activity     Alcohol use: No     Drug use: No     Sexual activity: Not Currently   Other Topics Concern     Not on file   Social History Narrative     Not on file     Social Determinants of Health     Financial Resource Strain: Not on file   Food Insecurity: Not on file   Transportation Needs: Not on file   Physical Activity: Not on file   Stress: Not on file   Social Connections: Not on file   Intimate Partner Violence: Not on file   Housing Stability: Not on file        Review of Systems - Patient denies fever, chills, rash, wound, stiffness, limping, numbness,  "weakness, heart burn, blood in stool, chest pain with activity, calf pain when walking, shortness of breath with activity, chronic cough, easy bleeding/bruising, swelling of ankles, excessive thirst, fatigue, depression, anxiety.  Patient admits to ***.      OBJECTIVE:  Appearance: {appearance:178761::\"alert, well appearing, and in no distress\"}.    There were no vitals taken for this visit.     There is no height or weight on file to calculate BMI.     General appearance: Patient is alert and fully cooperative with history & exam.  No sign of distress is noted during the visit.  Psychiatric: Affect is pleasant & appropriate.  Patient appears motivated to improve health.  Respiratory: Breathing is regular & unlabored while sitting.  HEENT: Hearing is intact to spoken word.  Speech is clear.  No gross evidence of visual impairment that would impact ambulation.    Vascular: Dorsalis pedis and posterior tibial pulses are palpable. There is pedal hair growth ***.  CFT < 3 sec from anterior tibial surface to distal digits ***. There is no appreciable edema noted.  Dermatologic: Turgor and texture are within normal limits. No coloration or temperature changes. No primary or secondary lesions noted.  Neurologic: All epicritic and proprioceptive sensations are grossly intact ***.  Musculoskeletal: All active and passive ankle, subtalar, midtarsal, and 1st MPJ range of motion are grossly intact without pain or crepitus, with the exception of ***. Manual muscle strength is ***. All dorsiflexors, plantarflexors, invertors, evertors are intact ***. Tenderness present to *** on palpation. Tenderness to *** with range of motion. Calf is soft/non-tender with/without warmth/induration    Imaging:     {Imaging order/result:[id number]}  No images are attached to the encounter or orders placed in the encounter.     XR Foot Left G/E 3 Views    Result Date: 6/14/2023  EXAM: XR FOOT LEFT G/E 3 VIEWS LOCATION: [facility]" HOSPITAL DATE: 6/14/2023 INDICATION:  Left foot pain COMPARISON: None.     IMPRESSION: Advanced degenerative change first MTP joint. No evidence for fracture.     XR Foot Left G/E 3 Views    Result Date: 6/14/2023  EXAM: XR FOOT LEFT G/E 3 VIEWS LOCATION: [facility] DATE: 6/14/2023 INDICATION:  Left foot pain COMPARISON: None.     IMPRESSION: Advanced degenerative change first MTP joint. No evidence for fracture.         TREATMENT:  ***    Chin Eduardo DPM  [facility] Foot & Ankle Surgery/Podiatry         Again, thank you for allowing me to participate in the care of your patient.        Sincerely,        Chin Riojas DPM

## 2023-06-22 NOTE — PROGRESS NOTES
FOOT AND ANKLE SURGERY/PODIATRY CONSULT NOTE         ASSESSMENT:   Ganglion cyst of the first metatarsal phalangeal joint left foot      TREATMENT:  I informed the patient that she does not appear to have a ganglion cyst.  It is asymptomatic so I do not recommend any particular treatment at this time.  She is to monitor her condition.  If the cyst increases in size and becomes painful then I would recommend surgical excision.        HPI: Alyssa Higginbotham resented to the clinic today complaining of a small lump on the lateral aspect of her first metatarsophalangeal joint left foot.  The patient stated she has no pain.  She is able to wear shoes without discomfort.  She denies any redness or swelling.  She denies any trauma to her foot.  She became concerned and wanted to know if it needs to be treated at this time.  She has no other complaints.      Past Medical History:   Diagnosis Date     Accidental fall 05/27/2015     Antiplatelet or antithrombotic long-term use      Anxiety      Aortic dissection (H)      Asthma      Benign meningioma of brain (H) 03/2015    initially seen on CT of head that was obtained after a fall. Frontal lobe, confirmed on an MRI Mar 2015, 3 mm     Bunion      Chronic headache      Depression      Heart murmur      Hepatitis C      Hyperlipidemia      Hypothyroid      Irregular heart rate      Nasal fracture 02/28/2015    fell face first on sideache     Osteoporosis      Other acquired deformity of toe      Stroke (H)     on CT scan       Social History     Socioeconomic History     Marital status:      Spouse name: Not on file     Number of children:  0     Years of education: Not on file     Highest education level: Not on file   Occupational History     Not on file   Tobacco Use     Smoking status: Never     Smokeless tobacco: Never   Vaping Use     Vaping Use: Never used   Substance and Sexual Activity     Alcohol use: No     Drug use: No     Sexual activity: Not Currently    Other Topics Concern     Not on file   Social History Narrative     Not on file     Social Determinants of Health     Financial Resource Strain: Not on file   Food Insecurity: Not on file   Transportation Needs: Not on file   Physical Activity: Not on file   Stress: Not on file   Social Connections: Not on file   Intimate Partner Violence: Not on file   Housing Stability: Not on file          Allergies   Allergen Reactions     Codeine Other (See Comments)     Faints     Demerol [Meperidine] Other (See Comments)     Reaction not reported by patient., Patient states she felt awful.          Current Outpatient Medications:      amitriptyline (ELAVIL) 10 MG tablet, Take 1 tablet (10 mg) by mouth At Bedtime, Disp: 90 tablet, Rfl: 3     butalbital-acetaminophen-caffeine (ESGIC) -40 MG tablet, Take 2 tablets by mouth every 8 hours as needed for headaches [BUTALBITAL-ACETAMINOPHEN-CAFFEINE (FIORICET, ESGIC) -40 MG PER TABLET] TAKE 1 TO 2 TABLETS BY MOUTH EVERY 8 HOURS AS NEEDED FOR PAIN. MAX OF 4000 MG ACETAMINOPHEN PER 24 HOURS Strength: -40 mg, Disp: 60 tablet, Rfl: 3     clonazePAM (KLONOPIN) 0.5 MG tablet, Take 1 tablet (0.5 mg) by mouth nightly as needed for anxiety or sleep, Disp: 90 tablet, Rfl: 3     levothyroxine (SYNTHROID/LEVOTHROID) 75 MCG tablet, TAKE 1 TABLET ON WEDNESDAY, THURSDAY, SATURDAY AND SUNDAY, Disp: 52 tablet, Rfl: 3     levothyroxine (SYNTHROID/LEVOTHROID) 88 MCG tablet, TAKE 1 TABLET ON MONDAY, TUESDAY AND FRIDAY, Disp: 39 tablet, Rfl: 3     metoprolol succinate ER (TOPROL XL) 200 MG 24 hr tablet, TAKE 1 TABLET DAILY, Disp: 90 tablet, Rfl: 3     multivitamin (ONE A DAY) per tablet, [MULTIVITAMIN (ONE A DAY) PER TABLET] Take 1 tablet by mouth daily. , Disp: , Rfl:      rizatriptan (MAXALT) 5 MG tablet, [RIZATRIPTAN (MAXALT) 5 MG TABLET] TAKE 1 TABLET BY MOUTH AS NEEDED FOR MIGRAINE. MAY REPEAT IN 2 HOURS IF NEEDED, Disp: 10 tablet, Rfl: 9     simvastatin (ZOCOR) 20 MG tablet,  TAKE 1 TABLET AT BEDTIME (DUE FOR AN OFFICE VISIT), Disp: 90 tablet, Rfl: 3     triamterene-HCTZ (MAXZIDE) 75-50 MG tablet, TAKE ONE-HALF (1/2) TABLET DAILY, Disp: 45 tablet, Rfl: 3     warfarin ANTICOAGULANT (COUMADIN) 1 MG tablet, TAKE 1 TO 2 TABLETS ALONG WITH 5 MG TABLET (6 TO 7 MG) DAILY AS DIRECTED, ADJUST DOSE PER INR RESULTS, Disp: 198 tablet, Rfl: 3     warfarin ANTICOAGULANT (COUMADIN) 5 MG tablet, TAKE 1 TABLET ALONG WITH 1 MG TABLET (6 TO 7 MG DAILY) AS DIRECTED, ADJUST PER INR RESULT, Disp: 100 tablet, Rfl: 3     Family History   Problem Relation Age of Onset     Pancreatic Cancer Father 70.00        history of smoking     Ovarian Cancer Sister 67.00        stage III, fatal     Skin Cancer Sister      Heart Disease Mother      Diabetes No family hx of      Alzheimer Disease No family hx of         Social History     Socioeconomic History     Marital status:      Spouse name: Not on file     Number of children:  0     Years of education: Not on file     Highest education level: Not on file   Occupational History     Not on file   Tobacco Use     Smoking status: Never     Smokeless tobacco: Never   Vaping Use     Vaping Use: Never used   Substance and Sexual Activity     Alcohol use: No     Drug use: No     Sexual activity: Not Currently   Other Topics Concern     Not on file   Social History Narrative     Not on file     Social Determinants of Health     Financial Resource Strain: Not on file   Food Insecurity: Not on file   Transportation Needs: Not on file   Physical Activity: Not on file   Stress: Not on file   Social Connections: Not on file   Intimate Partner Violence: Not on file   Housing Stability: Not on file        Review of Systems - Patient denies fever, chills, rash, wound, stiffness, limping, numbness, weakness, heart burn, blood in stool, chest pain with activity, calf pain when walking, shortness of breath with activity, chronic cough, easy bleeding/bruising, swelling of ankles,  excessive thirst, fatigue, depression, anxiety.  Patient admits to nontender mass left foot.      OBJECTIVE:  Appearance: alert, well appearing, and in no distress.    There were no vitals taken for this visit.     There is no height or weight on file to calculate BMI.     General appearance: Patient is alert and fully cooperative with history & exam.  No sign of distress is noted during the visit.  Psychiatric: Affect is pleasant & appropriate.  Patient appears motivated to improve health.  Respiratory: Breathing is regular & unlabored while sitting.  HEENT: Hearing is intact to spoken word.  Speech is clear.  No gross evidence of visual impairment that would impact ambulation.    Vascular: Dorsalis pedis and posterior tibial pulses are palpable. There is no pedal hair growth bilaterally.  CFT < 3 sec from anterior tibial surface to distal digits bilaterally. There is no appreciable edema noted.  Dermatologic: Small, raised, palpable, freely movable mass at the  lateral aspect of the head of the first metatarsal.  Turgor and texture are within normal limits. No coloration or temperature changes. No other primary or secondary lesions noted.  Neurologic: All epicritic and proprioceptive sensations are grossly intact bilaterally.  Musculoskeletal: All active and passive ankle, subtalar, midtarsal, and 1st MPJ range of motion are grossly intact without pain or crepitus, with the exception of none. Manual muscle strength is within normal limits bilaterally. All dorsiflexors, plantarflexors, invertors, evertors are intact bilaterally.  No tenderness present to the first MPJ left foot on palpation.  No tenderness to the first MPJ left foot with range of motion. Calf is soft/non-tender without warmth/induration    Imaging:       No images are attached to the encounter or orders placed in the encounter.     XR Foot Left G/E 3 Views    Result Date: 6/14/2023  EXAM: XR FOOT LEFT G/E 3 VIEWS LOCATION: [facility]  [facility] DATE: 6/14/2023 INDICATION:  Left foot pain COMPARISON: None.     IMPRESSION: Advanced degenerative change first MTP joint. No evidence for fracture.     XR Foot Left G/E 3 Views    Result Date: 6/14/2023  EXAM: XR FOOT LEFT G/E 3 VIEWS LOCATION: [facility] DATE: 6/14/2023 INDICATION:  Left foot pain COMPARISON: None.     IMPRESSION: Advanced degenerative change first MTP joint. No evidence for fracture.           Chin Eduardo DPM  [facility] Foot & Ankle Surgery/Podiatry

## 2023-06-23 ENCOUNTER — LAB (OUTPATIENT)
Dept: LAB | Facility: CLINIC | Age: 68
End: 2023-06-23
Payer: COMMERCIAL

## 2023-06-23 ENCOUNTER — ANTICOAGULATION THERAPY VISIT (OUTPATIENT)
Dept: ANTICOAGULATION | Facility: CLINIC | Age: 68
End: 2023-06-23

## 2023-06-23 DIAGNOSIS — Z79.01 LONG TERM CURRENT USE OF ANTICOAGULANT THERAPY: ICD-10-CM

## 2023-06-23 DIAGNOSIS — Z95.2 S/P AVR (AORTIC VALVE REPLACEMENT): ICD-10-CM

## 2023-06-23 DIAGNOSIS — Z95.2 S/P AVR: Primary | ICD-10-CM

## 2023-06-23 LAB — INR BLD: 1.9 (ref 0.9–1.1)

## 2023-06-23 PROCEDURE — 36416 COLLJ CAPILLARY BLOOD SPEC: CPT

## 2023-06-23 PROCEDURE — 85610 PROTHROMBIN TIME: CPT

## 2023-06-23 NOTE — PROGRESS NOTES
ANTICOAGULATION MANAGEMENT     Alyssa Higginbotham 68 year old female is on warfarin with subtherapeutic INR result. (Goal INR 2.0-2.5)    Recent labs: (last 7 days)     06/23/23  0742   INR 1.9*       ASSESSMENT       Source(s): Chart Review and Patient/Caregiver Call       Warfarin doses taken: Warfarin taken as instructed    Weekly warfarin dose increased on 6/14/23.    Previous result: Subtherapeutic at 1.7 on 6/14/23.       PLAN     Unable to reach Jennifer today.    Provided warfarin instructions and ACN will f/u on 6/26 with patient.    - if no factors affecting INR, continue same dose since INR is trending up after dose adjustment on 6/14/3.   - if there is factors affecting INR (ex: increased salads/vegetable), will increase dose to 3mg Mon/Wed/Fri and 5mg ROW.   - recheck INR in 7-10 days, to ensure INR stability.    Follow up required to assess for changes     Aylin Pablo RN  Anticoagulation Clinic  6/23/2023

## 2023-06-26 ENCOUNTER — TELEPHONE (OUTPATIENT)
Dept: FAMILY MEDICINE | Facility: CLINIC | Age: 68
End: 2023-06-26
Payer: COMMERCIAL

## 2023-06-26 NOTE — PROGRESS NOTES
ANTICOAGULATION MANAGEMENT     Alyssa Higginbotham 68 year old female is on warfarin with subtherapeutic INR result. (Goal INR 2.0-2.5)   - 5/26/23 INR target goal changed to 2.0 - 2.5    Recent labs: (last 7 days)     06/23/23  0742   INR 1.9*       ASSESSMENT       Source(s): Chart Review and Patient/Caregiver Call       Warfarin doses taken: Warfarin taken as instructed    Wkly warfarin dose increased on 6/14/23.    Diet: No new diet changes identified    Medication/supplement changes: None noted    New illness, injury, or hospitalization: No    Signs or symptoms of bleeding or clotting: No.   Denied any epistaxis events.    Previous result: Subtherapeutic at 1.7 on 6/14/23.    Additional findings:  Reported no factors or changes with diet / meds.         PLAN     Recommended plan for no diet, medication or health factor changes affecting INR     Dosing Instructions: Continue your current warfarin dose with next INR in 7-10 days       Summary  As of 6/23/2023    Full warfarin instructions:  2 mg every Wed; 3 mg every Mon, Fri; 5 mg all other days   Next INR check:  7/7/2023             Telephone call with  Jennifer ([phone]) who verbalizes understanding and agrees to plan    Lab visit scheduled - INR on 7/3/23 @ [facility].    Education provided:     Taking warfarin: Importance of taking warfarin as instructed    Goal range and lab monitoring: goal range and significance of current result    Plan made per [facility] anticoagulation protocol    Aylin Pablo RN  Anticoagulation Clinic  6/26/2023    _______________________________________________________________________     Anticoagulation Episode Summary     Current INR goal:  2.0-2.5   TTR:  64.0 % (1 y)   Target end date:  Indefinite   [facility]    Indications    S/P AVR [Z95.2]  S/P AVR (aortic valve replacement) [Z95.2]  Long term current use of anticoagulant therapy [Z79.01]           Comments:  6/2/23 INR target goal  changed to 2.0 - 2.5 d/t daily bleeding issues.  (Sensitivity)  Goal range changed 2/20/19 per cardiology         Anticoagulation Care Providers     Provider Role Specialty Phone number    Rafaela Woods MD Referring Family Medicine [phone]

## 2023-07-01 ENCOUNTER — MYC MEDICAL ADVICE (OUTPATIENT)
Dept: FAMILY MEDICINE | Facility: CLINIC | Age: 68
End: 2023-07-01
Payer: COMMERCIAL

## 2023-07-01 DIAGNOSIS — Z95.2 S/P AVR (AORTIC VALVE REPLACEMENT): ICD-10-CM

## 2023-07-01 DIAGNOSIS — F41.1 ANXIETY STATE: ICD-10-CM

## 2023-07-01 DIAGNOSIS — Z79.01 LONG TERM CURRENT USE OF ANTICOAGULANT THERAPY: ICD-10-CM

## 2023-07-01 DIAGNOSIS — E78.5 HYPERLIPIDEMIA LDL GOAL <130: ICD-10-CM

## 2023-07-01 RX ORDER — WARFARIN SODIUM 5 MG/1
TABLET ORAL
Qty: 100 TABLET | Refills: 3 | Status: SHIPPED | OUTPATIENT
Start: 2023-07-01 | End: 2024-03-02

## 2023-07-01 RX ORDER — SIMVASTATIN 20 MG
TABLET ORAL
Qty: 90 TABLET | Refills: 3 | Status: SHIPPED | OUTPATIENT
Start: 2023-07-01 | End: 2023-12-14

## 2023-07-01 NOTE — TELEPHONE ENCOUNTER
"warfarin  Routing refill request to provider for review/approval because:  Labs out of range:  INR    Last Written Prescription Date:  4/27/2022  Last Fill Quantity: 100,  # refills: 3   Last office visit provider:  6/14/2023       simvastatin  Routing refill request to provider for review/approval because:  A break in medication    Last Written Prescription Date:  4/14/2022  Last Fill Quantity: 90,  # refills: 3   Last office visit provider:  6/14/2023   Requested Prescriptions   Pending Prescriptions Disp Refills     warfarin ANTICOAGULANT (COUMADIN) 5 MG tablet [Pharmacy Med Name: WARFARIN TABS 5MG] 100 tablet 3     Sig: TAKE 1 TABLET ALONG WITH 1 MG TABLET (6 TO 7 MG DAILY) AS DIRECTED, ADJUST PER INR RESULT       Vitamin K Antagonists Failed - 7/1/2023  7:21 AM        Failed - INR is within goal in the past 6 weeks     Confirm INR is within goal in the past 6 weeks.     Recent Labs   Lab Test 06/23/23  0742   INR 1.9*                       Passed - Recent (12 mo) or future (30 days) visit within the authorizing provider's specialty     Patient has had an office visit with the authorizing provider or a provider within the authorizing providers department within the previous 12 mos or has a future within next 30 days. See \"Patient Info\" tab in inbasket, or \"Choose Columns\" in Meds & Orders section of the refill encounter.              Passed - Medication is active on med list        Passed - Patient is 18 years of age or older        Passed - Patient is not pregnant        Passed - No positive pregnancy on file in past 12 months           simvastatin (ZOCOR) 20 MG tablet [Pharmacy Med Name: SIMVASTATIN TABS 20MG] 90 tablet 3     Sig: TAKE 1 TABLET AT BEDTIME (DUE FOR AN OFFICE VISIT)       Statins Protocol Passed - 7/1/2023  7:21 AM        Passed - LDL on file in past 12 months     Recent Labs   Lab Test 03/01/23  1046   *             Passed - No abnormal creatine kinase in past 12 months     No lab " "results found.             Passed - Recent (12 mo) or future (30 days) visit within the authorizing provider's specialty     Patient has had an office visit with the authorizing provider or a provider within the authorizing providers department within the previous 12 mos or has a future within next 30 days. See \"Patient Info\" tab in inbasket, or \"Choose Columns\" in Meds & Orders section of the refill encounter.              Passed - Medication is active on med list        Passed - Patient is age 18 or older        Passed - No active pregnancy on record        Passed - No positive pregnancy test in past 12 months             Rut Andersen RN 07/01/23 2:20 PM  "

## 2023-07-03 ENCOUNTER — LAB (OUTPATIENT)
Dept: LAB | Facility: CLINIC | Age: 68
End: 2023-07-03
Payer: COMMERCIAL

## 2023-07-03 ENCOUNTER — ANTICOAGULATION THERAPY VISIT (OUTPATIENT)
Dept: ANTICOAGULATION | Facility: CLINIC | Age: 68
End: 2023-07-03

## 2023-07-03 DIAGNOSIS — Z95.2 S/P AVR: Primary | ICD-10-CM

## 2023-07-03 DIAGNOSIS — Z79.01 LONG TERM CURRENT USE OF ANTICOAGULANT THERAPY: ICD-10-CM

## 2023-07-03 DIAGNOSIS — Z95.2 S/P AVR (AORTIC VALVE REPLACEMENT): ICD-10-CM

## 2023-07-03 LAB — INR BLD: 1.5 (ref 0.9–1.1)

## 2023-07-03 PROCEDURE — 85610 PROTHROMBIN TIME: CPT

## 2023-07-03 PROCEDURE — 36416 COLLJ CAPILLARY BLOOD SPEC: CPT

## 2023-07-03 RX ORDER — CLONAZEPAM 0.5 MG/1
0.5 TABLET ORAL
Qty: 90 TABLET | Refills: 3 | Status: SHIPPED | OUTPATIENT
Start: 2023-07-03 | End: 2023-12-15

## 2023-07-03 NOTE — TELEPHONE ENCOUNTER
Per chart review, Clonazepam Rx is good until November. Patient's pharmacy should have refill on file. Sent patient Lumiary message informing her of this.    Roel Galdamez RN, BSN  [facility]

## 2023-07-03 NOTE — TELEPHONE ENCOUNTER
Please see patient messages. Current prescription on file with pharmacy is . Medication and pharmacy pended for review.    Chelsy Aburto RN

## 2023-07-03 NOTE — PROGRESS NOTES
ANTICOAGULATION MANAGEMENT     Alyssa Higginbotham 68 year old female is on warfarin with subtherapeutic INR result. (Goal INR 2.0-2.5)    Recent labs: (last 7 days)     07/03/23  1451   INR 1.5*       ASSESSMENT       Source(s): Chart Review and Patient/Caregiver Call       Warfarin doses taken: Missed dose(s) may be affecting INR    Reported missed 5mg warfarin dose on 6/29/23.    Diet: No new diet changes identified    Medication/supplement changes:  No.   Continues with Clonazepam 0.5mg 1 tablet nightly, PRN for anxiety or sleep. started on 7/3/23 No interaction anticipated    New illness, injury, or hospitalization: No    Signs or symptoms of bleeding or clotting: No    Previous result: Subtherapeutic at 1.9 on 6/23/23.    Additional findings: None         PLAN     Recommended plan for temporary change(s) affecting INR     Dosing Instructions: booster dose then continue your current warfarin dose with next INR in 7-10 days       Summary  As of 7/3/2023    Full warfarin instructions:  7/3: 5 mg; Otherwise 2 mg every Wed; 3 mg every Mon, Sat; 5 mg all other days   Next INR check:  7/17/2023             Telephone call with  Jennifer ([phone]) who verbalizes understanding and agrees to plan    - has dental appt on 7/13/23.   - will check INR the day before dental appt.    Lab visit scheduled - INR on 7/12/23 @ [facility].    Education provided:     Taking warfarin: Importance of taking warfarin as instructed    Goal range and lab monitoring: goal range and significance of current result and Importance of therapeutic range    Plan made per [facility] anticoagulation protocol    Aylin Pablo, RN  Anticoagulation Clinic  7/3/2023    _______________________________________________________________________     Anticoagulation Episode Summary     Current INR goal:  2.0-2.5   TTR:  61.5 % (1 y)   Target end date:  Indefinite   Send INR reminders to:  [facility]    Indications    S/P AVR [Z95.2]  S/P AVR (aortic  valve replacement) [Z95.2]  Long term current use of anticoagulant therapy [Z79.01]           Comments:  6/2/23 INR target goal changed to 2.0 - 2.5 d/t daily bleeding issues.  (Sensitivity)  Goal range changed 2/20/19 per cardiology         Anticoagulation Care Providers     Provider Role Specialty Phone number    Rafaela Woods MD Referring Family Medicine [phone]

## 2023-07-11 ENCOUNTER — LAB (OUTPATIENT)
Dept: LAB | Facility: CLINIC | Age: 68
End: 2023-07-11
Payer: COMMERCIAL

## 2023-07-11 ENCOUNTER — ANTICOAGULATION THERAPY VISIT (OUTPATIENT)
Dept: ANTICOAGULATION | Facility: CLINIC | Age: 68
End: 2023-07-11

## 2023-07-11 DIAGNOSIS — Z95.2 S/P AVR: Primary | ICD-10-CM

## 2023-07-11 DIAGNOSIS — Z95.2 S/P AVR (AORTIC VALVE REPLACEMENT): ICD-10-CM

## 2023-07-11 DIAGNOSIS — Z79.01 LONG TERM CURRENT USE OF ANTICOAGULANT THERAPY: ICD-10-CM

## 2023-07-11 LAB — INR BLD: 1.9 (ref 0.9–1.1)

## 2023-07-11 PROCEDURE — 85610 PROTHROMBIN TIME: CPT

## 2023-07-11 PROCEDURE — 36416 COLLJ CAPILLARY BLOOD SPEC: CPT

## 2023-07-11 NOTE — PROGRESS NOTES
ANTICOAGULATION MANAGEMENT     Alyssa Higginbotham 68 year old female is on warfarin with subtherapeutic INR result. (Goal INR 2.0-2.5)    Recent labs: (last 7 days)     07/11/23  1308   INR 1.9*       ASSESSMENT     Source(s): Chart Review and Patient/Caregiver Call     Warfarin doses taken: Booster dose(s) recently taken which may be affecting INR   Booster dose on 7/3/23 d/t one missed 5mg warfarin dose.  Diet: No new diet changes identified  Medication/supplement changes: None noted  New illness, injury, or hospitalization: No  Signs or symptoms of bleeding or clotting: No  Previous result: Subtherapeutic last 3 INR results.  Additional findings: None       PLAN     Recommended plan for no diet, medication or health factor changes affecting INR     Dosing Instructions: Increase your warfarin dose (3.6% change) with next INR in 1-2 weeks       Summary  As of 7/11/2023      Full warfarin instructions:  3 mg every Mon, Wed, Sat; 5 mg all other days   Next INR check:  7/21/2023               Telephone call with Jennifer who verbalizes understanding and agrees to plan    Lab visit scheduled - INR on 7/21/23 @ [facility]    Education provided:   Taking warfarin: Importance of taking warfarin as instructed  Goal range and lab monitoring: goal range and significance of current result  Dietary considerations: importance of consistent vitamin K intake    Plan made per [facility] anticoagulation protocol    Aylin Pablo RN  Anticoagulation Clinic  7/11/2023    _______________________________________________________________________     Anticoagulation Episode Summary       Current INR goal:  2.0-2.5   TTR:  59.3 % (1 y)   Target end date:  Indefinite   Send INR reminders to:  [facility]    Indications    S/P AVR [Z95.2]  S/P AVR (aortic valve replacement) [Z95.2]  Long term current use of anticoagulant therapy [Z79.01]             Comments:  6/2/23 INR target goal changed to 2.0 - 2.5 d/t daily bleeding issues.   (Sensitivity)  Goal range changed 2/20/19 per cardiology             Anticoagulation Care Providers       Provider Role Specialty Phone number    Rafaela Woods MD Referring Family Medicine [phone]

## 2023-07-21 ENCOUNTER — LAB (OUTPATIENT)
Dept: LAB | Facility: CLINIC | Age: 68
End: 2023-07-21
Payer: COMMERCIAL

## 2023-07-21 ENCOUNTER — ANTICOAGULATION THERAPY VISIT (OUTPATIENT)
Dept: ANTICOAGULATION | Facility: CLINIC | Age: 68
End: 2023-07-21

## 2023-07-21 DIAGNOSIS — Z79.01 LONG TERM CURRENT USE OF ANTICOAGULANT THERAPY: ICD-10-CM

## 2023-07-21 DIAGNOSIS — Z95.2 S/P AVR (AORTIC VALVE REPLACEMENT): ICD-10-CM

## 2023-07-21 DIAGNOSIS — Z95.2 S/P AVR: Primary | ICD-10-CM

## 2023-07-21 LAB — INR BLD: 2 (ref 0.9–1.1)

## 2023-07-21 PROCEDURE — 85610 PROTHROMBIN TIME: CPT

## 2023-07-21 PROCEDURE — 36416 COLLJ CAPILLARY BLOOD SPEC: CPT

## 2023-07-21 NOTE — PROGRESS NOTES
ANTICOAGULATION MANAGEMENT     Alyssa Higginbotham 68 year old female is on warfarin with therapeutic INR result. (Goal INR 2.0-2.5)    Recent labs: (last 7 days)     07/21/23  0727   INR 2.0*       ASSESSMENT     Source(s): Chart Review and Patient/Caregiver Call     Warfarin doses taken: Warfarin taken as instructed   Wkly warfarin dose increased on 7/11/23.  Diet: No new diet changes identified  Medication/supplement changes: None noted  New illness, injury, or hospitalization: No  Signs or symptoms of bleeding or clotting: No  Previous result: Subtherapeutic last 4 INR results.  Additional findings: None       PLAN     Recommended plan for no diet, medication or health factor changes affecting INR     Dosing Instructions: Continue your current warfarin dose with next INR in 1-2 weeks       Summary  As of 7/21/2023      Full warfarin instructions:  3 mg every Mon, Wed, Sat; 5 mg all other days   Next INR check:  8/4/2023               Telephone call with Jennifer who verbalizes understanding and agrees to plan    Lab visit scheduled - INR on 8/1/23 @ [facility].    Education provided:   Taking warfarin: Importance of taking warfarin as instructed  Goal range and lab monitoring: goal range and significance of current result  Dietary considerations: importance of consistent vitamin K intake    Plan made per [facility] anticoagulation protocol    Aylin Pablo RN  Anticoagulation Clinic  7/21/2023    _______________________________________________________________________     Anticoagulation Episode Summary       Current INR goal:  2.0-2.5   TTR:  56.6 % (1 y)   Target end date:  Indefinite   Send INR reminders to:  [facility]    Indications    S/P AVR [Z95.2]  S/P AVR (aortic valve replacement) [Z95.2]  Long term current use of anticoagulant therapy [Z79.01]             Comments:  6/2/23 INR target goal changed to 2.0 - 2.5 d/t daily bleeding issues.  (Sensitivity)  Goal range changed 2/20/19 per cardiology              Anticoagulation Care Providers       Provider Role Specialty Phone number    Rafaela Woods MD Referring Family Medicine [phone]

## 2023-07-25 ENCOUNTER — TELEPHONE (OUTPATIENT)
Dept: CARDIOLOGY | Facility: CLINIC | Age: 68
End: 2023-07-25
Payer: COMMERCIAL

## 2023-07-25 DIAGNOSIS — I71.03 DISSECTION OF THORACOABDOMINAL AORTA (H): Primary | ICD-10-CM

## 2023-07-25 NOTE — TELEPHONE ENCOUNTER
----- Message from Jonathon Richardson MD sent at 6/14/2022  8:48 AM CDT -----  Renee,    Could you set her up for a chest and abdominal and pelvic CT a in October 2023 to get a follow-up of her aortic dissection.    Thanks,    Jonathon      ==  Order placed. Message to patient via [facility] with reminder to schedule imaging exam as recommended by Dr. Richardson. -juventinob

## 2023-08-01 ENCOUNTER — LAB (OUTPATIENT)
Dept: LAB | Facility: CLINIC | Age: 68
End: 2023-08-01
Payer: COMMERCIAL

## 2023-08-01 ENCOUNTER — TELEPHONE (OUTPATIENT)
Dept: FAMILY MEDICINE | Facility: CLINIC | Age: 68
End: 2023-08-01

## 2023-08-01 ENCOUNTER — ANTICOAGULATION THERAPY VISIT (OUTPATIENT)
Dept: ANTICOAGULATION | Facility: CLINIC | Age: 68
End: 2023-08-01

## 2023-08-01 DIAGNOSIS — Z95.2 S/P AVR: Primary | ICD-10-CM

## 2023-08-01 DIAGNOSIS — Z95.2 S/P AVR (AORTIC VALVE REPLACEMENT): ICD-10-CM

## 2023-08-01 DIAGNOSIS — Z79.01 LONG TERM CURRENT USE OF ANTICOAGULANT THERAPY: ICD-10-CM

## 2023-08-01 LAB — INR BLD: 2.4 (ref 0.9–1.1)

## 2023-08-01 PROCEDURE — 36415 COLL VENOUS BLD VENIPUNCTURE: CPT

## 2023-08-01 PROCEDURE — 85610 PROTHROMBIN TIME: CPT

## 2023-08-01 NOTE — TELEPHONE ENCOUNTER
General Call      Reason for Call:  returning call from inr nurse    What are your questions or concerns:  Discuss inr and dosing    Date of last appointment with provider: n/a    Could we send this information to you in [facility] or would you prefer to receive a phone call?:   Patient would prefer a phone call   Okay to leave a detailed message?: Yes at Cell number on file:    Telephone Information:   Mobile [phone]   Mobile Not on file.

## 2023-08-01 NOTE — PROGRESS NOTES
ANTICOAGULATION MANAGEMENT     Alyssa Higginbotham 68 year old female is on warfarin with therapeutic INR result. (Goal INR 2.0-2.5)    Recent labs: (last 7 days)     08/01/23  0725   INR 2.4*       ASSESSMENT     Source(s): Chart Review and Patient/Caregiver Call     Warfarin doses taken: Warfarin taken as instructed  Diet: No new diet changes identified  Medication/supplement changes: None noted  New illness, injury, or hospitalization: No  Signs or symptoms of bleeding or clotting: No  Previous result: Therapeutic last visit; previously outside of goal range  Additional findings: None       PLAN     Recommended plan for no diet, medication or health factor changes affecting INR     Dosing Instructions: Continue your current warfarin dose with next INR in 4 weeks       Summary  As of 8/1/2023      Full warfarin instructions:  3 mg every Mon, Wed, Sat; 5 mg all other days   Next INR check:  8/29/2023               Telephone call with Jennifer who verbalizes understanding and agrees to plan    Lab visit scheduled    Education provided:   None required    Plan made per [facility] anticoagulation protocol    Viviana Guzmán RN  Anticoagulation Clinic  8/1/2023    _______________________________________________________________________     Anticoagulation Episode Summary       Current INR goal:  2.0-2.5   TTR:  56.6 % (1 y)   Target end date:  Indefinite   Send INR reminders to:  [facility]    Indications    S/P AVR [Z95.2]  S/P AVR (aortic valve replacement) [Z95.2]  Long term current use of anticoagulant therapy [Z79.01]             Comments:  6/2/23 INR target goal changed to 2.0 - 2.5 d/t daily bleeding issues.  (Sensitivity)  Goal range changed 2/20/19 per cardiology             Anticoagulation Care Providers       Provider Role Specialty Phone number    Rafaela Woods MD Referring Family Medicine [phone]

## 2023-08-23 DIAGNOSIS — G43.009 MIGRAINE WITHOUT AURA AND WITHOUT STATUS MIGRAINOSUS, NOT INTRACTABLE: ICD-10-CM

## 2023-08-23 RX ORDER — RIZATRIPTAN BENZOATE 5 MG/1
TABLET ORAL
Qty: 10 TABLET | Refills: 27 | Status: SHIPPED | OUTPATIENT
Start: 2023-08-23 | End: 2024-06-05

## 2023-08-29 ENCOUNTER — ANTICOAGULATION THERAPY VISIT (OUTPATIENT)
Dept: ANTICOAGULATION | Facility: CLINIC | Age: 68
End: 2023-08-29

## 2023-08-29 ENCOUNTER — LAB (OUTPATIENT)
Dept: LAB | Facility: CLINIC | Age: 68
End: 2023-08-29
Payer: COMMERCIAL

## 2023-08-29 DIAGNOSIS — Z79.01 LONG TERM CURRENT USE OF ANTICOAGULANT THERAPY: ICD-10-CM

## 2023-08-29 DIAGNOSIS — Z95.2 S/P AVR (AORTIC VALVE REPLACEMENT): ICD-10-CM

## 2023-08-29 DIAGNOSIS — Z95.2 S/P AVR: Primary | ICD-10-CM

## 2023-08-29 LAB — INR BLD: 1.5 (ref 0.9–1.1)

## 2023-08-29 PROCEDURE — 36416 COLLJ CAPILLARY BLOOD SPEC: CPT

## 2023-08-29 PROCEDURE — 85610 PROTHROMBIN TIME: CPT

## 2023-08-29 NOTE — PROGRESS NOTES
ANTICOAGULATION MANAGEMENT     Alyssa Higginbotham 68 year old female is on warfarin with subtherapeutic INR result. (Goal INR 2.0-2.5)    Recent labs: (last 7 days)     08/29/23  1019   INR 1.5*       ASSESSMENT     Source(s): Chart Review and Patient/Caregiver Call     Warfarin doses taken: Warfarin taken as instructed   Did not miss any warfarin doses.  Diet:  was at the [facility] on 8/25/23 may be affecting diet and INR   Diet was not her usual going.   It was a very hot / humid day [place] to the fair and she felt like she was going to faint, despite drinking lots of water.  Medication/supplement changes: None noted  New illness, injury, or hospitalization: No   S/p AVR, (mechanical tilting disc, 2000.  Signs or symptoms of bleeding or clotting: No  Previous result: Therapeutic last 2 INR visits.  Additional findings: None       PLAN     Recommended plan for temporary change(s) affecting INR     Dosing Instructions: booster dose then continue your current warfarin dose with next INR in 1-2 weeks       Summary  As of 8/29/2023      Full warfarin instructions:  8/29: 7.5 mg; Otherwise 3 mg every Mon, Wed, Sat; 5 mg all other days   Next INR check:  9/12/2023               Telephone call with Jennifer who verbalizes understanding and agrees to plan    Lab visit scheduled - INR on 9/7/23 @ [facility].    Education provided:   Taking warfarin: Importance of taking warfarin as instructed  Goal range and lab monitoring: goal range and significance of current result  Dietary considerations: importance of consistent vitamin K intake    Plan made with [facility] Pharmacist Brenda Pablo, RN  Anticoagulation Clinic  8/29/2023    _______________________________________________________________________     Anticoagulation Episode Summary       Current INR goal:  2.0-2.5   TTR:  56.5 % (1 y)   Target end date:  Indefinite   Send INR reminders to:  [facility]    Indications    S/P AVR [Z95.2]  S/P AVR  (aortic valve replacement) [Z95.2]  Long term current use of anticoagulant therapy [Z79.01]             Comments:  6/2/23 INR target goal changed to 2.0 - 2.5 d/t daily bleeding issues.  (Sensitivity)  Goal range changed 2/20/19 per cardiology             Anticoagulation Care Providers       Provider Role Specialty Phone number    Rafaela Woods MD Referring Family Medicine [phone]

## 2023-09-05 DIAGNOSIS — Z95.2 S/P AVR: ICD-10-CM

## 2023-09-05 DIAGNOSIS — Z79.01 LONG TERM CURRENT USE OF ANTICOAGULANT THERAPY: ICD-10-CM

## 2023-09-06 RX ORDER — WARFARIN SODIUM 1 MG/1
TABLET ORAL
Qty: 110 TABLET | Refills: 1 | Status: SHIPPED | OUTPATIENT
Start: 2023-09-06 | End: 2024-03-02

## 2023-09-06 NOTE — TELEPHONE ENCOUNTER
ANTICOAGULATION MANAGEMENT:  Medication Refill    Anticoagulation Summary  As of 8/29/2023      Warfarin maintenance plan:  3 mg (1 mg x 3) every Mon, Wed, Sat; 5 mg (5 mg x 1) all other days   Next INR check:  9/12/2023   Target end date:  Indefinite    Indications    S/P AVR [Z95.2]  S/P AVR (aortic valve replacement) [Z95.2]  Long term current use of anticoagulant therapy [Z79.01]                 Anticoagulation Care Providers       Provider Role Specialty Phone number    Rafaela Woods MD Referring Family Medicine [phone]            Refill Criteria    Visit with referring provider/group: Meets criteria: office visit within referring provider group in the last 1 year on 3/1/23    [facility] referral signed last signed: 10/14/2022; within last year: Yes    Lab monitoring not exceeding 2 weeks overdue: Yes    Alyssa meets all criteria for refill. Rx instructions and quantity supplied updated to match patient's current dosing plan. Warfarin 90 day supply with 1 refill granted per [facility] protocol     Bibi Ramachandran RN  Anticoagulation Clinic

## 2023-09-07 ENCOUNTER — LAB (OUTPATIENT)
Dept: LAB | Facility: CLINIC | Age: 68
End: 2023-09-07
Payer: COMMERCIAL

## 2023-09-07 ENCOUNTER — DOCUMENTATION ONLY (OUTPATIENT)
Dept: FAMILY MEDICINE | Facility: CLINIC | Age: 68
End: 2023-09-07

## 2023-09-07 ENCOUNTER — ANTICOAGULATION THERAPY VISIT (OUTPATIENT)
Dept: ANTICOAGULATION | Facility: CLINIC | Age: 68
End: 2023-09-07

## 2023-09-07 DIAGNOSIS — Z79.01 LONG TERM CURRENT USE OF ANTICOAGULANT THERAPY: ICD-10-CM

## 2023-09-07 DIAGNOSIS — Z95.2 S/P AVR: Primary | ICD-10-CM

## 2023-09-07 DIAGNOSIS — Z95.2 S/P AVR (AORTIC VALVE REPLACEMENT): ICD-10-CM

## 2023-09-07 LAB — INR BLD: 1.5 (ref 0.9–1.1)

## 2023-09-07 PROCEDURE — 36415 COLL VENOUS BLD VENIPUNCTURE: CPT

## 2023-09-07 PROCEDURE — 85610 PROTHROMBIN TIME: CPT

## 2023-09-07 NOTE — PROGRESS NOTES
ANTICOAGULATION CLINIC REFERRAL RENEWAL REQUEST       An annual renewal order is required for all patients referred to [facility].?  Please review and sign the pended referral order for Alyssa Higginbotham.       ANTICOAGULATION SUMMARY      Warfarin indication(s)             S/p AVR (mechanical tilting disc), 2000.    Mechanical heart valve present?  Mechanical AVR- Lanre-Shiley (Tilting disc), [place] ([place]) or Monoleaflet valve       Current goal range   INR: 2.0-2.5     Goal appropriate for indication? Goal of 2.0 - 2.5 verified with Dr. Woods , on 6/2/23 due to recurrent epistaxis     Time in Therapeutic Range (TTR)  (Goal > 60%) 54.3 %       Office visit with referring provider's group within last year Yes on 6/14/23.       Aylin Pablo RN  [facility]

## 2023-09-07 NOTE — PROGRESS NOTES
ANTICOAGULATION MANAGEMENT     Alyssa Higginbotham 68 year old female is on warfarin with subtherapeutic INR result. (Goal INR 2.0-2.5)    Recent labs: (last 7 days)     09/07/23  0747   INR 1.5*       ASSESSMENT     Source(s): Chart Review and Patient/Caregiver Call     Warfarin doses taken: Warfarin taken as instructed  Diet: No new diet changes identified  Medication/supplement changes: None noted  New illness, injury, or hospitalization: No  Signs or symptoms of bleeding or clotting: No  Previous result: Subtherapeutic at 1.5 on 8/29/23.  Additional findings: None       PLAN     Recommended plan for no diet, medication or health factor changes affecting INR     Dosing Instructions: booster dose then Increase your warfarin dose (6.9% change) with next INR in 1-2 weeks       Summary  As of 9/7/2023      Full warfarin instructions:  9/7: 7 mg; Otherwise 3 mg every Wed, Sat; 5 mg all other days   Next INR check:  9/15/2023               Telephone call with Jennifer who verbalizes understanding and agrees to plan    Lab visit scheduled - INR on 9/15/23 @ [facility].    Education provided:   Taking warfarin: Importance of taking warfarin as instructed  Goal range and lab monitoring: goal range and significance of current result  Dietary considerations: importance of consistent vitamin K intake    Plan made per [facility] anticoagulation protocol    Aylin Pablo RN  Anticoagulation Clinic  9/7/2023    _______________________________________________________________________     Anticoagulation Episode Summary       Current INR goal:  2.0-2.5   TTR:  54.3 % (1 y)   Target end date:  Indefinite   Send INR reminders to:  [facility]    Indications    S/P AVR [Z95.2]  S/P AVR (aortic valve replacement) [Z95.2]  Long term current use of anticoagulant therapy [Z79.01]             Comments:  6/2/23 INR target goal changed to 2.0 - 2.5 d/t daily bleeding issues.  (Sensitivity)  Goal range changed 2/20/19 per cardiology              Anticoagulation Care Providers       Provider Role Specialty Phone number    Rafaela Woods MD Referring Family Medicine [phone]

## 2023-09-15 ENCOUNTER — LAB (OUTPATIENT)
Dept: LAB | Facility: CLINIC | Age: 68
End: 2023-09-15
Payer: COMMERCIAL

## 2023-09-15 ENCOUNTER — ANTICOAGULATION THERAPY VISIT (OUTPATIENT)
Dept: ANTICOAGULATION | Facility: CLINIC | Age: 68
End: 2023-09-15

## 2023-09-15 DIAGNOSIS — Z79.01 LONG TERM CURRENT USE OF ANTICOAGULANT THERAPY: ICD-10-CM

## 2023-09-15 DIAGNOSIS — Z95.2 S/P AVR (AORTIC VALVE REPLACEMENT): ICD-10-CM

## 2023-09-15 DIAGNOSIS — Z95.2 S/P AVR: Primary | ICD-10-CM

## 2023-09-15 LAB — INR BLD: 1.8 (ref 0.9–1.1)

## 2023-09-15 PROCEDURE — 36415 COLL VENOUS BLD VENIPUNCTURE: CPT

## 2023-09-15 PROCEDURE — 85610 PROTHROMBIN TIME: CPT

## 2023-09-15 NOTE — PROGRESS NOTES
ANTICOAGULATION MANAGEMENT     Alyssa Higginbotham 68 year old female is on warfarin with subtherapeutic INR result. (Goal INR 2.0-2.5)    Recent labs: (last 7 days)     09/15/23  0729   INR 1.8*       ASSESSMENT     Source(s): Chart Review and Patient/Caregiver Call     Warfarin doses taken: Booster dose on 9/7/23, recently taken which may be affecting INR   And weekly warfarin dose increased.  Diet: Increased greens/vitamin K in diet; plans to resume previous intake   Reported drinking a bottle of Gatorade (she read contents and it contained Vitamin-K).  Medication/supplement changes: None noted  New illness, injury, or hospitalization: No  Signs or symptoms of bleeding or clotting: No  Previous result: Subtherapeutic last 2 INR results.  Additional findings: None       PLAN     Recommended plan for temporary change(s) affecting INR     Dosing Instructions: Continue your current warfarin dose with next INR in 1 wk.    Summary  As of 9/15/2023      Full warfarin instructions:  3 mg every Wed; 5 mg all other days   Next INR check:  9/29/2023               Telephone call with Jennifer who verbalizes understanding and agrees to plan    Check at provider office visit - INR and OV with Dr. Lock on 9/18/23 @ [facility]    Education provided:   Taking warfarin: Importance of taking warfarin as instructed  Goal range and lab monitoring: goal range and significance of current result  Dietary considerations: importance of consistent vitamin K intake and impact of vitamin K foods on INR    Plan made with [facility] Pharmacist Zoraida Pablo, RN  Anticoagulation Clinic  9/15/2023    _______________________________________________________________________     Anticoagulation Episode Summary       Current INR goal:  2.0-2.5   TTR:  54.0 % (1 y)   Target end date:  Indefinite   Send INR reminders to:  [facility]    Indications    S/P AVR [Z95.2]  S/P AVR (aortic valve replacement) [Z95.2]  Long term current  use of anticoagulant therapy [Z79.01]             Comments:  6/2/23 INR target goal changed to 2.0 - 2.5 d/t daily bleeding issues.  (Sensitivity)  Goal range changed 2/20/19 per cardiology             Anticoagulation Care Providers       Provider Role Specialty Phone number    Rafaela Woods MD Referring Family Medicine [phone]

## 2023-09-16 ENCOUNTER — HOSPITAL ENCOUNTER (EMERGENCY)
Facility: HOSPITAL | Age: 68
Discharge: HOME | End: 2023-09-16
Attending: STUDENT IN AN ORGANIZED HEALTH CARE EDUCATION/TRAINING PROGRAM | Admitting: STUDENT IN AN ORGANIZED HEALTH CARE EDUCATION/TRAINING PROGRAM
Payer: COMMERCIAL

## 2023-09-16 VITALS
DIASTOLIC BLOOD PRESSURE: 67 MMHG | WEIGHT: 114 LBS | OXYGEN SATURATION: 98 % | BODY MASS INDEX: 21.52 KG/M2 | HEART RATE: 77 BPM | SYSTOLIC BLOOD PRESSURE: 136 MMHG | RESPIRATION RATE: 18 BRPM | HEIGHT: 61 IN | TEMPERATURE: 97.7 F

## 2023-09-16 DIAGNOSIS — G43.809 OTHER MIGRAINE WITHOUT STATUS MIGRAINOSUS, NOT INTRACTABLE: ICD-10-CM

## 2023-09-16 DIAGNOSIS — M62.830 BACK MUSCLE SPASM: ICD-10-CM

## 2023-09-16 PROCEDURE — 250N000013 HC RX MED GY IP 250 OP 250 PS 637: Performed by: STUDENT IN AN ORGANIZED HEALTH CARE EDUCATION/TRAINING PROGRAM

## 2023-09-16 PROCEDURE — 96361 HYDRATE IV INFUSION ADD-ON: CPT

## 2023-09-16 PROCEDURE — 258N000003 HC RX IP 258 OP 636: Performed by: STUDENT IN AN ORGANIZED HEALTH CARE EDUCATION/TRAINING PROGRAM

## 2023-09-16 PROCEDURE — 250N000011 HC RX IP 250 OP 636: Mod: JZ | Performed by: STUDENT IN AN ORGANIZED HEALTH CARE EDUCATION/TRAINING PROGRAM

## 2023-09-16 PROCEDURE — 96374 THER/PROPH/DIAG INJ IV PUSH: CPT

## 2023-09-16 PROCEDURE — 96375 TX/PRO/DX INJ NEW DRUG ADDON: CPT

## 2023-09-16 PROCEDURE — 99284 EMERGENCY DEPT VISIT MOD MDM: CPT | Mod: 25

## 2023-09-16 RX ORDER — ACETAMINOPHEN 325 MG/1
975 TABLET ORAL ONCE
Status: COMPLETED | OUTPATIENT
Start: 2023-09-16 | End: 2023-09-16

## 2023-09-16 RX ORDER — CYCLOBENZAPRINE HCL 10 MG
10 TABLET ORAL ONCE
Status: COMPLETED | OUTPATIENT
Start: 2023-09-16 | End: 2023-09-16

## 2023-09-16 RX ORDER — DIPHENHYDRAMINE HYDROCHLORIDE 50 MG/ML
25 INJECTION INTRAMUSCULAR; INTRAVENOUS ONCE
Status: COMPLETED | OUTPATIENT
Start: 2023-09-16 | End: 2023-09-16

## 2023-09-16 RX ORDER — CYCLOBENZAPRINE HCL 10 MG
10 TABLET ORAL 3 TIMES DAILY PRN
Qty: 18 TABLET | Refills: 0 | Status: SHIPPED | OUTPATIENT
Start: 2023-09-16 | End: 2023-09-18

## 2023-09-16 RX ORDER — METOCLOPRAMIDE HYDROCHLORIDE 5 MG/ML
10 INJECTION INTRAMUSCULAR; INTRAVENOUS ONCE
Status: COMPLETED | OUTPATIENT
Start: 2023-09-16 | End: 2023-09-16

## 2023-09-16 RX ADMIN — ACETAMINOPHEN 975 MG: 325 TABLET ORAL at 07:54

## 2023-09-16 RX ADMIN — SODIUM CHLORIDE, POTASSIUM CHLORIDE, SODIUM LACTATE AND CALCIUM CHLORIDE 1000 ML: 600; 310; 30; 20 INJECTION, SOLUTION INTRAVENOUS at 08:02

## 2023-09-16 RX ADMIN — CYCLOBENZAPRINE 10 MG: 10 TABLET, FILM COATED ORAL at 07:54

## 2023-09-16 RX ADMIN — DIPHENHYDRAMINE HYDROCHLORIDE 25 MG: 50 INJECTION, SOLUTION INTRAMUSCULAR; INTRAVENOUS at 07:59

## 2023-09-16 RX ADMIN — METOCLOPRAMIDE 10 MG: 5 INJECTION, SOLUTION INTRAMUSCULAR; INTRAVENOUS at 07:59

## 2023-09-16 ASSESSMENT — ACTIVITIES OF DAILY LIVING (ADL): ADLS_ACUITY_SCORE: 35

## 2023-09-16 NOTE — ED PROVIDER NOTES
EMERGENCY DEPARTMENT ENCOUNTER      NAME: Alyssa Higginbotham  AGE: 68 year old female  YOB: 1955  MRN: [id number]  EVALUATION DATE & TIME: 9/16/2023  7:16 AM    PCP: Rafaela Woods    ED PROVIDER: Cesar Hair MD      Chief Complaint   Patient presents with    Neck Pain       FINAL IMPRESSION:  1. Back muscle spasm    2. Other migraine without status migrainosus, not intractable        ED COURSE & MEDICAL DECISION MAKING:    Pertinent Labs & Imaging studies reviewed. (See chart for details)  68 year old female presents to the Emergency Department for evaluation of neck pain    7:41 AM I met with the patient, obtained history, performed an initial exam, and discussed options and plan for diagnostics and treatment here in the ED.    ED Course as of 09/16/23 1002   Sat Sep 16, 2023   0750 Patient is a 68-year-old female who presents to the emergency department with bilateral paraspinal muscle pain, stiffness, tenderness for 2 days.  She states that she typically gets this pain after taking her triptan, and it lasts for 2 days usually.  She does not have chest pain or difficulty breathing.  No recent fevers, illness, sore throat to suggest meningitis or deep space infection of the neck as she has no trismus, and does have passive range of motion with minimal difficulty.  She is well-appearing with normal vital signs.  I suspect that this is a continuation of her migraine, and will treat as such with migraine cocktail and reassess.   1000 Patient's symptoms improved, and requesting discharge.  Will prescribe a short course of Flexeril and encouraged follow-up on Monday.       Medical Decision Making    History:  Supplemental history from: Documented in chart, if applicable  External Record(s) reviewed: Documented in chart, if applicable.    Work Up:  Chart documentation includes differential considered and any EKGs or imaging independently interpreted by provider, where specified.  In additional  to work up documented, I considered the following work up: Documented in chart, if applicable.    External consultation:  Discussion of management with another provider: Documented in chart, if applicable    Complicating factors:  Care impacted by chronic illness: N/A  Care affected by social determinants of health: N/A    Disposition considerations: Discharge. I prescribed additional prescription strength medication(s) as charted. See documentation for any additional details.      At the conclusion of the encounter I discussed the results of all of the tests and the disposition. The questions were answered. The patient or family acknowledged understanding and was agreeable with the care plan.     0 minutes of critical care time     MEDICATIONS GIVEN IN THE EMERGENCY:  Medications   lactated ringers BOLUS 1,000 mL (0 mLs Intravenous Stopped 9/16/23 1000)   cyclobenzaprine (FLEXERIL) tablet 10 mg (10 mg Oral $Given 9/16/23 0754)   diphenhydrAMINE (BENADRYL) injection 25 mg (25 mg Intravenous $Given 9/16/23 0759)   metoclopramide (REGLAN) injection 10 mg (10 mg Intravenous $Given 9/16/23 0759)   acetaminophen (TYLENOL) tablet 975 mg (975 mg Oral $Given 9/16/23 0754)       NEW PRESCRIPTIONS STARTED AT TODAY'S ER VISIT  New Prescriptions    CYCLOBENZAPRINE (FLEXERIL) 10 MG TABLET    Take 1 tablet (10 mg) by mouth 3 times daily as needed for muscle spasms     =================================================================    HPI    Patient information was obtained from: patient    Use of : N/A    Alyssa Higginbotham is a 68 year old female with a pertinent history of anxiety, migraines, HLD, HTN, s/p AVR, aortic dissection s/p repair in 2000, who presents to this ED by private car with  for evaluation of neck pain. Patient reports a history of migraines and had one 2 days ago. She took her rizatriptan which she reports sometimes makes her neck feel stiff. Now complains of 10/10 bilateral neck pain and  stiffness since yesterday. Also endorses a mild headache which she believes is being caused by the pain in her neck. Headache is less severe compared to her migraine a few days ago. Denies any associated vision changes, unilateral numbness or weakness.    Patient mentions that she has had chronic neck pain after a fall 8 years ago, but denies any recent falls. Icing her neck typically helps with the pain, but current neck pain is worse than normal. Otherwise denies any chest pain or shortness of breath. Denies recent fevers or illnesses. Denies any other complaints or concerns.      PAST MEDICAL HISTORY:  Past Medical History:   Diagnosis Date    Accidental fall 05/27/2015    Antiplatelet or antithrombotic long-term use     Anxiety     Aortic dissection (H)     Asthma     Benign meningioma of brain (H) 03/2015    initially seen on CT of head that was obtained after a fall. Frontal lobe, confirmed on an MRI Mar 2015, 3 mm    Bunion     Chronic headache     Depression     Heart murmur     Hepatitis C     Hyperlipidemia     Hypothyroid     Irregular heart rate     Nasal fracture 02/28/2015    fell face first on sideache    Osteoporosis     Other acquired deformity of toe     Stroke (H)     on CT scan       PAST SURGICAL HISTORY:  Past Surgical History:   Procedure Laterality Date    AORTIC VALVE REPLACEMENT   2000    ARTHROPLASTY TOE(S) Right 2/13/2023    Procedure: First metatarsal phalangeal joint implant arthroplasty right foot;  Surgeon: Chin Riojas DPM;  Location: [facility]    BIOPSY BREAST Left 2005    benign    COLONOSCOPY  2011    REPAIR THORACIC AORTA   2000       CURRENT MEDICATIONS:    cyclobenzaprine (FLEXERIL) 10 MG tablet  amitriptyline (ELAVIL) 10 MG tablet  butalbital-acetaminophen-caffeine (ESGIC) -40 MG tablet  clonazePAM (KLONOPIN) 0.5 MG tablet  levothyroxine (SYNTHROID/LEVOTHROID) 75 MCG tablet  levothyroxine (SYNTHROID/LEVOTHROID) 88 MCG tablet  metoprolol succinate ER (TOPROL  "XL) 200 MG 24 hr tablet  multivitamin (ONE A DAY) per tablet  rizatriptan (MAXALT) 5 MG tablet  simvastatin (ZOCOR) 20 MG tablet  triamterene-HCTZ (MAXZIDE) 75-50 MG tablet  warfarin ANTICOAGULANT (COUMADIN) 1 MG tablet  warfarin ANTICOAGULANT (COUMADIN) 5 MG tablet        ALLERGIES:  Allergies   Allergen Reactions    Codeine Other (See Comments)     Faints    Demerol [Meperidine] Other (See Comments)     Reaction not reported by patient., Patient states she felt awful.       FAMILY HISTORY:  Family History   Problem Relation Age of Onset    Pancreatic Cancer Father 70.00        history of smoking    Ovarian Cancer Sister 67.00        stage III, fatal    Skin Cancer Sister     Heart Disease Mother     Diabetes No family hx of     Alzheimer Disease No family hx of        SOCIAL HISTORY:   Social History     Socioeconomic History    Marital status:     Number of children:  0   Tobacco Use    Smoking status: Never    Smokeless tobacco: Never   Vaping Use    Vaping Use: Never used   Substance and Sexual Activity    Alcohol use: No    Drug use: No    Sexual activity: Not Currently       VITALS:  /67   Pulse 77   Temp 97.7  F (36.5  C) (Oral)   Resp 18   Ht 1.549 m (5' 1\")   Wt 51.7 kg (114 lb)   SpO2 98%   BMI 21.54 kg/m      PHYSICAL EXAM    Physical Exam  Vitals and nursing note reviewed.   Constitutional:       General: She is not in acute distress.     Appearance: Normal appearance. She is normal weight. She is not ill-appearing.   HENT:      Head: Normocephalic and atraumatic.      Nose: Nose normal.      Mouth/Throat:      Mouth: Mucous membranes are moist.      Pharynx: Oropharynx is clear.   Eyes:      Extraocular Movements: Extraocular movements intact.      Conjunctiva/sclera: Conjunctivae normal.      Pupils: Pupils are equal, round, and reactive to light.   Cardiovascular:      Rate and Rhythm: Normal rate and regular rhythm.      Pulses: Normal pulses.      Heart sounds: Murmur (diastolic " heard throughout the chest) heard.   Pulmonary:      Effort: Pulmonary effort is normal. No respiratory distress.      Breath sounds: Normal breath sounds.   Abdominal:      General: Abdomen is flat. There is no distension.      Palpations: Abdomen is soft.      Tenderness: There is no abdominal tenderness.   Musculoskeletal:         General: Normal range of motion.      Cervical back: Normal range of motion and neck supple. Tenderness (bilateral paraspinal from base of skull to shoulders) present. No rigidity.      Right lower leg: No edema.      Left lower leg: No edema.   Lymphadenopathy:      Cervical: No cervical adenopathy.   Skin:     General: Skin is warm and dry.      Capillary Refill: Capillary refill takes less than 2 seconds.   Neurological:      General: No focal deficit present.      Mental Status: She is alert and oriented to person, place, and time. Mental status is at baseline.   Psychiatric:         Mood and Affect: Mood normal.         Behavior: Behavior normal.         Thought Content: Thought content normal.         Judgment: Judgment normal.            LAB:  All pertinent labs reviewed and interpreted.       RADIOLOGY:  Reviewed all pertinent imaging. Please see official radiology report.  No orders to display       PROCEDURES:   None.      [facility] Documentation:   CMS Diagnoses:         I, Juan Daniel Duron, am serving as a scribe to document services personally performed by Cesar Hair MD, based on my observation and the provider's statements to me. I, Cesar Hair MD, attest that Juan Daniel Duron is acting in a scribe capacity, has observed my performance of the services and has documented them in accordance with my direction.      Cesar Hair MD  [facility] EMERGENCY DEPARTMENT  [address] [place] [id number]  [phone]       Cesar Hair MD  09/16/23 1002

## 2023-09-16 NOTE — ED NOTES
Pt alert and oriented x4. Ambulated with a steady gait. Discharged to home with . Pt given printed rx. Pt instructed to follow up with PCP.

## 2023-09-16 NOTE — DISCHARGE INSTRUCTIONS
Please return to the emergency department if you develop worsening symptoms, particularly numbness, weakness, difficulty going to the bathroom.

## 2023-09-16 NOTE — ED TRIAGE NOTES
Pt states 10/10 bilateral neck pain that has been on-going for the past several weeks.  Pt states she also suffers from Migraines which she associates with the severity of her neck pain.  Pt denies any visual changes, denies dizziness, denies gait disturbances.

## 2023-09-18 ENCOUNTER — DOCUMENTATION ONLY (OUTPATIENT)
Dept: ANTICOAGULATION | Facility: CLINIC | Age: 68
End: 2023-09-18

## 2023-09-18 ENCOUNTER — OFFICE VISIT (OUTPATIENT)
Dept: FAMILY MEDICINE | Facility: CLINIC | Age: 68
End: 2023-09-18
Payer: COMMERCIAL

## 2023-09-18 ENCOUNTER — ANTICOAGULATION THERAPY VISIT (OUTPATIENT)
Dept: ANTICOAGULATION | Facility: CLINIC | Age: 68
End: 2023-09-18

## 2023-09-18 VITALS
DIASTOLIC BLOOD PRESSURE: 84 MMHG | TEMPERATURE: 98.8 F | HEART RATE: 88 BPM | OXYGEN SATURATION: 98 % | BODY MASS INDEX: 22.11 KG/M2 | RESPIRATION RATE: 20 BRPM | WEIGHT: 117 LBS | SYSTOLIC BLOOD PRESSURE: 148 MMHG

## 2023-09-18 DIAGNOSIS — Z98.890 HISTORY OF REPAIR OF DISSECTING THORACIC ANEURYSM: ICD-10-CM

## 2023-09-18 DIAGNOSIS — Z00.00 HEALTH CARE MAINTENANCE: ICD-10-CM

## 2023-09-18 DIAGNOSIS — Z23 ENCOUNTER FOR IMMUNIZATION: ICD-10-CM

## 2023-09-18 DIAGNOSIS — Z95.2 S/P AVR: Primary | ICD-10-CM

## 2023-09-18 DIAGNOSIS — Z86.79 HISTORY OF REPAIR OF DISSECTING THORACIC ANEURYSM: ICD-10-CM

## 2023-09-18 DIAGNOSIS — Z79.01 LONG TERM CURRENT USE OF ANTICOAGULANT THERAPY: ICD-10-CM

## 2023-09-18 DIAGNOSIS — Z95.2 S/P AVR (AORTIC VALVE REPLACEMENT): ICD-10-CM

## 2023-09-18 DIAGNOSIS — M62.838 MUSCLE SPASM: ICD-10-CM

## 2023-09-18 DIAGNOSIS — G43.809 OTHER MIGRAINE WITHOUT STATUS MIGRAINOSUS, NOT INTRACTABLE: Primary | ICD-10-CM

## 2023-09-18 LAB — INR BLD: 2.3 (ref 0.9–1.1)

## 2023-09-18 PROCEDURE — 90662 IIV NO PRSV INCREASED AG IM: CPT | Performed by: FAMILY MEDICINE

## 2023-09-18 PROCEDURE — 99214 OFFICE O/P EST MOD 30 MIN: CPT | Mod: 25 | Performed by: FAMILY MEDICINE

## 2023-09-18 PROCEDURE — 85610 PROTHROMBIN TIME: CPT | Performed by: FAMILY MEDICINE

## 2023-09-18 PROCEDURE — G0008 ADMIN INFLUENZA VIRUS VAC: HCPCS | Performed by: FAMILY MEDICINE

## 2023-09-18 PROCEDURE — 36416 COLLJ CAPILLARY BLOOD SPEC: CPT | Performed by: FAMILY MEDICINE

## 2023-09-18 RX ORDER — TIZANIDINE 2 MG/1
2 TABLET ORAL 3 TIMES DAILY PRN
Qty: 30 TABLET | Refills: 0 | Status: SHIPPED | OUTPATIENT
Start: 2023-09-18 | End: 2023-10-20

## 2023-09-18 NOTE — PROGRESS NOTES
ANTICOAGULATION MANAGEMENT     Alyssa Higginbotham 68 year old female is on warfarin with therapeutic INR result. (Goal INR 2.0-2.5)    Recent labs: (last 7 days)     09/18/23  1506   INR 2.3*       ASSESSMENT     Source(s): Chart Review and Patient/Caregiver Call     Warfarin doses taken: Warfarin taken as instructed   Reported she was up at her cabin and did not have her blue sheet stating her dose, so she took a lower dose with 3mg warfarin on 9/17/23.  Diet: No new diet changes identified  Medication/supplement changes:  Yes.   9/18/23, advised to increase Amitriptyline (Elavil) from 10mg to 25mg daily to help with insomnia.  However, she reported she will stay on 10mg dose, as it is effective.   Continue with Tizandidine 2mg 1 tab 3x/day PRN for muscle spasms.   Continues with Butalbital-Acet. 50-325mg for PRN for migraines.  New illness, injury, or hospitalization: Yes:   9/18/23 ED follow-up with Dr. Lock - migraines with frequency 1x/week.   9/16/23 reported [facility] ED for back muscle spasm and neck pain x4 days.  She states that she typically gets this pain after taking her triptan, and it lasts for 2 days usually.   Signs or symptoms of bleeding or clotting: No  Previous result: Subtherapeutic last 3 INR results.  Additional findings:  vaccinated today - Flu Shot.       PLAN     Recommended plan for temporary change(s) affecting INR     Dosing Instructions: Continue your current warfarin dose with next INR in 1 week       Summary  As of 9/18/2023      Full warfarin instructions:  3 mg every Sat; 5 mg all other days   Next INR check:  10/2/2023               Telephone call with Jennifer who verbalizes understanding and agrees to plan.   - will get back on track with correct warfarin dose and monitor INR closely.    Lab visit scheduled - INR on 9/22/23 @ [facility].   - preferred to check INR on this day, d/t potential nose bleed.    Education provided:   Taking warfarin: take warfarin at same time each day;  preferably in the evening and Importance of taking warfarin as instructed  Goal range and lab monitoring: goal range and significance of current result    Plan made per [facility] anticoagulation protocol    Aylin Pablo RN  Anticoagulation Clinic  9/18/2023    _______________________________________________________________________     Anticoagulation Episode Summary       Current INR goal:  2.0-2.5   TTR:  54.5 % (1 y)   Target end date:  Indefinite   Send INR reminders to:  [facility]    Indications    S/P AVR [Z95.2]  S/P AVR (aortic valve replacement) [Z95.2]  Long term current use of anticoagulant therapy [Z79.01]             Comments:  6/2/23 INR target goal changed to 2.0 - 2.5 d/t daily bleeding issues.  (Sensitivity)  Goal range changed 2/20/19 per cardiology             Anticoagulation Care Providers       Provider Role Specialty Phone number    Rafaela Woods MD Referring Family Medicine [phone]

## 2023-09-18 NOTE — PROGRESS NOTES
Assessment & Plan     Other migraine without status migrainosus, not intractable  Patient is having migraines at a frequency of about once a week  Her triptan medications typically cause neck muscular irritation for a few days after using them  We discussed different medications to take trialing daily to see if we can prevent the migraines from occurring  She is already on amitriptyline to 10 mg to help with insomnia we discussed increasing dosing to 25 mg  - amitriptyline (ELAVIL) 25 MG tablet; Take 1 tablet (25 mg) by mouth At Bedtime    History of repair of dissecting thoracic aneurysm  Patient was having severe back pain was seen at the ER over the weekend thought to be muscular in nature they discharged her after treating a migraine with muscle relaxants  She is worried about her history of aneurysm repair was due for imaging next month  Reviewed ER notes and discussed with them to obtain imaging at this time I think was okay-her response to muscle relaxants and massage likely indicating this is not internal in nature and more musculoskeletal-we will proceed with CTA to further evaluate  - CTA Chest Abdomen Pelvis w Contrast; Future    Health care maintenance  Patient will be updated with flu shot today  - INFLUENZA VACCINE 65+ (FLUZONE HD)    Encounter for immunization  Please see above  - INFLUENZA VACCINE 65+ (FLUZONE HD)    Muscle spasm  Patient feels Flexeril is causing constipation  She like to alternative muscle relaxants which we discussed tizanidine  - tiZANidine (ZANAFLEX) 2 MG tablet; Take 1 tablet (2 mg) by mouth 3 times daily as needed for muscle spasms    Long term current use of anticoagulant therapy  Patient is due for an INR check on warfarin for history of aortic valve replacement  - INR point of care    S/P AVR (aortic valve replacement)  Please see above  - INR point of care      Moy Lock MD  [facility]    Palmer Rodriguez is a 68 year old,  presenting for the following health issues:  Neck Pain (Was seen in the ER 9/16 - neck and upper back pain X4 days ) and Constipation (Last BM 9/13, has taken 2 ex-lax on 9/17, prune juice, water, today having abdominal cramping )        9/18/2023     2:24 PM   Additional Questions   Roomed by Paulette JERONIMO CMA   Accompanied by Spouse   Patient here for evaluation of neck and back pain.  Pain has been present over the last 4 days or so associated with migraines seen over the weekend was given medications for migraine and improvement of symptoms and was discharged on a muscle relaxant.  Patient's pain is in the thoracic region midline-she is concerned as she has a history of a thoracic dissection repair roughly 23 years ago and no imaging was completed she is due for follow-up monitoring next month and would like to proceed with imaging sooner to make sure this is not aneurysm related or dissection related.  She has had some improvement with massage and icing and muscle relaxants-discussed with her that this is likely suggestive that the pain in her back was not secondary to an internal problem like a dissection but we should proceed with imaging to verify.  She feels her muscle relaxants are causing constipation since starting Flexeril like an alternative so we discussed tizanidine.  She suffers from migraines on a regular basis frequency about a once a week occurrence when she takes triptans they can help with the migraine however lead to neck muscular spasm or irritation.  This occurs for a few days.  We discussed with her transitioning to a daily medication to try to prevent migraines.  She is on amitriptyline for insomnia and we discussed different options but will increase amitriptyline to 25 mg daily.  She did been on Topamax years ago but had nosebleeds.  She is due for an INR today with history of aortic valve replacement she is on warfarin we will check INR for her today.  She had to be updated with a flu  vaccine.    History of Present Illness       Back Pain:  She presents for follow up of back pain. Patient's back pain is a new problem.    Original cause of back pain: not sure  First noticed back pain: in the last week  Patient feels back pain: comes and goesLocation of back pain:  Left middle of back  Description of back pain: stabbing  Back pain spreads: right side of neck and left side of neck    Since patient first noticed back pain, pain is: always present, but gets better and worse  Does back pain interfere with her job:  Not applicable  On a scale of 1-10 (10 being the worst), patient describes pain as:  10  What makes back pain worse: bending, coughing, certain positions, lying down, sitting and twisting   Acupuncture: not tried  Acetaminophen: not tried  Activity or exercise: not helpful  Chiropractor:  Not tried  Cold: not helpful  Heat: not tried  Massage: helpful  Muscle relaxants: helpful  NSAIDS: not tried  Opioids: not tried  Physical Therapy: not tried  Rest: helpful  Steroid Injection: not tried  Stretching: not tried  Surgery: not tried  TENS unit: not tried  Topical pain relievers: not tried  Other healthcare providers patient is seeing for back pain: Other    She eats 0-1 servings of fruits and vegetables daily.She consumes 2 sweetened beverage(s) daily.She exercises with enough effort to increase her heart rate 60 or more minutes per day.  She exercises with enough effort to increase her heart rate 7 days per week.   She is taking medications regularly.                 Objective    BP (!) 148/84 (BP Location: Left arm, Patient Position: Sitting, Cuff Size: Adult Regular)   Pulse 88   Temp 98.8  F (37.1  C) (Oral)   Resp 20   Wt 53.1 kg (117 lb)   SpO2 98%   BMI 22.11 kg/m    Body mass index is 22.11 kg/m .  Physical Exam   GENERAL: healthy, alert and no distress  CV: regular rate and rhythm, normal S1 S2, no S3 or S4, no murmur, click or rub, no peripheral edema and peripheral pulses  strong  MS: Patient expresses discomfort mid thoracic region midline no radiation of symptoms  NEURO: Normal strength and tone, mentation intact and speech normal  PSYCH: mentation appears normal, affect normal/bright

## 2023-09-18 NOTE — PROGRESS NOTES
ANTICOAGULATION  MANAGEMENT: Discharge Review    Alyssa Higginbotham chart reviewed for anticoagulation continuity of care    Emergency room visit on 9/16/23 for back muscle spasm / neck pain.   - x4 days duration.   - She states that she typically gets this pain after taking her triptan, and it lasts for 2 days usually     Discharge disposition: Home    Results:    Recent labs: (last 7 days)     09/15/23  0729 09/18/23  1506   INR 1.8* 2.3*     Anticoagulation inpatient management:     not applicable     Anticoagulation discharge instructions:     Warfarin dosing: home regimen continued   Bridging: No   INR goal change: No      Medication changes affecting anticoagulation: Yes:    Prescribed Cyclobenzaprine (Flexeril) 10mg    Additional factors affecting anticoagulation: No     PLAN     No adjustment to anticoagulation plan needed    Patient not contacted     No adjustment to Anticoagulation Calendar was required    Aylin Pablo RN

## 2023-09-19 ENCOUNTER — TELEPHONE (OUTPATIENT)
Dept: CARDIOLOGY | Facility: CLINIC | Age: 68
End: 2023-09-19

## 2023-09-19 DIAGNOSIS — I35.9 AORTIC VALVE DISORDER: ICD-10-CM

## 2023-09-19 DIAGNOSIS — Z95.2 S/P AVR: Primary | ICD-10-CM

## 2023-09-19 NOTE — TELEPHONE ENCOUNTER
[facility] Call Center    Phone Message    May a detailed message be left on voicemail: yes     Reason for Call: Order(s): Other:   Reason for requested: Echo  Date needed: ASAP  Provider name: Dr. Richardson    Action Taken: Message routed to:  [facility] cardio    Travel Screening: Not Applicable    Thank you!  [facility]

## 2023-09-19 NOTE — TELEPHONE ENCOUNTER
No orders for echo currently. Can review with Dr. Richardson upon his return. Last echo 04/22.  Pt has ct scheduled . In need of follow up- transferred to scheduling-ZIYAD

## 2023-09-21 ENCOUNTER — HOSPITAL ENCOUNTER (OUTPATIENT)
Dept: CT IMAGING | Facility: HOSPITAL | Age: 68
Discharge: HOME | End: 2023-09-21
Attending: INTERNAL MEDICINE | Admitting: INTERNAL MEDICINE
Payer: COMMERCIAL

## 2023-09-21 DIAGNOSIS — I71.03 DISSECTION OF THORACOABDOMINAL AORTA (H): ICD-10-CM

## 2023-09-21 PROCEDURE — 74174 CTA ABD&PLVS W/CONTRAST: CPT

## 2023-09-21 PROCEDURE — 250N000011 HC RX IP 250 OP 636: Mod: JZ | Performed by: INTERNAL MEDICINE

## 2023-09-21 RX ORDER — IOPAMIDOL 755 MG/ML
90 INJECTION, SOLUTION INTRAVASCULAR ONCE
Status: COMPLETED | OUTPATIENT
Start: 2023-09-21 | End: 2023-09-21

## 2023-09-21 RX ADMIN — IOPAMIDOL 90 ML: 755 INJECTION, SOLUTION INTRAVENOUS at 07:54

## 2023-09-22 ENCOUNTER — LAB (OUTPATIENT)
Dept: LAB | Facility: CLINIC | Age: 68
End: 2023-09-22
Payer: COMMERCIAL

## 2023-09-22 ENCOUNTER — ANTICOAGULATION THERAPY VISIT (OUTPATIENT)
Dept: ANTICOAGULATION | Facility: CLINIC | Age: 68
End: 2023-09-22

## 2023-09-22 DIAGNOSIS — Z79.01 LONG TERM CURRENT USE OF ANTICOAGULANT THERAPY: ICD-10-CM

## 2023-09-22 DIAGNOSIS — Z95.2 S/P AVR: Primary | ICD-10-CM

## 2023-09-22 DIAGNOSIS — Z95.2 S/P AVR (AORTIC VALVE REPLACEMENT): ICD-10-CM

## 2023-09-22 LAB — INR BLD: 2.1 (ref 0.9–1.1)

## 2023-09-22 PROCEDURE — 85610 PROTHROMBIN TIME: CPT

## 2023-09-22 PROCEDURE — 36416 COLLJ CAPILLARY BLOOD SPEC: CPT

## 2023-09-22 NOTE — PROGRESS NOTES
ANTICOAGULATION MANAGEMENT     Alyssa Higginbotham 68 year old female is on warfarin with therapeutic INR result. (Goal INR 2.0-2.5)    Recent labs: (last 7 days)     09/22/23  0712   INR 2.1*       ASSESSMENT     Source(s): Chart Review and Patient/Caregiver Call     Warfarin doses taken: Less warfarin taken than planned which may be affecting INR   Verbalized back wrong warfarin dose and had taken less warfarin, as instructed.   Will amend anticoag calendar to reflect the decreased warfarin dose she is currently taking, as INR is therapeutic.  Diet: No new diet changes identified  Medication/supplement changes: Yes.   Reported not taking muscle relaxant as her back muscle ymptoms have improved.   Also she did not increase Amitriptyline to 25mg, and continues with 10mg at bedtime.  New illness, injury, or hospitalization: No  Signs or symptoms of bleeding or clotting: No  Previous result: Therapeutic last visit at 2.3; previously outside of goal range last 3 INR results.  Additional findings: None       PLAN     Recommended plan for temporary change(s) affecting INR     Dosing Instructions: Continue your current warfarin dose with next INR in 1 week       Summary  As of 9/22/2023      Full warfarin instructions:  3 mg every Wed, Sat; 5 mg all other days   Next INR check:  10/6/2023               Telephone call with Jennifer who verbalizes understanding and agrees to plan   - will monitor closely monitor INR, since patient is continuing with decreased warfarin dose and check INR status in 1 wk.   - she had several medical events that happened this past one wk    Lab visit scheduled - INR on 9/29/23 @ [facility].    Education provided:   Taking warfarin: Importance of taking warfarin as instructed  Goal range and lab monitoring: goal range and significance of current result    Plan made per [facility] anticoagulation protocol    Aylin Pablo, RN  Anticoagulation  Clinic  9/22/2023    _______________________________________________________________________     Anticoagulation Episode Summary       Current INR goal:  2.0-2.5   TTR:  55.5 % (1 y)   Target end date:  Indefinite   Send INR reminders to:  [facility]    Indications    S/P AVR [Z95.2]  S/P AVR (aortic valve replacement) [Z95.2]  Long term current use of anticoagulant therapy [Z79.01]             Comments:  6/2/23 INR target goal changed to 2.0 - 2.5 d/t daily bleeding issues.  (Sensitivity)  Goal range changed 2/20/19 per cardiology             Anticoagulation Care Providers       Provider Role Specialty Phone number    Rafaela Woods MD Referring Family Medicine [phone]

## 2023-10-02 ENCOUNTER — LAB (OUTPATIENT)
Dept: LAB | Facility: CLINIC | Age: 68
End: 2023-10-02
Payer: COMMERCIAL

## 2023-10-02 ENCOUNTER — ANTICOAGULATION THERAPY VISIT (OUTPATIENT)
Dept: ANTICOAGULATION | Facility: CLINIC | Age: 68
End: 2023-10-02

## 2023-10-02 ENCOUNTER — TELEPHONE (OUTPATIENT)
Dept: CARDIOLOGY | Facility: CLINIC | Age: 68
End: 2023-10-02

## 2023-10-02 DIAGNOSIS — Z95.2 S/P AVR (AORTIC VALVE REPLACEMENT): ICD-10-CM

## 2023-10-02 DIAGNOSIS — Z79.01 LONG TERM CURRENT USE OF ANTICOAGULANT THERAPY: ICD-10-CM

## 2023-10-02 DIAGNOSIS — Z95.2 S/P AVR: Primary | ICD-10-CM

## 2023-10-02 LAB — INR BLD: 1.4 (ref 0.9–1.1)

## 2023-10-02 PROCEDURE — 85610 PROTHROMBIN TIME: CPT

## 2023-10-02 PROCEDURE — 36416 COLLJ CAPILLARY BLOOD SPEC: CPT

## 2023-10-02 NOTE — PROGRESS NOTES
ANTICOAGULATION MANAGEMENT     Alyssa Higginbotham 68 year old female is on warfarin with subtherapeutic INR result. (Goal INR 2.0-2.5)    Recent labs: (last 7 days)     10/02/23  1354   INR 1.4*       ASSESSMENT     Source(s): Chart Review and Patient/Caregiver Call     Warfarin doses taken: Warfarin taken as instructed and Jennifer does state there's a possibility she missed a dose this past week  Diet: No new diet changes identified  Medication/supplement changes: None noted  New illness, injury, or hospitalization: No  Signs or symptoms of bleeding or clotting: No  Previous result: Therapeutic last 2(+) visits. Noted that Jennifer had reported taking a lower maintenance dose, had reverted back to 31mg/week. ACN suspects maintenance dose needs adjustment but difficult to assess for this with the possibility of a missed dose this past week.   Additional findings: None       PLAN     Recommended plan for temporary change(s) affecting INR     Dosing Instructions: booster dose then continue your current warfarin dose with next INR in 1 week       Summary  As of 10/2/2023      Full warfarin instructions:  10/2: 8 mg; Otherwise 3 mg every Wed, Sat; 5 mg all other days   Next INR check:  10/9/2023               Telephone call with Jennifer who verbalizes understanding and agrees to plan    Lab visit scheduled    Education provided:   Goal range and lab monitoring: goal range and significance of current result and Importance of therapeutic range  Contact [phone]  with any changes, questions or concerns.     Plan made per [facility] anticoagulation protocol    Liudmila Aldana RN  Anticoagulation Clinic  10/2/2023    _______________________________________________________________________     Anticoagulation Episode Summary       Current INR goal:  2.0-2.5   TTR:  55.9 % (1 y)   Target end date:  Indefinite   Send INR reminders to:  CULLEN GALVEZ    Indications    S/P AVR [Z95.2]  S/P AVR (aortic valve replacement)  [Z95.2]  Long term current use of anticoagulant therapy [Z79.01]             Comments:  6/2/23 INR target goal changed to 2.0 - 2.5 d/t daily bleeding issues.  (Sensitivity)  Goal range changed 2/20/19 per cardiology             Anticoagulation Care Providers       Provider Role Specialty Phone number    Rafaela Woods MD Referring Family Medicine [phone]

## 2023-10-02 NOTE — TELEPHONE ENCOUNTER
Left message for Pt to get scheduled with [facility] per message from Kourtney

## 2023-10-02 NOTE — TELEPHONE ENCOUNTER
Noted. Message sent to scheduling. -ZIYAD    From: Jonathon Richardson MD  Sent: 9/29/2023   8:30 PM CDT  To: Kourtney Oconnell,    Yes would get an echocardiogram as well to evaluate prosthetic valve.    ThanksJonathon  ----- Message -----  From: Kourtney Shoemaker  Sent: 9/19/2023   9:04 AM CDT  To: Jonathon Richardson MD    ----- Message from Kourtney Shoemaker sent at 9/19/2023  9:04 AM CDT -----  Hi Dr. Richardson,    Did you want an annual echo prior to your visit with Pt? Pt currently has Ct scheduled. Thank you-ZIYAD

## 2023-10-02 NOTE — TELEPHONE ENCOUNTER
Pt returned call and She is scheduled with [facility] on 10/10 at 1:30pm. Pt aware of appt and loc

## 2023-10-06 ENCOUNTER — TELEPHONE (OUTPATIENT)
Dept: FAMILY MEDICINE | Facility: CLINIC | Age: 68
End: 2023-10-06
Payer: COMMERCIAL

## 2023-10-06 NOTE — TELEPHONE ENCOUNTER
Request for clarification from [facility].    Patient has prescription for     Amitriptyline 10 mg     And     Amitriptyline 25 mg      Requesting clarification as to which prescription she should be taking.

## 2023-10-09 ENCOUNTER — TELEPHONE (OUTPATIENT)
Dept: CARDIOLOGY | Facility: CLINIC | Age: 68
End: 2023-10-09

## 2023-10-09 ENCOUNTER — LAB (OUTPATIENT)
Dept: LAB | Facility: CLINIC | Age: 68
End: 2023-10-09
Payer: COMMERCIAL

## 2023-10-09 ENCOUNTER — ANTICOAGULATION THERAPY VISIT (OUTPATIENT)
Dept: ANTICOAGULATION | Facility: CLINIC | Age: 68
End: 2023-10-09

## 2023-10-09 DIAGNOSIS — Z79.01 LONG TERM CURRENT USE OF ANTICOAGULANT THERAPY: ICD-10-CM

## 2023-10-09 DIAGNOSIS — Z95.2 S/P AVR (AORTIC VALVE REPLACEMENT): ICD-10-CM

## 2023-10-09 DIAGNOSIS — Z95.2 S/P AVR: Primary | ICD-10-CM

## 2023-10-09 LAB — INR BLD: 1.4 (ref 0.9–1.1)

## 2023-10-09 PROCEDURE — 85610 PROTHROMBIN TIME: CPT

## 2023-10-09 PROCEDURE — 36416 COLLJ CAPILLARY BLOOD SPEC: CPT

## 2023-10-09 NOTE — TELEPHONE ENCOUNTER
Spoke with [facility] HIM department and they are working on getting the OP report. Person I spoke with was having difficulty getting the report to pull up and will call me back if they can not get the info faxed.

## 2023-10-09 NOTE — PROGRESS NOTES
ANTICOAGULATION MANAGEMENT     Alyssa Higginbotham 68 year old female is on warfarin with subtherapeutic INR result. (Goal INR 2.0-2.5)    Recent labs: (last 7 days)     10/09/23  1018   INR 1.4*       ASSESSMENT     Source(s): Chart Review and Patient/Caregiver Call     Warfarin doses taken: Booster dose on 10/2/23, recently taken which may be affecting INR  Diet: No new diet changes identified  Medication/supplement changes: None noted   Patient continues with 10mg Amitriptyline at bedtime, and did not increase dose to 25mg as ordered by Dr. Lock on 9/18/23.  New illness, injury, or hospitalization: No  Signs or symptoms of bleeding or clotting: No  Previous result: Subtherapeutic at 1.4 on 10/2/23, d/t missed dose and lower maintenance dose.  Additional findings: None       PLAN     Recommended plan for no diet, medication or health factor changes affecting INR     Dosing Instructions: Increase your warfarin dose (9.7% change) with next INR in 1 week       Summary  As of 10/9/2023      Full warfarin instructions:  4 mg every Sat; 5 mg all other days   Next INR check:  10/16/2023               Telephone call with Jennifer who verbalizes understanding and agrees to plan    Lab visit scheduled - INR on 10/16/23 @ [facility]    Education provided:   Taking warfarin: Importance of taking warfarin as instructed  Goal range and lab monitoring: goal range and significance of current result    Plan made per [facility] anticoagulation protocol    Aylin Pablo, RN  Anticoagulation Clinic  10/9/2023    _______________________________________________________________________     Anticoagulation Episode Summary       Current INR goal:  2.0-2.5   TTR:  55.9 % (1 y)   Target end date:  Indefinite   Send INR reminders to:  CULLEN GALVEZ    Indications    S/P AVR [Z95.2]  S/P AVR (aortic valve replacement) [Z95.2]  Long term current use of anticoagulant therapy [Z79.01]             Comments:  6/2/23 INR target goal changed to  2.0 - 2.5 d/t daily bleeding issues.  (Sensitivity)  Goal range changed 2/20/19 per cardiology             Anticoagulation Care Providers       Provider Role Specialty Phone number    Rafaela Woods MD Referring Family Medicine [phone]

## 2023-10-10 ENCOUNTER — TELEPHONE (OUTPATIENT)
Dept: CARDIOLOGY | Facility: CLINIC | Age: 68
End: 2023-10-10

## 2023-10-10 NOTE — TELEPHONE ENCOUNTER
Spoke with pt regarding changing appt due to [facility] in surgery. PT rescheduled to 10/17 and understands changes

## 2023-10-13 NOTE — TELEPHONE ENCOUNTER
Per patient comments patient has decided not to increase amitriptyline dosage to 25 mg. Staying on 10 mg daily.     Patient comments (entered 10/2/2023): I decided not to take these. I filled it, and then realized I don't want to increase my dose from 10 to 25, so I'm not taking them. I will continue to take the 10 mg filled by Dr. Woods

## 2023-10-16 ENCOUNTER — TELEPHONE (OUTPATIENT)
Dept: CARDIOLOGY | Facility: CLINIC | Age: 68
End: 2023-10-16

## 2023-10-16 ENCOUNTER — ANTICOAGULATION THERAPY VISIT (OUTPATIENT)
Dept: ANTICOAGULATION | Facility: CLINIC | Age: 68
End: 2023-10-16

## 2023-10-16 ENCOUNTER — LAB (OUTPATIENT)
Dept: LAB | Facility: CLINIC | Age: 68
End: 2023-10-16
Payer: COMMERCIAL

## 2023-10-16 DIAGNOSIS — Z79.01 LONG TERM CURRENT USE OF ANTICOAGULANT THERAPY: ICD-10-CM

## 2023-10-16 DIAGNOSIS — Z95.2 S/P AVR (AORTIC VALVE REPLACEMENT): ICD-10-CM

## 2023-10-16 DIAGNOSIS — Z95.2 S/P AVR: Primary | ICD-10-CM

## 2023-10-16 LAB — INR BLD: 1.7 (ref 0.9–1.1)

## 2023-10-16 PROCEDURE — 85610 PROTHROMBIN TIME: CPT

## 2023-10-16 PROCEDURE — 36416 COLLJ CAPILLARY BLOOD SPEC: CPT

## 2023-10-16 NOTE — PROGRESS NOTES
ANTICOAGULATION MANAGEMENT     Alyssa Higginbotham 68 year old female is on warfarin with subtherapeutic INR result. (Goal INR 2.0-2.5)    Recent labs: (last 7 days)     10/16/23  0707   INR 1.7*       ASSESSMENT     Source(s): Chart Review and Patient/Caregiver Call     Warfarin doses taken: Warfarin taken as instructed   Weekly warfarin dose was increased on 10/9/23.  Diet: No new diet changes identified  Medication/supplement changes: None noted  New illness, injury, or hospitalization: No  Signs or symptoms of bleeding or clotting: No  Previous result: Subtherapeutic last 2 INR results.  Additional findings: None       PLAN     Recommended plan for no diet, medication or health factor changes affecting INR     Dosing Instructions: Increase your warfarin dose (2.9% change) with next INR in 1-2 weeks       Summary  As of 10/16/2023      Full warfarin instructions:  5 mg every day   Next INR check:  10/30/2023               Telephone call with Jennifer who verbalizes understanding and agrees to plan   - just for today, will decrease Vit. K intake.   - No factors affect INR.  She is sensitive to increased warfarin doses.    Lab visit scheduled - INR on 10/27/23 @ [facility].    Education provided:   Taking warfarin: purpose of warfarin and how it works  Goal range and lab monitoring: goal range and significance of current result  Dietary considerations: importance of consistent vitamin K intake    Plan made per [facility] anticoagulation protocol    Aylin Pablo RN  Anticoagulation Clinic  10/16/2023    _______________________________________________________________________     Anticoagulation Episode Summary       Current INR goal:  2.0-2.5   TTR:  54.0% (1 y)   Target end date:  Indefinite   Send INR reminders to:  [facility]    Indications    S/P AVR [Z95.2]  S/P AVR (aortic valve replacement) [Z95.2]  Long term current use of anticoagulant therapy [Z79.01]             Comments:  6/2/23 INR target goal  changed to 2.0 - 2.5 d/t daily bleeding issues.  (Sensitivity)  Goal range changed 2/20/19 per cardiology             Anticoagulation Care Providers       Provider Role Specialty Phone number    Rafaela Woods MD Referring Family Medicine [phone]

## 2023-10-17 ENCOUNTER — TELEPHONE (OUTPATIENT)
Dept: CARDIOLOGY | Facility: CLINIC | Age: 68
End: 2023-10-17

## 2023-10-20 ENCOUNTER — OFFICE VISIT (OUTPATIENT)
Dept: CARDIOLOGY | Facility: CLINIC | Age: 68
End: 2023-10-20
Payer: COMMERCIAL

## 2023-10-20 VITALS
HEART RATE: 58 BPM | DIASTOLIC BLOOD PRESSURE: 80 MMHG | BODY MASS INDEX: 21.92 KG/M2 | WEIGHT: 116 LBS | RESPIRATION RATE: 12 BRPM | OXYGEN SATURATION: 98 % | SYSTOLIC BLOOD PRESSURE: 162 MMHG

## 2023-10-20 DIAGNOSIS — Q87.40 MARFAN'S SYNDROME: ICD-10-CM

## 2023-10-20 DIAGNOSIS — Z98.890 S/P AORTIC DISSECTION REPAIR: Primary | ICD-10-CM

## 2023-10-20 PROCEDURE — 99205 OFFICE O/P NEW HI 60 MIN: CPT | Performed by: SURGERY

## 2023-10-20 RX ORDER — AMITRIPTYLINE HYDROCHLORIDE 10 MG/1
10 TABLET ORAL AT BEDTIME
COMMUNITY
Start: 2023-10-02 | End: 2024-01-23

## 2023-10-20 NOTE — PATIENT INSTRUCTIONS
You were seen today in the [facility]    Repeat CTA in one year and return to clinic with Dr. Mercer at that time. We will call you to schedule your follow up closer to the time that is it due.    Please call YESENIA Brice surgery coordinator with any questions.  Thank you.    Babs Herrera RNCC  Cardiovascular Surgery  Phone [phone]  Fax [phone]

## 2023-10-20 NOTE — PROGRESS NOTES
Cardiac and Thoracic Surgery Consultation      Alyssa Higginbotham MRN# [id number]   YOB: 1955 Age: 68 year old        Reason for consult: I was asked by Dr. 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           Assessment and Plan:   Ms. 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 I discussed the natural history of the residual dissection and thoracoabdominal aorta in patients with previous [place] Type A aortic dissection with her and I reviewed her imaging.     One factor to consider in her case, is that her progressively worsening scoliosis is leading to more and more oblique measurements of her descending thoracic aorta on axial imaging. Reviewing her imagine today in coronal and sagittal section, I do not think there is too much cause for concern, although she should continue to have yearly CTA chest/abdomen/pelvis, and I also will refer her to one of the vascular surgeons to be followed. If her aortic arch and mid descending thoracic aortic diameters do not increase, she may be a candidate for TEVAR in the future, although I do not think it is indicated at this time.    I will see her in one year.    Her blood pressure is well controlled when measured at home. She has had genetic testing and this has all been discussed with her first degree relatives.             Chief Complaint:   Ms. 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    History is obtained from the patient         History of Present Illness:   Ms. Higginbotham is a 68-year-old woman with Marfan  disease who presented with [place] Type A aortic dissection in 2000 and underwent aortic root replacement with mechanical aortic valve and composite graft and ascending and hemiarch replacement by Dr. Jigar Vargas at [facility]. She did well and recovered and has been followed for many years by Dr. Jonathon Richardson. 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 On a recent CTA, this seemed to have enlarged, however I reviewed the images with her in clinic today, and I think her progressively worsening scoliosis may have been fooling us a bit. Either way, her descending thoracic aortic diameter is not great enough to merit intervention at this point.                  Past Medical History:     Past Medical History:   Diagnosis Date    Accidental fall 05/27/2015    Antiplatelet or antithrombotic long-term use     Anxiety     Aortic dissection (H)     Asthma     Benign meningioma of brain (H) 03/2015    initially seen on CT of head that was obtained after a fall. Frontal lobe, confirmed on an MRI Mar 2015, 3 mm    Bunion     Chronic headache     Depression     Heart murmur     Hepatitis C     Hyperlipidemia     Hypothyroid     Irregular heart rate     Nasal fracture 02/28/2015    fell face first on sideache    Osteoporosis     Other acquired deformity of toe     Stroke (H)     on CT scan             Past Surgical History:     Past Surgical History:   Procedure Laterality Date    AORTIC VALVE REPLACEMENT   2000    ARTHROPLASTY TOE(S) Right 2/13/2023    Procedure: First metatarsal phalangeal joint implant arthroplasty right foot;  Surgeon: Chin Riojas DPM;  Location: [facility]    BIOPSY BREAST Left 2005    benign    COLONOSCOPY  2011    REPAIR THORACIC AORTA   2000               Social History:     Social History     Tobacco Use    Smoking status: Never    Smokeless tobacco: Never   Substance Use Topics    Alcohol use: No              Family History:     Family History   Problem Relation Age of Onset    Pancreatic Cancer Father 70.00        history of smoking    Ovarian Cancer Sister 67.00        stage III, fatal    Skin Cancer Sister     Heart Disease Mother     Diabetes No family hx of     Alzheimer Disease No family hx of              Immunizations:     Immunization History   Administered Date(s) Administered    COVID-19 MONOVALENT 12+ (Pfizer) 03/11/2021, 04/01/2021, 11/04/2021    Flu, Unspecified 10/29/2002, 10/30/2006, 10/21/2008, 09/27/2016, 10/01/2018    HepA, Unspecified 10/29/2002, 04/25/2003    HepB, Unspecified 10/29/2002, 12/27/2002, 04/25/2003    Influenza (IIV3) PF 10/21/2008, 09/23/2009, 10/17/2011, 10/15/2012, 09/30/2013, 09/08/2014    Influenza Vaccine 65+ (Fluzone HD) 09/22/2020, 09/30/2021, 11/28/2022, 09/18/2023    Influenza Vaccine >6 months (Alfuria,Fluzone) 10/06/2017, 10/07/2018, 10/01/2019    Influenza Vaccine, 6+MO IM (QUADRIVALENT W/PRESERVATIVES) 09/23/2009, 09/20/2010, 09/03/2015    Pneumo Conj 13-V (2010&after) 07/29/2020    Pneumococcal 23 valent 01/10/2008, 12/13/2021    TD,PF 7+ (Tenivac) 05/08/2019    TDAP (Adacel,Boostrix) 03/05/2009    Td,adult,historic,unspecified 04/20/1988, 06/17/1991, 06/13/2003    Zoster recombinant adjuvanted (SHINGRIX) 05/08/2019, 09/22/2020    Zoster vaccine, live 08/02/2011             Allergies:     Allergies   Allergen Reactions    Codeine Other (See Comments)     Faints    Demerol [Meperidine] Other (See Comments)     Reaction not reported by patient., Patient states she felt awful.             Medications:     Current Outpatient Medications Ordered in Epic   Medication    amitriptyline (ELAVIL) 10 MG tablet    butalbital-acetaminophen-caffeine (ESGIC) -40 MG tablet    clonazePAM (KLONOPIN) 0.5 MG tablet    levothyroxine (SYNTHROID/LEVOTHROID) 75 MCG tablet    levothyroxine (SYNTHROID/LEVOTHROID) 88 MCG tablet    metoprolol succinate ER (TOPROL XL) 200 MG 24 hr  tablet    multivitamin (ONE A DAY) per tablet    simvastatin (ZOCOR) 20 MG tablet    triamterene-HCTZ (MAXZIDE) 75-50 MG tablet    warfarin ANTICOAGULANT (COUMADIN) 5 MG tablet    rizatriptan (MAXALT) 5 MG tablet    warfarin ANTICOAGULANT (COUMADIN) 1 MG tablet     No current Epic-ordered facility-administered medications on file.             Review of Systems:     The 10 point Review of Systems is negative other than noted in the HPI            Physical Exam:   Vitals were reviewed      BP: (!) 162/80 Pulse: 58   Resp: 12 SpO2: 98 %      Constitutional:   awake, alert, cooperative, no apparent distress, and appears stated age     Eyes:   Lids and lashes normal, pupils equal, round and reactive to light, extra ocular muscles intact, sclera clear, conjunctiva normal     ENT:   normocepalic, without obvious abnormality     Neck:   supple, symmetrical, trachea midline     Lungs:   no increased work of breathing, good air exchange, and no retractions     Cardiovascular:   regular rate and rhythm     Abdomen:   non-distended     Musculoskeletal:   no lower extremity pitting edema present  there is no redness, warmth, or swelling of the joints  full range of motion noted  motor strength is 5 out of 5 all extremities bilaterally     Neurologic:   Mental Status Exam:  Level of Alertness:   awake  Orientation:   person, place, time  Memory:   normal  Cranial Nerves:  cranial nerves II-XII are grossly intact  Motor Exam:  moves all extremities well and symmetrically     Neuropsychiatric:   General: normal, calm, and normal eye contact  Level of consciousness: alert / normal  Affect: normal     Skin:   no bruising or bleeding, normal skin color, texture, turgor, and no redness, warmth, or swelling          Data:   All laboratory data reviewed  All cardiac studies reviewed by me.  All imaging studies reviewed by me.    CTA CHEST/ABDOMEN/PELVIS: See my interpretation above       FINDINGS:   CT ANGIOGRAM CHEST, ABDOMEN, AND  PELVIS: Chronic postoperative changes of aortic valve replacement, coronary reimplantation and ascending aortic graft repair. There are chronic remnants of dissection involving the entire arch and thoracoabdominal aorta   with terminal extent to the distal right common iliac artery. The overall configuration of the dissection is unchanged. Dissection is noted to extend into the brachiocephalic and right common carotid artery, similar to previous. The celiac trunk, SMA,   LOKI and right renal artery arise from the true lumen with left renal artery arising from the false lumen. Multiple reentry sites at including left renal artery and large reentry site below the renal arteries. Also a reentry site at the distal right   common iliac artery. Probable entry/reentry site in the proximal descending thoracic aorta. There is been aneurysmal degeneration of the thoracoabdominal aorta. Maximum diameter in the mid descending thoracic aorta is 48 x 44 mm, including true and false   lumens. This compares to 44 x 41 mm on the prior study. There is stable aneurysmal dilation of the juxtarenal aorta measuring 39 x 20 mm.     LUNGS AND PLEURA: Normal.     MEDIASTINUM/AXILLAE: Poststernotomy. Stable complex peripherally enhancing right thyroid nodule.     CORONARY ARTERY CALCIFICATION: Previous intervention (stents or CABG).     HEPATOBILIARY: Normal.     PANCREAS: Normal.     SPLEEN: Normal.     ADRENAL GLANDS: Normal.     KIDNEYS/BLADDER: Lateral renal cysts. 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     BOWEL: Normal.     LYMPH NODES: Normal.     PELVIC ORGANS: Normal.     MUSCULOSKELETAL: S-shaped scoliosis thoracolumbar spine. Facet degenerative changes.                                                                      IMPRESSION:  1.  Stable postoperative changes from aortic valve replacement, coronary revascularization and ascending aortic graft repair.  2.  Chronic aortic dissection throughout the arch and  thoracoabdominal aorta extending into the right common iliac artery. Overall morphology is the same. There has been some progressive aneurysmal degeneration in the descending thoracic aorta with   maximum diameter of 48 x 44 mm. This reflects a 3 - 4 mm increase since the 03/25/2019 exam.          TRANSTHORACIC ECHOCARDIOGRAPHY:  The visual ejection fraction is 65-70%.  No regional wall motion abnormalities noted.  There is a bioprosthetic aortic valve.  The gradient is abnormal for this prosthetic aortic valve.  There is trace aortic regurgitation.  There is mild (1+) mitral regurgitation.  There is mild (1+) tricuspid regurgitation.  Compared to the prior study dated 10/16/2020, there are changes as noted. Mean  gradient across the aortic valve has increased.

## 2023-10-20 NOTE — LETTER
10/20/2023    Rafaela Woods MD  [address]  [facility] [address]    RE: Alyssa Higginbotham       Dear Colleague,     I had the pleasure of seeing Alyssa Higginbotham in the [facility].  Cardiac and Thoracic Surgery Consultation      Alyssa Higginbotham MRN# [id number]   YOB: 1955 Age: 68 year old        Reason for consult: I was asked by Dr. 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           Assessment and Plan:   Ms. 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 I discussed the natural history of the residual dissection and thoracoabdominal aorta in patients with previous Robby Type A aortic dissection with her and I reviewed her imaging.     One factor to consider in her case, is that her progressively worsening scoliosis is leading to more and more oblique measurements of her descending thoracic aorta on axial imaging. Reviewing her imagine today in coronal and sagittal section, I do not think there is too much cause for concern, although she should continue to have yearly CTA chest/abdomen/pelvis, and I also will refer her to one of the vascular surgeons to be followed. If her aortic arch and mid descending thoracic aortic diameters do not increase, she may be a candidate for TEVAR in the future, although I do not think it is indicated at this time.    I will see her in one year.    Her blood pressure is well controlled when measured at home. She has had genetic testing and this has all been discussed with her first degree relatives.             Chief Complaint:   Ms. Higginbotham is a 68-year-old woman with an aortic arch and descending thoracic aortic dissection with  thoracoabdominal aneurysm after previous repair of Robby Type A aortic dissection in 2000.    History is obtained from the patient         History of Present Illness:   Ms. Higginbotham is a 68-year-old woman with Marfan disease who presented with Deshler Type A aortic dissection in 2000 and underwent aortic root replacement with mechanical aortic valve and composite graft and ascending and hemiarch replacement by Dr. Jigar Vargas at [facility]. She did well and recovered and has been followed for many years by Dr. Jonathon Richardson. 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 On a recent CTA, this seemed to have enlarged, however I reviewed the images with her in clinic today, and I think her progressively worsening scoliosis may have been fooling us a bit. Either way, her descending thoracic aortic diameter is not great enough to merit intervention at this point.                  Past Medical History:     Past Medical History:   Diagnosis Date    Accidental fall 05/27/2015    Antiplatelet or antithrombotic long-term use     Anxiety     Aortic dissection (H)     Asthma     Benign meningioma of brain (H) 03/2015    initially seen on CT of head that was obtained after a fall. Frontal lobe, confirmed on an MRI Mar 2015, 3 mm    Bunion     Chronic headache     Depression     Heart murmur     Hepatitis C     Hyperlipidemia     Hypothyroid     Irregular heart rate     Nasal fracture 02/28/2015    fell face first on sideache    Osteoporosis     Other acquired deformity of toe     Stroke (H)     on CT scan             Past Surgical History:     Past Surgical History:   Procedure Laterality Date    AORTIC VALVE REPLACEMENT   2000    ARTHROPLASTY TOE(S) Right 2/13/2023    Procedure: First metatarsal phalangeal joint implant arthroplasty right foot;  Surgeon: Chin Riojas DPM;  Location: [facility]    BIOPSY BREAST Left 2005     benign    COLONOSCOPY  2011    REPAIR THORACIC AORTA   2000               Social History:     Social History     Tobacco Use    Smoking status: Never    Smokeless tobacco: Never   Substance Use Topics    Alcohol use: No             Family History:     Family History   Problem Relation Age of Onset    Pancreatic Cancer Father 70.00        history of smoking    Ovarian Cancer Sister 67.00        stage III, fatal    Skin Cancer Sister     Heart Disease Mother     Diabetes No family hx of     Alzheimer Disease No family hx of              Immunizations:     Immunization History   Administered Date(s) Administered    COVID-19 MONOVALENT 12+ (Pfizer) 03/11/2021, 04/01/2021, 11/04/2021    Flu, Unspecified 10/29/2002, 10/30/2006, 10/21/2008, 09/27/2016, 10/01/2018    HepA, Unspecified 10/29/2002, 04/25/2003    HepB, Unspecified 10/29/2002, 12/27/2002, 04/25/2003    Influenza (IIV3) PF 10/21/2008, 09/23/2009, 10/17/2011, 10/15/2012, 09/30/2013, 09/08/2014    Influenza Vaccine 65+ (Fluzone HD) 09/22/2020, 09/30/2021, 11/28/2022, 09/18/2023    Influenza Vaccine >6 months (Alfuria,Fluzone) 10/06/2017, 10/07/2018, 10/01/2019    Influenza Vaccine, 6+MO IM (QUADRIVALENT W/PRESERVATIVES) 09/23/2009, 09/20/2010, 09/03/2015    Pneumo Conj 13-V (2010&after) 07/29/2020    Pneumococcal 23 valent 01/10/2008, 12/13/2021    TD,PF 7+ (Tenivac) 05/08/2019    TDAP (Adacel,Boostrix) 03/05/2009    Td,adult,historic,unspecified 04/20/1988, 06/17/1991, 06/13/2003    Zoster recombinant adjuvanted (SHINGRIX) 05/08/2019, 09/22/2020    Zoster vaccine, live 08/02/2011             Allergies:     Allergies   Allergen Reactions    Codeine Other (See Comments)     Faints    Demerol [Meperidine] Other (See Comments)     Reaction not reported by patient., Patient states she felt awful.             Medications:     Current Outpatient Medications Ordered in Epic   Medication    amitriptyline (ELAVIL) 10 MG tablet    butalbital-acetaminophen-caffeine (ESGIC)  -40 MG tablet    clonazePAM (KLONOPIN) 0.5 MG tablet    levothyroxine (SYNTHROID/LEVOTHROID) 75 MCG tablet    levothyroxine (SYNTHROID/LEVOTHROID) 88 MCG tablet    metoprolol succinate ER (TOPROL XL) 200 MG 24 hr tablet    multivitamin (ONE A DAY) per tablet    simvastatin (ZOCOR) 20 MG tablet    triamterene-HCTZ (MAXZIDE) 75-50 MG tablet    warfarin ANTICOAGULANT (COUMADIN) 5 MG tablet    rizatriptan (MAXALT) 5 MG tablet    warfarin ANTICOAGULANT (COUMADIN) 1 MG tablet     No current Epic-ordered facility-administered medications on file.             Review of Systems:     The 10 point Review of Systems is negative other than noted in the HPI            Physical Exam:   Vitals were reviewed      BP: (!) 162/80 Pulse: 58   Resp: 12 SpO2: 98 %      Constitutional:   awake, alert, cooperative, no apparent distress, and appears stated age     Eyes:   Lids and lashes normal, pupils equal, round and reactive to light, extra ocular muscles intact, sclera clear, conjunctiva normal     ENT:   normocepalic, without obvious abnormality     Neck:   supple, symmetrical, trachea midline     Lungs:   no increased work of breathing, good air exchange, and no retractions     Cardiovascular:   regular rate and rhythm     Abdomen:   non-distended     Musculoskeletal:   no lower extremity pitting edema present  there is no redness, warmth, or swelling of the joints  full range of motion noted  motor strength is 5 out of 5 all extremities bilaterally     Neurologic:   Mental Status Exam:  Level of Alertness:   awake  Orientation:   person, place, time  Memory:   normal  Cranial Nerves:  cranial nerves II-XII are grossly intact  Motor Exam:  moves all extremities well and symmetrically     Neuropsychiatric:   General: normal, calm, and normal eye contact  Level of consciousness: alert / normal  Affect: normal     Skin:   no bruising or bleeding, normal skin color, texture, turgor, and no redness, warmth, or swelling          Data:    All laboratory data reviewed  All cardiac studies reviewed by me.  All imaging studies reviewed by me.    CTA CHEST/ABDOMEN/PELVIS: See my interpretation above       FINDINGS:   CT ANGIOGRAM CHEST, ABDOMEN, AND PELVIS: Chronic postoperative changes of aortic valve replacement, coronary reimplantation and ascending aortic graft repair. There are chronic remnants of dissection involving the entire arch and thoracoabdominal aorta   with terminal extent to the distal right common iliac artery. The overall configuration of the dissection is unchanged. Dissection is noted to extend into the brachiocephalic and right common carotid artery, similar to previous. The celiac trunk, SMA,   LOKI and right renal artery arise from the true lumen with left renal artery arising from the false lumen. Multiple reentry sites at including left renal artery and large reentry site below the renal arteries. Also a reentry site at the distal right   common iliac artery. Probable entry/reentry site in the proximal descending thoracic aorta. There is been aneurysmal degeneration of the thoracoabdominal aorta. Maximum diameter in the mid descending thoracic aorta is 48 x 44 mm, including true and false   lumens. This compares to 44 x 41 mm on the prior study. There is stable aneurysmal dilation of the juxtarenal aorta measuring 39 x 20 mm.     LUNGS AND PLEURA: Normal.     MEDIASTINUM/AXILLAE: Poststernotomy. Stable complex peripherally enhancing right thyroid nodule.     CORONARY ARTERY CALCIFICATION: Previous intervention (stents or CABG).     HEPATOBILIARY: Normal.     PANCREAS: Normal.     SPLEEN: Normal.     ADRENAL GLANDS: Normal.     KIDNEYS/BLADDER: Lateral renal cysts. 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     BOWEL: Normal.     LYMPH NODES: Normal.     PELVIC ORGANS: Normal.     MUSCULOSKELETAL: S-shaped scoliosis thoracolumbar spine. Facet degenerative changes.                                                                       IMPRESSION:  1.  Stable postoperative changes from aortic valve replacement, coronary revascularization and ascending aortic graft repair.  2.  Chronic aortic dissection throughout the arch and thoracoabdominal aorta extending into the right common iliac artery. Overall morphology is the same. There has been some progressive aneurysmal degeneration in the descending thoracic aorta with   maximum diameter of 48 x 44 mm. This reflects a 3 - 4 mm increase since the 03/25/2019 exam.          TRANSTHORACIC ECHOCARDIOGRAPHY:  The visual ejection fraction is 65-70%.  No regional wall motion abnormalities noted.  There is a bioprosthetic aortic valve.  The gradient is abnormal for this prosthetic aortic valve.  There is trace aortic regurgitation.  There is mild (1+) mitral regurgitation.  There is mild (1+) tricuspid regurgitation.  Compared to the prior study dated 10/16/2020, there are changes as noted. Mean  gradient across the aortic valve has increased.      Thank you for allowing me to participate in the care of your patient.      Sincerely,     Jasiel Mercer MD     [facility]  cc:   No referring provider defined for this encounter.

## 2023-10-23 ENCOUNTER — HOSPITAL ENCOUNTER (OUTPATIENT)
Dept: CARDIOLOGY | Facility: HOSPITAL | Age: 68
Discharge: HOME | End: 2023-10-23
Attending: INTERNAL MEDICINE | Admitting: INTERNAL MEDICINE
Payer: COMMERCIAL

## 2023-10-23 DIAGNOSIS — Z95.2 S/P AVR: ICD-10-CM

## 2023-10-23 DIAGNOSIS — I35.9 AORTIC VALVE DISORDER: ICD-10-CM

## 2023-10-23 LAB — LVEF ECHO: NORMAL

## 2023-10-23 PROCEDURE — 999N000208 ECHOCARDIOGRAM COMPLETE

## 2023-10-23 PROCEDURE — 255N000002 HC RX 255 OP 636: Performed by: INTERNAL MEDICINE

## 2023-10-23 PROCEDURE — 93306 TTE W/DOPPLER COMPLETE: CPT | Mod: 26 | Performed by: INTERNAL MEDICINE

## 2023-10-23 RX ADMIN — PERFLUTREN 2 ML: 6.52 INJECTION, SUSPENSION INTRAVENOUS at 17:35

## 2023-10-27 ENCOUNTER — LAB (OUTPATIENT)
Dept: LAB | Facility: CLINIC | Age: 68
End: 2023-10-27
Payer: COMMERCIAL

## 2023-10-27 ENCOUNTER — ANTICOAGULATION THERAPY VISIT (OUTPATIENT)
Dept: ANTICOAGULATION | Facility: CLINIC | Age: 68
End: 2023-10-27

## 2023-10-27 DIAGNOSIS — Z79.01 LONG TERM CURRENT USE OF ANTICOAGULANT THERAPY: ICD-10-CM

## 2023-10-27 DIAGNOSIS — Z95.2 S/P AVR: Primary | ICD-10-CM

## 2023-10-27 DIAGNOSIS — Z95.2 S/P AVR (AORTIC VALVE REPLACEMENT): ICD-10-CM

## 2023-10-27 LAB — INR BLD: 1.8 (ref 0.9–1.1)

## 2023-10-27 PROCEDURE — 85610 PROTHROMBIN TIME: CPT

## 2023-10-27 PROCEDURE — 36415 COLL VENOUS BLD VENIPUNCTURE: CPT

## 2023-10-27 NOTE — PROGRESS NOTES
ANTICOAGULATION MANAGEMENT     Alyssa Higginbotham 68 year old female is on warfarin with subtherapeutic INR result. (Goal INR 2.0-2.5)    Recent labs: (last 7 days)     10/27/23  0707   INR 1.8*       ASSESSMENT     Source(s): Chart Review and Patient/Caregiver Call     Warfarin doses taken: Warfarin taken as instructed   Weekly warfarin dose was increased on 10/16/23.  Diet: No new diet changes identified  Medication/supplement changes: None noted  New illness, injury, or hospitalization: No   10/20/23 follow-up with Dr. Mercer s/p aortic dissection repair 2000. Progressively worsening scoliosis.  Signs or symptoms of bleeding or clotting: No  Previous result: Subtherapeutic last 3 INR results.  Additional findings: None       PLAN     Recommended plan for no diet, medication or health factor changes affecting INR     Dosing Instructions: Increase your warfarin dose (8.6% change) with next INR in 1-2 weeks       Summary  As of 10/27/2023      Full warfarin instructions:  6 mg every Sun, Tue, Fri; 5 mg all other days   Next INR check:  11/10/2023               Telephone call with Jennifer who verbalizes understanding and agrees to plan    Lab visit scheduled - INR on 11/8/23 during Chemo labs @ [facility]    Education provided:   Taking warfarin: Importance of taking warfarin as instructed  Goal range and lab monitoring: goal range and significance of current result  Dietary considerations: importance of consistent vitamin K intake    Plan made with [facility] Pharmacist Zoraida Pablo, RN  Anticoagulation Clinic  10/27/2023    _______________________________________________________________________     Anticoagulation Episode Summary       Current INR goal:  2.0-2.5   TTR:  52.2% (1 y)   Target end date:  Indefinite   Send INR reminders to:  ANGIE MIRIAM [facility]    Indications    S/P AVR [Z95.2]  S/P AVR (aortic valve replacement) [Z95.2]  Long term current use of anticoagulant therapy [Z79.01]              Comments:  6/2/23 INR target goal changed to 2.0 - 2.5 d/t daily bleeding issues.  (Sensitivity)  Goal range changed 2/20/19 per cardiology             Anticoagulation Care Providers       Provider Role Specialty Phone number    Rafaela Woods MD Referring Family Medicine [phone]

## 2023-10-30 DIAGNOSIS — I35.9 AORTIC VALVE DISORDER: Primary | ICD-10-CM

## 2023-11-03 ENCOUNTER — LAB (OUTPATIENT)
Dept: LAB | Facility: CLINIC | Age: 68
End: 2023-11-03
Payer: COMMERCIAL

## 2023-11-03 ENCOUNTER — ANTICOAGULATION THERAPY VISIT (OUTPATIENT)
Dept: ANTICOAGULATION | Facility: CLINIC | Age: 68
End: 2023-11-03

## 2023-11-03 ENCOUNTER — TELEPHONE (OUTPATIENT)
Dept: FAMILY MEDICINE | Facility: CLINIC | Age: 68
End: 2023-11-03

## 2023-11-03 DIAGNOSIS — Z95.2 S/P AVR (AORTIC VALVE REPLACEMENT): ICD-10-CM

## 2023-11-03 DIAGNOSIS — Z79.01 LONG TERM CURRENT USE OF ANTICOAGULANT THERAPY: ICD-10-CM

## 2023-11-03 DIAGNOSIS — R77.8 ABNORMAL SPEP: Primary | ICD-10-CM

## 2023-11-03 DIAGNOSIS — Z95.2 S/P AVR: Primary | ICD-10-CM

## 2023-11-03 LAB — INR BLD: 2.7 (ref 0.9–1.1)

## 2023-11-03 PROCEDURE — 36416 COLLJ CAPILLARY BLOOD SPEC: CPT

## 2023-11-03 PROCEDURE — 85610 PROTHROMBIN TIME: CPT

## 2023-11-03 NOTE — TELEPHONE ENCOUNTER
Patient Returning Call    Reason for call:  Please call back regarding INR and and dosing    Information relayed to patient:  message placed    Patient has additional questions:  No      Could we [facility] this [facility] or would you prefer to receive a phone call?:   Patient would prefer a phone call   Okay to leave a detailed message?: Yes at Home number on file [phone] (home)

## 2023-11-08 ENCOUNTER — ANTICOAGULATION THERAPY VISIT (OUTPATIENT)
Dept: ANTICOAGULATION | Facility: CLINIC | Age: 68
End: 2023-11-08

## 2023-11-08 ENCOUNTER — LAB (OUTPATIENT)
Dept: INFUSION THERAPY | Facility: HOSPITAL | Age: 68
End: 2023-11-08
Payer: COMMERCIAL

## 2023-11-08 DIAGNOSIS — Z95.2 S/P AVR: Primary | ICD-10-CM

## 2023-11-08 DIAGNOSIS — Z95.2 S/P AVR (AORTIC VALVE REPLACEMENT): ICD-10-CM

## 2023-11-08 DIAGNOSIS — I35.9 AORTIC VALVE DISORDER: ICD-10-CM

## 2023-11-08 DIAGNOSIS — Z79.01 LONG TERM CURRENT USE OF ANTICOAGULANT THERAPY: ICD-10-CM

## 2023-11-08 DIAGNOSIS — R77.8 ABNORMAL SPEP: ICD-10-CM

## 2023-11-08 LAB
BASOPHILS # BLD AUTO: 0 10E3/UL (ref 0–0.2)
BASOPHILS NFR BLD AUTO: 1 %
EOSINOPHIL # BLD AUTO: 0.1 10E3/UL (ref 0–0.7)
EOSINOPHIL NFR BLD AUTO: 3 %
ERYTHROCYTE [DISTWIDTH] IN BLOOD BY AUTOMATED COUNT: 13.7 % (ref 10–15)
HCT VFR BLD AUTO: 41 % (ref 35–47)
HGB BLD-MCNC: 12.9 G/DL (ref 11.7–15.7)
IMM GRANULOCYTES # BLD: 0 10E3/UL
IMM GRANULOCYTES NFR BLD: 0 %
INR PPP: 2.51 (ref 0.85–1.15)
LYMPHOCYTES # BLD AUTO: 0.8 10E3/UL (ref 0.8–5.3)
LYMPHOCYTES NFR BLD AUTO: 19 %
MCH RBC QN AUTO: 25.8 PG (ref 26.5–33)
MCHC RBC AUTO-ENTMCNC: 31.5 G/DL (ref 31.5–36.5)
MCV RBC AUTO: 82 FL (ref 78–100)
MONOCYTES # BLD AUTO: 0.3 10E3/UL (ref 0–1.3)
MONOCYTES NFR BLD AUTO: 8 %
NEUTROPHILS # BLD AUTO: 3 10E3/UL (ref 1.6–8.3)
NEUTROPHILS NFR BLD AUTO: 69 %
NRBC # BLD AUTO: 0 10E3/UL
NRBC BLD AUTO-RTO: 0 /100
PLATELET # BLD AUTO: 199 10E3/UL (ref 150–450)
RBC # BLD AUTO: 5 10E6/UL (ref 3.8–5.2)
TOTAL PROTEIN SERUM FOR ELP: 7 G/DL (ref 6.4–8.3)
WBC # BLD AUTO: 4.3 10E3/UL (ref 4–11)

## 2023-11-08 PROCEDURE — 84155 ASSAY OF PROTEIN SERUM: CPT

## 2023-11-08 PROCEDURE — 85610 PROTHROMBIN TIME: CPT

## 2023-11-08 PROCEDURE — 84165 PROTEIN E-PHORESIS SERUM: CPT | Mod: TC | Performed by: PATHOLOGY

## 2023-11-08 PROCEDURE — 83521 IG LIGHT CHAINS FREE EACH: CPT | Mod: 59

## 2023-11-08 PROCEDURE — 85025 COMPLETE CBC W/AUTO DIFF WBC: CPT

## 2023-11-08 PROCEDURE — 36415 COLL VENOUS BLD VENIPUNCTURE: CPT

## 2023-11-08 NOTE — PROGRESS NOTES
ANTICOAGULATION MANAGEMENT     Alyssa Higginbotham 68 year old female is on warfarin with supratherapeutic INR result. (Goal INR 2.0-2.5)    Recent labs: (last 7 days)     11/08/23  0837   INR 2.51*       ASSESSMENT     Source(s): Chart Review and Patient/Caregiver Call     Warfarin doses taken: Held partial warfarin dose on 11/3/23, recently which may be affecting INR   And weekly warfarin dose was decreased.  Diet: No new diet changes identified  Medication/supplement changes: None noted  New illness, injury, or hospitalization: No  Signs or symptoms of bleeding or clotting: No  Previous result: Supratherapeutic at 2.7 on 11/3/23.  Additional findings: None       PLAN     Recommended plan for no diet, medication or health factor changes affecting INR     Dosing Instructions: decrease your warfarin dose (2.7% change) with next INR in 1-2 weeks       Summary  As of 11/8/2023      Full warfarin instructions:  6 mg every Sun; 5 mg all other days   Next INR check:  11/15/2023               Telephone call with Jennifer who verbalizes understanding and agrees to plan    Lab visit scheduled - INR on 11/16/23 @ [facility].    Education provided:   Taking warfarin: Importance of taking warfarin as instructed  Goal range and lab monitoring: goal range and significance of current result  Dietary considerations: importance of consistent vitamin K intake  Symptom monitoring: monitoring for bleeding signs and symptoms    Plan made per [facility] anticoagulation protocol    Aylin Pablo RN  Anticoagulation Clinic  11/8/2023    _______________________________________________________________________     Anticoagulation Episode Summary       Current INR goal:  2.0-2.5   TTR:  52.2% (1 y)   Target end date:  Indefinite   [facility]    Indications    S/P AVR [Z95.2]  S/P AVR (aortic valve replacement) [Z95.2]  Long term current use of anticoagulant therapy [Z79.01]             Comments:  6/2/23 INR target  goal changed to 2.0 - 2.5 d/t daily bleeding issues.  (Sensitivity)  Goal range changed 2/20/19 per cardiology             Anticoagulation Care Providers       Provider Role Specialty Phone number    Rafaela Woods MD Referring Family Medicine [phone]

## 2023-11-09 LAB
KAPPA LC FREE SER-MCNC: 1.84 MG/DL (ref 0.33–1.94)
KAPPA LC FREE/LAMBDA FREE SER NEPH: 1.12 {RATIO} (ref 0.26–1.65)
LAMBDA LC FREE SERPL-MCNC: 1.65 MG/DL (ref 0.57–2.63)

## 2023-11-10 ENCOUNTER — TELEPHONE (OUTPATIENT)
Dept: VASCULAR SURGERY | Facility: CLINIC | Age: 68
End: 2023-11-10
Payer: COMMERCIAL

## 2023-11-10 LAB
ALBUMIN SERPL ELPH-MCNC: 4.4 G/DL (ref 3.7–5.1)
ALPHA1 GLOB SERPL ELPH-MCNC: 0.3 G/DL (ref 0.2–0.4)
ALPHA2 GLOB SERPL ELPH-MCNC: 0.6 G/DL (ref 0.5–0.9)
B-GLOBULIN SERPL ELPH-MCNC: 0.8 G/DL (ref 0.6–1)
GAMMA GLOB SERPL ELPH-MCNC: 0.8 G/DL (ref 0.7–1.6)
M PROTEIN SERPL ELPH-MCNC: 0.1 G/DL
PROT PATTERN SERPL ELPH-IMP: ABNORMAL

## 2023-11-10 PROCEDURE — 84165 PROTEIN E-PHORESIS SERUM: CPT | Mod: 26 | Performed by: PATHOLOGY

## 2023-11-10 NOTE — TELEPHONE ENCOUNTER
The pt declined to be seen at the [facility] stating that she does not come to [place]. The pt is stating that she would like a nurse to call her and tell her why she is needing to be seen?  Mac Orozco on 11/10/2023 at 4:16 PM

## 2023-11-14 NOTE — TELEPHONE ENCOUNTER
Called and spoke with pt and explained reason for referral. Went over CT read with her and explained recommendation to see vascular surgery for descending aortic disease. See CT impression below:    IMPRESSION:  1.  Stable postoperative changes from aortic valve replacement, coronary revascularization and ascending aortic graft repair.  2.  Chronic aortic dissection throughout the arch and thoracoabdominal aorta extending into the right common iliac artery. Overall morphology is the same. There has been some progressive aneurysmal degeneration in the descending thoracic aorta with   maximum diameter of 48 x 44 mm. This reflects a 3 - 4 mm increase since the 03/25/2019 exam.    Pt in agreement with being seen by vascular but would [place] as she lives in [facility]. Will route to their team.    IVONNE Shah, RN  RN Care Coordinator  [facility] Vascular Surgery - [facility] phone: [phone]  Fax: [phone]

## 2023-11-15 ENCOUNTER — ONCOLOGY VISIT (OUTPATIENT)
Dept: ONCOLOGY | Facility: HOSPITAL | Age: 68
End: 2023-11-15
Attending: INTERNAL MEDICINE
Payer: COMMERCIAL

## 2023-11-15 VITALS
HEART RATE: 63 BPM | RESPIRATION RATE: 16 BRPM | DIASTOLIC BLOOD PRESSURE: 84 MMHG | BODY MASS INDEX: 21.9 KG/M2 | SYSTOLIC BLOOD PRESSURE: 185 MMHG | OXYGEN SATURATION: 99 % | WEIGHT: 116 LBS | HEIGHT: 61 IN

## 2023-11-15 DIAGNOSIS — D47.2 MGUS (MONOCLONAL GAMMOPATHY OF UNKNOWN SIGNIFICANCE): Primary | ICD-10-CM

## 2023-11-15 PROCEDURE — G0463 HOSPITAL OUTPT CLINIC VISIT: HCPCS | Performed by: INTERNAL MEDICINE

## 2023-11-15 PROCEDURE — 99213 OFFICE O/P EST LOW 20 MIN: CPT | Performed by: INTERNAL MEDICINE

## 2023-11-15 ASSESSMENT — PAIN SCALES - GENERAL: PAINLEVEL: NO PAIN (0)

## 2023-11-15 NOTE — PROGRESS NOTES
"Oncology Rooming Note    November 15, 2023 9:03 AM   Alyssa Higginbotham is a 68 year old female who presents for:    Chief Complaint   Patient presents with    Oncology Clinic Visit     6 month follow up with prior lab review related to Abnormal SPEP     Initial Vitals: BP (!) 185/84 (BP Location: Left arm, Patient Position: Sitting, Cuff Size: Adult Regular)   Pulse 63   Resp 16   Ht 1.549 m (5' 1\")   Wt 52.6 kg (116 lb)   SpO2 99%   BMI 21.92 kg/m   Estimated body mass index is 21.92 kg/m  as calculated from the following:    Height as of this encounter: 1.549 m (5' 1\").    Weight as of this encounter: 52.6 kg (116 lb). Body surface area is 1.5 meters squared.  No Pain (0) Comment: Data Unavailable   No LMP recorded. Patient is postmenopausal.  Allergies reviewed: Yes  Medications reviewed: Yes    Medications: Medication refills not needed today.  Pharmacy name entered into [facility]:    [id number] - [place], [place] - [address] [place] [address] AT [facility] [employer] & [address]  [facility] - [facility], [place] - [address] [facility] [address] [place] [address] PHARMACY [facility] [address] [place] [address]    Clinical concerns: 6 month follow up with prior lab review related to Abnormal SPEP      ARUNA MAYER CMA              "

## 2023-11-15 NOTE — PROGRESS NOTES
[facility] Hematology and Oncology Progress Note    Patient: Alyssa Higginbotham  MRN: [id number]  11/15/23        Reason for Visit    Chief Complaint   Patient presents with    Oncology Clinic Visit     6 month follow up with prior lab review related to Abnormal SPEP         Problem List Items Addressed This Visit    None  Visit Diagnoses       MGUS (monoclonal gammopathy of unknown significance)    -  Primary    Relevant Orders    Protein electrophoresis    Kappa and lambda light chain    CBC with platelets and differential    Basic metabolic panel  (Ca, Cl, CO2, Creat, Gluc, K, Na, BUN)              Assessment and Plan  MGUS  IgG lambda  Reviewed her labs today.  Monoclonal peak at 0.1 g/dL.  This is very stable.  Kappa lambda light chains are within normal limits.  CBC is normal.  Overall no concern for any progression.  Due to the very small nature of the monoclonal protein I will see her back in 1 year with labs before.  She is agreeable to the plan.     Cancer Staging   No matching staging information was found for the patient.      ECOG Performance    0 - Independent         Problem List    Patient Active Problem List   Diagnosis    Mitral Valve Disorder    Dissection Of The Aorta    Anxiety    Tension-type Headache    Marfan Syndrome    Hypothyroidism    Hyperlipidemia    Depression    Migraine    Aortic Valve Disorder    Myalgia And Myositis    S/P AVR    Meningioma (H)-initially seen on CT of head that was obtained after a fall. Frontal lobe, confirmed on an MRI Mar 2015, 3 mm    Concussion syndrome    Vertigo    PTSD (post-traumatic stress disorder)    Thyroid nodule    Family history of ovarian cancer    PADDY (generalized anxiety disorder)    Presbyopia    Long term current use of anticoagulant therapy    S/P AVR (aortic valve replacement)    Essential hypertension    Hallux limitus, right    Adenomatous colon polyp-about 2010, scope clear in 2016    Benign essential hypertension    Abnormal SPEP           Interval History   Alyssa Higginbotham is a 68 year old male with MGUS who is seen in hematology clinic for follow-up.    Doing well since last visit.  Denies any new issues today.  No new bone pain reported.  No weight loss.  No fever chills or night sweats.  Here with repeat labs.        Review of Systems  A comprehensive review of systems was negative except for what is noted in the interval history    Current Outpatient Medications   Medication    amitriptyline (ELAVIL) 10 MG tablet    butalbital-acetaminophen-caffeine (ESGIC) -40 MG tablet    clonazePAM (KLONOPIN) 0.5 MG tablet    levothyroxine (SYNTHROID/LEVOTHROID) 75 MCG tablet    levothyroxine (SYNTHROID/LEVOTHROID) 88 MCG tablet    metoprolol succinate ER (TOPROL XL) 200 MG 24 hr tablet    multivitamin (ONE A DAY) per tablet    simvastatin (ZOCOR) 20 MG tablet    triamterene-HCTZ (MAXZIDE) 75-50 MG tablet    warfarin ANTICOAGULANT (COUMADIN) 1 MG tablet    warfarin ANTICOAGULANT (COUMADIN) 5 MG tablet    rizatriptan (MAXALT) 5 MG tablet     No current facility-administered medications for this visit.        Physical Exam        General: alert and cooperative  HEENT: Head: Normal, normocephalic, atraumatic.  Eye: Normal external eye, conjunctiva, lids cornea, CARLOS.  Extremities: atraumatic, no peripheral edema:   CNS: Alert and oriented x3, neurologic exam grossly normal.      Lab Results    Recent Results (from the past 168 hour(s))   INR point of care   Result Value Ref Range    INR 2.4 (H) 0.9 - 1.1       Imaging    No results found.      Signed by: Katrin Cabrera MD

## 2023-11-15 NOTE — Clinical Note
11/15/2023         RE: Alyssa Higginbotham  [address]  [place] [address]        Dear Colleague,    Thank you for referring your patient, Alyssa Higginbotham, to the [facility] [place]. Please see a copy of my visit note below.    [facility] Hematology and Oncology Progress Note    Patient: Alyssa Higginbotham  MRN: [id number]  11/15/23        Reason for Visit    Chief Complaint   Patient presents with    Oncology Clinic Visit     6 month follow up with prior lab review related to          Problem List Items Addressed This Visit    None        Assessment and Plan    Cancer Staging   No matching staging information was found for the patient.      ECOG Performance    {ECOG SCALE:012317}         Problem List    Patient Active Problem List   Diagnosis    Mitral Valve Disorder    Dissection Of The Aorta    Anxiety    Tension-type Headache    Marfan Syndrome    Hypothyroidism    Hyperlipidemia    Depression    Migraine    Aortic Valve Disorder    Myalgia And Myositis    S/P AVR    Meningioma (H)-initially seen on CT of head that was obtained after a fall. Frontal lobe, confirmed on an MRI Mar 2015, 3 mm    Concussion syndrome    Vertigo    PTSD (post-traumatic stress disorder)    Thyroid nodule    Family history of ovarian cancer    PADDY (generalized anxiety disorder)    Presbyopia    Long term current use of anticoagulant therapy    S/P AVR (aortic valve replacement)    Essential hypertension    Hallux limitus, right    Adenomatous colon polyp-about 2010, scope clear in 2016    Benign essential hypertension    Abnormal SPEP        Oncology history      Interval History   Alyssa Higginbotham is a 68 year old        Review of Systems  A comprehensive review of systems was negative except for what is noted in the interval history    Current Outpatient Medications   Medication    amitriptyline (ELAVIL) 10 MG tablet    butalbital-acetaminophen-caffeine (ESGIC) -40 MG tablet     clonazePAM (KLONOPIN) 0.5 MG tablet    levothyroxine (SYNTHROID/LEVOTHROID) 75 MCG tablet    levothyroxine (SYNTHROID/LEVOTHROID) 88 MCG tablet    metoprolol succinate ER (TOPROL XL) 200 MG 24 hr tablet    multivitamin (ONE A DAY) per tablet    rizatriptan (MAXALT) 5 MG tablet    simvastatin (ZOCOR) 20 MG tablet    triamterene-HCTZ (MAXZIDE) 75-50 MG tablet    warfarin ANTICOAGULANT (COUMADIN) 1 MG tablet    warfarin ANTICOAGULANT (COUMADIN) 5 MG tablet     No current facility-administered medications for this visit.        Physical Exam        General: alert and cooperative  HEENT: Head: Normal, normocephalic, atraumatic.  Eye: Normal external eye, conjunctiva, lids cornea, CARLOS.  Chest: Clear to auscultation bilaterally  Cardiac: S1, S2 normal, regular rate and rhythm  Abdomen: abdomen is soft without significant tenderness, masses, organomegaly or guarding  Extremities: atraumatic, no peripheral edema  Skin:   CNS: Alert and oriented x3, neurologic exam grossly normal.  Lymphatics: No bilateral cervical, axillary, supraclavicular or inguinal adenopathy noted    Lab Results    No results found for this or any previous visit (from the past 168 hour(s)).    Imaging    Echocardiogram Complete    Result Date: 10/23/2023  [id number]^[id number]^KYARA^[place] [address]  Name: KEYONNA PHELPS MRN: [id number] : 1955 Study Date: 10/23/2023 08:36 AM Age: 68 yrs Gender: Female Patient Location: [facility] Reason For Study: S/P AVR, Aortic valve disorder Ordering Physician: KYARA HO Referring Physician: KYARA HO Performed By: PARISH  BSA: 1.5 m2 Height: 61 in Weight: 116 lb HR: 60 BP: 162/80 mmHg ______________________________________________________________________________ Procedure Complete Echo Adult. ______________________________________________________________________________ Interpretation Summary  Left ventricular size, wall motion  and function are normal. The ejection fraction is 55-60%. Normal right ventricle size and systolic function. The left atrium is mildly dilated. Bioprosthetic aortic valve is identified. Mean aortic valve gradient is 18 mmHg. Trace to mild aortic valve regurgitation.  When comapred to previous study on 4-, there is no significant interval change.  ______________________________________________________________________________ I      WMSI = 1.00     % Normal = 100  X - Cannot   0 -                      (2) - Mildly 2 -          Segments  Size Interpret    Hyperkinetic 1 - Normal  Hypokinetic  Hypokinetic  1-2     small                                                    7 -          3-5    moderate 3 - Akinetic 4 -          5 -         6 - Akinetic Dyskinetic   6-14    large              Dyskinetic   Aneurysmal  w/scar       w/scar       15-16   diffuse  Left Ventricle Left ventricular size, wall motion and function are normal. The ejection fraction is 55-60%. There is mild concentric left ventricular hypertrophy. Grade I or early diastolic dysfunction. No regional wall motion abnormalities noted.  Right Ventricle Normal right ventricle size and systolic function.  Atria The left atrium is mildly dilated. Right atrial size is normal. There is no atrial shunt seen.  Mitral Valve The mitral valve leaflets appear thickened, but open well. There is mild (1+) mitral regurgitation. There is no mitral valve stenosis.  Tricuspid Valve The tricuspid valve is not well visualized, but is grossly normal. There is mild (1+) tricuspid regurgitation. There is no tricuspid stenosis.  Aortic Valve There is trace to mild aortic regurgitation. The mean AoV pressure gradient is 17.9 mmHg. There is a unknown bioprosthetic valve present in aortic valve position.  Pulmonic Valve The pulmonic valve is normal in structure and function.  Vessels The aorta root is normal. IVC diameter <2.1 cm collapsing >50% with sniff suggests a normal RA  "pressure of 3 mmHg.  Pericardium There is no pericardial effusion.  ______________________________________________________________________________ MMode/2D Measurements & Calculations IVSd: 1.5 cm LVIDd: 3.6 cm LVIDs: 2.6 cm LVPWd: 1.1 cm FS: 28.9 % LV mass(C)d: 154.6 grams LV mass(C)dI: 103.2 grams/m2 Ao root diam: 2.3 cm LA dimension: 2.8 cm asc Aorta Diam: 2.3 cm LA/Ao: 1.2 LVOT diam: 1.8 cm LVOT area: 2.5 cm2  Ao root diam index Ht(cm/m): 1.5 Ao root diam index BSA (cm/m2): 1.5 Asc Ao diam index BSA (cm/m2): 1.6 Asc Ao diam index Ht(cm/m): 1.5 LA Volume (BP): 44.1 ml LA Volume Index (BP): 29.4 ml/m2  LA Volume Indexed (AL/bp): 30.4 ml/m2 RV Base: 4.0 cm RWT: 0.60 TAPSE: 2.1 cm  Doppler Measurements & Calculations MV E max crow: 65.6 cm/sec MV A max crow: 81.0 cm/sec MV E/A: 0.81 MV dec time: 0.26 sec Ao V2 max: 271.8 cm/sec Ao max P.0 mmHg Ao V2 mean: 198.1 cm/sec Ao mean P.9 mmHg Ao V2 VTI: 59.1 cm JACIEL(I,D): 0.80 cm2 JACIEL(V,D): 0.78 cm2 Ao acc time: 0.10 sec LV V1 max P.8 mmHg LV V1 max: 83.7 cm/sec LV V1 VTI: 18.6 cm SV(LVOT): 47.1 ml SI(LVOT): 31.4 ml/m2 PA acc time: 0.12 sec  TR max crow: 232.4 cm/sec TR max P.6 mmHg AV Crow Ratio (DI): 0.31 JACIEL Index (cm2/m2): 0.53 E/E': 8.5 E/E' av.4 Lateral E/e': 8.5 Medial E/e': 10.3 Peak E' Crow: 7.7 cm/sec RV S Crow: 10.5 cm/sec  ______________________________________________________________________________ Report approved by: Adrienne Braun 10/23/2023 09:34 AM          Signed by: Katrin Cabrera MD      Oncology Rooming Note    November 15, 2023 9:03 AM   Alyssa Higginbotham is a 68 year old female who presents for:    Chief Complaint   Patient presents with    Oncology Clinic Visit     6 month follow up with prior lab review related to Abnormal SPEP     Initial Vitals: BP (!) 185/84 (BP Location: Left arm, Patient Position: Sitting, Cuff Size: Adult Regular)   Pulse 63   Resp 16   Ht 1.549 m (5' 1\")   Wt 52.6 kg (116 lb)   SpO2 99%   BMI " "21.92 kg/m   Estimated body mass index is 21.92 kg/m  as calculated from the following:    Height as of this encounter: 1.549 m (5' 1\").    Weight as of this encounter: 52.6 kg (116 lb). Body surface area is 1.5 meters squared.  No Pain (0) Comment: Data Unavailable   No LMP recorded. Patient is postmenopausal.  Allergies reviewed: Yes  Medications reviewed: Yes    Medications: Medication refills not needed today.  Pharmacy name entered into [facility]:    [id number] - [address] AT [facility] [employer] [address]  [facility] - [address]    Clinical concerns: 6 month follow up with prior lab review related to Abnormal SPEP      ARUNA MAYER CMA                  Again, thank you for allowing me to participate in the care of your patient.        Sincerely,        Katrin Cabrera MD  "

## 2023-11-16 ENCOUNTER — LAB (OUTPATIENT)
Dept: LAB | Facility: CLINIC | Age: 68
End: 2023-11-16
Payer: COMMERCIAL

## 2023-11-16 ENCOUNTER — ANTICOAGULATION THERAPY VISIT (OUTPATIENT)
Dept: ANTICOAGULATION | Facility: CLINIC | Age: 68
End: 2023-11-16

## 2023-11-16 DIAGNOSIS — Z79.01 LONG TERM CURRENT USE OF ANTICOAGULANT THERAPY: ICD-10-CM

## 2023-11-16 DIAGNOSIS — Z95.2 S/P AVR (AORTIC VALVE REPLACEMENT): ICD-10-CM

## 2023-11-16 DIAGNOSIS — Z95.2 S/P AVR: Primary | ICD-10-CM

## 2023-11-16 LAB — INR BLD: 2.2 (ref 0.9–1.1)

## 2023-11-16 PROCEDURE — 85610 PROTHROMBIN TIME: CPT

## 2023-11-16 PROCEDURE — 36416 COLLJ CAPILLARY BLOOD SPEC: CPT

## 2023-11-16 NOTE — PROGRESS NOTES
ANTICOAGULATION MANAGEMENT     Alyssa Higginbotham 68 year old female is on warfarin with therapeutic INR result. (Goal INR 2.0-2.5)    Recent labs: (last 7 days)     11/16/23  0707   INR 2.2*       ASSESSMENT     Source(s): Chart Review and Patient/Caregiver Call     Warfarin doses taken: Warfarin taken as instructed   Decreased weekly warfarin dose on 11/8/23.  Diet: No new diet changes identified  Medication/supplement changes: None noted  New illness, injury, or hospitalization: No  Signs or symptoms of bleeding or clotting: No  Previous result: Supratherapeutic last 2 INR results.  Additional findings: None       PLAN     Recommended plan for no diet, medication or health factor changes affecting INR     Dosing Instructions: Continue your current warfarin dose with next INR in 1 week       Summary  As of 11/16/2023      Full warfarin instructions:  6 mg every Sun; 5 mg all other days   Next INR check:  11/23/2023               Telephone call with Jennifer who verbalizes understanding and agrees to plan    Lab visit scheduled - INR and Vascular Appt on 11/27/23 @ [facility].    Education provided:   Taking warfarin: Importance of taking warfarin as instructed  Goal range and lab monitoring: goal range and significance of current result    Plan made per [facility] anticoagulation protocol    Aylin Pablo RN  Anticoagulation Clinic  11/16/2023    _______________________________________________________________________     Anticoagulation Episode Summary       Current INR goal:  2.0-2.5   TTR:  52.1% (1 y)   Target end date:  Indefinite   Send INR reminders to:  [facility]    Indications    S/P AVR [Z95.2]  S/P AVR (aortic valve replacement) [Z95.2]  Long term current use of anticoagulant therapy [Z79.01]             Comments:  6/2/23 INR target goal changed to 2.0 - 2.5 d/t daily bleeding issues.  (Sensitivity)  Goal range changed 2/20/19 per cardiology             Anticoagulation Care Providers       Provider  Role Specialty Phone number    Rafaela Woods MD Referring Family Medicine [phone]

## 2023-11-27 ENCOUNTER — LAB (OUTPATIENT)
Dept: LAB | Facility: CLINIC | Age: 68
End: 2023-11-27
Payer: COMMERCIAL

## 2023-11-27 ENCOUNTER — ANTICOAGULATION THERAPY VISIT (OUTPATIENT)
Dept: ANTICOAGULATION | Facility: CLINIC | Age: 68
End: 2023-11-27

## 2023-11-27 ENCOUNTER — OFFICE VISIT (OUTPATIENT)
Dept: VASCULAR SURGERY | Facility: CLINIC | Age: 68
End: 2023-11-27
Attending: SURGERY
Payer: COMMERCIAL

## 2023-11-27 VITALS
OXYGEN SATURATION: 95 % | HEART RATE: 65 BPM | DIASTOLIC BLOOD PRESSURE: 70 MMHG | SYSTOLIC BLOOD PRESSURE: 138 MMHG | TEMPERATURE: 97.5 F

## 2023-11-27 DIAGNOSIS — Z95.2 S/P AVR (AORTIC VALVE REPLACEMENT): ICD-10-CM

## 2023-11-27 DIAGNOSIS — I71.03 DISSECTION OF THORACOABDOMINAL AORTA (H): Primary | ICD-10-CM

## 2023-11-27 DIAGNOSIS — Z79.01 LONG TERM CURRENT USE OF ANTICOAGULANT THERAPY: ICD-10-CM

## 2023-11-27 DIAGNOSIS — Z95.2 S/P AVR: Primary | ICD-10-CM

## 2023-11-27 LAB — INR BLD: 2.5 (ref 0.9–1.1)

## 2023-11-27 PROCEDURE — 36416 COLLJ CAPILLARY BLOOD SPEC: CPT

## 2023-11-27 PROCEDURE — 85610 PROTHROMBIN TIME: CPT

## 2023-11-27 PROCEDURE — 99204 OFFICE O/P NEW MOD 45 MIN: CPT | Performed by: SURGERY

## 2023-11-27 PROCEDURE — G0463 HOSPITAL OUTPT CLINIC VISIT: HCPCS | Performed by: SURGERY

## 2023-11-27 ASSESSMENT — PAIN SCALES - GENERAL: PAINLEVEL: NO PAIN (0)

## 2023-11-27 NOTE — PROGRESS NOTES
ANTICOAGULATION MANAGEMENT     Alyssa Higginbotham 68 year old female is on warfarin with therapeutic INR result. (Goal INR 2.0-2.5)    Recent labs: (last 7 days)     11/27/23  0933   INR 2.5*       ASSESSMENT     Source(s): Chart Review and Patient/Caregiver Call     Warfarin doses taken: Warfarin taken as instructed  Diet: No new diet changes identified  Medication/supplement changes: None noted  New illness, injury, or hospitalization: No   11/27/23 consultation with Dr. Nassar [facility] - for abdominal aortic aneurysm. Will follow-up yearly.  Signs or symptoms of bleeding or clotting: No  Previous result: Therapeutic last 3 INR visits  Additional findings:  she requested to decrease warfarin dose to 5mg daily, to keep within INR target goal.       PLAN     Recommended plan for no diet, medication or health factor changes affecting INR     Dosing Instructions: decrease your warfarin dose (2.8% change) with next INR in 1-2 weeks       Summary  As of 11/27/2023      Full warfarin instructions:  5 mg every day   Next INR check:  12/11/2023               Telephone call with Jennifer who verbalizes understanding and agrees to plan    Lab visit scheduled - INR on 12/6/23 @ [facility].    Education provided:   Taking warfarin: Importance of taking warfarin as instructed  Goal range and lab monitoring: goal range and significance of current result  Dietary considerations: importance of consistent vitamin K intake    Plan made per [facility] anticoagulation protocol    Aylin Pablo RN  Anticoagulation Clinic  11/27/2023    _______________________________________________________________________     Anticoagulation Episode Summary       Current INR goal:  2.0-2.5   TTR:  52.2% (1 y)   Target end date:  Indefinite   Send INR reminders to:  [facility]    Indications    S/P AVR [Z95.2]  S/P AVR (aortic valve replacement) [Z95.2]  Long term current use of anticoagulant therapy [Z79.01]             Comments:   6/2/23 INR target goal changed to 2.0 - 2.5 d/t daily bleeding issues.  (Sensitivity)  Goal range changed 2/20/19 per cardiology             Anticoagulation Care Providers       Provider Role Specialty Phone number    Rafaela Woods MD Referring Family Medicine [phone]

## 2023-11-27 NOTE — PROGRESS NOTES
[facility]    CT scan done 09/21/23    Patient is here for a consult to discuss Abdominal aortic aneurysm (AAA). Patient has no symptoms    Pt is currently taking Statin and Warfarin.    /70   Pulse 65   Temp 97.5  F (36.4  C)   SpO2 95%     The provider has been notified that the patient has no concerns.     Questions patient would like addressed today are: N/A.    Refills are needed: No    Has homecare services and agency name:  No

## 2023-11-27 NOTE — PATIENT INSTRUCTIONS
Radha Shah,    Thank you for entrusting your care with us today. After your visit today with MD Julia Nassar this is the plan that was discussed at your appointment.    We will contact you to schedule 1 year follow-up with repeat CTA scan.    I am including additional information on these things and our contact information if you have any questions or concerns.   Please do not hesitate to reach out to us if you felt we did not answer your questions or you are unsure of the treatment plan after your visit today. Our number is [phone].Thank you for trusting us with your care.         Again thank you for your time.

## 2023-11-27 NOTE — PROGRESS NOTES
VASCULAR SURGERY CLINIC CONSULTATION    VASCULAR SURGEON: Julia Nassar MD, RPVI     LOCATION:  [facility]     Alyssa Higginbotham   Medical Record #:  [id number]  YOB: 1955  Age:  68 year old     Date of Service: 11/27/2023    PRIMARY CARE PROVIDER: Rafaela Woods      Reason for visit: Aortic dissection    IMPRESSION: 68-year-old female with a history of type a aortic dissection status post open repair over 20 years ago.  Patient does have remaining dissection present with relatively stable morphology of the aorta based on the CT scan.  Patient remains asymptomatic.    RECOMMENDATION/RISKS: Continue tight blood pressure control.  Continue annual follow-up.    HPI:  Alyssa Higginbotham is a 68 year old female who was seen today for follow-up.  Patient is doing well in that regards and denies any complaints  REVIEW OF SYSTEMS:    A 12 point ROS was reviewed and is negative .     PHH:    Past Medical History:   Diagnosis Date    Accidental fall 05/27/2015    Antiplatelet or antithrombotic long-term use     Anxiety     Aortic dissection (H)     Asthma     Benign meningioma of brain (H) 03/2015    initially seen on CT of head that was obtained after a fall. Frontal lobe, confirmed on an MRI Mar 2015, 3 mm    Bunion     Chronic headache     Depression     Heart murmur     Hepatitis C     Hyperlipidemia     Hypothyroid     Irregular heart rate     Nasal fracture 02/28/2015    fell face first on sideache    Osteoporosis     Other acquired deformity of toe     Stroke (H)     on CT scan          Past Surgical History:   Procedure Laterality Date    AORTIC VALVE REPLACEMENT   2000    ARTHROPLASTY TOE(S) Right 2/13/2023    Procedure: First metatarsal phalangeal joint implant arthroplasty right foot;  Surgeon: Chin Riojas DPM;  Location: [facility]    BIOPSY BREAST Left 2005    benign    COLONOSCOPY  2011    REPAIR THORACIC AORTA   2000       ALLERGIES:  Codeine and Demerol  [meperidine]    MEDS:    Current Outpatient Medications:     amitriptyline (ELAVIL) 10 MG tablet, Take 10 mg by mouth at bedtime, Disp: , Rfl:     butalbital-acetaminophen-caffeine (ESGIC) -40 MG tablet, Take 2 tablets by mouth every 8 hours as needed for headaches [BUTALBITAL-ACETAMINOPHEN-CAFFEINE (FIORICET, ESGIC) -40 MG PER TABLET] TAKE 1 TO 2 TABLETS BY MOUTH EVERY 8 HOURS AS NEEDED FOR PAIN. MAX OF 4000 MG ACETAMINOPHEN PER 24 HOURS Strength: -40 mg, Disp: 60 tablet, Rfl: 3    clonazePAM (KLONOPIN) 0.5 MG tablet, Take 1 tablet (0.5 mg) by mouth nightly as needed for anxiety or sleep (Patient taking differently: Take 0.5 mg by mouth at bedtime), Disp: 90 tablet, Rfl: 3    levothyroxine (SYNTHROID/LEVOTHROID) 75 MCG tablet, TAKE 1 TABLET ON WEDNESDAY, THURSDAY, SATURDAY AND SUNDAY, Disp: 52 tablet, Rfl: 3    levothyroxine (SYNTHROID/LEVOTHROID) 88 MCG tablet, TAKE 1 TABLET ON MONDAY, TUESDAY AND FRIDAY, Disp: 39 tablet, Rfl: 3    metoprolol succinate ER (TOPROL XL) 200 MG 24 hr tablet, TAKE 1 TABLET DAILY, Disp: 90 tablet, Rfl: 3    multivitamin (ONE A DAY) per tablet, [MULTIVITAMIN (ONE A DAY) PER TABLET] Take 1 tablet by mouth daily. , Disp: , Rfl:     rizatriptan (MAXALT) 5 MG tablet, TAKE 1 TABLET AS NEEDED FOR MIGRAINE. MAY REPEAT IN 2 HOURS IF NEEDED, Disp: 10 tablet, Rfl: 27    simvastatin (ZOCOR) 20 MG tablet, TAKE 1 TABLET AT BEDTIME (DUE FOR AN OFFICE VISIT), Disp: 90 tablet, Rfl: 3    triamterene-HCTZ (MAXZIDE) 75-50 MG tablet, TAKE ONE-HALF (1/2) TABLET DAILY, Disp: 45 tablet, Rfl: 3    warfarin ANTICOAGULANT (COUMADIN) 1 MG tablet, Take 3 tablets (3 mg) on Mondays, Wednesdays, and Saturdays. Adjust dose based on INR results as directed., Disp: 110 tablet, Rfl: 1    warfarin ANTICOAGULANT (COUMADIN) 5 MG tablet, TAKE 1 TABLET ALONG WITH 1 MG TABLET (6 TO 7 MG DAILY) AS DIRECTED, ADJUST PER INR RESULT (Patient taking differently: Take 5 mg by mouth daily), Disp: 100 tablet, Rfl:  3    SOCIAL HABITS:    History   Smoking Status    Never   Smokeless Tobacco    Never     Social History    Substance and Sexual Activity      Alcohol use: No      History   Drug Use No       FAMILY HISTORY:    Family History   Problem Relation Age of Onset    Pancreatic Cancer Father 70.00        history of smoking    Ovarian Cancer Sister 67.00        stage III, fatal    Skin Cancer Sister     Heart Disease Mother     Diabetes No family hx of     Alzheimer Disease No family hx of        PE:  /70   Pulse 65   Temp 97.5  F (36.4  C)   SpO2 95%   Wt Readings from Last 1 Encounters:   11/15/23 116 lb (52.6 kg)     There is no height or weight on file to calculate BMI.    EXAM:  GENERAL: This is a well-developed 68 year old female who appears her stated age  EYES: Grossly normal.  MOUTH: Buccal mucosa normal   CARDIAC: Normal   CHEST/LUNG: Clear to auscultation bilaterally  GASTROINTESINAL soft nontender nondistended  MUSCULOSKELETAL: Grossly normal and both lower extremities are intact.  HEME/LYMPH: No lymphedema  NEUROLOGIC: Focally intact, Alert and oriented x 3.   PSYCH: appropriate affect            DIAGNOSTIC STUDIES:     Images:  No results found.      LABS:      Sodium   Date Value Ref Range Status   01/30/2023 139 136 - 145 mmol/L Final   12/13/2021 137 136 - 145 mmol/L Final   07/29/2020 141 136 - 145 mmol/L Final     Urea Nitrogen   Date Value Ref Range Status   01/30/2023 18.0 8.0 - 23.0 mg/dL Final   12/13/2021 19 8 - 22 mg/dL Final   07/29/2020 18 8 - 22 mg/dL Final   01/07/2020 17 8 - 22 mg/dL Final     Hemoglobin   Date Value Ref Range Status   11/08/2023 12.9 11.7 - 15.7 g/dL Final   02/19/2023 13.4 11.7 - 15.7 g/dL Final   01/30/2023 13.8 11.7 - 15.7 g/dL Final     Platelet Count   Date Value Ref Range Status   11/08/2023 199 150 - 450 10e3/uL Final   02/19/2023 227 150 - 450 10e3/uL Final   01/30/2023 200 150 - 450 10e3/uL Final     INR   Date Value Ref Range Status   11/27/2023 2.5 (H) 0.9  - 1.1 Final   11/16/2023 2.2 (H) 0.9 - 1.1 Final   11/08/2023 2.51 (H) 0.85 - 1.15 Final   11/03/2023 2.7 (H) 0.9 - 1.1 Final   02/19/2023 1.74 (H) 0.85 - 1.15 Final   07/06/2021 2.60 (H) 0.90 - 1.10 Final     INR Point of Care   Date Value Ref Range Status   06/14/2022 1.9 (A) 0.9 - 1.1 Final     INR (External)   Date Value Ref Range Status   09/13/2021 1.0 0.9 - 1.1 Final   09/09/2021 3.3 (A) 0.9 - 1.1 Corrected       30 minutes spent on the day of encounter doing chart review, history and exam, documentation, and further activities as noted.       Julia Nassar MD, [facility]  VASCULAR SURGERY

## 2023-12-06 ENCOUNTER — LAB (OUTPATIENT)
Dept: LAB | Facility: CLINIC | Age: 68
End: 2023-12-06
Payer: COMMERCIAL

## 2023-12-06 ENCOUNTER — ANTICOAGULATION THERAPY VISIT (OUTPATIENT)
Dept: ANTICOAGULATION | Facility: CLINIC | Age: 68
End: 2023-12-06

## 2023-12-06 DIAGNOSIS — Z95.2 S/P AVR (AORTIC VALVE REPLACEMENT): ICD-10-CM

## 2023-12-06 DIAGNOSIS — Z79.01 LONG TERM CURRENT USE OF ANTICOAGULANT THERAPY: ICD-10-CM

## 2023-12-06 DIAGNOSIS — Z95.2 S/P AVR: Primary | ICD-10-CM

## 2023-12-06 LAB — INR BLD: 2.4 (ref 0.9–1.1)

## 2023-12-06 PROCEDURE — 85610 PROTHROMBIN TIME: CPT

## 2023-12-06 PROCEDURE — 36415 COLL VENOUS BLD VENIPUNCTURE: CPT

## 2023-12-06 NOTE — PROGRESS NOTES
ANTICOAGULATION MANAGEMENT     Alyssa Higginbotham 68 year old female is on warfarin with therapeutic INR result. (Goal INR 2.0-2.5)    Recent labs: (last 7 days)     12/06/23  0709   INR 2.4*       ASSESSMENT     Source(s): Chart Review and Patient/Caregiver Call     Warfarin doses taken: Warfarin taken as instructed   Weekly warfarin dose decreased on 11/27/23.  Diet: No new diet changes identified  Medication/supplement changes: None noted  New illness, injury, or hospitalization: No  Signs or symptoms of bleeding or clotting: No  Previous result: Therapeutic last 2 INR visits  Additional findings: None       PLAN     Recommended plan for no diet, medication or health factor changes affecting INR     Dosing Instructions: Continue your current warfarin dose with next INR in 1-2 weeks       Summary  As of 12/6/2023      Full warfarin instructions:  5 mg every day   Next INR check:  12/20/2023               Telephone call with Jennifer who verbalizes understanding and agrees to plan    Lab visit scheduled - INR on 12/14/23 for Heart Care visit @ [facility]    Education provided:   Taking warfarin: Importance of taking warfarin as instructed  Goal range and lab monitoring: goal range and significance of current result    Plan made per [facility] anticoagulation protocol    Aylin Pablo, RN  Anticoagulation Clinic  12/6/2023    _______________________________________________________________________     Anticoagulation Episode Summary       Current INR goal:  2.0-2.5   TTR:  52.1% (1 y)   Target end date:  Indefinite   Send INR reminders to:  [facility]    Indications    S/P AVR [Z95.2]  S/P AVR (aortic valve replacement) [Z95.2]  Long term current use of anticoagulant therapy [Z79.01]             Comments:  6/2/23 INR target goal changed to 2.0 - 2.5 d/t daily bleeding issues.  (Sensitivity)  Goal range changed 2/20/19 per cardiology             Anticoagulation Care Providers       Provider Role Specialty Phone  number    Rafaela Woods MD [facility] [phone]

## 2023-12-08 ENCOUNTER — PATIENT OUTREACH (OUTPATIENT)
Dept: GASTROENTEROLOGY | Facility: CLINIC | Age: 68
End: 2023-12-08
Payer: COMMERCIAL

## 2023-12-14 ENCOUNTER — LAB (OUTPATIENT)
Dept: LAB | Facility: CLINIC | Age: 68
End: 2023-12-14
Payer: COMMERCIAL

## 2023-12-14 ENCOUNTER — ANTICOAGULATION THERAPY VISIT (OUTPATIENT)
Dept: ANTICOAGULATION | Facility: CLINIC | Age: 68
End: 2023-12-14

## 2023-12-14 ENCOUNTER — OFFICE VISIT (OUTPATIENT)
Dept: CARDIOLOGY | Facility: CLINIC | Age: 68
End: 2023-12-14
Attending: INTERNAL MEDICINE
Payer: COMMERCIAL

## 2023-12-14 VITALS
TEMPERATURE: 97.4 F | OXYGEN SATURATION: 99 % | RESPIRATION RATE: 14 BRPM | HEART RATE: 62 BPM | DIASTOLIC BLOOD PRESSURE: 83 MMHG | SYSTOLIC BLOOD PRESSURE: 168 MMHG | HEIGHT: 61 IN | WEIGHT: 115.3 LBS | BODY MASS INDEX: 21.77 KG/M2

## 2023-12-14 DIAGNOSIS — Z79.01 LONG TERM CURRENT USE OF ANTICOAGULANT THERAPY: ICD-10-CM

## 2023-12-14 DIAGNOSIS — E78.5 HYPERLIPIDEMIA LDL GOAL <130: ICD-10-CM

## 2023-12-14 DIAGNOSIS — I35.9 AORTIC VALVE DISORDER: ICD-10-CM

## 2023-12-14 DIAGNOSIS — Z95.2 S/P AVR (AORTIC VALVE REPLACEMENT): ICD-10-CM

## 2023-12-14 DIAGNOSIS — E78.2 MIXED HYPERLIPIDEMIA: Primary | ICD-10-CM

## 2023-12-14 DIAGNOSIS — Z95.2 S/P AVR: Primary | ICD-10-CM

## 2023-12-14 DIAGNOSIS — I71.03 DISSECTION OF THORACOABDOMINAL AORTA (H): ICD-10-CM

## 2023-12-14 DIAGNOSIS — I10 BENIGN ESSENTIAL HYPERTENSION: ICD-10-CM

## 2023-12-14 DIAGNOSIS — Z95.2 S/P AVR: ICD-10-CM

## 2023-12-14 LAB — INR BLD: 1.7 (ref 0.9–1.1)

## 2023-12-14 PROCEDURE — 99214 OFFICE O/P EST MOD 30 MIN: CPT | Performed by: INTERNAL MEDICINE

## 2023-12-14 PROCEDURE — 36416 COLLJ CAPILLARY BLOOD SPEC: CPT

## 2023-12-14 PROCEDURE — 85610 PROTHROMBIN TIME: CPT

## 2023-12-14 RX ORDER — SIMVASTATIN 40 MG
40 TABLET ORAL AT BEDTIME
Qty: 90 TABLET | Refills: 3 | Status: SHIPPED | OUTPATIENT
Start: 2023-12-14 | End: 2024-01-17

## 2023-12-14 NOTE — PROGRESS NOTES
ANTICOAGULATION MANAGEMENT     Alyssa Higginbotham 68 year old female is on warfarin with subtherapeutic INR result. (Goal INR 2.0-2.5)    Recent labs: (last 7 days)     12/14/23  0812   INR 1.7*       ASSESSMENT     Warfarin Lab Questionnaire    Warfarin Doses Last 7 Days    Pt Rptd Dose SUNDAY MONDAY TUESDAY WED THURS FRIDAY SATURDAY 12/14/2023   8:13 AM 5 5 5 5 5 5 5         12/14/2023   Warfarin Lab Questionnaire   Missed doses within past 14 days? No   Changes in diet or alcohol within past 14 days? No   Medication changes since last result? No - 12/14/23 increased Simvastatin from 20mg to 40mg daily, to bring down LDL closer to 70.  (May anticipate and increase risk for bleeding).     Injuries or illness since last result? No - 12/14/23 Follow-up with [facility] for dissection of thoraco-abdominal aorta.     New shortness of breath, severe headaches or sudden changes in vision since last result? No   Abnormal bleeding since last result? No   Upcoming surgery, procedure? No   Best number to call with results? [phone]     Previous result: Therapeutic last 3 INR visits.    Additional findings: None       PLAN     Recommended plan for ongoing change(s) affecting INR     Dosing Instructions: booster dose then continue your current warfarin dose with next INR in 1-2 weeks       Summary  As of 12/14/2023      Full warfarin instructions:  12/14: 6 mg; Otherwise 5 mg every day   Next INR check:  12/28/2023               Telephone call with Jennifer who verbalizes understanding and agrees to plan.   - patient sensitive to increased dose of warfarin.  Due to an increase in Simvastatin dose which can increase risk for bleeding.  For now will do one time booster and monitor INR closely.    Lab visit scheduled - INR on 12/22/23 @ [facility].    Education provided:   Taking warfarin: Importance of taking warfarin as instructed  Goal range and lab monitoring: goal range and significance of current result  Interaction IS anticipated  between warfarin and increasing dose of Simvastatin  Symptom monitoring: monitoring for bleeding signs and symptoms    Plan made per [facility] anticoagulation protocol    Aylin Pablo, RN  Anticoagulation Clinic  12/14/2023    _______________________________________________________________________     Anticoagulation Episode Summary       Current INR goal:  2.0-2.5   TTR:  51.2% (1 y)   Target end date:  Indefinite   Send INR reminders to:  [facility]    Indications    S/P AVR [Z95.2]  S/P AVR (aortic valve replacement) [Z95.2]  Long term current use of anticoagulant therapy [Z79.01]             Comments:  6/2/23 INR target goal changed to 2.0 - 2.5 d/t daily bleeding issues.  (Sensitivity)  Goal range changed 2/20/19 per cardiology             Anticoagulation Care Providers       Provider Role Specialty Phone number    Rafaela Woods MD Referring Family Medicine [phone]

## 2023-12-14 NOTE — PROGRESS NOTES
[facility]  [phone]          Assessment/Recommendations   Patient with known dissection number ascending aorta which extends to the carotid, and down to the iliacs s/p repair with mechanical prosthesis almost 24 years ago.  She has been doing well from a functional capacity standpoint and does have documented whitecoat hypertension but did not bring her cuff in to make sure that her blood pressure cuff at home is working well and accurate.    She did have the opportunity to meet with Dr. Mercer from cardiac surgery and Dr. North from vascular surgery.  She will follow-up with these physicians as well.    Echocardiogram showed stable valvular function of the mechanical prosthesis in the aortic position.    Have asked her to increase her simvastatin to 40 mg a day to drive her LDL cholesterol down closer to 70.    Commended her on her regular exercise.    If she remains stable we will see her back in 1 year, but of course would be happy to see her sooner if questions or problems arise.    30 minutes spent with chart review, patient visit, and documentation.     History of Present Illness/Subjective    Ms. Alyssa Higginbotham is a 68 year old female with known ascending aortic dissection which extended into her descending thoracic aorta and iliacs almost 24 years ago.  She had a repair with mechanical prosthesis in the aortic position at [facility] by Dr. Vargas.  She has done well over the years although there was a question of whether or not the descending thoracic aorta was enlarging.  She did see Dr. Mercer and he felt that there was no significant enlargement based on his measurements.  He will continue to follow her on an annual basis.    The patient has been feeling well.  She exercises for an hour and a half daily.  She does a sitting bike and a treadmill and has had no changes in her functional capacity.  She reports that her blood pressure at home generally runs  "in the 1 10-1 teens range and when she comes to the clinic it is almost always high.    She denies orthopnea, paroxysmal nocturnal dyspnea, peripheral edema, syncope or near syncopal episodes and does not have palpitations.  No chest discomfort.    LDL cholesterol was just above 100.    ECG: Personally reviewed.        Physical Examination Review of Systems   BP (!) 168/83 (BP Location: Left arm, Patient Position: Sitting, Cuff Size: Adult Regular)   Pulse 62   Temp 97.4  F (36.3  C) (Oral)   Resp 14   Ht 1.549 m (5' 1\")   Wt 52.3 kg (115 lb 4.8 oz)   SpO2 99%   BMI 21.79 kg/m    Body mass index is 21.79 kg/m .  Wt Readings from Last 3 Encounters:   12/14/23 52.3 kg (115 lb 4.8 oz)   11/15/23 52.6 kg (116 lb)   10/20/23 52.6 kg (116 lb)     General Appearance:   Alert, cooperative and in no acute distress.   ENT/Mouth: Pink/moist oral mucosa   EYES:  no scleral icterus, normal conjunctivae   Neck: JVP normal. No Hepatojugular reflux. Thyroid not visualized.   Chest/Lungs:   Lungs are clear to auscultation, equal chest wall expansion.   Cardiovascular:   S1, metallic S2 with 2/6 systolic murmur , no clicks or rubs. Brachial, radial  pulses are intact and symetric. No carotid bruits noted   Abdomen:  Nontender. BS+.    Extremities: No cyanosis, clubbing or edema   Skin: no xanthelasma, warm.    Neurologic: normal arm movement bilateral, no tremors     Psychiatric: Appropriate affect.      Enc Vitals  BP: (!) 168/83  Pulse: 62  Resp: 14  Temp: 97.4  F (36.3  C)  Temp src: Oral  SpO2: 99 %  Weight: 52.3 kg (115 lb 4.8 oz)  Height: 154.9 cm (5' 1\")                                           Medical History  Surgical History Family History Social History   Past Medical History:   Diagnosis Date    Accidental fall 05/27/2015    Antiplatelet or antithrombotic long-term use     Anxiety     Aortic dissection (H)     Asthma     Benign meningioma of brain (H) 03/2015    initially seen on CT of head that was obtained after " a fall. Frontal lobe, confirmed on an MRI Mar 2015, 3 mm    Bunion     Chronic headache     Depression     Heart murmur     Hepatitis C     Hyperlipidemia     Hypothyroid     Irregular heart rate     Nasal fracture 02/28/2015    fell face first on sideache    Osteoporosis     Other acquired deformity of toe     Stroke (H)     on CT scan    Past Surgical History:   Procedure Laterality Date    AORTIC VALVE REPLACEMENT   2000    ARTHROPLASTY TOE(S) Right 2/13/2023    Procedure: First metatarsal phalangeal joint implant arthroplasty right foot;  Surgeon: Chin Riojas DPM;  Location: [facility]    BIOPSY BREAST Left 2005    benign    COLONOSCOPY  2011    REPAIR THORACIC AORTA   2000    Family History   Problem Relation Age of Onset    Pancreatic Cancer Father 70.00        history of smoking    Ovarian Cancer Sister 67.00        stage III, fatal    Skin Cancer Sister     Heart Disease Mother     Diabetes No family hx of     Alzheimer Disease No family hx of     Social History     Socioeconomic History    Marital status:      Spouse name: Not on file    Number of children:  0    Years of education: Not on file    Highest education level: Not on file   Occupational History    Not on file   Tobacco Use    Smoking status: Never    Smokeless tobacco: Never   Vaping Use    Vaping Use: Never used   Substance and Sexual Activity    Alcohol use: No    Drug use: No    Sexual activity: Not Currently   Other Topics Concern    Not on file   Social History Narrative    Not on file     Social Determinants of Health     Financial Resource Strain: Not on file   Food Insecurity: Not on file   Transportation Needs: Not on file   Physical Activity: Not on file   Stress: Not on file   Social Connections: Not on file   Interpersonal Safety: Not on file   Housing Stability: Not on file          Medications  Allergies   Current Outpatient Medications   Medication Sig Dispense Refill    amitriptyline (ELAVIL) 10 MG tablet Take  10 mg by mouth at bedtime      butalbital-acetaminophen-caffeine (ESGIC) -40 MG tablet Take 2 tablets by mouth every 8 hours as needed for headaches [BUTALBITAL-ACETAMINOPHEN-CAFFEINE (FIORICET, ESGIC) -40 MG PER TABLET] TAKE 1 TO 2 TABLETS BY MOUTH EVERY 8 HOURS AS NEEDED FOR PAIN. MAX OF 4000 MG ACETAMINOPHEN PER 24 HOURS Strength: -40 mg 60 tablet 3    clonazePAM (KLONOPIN) 0.5 MG tablet Take 1 tablet (0.5 mg) by mouth nightly as needed for anxiety or sleep (Patient taking differently: Take 0.5 mg by mouth at bedtime) 90 tablet 3    levothyroxine (SYNTHROID/LEVOTHROID) 75 MCG tablet TAKE 1 TABLET ON WEDNESDAY, THURSDAY, SATURDAY AND SUNDAY 52 tablet 3    levothyroxine (SYNTHROID/LEVOTHROID) 88 MCG tablet TAKE 1 TABLET ON MONDAY, TUESDAY AND FRIDAY 39 tablet 3    metoprolol succinate ER (TOPROL XL) 200 MG 24 hr tablet TAKE 1 TABLET DAILY 90 tablet 3    multivitamin (ONE A DAY) per tablet [MULTIVITAMIN (ONE A DAY) PER TABLET] Take 1 tablet by mouth daily.       rizatriptan (MAXALT) 5 MG tablet TAKE 1 TABLET AS NEEDED FOR MIGRAINE. MAY REPEAT IN 2 HOURS IF NEEDED 10 tablet 27    simvastatin (ZOCOR) 40 MG tablet Take 1 tablet (40 mg) by mouth at bedtime TAKE 1 TABLET AT BEDTIME (DUE FOR AN OFFICE VISIT) 90 tablet 3    triamterene-HCTZ (MAXZIDE) 75-50 MG tablet TAKE ONE-HALF (1/2) TABLET DAILY 45 tablet 3    warfarin ANTICOAGULANT (COUMADIN) 1 MG tablet Take 3 tablets (3 mg) on Mondays, Wednesdays, and Saturdays. Adjust dose based on INR results as directed. 110 tablet 1    warfarin ANTICOAGULANT (COUMADIN) 5 MG tablet TAKE 1 TABLET ALONG WITH 1 MG TABLET (6 TO 7 MG DAILY) AS DIRECTED, ADJUST PER INR RESULT (Patient taking differently: Take 5 mg by mouth daily) 100 tablet 3    Allergies   Allergen Reactions    Codeine Other (See Comments)     Faints    Demerol [Meperidine] Other (See Comments)     Reaction not reported by patient., Patient states she felt awful.         Lab Results    Chemistry/lipid  CBC Cardiac Enzymes/BNP/TSH/INR   Lab Results   Component Value Date    CHOL 176 03/01/2023    HDL 54 03/01/2023    TRIG 89 03/01/2023    BUN 18.0 01/30/2023     01/30/2023    CO2 27 01/30/2023    Lab Results   Component Value Date    WBC 4.3 11/08/2023    HGB 12.9 11/08/2023    HCT 41.0 11/08/2023    MCV 82 11/08/2023     11/08/2023    Lab Results   Component Value Date    TSH 3.22 03/01/2023    INR 1.7 (H) 12/14/2023

## 2023-12-14 NOTE — LETTER
12/14/2023    Rafaela Woods MD  [address]  [facility] [address]    RE: Alyssa Higginbotham       Dear Colleague,     I had the pleasure of seeing Alyssa Higginbotham in the [facility].      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  She has been doing well from a functional capacity standpoint and does have documented whitecoat hypertension but did not bring her cuff in to make sure that her blood pressure cuff at home is working well and accurate.    She did have the opportunity to meet with Dr. Mercer from cardiac surgery and Dr. North from vascular surgery.  She will follow-up with these physicians as well.    Echocardiogram showed stable valvular function of the mechanical prosthesis in the aortic position.    Have asked her to increase her simvastatin to 40 mg a day to drive her LDL cholesterol down closer to 70.    Commended her on her regular exercise.    If she remains stable we will see her back in 1 year, but of course would be happy to see her sooner if questions or problems arise.    30 minutes spent with chart review, patient visit, and documentation.     History of Present Illness/Subjective    Ms. Alyssa Higginbotham is a 68 year old female with known ascending aortic dissection which extended into her descending thoracic aorta and iliacs almost 24 years ago.  She had a repair with mechanical prosthesis in the aortic position at [facility] by Dr. Vargas.  She has done well over the years although there was a question of whether or not the descending thoracic aorta was enlarging.  She did see Dr. Mercer and he felt that there was no significant enlargement based on his measurements.  He will continue to follow her on an annual basis.    The patient has  "been feeling well.  She exercises for an hour and a half daily.  She does a sitting bike and a treadmill and has had no changes in her functional capacity.  She reports that her blood pressure at home generally runs in the 1 10-1 teens range and when she comes to the clinic it is almost always high.    She denies orthopnea, paroxysmal nocturnal dyspnea, peripheral edema, syncope or near syncopal episodes and does not have palpitations.  No chest discomfort.    LDL cholesterol was just above 100.    ECG: Personally reviewed.        Physical Examination Review of Systems   BP (!) 168/83 (BP Location: Left arm, Patient Position: Sitting, Cuff Size: Adult Regular)   Pulse 62   Temp 97.4  F (36.3  C) (Oral)   Resp 14   Ht 1.549 m (5' 1\")   Wt 52.3 kg (115 lb 4.8 oz)   SpO2 99%   BMI 21.79 kg/m    Body mass index is 21.79 kg/m .  Wt Readings from Last 3 Encounters:   12/14/23 52.3 kg (115 lb 4.8 oz)   11/15/23 52.6 kg (116 lb)   10/20/23 52.6 kg (116 lb)     General Appearance:   Alert, cooperative and in no acute distress.   ENT/Mouth: Pink/moist oral mucosa   EYES:  no scleral icterus, normal conjunctivae   Neck: JVP normal. No Hepatojugular reflux. Thyroid not visualized.   Chest/Lungs:   Lungs are clear to auscultation, equal chest wall expansion.   Cardiovascular:   S1, metallic S2 with 2/6 systolic murmur , no clicks or rubs. Brachial, radial  pulses are intact and symetric. No carotid bruits noted   Abdomen:  Nontender. BS+.    Extremities: No cyanosis, clubbing or edema   Skin: no xanthelasma, warm.    Neurologic: normal arm movement bilateral, no tremors     Psychiatric: Appropriate affect.      Enc Vitals  BP: (!) 168/83  Pulse: 62  Resp: 14  Temp: 97.4  F (36.3  C)  Temp src: Oral  SpO2: 99 %  Weight: 52.3 kg (115 lb 4.8 oz)  Height: 154.9 cm (5' 1\")                                           Medical History  Surgical History Family History Social History   Past Medical History:   Diagnosis Date    " Accidental fall 05/27/2015    Antiplatelet or antithrombotic long-term use     Anxiety     Aortic dissection (H)     Asthma     Benign meningioma of brain (H) 03/2015    initially seen on CT of head that was obtained after a fall. Frontal lobe, confirmed on an MRI Mar 2015, 3 mm    Bunion     Chronic headache     Depression     Heart murmur     Hepatitis C     Hyperlipidemia     Hypothyroid     Irregular heart rate     Nasal fracture 02/28/2015    fell face first on sideache    Osteoporosis     Other acquired deformity of toe     Stroke (H)     on CT scan    Past Surgical History:   Procedure Laterality Date    AORTIC VALVE REPLACEMENT   2000    ARTHROPLASTY TOE(S) Right 2/13/2023    Procedure: First metatarsal phalangeal joint implant arthroplasty right foot;  Surgeon: Cihn Riojas DPM;  Location: [facility]    BIOPSY BREAST Left 2005    benign    COLONOSCOPY  2011    REPAIR THORACIC AORTA   2000    Family History   Problem Relation Age of Onset    Pancreatic Cancer Father 70.00        history of smoking    Ovarian Cancer Sister 67.00        stage III, fatal    Skin Cancer Sister     Heart Disease Mother     Diabetes No family hx of     Alzheimer Disease No family hx of     Social History     Socioeconomic History    Marital status:      Spouse name: Not on file    Number of children:  0    Years of education: Not on file    Highest education level: Not on file   Occupational History    Not on file   Tobacco Use    Smoking status: Never    Smokeless tobacco: Never   Vaping Use    Vaping Use: Never used   Substance and Sexual Activity    Alcohol use: No    Drug use: No    Sexual activity: Not Currently   Other Topics Concern    Not on file   Social History Narrative    Not on file     Social Determinants of Health     Financial Resource Strain: Not on file   Food Insecurity: Not on file   Transportation Needs: Not on file   Physical Activity: Not on file   Stress: Not on file   Social Connections:  Not on file   Interpersonal Safety: Not on file   Housing Stability: Not on file          Medications  Allergies   Current Outpatient Medications   Medication Sig Dispense Refill    amitriptyline (ELAVIL) 10 MG tablet Take 10 mg by mouth at bedtime      butalbital-acetaminophen-caffeine (ESGIC) -40 MG tablet Take 2 tablets by mouth every 8 hours as needed for headaches [BUTALBITAL-ACETAMINOPHEN-CAFFEINE (FIORICET, ESGIC) -40 MG PER TABLET] TAKE 1 TO 2 TABLETS BY MOUTH EVERY 8 HOURS AS NEEDED FOR PAIN. MAX OF 4000 MG ACETAMINOPHEN PER 24 HOURS Strength: -40 mg 60 tablet 3    clonazePAM (KLONOPIN) 0.5 MG tablet Take 1 tablet (0.5 mg) by mouth nightly as needed for anxiety or sleep (Patient taking differently: Take 0.5 mg by mouth at bedtime) 90 tablet 3    levothyroxine (SYNTHROID/LEVOTHROID) 75 MCG tablet TAKE 1 TABLET ON WEDNESDAY, THURSDAY, SATURDAY AND SUNDAY 52 tablet 3    levothyroxine (SYNTHROID/LEVOTHROID) 88 MCG tablet TAKE 1 TABLET ON MONDAY, TUESDAY AND FRIDAY 39 tablet 3    metoprolol succinate ER (TOPROL XL) 200 MG 24 hr tablet TAKE 1 TABLET DAILY 90 tablet 3    multivitamin (ONE A DAY) per tablet [MULTIVITAMIN (ONE A DAY) PER TABLET] Take 1 tablet by mouth daily.       rizatriptan (MAXALT) 5 MG tablet TAKE 1 TABLET AS NEEDED FOR MIGRAINE. MAY REPEAT IN 2 HOURS IF NEEDED 10 tablet 27    simvastatin (ZOCOR) 40 MG tablet Take 1 tablet (40 mg) by mouth at bedtime TAKE 1 TABLET AT BEDTIME (DUE FOR AN OFFICE VISIT) 90 tablet 3    triamterene-HCTZ (MAXZIDE) 75-50 MG tablet TAKE ONE-HALF (1/2) TABLET DAILY 45 tablet 3    warfarin ANTICOAGULANT (COUMADIN) 1 MG tablet Take 3 tablets (3 mg) on Mondays, Wednesdays, and Saturdays. Adjust dose based on INR results as directed. 110 tablet 1    warfarin ANTICOAGULANT (COUMADIN) 5 MG tablet TAKE 1 TABLET ALONG WITH 1 MG TABLET (6 TO 7 MG DAILY) AS DIRECTED, ADJUST PER INR RESULT (Patient taking differently: Take 5 mg by mouth daily) 100 tablet 3     Allergies   Allergen Reactions    Codeine Other (See Comments)     Faints    Demerol [Meperidine] Other (See Comments)     Reaction not reported by patient., Patient states she felt awful.         Lab Results    Chemistry/lipid CBC Cardiac Enzymes/BNP/TSH/INR   Lab Results   Component Value Date    CHOL 176 03/01/2023    HDL 54 03/01/2023    TRIG 89 03/01/2023    BUN 18.0 01/30/2023     01/30/2023    CO2 27 01/30/2023    Lab Results   Component Value Date    WBC 4.3 11/08/2023    HGB 12.9 11/08/2023    HCT 41.0 11/08/2023    MCV 82 11/08/2023     11/08/2023    Lab Results   Component Value Date    TSH 3.22 03/01/2023    INR 1.7 (H) 12/14/2023                Thank you for allowing me to participate in the care of your patient.      Sincerely,     Jonathon Richardson MD     [facility]  cc:   Jonathon Richardson MD  [address]  [place] [address]

## 2023-12-15 DIAGNOSIS — F41.1 ANXIETY STATE: ICD-10-CM

## 2023-12-15 RX ORDER — CLONAZEPAM 0.5 MG/1
TABLET ORAL
Qty: 90 TABLET | Refills: 3 | Status: SHIPPED | OUTPATIENT
Start: 2023-12-15 | End: 2024-03-14

## 2023-12-22 ENCOUNTER — ANTICOAGULATION THERAPY VISIT (OUTPATIENT)
Dept: ANTICOAGULATION | Facility: CLINIC | Age: 68
End: 2023-12-22

## 2023-12-22 ENCOUNTER — LAB (OUTPATIENT)
Dept: LAB | Facility: CLINIC | Age: 68
End: 2023-12-22
Payer: COMMERCIAL

## 2023-12-22 DIAGNOSIS — Z79.01 LONG TERM CURRENT USE OF ANTICOAGULANT THERAPY: ICD-10-CM

## 2023-12-22 DIAGNOSIS — Z95.2 S/P AVR: Primary | ICD-10-CM

## 2023-12-22 DIAGNOSIS — Z95.2 S/P AVR (AORTIC VALVE REPLACEMENT): ICD-10-CM

## 2023-12-22 LAB — INR BLD: 2 (ref 0.9–1.1)

## 2023-12-22 PROCEDURE — 36416 COLLJ CAPILLARY BLOOD SPEC: CPT

## 2023-12-22 PROCEDURE — 85610 PROTHROMBIN TIME: CPT

## 2023-12-22 NOTE — PROGRESS NOTES
ANTICOAGULATION MANAGEMENT     Alyssa Higginbotham 68 year old female is on warfarin with therapeutic INR result. (Goal INR 2.0-2.5)    Recent labs: (last 7 days)     12/22/23  0731   INR 2.0*       ASSESSMENT     Source(s): Chart Review and Patient/Caregiver Call     Warfarin doses taken: Booster dose on 12/14/23, recently taken which may be affecting INR  Diet: No new diet changes identified  Medication/supplement changes:  Yes.   Simvastatin dose was increased from 20mg to 40mg daily on 12/14/23.  However, she is only taking 30mg d/t known effects to INR to increase risk for bleeding.  New illness, injury, or hospitalization: No  Signs or symptoms of bleeding or clotting: No  Previous result: Subtherapeutic at 1.7 on 12/14/23.  Additional findings: None       PLAN     Recommended plan for temporary change(s) and ongoing change(s) affecting INR     Dosing Instructions: Continue your current warfarin dose with next INR in 1-2 weeks       Summary  As of 12/22/2023      Full warfarin instructions:  5 mg every day   Next INR check:  1/5/2024               Telephone call with Jennifer who verbalizes understanding and agrees to plan    Lab visit scheduled - INR on 12/29/23 @ [facility].    Education provided:   Taking warfarin: Importance of taking warfarin as instructed  Goal range and lab monitoring: goal range and significance of current result  Dietary considerations: importance of consistent vitamin K intake  Interaction IS anticipated between warfarin and increased Simvastatin  Symptom monitoring: monitoring for bleeding signs and symptoms    Plan made per [facility] anticoagulation protocol    Aylin Pablo RN  Anticoagulation Clinic  12/22/2023    _______________________________________________________________________     Anticoagulation Episode Summary       Current INR goal:  2.0-2.5   TTR:  49.0% (1 y)   Target end date:  Indefinite   Send INR reminders to:  [facility]    Indications    S/P AVR  [Z95.2]  S/P AVR (aortic valve replacement) [Z95.2]  Long term current use of anticoagulant therapy [Z79.01]             Comments:  6/2/23 INR target goal changed to 2.0 - 2.5 d/t daily bleeding issues.  (Sensitivity)  Goal range changed 2/20/19 per cardiology             Anticoagulation Care Providers       Provider Role Specialty Phone number    Rafaela Woods MD Referring [facility] Medicine [phone]

## 2023-12-29 ENCOUNTER — ANTICOAGULATION THERAPY VISIT (OUTPATIENT)
Dept: ANTICOAGULATION | Facility: CLINIC | Age: 68
End: 2023-12-29

## 2023-12-29 ENCOUNTER — LAB (OUTPATIENT)
Dept: CARDIOLOGY | Facility: CLINIC | Age: 68
End: 2023-12-29
Payer: COMMERCIAL

## 2023-12-29 DIAGNOSIS — Z95.2 S/P AVR: Primary | ICD-10-CM

## 2023-12-29 DIAGNOSIS — Z95.2 S/P AVR (AORTIC VALVE REPLACEMENT): ICD-10-CM

## 2023-12-29 DIAGNOSIS — Z79.01 LONG TERM CURRENT USE OF ANTICOAGULANT THERAPY: ICD-10-CM

## 2023-12-29 LAB — INR POINT OF CARE: 2.8 (ref 0.9–1.1)

## 2023-12-29 PROCEDURE — 36416 COLLJ CAPILLARY BLOOD SPEC: CPT

## 2023-12-29 PROCEDURE — 85610 PROTHROMBIN TIME: CPT

## 2023-12-29 NOTE — PROGRESS NOTES
ANTICOAGULATION MANAGEMENT     Alyssa Higginbotham 68 year old female is on warfarin with supratherapeutic INR result. (Goal INR 2.0-2.5)    Recent labs: (last 7 days)     12/29/23  0757   INR 2.8*       ASSESSMENT     Source(s): Chart Review and Patient/Caregiver Call     Warfarin doses taken: Warfarin taken as instructed  Diet: No new diet changes identified  Medication/supplement changes: Yes.  Simvastatin increased from 20mg to 30mg on 12/14/23. dose increased on 12/14/23 which may be increasing INR today.  (Was RX to take 40mg, however, she wants to do incremental dose right now).  New illness, injury, or hospitalization: No  Signs or symptoms of bleeding or clotting: No  Previous result: Therapeutic last visit at 2.0; previously outside of goal range at 1.7  Additional findings: None       PLAN     Recommended plan for ongoing change(s) affecting INR     Dosing Instructions: decrease your warfarin dose (8.6% change) with next INR in 1-2 weeks       Summary  As of 12/29/2023      Full warfarin instructions:  2 mg every Fri; 5 mg all other days   Next INR check:  1/5/2024               Telephone call with Jennifer who verbalizes understanding and agrees to plan    Lab visit scheduled - INR on 1/5/24 @ [facility].   (No early appts @ [facility])    Education provided:   Taking warfarin: Importance of taking warfarin as instructed  Goal range and lab monitoring: goal range and significance of current result  Interaction IS anticipated between warfarin and Simvastatin  Symptom monitoring: monitoring for bleeding signs and symptoms    Plan made per [facility] anticoagulation protocol    Aylin Pablo RN  Anticoagulation Clinic  12/29/2023    _______________________________________________________________________     Anticoagulation Episode Summary       Current INR goal:  2.0-2.5   TTR:  48.3% (1 y)   Target end date:  Indefinite   [facility]    Indications    S/P AVR [Z95.2]  S/P AVR  (aortic valve replacement) [Z95.2]  Long term current use of anticoagulant therapy [Z79.01]             Comments:  6/2/23 INR target goal changed to 2.0 - 2.5 d/t daily bleeding issues.  (Sensitivity)  Goal range changed 2/20/19 per cardiology             Anticoagulation Care Providers       Provider Role Specialty Phone number    Rafaela Woods MD Referring Family Medicine [phone]

## 2024-01-05 ENCOUNTER — ANTICOAGULATION THERAPY VISIT (OUTPATIENT)
Dept: ANTICOAGULATION | Facility: CLINIC | Age: 69
End: 2024-01-05

## 2024-01-05 ENCOUNTER — LAB (OUTPATIENT)
Dept: CARDIOLOGY | Facility: CLINIC | Age: 69
End: 2024-01-05
Payer: COMMERCIAL

## 2024-01-05 ENCOUNTER — TELEPHONE (OUTPATIENT)
Dept: ANTICOAGULATION | Facility: CLINIC | Age: 69
End: 2024-01-05

## 2024-01-05 DIAGNOSIS — Z95.2 S/P AVR (AORTIC VALVE REPLACEMENT): ICD-10-CM

## 2024-01-05 DIAGNOSIS — Z95.2 S/P AVR: Primary | ICD-10-CM

## 2024-01-05 DIAGNOSIS — Z79.01 LONG TERM CURRENT USE OF ANTICOAGULANT THERAPY: ICD-10-CM

## 2024-01-05 LAB — INR POINT OF CARE: 2 (ref 0.9–1.1)

## 2024-01-05 PROCEDURE — 36416 COLLJ CAPILLARY BLOOD SPEC: CPT

## 2024-01-05 PROCEDURE — 85610 PROTHROMBIN TIME: CPT

## 2024-01-05 NOTE — TELEPHONE ENCOUNTER
FYI - Status Update    Who is Calling: patient    Update: Patient called back for INR nurse.    Does caller want a call/response back: Yes     Could we send this information to you in [employer] or would you prefer to receive a phone call?:   Patient would prefer a phone call   Okay to leave a detailed message?: Yes at Home number on file [phone] (home)

## 2024-01-05 NOTE — PROGRESS NOTES
ANTICOAGULATION MANAGEMENT     Alyssa Higginbotham 68 year old female is on warfarin with therapeutic INR result. (Goal INR 2.0-2.5)    Recent labs: (last 7 days)     01/05/24  0741   INR 2.0*       ASSESSMENT     Source(s): Chart Review and Patient/Caregiver Call     Warfarin doses taken: Warfarin taken as instructed   Weekly warfarin dose was decreased on 12/29/23.  Diet: No new diet changes identified  Medication/supplement changes:  Yes   Simvastatin increased from 20mg to 30mg on 12/14/23.  (stated she will stay with 30mg for another 2-3 wks before increasing to 40mg).  (RX was to take 40mg, however, she wants to do incremental dose right now).   New illness, injury, or hospitalization: No  Signs or symptoms of bleeding or clotting: No  Previous result: Supratherapeutic at 2.8 on 12/29/23.  Additional findings: None       PLAN     Recommended plan for no diet, medication or health factor changes affecting INR     Dosing Instructions: Continue your current warfarin dose with next INR in 1-2 weeks       Summary  As of 1/5/2024      Full warfarin instructions:  2 mg every Fri; 5 mg all other days   Next INR check:  1/19/2024               Telephone call with Jennifer who verbalizes understanding and agrees to plan    Lab visit scheduled - INR on 1/12/24 @ [facility] all booked for early morning appts.    Education provided:   Taking warfarin: Importance of taking warfarin as instructed  Goal range and lab monitoring: goal range and significance of current result    Plan made per [facility] anticoagulation protocol    Aylin Pabol RN  Anticoagulation Clinic  1/5/2024    _______________________________________________________________________     Anticoagulation Episode Summary       Current INR goal:  2.0-2.5   TTR:  48.0% (1 y)   Target end date:  Indefinite   Send INR reminders to:  [facility]    Indications    S/P AVR [Z95.2]  S/P AVR (aortic valve replacement) [Z95.2]  Long term current  use of anticoagulant therapy [Z79.01]             Comments:  6/2/23 INR target goal changed to 2.0 - 2.5 d/t daily bleeding issues.  (Sensitivity)  Goal range changed 2/20/19 per cardiology             Anticoagulation Care Providers       Provider Role Specialty Phone number    Rafalea Woods MD Referring Family Medicine [phone]

## 2024-01-12 ENCOUNTER — ANTICOAGULATION THERAPY VISIT (OUTPATIENT)
Dept: ANTICOAGULATION | Facility: CLINIC | Age: 69
End: 2024-01-12

## 2024-01-12 ENCOUNTER — LAB (OUTPATIENT)
Dept: CARDIOLOGY | Facility: CLINIC | Age: 69
End: 2024-01-12
Payer: COMMERCIAL

## 2024-01-12 DIAGNOSIS — Z95.2 S/P AVR (AORTIC VALVE REPLACEMENT): ICD-10-CM

## 2024-01-12 DIAGNOSIS — Z79.01 LONG TERM CURRENT USE OF ANTICOAGULANT THERAPY: ICD-10-CM

## 2024-01-12 DIAGNOSIS — Z95.2 S/P AVR: Primary | ICD-10-CM

## 2024-01-12 LAB — INR POINT OF CARE: 2.2 (ref 0.9–1.1)

## 2024-01-12 PROCEDURE — 36416 COLLJ CAPILLARY BLOOD SPEC: CPT

## 2024-01-12 PROCEDURE — 85610 PROTHROMBIN TIME: CPT

## 2024-01-12 NOTE — PROGRESS NOTES
ANTICOAGULATION MANAGEMENT     Alyssa Higginbotham 68 year old female is on warfarin with therapeutic INR result. (Goal INR 2.0-2.5)    Recent labs: (last 7 days)     01/12/24  0745   INR 2.2*       ASSESSMENT     Source(s): Chart Review and Patient/Caregiver Call     Warfarin doses taken: Warfarin taken as instructed  Diet: No new diet changes identified  Medication/supplement changes: None noted    Simvastatin increased from 20mg to 30mg on 12/14/23.    (stated she will stay with 30mg for another 2-3 wks before increasing to 40mg).  (RX was to take 40mg, however, she wants to do incremental dose right now).  New illness, injury, or hospitalization: No  Signs or symptoms of bleeding or clotting: No  Previous result: Therapeutic last visit at 2.0; previously outside of goal rangeat 2.8  Additional findings: None       PLAN     Recommended plan for no diet, medication or health factor changes affecting INR     Dosing Instructions: Continue your current warfarin dose with next INR in 1-2 weeks       Summary  As of 1/12/2024      Full warfarin instructions:  2 mg every Fri; 5 mg all other days   Next INR check:  1/26/2024               Telephone call with Jennifer who verbalizes understanding and agrees to plan    Lab visit scheduled - INR on 1/22/24 @ [facility].   (Has dental appt on 1/23/24 wants to ensure INR is therapeutic)    Education provided:   Taking warfarin: Importance of taking warfarin as instructed  Goal range and lab monitoring: goal range and significance of current result    Plan made per [facility] anticoagulation protocol    Aylin Pablo RN  Anticoagulation Clinic  1/12/2024    _______________________________________________________________________     Anticoagulation Episode Summary       Current INR goal:  2.0-2.5   TTR:  50.0% (1 y)   Target end date:  Indefinite   Send INR reminders to:  [facility]    Indications    S/P AVR [Z95.2]  S/P AVR (aortic valve replacement) [Z95.2]  Long  term current use of anticoagulant therapy [Z79.01]             Comments:  6/2/23 INR target goal changed to 2.0 - 2.5 d/t daily bleeding issues.  (Sensitivity)  Goal range changed 2/20/19 per cardiology             Anticoagulation Care Providers       Provider Role Specialty Phone number    Rafaela Woods MD Referring Family Medicine [phone]

## 2024-01-19 ENCOUNTER — MYC REFILL (OUTPATIENT)
Dept: FAMILY MEDICINE | Facility: CLINIC | Age: 69
End: 2024-01-19
Payer: COMMERCIAL

## 2024-01-19 DIAGNOSIS — I10 ESSENTIAL HYPERTENSION, MALIGNANT: ICD-10-CM

## 2024-01-19 RX ORDER — METOPROLOL SUCCINATE 200 MG/1
200 TABLET, EXTENDED RELEASE ORAL DAILY
Qty: 90 TABLET | Refills: 3 | OUTPATIENT
Start: 2024-01-19

## 2024-01-19 RX ORDER — METOPROLOL SUCCINATE 200 MG/1
200 TABLET, EXTENDED RELEASE ORAL DAILY
Qty: 90 TABLET | Refills: 3 | Status: SHIPPED | OUTPATIENT
Start: 2024-01-19

## 2024-01-19 NOTE — TELEPHONE ENCOUNTER
Left msg to call back. Rx was denied to due failing BP parameters. Please record any BP s patient may have, and route to provider to approve.

## 2024-01-22 ENCOUNTER — ANTICOAGULATION THERAPY VISIT (OUTPATIENT)
Dept: ANTICOAGULATION | Facility: CLINIC | Age: 69
End: 2024-01-22

## 2024-01-22 ENCOUNTER — TELEPHONE (OUTPATIENT)
Dept: FAMILY MEDICINE | Facility: CLINIC | Age: 69
End: 2024-01-22

## 2024-01-22 ENCOUNTER — LAB (OUTPATIENT)
Dept: CARDIOLOGY | Facility: CLINIC | Age: 69
End: 2024-01-22
Payer: COMMERCIAL

## 2024-01-22 DIAGNOSIS — Z79.01 LONG TERM CURRENT USE OF ANTICOAGULANT THERAPY: ICD-10-CM

## 2024-01-22 DIAGNOSIS — Z95.2 S/P AVR (AORTIC VALVE REPLACEMENT): ICD-10-CM

## 2024-01-22 DIAGNOSIS — Z95.2 S/P AVR: Primary | ICD-10-CM

## 2024-01-22 LAB — INR POINT OF CARE: 2.2 (ref 0.9–1.1)

## 2024-01-22 PROCEDURE — 36416 COLLJ CAPILLARY BLOOD SPEC: CPT

## 2024-01-22 PROCEDURE — 85610 PROTHROMBIN TIME: CPT

## 2024-01-22 NOTE — PROGRESS NOTES
ANTICOAGULATION MANAGEMENT     Alyssa Higginbotham 68 year old female is on warfarin with therapeutic INR result. (Goal INR 2.0-2.5)    Recent labs: (last 7 days)     01/22/24  0801   INR 2.2*       ASSESSMENT     Source(s): Chart Review and Patient/Caregiver Call     Warfarin doses taken: Warfarin taken as instructed  Diet: No new diet changes identified  Medication/supplement changes:  Yes.   Reported, taking Simvastatin from 30mg to 40mg, however, after 5 days, she decreased to 20mg daily on 1/17/24.  Reported, she had body aches and could not sleep.   (Can potentially increase INR or risk for bleeding)  New illness, injury, or hospitalization: No  Signs or symptoms of bleeding or clotting: No  Previous result: Therapeutic last 2 INR visits  Additional findings: None       PLAN     Recommended plan for temporary change(s) affecting INR     Dosing Instructions: Continue your current warfarin dose with next INR in 1-2 weeks       Summary  As of 1/22/2024      Full warfarin instructions:  2 mg every Fri; 5 mg all other days   Next INR check:  2/5/2024               Telephone call with Jennifer who verbalizes understanding and agrees to plan   - will adjust next INR based on results, d/t she changed doses of Simvastatin from 30mg to 40mg, then down to 20mg daily, within one week.    Lab visit scheduled - INR on 1/30/24 @ [facility]    Education provided:   Taking warfarin: Importance of taking warfarin as instructed  Goal range and lab monitoring: goal range and significance of current result  Interaction IS anticipated between warfarin and Simvastatin    Plan made per [facility] anticoagulation protocol    Aylin Pablo RN  Anticoagulation Clinic  1/22/2024    _______________________________________________________________________     Anticoagulation Episode Summary       Current INR goal:  2.0-2.5   TTR:  50.1% (1 y)   Target end date:  Indefinite   Send INR reminders to:  [facility]    Indications    S/P AVR  [Z95.2]  S/P AVR (aortic valve replacement) [Z95.2]  Long term current use of anticoagulant therapy [Z79.01]             Comments:  6/2/23 INR target goal changed to 2.0 - 2.5 d/t daily bleeding issues.  (Sensitivity)  Goal range changed 2/20/19 per cardiology             Anticoagulation Care Providers       Provider Role Specialty Phone number    Rafaela Woods MD Referring [facility] Medicine [phone]

## 2024-01-22 NOTE — TELEPHONE ENCOUNTER
Patient Returning Call    Reason for call:  Patient returned call from coumadin team, patient will await a call back    Information relayed to patient:  message placed    Patient has additional questions:  No      Could we send this information to you in [employer] or would you prefer to receive a phone call?:   Patient would prefer a phone call   Okay to leave a detailed message?: Yes at Home number on file [phone] (home)

## 2024-01-23 ENCOUNTER — MYC REFILL (OUTPATIENT)
Dept: FAMILY MEDICINE | Facility: CLINIC | Age: 69
End: 2024-01-23
Payer: COMMERCIAL

## 2024-01-23 DIAGNOSIS — I10 HTN (HYPERTENSION): ICD-10-CM

## 2024-01-23 DIAGNOSIS — I10 ESSENTIAL HYPERTENSION, MALIGNANT: Primary | ICD-10-CM

## 2024-01-23 DIAGNOSIS — I10 ESSENTIAL HYPERTENSION: ICD-10-CM

## 2024-01-23 DIAGNOSIS — F41.1 ANXIETY STATE: Primary | ICD-10-CM

## 2024-01-23 RX ORDER — TRIAMTERENE AND HYDROCHLOROTHIAZIDE 75; 50 MG/1; MG/1
0.5 TABLET ORAL DAILY
Qty: 45 TABLET | Refills: 3 | Status: SHIPPED | OUTPATIENT
Start: 2024-01-23

## 2024-01-23 RX ORDER — AMITRIPTYLINE HYDROCHLORIDE 10 MG/1
10 TABLET ORAL AT BEDTIME
Qty: 90 TABLET | Refills: 3 | Status: SHIPPED | OUTPATIENT
Start: 2024-01-23

## 2024-01-30 ENCOUNTER — ANTICOAGULATION THERAPY VISIT (OUTPATIENT)
Dept: ANTICOAGULATION | Facility: CLINIC | Age: 69
End: 2024-01-30

## 2024-01-30 ENCOUNTER — LAB (OUTPATIENT)
Dept: LAB | Facility: CLINIC | Age: 69
End: 2024-01-30
Payer: COMMERCIAL

## 2024-01-30 DIAGNOSIS — Z95.2 S/P AVR (AORTIC VALVE REPLACEMENT): ICD-10-CM

## 2024-01-30 DIAGNOSIS — Z79.01 LONG TERM CURRENT USE OF ANTICOAGULANT THERAPY: ICD-10-CM

## 2024-01-30 DIAGNOSIS — Z95.2 S/P AVR: Primary | ICD-10-CM

## 2024-01-30 LAB
INR BLD: 1.9 (ref 0.9–1.1)
INR BLD: 1.9 (ref 0.9–1.1)

## 2024-01-30 PROCEDURE — 85610 PROTHROMBIN TIME: CPT

## 2024-01-30 PROCEDURE — 36416 COLLJ CAPILLARY BLOOD SPEC: CPT

## 2024-01-30 NOTE — PROGRESS NOTES
ANTICOAGULATION MANAGEMENT     Alyssa Higginbotham 68 year old female is on warfarin with subtherapeutic INR result. (Goal INR 2.0-2.5)    Recent labs: (last 7 days)     01/30/24  0718   INR 1.9*       ASSESSMENT     Source(s): Chart Review and Patient/Caregiver Call     Warfarin doses taken: Warfarin taken as instructed  Diet: No new diet changes identified  Medication/supplement changes: Yes.   Simvastatin from 30mg to 20mg daily dose decreased on 1/17/24 which may be decreasing INR today.  (She did try taking 40mg daily, however, she started to experience myalgias, when she stopped pains resolved).  New illness, injury, or hospitalization: No  Signs or symptoms of bleeding or clotting: No  Previous result: Therapeutic last 3 INR visits  Additional findings: None       PLAN     Recommended plan for ongoing change(s) affecting INR     Dosing Instructions: Increase your warfarin dose (3.1% change) with next INR in 1-2 weeks       Summary  As of 1/30/2024      Full warfarin instructions:  3 mg every Fri; 5 mg all other days   Next INR check:  2/13/2024               Telephone call with Jennifer who verbalizes understanding and agrees to plan    Lab visit scheduled - INR on 2/8/24 @ [facility].    Education provided:   Taking warfarin: Importance of taking warfarin as instructed  Goal range and lab monitoring: goal range and significance of current result  Interaction IS anticipated between warfarin and Simvastatin    Plan made per [facility] anticoagulation protocol    Aylin Pablo RN  Anticoagulation Clinic  1/30/2024    _______________________________________________________________________     Anticoagulation Episode Summary       Current INR goal:  2.0-2.5   TTR:  49.3% (1 y)   Target end date:  Indefinite   Send INR reminders to:  [facility]    Indications    S/P AVR [Z95.2]  S/P AVR (aortic valve replacement) [Z95.2]  Long term current use of anticoagulant therapy [Z79.01]             Comments:  6/2/23 INR  target goal changed to 2.0 - 2.5 d/t daily bleeding issues.  (Sensitivity)  Goal range changed 2/20/19 per cardiology             Anticoagulation Care Providers       Provider Role Specialty Phone number    Rafaela Woods MD Referring Family Medicine [phone]

## 2024-02-08 ENCOUNTER — ANTICOAGULATION THERAPY VISIT (OUTPATIENT)
Dept: ANTICOAGULATION | Facility: CLINIC | Age: 69
End: 2024-02-08

## 2024-02-08 ENCOUNTER — LAB (OUTPATIENT)
Dept: LAB | Facility: CLINIC | Age: 69
End: 2024-02-08
Payer: COMMERCIAL

## 2024-02-08 DIAGNOSIS — Z95.2 S/P AVR (AORTIC VALVE REPLACEMENT): ICD-10-CM

## 2024-02-08 DIAGNOSIS — Z79.01 LONG TERM CURRENT USE OF ANTICOAGULANT THERAPY: ICD-10-CM

## 2024-02-08 DIAGNOSIS — Z95.2 S/P AVR: Primary | ICD-10-CM

## 2024-02-08 LAB — INR BLD: 1.9 (ref 0.9–1.1)

## 2024-02-08 PROCEDURE — 85610 PROTHROMBIN TIME: CPT

## 2024-02-08 PROCEDURE — 36416 COLLJ CAPILLARY BLOOD SPEC: CPT

## 2024-02-08 NOTE — PROGRESS NOTES
ANTICOAGULATION MANAGEMENT     Alyssa Higginbotham 68 year old female is on warfarin with subtherapeutic INR result. (Goal INR 2.0-2.5)    Recent labs: (last 7 days)     02/08/24  0725   INR 1.9*       ASSESSMENT     Source(s): Chart Review and Patient/Caregiver Call     Warfarin doses taken: Warfarin taken as instructed   Weekly warfarin dose on 1/30/24.  Diet: No new diet changes identified  Medication/supplement changes:  Yes.   On 1/17/24, she decreased Simvastatin from 30mg to 20mg daily, which continues to decrease INR today.  (She did try taking 40mg daily, however, she started to experience myalgias, and when she stopped pains resolved)   New illness, injury, or hospitalization: No  Signs or symptoms of bleeding or clotting: No  Previous result: Subtherapeutic last 2 INR results.  Additional findings:  sensitive to warfarin dose increase.       PLAN     Recommended plan for no diet, medication or health factor changes affecting INR     Dosing Instructions: Increase your warfarin dose (3% change) with next INR in 1-2 weeks       Summary  As of 2/8/2024      Full warfarin instructions:  4 mg every Fri; 5 mg all other days   Next INR check:  2/22/2024               Telephone call with Jennifer who verbalizes understanding and agrees to plan.   - advised to come in sooner if any s/sx of any active bleeding.    Lab visit scheduled - INR on 2/22/24 @ [facility].    Education provided:   Taking warfarin: Importance of taking warfarin as instructed  Goal range and lab monitoring: goal range and significance of current result  Symptom monitoring: monitoring for bleeding signs and symptoms    Plan made per [facility] anticoagulation protocol    Aylin Pablo, RN  Anticoagulation Clinic  2/8/2024    _______________________________________________________________________     Anticoagulation Episode Summary       Current INR goal:  2.0-2.5   TTR:  47.6% (1 y)   Target end date:  Indefinite   Send INR reminders to:  [facility]    Indications    S/P AVR [Z95.2]  S/P AVR (aortic valve replacement) [Z95.2]  Long term current use of anticoagulant therapy [Z79.01]             Comments:  6/2/23 INR target goal changed to 2.0 - 2.5 d/t daily bleeding issues.  (Sensitivity)  Goal range changed 2/20/19 per cardiology             Anticoagulation Care Providers       Provider Role Specialty Phone number    Rafaela Woods MD Referring [facility] Medicine [phone]

## 2024-02-22 ENCOUNTER — LAB (OUTPATIENT)
Dept: LAB | Facility: CLINIC | Age: 69
End: 2024-02-22
Payer: COMMERCIAL

## 2024-02-22 ENCOUNTER — ANTICOAGULATION THERAPY VISIT (OUTPATIENT)
Dept: ANTICOAGULATION | Facility: CLINIC | Age: 69
End: 2024-02-22

## 2024-02-22 DIAGNOSIS — Z95.2 S/P AVR (AORTIC VALVE REPLACEMENT): ICD-10-CM

## 2024-02-22 DIAGNOSIS — Z95.2 S/P AVR: Primary | ICD-10-CM

## 2024-02-22 DIAGNOSIS — Z79.01 LONG TERM CURRENT USE OF ANTICOAGULANT THERAPY: ICD-10-CM

## 2024-02-22 LAB — INR BLD: 1.7 (ref 0.9–1.1)

## 2024-02-22 PROCEDURE — 85610 PROTHROMBIN TIME: CPT

## 2024-02-22 PROCEDURE — 36415 COLL VENOUS BLD VENIPUNCTURE: CPT

## 2024-02-22 NOTE — PROGRESS NOTES
ANTICOAGULATION MANAGEMENT     Alyssa Higginbotham 68 year old female is on warfarin with subtherapeutic INR result. (Goal INR 2.0-2.5)    Recent labs: (last 7 days)     02/22/24  0704   INR 1.7*       ASSESSMENT     Source(s): Chart Review and Patient/Caregiver Call     Warfarin doses taken: Missed dose on 2/15/24, may be affecting INR.   Reported she missed taking ALL of her medications on 2/15/24.   Weekly warfarin dose was increased on 2/8/24.   (Pt sensitive to known increase of warfarin).  Diet: No new diet changes identified  Medication/supplement changes: None noted   Only med change was, she decreased Simvastatin from 30mg to 20mg daily on 1/17/24.  New illness, injury, or hospitalization: No  Signs or symptoms of bleeding or clotting: No  Previous result: Subtherapeutic last 3 INR results was 1.9  Additional findings: None       PLAN     Recommended plan for temporary change(s) affecting INR     Dosing Instructions: booster dose then continue your current warfarin dose with next INR in 1-2 weeks       Summary  As of 2/22/2024      Full warfarin instructions:  2/22: 6 mg; Otherwise 4 mg every Fri; 5 mg all other days   Next INR check:  3/7/2024               Telephone call with Jennifer who verbalizes understanding and agrees to plan    Lab visit scheduled - INR on 3/1/24 @ [facility].    Education provided:   Taking warfarin: Importance of taking warfarin as instructed  Goal range and lab monitoring: goal range and significance of current result    Plan made per [facility] anticoagulation protocol    Aylin Pablo RN  Anticoagulation Clinic  2/22/2024    _______________________________________________________________________     Anticoagulation Episode Summary       Current INR goal:  2.0-2.5   TTR:  47.3% (1 y)   Target end date:  Indefinite   Send INR reminders to:  [facility]    Indications    S/P AVR [Z95.2]  S/P AVR (aortic valve replacement) [Z95.2]  Long term current use of anticoagulant  therapy [Z79.01]             Comments:  6/2/23 INR target goal changed to 2.0 - 2.5 d/t daily bleeding issues.  (Sensitivity)  Goal range changed 2/20/19 per cardiology             Anticoagulation Care Providers       Provider Role Specialty Phone number    Rafaela Woods MD Referring Family Medicine [phone]

## 2024-03-01 ENCOUNTER — LAB (OUTPATIENT)
Dept: CARDIOLOGY | Facility: CLINIC | Age: 69
End: 2024-03-01
Payer: COMMERCIAL

## 2024-03-01 ENCOUNTER — ANTICOAGULATION THERAPY VISIT (OUTPATIENT)
Dept: ANTICOAGULATION | Facility: CLINIC | Age: 69
End: 2024-03-01

## 2024-03-01 DIAGNOSIS — Z95.2 S/P AVR: Primary | ICD-10-CM

## 2024-03-01 DIAGNOSIS — Z95.2 S/P AVR (AORTIC VALVE REPLACEMENT): ICD-10-CM

## 2024-03-01 DIAGNOSIS — Z79.01 LONG TERM CURRENT USE OF ANTICOAGULANT THERAPY: ICD-10-CM

## 2024-03-01 LAB — INR POINT OF CARE: 2.3 (ref 0.9–1.1)

## 2024-03-01 PROCEDURE — 36416 COLLJ CAPILLARY BLOOD SPEC: CPT

## 2024-03-01 PROCEDURE — 85610 PROTHROMBIN TIME: CPT

## 2024-03-01 NOTE — PROGRESS NOTES
ANTICOAGULATION MANAGEMENT     Alyssa Higginbotham 68 year old female is on warfarin with therapeutic INR result. (Goal INR 2.0-2.5)    Recent labs: (last 7 days)     03/01/24  0736   INR 2.3*       ASSESSMENT     Source(s): Chart Review and Patient/Caregiver Call     Warfarin doses taken: Booster dose on 2/22/24, d/t misssed dose. recently taken which may be affecting INR  Diet: No new diet changes identified  Medication/supplement changes: None noted  New illness, injury, or hospitalization: No  Signs or symptoms of bleeding or clotting: No  Previous result: Subtherapeutic last 4 INR results.  Additional findings: None       PLAN     Recommended plan for no diet, medication or health factor changes affecting INR     Dosing Instructions: Continue your current warfarin dose with next INR in 1-2 weeks       Summary  As of 3/1/2024      Full warfarin instructions:  4 mg every Fri; 5 mg all other days   Next INR check:  3/15/2024               Telephone call with Jennifer who verbalizes understanding and agrees to plan    Lab visit scheduled - [facility] 3[facility]12/24 [facility].    Education provided:   Taking warfarin: Importance of taking warfarin as instructed  Goal range and lab monitoring: goal range and significance of current result    Plan made per [facility] anticoagulation protocol    Aylin Pablo RN  Anticoagulation Clinic  3/1/2024    _______________________________________________________________________     Anticoagulation Episode Summary       Current INR goal:  2.0-2.5   TTR:  47.4% (1 y)   Target end date:  Indefinite   Send INR reminders to:  [facility]    Indications    S/P AVR [Z95.2]  S/P AVR (aortic valve replacement) [Z95.2]  Long term current use of anticoagulant therapy [Z79.01]             Comments:  6/2/23 INR target goal changed to 2.0 - 2.5 d/t daily bleeding issues.  (Sensitivity)  Goal range changed 2/20/19 per cardiology             Anticoagulation Care Providers       Provider Role  Specialty Phone number    Rafaela Woods MD Referring Family Medicine [phone]

## 2024-03-02 ENCOUNTER — MYC REFILL (OUTPATIENT)
Dept: FAMILY MEDICINE | Facility: CLINIC | Age: 69
End: 2024-03-02
Payer: COMMERCIAL

## 2024-03-02 DIAGNOSIS — E04.1 THYROID NODULE: ICD-10-CM

## 2024-03-02 DIAGNOSIS — Z95.2 S/P AVR (AORTIC VALVE REPLACEMENT): ICD-10-CM

## 2024-03-02 DIAGNOSIS — Z79.01 LONG TERM CURRENT USE OF ANTICOAGULANT THERAPY: ICD-10-CM

## 2024-03-02 DIAGNOSIS — Z95.2 S/P AVR: ICD-10-CM

## 2024-03-04 RX ORDER — LEVOTHYROXINE SODIUM 75 UG/1
TABLET ORAL
Qty: 52 TABLET | Refills: 0 | Status: SHIPPED | OUTPATIENT
Start: 2024-03-04 | End: 2024-03-26

## 2024-03-04 RX ORDER — WARFARIN SODIUM 5 MG/1
TABLET ORAL
Qty: 90 TABLET | Refills: 1 | Status: ON HOLD | OUTPATIENT
Start: 2024-03-04 | End: 2024-07-29

## 2024-03-04 RX ORDER — WARFARIN SODIUM 1 MG/1
TABLET ORAL
Qty: 55 TABLET | Refills: 1 | Status: ON HOLD | OUTPATIENT
Start: 2024-03-04 | End: 2024-07-29

## 2024-03-04 RX ORDER — LEVOTHYROXINE SODIUM 88 UG/1
TABLET ORAL
Qty: 39 TABLET | Refills: 0 | Status: SHIPPED | OUTPATIENT
Start: 2024-03-04 | End: 2024-03-26

## 2024-03-12 ENCOUNTER — LAB (OUTPATIENT)
Dept: LAB | Facility: CLINIC | Age: 69
End: 2024-03-12
Payer: COMMERCIAL

## 2024-03-12 ENCOUNTER — ANTICOAGULATION THERAPY VISIT (OUTPATIENT)
Dept: ANTICOAGULATION | Facility: CLINIC | Age: 69
End: 2024-03-12

## 2024-03-12 DIAGNOSIS — Z79.01 LONG TERM CURRENT USE OF ANTICOAGULANT THERAPY: ICD-10-CM

## 2024-03-12 DIAGNOSIS — Z95.2 S/P AVR: Primary | ICD-10-CM

## 2024-03-12 DIAGNOSIS — Z95.2 S/P AVR (AORTIC VALVE REPLACEMENT): ICD-10-CM

## 2024-03-12 LAB — INR BLD: 2.6 (ref 0.9–1.1)

## 2024-03-12 PROCEDURE — 85610 PROTHROMBIN TIME: CPT

## 2024-03-12 PROCEDURE — 36416 COLLJ CAPILLARY BLOOD SPEC: CPT

## 2024-03-12 NOTE — PROGRESS NOTES
ANTICOAGULATION MANAGEMENT     Alyssa Higginbotham 68 year old female is on warfarin with supratherapeutic INR result. (Goal INR 2.0-2.5)    Recent labs: (last 7 days)     03/12/24  0730   INR 2.6*       ASSESSMENT     Source(s): Chart Review and Patient/Caregiver Call     Warfarin doses taken: Warfarin taken as instructed  Diet: No new diet changes identified  Medication/supplement changes: None noted  New illness, injury, or hospitalization: No  Signs or symptoms of bleeding or clotting: No  Previous result: Therapeutic last visit; previously outside of goal range  Additional findings:  Patient prefers INR on lower side       PLAN     Recommended plan for no diet, medication or health factor changes affecting INR     Dosing Instructions: decrease your warfarin dose (2.9% change) with next INR in 1 week.  Patient prefers to decrease dosing at this time.       Summary  As of 3/12/2024      Full warfarin instructions:  4 mg every Tue, Fri; 5 mg all other days   Next INR check:  3/19/2024               Telephone call with Jennifer who verbalizes understanding and agrees to plan    Check at provider office visit 3/19/24    Education provided:   Goal range and lab monitoring: goal range and significance of current result  Contact [phone] with any changes, questions or concerns.     Plan made per [facility] anticoagulation protocol    Haven Carpio, RN  Anticoagulation Clinic  3/12/2024    _______________________________________________________________________     Anticoagulation Episode Summary       Current INR goal:  2.0-2.5   TTR:  46.9% (1 y)   Target end date:  Indefinite   Send INR reminders to:  [facility]    Indications    S/P AVR [Z95.2]  S/P AVR (aortic valve replacement) [Z95.2]  Long term current use of anticoagulant therapy [Z79.01]             Comments:  6/2/23 INR target goal changed to 2.0 - 2.5 d/t daily bleeding issues.  (Sensitivity)  Goal range changed 2/20/19 per cardiology              Anticoagulation Care Providers       Provider Role Specialty Phone number    Rafaela Woods MD Referring Family Medicine [phone]

## 2024-03-14 ENCOUNTER — MYC REFILL (OUTPATIENT)
Dept: FAMILY MEDICINE | Facility: CLINIC | Age: 69
End: 2024-03-14
Payer: COMMERCIAL

## 2024-03-14 DIAGNOSIS — F41.1 ANXIETY STATE: ICD-10-CM

## 2024-03-15 RX ORDER — CLONAZEPAM 0.5 MG/1
TABLET ORAL
Qty: 90 TABLET | Refills: 3 | Status: SHIPPED | OUTPATIENT
Start: 2024-03-15 | End: 2024-03-18

## 2024-03-19 ENCOUNTER — ANTICOAGULATION THERAPY VISIT (OUTPATIENT)
Dept: ANTICOAGULATION | Facility: CLINIC | Age: 69
End: 2024-03-19

## 2024-03-19 ENCOUNTER — OFFICE VISIT (OUTPATIENT)
Dept: FAMILY MEDICINE | Facility: CLINIC | Age: 69
End: 2024-03-19
Payer: COMMERCIAL

## 2024-03-19 VITALS
HEART RATE: 66 BPM | HEIGHT: 62 IN | TEMPERATURE: 97.5 F | DIASTOLIC BLOOD PRESSURE: 90 MMHG | WEIGHT: 112.6 LBS | OXYGEN SATURATION: 99 % | RESPIRATION RATE: 16 BRPM | SYSTOLIC BLOOD PRESSURE: 157 MMHG | BODY MASS INDEX: 20.72 KG/M2

## 2024-03-19 DIAGNOSIS — Z95.2 S/P AVR: ICD-10-CM

## 2024-03-19 DIAGNOSIS — Q87.40 MARFAN'S SYNDROME: ICD-10-CM

## 2024-03-19 DIAGNOSIS — Z12.31 ENCOUNTER FOR SCREENING MAMMOGRAM FOR MALIGNANT NEOPLASM OF BREAST: ICD-10-CM

## 2024-03-19 DIAGNOSIS — Z79.01 LONG TERM CURRENT USE OF ANTICOAGULANT THERAPY: ICD-10-CM

## 2024-03-19 DIAGNOSIS — N95.8 OTHER SPECIFIED MENOPAUSAL AND PERIMENOPAUSAL DISORDERS: ICD-10-CM

## 2024-03-19 DIAGNOSIS — D72.819 LEUKOPENIA, UNSPECIFIED TYPE: ICD-10-CM

## 2024-03-19 DIAGNOSIS — I71.03 DISSECTION OF THORACOABDOMINAL AORTA (H): ICD-10-CM

## 2024-03-19 DIAGNOSIS — M85.80 OSTEOPENIA, UNSPECIFIED LOCATION: ICD-10-CM

## 2024-03-19 DIAGNOSIS — G43.809 OTHER MIGRAINE WITHOUT STATUS MIGRAINOSUS, NOT INTRACTABLE: ICD-10-CM

## 2024-03-19 DIAGNOSIS — Z00.00 ENCOUNTER FOR MEDICARE ANNUAL WELLNESS EXAM: Primary | ICD-10-CM

## 2024-03-19 DIAGNOSIS — D32.9 MENINGIOMA (H): ICD-10-CM

## 2024-03-19 DIAGNOSIS — E78.5 HYPERLIPIDEMIA, UNSPECIFIED HYPERLIPIDEMIA TYPE: ICD-10-CM

## 2024-03-19 DIAGNOSIS — Z95.2 S/P AVR (AORTIC VALVE REPLACEMENT): Primary | ICD-10-CM

## 2024-03-19 DIAGNOSIS — I10 BENIGN ESSENTIAL HYPERTENSION: ICD-10-CM

## 2024-03-19 DIAGNOSIS — E03.9 HYPOTHYROIDISM, UNSPECIFIED TYPE: ICD-10-CM

## 2024-03-19 LAB
ALBUMIN SERPL BCG-MCNC: 4.7 G/DL (ref 3.5–5.2)
ALP SERPL-CCNC: 69 U/L (ref 40–150)
ALT SERPL W P-5'-P-CCNC: 25 U/L (ref 0–50)
ANION GAP SERPL CALCULATED.3IONS-SCNC: 11 MMOL/L (ref 7–15)
AST SERPL W P-5'-P-CCNC: 34 U/L (ref 0–45)
BILIRUB SERPL-MCNC: 0.4 MG/DL
BUN SERPL-MCNC: 19.6 MG/DL (ref 8–23)
CALCIUM SERPL-MCNC: 9.9 MG/DL (ref 8.8–10.2)
CHLORIDE SERPL-SCNC: 100 MMOL/L (ref 98–107)
CHOLEST SERPL-MCNC: 159 MG/DL
CREAT SERPL-MCNC: 0.91 MG/DL (ref 0.51–0.95)
DEPRECATED HCO3 PLAS-SCNC: 29 MMOL/L (ref 22–29)
EGFRCR SERPLBLD CKD-EPI 2021: 68 ML/MIN/1.73M2
ERYTHROCYTE [DISTWIDTH] IN BLOOD BY AUTOMATED COUNT: 13.5 % (ref 10–15)
FASTING STATUS PATIENT QL REPORTED: YES
GLUCOSE SERPL-MCNC: 92 MG/DL (ref 70–99)
HBA1C MFR BLD: 5.4 % (ref 0–5.6)
HCT VFR BLD AUTO: 38.7 % (ref 35–47)
HDLC SERPL-MCNC: 47 MG/DL
HGB BLD-MCNC: 12.9 G/DL (ref 11.7–15.7)
INR BLD: 1.7 (ref 0.9–1.1)
LDLC SERPL CALC-MCNC: 89 MG/DL
MCH RBC QN AUTO: 26.5 PG (ref 26.5–33)
MCHC RBC AUTO-ENTMCNC: 33.3 G/DL (ref 31.5–36.5)
MCV RBC AUTO: 80 FL (ref 78–100)
NONHDLC SERPL-MCNC: 112 MG/DL
PLATELET # BLD AUTO: 186 10E3/UL (ref 150–450)
POTASSIUM SERPL-SCNC: 4.1 MMOL/L (ref 3.4–5.3)
PROT SERPL-MCNC: 7.3 G/DL (ref 6.4–8.3)
RBC # BLD AUTO: 4.86 10E6/UL (ref 3.8–5.2)
SODIUM SERPL-SCNC: 140 MMOL/L (ref 135–145)
T4 FREE SERPL-MCNC: 1.62 NG/DL (ref 0.9–1.7)
TRIGL SERPL-MCNC: 113 MG/DL
TSH SERPL DL<=0.005 MIU/L-ACNC: 5.7 UIU/ML (ref 0.3–4.2)
WBC # BLD AUTO: 3.8 10E3/UL (ref 4–11)

## 2024-03-19 PROCEDURE — 84443 ASSAY THYROID STIM HORMONE: CPT | Performed by: PHYSICIAN ASSISTANT

## 2024-03-19 PROCEDURE — 99204 OFFICE O/P NEW MOD 45 MIN: CPT | Mod: 25 | Performed by: PHYSICIAN ASSISTANT

## 2024-03-19 PROCEDURE — G0439 PPPS, SUBSEQ VISIT: HCPCS | Performed by: PHYSICIAN ASSISTANT

## 2024-03-19 PROCEDURE — 80061 LIPID PANEL: CPT | Performed by: PHYSICIAN ASSISTANT

## 2024-03-19 PROCEDURE — 84439 ASSAY OF FREE THYROXINE: CPT | Performed by: PHYSICIAN ASSISTANT

## 2024-03-19 PROCEDURE — 80053 COMPREHEN METABOLIC PANEL: CPT | Performed by: PHYSICIAN ASSISTANT

## 2024-03-19 PROCEDURE — 85610 PROTHROMBIN TIME: CPT | Performed by: PHYSICIAN ASSISTANT

## 2024-03-19 PROCEDURE — 36415 COLL VENOUS BLD VENIPUNCTURE: CPT | Performed by: PHYSICIAN ASSISTANT

## 2024-03-19 PROCEDURE — 83036 HEMOGLOBIN GLYCOSYLATED A1C: CPT | Mod: GZ | Performed by: PHYSICIAN ASSISTANT

## 2024-03-19 PROCEDURE — 85027 COMPLETE CBC AUTOMATED: CPT | Performed by: PHYSICIAN ASSISTANT

## 2024-03-19 RX ORDER — CLONAZEPAM 0.5 MG/1
TABLET ORAL
Qty: 90 TABLET | Refills: 0 | Status: SHIPPED | OUTPATIENT
Start: 2024-03-19 | End: 2024-06-11

## 2024-03-19 RX ORDER — RESPIRATORY SYNCYTIAL VIRUS VACCINE 120MCG/0.5
0.5 KIT INTRAMUSCULAR ONCE
Qty: 1 EACH | Refills: 0 | Status: CANCELLED | OUTPATIENT
Start: 2024-03-19 | End: 2024-03-19

## 2024-03-19 SDOH — HEALTH STABILITY: PHYSICAL HEALTH: ON AVERAGE, HOW MANY DAYS PER WEEK DO YOU ENGAGE IN MODERATE TO STRENUOUS EXERCISE (LIKE A BRISK WALK)?: 7 DAYS

## 2024-03-19 SDOH — HEALTH STABILITY: PHYSICAL HEALTH: ON AVERAGE, HOW MANY MINUTES DO YOU ENGAGE IN EXERCISE AT THIS LEVEL?: 90 MIN

## 2024-03-19 ASSESSMENT — SOCIAL DETERMINANTS OF HEALTH (SDOH): HOW OFTEN DO YOU GET TOGETHER WITH FRIENDS OR RELATIVES?: ONCE A WEEK

## 2024-03-19 NOTE — PROGRESS NOTES
ANTICOAGULATION MANAGEMENT     Alyssa Higginbotham 68 year old female is on warfarin with subtherapeutic INR result. (Goal INR 2.0-2.5)    Recent labs: (last 7 days)     03/19/24  0908   INR 1.7*       ASSESSMENT     Source(s): Chart Review and Patient/Caregiver Call     Warfarin doses taken: Warfarin taken as instructed   Weekly warfarin dose was decreased on 3/12/24.  (Pt requested to decrease dose, as she prefers to be on lower side).  Diet: No new diet changes identified  Medication/supplement changes: None noted  New illness, injury, or hospitalization: No   Annual wellness check today -   Signs or symptoms of bleeding or clotting: No  Previous result: Supratherapeutic at 2.6 on 3/12/24.  Additional findings: None       PLAN     Recommended plan for no diet, medication or health factor changes affecting INR     Dosing Instructions: booster dose then continue your current warfarin dose with next INR in 1-2 weeks       Summary  As of 3/19/2024      Full warfarin instructions:  3/19: 5 mg; Otherwise 4 mg every Tue, Fri; 5 mg all other days   Next INR check:  4/2/2024               Telephone call with Jennifer who verbalizes understanding and agrees to plan    Lab visit scheduled - INR on 3/26/24 @ [facility].    Education provided:   Taking warfarin: Importance of taking warfarin as instructed  Goal range and lab monitoring: goal range and significance of current result    Plan made per [facility] anticoagulation protocol    Aylin Pablo RN  Anticoagulation Clinic  3/19/2024    _______________________________________________________________________     Anticoagulation Episode Summary       Current INR goal:  2.0-2.5   TTR:  46.7% (1 y)   Target end date:  Indefinite   Send INR reminders to:  [facility]    Indications    S/P AVR (aortic valve replacement) [Z95.2]  Long term current use of anticoagulant therapy [Z79.01]             Comments:  6/2/23 INR target goal changed to 2.0 - 2.5 d/t daily bleeding issues.   (Sensitivity)  Goal range changed 2/20/19 per cardiology             Anticoagulation Care Providers       Provider Role Specialty Phone number    Rafaela Woods MD Referring Family Medicine [phone]

## 2024-03-19 NOTE — PROGRESS NOTES
Preventive Care Visit  [facility]  Dawn Goff PA-C, Physician Assistant - Medical  Mar 19, 2024      Assessment & Plan     Encounter for Medicare annual wellness exam  Patient seen for wellness exam today  Patient declined COVID vaccine today, otherwise up to date  Routine breast cancer screening ordered today with jemma for history of difficulty visualization given dense breast tissue  Colon cancer screening last done 2023, due next 2026  Sees a dermatology routinely since sister has skin cancer history  The follow labs were ordered for routine health evaluation    - CBC with platelets  - Comprehensive metabolic panel  - Hemoglobin A1c  - Lipid panel reflex to direct LDL Fasting  - TSH with free T4 reflex  - CBC with platelets  - Comprehensive metabolic panel  - Hemoglobin A1c  - Lipid panel reflex to direct LDL Fasting  - TSH with free T4 reflex    Benign essential hypertension  Chronic issue. Elevated today in clinic x2  Currently taking metoprolol and triamterene-hydrochlorothiazide  Patient reports a degree of white coat syndrome  Will monitor at this time and consider adjustments in medication therapy if persists     - CBC with platelets  - Comprehensive metabolic panel  - CBC with platelets  - Comprehensive metabolic panel    Hypothyroidism, unspecified type  Thyroid ultrasound in 2019 was stable with stable levels over last 5 years. Bengin biopsy.  No further ultrasounds needed unless symptomatic   Will check TSH today to ensure levothyroxine is at a therapeutic dose    - TSH with free T4 reflex  - TSH with free T4 reflex    Hyperlipidemia, unspecified hyperlipidemia type  Currently stable on simvastatin 20mg.  Had intense muscle cramping being on simvastatin 40mg in the past   Will check cholesterol today to ensure this dose is therapeutic    - Lipid panel reflex to direct LDL Fasting  - Lipid panel reflex to direct LDL Fasting    Meningioma (H)-initially seen on  CT of head that was obtained after a fall. Frontal lobe, confirmed on an MRI Mar 2015, 3 mm  Other migraine without status migrainosus, not intractable  3 mm seen on MRI in 2015.  She did have a head CT which was normal in 2021.  She saw neuro in 2015 and no follow-up mentioned in their plan.   Continue to monitor and follow up if experiencing increased intensity or frequency of migraines as well as any new vision changes. No new symptoms today.   Migraines well managed and stable with current medication regimen     Dissection of thoracoabdominal aorta (H)  S/P AVR  Long term current use of anticoagulant therapy  Marfan Syndrome  Followed by cardiology. Last seen December 2023. ECHO showed stable valvular function of the mechanical prosthesis at that visit  Recommend follow up in 1 year     - INR point of care (finger stick)    Encounter for screening mammogram for malignant neoplasm of breast  Due for breast cancer screening in May  Has a difficult time doing self breast exams at home due to dense breast tissue  Given dense breast tissue, herb ordered for further visualization during exam. Encouraged to bring letter to visit.    - MA Screen Bilateral w/Herb    Osteopenia, unspecified location  Other specified menopausal and perimenopausal disorders  Chronic issue. Due for DEXA screening today  Order placed for imaging     - DEXA HIP/PELVIS/SPINE - Future      Patient has been advised of split billing requirements and indicates understanding: Yes    Counseling  Appropriate preventive services were discussed with this patient, including applicable screening as appropriate for fall prevention, nutrition, physical activity, Tobacco-use cessation, weight loss and cognition.  Checklist reviewing preventive services available has been given to the patient.  Reviewed patient's diet, addressing concerns and/or questions.   She is at risk for psychosocial distress and has been provided with information to reduce risk.   The  patient was provided with written information regarding signs of hearing loss.       Palmer Rodriguez is a 68 year old, presenting for the following:  Physical        3/19/2024     7:55 AM   Additional Questions   Roomed by YANCY Melendez CMA([facility])         Via the Health Maintenance questionnaire, the patient has reported the following services have been completed -DEXA, this information has been sent to the abstraction team.  Health Care Directive  Patient does not have a Health Care Directive or Living Will: Patient states has Advance Directive and will bring in a copy to clinic.    RIGOBERTO Higginbotham is a 68 year old female, new to this provider, who presents for a wellness exam.    Migraines:  Well managed with current medication regimen of Esgic and Maxalt. Migraines have  not increased in frequency or severity.     Hypothyroidism: Tolerating levothyroxine with no issues. Would like to check TSH today.    Cardiology: Last saw in December, plan to follow up in 1 year. Wants to check INR today    Hyperlipidemia: On simvastatin 20mg. Had previously been on 40mg but had severe leg cramps so is back down to 20mg which is going well.     No mood concerns today. Looking forward to summer weather as that positively impacts her mood    Oncology: Last saw in November, plans to f/u 1 year. Saw a  a long time ago. No found genetic link for ovarian cancer given sister's history. Was diagnosed with Marfan's when she met with genetics.    HTN: BP elevated today, will recheck at end of visit. Taking triamterene-hydrochlorothiazide and metroprolol at this time.     Was sent a letter she may need a different type of mammogram this year due to her dense breast tissue    Declines COVID vaccine today          3/19/2024   General Health   How would you rate your overall physical health? Good   Feel stress (tense, anxious, or unable to sleep) Only a little   (!) STRESS CONCERN      3/19/2024   Nutrition   Diet: Regular (no  restrictions)         3/19/2024   Exercise   Days per week of moderate/strenous exercise 7 days   Average minutes spent exercising at this level 90 min         3/19/2024   Social Factors   Frequency of gathering with friends or relatives Once a week   Worry food won't last until get money to buy more No   Food not last or not have enough money for food? No   Do you have housing?  Yes   Are you worried about losing your housing? No   Lack of transportation? No   Unable to get utilities (heat,electricity)? No         3/19/2024   Activities of Daily Living- Home Safety   Needs help with the following daily activites None of the above   Safety concerns in the home None of the above         3/19/2024   Dental   Dentist two times every year? Yes         3/19/2024   Hearing Screening   Hearing concerns? (!) IT'S HARD TO FOLLOW A CONVERSATION IN A NOISY RESTAURANT OR CROWDED ROOM.    Had hearing checked 2 years ago. Tinnitus in the last 2 years. Stayed the same. Had visit with audiology 2 years ago. Mom had this as well.          3/19/2024   Driving Risk Screening   Patient/family members have concerns about driving No         3/19/2024   General Alertness/Fatigue Screening   Have you been more tired than usual lately? No         3/19/2024   Urinary Incontinence Screening   Bothered by leaking urine in past 6 months No         3/19/2024   TB Screening   Were you born outside of the [place]? No         Today's PHQ-2 Score:       3/19/2024     8:00 AM   PHQ-2 ( 1999 Pf[employer])   Q1: Little interest or pleasure in doing things 1   Q2: Feeling down, depressed or hopeless 0   PHQ-2 Score 1           3/19/2024   Substance Use   Alcohol more than 3/day or more than 7/wk No   Do you have a current opioid prescription? No   How severe/bad is pain from 1 to 10? 0/10 (No Pain)   Do you use any other substances recreationally? No     Social History     Tobacco Use    Smoking status: Never     Passive exposure: Past    Smokeless tobacco: Never    Vaping Use    Vaping Use: Never used   Substance Use Topics    Alcohol use: No    Drug use: No           2023   LAST FHS-7 RESULTS   1st degree relative breast or ovarian cancer No   Any relative bilateral breast cancer No   Any male have breast cancer No   Any ONE woman have BOTH breast AND ovarian cancer No   Any woman with breast cancer before 50yrs No   2 or more relatives with breast AND/OR ovarian cancer No   2 or more relatives with breast AND/OR bowel cancer No        Mammogram Screening - Mammogram every 1-2 years updated in Health Maintenance based on mutual decision making      ASCVD Risk   The 10-year ASCVD risk score (Alber NEWBERRY, et al., 2019) is: 17.6%    Values used to calculate the score:      Age: 68 years      Sex: Female      Is Non- : No      Diabetic: No      Tobacco smoker: No      Systolic Blood Pressure: 175 mmHg      Is BP treated: Yes      HDL Cholesterol: 54 mg/dL      Total Cholesterol: 176 mg/dL    Fracture Risk Assessment Tool  Link to Frax Calculator  Use the information below to complete the Frax calculator  : 1955  Sex: female  Weight (kg): 51.1 kg (actual weight)  Height (cm): 156.8 cm  Previous Fragility Fracture:  No  History of parent with fractured hip:  No  Current Smoking:  No  Patient has been on glucocorticoids for more than 3 months (5mg/day or more): No  Rheumatoid Arthritis on Problem List:  No  Secondary Osteoporosis on Problem List:  No  Consumes 3 or more units of alcohol per day: No  Femoral Neck BMD (g/cm2)            Reviewed and updated as needed this visit by Provider   Tobacco  Allergies  Meds  Problems  Med Hx  Surg Hx  Fam Hx              Current providers sharing in care for this patient include:  Patient Care Team:  Rafaela Woods MD as PCP - General (Family Medicine)  Rafaela Woods MD as Assigned PCP  Chin Riojas DPM as Assigned Surgical Provider  Katrin Cabrera MD as MD (Medical  Oncology)  Katrin Cabrera MD as Assigned Cancer Care Provider  Florecita Mathis RN as Specialty Care Coordinator (Hematology & Oncology)  Jonathon Richardson MD as Assigned Heart and Vascular Provider    The following health maintenance items are reviewed in Epic and correct as of today:  Health Maintenance   Topic Date Due    RSV VACCINE (Pregnancy & 60+) (1 - 1-dose 60+ series) Never done    COVID-19 Vaccine (4 - 2023-24 season) 09/01/2023    ANNUAL REVIEW OF HM ORDERS  11/28/2023    LIPID  03/01/2024    TSH W/FREE T4 REFLEX  03/01/2024    DEXA  04/14/2024    MAMMO SCREENING  05/19/2024    MEDICARE ANNUAL WELLNESS VISIT  03/19/2025    FALL RISK ASSESSMENT  03/19/2025    GLUCOSE  01/30/2026    COLORECTAL CANCER SCREENING  03/03/2026    ADVANCE CARE PLANNING  03/01/2028    DTAP/TDAP/TD IMMUNIZATION (3 - Td or Tdap) 05/08/2029    PHQ-2 (once per calendar year)  Completed    INFLUENZA VACCINE  Completed    Pneumococcal Vaccine: 65+ Years  Completed    ZOSTER IMMUNIZATION  Completed    IPV IMMUNIZATION  Aged Out    HPV IMMUNIZATION  Aged Out    MENINGITIS IMMUNIZATION  Aged Out    RSV MONOCLONAL ANTIBODY  Aged Out    HEPATITIS C SCREENING  Discontinued         Review of Systems  CONSTITUTIONAL: NEGATIVE for fever, chills, change in weight  INTEGUMENTARY/SKIN: NEGATIVE for worrisome rashes, moles or lesions  EYES: NEGATIVE for vision changes or irritation  ENT/MOUTH: NEGATIVE for ear, mouth and throat problems  RESP: NEGATIVE for significant cough or SOB  BREAST: NEGATIVE for masses, tenderness or discharge  CV: NEGATIVE for chest pain, or peripheral edema POSITIVE for palpitations (with caffeine intake)   GI: NEGATIVE for nausea, abdominal pain, heartburn, or change in bowel habits  : NEGATIVE for frequency, dysuria, or hematuria  MUSCULOSKELETAL: NEGATIVE for significant arthralgias or myalgia  NEURO: NEGATIVE for weakness, dizziness or paresthesias  ENDOCRINE: NEGATIVE for temperature intolerance, skin/hair  "changes  HEME: NEGATIVE for bleeding problems  PSYCHIATRIC: NEGATIVE for changes in mood or affect     Objective    Exam  BP (!) 175/96   Pulse 61   Temp 97.5  F (36.4  C) (Oral)   Resp 16   Ht 1.568 m (5' 1.75\")   Wt 51.1 kg (112 lb 9.6 oz)   SpO2 99%   BMI 20.76 kg/m     Estimated body mass index is 20.76 kg/m  as calculated from the following:    Height as of this encounter: 1.568 m (5' 1.75\").    Weight as of this encounter: 51.1 kg (112 lb 9.6 oz).    Physical Exam  GENERAL: alert and no distress  EYES: Eyes grossly normal to inspection, PERRL and conjunctivae and sclerae normal  HENT: ear canals and TM's normal, nose and mouth without ulcers or lesions  NECK: no adenopathy, no asymmetry, masses, or scars  RESP: lungs clear to auscultation - no rales, rhonchi or wheezes  CV: regular rate and rhythm, normal S1 pronounced S2, no S3 or S4, no murmur, click or rub, no peripheral edema  ABDOMEN: soft, nontender, no hepatosplenomegaly, no masses and bowel sounds normal  MS: no gross musculoskeletal defects noted, no edema  SKIN: no suspicious lesions or rashes  NEURO: Normal strength and tone, mentation intact and speech normal  PSYCH: mentation appears normal, affect normal/bright        3/19/2024   Mini Cog   Clock Draw Score 2 Normal   3 Item Recall 3 objects recalled   Mini Cog Total Score 5              Signed Electronically by: Dawn Goff PA-C    "

## 2024-03-19 NOTE — PATIENT INSTRUCTIONS
Preventive Care Advice   This is general advice given by our system to help you stay healthy. However, your care team may have specific advice just for you. Please talk to your care team about your preventive care needs.  Nutrition  Eat 5 or more servings of fruits and vegetables each day.  Try wheat bread, brown rice and whole grain pasta (instead of white bread, rice, and pasta).  Get enough calcium and vitamin D. Check the label on foods and aim for 100% of the RDA (recommended daily allowance).  Lifestyle  Exercise at least 150 minutes each week   (30 minutes a day, 5 days a week).  Do muscle strengthening activities 2 days a week. These help control your weight and prevent disease.  No smoking.  Wear sunscreen to prevent skin cancer.  Have a dental exam and cleaning every 6 months.  Yearly exams  See your health care team every year to talk about:  Any changes in your health.  Any medicines your care team has prescribed.  Preventive care, family planning, and ways to prevent chronic diseases.  Shots (vaccines)   HPV shots (up to age 26), if you've never had them before.  Hepatitis B shots (up to age 59), if you've never had them before.  COVID-19 shot: Get this shot when it's due.  Flu shot: Get a flu shot every year.  Tetanus shot: Get a tetanus shot every 10 years.  Pneumococcal, hepatitis A, and RSV shots: Ask your care team if you need these based on your risk.  Shingles shot (for age 50 and up).  General health tests  Diabetes screening:  Starting at age 35, Get screened for diabetes at least every 3 years.  If you are younger than age 35, ask your care team if you should be screened for diabetes.  Cholesterol test: At age 39, start having a cholesterol test every 5 years, or more often if advised.  Bone density scan (DEXA): At age 50, ask your care team if you should have this scan for osteoporosis (brittle bones).  Hepatitis C: Get tested at least once in your life.  STIs (sexually transmitted  infections)  Before age 24: Ask your care team if you should be screened for STIs.  After age 24: Get screened for STIs if you're at risk. You are at risk for STIs (including HIV) if:  You are sexually active with more than one person.  You don't use condoms every time.  You or a partner was diagnosed with a sexually transmitted infection.  If you are at risk for HIV, ask about PrEP medicine to prevent HIV.  Get tested for HIV at least once in your life, whether you are at risk for HIV or not.  Cancer screening tests  Cervical cancer screening: If you have a cervix, begin getting regular cervical cancer screening tests at age 21. Most people who have regular screenings with normal results can stop after age 65. Talk about this with your provider.  Breast cancer scan (mammogram): If you've ever had breasts, begin having regular mammograms starting at age 40. This is a scan to check for breast cancer.  Colon cancer screening: It is important to start screening for colon cancer at age 45.  Have a colonoscopy test every 10 years (or more often if you're at risk) Or, ask your provider about stool tests like a FIT test every year or Cologuard test every 3 years.  To learn more about your testing options, visit: https://www.[employer]ov[employer]/082069.pdf.  For help making a decision, visit: https://bit.ly/gg05873.  Prostate cancer screening test: If you have a prostate and are age 55 to 69, ask your provider if you would benefit from a yearly prostate cancer screening test.  Lung cancer screening: If you are a current or former smoker age 50 to 80, ask your care team if ongoing lung cancer screenings are right for you.  For informational purposes only. Not to replace the advice of your health care provider. Copyright   2023 [employer]. All rights reserved. Clinically reviewed by the [facility]itions [facility]. QualtrÃ© [id number] - REV 01/24.    Learning About Stress  What is stress?     Stress is your  body's response to a hard situation. Your body can have a physical, emotional, or mental response. Stress is a fact of life for most people, and it affects everyone differently. What causes stress for you may not be stressful for someone else.  A lot of things can cause stress. You may feel stress when you go on a job interview, take a test, or run a race. This kind of short-term stress is normal and even useful. It can help you if you need to work hard or react quickly. For example, stress can help you finish an important job on time.  Long-term stress is caused by ongoing stressful situations or events. Examples of long-term stress include long-term health problems, ongoing problems at work, or conflicts in your family. Long-term stress can harm your health.  How does stress affect your health?  When you are stressed, your body responds as though you are in danger. It makes hormones that speed up your heart, make you breathe faster, and give you a burst of energy. This is called the fight-or-flight stress response. If the stress is over quickly, your body goes back to normal and no harm is done.  But if stress happens too often or lasts too long, it can have bad effects. Long-term stress can make you more likely to get sick, and it can make symptoms of some diseases worse. If you tense up when you are stressed, you may develop neck, shoulder, or low back pain. Stress is linked to high blood pressure and heart disease.  Stress also harms your emotional health. It can make you jones, tense, or depressed. Your relationships may suffer, and you may not do well at work or school.  What can you do to manage stress?  You can try these things to help manage stress:   Do something active. Exercise or activity can help reduce stress. Walking is a great way to get started. Even everyday activities such as housecleaning or yard work can help.  Try yoga or linda chi. These techniques combine exercise and meditation. You may need  some training at first to learn them.  Do something you enjoy. For example, listen to music or go to a movie. Practice your hobby or do volunteer work.  Meditate. This can help you relax, because you are not worrying about what happened before or what may happen in the future.  Do guided imagery. Imagine yourself in any setting that helps you feel calm. You can use online videos, books, or a teacher to guide you.  Do breathing exercises. For example:  From a standing position, bend forward from the waist with your knees slightly bent. Let your arms dangle close to the floor.  Breathe in slowly and deeply as you return to a standing position. Roll up slowly and lift your head last.  Hold your breath for just a few seconds in the standing position.  Breathe out slowly and bend forward from the waist.  Let your feelings out. Talk, laugh, cry, and express anger when you need to. Talking with supportive friends or family, a counselor, or a aly leader about your feelings is a healthy way to relieve stress. Avoid discussing your feelings with people who make you feel worse.  Write. It may help to write about things that are bothering you. This helps you find out how much stress you feel and what is causing it. When you know this, you can find better ways to cope.  What can you do to prevent stress?  You might try some of these things to help prevent stress:  Manage your time. This helps you find time to do the things you want and need to do.  Get enough sleep. Your body recovers from the stresses of the day while you are sleeping.  Get support. Your family, friends, and community can make a difference in how you experience stress.  Limit your news feed. Avoid or limit time on social media or news that may make you feel stressed.  Do something active. Exercise or activity can help reduce stress. Walking is a great way to get started.  Where can you learn more?  Go to https://www.healthwise.net/patiented  Enter N032 in the  "search box to learn more about \"Learning About Stress.\"  Current as of: October 24, 2023               Content Version: 14.0    6667-9757 [employer].   Care instructions adapted under license by your healthcare professional. If you have questions about a medical condition or this instruction, always ask your healthcare professional. [employer] disclaims any warranty or liability for your use of this information.      "

## 2024-03-24 ENCOUNTER — MYC REFILL (OUTPATIENT)
Dept: FAMILY MEDICINE | Facility: CLINIC | Age: 69
End: 2024-03-24
Payer: COMMERCIAL

## 2024-03-24 DIAGNOSIS — E04.1 THYROID NODULE: ICD-10-CM

## 2024-03-25 RX ORDER — LEVOTHYROXINE SODIUM 88 UG/1
TABLET ORAL
Qty: 39 TABLET | Refills: 0 | OUTPATIENT
Start: 2024-03-25

## 2024-03-26 ENCOUNTER — LAB (OUTPATIENT)
Dept: LAB | Facility: CLINIC | Age: 69
End: 2024-03-26
Payer: COMMERCIAL

## 2024-03-26 ENCOUNTER — ANTICOAGULATION THERAPY VISIT (OUTPATIENT)
Dept: ANTICOAGULATION | Facility: CLINIC | Age: 69
End: 2024-03-26

## 2024-03-26 DIAGNOSIS — E04.1 THYROID NODULE: ICD-10-CM

## 2024-03-26 DIAGNOSIS — Z79.01 LONG TERM CURRENT USE OF ANTICOAGULANT THERAPY: ICD-10-CM

## 2024-03-26 DIAGNOSIS — Z95.2 S/P AVR (AORTIC VALVE REPLACEMENT): ICD-10-CM

## 2024-03-26 DIAGNOSIS — I10 ESSENTIAL HYPERTENSION, MALIGNANT: ICD-10-CM

## 2024-03-26 DIAGNOSIS — Z95.2 S/P AVR (AORTIC VALVE REPLACEMENT): Primary | ICD-10-CM

## 2024-03-26 LAB — INR BLD: 2.1 (ref 0.9–1.1)

## 2024-03-26 PROCEDURE — 85610 PROTHROMBIN TIME: CPT

## 2024-03-26 PROCEDURE — 36415 COLL VENOUS BLD VENIPUNCTURE: CPT

## 2024-03-26 RX ORDER — LEVOTHYROXINE SODIUM 88 UG/1
TABLET ORAL
Qty: 39 TABLET | Refills: 3 | Status: SHIPPED | OUTPATIENT
Start: 2024-03-26 | End: 2024-04-08

## 2024-03-26 RX ORDER — LEVOTHYROXINE SODIUM 75 UG/1
TABLET ORAL
Qty: 52 TABLET | Refills: 3 | Status: SHIPPED | OUTPATIENT
Start: 2024-03-26 | End: 2024-04-08

## 2024-03-26 NOTE — PROGRESS NOTES
ANTICOAGULATION MANAGEMENT     Alyssa Higginbotham 69 year old female is on warfarin with therapeutic INR result. (Goal INR 2.0-2.5)    Recent labs: (last 7 days)     03/26/24  0702   INR 2.1*       ASSESSMENT     Source(s): Chart Review and Patient/Caregiver Call     Warfarin doses taken: Booster dose on 3/19/24, recently taken which may be affecting INR  Diet: No new diet changes identified  Medication/supplement changes:  Yes.   Reported her TSH level was high at 5.70 (range 0.30 - 4.20) and Levothyroxine (Synthroid) was increased to 88mcg daily.  However, she felt this dose was to high.  Currently taking Levothyroxine 75mcg on Wed/Sat and 88mg all other days. Will recheck TSH level in 6 wks.    Per [employer], Concurrent use of LEVOTHYROXINE and ORAL ANTICOAGULANTS may result in increased response to oral anticoagulant therapy.    New illness, injury, or hospitalization: Yes:    Hypothyroidism  Signs or symptoms of bleeding or clotting: No  Previous result: Subtherapeutic at 1.7 on 3/19/24.  Additional findings: sensitive to warfarin dose changes.       PLAN     Recommended plan for ongoing change(s) affecting INR     Dosing Instructions: Continue your current warfarin dose with next INR in 1 week       Summary  As of 3/26/2024      Full warfarin instructions:  4 mg every Tue, Fri; 5 mg all other days   Next INR check:  4/2/2024               Telephone call with Jennifer who verbalizes understanding and agrees to plan.   - monitor INR closely d/t known interaction with warfarin.    Lab visit scheduled - INR on 4/2/24 @ [facility].   - [facility] did not have a 7a opening.   - OK to go to [facility] if no opening @ [facility].    Education provided:   Taking warfarin: Importance of taking warfarin as instructed  Goal range and lab monitoring: goal range and significance of current result  Interaction IS anticipated between warfarin and Levothyroxine    Plan made per [facility] anticoagulation protocol    Aylin Pablo,  RN  Anticoagulation Clinic  3/26/2024    _______________________________________________________________________     Anticoagulation Episode Summary       Current INR goal:  2.0-2.5   TTR:  45.7% (1 y)   Target end date:  Indefinite   Send INR reminders to:  [facility]    Indications    S/P AVR (aortic valve replacement) [Z95.2]  Long term current use of anticoagulant therapy [Z79.01]             Comments:  6/2/23 INR target goal changed to 2.0 - 2.5 d/t daily bleeding issues.  (Sensitivity)  Goal range changed 2/20/19 per cardiology             Anticoagulation Care Providers       Provider Role Specialty Phone number    Rafaela Woods MD Referring Family Medicine [phone]

## 2024-03-26 NOTE — TELEPHONE ENCOUNTER
Jennifer Villanueva is requesting a refill on her Levothyroxine 88mcg  (she has 13 tablets left)     - take Levothyroxine 75 mcg on Wednesday / Saturday.                      (She felt taking 88mcg daily was too high of an increase in dose)     - need to send RX to [facility] (#[id number]) in [place], [place]     - Please refill - Levothyroxine 88mg takes one tablet 5 days of the week on Sunday, Monday, Tuesday, Thursday, and Friday.  (Give her enough to last at least 6 wks until her next TSH level check.

## 2024-03-27 ENCOUNTER — MYC MEDICAL ADVICE (OUTPATIENT)
Dept: FAMILY MEDICINE | Facility: CLINIC | Age: 69
End: 2024-03-27
Payer: COMMERCIAL

## 2024-03-27 DIAGNOSIS — E04.1 THYROID NODULE: Primary | ICD-10-CM

## 2024-03-28 RX ORDER — LEVOTHYROXINE SODIUM 88 UG/1
88 TABLET ORAL DAILY
Qty: 24 TABLET | Refills: 0 | Status: SHIPPED | OUTPATIENT
Start: 2024-03-28 | End: 2024-04-05

## 2024-04-01 ENCOUNTER — ANTICOAGULATION THERAPY VISIT (OUTPATIENT)
Dept: ANTICOAGULATION | Facility: CLINIC | Age: 69
End: 2024-04-01

## 2024-04-01 ENCOUNTER — LAB (OUTPATIENT)
Dept: CARDIOLOGY | Facility: CLINIC | Age: 69
End: 2024-04-01
Payer: COMMERCIAL

## 2024-04-01 DIAGNOSIS — Z79.01 LONG TERM CURRENT USE OF ANTICOAGULANT THERAPY: ICD-10-CM

## 2024-04-01 DIAGNOSIS — Z95.2 S/P AVR (AORTIC VALVE REPLACEMENT): ICD-10-CM

## 2024-04-01 DIAGNOSIS — Z95.2 S/P AVR (AORTIC VALVE REPLACEMENT): Primary | ICD-10-CM

## 2024-04-01 LAB — INR POINT OF CARE: 2 (ref 0.9–1.1)

## 2024-04-01 PROCEDURE — 36416 COLLJ CAPILLARY BLOOD SPEC: CPT

## 2024-04-01 PROCEDURE — 85610 PROTHROMBIN TIME: CPT

## 2024-04-01 NOTE — PROGRESS NOTES
ANTICOAGULATION MANAGEMENT     Alyssa Higginbotham 69 year old female is on warfarin with therapeutic INR result. (Goal INR 2.0-2.5)    Recent labs: (last 7 days)     04/01/24  0813   INR 2.0*       ASSESSMENT     Source(s): Chart Review and Patient/Caregiver Call     Warfarin doses taken: Warfarin taken as instructed  Diet: No new diet changes identified  Medication/supplement changes:  increased thyroid med  New illness, injury, or hospitalization: No  Signs or symptoms of bleeding or clotting: No  Previous result: Therapeutic last visit; previously outside of goal range  Additional findings: None       PLAN     Recommended plan for no diet, medication or health factor changes affecting INR     Dosing Instructions: Continue your current warfarin dose with next INR in 2 weeks       Summary  As of 4/1/2024      Full warfarin instructions:  4 mg every Tue, Fri; 5 mg all other days   Next INR check:  4/17/2024               Telephone call with Jennifer who verbalizes understanding and agrees to plan    Lab visit scheduled    Education provided:   Please call back if any changes to your diet, medications or how you've been taking warfarin    Plan made per [facility] anticoagulation protocol    Eneida Suarez RN  Anticoagulation Clinic  4/1/2024    _______________________________________________________________________     Anticoagulation Episode Summary       Current INR goal:  2.0-2.5   TTR:  47.4% (1 y)   Target end date:  Indefinite   Send INR reminders to:  [facility]    Indications    S/P AVR (aortic valve replacement) [Z95.2]  Long term current use of anticoagulant therapy [Z79.01]             Comments:  6/2/23 INR target goal changed to 2.0 - 2.5 d/t daily bleeding issues.  (Sensitivity)  Goal range changed 2/20/19 per cardiology             Anticoagulation Care Providers       Provider Role Specialty Phone number    Rafaela Woods MD Referring Family Medicine [phone]

## 2024-04-03 ENCOUNTER — NURSE TRIAGE (OUTPATIENT)
Dept: NURSING | Facility: CLINIC | Age: 69
End: 2024-04-03
Payer: COMMERCIAL

## 2024-04-03 DIAGNOSIS — E04.1 THYROID NODULE: ICD-10-CM

## 2024-04-03 NOTE — TELEPHONE ENCOUNTER
Nurse Triage SBAR    Is this a 2nd Level Triage? No    Situation: Patient       Background: Patient saw FRANCO Durán on 3/27/24. Patient was advised to change days and doses of thyroid med.Patient takes 75 and 88 mcg levothyroxine. Patient recently refilled the Rx's with the Rx as written before the advice to change dosing. Patient states she will be short 24 tablets of the 88 mcg levothyroxine with the current 90 day fill.  Rx was sent on 3/28/24 for 24 tablets but the Rx says to take 88 mcg every single day so insurance will not fill.     Patient needs updated prescriptions sent to [facility] to reflect doses prescribed at 3/27/24 visit so that she will not run out. Patient will be short of the 88 mcg (24 tablets) and will have enough of the 75mcg. Patient is requesting a fill of 88 mcg, 24 tablets, along with new Rx's for new dosing.     Assessment: No triage     Recommendation: Per disposition, Information. Reviewed Care Advice with patient and verbalizes understanding. Declines additional questions. Advised patient to call back with any new or worsening symptoms. Patient verbalized understanding and agrees with plan.     Protocol Recommended Disposition: Telephone advice    Kanwal Marion RN on 4/3/2024 at 5:16 PM  [facility] Nurse Advisors  Reason for Disposition   Caller has medicine question only, adult not sick, and triager answers question    Protocols used: Medication Question Call-A-[place]

## 2024-04-06 NOTE — TELEPHONE ENCOUNTER
Pt called back and states that she takes 88 mcg M, T, Th, F, Sun and 75 mcg W, Sat. Pt requesting this to [facility] mail order pharm and states that she has enough med to get through to when this will be mailed out.    Kerry Monsivais, RN, BSN  [facility] Nurse Advisor 3:50 PM 4/6/2024

## 2024-04-08 RX ORDER — LEVOTHYROXINE SODIUM 75 UG/1
TABLET ORAL
Qty: 52 TABLET | Refills: 3 | Status: SHIPPED | OUTPATIENT
Start: 2024-04-08

## 2024-04-08 RX ORDER — LEVOTHYROXINE SODIUM 88 UG/1
TABLET ORAL
Qty: 90 TABLET | Refills: 0 | Status: SHIPPED | OUTPATIENT
Start: 2024-04-08 | End: 2024-08-22

## 2024-04-08 NOTE — TELEPHONE ENCOUNTER
Confirmed patient taking     88mcg 5 days a week, taking 75mcg Wed and Saturday.    Rx sent in.    Dawn Goff PA-C

## 2024-04-17 ENCOUNTER — ANTICOAGULATION THERAPY VISIT (OUTPATIENT)
Dept: ANTICOAGULATION | Facility: CLINIC | Age: 69
End: 2024-04-17

## 2024-04-17 ENCOUNTER — LAB (OUTPATIENT)
Dept: LAB | Facility: CLINIC | Age: 69
End: 2024-04-17
Payer: COMMERCIAL

## 2024-04-17 DIAGNOSIS — Z79.01 LONG TERM CURRENT USE OF ANTICOAGULANT THERAPY: ICD-10-CM

## 2024-04-17 DIAGNOSIS — Z95.2 S/P AVR (AORTIC VALVE REPLACEMENT): ICD-10-CM

## 2024-04-17 DIAGNOSIS — E03.9 HYPOTHYROIDISM, UNSPECIFIED TYPE: ICD-10-CM

## 2024-04-17 DIAGNOSIS — D72.819 LEUKOPENIA, UNSPECIFIED TYPE: ICD-10-CM

## 2024-04-17 DIAGNOSIS — Z95.2 S/P AVR (AORTIC VALVE REPLACEMENT): Primary | ICD-10-CM

## 2024-04-17 LAB
ERYTHROCYTE [DISTWIDTH] IN BLOOD BY AUTOMATED COUNT: 13.2 % (ref 10–15)
HCT VFR BLD AUTO: 38 % (ref 35–47)
HGB BLD-MCNC: 12.5 G/DL (ref 11.7–15.7)
INR BLD: 2.2 (ref 0.9–1.1)
MCH RBC QN AUTO: 26.7 PG (ref 26.5–33)
MCHC RBC AUTO-ENTMCNC: 32.9 G/DL (ref 31.5–36.5)
MCV RBC AUTO: 81 FL (ref 78–100)
PLATELET # BLD AUTO: 189 10E3/UL (ref 150–450)
RBC # BLD AUTO: 4.68 10E6/UL (ref 3.8–5.2)
WBC # BLD AUTO: 4 10E3/UL (ref 4–11)

## 2024-04-17 PROCEDURE — 36415 COLL VENOUS BLD VENIPUNCTURE: CPT

## 2024-04-17 PROCEDURE — 85027 COMPLETE CBC AUTOMATED: CPT

## 2024-04-17 PROCEDURE — 84439 ASSAY OF FREE THYROXINE: CPT

## 2024-04-17 PROCEDURE — 85610 PROTHROMBIN TIME: CPT

## 2024-04-17 PROCEDURE — 36416 COLLJ CAPILLARY BLOOD SPEC: CPT

## 2024-04-17 PROCEDURE — 84443 ASSAY THYROID STIM HORMONE: CPT

## 2024-04-17 NOTE — PROGRESS NOTES
ANTICOAGULATION MANAGEMENT     Alyssa Higginbotham 69 year old female is on warfarin with therapeutic INR result. (Goal INR 2.0-2.5)    Recent labs: (last 7 days)     04/17/24  0713   INR 2.2*       ASSESSMENT     Source(s): Chart Review and Patient/Caregiver Call     Warfarin doses taken: Warfarin taken as instructed  Diet: No new diet changes identified  Medication/supplement changes: None noted  New illness, injury, or hospitalization: No  Signs or symptoms of bleeding or clotting: No  Previous result: Therapeutic last 2 INR visits  Additional findings: None       PLAN     Recommended plan for no diet, medication or health factor changes affecting INR     Dosing Instructions: Continue your current warfarin dose with next INR in 1-2 weeks       Summary  As of 4/17/2024      Full warfarin instructions:  4 mg every Tue, Fri; 5 mg all other days   Next INR check:  5/1/2024               Telephone call with Jennifer who verbalizes understanding and agrees to plan    Lab visit scheduled - INR on 4/29/24 @ [facility]    Education provided:   Taking warfarin: Importance of taking warfarin as instructed  Goal range and lab monitoring: goal range and significance of current result    Plan made per [facility] anticoagulation protocol    Aylin Pablo, RN  Anticoagulation Clinic  4/17/2024    _______________________________________________________________________     Anticoagulation Episode Summary       Current INR goal:  2.0-2.5   TTR:  49.5% (1 y)   Target end date:  Indefinite   Send INR reminders to:  [facility]    Indications    S/P AVR (aortic valve replacement) [Z95.2]  Long term current use of anticoagulant therapy [Z79.01]             Comments:  6/2/23 INR target goal changed to 2.0 - 2.5 d/t daily bleeding issues.  (Sensitivity)  Goal range changed 2/20/19 per cardiology             Anticoagulation Care Providers       Provider Role Specialty Phone number    Rafaela Woods MD Referring Family  Medicine [phone]

## 2024-04-18 LAB
T4 FREE SERPL-MCNC: 1.45 NG/DL (ref 0.9–1.7)
TSH SERPL DL<=0.005 MIU/L-ACNC: 4.7 UIU/ML (ref 0.3–4.2)

## 2024-04-19 ENCOUNTER — PATIENT OUTREACH (OUTPATIENT)
Dept: CARE COORDINATION | Facility: CLINIC | Age: 69
End: 2024-04-19
Payer: COMMERCIAL

## 2024-04-19 DIAGNOSIS — E03.9 HYPOTHYROIDISM, UNSPECIFIED TYPE: Primary | ICD-10-CM

## 2024-04-20 ENCOUNTER — MYC REFILL (OUTPATIENT)
Dept: CARDIOLOGY | Facility: CLINIC | Age: 69
End: 2024-04-20
Payer: COMMERCIAL

## 2024-04-20 DIAGNOSIS — E78.5 HYPERLIPIDEMIA LDL GOAL <130: ICD-10-CM

## 2024-04-22 RX ORDER — SIMVASTATIN 20 MG
20 TABLET ORAL AT BEDTIME
Qty: 90 TABLET | Refills: 1 | Status: SHIPPED | OUTPATIENT
Start: 2024-04-22 | End: 2024-10-01

## 2024-04-23 ENCOUNTER — HOSPITAL ENCOUNTER (OUTPATIENT)
Dept: BONE DENSITY | Facility: HOSPITAL | Age: 69
Discharge: HOME | End: 2024-04-23
Attending: PHYSICIAN ASSISTANT | Admitting: PHYSICIAN ASSISTANT
Payer: COMMERCIAL

## 2024-04-23 DIAGNOSIS — M85.80 OSTEOPENIA, UNSPECIFIED LOCATION: ICD-10-CM

## 2024-04-23 DIAGNOSIS — N95.8 OTHER SPECIFIED MENOPAUSAL AND PERIMENOPAUSAL DISORDERS: ICD-10-CM

## 2024-04-23 PROBLEM — M85.89 OSTEOPENIA OF MULTIPLE SITES: Status: ACTIVE | Noted: 2024-04-23

## 2024-04-23 PROCEDURE — 77089 TBS DXA CAL W/I&R FX RISK: CPT

## 2024-04-29 ENCOUNTER — ANTICOAGULATION THERAPY VISIT (OUTPATIENT)
Dept: ANTICOAGULATION | Facility: CLINIC | Age: 69
End: 2024-04-29

## 2024-04-29 ENCOUNTER — LAB (OUTPATIENT)
Dept: CARDIOLOGY | Facility: CLINIC | Age: 69
End: 2024-04-29
Payer: COMMERCIAL

## 2024-04-29 DIAGNOSIS — Z95.2 S/P AVR (AORTIC VALVE REPLACEMENT): Primary | ICD-10-CM

## 2024-04-29 DIAGNOSIS — Z79.01 LONG TERM CURRENT USE OF ANTICOAGULANT THERAPY: ICD-10-CM

## 2024-04-29 DIAGNOSIS — Z95.2 S/P AVR (AORTIC VALVE REPLACEMENT): ICD-10-CM

## 2024-04-29 LAB — INR POINT OF CARE: 2.2 (ref 0.9–1.1)

## 2024-04-29 PROCEDURE — 85610 PROTHROMBIN TIME: CPT

## 2024-04-29 PROCEDURE — 36416 COLLJ CAPILLARY BLOOD SPEC: CPT

## 2024-04-29 NOTE — PROGRESS NOTES
ANTICOAGULATION MANAGEMENT     Alyssa Higginbotham 69 year old female is on warfarin with therapeutic INR result. (Goal INR 2.0-2.5)    Recent labs: (last 7 days)     04/29/24  0742   INR 2.2*       ASSESSMENT     Source(s): Chart Review  Previous INR was Therapeutic last 2(+) visits  Medication, diet, health changes since last INR chart reviewed; none identified         PLAN     Recommended plan for no diet, medication or health factor changes affecting INR     Dosing Instructions: Continue your current warfarin dose with next INR in 3 weeks       Summary  As of 4/29/2024      Full warfarin instructions:  4 mg every Tue, Fri; 5 mg all other days   Next INR check:  5/20/2024               Detailed voice message left for Jennifer with dosing instructions and follow up date.   Sent [employer] message with dosing and follow up instructions    Contact [phone] to schedule and with any changes, questions or concerns.     Education provided:   Please call back if any changes to your diet, medications or how you've been taking warfarin  Goal range and lab monitoring: goal range and significance of current result  Importance of notifying anticoagulation clinic for: changes in medications; a sooner lab recheck maybe needed  Written instructions provided  Contact [phone] with any changes, questions or concerns.     Plan made per [facility] anticoagulation protocol    Izzy Gomez RN  Anticoagulation Clinic  4/29/2024    _______________________________________________________________________     Anticoagulation Episode Summary       Current INR goal:  2.0-2.5   TTR:  49.5% (1 y)   Target end date:  Indefinite   Send INR reminders to:  [facility]    Indications    S/P AVR (aortic valve replacement) [Z95.2]  Long term current use of anticoagulant therapy [Z79.01]             Comments:  6/2/23 INR target goal changed to 2.0 - 2.5 d/t daily bleeding issues.  (Sensitivity)  Goal range changed 2/20/19 per cardiology              Anticoagulation Care Providers       Provider Role Specialty Phone number    Rafaela Woods MD Referring Family Medicine [phone]

## 2024-05-13 ENCOUNTER — LAB (OUTPATIENT)
Dept: CARDIOLOGY | Facility: CLINIC | Age: 69
End: 2024-05-13
Payer: COMMERCIAL

## 2024-05-13 ENCOUNTER — ANTICOAGULATION THERAPY VISIT (OUTPATIENT)
Dept: ANTICOAGULATION | Facility: CLINIC | Age: 69
End: 2024-05-13

## 2024-05-13 DIAGNOSIS — Z79.01 LONG TERM CURRENT USE OF ANTICOAGULANT THERAPY: ICD-10-CM

## 2024-05-13 DIAGNOSIS — Z95.2 S/P AVR (AORTIC VALVE REPLACEMENT): ICD-10-CM

## 2024-05-13 DIAGNOSIS — Z95.2 S/P AVR (AORTIC VALVE REPLACEMENT): Primary | ICD-10-CM

## 2024-05-13 LAB — INR POINT OF CARE: 2 (ref 0.9–1.1)

## 2024-05-13 PROCEDURE — 36416 COLLJ CAPILLARY BLOOD SPEC: CPT

## 2024-05-13 PROCEDURE — 85610 PROTHROMBIN TIME: CPT

## 2024-05-13 NOTE — PROGRESS NOTES
ANTICOAGULATION MANAGEMENT     Alyssa Higginbotham 69 year old female is on warfarin with therapeutic INR result. (Goal INR 2.0-2.5)    Recent labs: (last 7 days)     05/13/24  0754   INR 2.0*       ASSESSMENT     Source(s): Chart Review and Patient/Caregiver Call     Warfarin doses taken: Warfarin taken as instructed  Diet: No new diet changes identified  Medication/supplement changes: None noted  New illness, injury, or hospitalization: No  Signs or symptoms of bleeding or clotting: No  Previous result: Therapeutic last 4 INR visits.  Additional findings: None       PLAN     Recommended plan for no diet, medication or health factor changes affecting INR     Dosing Instructions: Continue your current warfarin dose with next INR in 2 weeks       Summary  As of 5/13/2024      Full warfarin instructions:  4 mg every Tue, Fri; 5 mg all other days   Next INR check:  5/27/2024               Telephone call with Jennifer who verbalizes understanding and agrees to plan    Lab visit scheduled - INR on 5/23/24 @ [facility].    Education provided:   Taking warfarin: Importance of taking warfarin as instructed  Goal range and lab monitoring: goal range and significance of current result    Plan made per [facility] anticoagulation protocol    Aylin Pablo RN  Anticoagulation Clinic  5/13/2024    _______________________________________________________________________     Anticoagulation Episode Summary       Current INR goal:  2.0-2.5   TTR:  51.8% (1 y)   Target end date:  Indefinite   Send INR reminders to:  [facility]    Indications    S/P AVR (aortic valve replacement) [Z95.2]  Long term current use of anticoagulant therapy [Z79.01]             Comments:  6/2/23 INR target goal changed to 2.0 - 2.5 d/t daily bleeding issues.  (Sensitivity)  Goal range changed 2/20/19 per cardiology             Anticoagulation Care Providers       Provider Role Specialty Phone number    Rafaela Woods MD Referring Family Medicine  [phone]

## 2024-05-17 ENCOUNTER — PATIENT OUTREACH (OUTPATIENT)
Dept: CARE COORDINATION | Facility: CLINIC | Age: 69
End: 2024-05-17
Payer: COMMERCIAL

## 2024-05-20 ENCOUNTER — ANCILLARY PROCEDURE (OUTPATIENT)
Dept: MAMMOGRAPHY | Facility: CLINIC | Age: 69
End: 2024-05-20
Attending: PHYSICIAN ASSISTANT
Payer: COMMERCIAL

## 2024-05-20 DIAGNOSIS — Z12.31 ENCOUNTER FOR SCREENING MAMMOGRAM FOR MALIGNANT NEOPLASM OF BREAST: ICD-10-CM

## 2024-05-20 PROCEDURE — 77063 BREAST TOMOSYNTHESIS BI: CPT

## 2024-05-23 ENCOUNTER — LAB (OUTPATIENT)
Dept: LAB | Facility: CLINIC | Age: 69
End: 2024-05-23
Payer: COMMERCIAL

## 2024-05-23 ENCOUNTER — ANTICOAGULATION THERAPY VISIT (OUTPATIENT)
Dept: ANTICOAGULATION | Facility: CLINIC | Age: 69
End: 2024-05-23

## 2024-05-23 DIAGNOSIS — Z79.01 LONG TERM CURRENT USE OF ANTICOAGULANT THERAPY: ICD-10-CM

## 2024-05-23 DIAGNOSIS — Z95.2 S/P AVR (AORTIC VALVE REPLACEMENT): Primary | ICD-10-CM

## 2024-05-23 DIAGNOSIS — Z95.2 S/P AVR (AORTIC VALVE REPLACEMENT): ICD-10-CM

## 2024-05-23 LAB — INR BLD: 1.9 (ref 0.9–1.1)

## 2024-05-23 PROCEDURE — 85610 PROTHROMBIN TIME: CPT

## 2024-05-23 PROCEDURE — 36416 COLLJ CAPILLARY BLOOD SPEC: CPT

## 2024-05-23 NOTE — PROGRESS NOTES
ANTICOAGULATION MANAGEMENT     Alyssa Higginbotham 69 year old female is on warfarin with subtherapeutic INR result. (Goal INR 2.0-2.5)    Recent labs: (last 7 days)     05/23/24  0705   INR 1.9*       ASSESSMENT     Source(s): Chart Review and Patient/Caregiver Call     Warfarin doses taken: Warfarin taken as instructed  Diet: No new diet changes identified  Medication/supplement changes:  Yes   Has not started yet - Levothyroxine 88mcg and was instructed to take daily for the next 3 weeks.  However, since INR is low today, will start tomorrow.  (Anticipate an increase in INR, will monitor INR closely).  New illness, injury, or hospitalization: No  Signs or symptoms of bleeding or clotting: No  Previous result: Therapeutic last 5 INR visits  Additional findings:  None       PLAN     Recommended plan for ongoing change(s) affecting INR     Dosing Instructions: Continue your current warfarin dose with next INR in 1 week       Summary  As of 5/23/2024      Full warfarin instructions:  4 mg every Tue, Fri; 5 mg all other days   Next INR check:  5/31/2024               Telephone call with Jennifer who verbalizes understanding and agrees to plan    Lab visit scheduled - INR on 5/30/24 @ [facility].    Education provided:   Taking warfarin: Importance of taking warfarin as instructed  Goal range and lab monitoring: goal range and significance of current result  Interaction IS anticipated between warfarin and Levothyroxine    Plan made per [facility] anticoagulation protocol    Aylin Pablo RN  Anticoagulation Clinic  5/23/2024    _______________________________________________________________________     Anticoagulation Episode Summary       Current INR goal:  2.0-2.5   TTR:  49.1% (1 y)   Target end date:  Indefinite   Send INR reminders to:  [facility]    Indications    S/P AVR (aortic valve replacement) [Z95.2]  Long term current use of anticoagulant therapy [Z79.01]             Comments:  6/2/23 INR target goal  changed to 2.0 - 2.5 d/t daily bleeding issues.  (Sensitivity)  Goal range changed 2/20/19 per cardiology             Anticoagulation Care Providers       Provider Role Specialty Phone number    Rafaela Woods MD Referring Family Medicine [phone]

## 2024-05-30 ENCOUNTER — ANTICOAGULATION THERAPY VISIT (OUTPATIENT)
Dept: ANTICOAGULATION | Facility: CLINIC | Age: 69
End: 2024-05-30

## 2024-05-30 ENCOUNTER — LAB (OUTPATIENT)
Dept: LAB | Facility: CLINIC | Age: 69
End: 2024-05-30
Payer: COMMERCIAL

## 2024-05-30 DIAGNOSIS — Z95.2 S/P AVR (AORTIC VALVE REPLACEMENT): ICD-10-CM

## 2024-05-30 DIAGNOSIS — Z79.01 LONG TERM CURRENT USE OF ANTICOAGULANT THERAPY: ICD-10-CM

## 2024-05-30 DIAGNOSIS — Z95.2 S/P AVR (AORTIC VALVE REPLACEMENT): Primary | ICD-10-CM

## 2024-05-30 LAB — INR BLD: 2 (ref 0.9–1.1)

## 2024-05-30 PROCEDURE — 85610 PROTHROMBIN TIME: CPT

## 2024-05-30 PROCEDURE — 36416 COLLJ CAPILLARY BLOOD SPEC: CPT

## 2024-05-30 NOTE — PROGRESS NOTES
ANTICOAGULATION MANAGEMENT     Alyssa Higginbotham 69 year old female is on warfarin with therapeutic INR result. (Goal INR 2.0-2.5)    Recent labs: (last 7 days)     05/30/24  0709   INR 2.0*       ASSESSMENT     Source(s): Chart Review  Previous INR was Subtherapeutic  Medication, diet, health changes since last INR chart reviewed; none identified started levothyroxine last week per chart, with inr increase potential.          PLAN     Recommended plan for no diet, medication or health factor changes affecting INR     Dosing Instructions: Continue your current warfarin dose with next INR in 1-2 weeks       Summary  As of 5/30/2024      Full warfarin instructions:  4 mg every Tue, Fri; 5 mg all other days   Next INR check:  6/13/2024               Detailed voice message left for Jennifer with dosing instructions and follow up date.     Contact [phone] to schedule and with any changes, questions or concerns.     Education provided:   Please call back if any changes to your diet, medications or how you've been taking warfarin    Plan made per [facility] anticoagulation protocol    Eneida Suarez RN  Anticoagulation Clinic  5/30/2024    _______________________________________________________________________     Anticoagulation Episode Summary       Current INR goal:  2.0-2.5   TTR:  47.2% (1 y)   Target end date:  Indefinite   Send INR reminders to:  [facility]    Indications    S/P AVR (aortic valve replacement) [Z95.2]  Long term current use of anticoagulant therapy [Z79.01]             Comments:  6/2/23 INR target goal changed to 2.0 - 2.5 d/t daily bleeding issues.  (Sensitivity)  Goal range changed 2/20/19 per cardiology             Anticoagulation Care Providers       Provider Role Specialty Phone number    Rafaela Woods MD Referring Family Medicine [phone]

## 2024-06-05 ENCOUNTER — OFFICE VISIT (OUTPATIENT)
Dept: FAMILY MEDICINE | Facility: CLINIC | Age: 69
End: 2024-06-05
Payer: COMMERCIAL

## 2024-06-05 VITALS
SYSTOLIC BLOOD PRESSURE: 146 MMHG | HEART RATE: 74 BPM | OXYGEN SATURATION: 98 % | HEIGHT: 62 IN | BODY MASS INDEX: 20.81 KG/M2 | TEMPERATURE: 98.2 F | RESPIRATION RATE: 16 BRPM | WEIGHT: 113.1 LBS | DIASTOLIC BLOOD PRESSURE: 72 MMHG

## 2024-06-05 DIAGNOSIS — G43.009 MIGRAINE WITHOUT AURA AND WITHOUT STATUS MIGRAINOSUS, NOT INTRACTABLE: Primary | ICD-10-CM

## 2024-06-05 PROCEDURE — 99213 OFFICE O/P EST LOW 20 MIN: CPT | Performed by: FAMILY MEDICINE

## 2024-06-05 RX ORDER — RESPIRATORY SYNCYTIAL VIRUS VACCINE 120MCG/0.5
0.5 KIT INTRAMUSCULAR ONCE
Qty: 1 EACH | Refills: 0 | Status: CANCELLED | OUTPATIENT
Start: 2024-06-05 | End: 2024-06-05

## 2024-06-05 NOTE — PROGRESS NOTES
"  Assessment & Plan     Migraine without aura and without status migrainosus, not intractable  Will send in nurtec and see if covered and if improvement.  Stop the maxalt.  Follow up with PCP or my chart message with questions or concerns.  - rimegepant (NURTEC) 75 MG ODT tablet  Dispense: 8 tablet; Refill: 0                  Subjective   Jennifer is a 69 year old, presenting for the following health issues:  Headache (migraines)        6/5/2024     8:43 AM   Additional Questions   Roomed by Ling JERONIMO MA     History of Present Illness       Headaches:   Since the patient's last clinic visit, headaches are: no change  The patient is getting headaches:  3 month  She is able to do normal daily activities when she has a migraine.  The patient is taking the following rescue/relief medications:  Other   Patient states \"I get total relief\" from the rescue/relief medications.   The patient is taking the following medications to prevent migraines:  No medications to prevent migraines  In the past 4 weeks, the patient has gone to an Urgent Care or Emergency Room 0 times times due to headaches.    She eats 0-1 servings of fruits and vegetables daily.She consumes 0 sweetened beverage(s) daily.She exercises with enough effort to increase her heart rate 60 or more minutes per day.  She exercises with enough effort to increase her heart rate 7 days per week.   She is taking medications regularly.       Long hx of migraines.  On amitriptyline and helps with migraines occasionally and then on   Usually 3 migraines a month.  Usually preceeded with dehydration.  Light sensitivity, nausea, vomiting, blurry vision and pain with the migraines.  If taking butalbital it helps within an hour but without taking it HA will last days.  Pain in the whole head and the back of the neck.   Rizatriptan used in the past and ended in the ER with neck pain.              Review of Systems  Constitutional, HEENT, cardiovascular, pulmonary, gi and gu " "systems are negative, except as otherwise noted.      Objective    BP (!) 146/72 (BP Location: Right arm, Patient Position: Sitting, Cuff Size: Adult Small)   Pulse 74   Temp 98.2  F (36.8  C) (Oral)   Resp 16   Ht 1.565 m (5' 1.61\")   Wt 51.3 kg (113 lb 1.6 oz)   SpO2 98%   BMI 20.95 kg/m    Body mass index is 20.95 kg/m .  Physical Exam   GENERAL: alert and no distress  NECK: no adenopathy, no asymmetry, masses, or scars  RESP: lungs clear to auscultation - no rales, rhonchi or wheezes  CV: regular rate and rhythm, normal S1 S2, no S3 or S4, + murmur, no click or rub, no peripheral edema  MS: no gross musculoskeletal defects noted, no edema            Signed Electronically by: Babs Jin MD    "

## 2024-06-06 ENCOUNTER — LAB (OUTPATIENT)
Dept: LAB | Facility: CLINIC | Age: 69
End: 2024-06-06
Payer: COMMERCIAL

## 2024-06-06 ENCOUNTER — ANTICOAGULATION THERAPY VISIT (OUTPATIENT)
Dept: ANTICOAGULATION | Facility: CLINIC | Age: 69
End: 2024-06-06

## 2024-06-06 ENCOUNTER — TELEPHONE (OUTPATIENT)
Dept: FAMILY MEDICINE | Facility: CLINIC | Age: 69
End: 2024-06-06

## 2024-06-06 DIAGNOSIS — Z95.2 S/P AVR: ICD-10-CM

## 2024-06-06 DIAGNOSIS — Z79.01 LONG TERM CURRENT USE OF ANTICOAGULANT THERAPY: ICD-10-CM

## 2024-06-06 DIAGNOSIS — E78.5 HYPERLIPIDEMIA LDL GOAL <130: ICD-10-CM

## 2024-06-06 DIAGNOSIS — Z95.2 S/P AVR (AORTIC VALVE REPLACEMENT): ICD-10-CM

## 2024-06-06 DIAGNOSIS — E78.2 MIXED HYPERLIPIDEMIA: ICD-10-CM

## 2024-06-06 DIAGNOSIS — I10 BENIGN ESSENTIAL HYPERTENSION: ICD-10-CM

## 2024-06-06 DIAGNOSIS — Z95.2 S/P AVR (AORTIC VALVE REPLACEMENT): Primary | ICD-10-CM

## 2024-06-06 DIAGNOSIS — I71.03 DISSECTION OF THORACOABDOMINAL AORTA (H): ICD-10-CM

## 2024-06-06 DIAGNOSIS — E03.9 HYPOTHYROIDISM, UNSPECIFIED TYPE: ICD-10-CM

## 2024-06-06 DIAGNOSIS — I35.9 AORTIC VALVE DISORDER: ICD-10-CM

## 2024-06-06 LAB — INR BLD: 1.8 (ref 0.9–1.1)

## 2024-06-06 PROCEDURE — 80061 LIPID PANEL: CPT

## 2024-06-06 PROCEDURE — 85610 PROTHROMBIN TIME: CPT

## 2024-06-06 PROCEDURE — 36416 COLLJ CAPILLARY BLOOD SPEC: CPT

## 2024-06-06 PROCEDURE — 36415 COLL VENOUS BLD VENIPUNCTURE: CPT

## 2024-06-06 PROCEDURE — 84443 ASSAY THYROID STIM HORMONE: CPT

## 2024-06-06 NOTE — TELEPHONE ENCOUNTER
FYI - Status Update    Who is Calling: patient    Update: savannah was calling sincere from [facility] back    Does caller want a call/response back: Yes     Could we send this information to you in [facility] or would you prefer to receive a phone call?:   Patient would prefer a phone call   Okay to leave a detailed message?: Yes at Home number on file [phone] (home)

## 2024-06-06 NOTE — PROGRESS NOTES
ANTICOAGULATION MANAGEMENT     Alyssa Higginbotham 69 year old female is on warfarin with subtherapeutic INR result. (Goal INR 2.0-2.5)    Recent labs: (last 7 days)     06/06/24  0903   INR 1.8*       ASSESSMENT     Source(s): Chart Review and Patient/Caregiver Call     Warfarin doses taken: Warfarin taken as instructed  Diet: No new diet changes identified  Medication/supplement changes:  Yes.   However, she reported she messed up on her dose of Levothyroxine and was taking the old dose of 75mcg.   Started the new Levothyroxine dose 88mcg daily for the next 3 wks   Monitor INR closely d/t known interaction to increase INR / risk of bleeding.  New illness, injury, or hospitalization: No   Repeated TSH level - still in process.   Last TSH from 4/17/24 was 4.70  Signs or symptoms of bleeding or clotting: No  Previous result: Therapeutic last visit at 2.0; previously outside of goal range at 1.9  Additional findings: None       PLAN     Recommended plan for temporary change(s) affecting INR     Dosing Instructions: booster dose then continue your current warfarin dose with next INR in 1-2 weeks       Summary  As of 6/6/2024      Full warfarin instructions:  6/6: 6 mg; Otherwise 4 mg every Tue, Fri; 5 mg all other days   Next INR check:  6/20/2024               Telephone call with Jennifer who verbalizes understanding and agrees to plan    Lab visit scheduled - INR on 6/14/24 @ [facility]    Education provided:   Taking warfarin: Importance of taking warfarin as instructed  Goal range and lab monitoring: goal range and significance of current result    Plan made per [facility] anticoagulation protocol    Aylin Pablo, RN  Anticoagulation Clinic  6/6/2024    _______________________________________________________________________     Anticoagulation Episode Summary       Current INR goal:  2.0-2.5   TTR:  45.4% (1 y)   Target end date:  Indefinite   Send INR reminders to:  [facility]    Indications    S/P  AVR (aortic valve replacement) [Z95.2]  Long term current use of anticoagulant therapy [Z79.01]             Comments:  6/2/23 INR target goal changed to 2.0 - 2.5 d/t daily bleeding issues.  (Sensitivity)  Goal range changed 2/20/19 per cardiology             Anticoagulation Care Providers       Provider Role Specialty Phone number    Rafaela Woods MD Referring [facility] Medicine [phone]

## 2024-06-07 ENCOUNTER — TELEPHONE (OUTPATIENT)
Dept: FAMILY MEDICINE | Facility: CLINIC | Age: 69
End: 2024-06-07

## 2024-06-07 LAB
CHOLEST SERPL-MCNC: 141 MG/DL
FASTING STATUS PATIENT QL REPORTED: NO
HDLC SERPL-MCNC: 48 MG/DL
LDLC SERPL CALC-MCNC: 71 MG/DL
NONHDLC SERPL-MCNC: 93 MG/DL
TRIGL SERPL-MCNC: 110 MG/DL
TSH SERPL DL<=0.005 MIU/L-ACNC: 2.65 UIU/ML (ref 0.3–4.2)

## 2024-06-07 NOTE — TELEPHONE ENCOUNTER
Fax recieived from pharmacy regarding PA needed on Nurtec 75mg ODT tablets.    Please obtain PA    Patient ID # is [id number]

## 2024-06-11 DIAGNOSIS — F41.1 ANXIETY STATE: ICD-10-CM

## 2024-06-11 RX ORDER — CLONAZEPAM 0.5 MG/1
TABLET ORAL
Qty: 90 TABLET | Refills: 0 | Status: SHIPPED | OUTPATIENT
Start: 2024-06-11 | End: 2024-09-03

## 2024-06-14 ENCOUNTER — ANTICOAGULATION THERAPY VISIT (OUTPATIENT)
Dept: ANTICOAGULATION | Facility: CLINIC | Age: 69
End: 2024-06-14

## 2024-06-14 ENCOUNTER — LAB (OUTPATIENT)
Dept: CARDIOLOGY | Facility: CLINIC | Age: 69
End: 2024-06-14
Payer: COMMERCIAL

## 2024-06-14 ENCOUNTER — TELEPHONE (OUTPATIENT)
Dept: FAMILY MEDICINE | Facility: CLINIC | Age: 69
End: 2024-06-14

## 2024-06-14 DIAGNOSIS — Z79.01 LONG TERM CURRENT USE OF ANTICOAGULANT THERAPY: ICD-10-CM

## 2024-06-14 DIAGNOSIS — Z95.2 S/P AVR (AORTIC VALVE REPLACEMENT): Primary | ICD-10-CM

## 2024-06-14 DIAGNOSIS — Z95.2 S/P AVR (AORTIC VALVE REPLACEMENT): ICD-10-CM

## 2024-06-14 LAB — INR POINT OF CARE: 1.8 (ref 0.9–1.1)

## 2024-06-14 PROCEDURE — 85610 PROTHROMBIN TIME: CPT

## 2024-06-14 PROCEDURE — 36416 COLLJ CAPILLARY BLOOD SPEC: CPT

## 2024-06-14 NOTE — PROGRESS NOTES
ANTICOAGULATION MANAGEMENT     Alyssa Higginbotham 69 year old female is on warfarin with subtherapeutic INR result. (Goal INR 2.0-2.5)    Recent labs: (last 7 days)     06/14/24  0743   INR 1.8*       ASSESSMENT     Source(s): Chart Review and Patient/Caregiver Call     Warfarin doses taken: Booster dose given on 6/6/24. recently taken which may be affecting INR  Diet:  no new changes.  Medication/supplement changes: None noted  New illness, injury, or hospitalization: No  Signs or symptoms of bleeding or clotting: No  Previous result: Subtherapeutic at 1.8 on 6/6/24.  Additional findings: None       PLAN     Recommended plan for no diet, medication or health factor changes affecting INR     Dosing Instructions: Increase your warfarin dose (3% change) with next INR in 1-2 weeks       Summary  As of 6/14/2024      Full warfarin instructions:  4 mg every Tue; 5 mg all other days   Next INR check:  6/28/2024               Telephone call with Jennifer who verbalizes understanding and agrees to plan    Lab visit scheduled - INR on 6/21/24 @ [facility].    Education provided:   Taking warfarin: Importance of taking warfarin as instructed  Goal range and lab monitoring: goal range and significance of current result    Plan made per [facility] anticoagulation protocol    Aylin Pablo RN  Anticoagulation Clinic  6/14/2024    _______________________________________________________________________     Anticoagulation Episode Summary       Current INR goal:  2.0-2.5   TTR:  44.6% (1 y)   Target end date:  Indefinite   Send INR reminders to:  [facility]    Indications    S/P AVR (aortic valve replacement) [Z95.2]  Long term current use of anticoagulant therapy [Z79.01]             Comments:  6/2/23 INR target goal changed to 2.0 - 2.5 d/t daily bleeding issues.  (Sensitivity)  Goal range changed 2/20/19 per cardiology             Anticoagulation Care Providers       Provider Role Specialty Phone number    Chuck  Rafaela SUBRAMANIAN MD [facility] [phone]

## 2024-06-14 NOTE — TELEPHONE ENCOUNTER
Patient Returning Call    Reason for call:  anticoagulation     Information relayed to patient:  Message sent to INR nurse to return call.     Patient has additional questions:  No      Could we send this information to you in [facility] or would you prefer to receive a phone call?:   Patient would prefer a phone call   Okay to leave a detailed message?: Yes at Cell number on file:    Telephone Information:   Mobile [phone]   Mobile Not on file.

## 2024-06-17 ENCOUNTER — MYC MEDICAL ADVICE (OUTPATIENT)
Dept: FAMILY MEDICINE | Facility: CLINIC | Age: 69
End: 2024-06-17
Payer: COMMERCIAL

## 2024-06-17 DIAGNOSIS — G43.009 MIGRAINE WITHOUT AURA AND WITHOUT STATUS MIGRAINOSUS, NOT INTRACTABLE: ICD-10-CM

## 2024-06-17 DIAGNOSIS — E04.1 THYROID NODULE: Primary | ICD-10-CM

## 2024-06-17 NOTE — TELEPHONE ENCOUNTER
Prior Authorization Approval    Medication: NURTEC 75 MG PO TBDP  Authorization Effective Date: 6/17/2024  Authorization Expiration Date: 6/17/2025  Approved Dose/Quantity: UD  Reference #:     Insurance Company: [employer] - Phone [phone] Fax [phone]  Expected CoPay: $ 282   CoPay Card Available: No    Financial Assistance Needed: N/A  Which Pharmacy is filling the prescription: [id number] [place] [id number] [facility] [address]  Pharmacy Notified: YES  Patient Notified: NO

## 2024-06-17 NOTE — TELEPHONE ENCOUNTER
PA Initiation    Medication: NURTEC 75 MG PO TBDP  Insurance Company: [employer] - Phone [phone] Fax [phone]  Pharmacy Filling the Rx: [facility] #[id number] [address] [facility] [address]  Filling Pharmacy Phone:    Filling Pharmacy Fax:    Start Date: 6/17/2024

## 2024-06-19 NOTE — TELEPHONE ENCOUNTER
Over 15 min wait with pharmacy- unable to get a hold of pharmacy.     Reaching back out to pt as well via [employer] to see if she has reached out to pharmacy or insurance as well for other options.

## 2024-06-19 NOTE — TELEPHONE ENCOUNTER
Please call pharmacy and see what else would be approved at a lower cost.  Pt is wanting sublingual but open to nasal.  Then please call and discuss with pt.

## 2024-06-21 ENCOUNTER — LAB (OUTPATIENT)
Dept: CARDIOLOGY | Facility: CLINIC | Age: 69
End: 2024-06-21
Payer: COMMERCIAL

## 2024-06-21 ENCOUNTER — ANTICOAGULATION THERAPY VISIT (OUTPATIENT)
Dept: ANTICOAGULATION | Facility: CLINIC | Age: 69
End: 2024-06-21

## 2024-06-21 DIAGNOSIS — Z79.01 LONG TERM CURRENT USE OF ANTICOAGULANT THERAPY: ICD-10-CM

## 2024-06-21 DIAGNOSIS — Z95.2 S/P AVR (AORTIC VALVE REPLACEMENT): ICD-10-CM

## 2024-06-21 DIAGNOSIS — Z95.2 S/P AVR (AORTIC VALVE REPLACEMENT): Primary | ICD-10-CM

## 2024-06-21 LAB — INR POINT OF CARE: 1.8 (ref 0.9–1.1)

## 2024-06-21 PROCEDURE — 36416 COLLJ CAPILLARY BLOOD SPEC: CPT

## 2024-06-21 PROCEDURE — 85610 PROTHROMBIN TIME: CPT

## 2024-06-21 NOTE — PROGRESS NOTES
ANTICOAGULATION MANAGEMENT     Alyssa Higginbotham 69 year old female is on warfarin with subtherapeutic INR result. (Goal INR 2.0-2.5)    Recent labs: (last 7 days)     06/21/24  0742   INR 1.8*       ASSESSMENT     Source(s): Chart Review and Patient/Caregiver Call     Warfarin doses taken: Warfarin taken as instructed   Weekly warfarin dose increased by 3% on 6/14/24.   (Pt sensitive to dose increase).  Diet: No new diet changes identified  Medication/supplement changes:  Yes.   Reported she cut down on her Levothyroxine 75mcg 2 days/wk and 88mcg 5 days/wk.  Instead of Levothyroxine 88mcg daily.  Stated TSH dropped down too low.  New illness, injury, or hospitalization: No  Signs or symptoms of bleeding or clotting: No  Previous result: Subtherapeutic last 2 INR results; (1.8, 1.8)  Additional findings: None       PLAN     Recommended plan for ongoing change(s) affecting INR     Dosing Instructions: Increase your warfarin dose (2.9% change) with next INR in 1-2 weeks       Summary  As of 6/21/2024      Full warfarin instructions:  5 mg every day   Next INR check:  7/5/2024               Telephone call with Jennifer who verbalizes understanding and agrees to plan    Lab visit scheduled - INR on 6/28/24 @ [facility]    Education provided:   Taking warfarin: Importance of taking warfarin as instructed  Goal range and lab monitoring: goal range and significance of current result  Interaction IS anticipated between warfarin and Levothyroxine    Plan made per [facility] anticoagulation protocol    Aylin Pablo RN  Anticoagulation Clinic  6/21/2024    _______________________________________________________________________     Anticoagulation Episode Summary       Current INR goal:  2.0-2.5   TTR:  44.6% (1 y)   Target end date:  Indefinite   Send INR reminders to:  [facility]    Indications    S/P AVR (aortic valve replacement) [Z95.2]  Long term current use of anticoagulant therapy [Z79.01]              Comments:  6/2/23 INR target goal changed to 2.0 - 2.5 d/t daily bleeding issues.  (Sensitivity)  Goal range changed 2/20/19 per cardiology             Anticoagulation Care Providers       Provider Role Specialty Phone number    Rafaela Woods MD Referring Family Medicine [phone]

## 2024-06-24 RX ORDER — SUMATRIPTAN 5 MG/1
1 SPRAY NASAL PRN
Qty: 1 EACH | Refills: 3 | Status: SHIPPED | OUTPATIENT
Start: 2024-06-24 | End: 2024-08-29

## 2024-06-28 ENCOUNTER — ANTICOAGULATION THERAPY VISIT (OUTPATIENT)
Dept: ANTICOAGULATION | Facility: CLINIC | Age: 69
End: 2024-06-28

## 2024-06-28 ENCOUNTER — LAB (OUTPATIENT)
Dept: CARDIOLOGY | Facility: CLINIC | Age: 69
End: 2024-06-28
Payer: COMMERCIAL

## 2024-06-28 DIAGNOSIS — Z95.2 S/P AVR (AORTIC VALVE REPLACEMENT): Primary | ICD-10-CM

## 2024-06-28 DIAGNOSIS — Z79.01 LONG TERM CURRENT USE OF ANTICOAGULANT THERAPY: ICD-10-CM

## 2024-06-28 DIAGNOSIS — Z95.2 S/P AVR (AORTIC VALVE REPLACEMENT): ICD-10-CM

## 2024-06-28 LAB — INR POINT OF CARE: 2 (ref 0.9–1.1)

## 2024-06-28 PROCEDURE — 36416 COLLJ CAPILLARY BLOOD SPEC: CPT

## 2024-06-28 PROCEDURE — 85610 PROTHROMBIN TIME: CPT

## 2024-06-28 NOTE — PROGRESS NOTES
ANTICOAGULATION MANAGEMENT     Alyssa Higginbotham 69 year old female is on warfarin with therapeutic INR result. (Goal INR 2.0-2.5)    Recent labs: (last 7 days)     06/28/24  0738   INR 2.0*       ASSESSMENT     Source(s): Chart Review and Patient/Caregiver Call     Warfarin doses taken: Warfarin taken as instructed   Weekly warfarin dose increased by 2.9% on 6/21/24  Diet: No new diet changes identified  Medication/supplement changes: None noted  New illness, injury, or hospitalization: No  Signs or symptoms of bleeding or clotting: No  Previous result: Subtherapeutic last 3 INR results.  Additional findings: Might leave for [place] on 7/10/24.       PLAN     Recommended plan for no diet, medication or health factor changes affecting INR     Dosing Instructions: Continue your current warfarin dose with next INR in 1-2 weeks       Summary  As of 6/28/2024      Full warfarin instructions:  5 mg every day   Next INR check:  7/12/2024               Telephone call with Jennifer who verbalizes understanding and agrees to plan    Lab visit scheduled - INR on 7/5/24 @ [facility]    Education provided: Taking warfarin: Importance of taking warfarin as instructed  Goal range and lab monitoring: goal range and significance of current result    Plan made per [facility] anticoagulation protocol    Aylin Pablo RN  Anticoagulation Clinic  6/28/2024    _______________________________________________________________________     Anticoagulation Episode Summary       Current INR goal:  2.0-2.5   TTR:  44.6% (1 y)   Target end date:  Indefinite   Send INR reminders to:  [facility]    Indications    S/P AVR (aortic valve replacement) [Z95.2]  Long term current use of anticoagulant therapy [Z79.01]             Comments:  6/2/23 INR target goal changed to 2.0 - 2.5 d/t daily bleeding issues.  (Sensitivity)  Goal range changed 2/20/19 per cardiology             Anticoagulation Care Providers       Provider Role Specialty  Phone number    Rafaela Woods MD Referring [facility] Medicine [phone]

## 2024-07-05 ENCOUNTER — LAB (OUTPATIENT)
Dept: CARDIOLOGY | Facility: CLINIC | Age: 69
End: 2024-07-05
Payer: COMMERCIAL

## 2024-07-05 ENCOUNTER — ANTICOAGULATION THERAPY VISIT (OUTPATIENT)
Dept: ANTICOAGULATION | Facility: CLINIC | Age: 69
End: 2024-07-05

## 2024-07-05 DIAGNOSIS — Z79.01 LONG TERM CURRENT USE OF ANTICOAGULANT THERAPY: ICD-10-CM

## 2024-07-05 DIAGNOSIS — Z95.2 S/P AVR (AORTIC VALVE REPLACEMENT): ICD-10-CM

## 2024-07-05 DIAGNOSIS — Z95.2 S/P AVR (AORTIC VALVE REPLACEMENT): Primary | ICD-10-CM

## 2024-07-05 LAB — INR POINT OF CARE: 2.1 (ref 0.9–1.1)

## 2024-07-05 PROCEDURE — 36416 COLLJ CAPILLARY BLOOD SPEC: CPT

## 2024-07-05 PROCEDURE — 85610 PROTHROMBIN TIME: CPT

## 2024-07-05 NOTE — PROGRESS NOTES
ANTICOAGULATION MANAGEMENT     Alyssa Higginbotham 69 year old female is on warfarin with therapeutic INR result. (Goal INR 2.0-2.5)    Recent labs: (last 7 days)     07/05/24  0753   INR 2.1*       ASSESSMENT     Source(s): Chart Review and Patient/Caregiver Call     Warfarin doses taken: Warfarin taken as instructed  Diet: No new diet changes identified  Medication/supplement changes: None noted  New illness, injury, or hospitalization: No  Signs or symptoms of bleeding or clotting: No  Previous result: Therapeutic last visit at 2.0; previously outside of goal range last 3 subtherapeutic.  Additional findings: did previously mention might be going to [place] next week.       PLAN     Recommended plan for no diet, medication or health factor changes affecting INR     Dosing Instructions: Continue your current warfarin dose with next INR in 1-2 weeks       Summary  As of 7/5/2024      Full warfarin instructions:  5 mg every day   Next INR check:  7/19/2024               Detailed voice message left for Jennifer with dosing instructions and follow up date.    - also sent [employer] message.    Lab visit scheduled - INR on 7/15/24 @ [facility].    Education provided: Please call back if any changes to your diet, medications or how you've been taking warfarin  Taking warfarin: Importance of taking warfarin as instructed  Goal range and lab monitoring: goal range and significance of current result  Dietary considerations: importance of consistent vitamin K intake    Plan made per [facility] anticoagulation protocol    Aylin Pablo RN  Anticoagulation Clinic  7/5/2024    _______________________________________________________________________     Anticoagulation Episode Summary       Current INR goal:  2.0-2.5   TTR:  46.5% (1 y)   Target end date:  Indefinite   Send INR reminders to:  [facility]    Indications    S/P AVR (aortic valve replacement) [Z95.2]  Long term current use of anticoagulant therapy  [Z79.01]             Comments:  6/2/23 INR target goal changed to 2.0 - 2.5 d/t daily bleeding issues.  (Sensitivity)  Goal range changed 2/20/19 per cardiology             Anticoagulation Care Providers       Provider Role Specialty Phone number    Rafaela Woods MD Referring Family Medicine [phone]

## 2024-07-15 ENCOUNTER — LAB (OUTPATIENT)
Dept: CARDIOLOGY | Facility: CLINIC | Age: 69
End: 2024-07-15
Payer: COMMERCIAL

## 2024-07-15 ENCOUNTER — ANTICOAGULATION THERAPY VISIT (OUTPATIENT)
Dept: ANTICOAGULATION | Facility: CLINIC | Age: 69
End: 2024-07-15

## 2024-07-15 DIAGNOSIS — Z79.01 LONG TERM CURRENT USE OF ANTICOAGULANT THERAPY: ICD-10-CM

## 2024-07-15 DIAGNOSIS — Z95.2 S/P AVR (AORTIC VALVE REPLACEMENT): Primary | ICD-10-CM

## 2024-07-15 DIAGNOSIS — Z95.2 S/P AVR (AORTIC VALVE REPLACEMENT): ICD-10-CM

## 2024-07-15 LAB — INR POINT OF CARE: 1.6 (ref 0.9–1.1)

## 2024-07-15 PROCEDURE — 36416 COLLJ CAPILLARY BLOOD SPEC: CPT

## 2024-07-15 PROCEDURE — 85610 PROTHROMBIN TIME: CPT

## 2024-07-15 NOTE — PROGRESS NOTES
ANTICOAGULATION MANAGEMENT     Alyssa Higginbotham 69 year old female is on warfarin with subtherapeutic INR result. (Goal INR 2.0-2.5)    Recent labs: (last 7 days)     07/15/24  0736   INR 1.6*       ASSESSMENT     Source(s): Chart Review and Patient/Caregiver Call     Warfarin doses taken: Warfarin taken as instructed  Diet: No new diet changes identified  Medication/supplement changes:  Yes   Reported taking 2 tablets of Nurtec for her migraine.   not known to interact with warfarin.  New illness, injury, or hospitalization: No   Hx of migraine.  Signs or symptoms of bleeding or clotting: No  Previous result: Therapeutic last 2 INR visits  Additional findings:  just returned 2 days ago from [place].       PLAN     Recommended plan for temporary change(s) affecting INR     Dosing Instructions: booster dose then continue your current warfarin dose with next INR in 1-2 weeks       Summary  As of 7/15/2024      Full warfarin instructions:  7/15: 6 mg; Otherwise 5 mg every day   Next INR check:  7/29/2024               Telephone call with Jennifer who verbalizes understanding and agrees to plan   - was in [place] last week to visit family.  Warfarin very sensitive to changes; environment, elevation, temperature, and diet.    Lab visit scheduled - INR on 7/19/24 @ [facility] [place].    Education provided: Taking warfarin: Importance of taking warfarin as instructed  Goal range and lab monitoring: goal range and significance of current result    Plan made per [facility] anticoagulation protocol    Aylin Pablo RN  Anticoagulation Clinic  7/15/2024    _______________________________________________________________________     Anticoagulation Episode Summary       Current INR goal:  2.0-2.5   TTR:  47.1% (1 y)   Target end date:  Indefinite   Send INR reminders to:  [facility]    Indications    S/P AVR (aortic valve replacement) [Z95.2]  Long term current use of anticoagulant therapy [Z79.01]             Comments:  6/2/23  INR target goal changed to 2.0 - 2.5 d/t daily bleeding issues.  (Sensitivity)  Goal range changed 2/20/19 per cardiology             Anticoagulation Care Providers       Provider Role Specialty Phone number    Rafaela Woods MD Referring Family Medicine [phone]

## 2024-07-19 ENCOUNTER — ANTICOAGULATION THERAPY VISIT (OUTPATIENT)
Dept: ANTICOAGULATION | Facility: CLINIC | Age: 69
End: 2024-07-19

## 2024-07-19 ENCOUNTER — LAB (OUTPATIENT)
Dept: CARDIOLOGY | Facility: CLINIC | Age: 69
End: 2024-07-19
Payer: COMMERCIAL

## 2024-07-19 DIAGNOSIS — Z79.01 LONG TERM CURRENT USE OF ANTICOAGULANT THERAPY: ICD-10-CM

## 2024-07-19 DIAGNOSIS — Z95.2 S/P AVR (AORTIC VALVE REPLACEMENT): Primary | ICD-10-CM

## 2024-07-19 DIAGNOSIS — Z95.2 S/P AVR (AORTIC VALVE REPLACEMENT): ICD-10-CM

## 2024-07-19 LAB — INR POINT OF CARE: 1.9 (ref 0.9–1.1)

## 2024-07-19 PROCEDURE — 85610 PROTHROMBIN TIME: CPT

## 2024-07-19 PROCEDURE — 36416 COLLJ CAPILLARY BLOOD SPEC: CPT

## 2024-07-19 NOTE — PROGRESS NOTES
ANTICOAGULATION MANAGEMENT     Alyssa Higginbotham 69 year old female is on warfarin with subtherapeutic INR result. (Goal INR 2.0-2.5)    Recent labs: (last 7 days)     07/19/24  0751   INR 1.9*       ASSESSMENT     Source(s): Chart Review and Patient/Caregiver Call     Warfarin doses taken: Booster dose with 6mg on 7/15/24, recently taken which may be affecting INR.  Diet: No new diet changes identified  Medication/supplement changes: None noted  New illness, injury, or hospitalization: No  Signs or symptoms of bleeding or clotting: No  Previous result: Subtherapeutic at 1.6 on 7/15/24.  Additional findings: None       PLAN     Recommended plan for no diet, medication or health factor changes affecting INR     Dosing Instructions: Continue your current warfarin dose with next INR in 7-10 days       Summary  As of 7/19/2024      Full warfarin instructions:  5 mg every day   Next INR check:  7/29/2024               Telephone call with Jennifer who verbalizes understanding and agrees to plan    Lab visit scheduled - INR on 7/29/24 @ [facility]    Education provided: Taking warfarin: Importance of taking warfarin as instructed  Goal range and lab monitoring: goal range and significance of current result  Dietary considerations: impact of vitamin K foods on INR    Plan made per [facility] anticoagulation protocol    Aylin Pablo RN  Anticoagulation Clinic  7/19/2024    _______________________________________________________________________     Anticoagulation Episode Summary       Current INR goal:  2.0-2.5   TTR:  45.4% (11.8 mo)   Target end date:  Indefinite   Send INR reminders to:  [facility]    Indications    S/P AVR (aortic valve replacement) [Z95.2]  Long term current use of anticoagulant therapy [Z79.01]             Comments:  6/2/23 INR target goal changed to 2.0 - 2.5 d/t daily bleeding issues.  (Sensitivity)  Goal range changed 2/20/19 per cardiology             Anticoagulation Care Providers        Provider Role Specialty Phone number    Rafaela Woods MD Referring Family Medicine [phone]

## 2024-07-25 ENCOUNTER — APPOINTMENT (OUTPATIENT)
Dept: CT IMAGING | Facility: HOSPITAL | Age: 69
End: 2024-07-25
Attending: STUDENT IN AN ORGANIZED HEALTH CARE EDUCATION/TRAINING PROGRAM
Payer: COMMERCIAL

## 2024-07-25 ENCOUNTER — HOSPITAL ENCOUNTER (INPATIENT)
Facility: CLINIC | Age: 69
LOS: 4 days | Discharge: HOME HEALTH | DRG: 064 | End: 2024-07-29
Attending: EMERGENCY MEDICINE | Admitting: STUDENT IN AN ORGANIZED HEALTH CARE EDUCATION/TRAINING PROGRAM
Payer: COMMERCIAL

## 2024-07-25 ENCOUNTER — APPOINTMENT (OUTPATIENT)
Dept: CT IMAGING | Facility: CLINIC | Age: 69
DRG: 064 | End: 2024-07-25
Payer: COMMERCIAL

## 2024-07-25 ENCOUNTER — HOSPITAL ENCOUNTER (EMERGENCY)
Facility: HOSPITAL | Age: 69
Discharge: ANOTHER HOSPITAL | End: 2024-07-25
Attending: EMERGENCY MEDICINE | Admitting: EMERGENCY MEDICINE
Payer: COMMERCIAL

## 2024-07-25 ENCOUNTER — DOCUMENTATION ONLY (OUTPATIENT)
Dept: ANTICOAGULATION | Facility: CLINIC | Age: 69
End: 2024-07-25

## 2024-07-25 VITALS
SYSTOLIC BLOOD PRESSURE: 149 MMHG | DIASTOLIC BLOOD PRESSURE: 71 MMHG | HEIGHT: 62 IN | RESPIRATION RATE: 21 BRPM | TEMPERATURE: 97.8 F | WEIGHT: 113 LBS | HEART RATE: 82 BPM | OXYGEN SATURATION: 99 % | BODY MASS INDEX: 20.8 KG/M2

## 2024-07-25 DIAGNOSIS — I61.9 INTRAPARENCHYMAL HEMORRHAGE OF BRAIN (H): ICD-10-CM

## 2024-07-25 DIAGNOSIS — Z79.01 CHRONIC ANTICOAGULATION: ICD-10-CM

## 2024-07-25 DIAGNOSIS — I60.9 SUBARACHNOID HEMORRHAGE (H): ICD-10-CM

## 2024-07-25 DIAGNOSIS — I62.9 INTRACRANIAL HEMORRHAGE (H): ICD-10-CM

## 2024-07-25 DIAGNOSIS — Z95.2 S/P AVR (AORTIC VALVE REPLACEMENT): Primary | ICD-10-CM

## 2024-07-25 LAB
ANION GAP SERPL CALCULATED.3IONS-SCNC: 13 MMOL/L (ref 7–15)
APTT PPP: 36 SECONDS (ref 22–38)
BASOPHILS # BLD AUTO: 0 10E3/UL (ref 0–0.2)
BASOPHILS NFR BLD AUTO: 0 %
BUN SERPL-MCNC: 13.2 MG/DL (ref 8–23)
CALCIUM SERPL-MCNC: 9.2 MG/DL (ref 8.8–10.4)
CHLORIDE SERPL-SCNC: 87 MMOL/L (ref 98–107)
CREAT SERPL-MCNC: 0.65 MG/DL (ref 0.51–0.95)
CRP SERPL-MCNC: 13.8 MG/L
EGFRCR SERPLBLD CKD-EPI 2021: >90 ML/MIN/1.73M2
EOSINOPHIL # BLD AUTO: 0 10E3/UL (ref 0–0.7)
EOSINOPHIL NFR BLD AUTO: 0 %
ERYTHROCYTE [DISTWIDTH] IN BLOOD BY AUTOMATED COUNT: 12.8 % (ref 10–15)
ERYTHROCYTE [SEDIMENTATION RATE] IN BLOOD BY WESTERGREN METHOD: 13 MM/HR (ref 0–30)
ETHANOL SERPL-MCNC: <0.01 G/DL
GLUCOSE BLDC GLUCOMTR-MCNC: 130 MG/DL (ref 70–99)
GLUCOSE BLDC GLUCOMTR-MCNC: 139 MG/DL (ref 70–99)
GLUCOSE SERPL-MCNC: 154 MG/DL (ref 70–99)
HBA1C MFR BLD: 5.5 %
HCO3 SERPL-SCNC: 27 MMOL/L (ref 22–29)
HCT VFR BLD AUTO: 37.6 % (ref 35–47)
HGB BLD-MCNC: 13.2 G/DL (ref 11.7–15.7)
IMM GRANULOCYTES # BLD: 0.1 10E3/UL
IMM GRANULOCYTES NFR BLD: 1 %
INR PPP: 1.14 (ref 0.85–1.15)
INR PPP: 1.24 (ref 0.85–1.15)
INR PPP: 2.27 (ref 0.85–1.15)
LYMPHOCYTES # BLD AUTO: 0.6 10E3/UL (ref 0.8–5.3)
LYMPHOCYTES NFR BLD AUTO: 5 %
MCH RBC QN AUTO: 26.9 PG (ref 26.5–33)
MCHC RBC AUTO-ENTMCNC: 35.1 G/DL (ref 31.5–36.5)
MCV RBC AUTO: 77 FL (ref 78–100)
MONOCYTES # BLD AUTO: 0.8 10E3/UL (ref 0–1.3)
MONOCYTES NFR BLD AUTO: 6 %
NEUTROPHILS # BLD AUTO: 10.4 10E3/UL (ref 1.6–8.3)
NEUTROPHILS NFR BLD AUTO: 88 %
NRBC # BLD AUTO: 0 10E3/UL
NRBC BLD AUTO-RTO: 0 /100
OSMOLALITY SERPL: 272 MMOL/KG (ref 280–301)
OSMOLALITY UR: 718 MMOL/KG (ref 100–1200)
PLATELET # BLD AUTO: 237 10E3/UL (ref 150–450)
POTASSIUM SERPL-SCNC: 3.4 MMOL/L (ref 3.4–5.3)
RADIOLOGIST FLAGS: ABNORMAL
RADIOLOGIST FLAGS: ABNORMAL
RBC # BLD AUTO: 4.91 10E6/UL (ref 3.8–5.2)
SODIUM SERPL-SCNC: 125 MMOL/L (ref 135–145)
SODIUM SERPL-SCNC: 127 MMOL/L (ref 135–145)
SODIUM UR-SCNC: 72 MMOL/L
TROPONIN T SERPL HS-MCNC: 12 NG/L
TROPONIN T SERPL HS-MCNC: 16 NG/L
TSH SERPL DL<=0.005 MIU/L-ACNC: 1.94 UIU/ML (ref 0.3–4.2)
WBC # BLD AUTO: 11.8 10E3/UL (ref 4–11)

## 2024-07-25 PROCEDURE — 84295 ASSAY OF SERUM SODIUM: CPT | Performed by: STUDENT IN AN ORGANIZED HEALTH CARE EDUCATION/TRAINING PROGRAM

## 2024-07-25 PROCEDURE — 99207 PR NO CHARGE LOS: CPT | Performed by: NURSE PRACTITIONER

## 2024-07-25 PROCEDURE — 258N000003 HC RX IP 258 OP 636: Performed by: STUDENT IN AN ORGANIZED HEALTH CARE EDUCATION/TRAINING PROGRAM

## 2024-07-25 PROCEDURE — 250N000011 HC RX IP 250 OP 636: Performed by: EMERGENCY MEDICINE

## 2024-07-25 PROCEDURE — 85610 PROTHROMBIN TIME: CPT | Performed by: EMERGENCY MEDICINE

## 2024-07-25 PROCEDURE — 82077 ASSAY SPEC XCP UR&BREATH IA: CPT | Performed by: EMERGENCY MEDICINE

## 2024-07-25 PROCEDURE — 87040 BLOOD CULTURE FOR BACTERIA: CPT

## 2024-07-25 PROCEDURE — 84295 ASSAY OF SERUM SODIUM: CPT | Performed by: EMERGENCY MEDICINE

## 2024-07-25 PROCEDURE — 96367 TX/PROPH/DG ADDL SEQ IV INF: CPT

## 2024-07-25 PROCEDURE — 85025 COMPLETE CBC W/AUTO DIFF WBC: CPT | Performed by: EMERGENCY MEDICINE

## 2024-07-25 PROCEDURE — 96375 TX/PRO/DX INJ NEW DRUG ADDON: CPT | Mod: 59

## 2024-07-25 PROCEDURE — 82962 GLUCOSE BLOOD TEST: CPT

## 2024-07-25 PROCEDURE — 999N000128 HC STATISTIC PERIPHERAL IV START W/O US GUIDANCE

## 2024-07-25 PROCEDURE — 84484 ASSAY OF TROPONIN QUANT: CPT | Performed by: EMERGENCY MEDICINE

## 2024-07-25 PROCEDURE — 36415 COLL VENOUS BLD VENIPUNCTURE: CPT | Performed by: STUDENT IN AN ORGANIZED HEALTH CARE EDUCATION/TRAINING PROGRAM

## 2024-07-25 PROCEDURE — 84300 ASSAY OF URINE SODIUM: CPT | Performed by: STUDENT IN AN ORGANIZED HEALTH CARE EDUCATION/TRAINING PROGRAM

## 2024-07-25 PROCEDURE — 36415 COLL VENOUS BLD VENIPUNCTURE: CPT

## 2024-07-25 PROCEDURE — 86140 C-REACTIVE PROTEIN: CPT

## 2024-07-25 PROCEDURE — 0042T CT HEAD PERFUSION W CONTRAST: CPT

## 2024-07-25 PROCEDURE — 70450 CT HEAD/BRAIN W/O DYE: CPT

## 2024-07-25 PROCEDURE — 84484 ASSAY OF TROPONIN QUANT: CPT | Performed by: STUDENT IN AN ORGANIZED HEALTH CARE EDUCATION/TRAINING PROGRAM

## 2024-07-25 PROCEDURE — 99292 CRITICAL CARE ADDL 30 MIN: CPT | Mod: FS | Performed by: PSYCHIATRY & NEUROLOGY

## 2024-07-25 PROCEDURE — 258N000003 HC RX IP 258 OP 636: Performed by: PHYSICIAN ASSISTANT

## 2024-07-25 PROCEDURE — 83930 ASSAY OF BLOOD OSMOLALITY: CPT | Performed by: STUDENT IN AN ORGANIZED HEALTH CARE EDUCATION/TRAINING PROGRAM

## 2024-07-25 PROCEDURE — 93005 ELECTROCARDIOGRAM TRACING: CPT | Performed by: EMERGENCY MEDICINE

## 2024-07-25 PROCEDURE — 96374 THER/PROPH/DIAG INJ IV PUSH: CPT

## 2024-07-25 PROCEDURE — 99285 EMERGENCY DEPT VISIT HI MDM: CPT

## 2024-07-25 PROCEDURE — 250N000011 HC RX IP 250 OP 636

## 2024-07-25 PROCEDURE — 999N000040 HC STATISTIC CONSULT NO CHARGE VASC ACCESS

## 2024-07-25 PROCEDURE — 93005 ELECTROCARDIOGRAM TRACING: CPT

## 2024-07-25 PROCEDURE — 70496 CT ANGIOGRAPHY HEAD: CPT

## 2024-07-25 PROCEDURE — 85730 THROMBOPLASTIN TIME PARTIAL: CPT | Performed by: EMERGENCY MEDICINE

## 2024-07-25 PROCEDURE — 99223 1ST HOSP IP/OBS HIGH 75: CPT

## 2024-07-25 PROCEDURE — 99207 PR APP CREDIT; MD BILLING SHARED VISIT: CPT | Performed by: NURSE PRACTITIONER

## 2024-07-25 PROCEDURE — 85610 PROTHROMBIN TIME: CPT | Performed by: STUDENT IN AN ORGANIZED HEALTH CARE EDUCATION/TRAINING PROGRAM

## 2024-07-25 PROCEDURE — 250N000011 HC RX IP 250 OP 636: Performed by: PHYSICIAN ASSISTANT

## 2024-07-25 PROCEDURE — 83935 ASSAY OF URINE OSMOLALITY: CPT | Performed by: STUDENT IN AN ORGANIZED HEALTH CARE EDUCATION/TRAINING PROGRAM

## 2024-07-25 PROCEDURE — 200N000001 HC R&B ICU

## 2024-07-25 PROCEDURE — 250N000013 HC RX MED GY IP 250 OP 250 PS 637: Performed by: STUDENT IN AN ORGANIZED HEALTH CARE EDUCATION/TRAINING PROGRAM

## 2024-07-25 PROCEDURE — 83036 HEMOGLOBIN GLYCOSYLATED A1C: CPT | Performed by: STUDENT IN AN ORGANIZED HEALTH CARE EDUCATION/TRAINING PROGRAM

## 2024-07-25 PROCEDURE — 84443 ASSAY THYROID STIM HORMONE: CPT | Performed by: STUDENT IN AN ORGANIZED HEALTH CARE EDUCATION/TRAINING PROGRAM

## 2024-07-25 PROCEDURE — 99291 CRITICAL CARE FIRST HOUR: CPT | Mod: 25

## 2024-07-25 PROCEDURE — 250N000009 HC RX 250: Performed by: STUDENT IN AN ORGANIZED HEALTH CARE EDUCATION/TRAINING PROGRAM

## 2024-07-25 PROCEDURE — 85652 RBC SED RATE AUTOMATED: CPT

## 2024-07-25 PROCEDURE — 96365 THER/PROPH/DIAG IV INF INIT: CPT | Mod: 59

## 2024-07-25 PROCEDURE — 99291 CRITICAL CARE FIRST HOUR: CPT | Mod: FS | Performed by: PSYCHIATRY & NEUROLOGY

## 2024-07-25 PROCEDURE — 99223 1ST HOSP IP/OBS HIGH 75: CPT | Performed by: STUDENT IN AN ORGANIZED HEALTH CARE EDUCATION/TRAINING PROGRAM

## 2024-07-25 PROCEDURE — 250N000011 HC RX IP 250 OP 636: Performed by: STUDENT IN AN ORGANIZED HEALTH CARE EDUCATION/TRAINING PROGRAM

## 2024-07-25 PROCEDURE — 36415 COLL VENOUS BLD VENIPUNCTURE: CPT | Performed by: EMERGENCY MEDICINE

## 2024-07-25 RX ORDER — 3% SODIUM CHLORIDE 3 G/100ML
INJECTION, SOLUTION INTRAVENOUS CONTINUOUS
Status: DISCONTINUED | OUTPATIENT
Start: 2024-07-25 | End: 2024-07-25

## 2024-07-25 RX ORDER — SODIUM CHLORIDE 9 MG/ML
INJECTION, SOLUTION INTRAVENOUS CONTINUOUS
Status: DISCONTINUED | OUTPATIENT
Start: 2024-07-25 | End: 2024-07-25

## 2024-07-25 RX ORDER — LORAZEPAM 2 MG/ML
0.5 INJECTION INTRAMUSCULAR ONCE
Status: COMPLETED | OUTPATIENT
Start: 2024-07-25 | End: 2024-07-25

## 2024-07-25 RX ORDER — NICOTINE POLACRILEX 4 MG
15-30 LOZENGE BUCCAL
Status: DISCONTINUED | OUTPATIENT
Start: 2024-07-25 | End: 2024-07-29 | Stop reason: HOSPADM

## 2024-07-25 RX ORDER — ACETAMINOPHEN 650 MG/1
650 SUPPOSITORY RECTAL EVERY 4 HOURS PRN
Status: DISCONTINUED | OUTPATIENT
Start: 2024-07-25 | End: 2024-07-28

## 2024-07-25 RX ORDER — IOPAMIDOL 755 MG/ML
117 INJECTION, SOLUTION INTRAVASCULAR ONCE
Status: COMPLETED | OUTPATIENT
Start: 2024-07-25 | End: 2024-07-25

## 2024-07-25 RX ORDER — ONDANSETRON 2 MG/ML
4 INJECTION INTRAMUSCULAR; INTRAVENOUS EVERY 6 HOURS PRN
Status: DISCONTINUED | OUTPATIENT
Start: 2024-07-25 | End: 2024-07-29 | Stop reason: HOSPADM

## 2024-07-25 RX ORDER — ONDANSETRON 4 MG/1
4 TABLET, ORALLY DISINTEGRATING ORAL EVERY 6 HOURS PRN
Status: DISCONTINUED | OUTPATIENT
Start: 2024-07-25 | End: 2024-07-29 | Stop reason: HOSPADM

## 2024-07-25 RX ORDER — DEXTROSE MONOHYDRATE 25 G/50ML
25-50 INJECTION, SOLUTION INTRAVENOUS
Status: DISCONTINUED | OUTPATIENT
Start: 2024-07-25 | End: 2024-07-29 | Stop reason: HOSPADM

## 2024-07-25 RX ORDER — LORAZEPAM 2 MG/ML
INJECTION INTRAMUSCULAR
Status: COMPLETED
Start: 2024-07-25 | End: 2024-07-25

## 2024-07-25 RX ORDER — LEVETIRACETAM 5 MG/ML
500 INJECTION INTRAVASCULAR EVERY 12 HOURS
Status: DISCONTINUED | OUTPATIENT
Start: 2024-07-25 | End: 2024-07-28

## 2024-07-25 RX ADMIN — SODIUM CHLORIDE: 9 INJECTION, SOLUTION INTRAVENOUS at 15:56

## 2024-07-25 RX ADMIN — LEVETIRACETAM 500 MG: 5 INJECTION INTRAVENOUS at 20:30

## 2024-07-25 RX ADMIN — VASOPRESSIN: 20 INJECTION, SOLUTION INTRAVENOUS at 22:43

## 2024-07-25 RX ADMIN — PHYTONADIONE 10 MG: 10 INJECTION, EMULSION INTRAMUSCULAR; INTRAVENOUS; SUBCUTANEOUS at 14:08

## 2024-07-25 RX ADMIN — NICARDIPINE HYDROCHLORIDE 2.5 MG/HR: 0.2 INJECTION, SOLUTION INTRAVENOUS at 15:35

## 2024-07-25 RX ADMIN — LORAZEPAM 0.5 MG: 2 INJECTION INTRAMUSCULAR; INTRAVENOUS at 15:05

## 2024-07-25 RX ADMIN — LORAZEPAM 0.5 MG: 2 INJECTION INTRAMUSCULAR at 15:05

## 2024-07-25 RX ADMIN — MIDAZOLAM 1.5 MG: 1 INJECTION INTRAMUSCULAR; INTRAVENOUS at 16:54

## 2024-07-25 RX ADMIN — IOPAMIDOL 117 ML: 755 INJECTION, SOLUTION INTRAVENOUS at 13:26

## 2024-07-25 RX ADMIN — ACETAMINOPHEN 650 MG: 650 SUPPOSITORY RECTAL at 20:52

## 2024-07-25 RX ADMIN — LEVETIRACETAM 1000 MG: 100 INJECTION, SOLUTION INTRAVENOUS at 13:46

## 2024-07-25 RX ADMIN — PROTHROMBIN, COAGULATION FACTOR VII HUMAN, COAGULATION FACTOR IX HUMAN, COAGULATION FACTOR X HUMAN, PROTEIN C, PROTEIN S HUMAN, AND WATER 1604 UNITS: KIT at 14:06

## 2024-07-25 ASSESSMENT — ACTIVITIES OF DAILY LIVING (ADL)
ADLS_ACUITY_SCORE: 46
ADLS_ACUITY_SCORE: 35
ADLS_ACUITY_SCORE: 47
ADLS_ACUITY_SCORE: 46
ADLS_ACUITY_SCORE: 35

## 2024-07-25 ASSESSMENT — COLUMBIA-SUICIDE SEVERITY RATING SCALE - C-SSRS
6. HAVE YOU EVER DONE ANYTHING, STARTED TO DO ANYTHING, OR PREPARED TO DO ANYTHING TO END YOUR LIFE?: NO
2. HAVE YOU ACTUALLY HAD ANY THOUGHTS OF KILLING YOURSELF IN THE PAST MONTH?: NO
1. IN THE PAST MONTH, HAVE YOU WISHED YOU WERE DEAD OR WISHED YOU COULD GO TO SLEEP AND NOT WAKE UP?: NO

## 2024-07-25 NOTE — ED PROVIDER NOTES
Emergency Department Note      History of Present Illness     Chief Complaint   Stroke    HPI   Alyssa Higginbotham is a 69 year old female with history of CVA, aortic dissection, aortic valve replacement, and anticoagulated on warfarin present as transfer from [facility] ED for non-traumatic intracranial hemorrhage. EMS stated that patient has been having a headache for 2 days and experienced no relief with her migraine medications and ultimately presented to outside ED confused. She was found to have a mild left sided facial droop, generalized left weakness, and an altered mental state. Her last blood pressure reading is 139/85. Her CT scan at 1330 shows intracranial hemorrhaging and her warfarin was reversed with vitamin K and Kcentra. Patient also received a dose of Keppra. History limited due to patient's condition and supplemented by EMS and chart review.    Independent Historian   The patient's history was supplemented by the EMS and her ED visit note due to the patient's current condition.    Review of External Notes   I reviewed the ED visit note from today.    Past Medical History     Medical History and Problem List   Aortic dissection  Aortic valve disorder   Accidental fall  Benign meningioma of brain   Bunion  Chronic headache  Concussion syndrome   Depression  Heart murmur  Hepatitis C  Hyperlipidemia  Hypothyroid  Irregular heart rate  Marfan's syndrome   Mitrial valve disorder  Myalgia and myositis   Nasal fracture  Osteoporosis  Ocular migraine   Other acquired deformity of toe  PTSD  Vertigo   Stroke  PADDY  Disorder of liver  Non inflammatory disorder of vagina  Unspecified cataract   Tension headache     Medications   amitriptyline   butalbital-acetaminophen-caffeine  clonazepam  levothyroxine   Levoxyl   metoprolol succinate ER   rimegepant   Rizatriptan   simvastatin   triamterene-hctz  Warfarin  Zocor      Surgical History   Aortic valve replacement  Arthroplasty toes, right   Biopsy  breast  Colonoscopy   Repair thoracic aorta     Physical Exam     Patient Vitals for the past 24 hrs:   BP Temp Temp src Pulse Resp SpO2   07/25/24 1831 135/83 -- -- 105 21 98 %   07/25/24 1815 137/89 -- -- 106 21 97 %   07/25/24 1807 (!) 145/91 -- -- 102 21 97 %   07/25/24 1758 (!) 127/98 -- -- 113 22 --   07/25/24 1730 (!) 141/74 -- -- -- -- --   07/25/24 1715 122/62 -- -- 103 -- 98 %   07/25/24 1714 -- -- -- -- 16 --   07/25/24 1708 125/64 -- -- 105 18 99 %   07/25/24 1645 (!) 144/81 -- -- 105 (!) 9 98 %   07/25/24 1643 -- -- -- -- 20 --   07/25/24 1631 -- -- -- 99 23 98 %   07/25/24 1630 (!) 80/63 -- -- 104 30 --   07/25/24 1621 -- -- -- -- 23 --   07/25/24 1616 102/62 -- -- 103 -- 98 %   07/25/24 1545 128/87 -- -- 89 23 99 %   07/25/24 1516 -- -- -- 89 23 99 %   07/25/24 1515 (!) 131/92 -- -- 89 22 100 %   07/25/24 1513 -- 98.6  F (37  C) Temporal -- -- --   07/25/24 1503 -- -- -- -- -- 99 %   07/25/24 1502 -- -- -- 103 16 --   07/25/24 1500 137/83 -- -- 92 -- --     Physical Exam  Constitutional:       General: Not in acute distress.     Appearance: Normal appearance  HENT:      Head: Normocephalic and atraumatic.   Eyes:     Extraocular Movements: Extraocular movements intact.      Conjunctiva/sclera: Conjunctivae normal.      Pupils: Pupils are equal, round, and reactive to light.   Cardiovascular:     Rate and Rhythm: Normal rate and regular rhythm.   Pulmonary:      Effort: Pulmonary effort is normal.      Breath sounds: Normal breath sounds.   Abdominal:      General: Abdomen is flat. There is no distension.      Palpations: Abdomen is soft.      Tenderness: There is no abdominal tenderness.   Musculoskeletal:      Cervical back: Normal range of motion and neck supple. No rigidity.      General: No swelling or deformity.   Skin:     General: Skin is warm and dry.   Neurological:      General: Moving all 4 extremities.  Following simple commands.  Confused.  Even smile bilaterally.  No gaze palsy.  Pupils  PERRL.     Mental Status: Confused.   Psychiatric:         Mood and Affect: Mood normal.         Behavior: Behavior normal.     Diagnostics     Lab Results   Labs Ordered and Resulted from Time of ED Arrival to Time of ED Departure   INR - Abnormal       Result Value    INR 1.24 (*)    OSMOLALITY - Abnormal    Osmolality Blood 272 (*)    SODIUM - Abnormal    Sodium 125 (*)    OSMOLALITY, RANDOM URINE - Normal    Osmolality Urine 718     SODIUM RANDOM URINE    Sodium Urine mmol/L 72     HEMOGLOBIN A1C   ERYTHROCYTE SEDIMENTATION RATE AUTO   CRP INFLAMMATION   TSH WITH FREE T4 REFLEX   BLOOD CULTURE       Imaging   CT Head w/o Contrast   Preliminary Result   IMPRESSION:   1.  Mildly increased conspicuity of a small parenchymal hemorrhage in the inferolateral right temporal lobe measuring 6 mm with adjacent small-volume subarachnoid hemorrhage.   2.  Otherwise, no significant interval change in multicompartment intracranial hemorrhage, including dominant parenchymal hematoma is in the bilateral frontal lobes, right larger than left, as described.   3.  Mild local mass effect primarily in the frontal regions is unchanged. No significant midline shift/herniation.   4.  Continued follow-up is suggested.          EKG   ECG results from 07/25/24   ECG 12-LEAD WITH MUSE ([facility])     Value    Systolic Blood Pressure     Diastolic Blood Pressure     Ventricular Rate 88    Atrial Rate 88    CO Interval 162    QRS Duration 84        QTc 445    P Axis 101    R AXIS 57    T Axis 36    Interpretation ECG      Sinus rhythm  Septal infarct (cited on or before 30-Jan-2023)  Abnormal ECG  No ST- elevation myocardial infarction   ECG interpreted by me at 1036.  See ED course for independent interpretation.        *Note: Due to a large number of results and/or encounters for the requested time period, some results have not been displayed. A complete set of results can be found in Results Review.     See ED course for independent  interpretation of EKG     Independent Interpretation   See ED Course    ED Course      Medications Administered   Medications   niCARdipine 40 mg in 200 mL NS (CARDENE) infusion (2.5 mg/hr Intravenous Rate/Dose Verify 7/25/24 1905)   HOLD: warfarin (COUMADIN) therapy (has no administration in time range)   LORazepam (ATIVAN) injection 0.5 mg (0.5 mg Intravenous $Given 7/25/24 1505)     Procedures   Procedures     Discussion of Management   Indu Rhoades, see ED course.    ED Course   ED Course as of 07/25/24 2103   u Jul 25, 2024   1500 I obtained the history and examined the patient as above.    1506 Discussed case with neurosurgery PA Indu Rhoades who is at bedside evaluating the patient. Recommend notifying stroke neurology as well.   1508 I rechecked and updated the patient.     1511 Discussed patient with hospitalist (Dr. Erickson) who accepted the patient for admission to ICU.   1537 EKG 12-lead, tracing only  Normal sinus rhythm.  Rate of 88.  Normal CA and QRS.  Normal QTc.  No acute ST elevation or depression as compared with EKG from earlier today.   1545 I rechecked and updated the patient.     1546 On my reexamination, pupils PERRL.   1555 Updated stroke neurology (Porfirio) of patient's arrival.   1649 INR(!): 1.24  Warfarin reversed       Optional/Additional Documentation  None    Medical Decision Making / Diagnosis     CMS Diagnoses: None    MIPS       None    MDM   Alyssa Higginbotham is a 69-year-old female as described above presents to the emergency department as transfer from outside facility for nontraumatic ICH.  Initially reported to have left-sided facial drooping and left-sided weakness.  Patient has received reversal of anticoagulation for warfarin at outside facility including Kcentra and vitamin K.  Seizure prophylaxis given with Keppra.  At time of arrival, patient is confused, intermittently following simple commands, but otherwise no obvious focal deficits.  Even smile.  Pupils PERRL.  Moving  all 4 extremities.  Neurosurgery consulted immediately upon arrival and evaluated the patient.  IV nicardipine drip continued for close blood pressure management at neurosurgery recommended goals.  Stroke neurology also notified of patient's arrival including timing of medication administration at outside facility.  Hospitalist was consulted for admission to ICU.  Patient and family updated on plan.  Additional workup and orders as listed in chart.    Please refer to ED course above as part of continuation of MDM for details on the patient's treatment course and any changes or updates in care plan beyond my initial evaluation and MDM creation.    Critical Care:     I have determined that Alyssa Higginbotham is critically ill with high probability of imminent, life threatening deterioration that could result in multi-organ failure.     Total Critical Care time was approximately 30 minutes for this patient exclusive of separately billable procedures, treating other patients, and teaching time.       Critical care services included obtaining a history, examining the patient, pulse oximetry, ordering and review of studies, arranging urgent treatment with development of a management plan, evaluation of patient's response to treatment, frequent reassessment, and discussions with other providers. She is critically ill but stabilized upon admission.       Please see MDM section and the rest of the note for further information on patient assessment and treatment.      Disposition   The patient was admitted to the hospital.     Diagnosis     ICD-10-CM    1. Subarachnoid hemorrhage (H)  I60.9       2. Intraparenchymal hemorrhage of brain (H)  I61.9            Discharge Medications   Current Discharge Medication List            Scribe Disclosure:  I, Phoenix Peterson, am serving as a scribe at 3:11 PM on 7/25/2024 to document services personally performed by Marito Carvajal DO based on my observations and the provider's statements  to me.        Marito Carvajal DO  07/25/24 8650

## 2024-07-25 NOTE — CONSULTS
Neurosurgery Consultation    Date of Service:  07/25/2024  Date of Admission:  7/25/2024  Hospital Day: 1    Assessment/Plan  Alyssa Higginbotham is a 69 year old female with a past medical history of type a aortic dissection in 2000 on Coumadin for mechanical aortic valve admitted on 7/25/2024 for altered mental status.  Patient was found to have bifrontal IPH with scattered SAH.      Neuro  # IPH  #SAH  -Neurochecks every 2 hrs  -SBP goal <150 mmHg  -HOB > 30   -PT/OT/SLP  -Obtain INR  -Reversal with Kcentra  -Vitamin K may need repeat dosing overnight  -Repeat INR in the morning  -Repeat CT scan in 4 hours  -No surgical intervention indicated at this time.    Discussion with  at bedside.  He would like his life wife to be a full code.  No surgical intervention warranted at this time but patient's  is open to surgery if the need should arise.    Clinically Significant Risk Factors Present on Admission         # Hyponatremia: Lowest Na = 127 mmol/L in last 2 days, will monitor as appropriate         # Drug Induced Coagulation Defect: home medication list includes an anticoagulant medication    # Hypertension: Noted on problem list                        TIME SPENT ON THIS ENCOUNTER   I spent 60 minutes of time on the unit/floor managing the care of Alyssa Higginbotham excluding time performing procedures. I have personally reviewed the following data/imaging over the past 24 hours.     The patient was discussed with Dr. Hayes via Cuong Marquez, [place]  [address] [address]    Tel [phone]    History of Present Illness:  Alyssa Higginbotham is a 69 year old female with a past medical history of type a aortic dissection in 2000 on Coumadin, hypertension, hyperlipidemia, mechanical aortic valve admitted on 7/25/2024 for altered mental status.  Patient was found to have bifrontal IPH with scattered SAH.    Patient developed  "headache 2 days ago with worsening altered mental status as time progressed leading to her being brought to the ED today.  Patient's blood pressure was 140 systolic on admission.  INR 2.27 initially there was thought of left facial droop and left hand weakness and patient exhibiting bizarre behavior like attempting to put on 3 shirts.  Patient's  denies recent trauma.        Allergies   Allergen Reactions    Codeine Other (See Comments)     Faints    Demerol [Meperidine] Other (See Comments)     Reaction not reported by patient., Patient states she felt awful.       Current Medications:  Current Facility-Administered Medications   Medication Dose Route Frequency Provider Last Rate Last Admin    phytonadione (AQUAMEPHYTON) 10 MG/ML 10 mg in sodium chloride 0.9 % 50 mL intermittent infusion  10 mg Intravenous Once Chelsy Rodriguez PA-C 100 mL/hr at 07/25/24 1408 10 mg at 07/25/24 1408    sodium chloride 0.9 % bag for CT scan flush use  100 mL As instructed Once Ivana Broussard MD           PRN Medications:  Current Facility-Administered Medications   Medication Dose Route Frequency Provider Last Rate Last Admin    HOLD: warfarin (COUMADIN) therapy   Does not apply HOLD Chelsy Rodriguez PA-C           Infusions:  Current Facility-Administered Medications   Medication Dose Route Frequency Provider Last Rate Last Admin    niCARdipine 40 mg in 200 mL NS (CARDENE) infusion  0.5-15 mg/hr Intravenous Continuous Chelsy Rdoriguez PA-C           Physical Examination:  Vitals: /76   Pulse 94   Temp 97.8  F (36.6  C)   Resp 18   Ht 1.575 m (5' 2\")   Wt 51.3 kg (113 lb)   SpO2 95%   BMI 20.67 kg/m    General: Adult female patient, lying in bed, critically-ill  HEENT: Normocephalic, atraumatic, no icterus, oral cavity/oropharynx pink and moist  Cardiac: Adequately perfused  Pulm: Unlabored, expansion symmetric, no retractions or use of accessory muscles  Abdomen: Soft, non-distended  Extremities: Warm, no " edema, distal pulses +2, well perfused  Skin: No rash or lesion  Psych: Calm and cooperative  Neuro:  Mental status: drowsy oriented to self, time, place, and circumstance.  Has trouble following complex commands needing multiple explanations of task.  Cranial nerves: VFF, PERRL, conjugate gaze, EOMI, facial sensation intact, face symmetric, shoulder shrug strong, tongue midline, no dysarthria.   Motor: Normal bulk and tone. No abnormal movements. 5/5 strength in 4/4 extremities.   Sensory: Intact to light touch x 4 extremities  Coordination: FNF and HS without ataxia or dysmetria. Rapid alternating movements intact.   Gait: GOYO, deferred.    NIHSS  Interval baseline (07/25/24 1409)   1a. Level of Consciousness 1-->Not alert, but arousable by minor stimulation to obey, answer, or respond   1b. LOC Questions 0-->Answers both questions correctly   1c. LOC Commands 0-->Performs both tasks correctly   2.   Best Gaze 0-->Normal   3.   Visual 0-->No visual loss   4.   Facial Palsy 0-->Normal symmetrical movements   5a. Motor Arm, Left 0-->No drift, limb holds 90 (or 45) degrees for full 10 secs   5b. Motor Arm, Right 0-->No drift, limb holds 90 (or 45) degrees for full 10 secs   6a. Motor Leg, Left 0-->No drift, leg holds 30 degree position for full 5 secs   6b. Motor Leg, right 0-->No drift, leg holds 30 degree position for full 5 secs   7.   Limb Ataxia 0-->Absent   8.   Sensory 0-->Normal, no sensory loss   9.   Best Language 0-->No aphasia, normal   10. Dysarthria 0-->Normal   11. Extinction and Inattention  0-->No abnormality   Total 1 (07/25/24 1409)     ICH Score (at arrival)  Scoring Tool Score   Age ? 80 years 1 point No   GCS score  3-4   5-12   13-15   2 point  1 point  0 point GCS 13-15   Hematoma volume, cm 3 ? 30 1 point No   Intraventricular extension 1 point No   Infratentorial location 1 point No   Total 0       Labs and Imaging:    Results for orders placed or performed during the hospital encounter of  07/25/24 (from the past 24 hour(s))   Glucose by meter   Result Value Ref Range    GLUCOSE BY METER POCT 130 (H) 70 - 99 mg/dL   CTA Head Neck with Contrast   Result Value Ref Range    Radiologist flags Acute intracranial hemorrhage (AA)     Narrative    EXAM: CTA HEAD NECK W CONTRAST, CT HEAD PERFUSION W CONTRAST  LOCATION: [facility]  DATE: 7/25/2024    INDICATION: Code Stroke to evaluate for potential thrombolysis and thrombectomy. PLEASE READ IMMEDIATELY.  COMPARISON: 9/22/2016, 9/21/2023.  TECHNIQUE: Head and neck CT angiogram with IV contrast. Noncontrast head CT followed by axial helical CT images of the head and neck vessels obtained during the arterial phase of intravenous contrast administration. Axial 2D reconstructed images and   multiplanar 3D MIP reconstructed images of the head and neck vessels were performed by the technologist. Additional CT cerebral perfusion was performed utilizing a second contrast bolus. Perfusion data were post processed with generation of standard   perfusion maps and estimation of ischemic/infarcted volumes utilizing standard threshold values. Dose reduction techniques were used. All stenosis measurements made according to NASCET criteria unless otherwise specified.  CONTRAST: isovue 370 67 ml (accession [id number]), isovue 370 50 ml (accession [id number])    FINDINGS:   NONCONTRAST HEAD CT:   INTRACRANIAL CONTENTS: Bifrontal intraparenchymal hyperdensities measuring 31 x 43 x 27 mm on the right, and 12 x 13 x 16 mm on the left (estimated volume 18 cc and 1 cc on the left) . Bifrontal and left intrasylvian extra-axial hyperdense material. No   significant midline shift, mass effect. No evidence of acute hydrocephalus. No area of loss of gray-white matter differentiation.     VISUALIZED ORBITS/SINUSES/MASTOIDS: No intraorbital abnormality. No paranasal sinus mucosal disease. No middle ear or mastoid effusion.    BONES/SOFT TISSUES: No acute  abnormality.    HEAD CTA:  ANTERIOR CIRCULATION: No stenosis/occlusion, aneurysm, or high flow vascular malformation. Fetal origin of the left posterior cerebral artery from the anterior circulation.    POSTERIOR CIRCULATION: No stenosis/occlusion, aneurysm, or high flow vascular malformation. Balanced vertebral arteries supply a normal basilar artery.     DURAL VENOUS SINUSES: Not well evaluated on a technical basis.    NECK CTA:  RIGHT CAROTID: No measurable stenosis or dissection distal to the chronic aortic dissection. Tortuous beaded appearance of the right proximal/mid internal carotid artery.    LEFT CAROTID: No measurable stenosis or dissection. Tortuous beaded appearance of the proximal/left internal carotid artery.    VERTEBRAL ARTERIES: Tortuous left V2 segment vertebral artery. No focal stenosis or dissection. Balanced vertebral arteries.    AORTIC ARCH: Classic aortic arch. Chronic dissection extending into the right proximal common carotid artery.    NONVASCULAR STRUCTURES: Stable right thyroid nodule.    CT PERFUSION:  PERFUSION MAPS: Symmetrical cerebral perfusion with the exception of the region of the right frontal intraparenchymal hemorrhage. No focal deficits in cerebral blood flow or volume to suggest ischemia/oligemia accounting for technical limitations.    RAPID ANALYSIS: Unreliable secondary to above.      Impression    IMPRESSION:   HEAD CT:  1.  Bifrontal right greater than left intraparenchymal hemorrhages (hemorrhage volume estimated at 18 cc and 1 cc respectively); as well as scattered subarachnoid hemorrhage.  2.  Negative for acute infarct. Negative for herniation changes or evidence of acute hydrocephalus.    HEAD CTA:   1.  No large vessel occlusion. No severe stenosis.  2.  No aneurysm, or high flow vascular malformation identified.    NECK CTA:  1.  No hemodynamically significant stenosis in the neck vessels.   2.  No evidence for acute internal carotid or vertebral artery  dissection.   3.  Suspect fibromuscular dysplasia involving bilateral internal carotid arteries and possibly the left vertebral artery.  4.  Chronic aortic dissection extending into the right proximal common carotid artery not significantly changed, and better appreciated on dedicated imaging    CT PERFUSION:  1.  No core infarct on perfusion images.      [Critical Result: Acute intracranial hemorrhage]    Finding was identified on 7/25/2024 1:41 PM CDT.     Chelsy Rodriguez PA-C was contacted by me on 7/25/2024 1:29 PM CDT and verbalized understanding of the critical result.   CT Head Perfusion w Contrast - For Tier 2 Stroke   Result Value Ref Range    Radiologist flags Acute intracranial hemorrhage (AA)     Narrative    EXAM: CTA HEAD NECK W CONTRAST, CT HEAD PERFUSION W CONTRAST  LOCATION: [facility]  DATE: 7/25/2024    INDICATION: Code Stroke to evaluate for potential thrombolysis and thrombectomy. PLEASE READ IMMEDIATELY.  COMPARISON: 9/22/2016, 9/21/2023.  TECHNIQUE: Head and neck CT angiogram with IV contrast. Noncontrast head CT followed by axial helical CT images of the head and neck vessels obtained during the arterial phase of intravenous contrast administration. Axial 2D reconstructed images and   multiplanar 3D MIP reconstructed images of the head and neck vessels were performed by the technologist. Additional CT cerebral perfusion was performed utilizing a second contrast bolus. Perfusion data were post processed with generation of standard   perfusion maps and estimation of ischemic/infarcted volumes utilizing standard threshold values. Dose reduction techniques were used. All stenosis measurements made according to NASCET criteria unless otherwise specified.  CONTRAST: isovue 370 67 ml (accession [id number]), isovue 370 50 ml (accession [id number])    FINDINGS:   NONCONTRAST HEAD CT:   INTRACRANIAL CONTENTS: Bifrontal intraparenchymal hyperdensities measuring 31 x 43 x 27 mm  on the right, and 12 x 13 x 16 mm on the left (estimated volume 18 cc and 1 cc on the left) . Bifrontal and left intrasylvian extra-axial hyperdense material. No   significant midline shift, mass effect. No evidence of acute hydrocephalus. No area of loss of gray-white matter differentiation.     VISUALIZED ORBITS/SINUSES/MASTOIDS: No intraorbital abnormality. No paranasal sinus mucosal disease. No middle ear or mastoid effusion.    BONES/SOFT TISSUES: No acute abnormality.    HEAD CTA:  ANTERIOR CIRCULATION: No stenosis/occlusion, aneurysm, or high flow vascular malformation. Fetal origin of the left posterior cerebral artery from the anterior circulation.    POSTERIOR CIRCULATION: No stenosis/occlusion, aneurysm, or high flow vascular malformation. Balanced vertebral arteries supply a normal basilar artery.     DURAL VENOUS SINUSES: Not well evaluated on a technical basis.    NECK CTA:  RIGHT CAROTID: No measurable stenosis or dissection distal to the chronic aortic dissection. Tortuous beaded appearance of the right proximal/mid internal carotid artery.    LEFT CAROTID: No measurable stenosis or dissection. Tortuous beaded appearance of the proximal/left internal carotid artery.    VERTEBRAL ARTERIES: Tortuous left V2 segment vertebral artery. No focal stenosis or dissection. Balanced vertebral arteries.    AORTIC ARCH: Classic aortic arch. Chronic dissection extending into the right proximal common carotid artery.    NONVASCULAR STRUCTURES: Stable right thyroid nodule.    CT PERFUSION:  PERFUSION MAPS: Symmetrical cerebral perfusion with the exception of the region of the right frontal intraparenchymal hemorrhage. No focal deficits in cerebral blood flow or volume to suggest ischemia/oligemia accounting for technical limitations.    RAPID ANALYSIS: Unreliable secondary to above.      Impression    IMPRESSION:   HEAD CT:  1.  Bifrontal right greater than left intraparenchymal hemorrhages (hemorrhage volume  estimated at 18 cc and 1 cc respectively); as well as scattered subarachnoid hemorrhage.  2.  Negative for acute infarct. Negative for herniation changes or evidence of acute hydrocephalus.    HEAD CTA:   1.  No large vessel occlusion. No severe stenosis.  2.  No aneurysm, or high flow vascular malformation identified.    NECK CTA:  1.  No hemodynamically significant stenosis in the neck vessels.   2.  No evidence for acute internal carotid or vertebral artery dissection.   3.  Suspect fibromuscular dysplasia involving bilateral internal carotid arteries and possibly the left vertebral artery.  4.  Chronic aortic dissection extending into the right proximal common carotid artery not significantly changed, and better appreciated on dedicated imaging    CT PERFUSION:  1.  No core infarct on perfusion images.      [Critical Result: Acute intracranial hemorrhage]    Finding was identified on 7/25/2024 1:41 PM CDT.     Chelsy Rodriguez PA-C was contacted by me on 7/25/2024 1:29 PM CDT and verbalized understanding of the critical result.   CBC with Platelets & Differential    Narrative    The following orders were created for panel order CBC with Platelets & Differential.  Procedure                               Abnormality         Status                     ---------                               -----------         ------                     CBC with platelets and d...[[id number]]  Abnormal            Final result                 Please view results for these tests on the individual orders.   Basic metabolic panel   Result Value Ref Range    Sodium 127 (L) 135 - 145 mmol/L    Potassium 3.4 3.4 - 5.3 mmol/L    Chloride 87 (L) 98 - 107 mmol/L    Carbon Dioxide (CO2) 27 22 - 29 mmol/L    Anion Gap 13 7 - 15 mmol/L    Urea Nitrogen 13.2 8.0 - 23.0 mg/dL    Creatinine 0.65 0.51 - 0.95 mg/dL    GFR Estimate >90 >60 mL/min/1.73m2    Calcium 9.2 8.8 - 10.4 mg/dL    Glucose 154 (H) 70 - 99 mg/dL   INR   Result Value Ref Range     INR 2.27 (H) 0.85 - 1.15   Partial thromboplastin time   Result Value Ref Range    aPTT 36 22 - 38 Seconds   Troponin T, High Sensitivity   Result Value Ref Range    Troponin T, High Sensitivity 12 <=14 ng/L   CBC with platelets and differential   Result Value Ref Range    WBC Count 11.8 (H) 4.0 - 11.0 10e3/uL    RBC Count 4.91 3.80 - 5.20 10e6/uL    Hemoglobin 13.2 11.7 - 15.7 g/dL    Hematocrit 37.6 35.0 - 47.0 %    MCV 77 (L) 78 - 100 fL    MCH 26.9 26.5 - 33.0 pg    MCHC 35.1 31.5 - 36.5 g/dL    RDW 12.8 10.0 - 15.0 %    Platelet Count 237 150 - 450 10e3/uL    % Neutrophils 88 %    % Lymphocytes 5 %    % Monocytes 6 %    % Eosinophils 0 %    % Basophils 0 %    % Immature Granulocytes 1 %    NRBCs per 100 WBC 0 <1 /100    Absolute Neutrophils 10.4 (H) 1.6 - 8.3 10e3/uL    Absolute Lymphocytes 0.6 (L) 0.8 - 5.3 10e3/uL    Absolute Monocytes 0.8 0.0 - 1.3 10e3/uL    Absolute Eosinophils 0.0 0.0 - 0.7 10e3/uL    Absolute Basophils 0.0 0.0 - 0.2 10e3/uL    Absolute Immature Granulocytes 0.1 <=0.4 10e3/uL    Absolute NRBCs 0.0 10e3/uL     *Note: Due to a large number of results and/or encounters for the requested time period, some results have not been displayed. A complete set of results can be found in Results Review.       All relevant imaging and laboratory values personally reviewed.    Dragon Disclosure   This was created at least in part with a voice recognition software. Mistakes/typos may be present.

## 2024-07-25 NOTE — ED TRIAGE NOTES
Pt was a transfer from [facility]   Pt was given k centra and vitamin K   Pt has been confusion and agigited since last night that has gotten wrose   Start states it was a head bleed

## 2024-07-25 NOTE — PROGRESS NOTES
See Raghav Marquez CNP consult ntoe    Reviewed imaging with family and plans from nsgy standpoint   Family in agreement   Dr. Hayes has reviewed history, imaging and in agreement as well       Indu Rene PA-C  [facility] Neurosurgery  [address] [address]  Tel [phone]  Fax [phone]  Text page via [facility] Paging/Directory

## 2024-07-25 NOTE — CONSULTS
[facility]    Stroke Consult Note    Reason for Consult:  IPH    Chief Complaint: Stroke     HPI  Alyssa Higginbotham is a 69 year old female with a PMH of migraine, HLD, HTN, aortic dissection (2000), marfan syndrome, s/p AVR on coumadin. She presents to the ED for evaluation of headache and AMS. 2 days prior (7/23) she had onset of severe headache that responded to her typical migraine medications. Then yesterday afternoon/evening she has onset of severe headache that was not responsive to her typical abortive therapies. Per Jeferson () Jennifer was not getting out of bed and not responding to his questions. He reports she was able to walk to the bathroom this morning. On initial [facility] ED providers examination, patient has significant AMS with inattention and difficulty following commands; she also has subtle L facial droop and notable L hand weakness. /77. INR 2.27. aPTT: 36. She was given 1000 mg of Keppra. Warfarin was reversed starting with Kcentra and Vitamin K. INR post reversal interventions is 1.24. Patient was transferred from [facility] to [facility].     Jeferson denies Jennifer having any recent head trauma, falls or car accident. Regarding her migraine history she typically has one every 12-14 days.     Stroke Evaluation Summarized  MRI/Head CT CTH 4 hour stability scan: Pending  CTH @ 1:27 pm: Bifrontal right greater than left intraparenchymal hemorrhages (hemorrhage volume estimated at 18 cc and 1 cc respectively); as well as scattered subarachnoid hemorrhage    Intracranial Vasculature CTA Head: No LVO or severe stenosis.   Cervical Vasculature CTA Neck: No LVO. Chronic arotic dissection extending into the right proximal common carotid artery    CT Perfusion No cofe infarct      Echocardiogram Pending    EKG/Telemetry Sinus rhythm   Septal infarct (cited on or before 30-Jan-2023)    Other Testing INR: 2.27 ->1.24     LDL  No lab value available in past 30 days   A1C  No lab value  "available in past 90 days   Troponin 7/25/2024: 12 ng/L     Impression  Bifrontal Right > Left intraparenchymal hemorrhage, scattered SAH of unclear etiology     Recommendations   - Admit to the ICU  - Neurochecks and Vital Signs every 1 hour  - Systolic BP Goal: 130 - 150   - Recommend Nicadipine gtt, avoid periods of hypotension   - Head of bed elevated >30 degrees  - Hold anticoagulation/anti-platelet/NSAIDs medications  - Warfarin Reversal completed at [facility], recheck INR tomorrow AM  - Continue Telemetry  - Imaging:   - Repeat CTH - 4 hour stability scan (timed for 5:30 pm, ordered), please contact Stroke provider on call once completed    - MRI Brain with and without contrast with SWI sequence, timed for tomorrow morning    - MRV Brain with contrast, timed for tomorrow morning   - AED: Keppra 500 mg twice daily for maintenance    - Bedside Glucose Monitoring  - Recommend Neurosurgery consult   - A1c, Troponin x 3  - PT/OT/SLP  - Stroke Education  - Euthermia, Euglycemia  - ESR/CRP (ordered)  - Blood cultures (ordered)    DVT: SCDs    Patient Follow-up    - final recommendation pending work-up    Thank you for this consult. We will continue to follow.     Starla Parmar PA-C  Vascular Neurology    To page me or covering stroke neurology team member, click here: AMCOM  Choose \"On Call\" tab at top, then select \"NEUROLOGY/ALL SITES\" from middle drop-down box, press Enter, then look for \"stroke\" or \"telestroke\" for your site.  _____________________________________________________    Clinically Significant Risk Factors Present on Admission         # Hyponatremia: Lowest Na = 125 mmol/L in last 2 days, will monitor as appropriate         # Drug Induced Coagulation Defect: home medication list includes an anticoagulant medication    # Hypertension: Noted on problem list                     Past Medical History    Past Medical History:   Diagnosis Date    Benign meningioma of brain (H) 03/2015    initially seen on " CT of head that was obtained after a fall. Frontal lobe, confirmed on an MRI Mar 2015, 3 mm    Bunion     Chronic headache     Depression     Heart murmur     Hepatitis C     Hyperlipidemia     Hypothyroid     Irregular heart rate     Nasal fracture 02/28/2015    fell face first on sideache    Osteoporosis     Other acquired deformity of toe     Stroke (H)     on CT scan     Medications   Home Meds  Prior to Admission medications    Medication Sig Start Date End Date Taking? Authorizing Provider   amitriptyline (ELAVIL) 10 MG tablet Take 1 tablet (10 mg) by mouth at bedtime 1/23/24   Rafaela Woods MD   butalbital-acetaminophen-caffeine (ESGIC) -40 MG tablet Take 2 tablets by mouth every 8 hours as needed for headaches [BUTALBITAL-ACETAMINOPHEN-CAFFEINE (FIORICET, ESGIC) -40 MG PER TABLET] TAKE 1 TO 2 TABLETS BY MOUTH EVERY 8 HOURS AS NEEDED FOR PAIN. MAX OF 4000 MG ACETAMINOPHEN PER 24 HOURS Strength: -40 mg 1/11/23   Rafaela Woods MD   clonazePAM (KLONOPIN) 0.5 MG tablet TAKE ONE TABLET BY MOUTH ONCE NIGHTLY AS NEEDED FOR ANXIETY OR SLEEP 6/11/24   Rafaela Woods MD   levothyroxine (SYNTHROID/LEVOTHROID) 75 MCG tablet TAKE 1 TABLET ON WEDNESDAY, SATURDAY 4/8/24   Dawn Goff PA-C   levothyroxine (SYNTHROID/LEVOTHROID) 88 MCG tablet Take 1 tablet of 88mcg by mouth Monday, Tuesday, Thursday, Friday and Sunday. Will take 75mcg the other two days. 4/8/24   Dawn Goff PA-C   metoprolol succinate ER (TOPROL XL) 200 MG 24 hr tablet Take 1 tablet (200 mg) by mouth daily 1/19/24   Rafaela Woods MD   multivitamin (ONE A DAY) per tablet [MULTIVITAMIN (ONE A DAY) PER TABLET] Take 1 tablet by mouth daily.  4/13/15   Provider, Nadia   rimegepant (NURTEC) 75 MG ODT tablet Place 1 tablet (75 mg) under the tongue daily as needed for migraine Maximum of 1 tablet every 24 hours. 6/5/24   Babs Jin MD   simvastatin (ZOCOR) 20 MG tablet Take 1 tablet  (20 mg) by mouth at bedtime TAKE 1 TABLET AT BEDTIME (DUE FOR AN OFFICE VISIT) 4/22/24   Jonathon Richardson MD   SUMAtriptan (IMITREX) 5 MG/ACT nasal spray Spray 1 spray in nostril as needed for migraine May repeat dose x1 after at least 2 hours if migraine persists 6/24/24   Babs Jin MD   triamterene-HCTZ (MAXZIDE) 75-50 MG tablet Take 0.5 tablets by mouth daily 1/23/24   Rafaela Woods MD   warfarin ANTICOAGULANT (COUMADIN) 1 MG tablet Take 4 tablets (4mg) by mouth on Fridays, or as directed. Adjust dose based on INR results as directed. 3/4/24   Rafaela Woods MD   warfarin ANTICOAGULANT (COUMADIN) 5 MG tablet Take 1 tablet (5mg) by mouth 6 days of the week, Sun, Mon, Tues, Wed, Thurs, Sat, or as directed.  Adjust dose based on INR results. 3/4/24   Rafaela Woods MD       Scheduled Meds  Current Facility-Administered Medications   Medication Dose Route Frequency Provider Last Rate Last Admin    midazolam (VERSED) injection 2 mg  2 mg Intravenous Once Yeh, [place], DO           Infusion Meds  Current Facility-Administered Medications   Medication Dose Route Frequency Provider Last Rate Last Admin    niCARdipine 40 mg in 200 mL NS (CARDENE) infusion  0.5-15 mg/hr Intravenous Continuous Yeh, Marito, DO 5 mL/hr at 07/25/24 1737 1 mg/hr at 07/25/24 1737    sodium chloride 0.9 % infusion   Intravenous Continuous Darshana Erickson  mL/hr at 07/25/24 1556 New Bag at 07/25/24 1556       Allergies   Allergies   Allergen Reactions    Codeine Other (See Comments)     Faints    Demerol [Meperidine] Other (See Comments)     Reaction not reported by patient., Patient states she felt awful.          PHYSICAL EXAMINATION   Temp:  [97.8  F (36.6  C)-98.6  F (37  C)] 98.6  F (37  C)  Pulse:  [] 103  Resp:  [9-30] 16  BP: ()/(62-92) 141/74  SpO2:  [95 %-100 %] 98 %    General Exam  General:  patient lying in bed with her eyes closed  HEENT:  normocephalic/atraumatic  Pulmonary:  no  respiratory distress    Neuro Exam   Mental Status: lethargic/drowsy, not following commands though able to squeeze bilateral fingers/hands unclear if voluntary or involuntary, no audible speech  Cranial Nerves:  blinks to threat when examiner opens eyes, does not follow commands to track examiner but appears to look around the room when briefly opening eyes, left facial asymmetry that ?resolved with activation, pupils reactive   Motor: able to move all limbs spontaneously, briefly maintains antigravity with elevation of bilateral upper extremities but drift down to bed (not following commands to keep arms elevated), able to maintain lower extremities after evaluation by examiner but drifts down to the bed, bilateral hand  strength symmetric, grimaces and withdraws to noxious stimuli in all 4 extremities   Reflexes:    Sensory: See motor, unable to test extension    Coordination:  Unable to assess  Station/Gait:  deferred    ICH Score (at arrival)  Scoring Tool Score   Age ? 80 years 1 point No   GCS score  3-4   5-12   13-15   2 point  1 point  0 point GCS 5-12   Hematoma volume, cm 3 ? 30 1 point No   Intraventricular extension 1 point No   Infratentorial location 1 point No   Total 1     Stroke Scales  NIHSS  1a. Level of Consciousness 2-->Not alert, requires repeated stimulation to attend, or is obtunded and requires strong or painful stimulation to make movements (not stereotyped)   1b. LOC Questions 2-->Answers neither question correctly   1c. LOC Commands 1-->Performs one task correctly   2.   Best Gaze 0-->Normal (does not follow commands for formal gaze testing but appeared to look on both sides when moving)   3.   Visual 0-->No visual loss (blinks to threat when examiner attempts to open eyes)   4.   Facial Palsy 1-->Minor paralysis (flattened nasolabial fold, asymmetry on smiling) (left facial asymmetry at rest, ?resolved with brief smile)   5a. Motor Arm, Left 2-->Some effort against gravity, limb  "cannot get to or maintain (if cued) 90 (or 45) degrees, drifts down to bed, but has some effort against gravity   5b. Motor Arm, Right 2-->Some effort against gravity, limb cannot get to or maintain (if cued) 90 (or 45) degrees, drifts down to bed, but has some effort against gravity   6a. Motor Leg, Left 2-->Some effort against gravity, leg falls to bed by 5 secs, but has some effort against gravity   6b. Motor Leg, right 2-->Some effort against gravity, leg falls to bed by 5 secs, but has some effort against gravity   7.   Limb Ataxia 0-->Absent   8.   Sensory 0-->Normal, no sensory loss (responds to noxious stimuli in all 4 extremities)   9.   Best Language 3-->Mute, global aphasia, no usable speech or auditory comprehension   10. Dysarthria 2-->Severe dysarthria, patients speech is so slurred as to be unintelligible in the absence of or out of proportion to any dysphasia, or is mute/anarthric   11. Extinction and Inattention  0-->No abnormality   Total 19 (07/25/24 1646)     Imaging  I personally reviewed all imaging; relevant findings per HPI.    Labs Data   CBC  Recent Labs   Lab 07/25/24  1329   WBC 11.8*   RBC 4.91   HGB 13.2   HCT 37.6        Basic Metabolic Panel   Recent Labs   Lab 07/25/24  1620 07/25/24  1329 07/25/24  1305   * 127*  --    POTASSIUM  --  3.4  --    CHLORIDE  --  87*  --    CO2  --  27  --    BUN  --  13.2  --    CR  --  0.65  --    GLC  --  154* 130*   AMI  --  9.2  --      Liver Panel  No results for input(s): \"PROTTOTAL\", \"ALBUMIN\", \"BILITOTAL\", \"ALKPHOS\", \"AST\", \"ALT\", \"BILIDIRECT\" in the last 168 hours.  INR    Recent Labs   Lab Test 07/25/24  1620 07/25/24  1329 07/19/24  0751   INR 1.24* 2.27* 1.9*         Stroke Consult Data Data   This was a non-emergent, non-telestroke consult.  I have personally spent a total of 75 minutes providing care today, time spent in reviewing medical records and devising the plan as recorded above.   "

## 2024-07-25 NOTE — PROGRESS NOTES
ANTICOAGULATION  MANAGEMENT: Discharge Review    Alyssa Higginbotham chart reviewed for anticoagulation continuity of care    Emergency room visit on 7/25/24 for severe headache for 2 days, confusion, and not communicating normally.   - transferred to [facility] with Neurosurgery Team.   - DX: Nontraumatic, bifrontal intraparenchymal hemorrhage (IPH) with scattered subarachnoid hemorrhage    - 2 days ago the patient had a headache and took her butalbital-acetaminophen-caffeine with some relief. Yesterday the headache came back and she took Zyrtec without relief. she developed confusion and a flat affect.      Discharge disposition: Transferred hospitals to [facility]    Results:    Recent labs: (last 7 days)     07/19/24  0751 07/25/24  1329 07/25/24  1620   INR 1.9* 2.27* 1.24*     Anticoagulation inpatient management:     reversed with aPhytonadione 10mg infusion on7/25/24    Anticoagulation discharge instructions:     Warfarin dosing: hold     Bridging: No   INR goal change: No      Medication changes affecting anticoagulation: No    Additional factors affecting anticoagulation: No     PLAN     No adjustment to anticoagulation plan needed    Patient not contacted    No adjustment to Anticoagulation Calendar was required    Aylin Pablo, RN

## 2024-07-25 NOTE — ED PROVIDER NOTES
"Emergency Department Midlevel Supervisory Note     I had a face to face encounter with this patient seen by the Advanced Practice Provider ([facility]). I personally made/approved the management plan and take responsibility for the patient management. I personally saw patient and performed a substantive portion of the visit including all aspects of the medical decision making.     ED Course:  1:10 PM  Chelsy Rodriguez PA-C staffed patient with me. I agree with their assessment and plan of management, and I will see the patient.  1:25 PM  I met with the patient to introduce myself, gather additional history, perform my initial exam, and discuss the plan.   1:56 PM Chelsy spoke with [facility]. She is transferring there.    Critical care time:  45 minutes    Brief HPI:     Alyssa Higginbotham is a 69 year old female who presents for evaluation of headache.     Per ,  About 2 days ago the patient had a headache and took her butalbital-acetaminophen-caffeine with some relief. Yesterday the headache came back and she took Zyrtec without relief. Last night she developed confusion and a flat affect. This morning, she has been more confused, not communicating normally, not following commands, and is \"out of it\". At one point this morning she was trying to put a shirt on and had 3 different shirts on. She does not use alcohol or drugs. No recent head trauma. No history of dementia or Alzheimer's. She is allergic to codeine and demerol. He denies any other complaints at this time.      Per patient,  She does endorse a headache. Denies chest pain or shortness of breath. She is able to identify that she is in a hospital but otherwise does not answer questions or follow commands very well.       I, Aldair Del Rio, am serving as a scribe to document services personally performed by Ivana Broussard MD, based on my observations and the provider's statements to me. I, Ivana Broussard MD, attest that Aldair Del Rio is acting in a " "scribe capacity, has observed my performance of the services and has documented them in accordance with my direction.     Brief Physical Exam: BP (!) 149/71   Pulse 82   Temp 97.8  F (36.6  C)   Resp 21   Ht 1.575 m (5' 2\")   Wt 51.3 kg (113 lb)   SpO2 99%   BMI 20.67 kg/m    Constitutional:  Alert, in no acute distress  EYES: Conjunctivae clear  HENT:  Atraumatic, b pupils at 3mm  Respiratory:  Respirations even, unlabored, in no acute respiratory distress  Cardiovascular:  Regular rate and rhythm, good peripheral perfusion  GI: Soft, non-distended, non-tender  Musculoskeletal:  Moves all 4 extremities equally, grossly symmetrical strength.  No ecchymosis on extremities but has excoriated abrasion right mid shin.  Integument: Warm & dry. No appreciable rash, erythema.  Neurologic:  prefers to keep eyes closed, will open eyes and follow simple commands.  Mild left sided facial droop, cannot do vision tsting reliably as she will close her eyes when I try to test.  Minimal speech.  Certainly seems to be having aphasia, dysarthria mild, and has mild left sided neglect where she tends to look towards her right, tends to ignore her left and when I tell her to lift her arms to lift the right first but when I tell her to lift both arms she will then lift both arms, and when palpated her lower extremities she only notices that I am touching her right when I am touching both of them together.  If I touch the left alone she does notice that I am touching her left leg.  Lifts all extremities anti gravity and can hold them up against gravity.         MDM:  Patient was seen at the time that her CT was being completed.  We do confirm that she has bilateral intracerebral hemorrhage as well as subarachnoid component and possibly subdural.  Therefore, did write for blood pressure control though currently she does not need it, as her blood pressure is less than 150.  Wrote for reversal for her warfarin after speaking with " neurosurgery and discussed the risk of clotting of her mechanical heart valve with her  in doing this but at this point the risk of bleeding and brain damage outweighs the risk of clotting of the aortic valve.  Started her on Keppra for the subarachnoid/subdural component, and head of bed to 30 degrees.  Her neuro examination currently remains stable with some left-sided facial droop, certainly aphasia/dysarthria and the left-sided neglect, but no severe weakness was found.       1. Intracranial hemorrhage (H)    2. Subarachnoid hemorrhage (H)    3. Chronic anticoagulation        Consults:  I discussed the patient with neurosurgery who recommends transfer for definitive care and agrees with above treatments.    Labs and Imaging:  Results for orders placed or performed during the hospital encounter of 07/25/24   CTA Head Neck with Contrast   Result Value Ref Range    Radiologist flags Acute intracranial hemorrhage (AA)     Impression    IMPRESSION:   HEAD CT:  1.  Bifrontal right greater than left intraparenchymal hemorrhages (hemorrhage volume estimated at 18 cc and 1 cc respectively); as well as scattered subarachnoid hemorrhage.  2.  Negative for acute infarct. Negative for herniation changes or evidence of acute hydrocephalus.    HEAD CTA:   1.  No large vessel occlusion. No severe stenosis.  2.  No aneurysm, or high flow vascular malformation identified.    NECK CTA:  1.  No hemodynamically significant stenosis in the neck vessels.   2.  No evidence for acute internal carotid or vertebral artery dissection.   3.  Suspect fibromuscular dysplasia involving bilateral internal carotid arteries and possibly the left vertebral artery.  4.  Chronic aortic dissection extending into the right proximal common carotid artery not significantly changed, and better appreciated on dedicated imaging    CT PERFUSION:  1.  No core infarct on perfusion images.      [Critical Result: Acute intracranial hemorrhage]    Finding  was identified on 7/25/2024 1:41 PM CDT.     Chelsy Rodriguez PA-C was contacted by me on 7/25/2024 1:29 PM CDT and verbalized understanding of the critical result.   CT Head Perfusion w Contrast - For Tier 2 Stroke   Result Value Ref Range    Radiologist flags Acute intracranial hemorrhage (AA)     Impression    IMPRESSION:   HEAD CT:  1.  Bifrontal right greater than left intraparenchymal hemorrhages (hemorrhage volume estimated at 18 cc and 1 cc respectively); as well as scattered subarachnoid hemorrhage.  2.  Negative for acute infarct. Negative for herniation changes or evidence of acute hydrocephalus.    HEAD CTA:   1.  No large vessel occlusion. No severe stenosis.  2.  No aneurysm, or high flow vascular malformation identified.    NECK CTA:  1.  No hemodynamically significant stenosis in the neck vessels.   2.  No evidence for acute internal carotid or vertebral artery dissection.   3.  Suspect fibromuscular dysplasia involving bilateral internal carotid arteries and possibly the left vertebral artery.  4.  Chronic aortic dissection extending into the right proximal common carotid artery not significantly changed, and better appreciated on dedicated imaging    CT PERFUSION:  1.  No core infarct on perfusion images.      [Critical Result: Acute intracranial hemorrhage]    Finding was identified on 7/25/2024 1:41 PM CDT.     Chelsy Rodriguez PA-C was contacted by me on 7/25/2024 1:29 PM CDT and verbalized understanding of the critical result.   Basic metabolic panel   Result Value Ref Range    Sodium 127 (L) 135 - 145 mmol/L    Potassium 3.4 3.4 - 5.3 mmol/L    Chloride 87 (L) 98 - 107 mmol/L    Carbon Dioxide (CO2) 27 22 - 29 mmol/L    Anion Gap 13 7 - 15 mmol/L    Urea Nitrogen 13.2 8.0 - 23.0 mg/dL    Creatinine 0.65 0.51 - 0.95 mg/dL    GFR Estimate >90 >60 mL/min/1.73m2    Calcium 9.2 8.8 - 10.4 mg/dL    Glucose 154 (H) 70 - 99 mg/dL   Result Value Ref Range    INR 2.27 (H) 0.85 - 1.15   Partial  thromboplastin time   Result Value Ref Range    aPTT 36 22 - 38 Seconds   Result Value Ref Range    Troponin T, High Sensitivity 12 <=14 ng/L   Glucose by meter   Result Value Ref Range    GLUCOSE BY METER POCT 130 (H) 70 - 99 mg/dL   CBC with platelets and differential   Result Value Ref Range    WBC Count 11.8 (H) 4.0 - 11.0 10e3/uL    RBC Count 4.91 3.80 - 5.20 10e6/uL    Hemoglobin 13.2 11.7 - 15.7 g/dL    Hematocrit 37.6 35.0 - 47.0 %    MCV 77 (L) 78 - 100 fL    MCH 26.9 26.5 - 33.0 pg    MCHC 35.1 31.5 - 36.5 g/dL    RDW 12.8 10.0 - 15.0 %    Platelet Count 237 150 - 450 10e3/uL    % Neutrophils 88 %    % Lymphocytes 5 %    % Monocytes 6 %    % Eosinophils 0 %    % Basophils 0 %    % Immature Granulocytes 1 %    NRBCs per 100 WBC 0 <1 /100    Absolute Neutrophils 10.4 (H) 1.6 - 8.3 10e3/uL    Absolute Lymphocytes 0.6 (L) 0.8 - 5.3 10e3/uL    Absolute Monocytes 0.8 0.0 - 1.3 10e3/uL    Absolute Eosinophils 0.0 0.0 - 0.7 10e3/uL    Absolute Basophils 0.0 0.0 - 0.2 10e3/uL    Absolute Immature Granulocytes 0.1 <=0.4 10e3/uL    Absolute NRBCs 0.0 10e3/uL       I have reviewed the relevant laboratory studies above.    I independently interpreted the following imaging study(s):   Ct head    EKG: I reviewed and independently interpreted the patient's EKG, with comments made as listed below. Please see scanned EKG for full report.   1336nsr rate fo 88, septal infarct pattern cited on or before 1/30/23 nad not changed, but does have nonspecific st depression less than 1 box throughout anterior precordial leads and inferior limb leads.  Hr has increased and the nonspecific diffuse st depression is new compared to 1/30/23 concerning for ischemia.    Pr 182ms, qrs 76ms, qtc 392ms, prt axes 81 12 -18    Procedures:  I was present for the key portions of procedures documented in [facility]/midlevel note, see midlevel note for further details.    Ivana Broussard MD  [facility] EMERGENCY DEPARTMENT  [id number]  [facility] [id number]  [phone]       Ivana Broussard MD  07/25/24 1442       Ivana Broussard MD  07/25/24 1441

## 2024-07-25 NOTE — ED TRIAGE NOTES
Patient comes in with her  for acute change in mental status. Patient had a migraine two days ago and took some meds. And then today was fatigued and had a flat affect with difficulty following directions.     Chelsy VILLATORO called to Triage 2, Tier 2 stroke code called and Laurita PATTERSON followed patient to CT          Triage Assessment (Adult)       Row Name 07/25/24 1300          Triage Assessment    Airway WDL WDL        Respiratory WDL    Respiratory WDL WDL        Skin Circulation/Temperature WDL    Skin Circulation/Temperature WDL WDL        Cardiac WDL    Cardiac WDL WDL        Peripheral/Neurovascular WDL    Peripheral Neurovascular WDL WDL        Cognitive/Neuro/Behavioral WDL    Cognitive/Neuro/Behavioral WDL X     Level of Consciousness confused;obtunded     Orientation disoriented to;person;place;time;situation     Mood/Behavior flat affect        Pupils (CN II)    Pupil PERRLA yes     Pupil Size Left 4 mm     Pupil Size Right 4 mm

## 2024-07-25 NOTE — ED PROVIDER NOTES
"EMERGENCY DEPARTMENT ENCOUNTER   NAME: Alyssa Higginbotham ; AGE: 69 year old female ; YOB: 1955 ; MRN: [id number] ; PCP: Rafaela Woods     Evaluation Date & Time: No admission date for patient encounter.    ED Provider: Chelsy Rodriguez PA-C    CHIEF COMPLAINT     Headache      FINAL ASSESSMENT       ICD-10-CM    1. Intracranial hemorrhage (H)  I62.9       2. Subarachnoid hemorrhage (H)  I60.9       3. Chronic anticoagulation  Z79.01           ED COURSE, MEDICAL DECISION MAKING, PLAN     ED course     1:05 PM I met with the patient, obtained history, performed an initial exam, and discussed options and plan for diagnostics and treatment here in the ED.   1:06 PM Tier 2 stroke code called.   1:16 PM I staffed the patient with Dr. Broussard.   1:58 PM I spoke with the neurosurgery team here and at [facility]. Plan is to transfer to [facility]. I spoke with the ED provider there and they accept the patient.   ______________________________________________________________________    Reason for visit: Alyssa Higginbotham is a 69 year old female with past medical history of meningioma 2015, hypertension, hyperlipidemia, and s/p AVR on anticoagulation presenting for a bad headache that started 2 days ago followed by disorientation     Exam: Patient has a very flat affect and takes multiple verbal and physical prompts to respond.  She will occasionally follow commands, but does take multiple prompts.  She only answers 1 question for me and that is when I ask her if she knows where she is she reports \"hospital.\"  She just does not respond to any of my other questions.  She appears to maybe have a very faint left-sided facial droop.  When I asked her to squeeze my fingers she only does so on the right side, but not the left side.  Does not respond when I try and assess lower extremity strength.  Patient is only minimally hypertensive at 149/71, but is otherwise vitally normal. Difficult to get accurate NIH " score. I scored her at a 5.     Differentials: Cerebral occlusion, cerebral hemorrhage, intoxication, seizure, electrolyte disturbances.     Labs: INR is 2.27.  Sodium is a bit low at 127 with a chloride of 87.  Glucose slightly elevated at 154.    EKG: Sinus rhythm with regular rate.  No emergent ischemia or STEMI appreciated.    Imaging:   HEAD CT:  1.  Bifrontal right greater than left intraparenchymal hemorrhages (hemorrhage volume estimated at 18 cc and 1 cc respectively); as well as scattered subarachnoid hemorrhage.  2.  Negative for acute infarct. Negative for herniation changes or evidence of acute hydrocephalus.  HEAD CTA:   1.  No large vessel occlusion. No severe stenosis.  2.  No aneurysm, or high flow vascular malformation identified.  NECK CTA:  1.  No hemodynamically significant stenosis in the neck vessels.   2.  No evidence for acute internal carotid or vertebral artery dissection.   3.  Suspect fibromuscular dysplasia involving bilateral internal carotid arteries and possibly the left vertebral artery.  4.  Chronic aortic dissection extending into the right proximal common carotid artery not significantly changed, and better appreciated on dedicated imaging  CT PERFUSION:  1.  No core infarct on perfusion images.    Consults: Spoke with stroke neuro here at [facility] as well as neurosurgery.  Spoke with [facility].  Also spoke with neurosurgery at [facility] and ED MD at [facility].    Interventions/recheck: Patient given 1000 mg of Keppra.  Coumadin reversal started including Kcentra and vitamin K.  Nicardipine drip ordered with a goal blood pressure of less than 150 systolically.    Assessment: Bilateral intraparenchymal hemorrhages and scattered subarachnoid hemorrhage.    Plan: Patient will be transferred to [facility] for neurosurgery to follow.      *All pertinent lab & imaging studies independently reviewed. (See chart for details)   *Discussed the results of all the tests and plan  "with patient and family/guardians.   ______________________________________________________________________    Critical Care  Performed by: Chelsy Rodriguez PA-C  Authorized by: Chelsy Rodriguez PA-C    Total critical care time: 40 minutes  Critical care time was exclusive of separately billable procedures and treating other patients.  Critical care was necessary to treat or prevent imminent or life-threatening deterioration of the following conditions: Intracranial hemorrhage  Critical care was time spent personally by me on the following activities: development of treatment plan with patient or surrogate, discussions with consultants, examination of patient, evaluation of patient's response to treatment, obtaining history from patient or surrogate, ordering and performing treatments and interventions, ordering and review of laboratory studies, ordering and review of radiographic studies and re-evaluation of patient's condition, this excludes any separately billable procedures.    HISTORY OF PRESENT ILLNESS   Patient information was obtained from:  and patient    Use of Intrepreter: N/A     Alyssa Higginbotham is a 69 year old female with past medical history of meningioma 2015, hypertension, hyperlipidemia, and s/p AVR on anticoagulation who presents to the ED by walk-in for evaluation of headache and disorientation.     Per ,  About 2 days ago the patient had a headache and took her butalbital-acetaminophen-caffeine with some relief. Yesterday the headache came back and she took Zyrtec without relief. Last night she developed confusion and a flat affect. This morning, she has been more confused, not communicating normally, not following commands, and is \"out of it\". At one point this morning she was trying to put a shirt on and had 3 different shirts on.     She does not use alcohol or drugs. No recent head trauma. No history of dementia or Alzheimer's.   She is allergic to codeine and demerol.   He " denies any other complaints at this time.     Per patient,  Denies any pain anywhere.   She is able to identify that she is in a hospital but otherwise does not answer questions or follow commands very well.     Per my chart review  7/19/2024: INR check was 1.9.      MEDICAL HISTORY     Past Medical History:   Diagnosis Date    Benign meningioma of brain (H) 03/2015    Bunion     Chronic headache     Depression     Heart murmur     Hepatitis C     Hyperlipidemia     Hypothyroid     Irregular heart rate     Nasal fracture 02/28/2015    Osteoporosis     Other acquired deformity of toe     Stroke (H)        Past Surgical History:   Procedure Laterality Date    AORTIC VALVE REPLACEMENT   2000    ARTHROPLASTY TOE(S) Right 2/13/2023    Procedure: First metatarsal phalangeal joint implant arthroplasty right foot;  Surgeon: Chin Riojas DPM;  Location: [facility]    BIOPSY BREAST Left 2005    benign    COLONOSCOPY  2011    REPAIR THORACIC AORTA   2000       Family History   Problem Relation Age of Onset    Heart Disease Mother     Pancreatic Cancer Father 70        history of smoking    Ovarian Cancer Sister 67        stage III, fatal    Skin Cancer Sister     Atrial fibrillation Sister     Diabetes No family hx of     Alzheimer Disease No family hx of        Social History     Tobacco Use    Smoking status: Never     Passive exposure: Past    Smokeless tobacco: Never   Vaping Use    Vaping status: Never Used   Substance Use Topics    Alcohol use: No    Drug use: No       amitriptyline (ELAVIL) 10 MG tablet  butalbital-acetaminophen-caffeine (ESGIC) -40 MG tablet  clonazePAM (KLONOPIN) 0.5 MG tablet  levothyroxine (SYNTHROID/LEVOTHROID) 75 MCG tablet  levothyroxine (SYNTHROID/LEVOTHROID) 88 MCG tablet  metoprolol succinate ER (TOPROL XL) 200 MG 24 hr tablet  multivitamin (ONE A DAY) per tablet  rimegepant (NURTEC) 75 MG ODT tablet  simvastatin (ZOCOR) 20 MG tablet  SUMAtriptan (IMITREX) 5 MG/ACT nasal  "spray  triamterene-HCTZ (MAXZIDE) 75-50 MG tablet  warfarin ANTICOAGULANT (COUMADIN) 1 MG tablet  warfarin ANTICOAGULANT (COUMADIN) 5 MG tablet          PHYSICAL EXAM     First Vitals:  Patient Vitals for the past 24 hrs:   BP Temp Pulse Resp SpO2 Height Weight   07/25/24 1420 (!) 149/71 -- 82 21 99 % -- --   07/25/24 1405 117/76 -- 94 18 95 % -- --   07/25/24 1400 139/65 -- 88 16 97 % -- --   07/25/24 1350 (!) 151/69 -- 87 23 99 % -- --   07/25/24 1337 (!) 148/70 -- 86 18 97 % -- --   07/25/24 1304 -- -- -- 20 -- -- --   07/25/24 1303 (!) 143/77 97.8  F (36.6  C) 96 -- 97 % 1.575 m (5' 2\") 51.3 kg (113 lb)         PHYSICAL EXAM:   Constitutional: No acute distress.  Neuro: Flat affect. Responsive after multiple prompts. Only answers one question for me. Intermittently follows commands. Slight left sided facial droop. Does not squeeze with left hand when prompted to squeeze my fingers.   Psych: Calm and cooperative.  Head: Normocephalic.  Eyes: PERRL. EOMI. Conjunctivae clear.   Mouth: Pink and moist.    Cardio: Regular rate. Adequate perfusion to extremities.   Pulmonary: Oxygenating well on RA. No labored breathing.   Skin: Natural color, warm, dry, intact.     National [employer] Stroke Scale  Exam Interval: Baseline   Score    Level of consciousness: (1)   Not alert; arousable w/ minor stim to obey/answer/respond    LOC questions: (1)   Answers one question correctly    LOC commands: (1)   Performs one task correctly    Best gaze: (0)   Normal    Visual: (0)   No visual loss (unable to assess)     Facial palsy: (1)   Minor paralysis (flat nasolabial fold, smile asymmetry)    Motor arm (left): (0)   No drift    Motor arm (right): (0)   No drift    Motor leg (left): (0)   No drift (unable to assess)    Motor leg (right): (0)   No drift (unable to assess)     Limb ataxia: (0)   Absent    Sensory: (0)   Normal- no sensory loss (unable to assess)     Best language: (0)   Normal- no aphasia (difficult to " assess, pt not answering questions)     Dysarthria: (0)   Normal (difficult to assess, pt not answering questions)     Extinction and inattention: (1)   Visual, tacile, auditory, spatial, person inattention        Total Score:  5        RESULTS     LAB:  All pertinent labs reviewed and interpreted  Labs Ordered and Resulted from Time of ED Arrival to Time of ED Departure   BASIC METABOLIC PANEL - Abnormal       Result Value    Sodium 127 (*)     Potassium 3.4      Chloride 87 (*)     Carbon Dioxide (CO2) 27      Anion Gap 13      Urea Nitrogen 13.2      Creatinine 0.65      GFR Estimate >90      Calcium 9.2      Glucose 154 (*)    INR - Abnormal    INR 2.27 (*)    GLUCOSE BY METER - Abnormal    GLUCOSE BY METER POCT 130 (*)    CBC WITH PLATELETS AND DIFFERENTIAL - Abnormal    WBC Count 11.8 (*)     RBC Count 4.91      Hemoglobin 13.2      Hematocrit 37.6      MCV 77 (*)     MCH 26.9      MCHC 35.1      RDW 12.8      Platelet Count 237      % Neutrophils 88      % Lymphocytes 5      % Monocytes 6      % Eosinophils 0      % Basophils 0      % Immature Granulocytes 1      NRBCs per 100 WBC 0      Absolute Neutrophils 10.4 (*)     Absolute Lymphocytes 0.6 (*)     Absolute Monocytes 0.8      Absolute Eosinophils 0.0      Absolute Basophils 0.0      Absolute Immature Granulocytes 0.1      Absolute NRBCs 0.0     PARTIAL THROMBOPLASTIN TIME - Normal    aPTT 36     TROPONIN T, HIGH SENSITIVITY - Normal    Troponin T, High Sensitivity 12     GLUCOSE MONITOR NURSING POCT       RADIOLOGY:  CTA Head Neck with Contrast   Final Result   Abnormal   IMPRESSION:    HEAD CT:   1.  Bifrontal right greater than left intraparenchymal hemorrhages (hemorrhage volume estimated at 18 cc and 1 cc respectively); as well as scattered subarachnoid hemorrhage.   2.  Negative for acute infarct. Negative for herniation changes or evidence of acute hydrocephalus.      HEAD CTA:    1.  No large vessel occlusion. No severe stenosis.   2.  No  aneurysm, or high flow vascular malformation identified.      NECK CTA:   1.  No hemodynamically significant stenosis in the neck vessels.    2.  No evidence for acute internal carotid or vertebral artery dissection.    3.  Suspect fibromuscular dysplasia involving bilateral internal carotid arteries and possibly the left vertebral artery.   4.  Chronic aortic dissection extending into the right proximal common carotid artery not significantly changed, and better appreciated on dedicated imaging      CT PERFUSION:   1.  No core infarct on perfusion images.         [Critical Result: Acute intracranial hemorrhage]      Finding was identified on 7/25/2024 1:41 PM CDT.       Chelsy Rodriguez PA-C was contacted by me on 7/25/2024 1:29 PM CDT and verbalized understanding of the critical result.      CT Head Perfusion w Contrast - For Tier 2 Stroke   Final Result   Abnormal   IMPRESSION:    HEAD CT:   1.  Bifrontal right greater than left intraparenchymal hemorrhages (hemorrhage volume estimated at 18 cc and 1 cc respectively); as well as scattered subarachnoid hemorrhage.   2.  Negative for acute infarct. Negative for herniation changes or evidence of acute hydrocephalus.      HEAD CTA:    1.  No large vessel occlusion. No severe stenosis.   2.  No aneurysm, or high flow vascular malformation identified.      NECK CTA:   1.  No hemodynamically significant stenosis in the neck vessels.    2.  No evidence for acute internal carotid or vertebral artery dissection.    3.  Suspect fibromuscular dysplasia involving bilateral internal carotid arteries and possibly the left vertebral artery.   4.  Chronic aortic dissection extending into the right proximal common carotid artery not significantly changed, and better appreciated on dedicated imaging      CT PERFUSION:   1.  No core infarct on perfusion images.         [Critical Result: Acute intracranial hemorrhage]      Finding was identified on 7/25/2024 1:41 PM CDT.       Chelsy  Michael COLINDRES was contacted by me on 7/25/2024 1:29 PM CDT and verbalized understanding of the critical result.          ECG:  EKG was collected and independently interpreted by myself and also confirmed by MD.    Findings: Sinus rhythm with a rate of 88.  There is a nonspecific T wave abnormality present.  No QTc prolongation.  No emergent ischemia or STEMI.    Comparisons: Compared to ECG from January 2023, nonspecific T wave abnormality, worse in the anterior leads.      PROCEDURES     None           MEDICAL DECISION MAKING:  Obtained supplemental history:Supplemental history obtained?: Family Member/Significant Other  Reviewed external records: External records reviewed?: No  Care impacted by chronic illness:Hyperlipidemia and Hypertension  Care significantly affected by social determinants of health:N/A  Did you consider but not order tests?: Work up considered but not performed and documented in chart, if applicable  Did you interpret images independently?: Independent interpretation of ECG and images noted in documentation, when applicable.  Consultation discussion with other provider:Did you involve another provider (consultant, MH, pharmacy, etc.)?: I discussed the care with another health care provider, see documentation for details.  Transfer to [facility] for ED to ED transfer for neurosurgery.       FINAL IMPRESSION:    ICD-10-CM    1. Intracranial hemorrhage (H)  I62.9       2. Subarachnoid hemorrhage (H)  I60.9       3. Chronic anticoagulation  Z79.01             MEDICATIONS GIVEN IN THE EMERGENCY DEPARTMENT:  Medications   niCARdipine 40 mg in 200 mL NS (CARDENE) infusion (has no administration in time range)   HOLD: warfarin (COUMADIN) therapy (has no administration in time range)   iopamidol (ISOVUE-370) solution 117 mL (117 mLs Intravenous $Given 7/25/24 1326)     And   sodium chloride 0.9 % bag for CT scan flush use (100 mLs As instructed Not Given 7/25/24 3715)   levETIRAcetam (KEPPRA) 1,000 mg  in sodium chloride 0.9 % 120 mL intermittent infusion (0 mg Intravenous Stopped 7/25/24 1405)   phytonadione (AQUAMEPHYTON) 10 MG/ML 10 mg in sodium chloride 0.9 % 50 mL intermittent infusion (0 mg Intravenous Stopped 7/25/24 1438)   prothrombin 4 factor complex concentrate (KCENTRA) infusion 1,604 Units (1,604 Units Intravenous $Given 7/25/24 1406)         NEW PRESCRIPTIONS STARTED AT TODAY'S ED VISIT:  Discharge Medication List as of 7/25/2024  2:29 PM               IAldair, am serving as a scribe to document services personally performed by Chelsy Rodriguez PA-C, based on my observation and the provider's statements to me. I, Chelsy Rodriguez PA-C attest that Aldair Del Rio is acting in a scribe capacity, has observed my performance of the services and has documented them in accordance with my direction.     Some or all of this documentation has been completed using dictation software and mild grammatical errors may be present. Please contact me with any concerns regarding this.       Chelsy Rodriguez PA-C  Emergency Medicine   [facility] EMERGENCY DEPARTMENT      Chelsy Rodriguez PA-C  07/25/24 1450

## 2024-07-25 NOTE — PHARMACY-ADMISSION MEDICATION HISTORY
Pharmacist Admission Medication History    Admission medication history is complete. The information provided in this note is only as accurate as the sources available at the time of the update.    Information Source(s): Family member, Clinic records, and CareEverywhere/SureScripts via in-person    Pertinent Information: patient presents with confusion. Med hx obtained from patient's spouse. He was unsure of warfarin dose so this was updated based on [facility] clinic notes from 7/19. He does report that that is the most recent visit at [facility].    Warfarin:   Patient is taking warfarin for the indication of AVR with an INR goal of 2-2.5.   Current dosing regimen is 5 mg qd.   Last dose (5 mg) was taken 7/24 at PM (although patient vomited approx 5 min later).    Changes made to PTA medication list:  Added: None  Deleted: None  Changed: warfarin    Allergies reviewed with patient and updates made in EHR: no    Medication History Completed By: Nette Patel [facility] 7/25/2024 4:30 PM    PTA Med List   Medication Sig Note Last Dose    amitriptyline (ELAVIL) 10 MG tablet Take 1 tablet (10 mg) by mouth at bedtime  7/24/2024    butalbital-acetaminophen-caffeine (ESGIC) -40 MG tablet Take 2 tablets by mouth every 8 hours as needed for headaches [BUTALBITAL-ACETAMINOPHEN-CAFFEINE (FIORICET, ESGIC) -40 MG PER TABLET] TAKE 1 TO 2 TABLETS BY MOUTH EVERY 8 HOURS AS NEEDED FOR PAIN. MAX OF 4000 MG ACETAMINOPHEN PER 24 HOURS Strength: -40 mg      clonazePAM (KLONOPIN) 0.5 MG tablet TAKE ONE TABLET BY MOUTH ONCE NIGHTLY AS NEEDED FOR ANXIETY OR SLEEP      levothyroxine (SYNTHROID/LEVOTHROID) 75 MCG tablet TAKE 1 TABLET ON WEDNESDAY, SATURDAY 7/24/2024    levothyroxine (SYNTHROID/LEVOTHROID) 88 MCG tablet Take 1 tablet of 88mcg by mouth Monday, Tuesday, Thursday, Friday and Sunday. Will take 75mcg the other two days.  Past Week    metoprolol succinate ER (TOPROL XL) 200 MG 24 hr tablet Take 1 tablet (200 mg) by  mouth daily  7/24/2024    multivitamin (ONE A DAY) per tablet [MULTIVITAMIN (ONE A DAY) PER TABLET] Take 1 tablet by mouth daily.   7/24/2024    rimegepant (NURTEC) 75 MG ODT tablet Place 1 tablet (75 mg) under the tongue daily as needed for migraine Maximum of 1 tablet every 24 hours.      simvastatin (ZOCOR) 20 MG tablet Take 1 tablet (20 mg) by mouth at bedtime TAKE 1 TABLET AT BEDTIME (DUE FOR AN OFFICE VISIT)  7/24/2024    SUMAtriptan (IMITREX) 5 MG/ACT nasal spray Spray 1 spray in nostril as needed for migraine May repeat dose x1 after at least 2 hours if migraine persists      triamterene-HCTZ (MAXZIDE) 75-50 MG tablet Take 0.5 tablets by mouth daily  7/24/2024    warfarin ANTICOAGULANT (COUMADIN) 5 MG tablet Take 1 tablet (5mg) by mouth 6 days of the week, Sun, Mon, Tues, Wed, Thurs, Sat, or as directed.  Adjust dose based on INR results. (Patient taking differently: Take 5 mg by mouth daily) 7/25/2024: Took 7/24 PM but family reports patient vomited about 5 minutes later 7/24/2024

## 2024-07-25 NOTE — H&P
[facility]    History and Physical - Hospitalist Service       Date of Admission:  7/25/2024    Assessment & Plan      Alyssa Higginbotham is a 69 year old female with past medical history significant for chronic aortic dissection and mechanical aortic valve on chronic warfarin, hypertension, hyperlipidemia, known meningioma, depression, hypothyroidism admitted on 7/25/2024 nontraumatic bifrontal right greater than left intraparenchymal hemorrhages and scattered subarachnoid hemorrhage.    Anticoagulation associated, non-traumatic bifrontal intraparenchymal hemorrhages, without brain compression  Subarachnoid hemorrhages   Hx of Meningioma   Patient presented to an outside emergency department with headache and confusion.  *Head CT shows bifrontal right greater than left intraparenchymal hemorrhages as well as scattered subarachnoid hemorrhage.  Negative for acute infarct or large vessel occlusion.  *INR was in the therapeutic range at 2.27. She was given vitamin K and Kcentra for warfarin reversal.  *Patient transferred to [facility] for stroke neurology and neurosurgery consultation.  -Admit to ICU  -Neurosurgery consultation, recommendations appreciated  -Stroke neurology consultation, recommendations appreciated  -Neurochecks every 2 hours  -SBP goal less than 140 mmHg  -HOB greater than 30 degrees  -Repeat head CT in 4 hours  (4pm)   -MRI brain w/ and w/o contrast   -Monitor IN/R  -No surgical intervention indicated at this time  -PT/OT/SLP    Hyponatremia   Sodium is 127. Suspect related to hydrochlorothiazide.   * started NS but sodium dropped to 125. Urine osm 718 and urine sodium 72   - Stop NS  - Fluid restrict - pt is currently NPO   - Monitor sodium closely, target normonatremia per neurosurgery  - Hold Maxxide.   ADDENDUM: discussed with neurosurgery- they recommend hypertonic 3% saline. Will orer at 15cc/hr with Q4H sodium checks. Will also place order for PICC line.      Chronic aortic dissection status post aortic valve replacement  Long-term current use of anticoagulant  Marfan syndrome  Pt with known ascending aortic dissection which extends up to carotids and down to the iliacs s/p mechanical aortic valve replacement (25 years ago)   Recent ECHO on Oct 2023 showed stable valvular function of the mechanical valve. EF 55-60%.   - Hold PTA warfarin due to bleed   - Hold PTA metoprolol, maxxide and simvastatin while NPO     Hypertension  Hyperlipidemia  - Hold PTA metoprolol, Maxxide and simvastatin while NPO    Hypothyroidism  - Hold PTA levothyroxine while NPO     Chronic Migraines  - Hold PTA Nurtec, Butalbital-acetaminophen-caffeine, and Imitrex while NPO     MGUS, IgM Lambda   Follows with heme/onc. Stable.     Diet: NPO  DVT Prophylaxis: Pneumatic Compression Devices  Lopez Catheter: Not present  Lines: None     Cardiac Monitoring: None  Code Status: Full Code     Clinically Significant Risk Factors Present on Admission         # Hyponatremia: Lowest Na = 127 mmol/L in last 2 days, will monitor as appropriate       # Drug Induced Coagulation Defect: home medication list includes an anticoagulant medication    # Hypertension: Noted on problem list                    Disposition Plan     Medically Ready for Discharge: Anticipated in 5+ Days      Darshana Erickson MD  Hospitalist Service  [facility]  Securely message with "[employer] (more info)  Text page via [facility] Paging/Directory     ______________________________________________________________________    Chief Complaint   Headache and confusion     History is obtained from the patient's family and chart review    History of Present Illness   Alyssa Higginbotham is a 69 year old female who presents to the ED with confusion. Pt is unable to provide any useful history.     She initially presented to an outside ED with confusion and headache. Her  reports that she started having a severe headache  a couple days ago. She took some of her butalbital-caffeine-ASA to treat the headache. The today he noted her to be confused and wasn't getting out of bed.       On initial [facility] ED providers examination, patient has significant AMS with inattention and difficulty following commands; she also has subtle L facial droop and notable L hand weakness. /77. INR 2.27. aPTT: 36. She was given 1000 mg of Keppra. Warfarin was reversed starting with Kcentra and Vitamin K. INR post reversal interventions still pending. Patient was transferred from [facility] to [facility].     On  my examination at [facility] ED the patient is unable to provide any history. She does open eyes with a lot of prompting but does not follow commands consistently.       Past Medical History    Past Medical History:   Diagnosis Date    Benign meningioma of brain (H) 03/2015    initially seen on CT of head that was obtained after a fall. Frontal lobe, confirmed on an MRI Mar 2015, 3 mm    Bunion     Chronic headache     Depression     Heart murmur     Hepatitis C     Hyperlipidemia     Hypothyroid     Irregular heart rate     Nasal fracture 02/28/2015    fell face first on sideache    Osteoporosis     Other acquired deformity of toe     Stroke (H)     on CT scan       Past Surgical History   Past Surgical History:   Procedure Laterality Date    AORTIC VALVE REPLACEMENT   2000    ARTHROPLASTY TOE(S) Right 2/13/2023    Procedure: First metatarsal phalangeal joint implant arthroplasty right foot;  Surgeon: Chin Riojas DPM;  Location: [facility]    BIOPSY BREAST Left 2005    benign    COLONOSCOPY  2011    REPAIR THORACIC AORTA   2000       Prior to Admission Medications   Prior to Admission Medications   Prescriptions Last Dose Informant Patient Reported? Taking?   SUMAtriptan (IMITREX) 5 MG/ACT nasal spray   No No   Sig: Spray 1 spray in nostril as needed for migraine May repeat dose x1 after at least 2 hours if migraine persists   amitriptyline (ELAVIL) 10  MG tablet   No No   Sig: Take 1 tablet (10 mg) by mouth at bedtime   butalbital-acetaminophen-caffeine (ESGIC) -40 MG tablet   No No   Sig: Take 2 tablets by mouth every 8 hours as needed for headaches [BUTALBITAL-ACETAMINOPHEN-CAFFEINE (FIORICET, ESGIC) -40 MG PER TABLET] TAKE 1 TO 2 TABLETS BY MOUTH EVERY 8 HOURS AS NEEDED FOR PAIN. MAX OF 4000 MG ACETAMINOPHEN PER 24 HOURS Strength: -40 mg   clonazePAM (KLONOPIN) 0.5 MG tablet   No No   Sig: TAKE ONE TABLET BY MOUTH ONCE NIGHTLY AS NEEDED FOR ANXIETY OR SLEEP   levothyroxine (SYNTHROID/LEVOTHROID) 75 MCG tablet   No No   Sig: TAKE 1 TABLET ON WEDNESDAY, SATURDAY   levothyroxine (SYNTHROID/LEVOTHROID) 88 MCG tablet   No No   Sig: Take 1 tablet of 88mcg by mouth Monday, Tuesday, Thursday, Friday and Sunday. Will take 75mcg the other two days.   metoprolol succinate ER (TOPROL XL) 200 MG 24 hr tablet   No No   Sig: Take 1 tablet (200 mg) by mouth daily   multivitamin (ONE A DAY) per tablet   Yes No   Sig: [MULTIVITAMIN (ONE A DAY) PER TABLET] Take 1 tablet by mouth daily.    rimegepant (NURTEC) 75 MG ODT tablet   No No   Sig: Place 1 tablet (75 mg) under the tongue daily as needed for migraine Maximum of 1 tablet every 24 hours.   simvastatin (ZOCOR) 20 MG tablet   No No   Sig: Take 1 tablet (20 mg) by mouth at bedtime TAKE 1 TABLET AT BEDTIME (DUE FOR AN OFFICE VISIT)   triamterene-HCTZ (MAXZIDE) 75-50 MG tablet   No No   Sig: Take 0.5 tablets by mouth daily   warfarin ANTICOAGULANT (COUMADIN) 1 MG tablet   No No   Sig: Take 4 tablets (4mg) by mouth on Fridays, or as directed. Adjust dose based on INR results as directed.   warfarin ANTICOAGULANT (COUMADIN) 5 MG tablet   No No   Sig: Take 1 tablet (5mg) by mouth 6 days of the week, Sun, Mon, Tues, Wed, Thurs, Sat, or as directed.  Adjust dose based on INR results.      Facility-Administered Medications: None        Review of Systems    The 10 point Review of Systems is negative other than noted in  the HPI or here.     Physical Exam   Vital Signs: Temp: 98.6  F (37  C) Temp src: Temporal BP: 137/83 Pulse: 103   Resp: 16 SpO2: 99 %      Weight: 0 lbs 0 oz    Constitutional: Awake, alert, cooperative, no apparent distress.  Eyes: Conjunctiva and pupils examined and normal.  HEENT: Moist mucous membranes, normal dentition.  Respiratory: Clear to auscultation bilaterally, no crackles or wheezing.  Cardiovascular: Regular rate and rhythm, normal S1 and S2, and  III/VI murmur noted.  GI: Soft, non-distended, non-tender, normal bowel sounds.  Skin: No rashes, no cyanosis, no edema.  Musculoskeletal: No joint swelling, erythema or tenderness.  Neurologic: difficult neurologic exam. Pt does not follow commands. Face appears symmetric. Opens eyes. Pupils are equal and reactive. Pt moves all extremities spontaneously. (Also recently received ativan)  Psychiatric: Somnolent. Confused.       Medical Decision Making       75 MINUTES SPENT BY ME on the date of service doing chart review, history, exam, documentation & further activities per the note.      Data     I have personally reviewed the following data over the past 24 hrs:    11.8 (H)  \   13.2   / 237     127 (L) 87 (L) 13.2 /  154 (H)   3.4 27 0.65 \     Trop: 12 BNP: N/A     INR:  2.27 (H) PTT:  36   D-dimer:  N/A Fibrinogen:  N/A       Imaging results reviewed over the past 24 hrs:   Recent Results (from the past 24 hour(s))   CTA Head Neck with Contrast   Result Value    Radiologist flags Acute intracranial hemorrhage (AA)    Narrative    EXAM: CTA HEAD NECK W CONTRAST, CT HEAD PERFUSION W CONTRAST  LOCATION: [facility]  DATE: 7/25/2024    INDICATION: Code Stroke to evaluate for potential thrombolysis and thrombectomy. PLEASE READ IMMEDIATELY.  COMPARISON: 9/22/2016, 9/21/2023.  TECHNIQUE: Head and neck CT angiogram with IV contrast. Noncontrast head CT followed by axial helical CT images of the head and neck vessels obtained during the  arterial phase of intravenous contrast administration. Axial 2D reconstructed images and   multiplanar 3D MIP reconstructed images of the head and neck vessels were performed by the technologist. Additional CT cerebral perfusion was performed utilizing a second contrast bolus. Perfusion data were post processed with generation of standard   perfusion maps and estimation of ischemic/infarcted volumes utilizing standard threshold values. Dose reduction techniques were used. All stenosis measurements made according to NASCET criteria unless otherwise specified.  CONTRAST: isovue 370 67 ml (accession [id number]), isovue 370 50 ml (accession [id number])    FINDINGS:   NONCONTRAST HEAD CT:   INTRACRANIAL CONTENTS: Bifrontal intraparenchymal hyperdensities measuring 31 x 43 x 27 mm on the right, and 12 x 13 x 16 mm on the left (estimated volume 18 cc and 1 cc on the left) . Bifrontal and left intrasylvian extra-axial hyperdense material. No   significant midline shift, mass effect. No evidence of acute hydrocephalus. No area of loss of gray-white matter differentiation.     VISUALIZED ORBITS/SINUSES/MASTOIDS: No intraorbital abnormality. No paranasal sinus mucosal disease. No middle ear or mastoid effusion.    BONES/SOFT TISSUES: No acute abnormality.    HEAD CTA:  ANTERIOR CIRCULATION: No stenosis/occlusion, aneurysm, or high flow vascular malformation. Fetal origin of the left posterior cerebral artery from the anterior circulation.    POSTERIOR CIRCULATION: No stenosis/occlusion, aneurysm, or high flow vascular malformation. Balanced vertebral arteries supply a normal basilar artery.     DURAL VENOUS SINUSES: Not well evaluated on a technical basis.    NECK CTA:  RIGHT CAROTID: No measurable stenosis or dissection distal to the chronic aortic dissection. Tortuous beaded appearance of the right proximal/mid internal carotid artery.    LEFT CAROTID: No measurable stenosis or dissection. Tortuous beaded appearance of the  proximal/left internal carotid artery.    VERTEBRAL ARTERIES: Tortuous left V2 segment vertebral artery. No focal stenosis or dissection. Balanced vertebral arteries.    AORTIC ARCH: Classic aortic arch. Chronic dissection extending into the right proximal common carotid artery.    NONVASCULAR STRUCTURES: Stable right thyroid nodule.    CT PERFUSION:  PERFUSION MAPS: Symmetrical cerebral perfusion with the exception of the region of the right frontal intraparenchymal hemorrhage. No focal deficits in cerebral blood flow or volume to suggest ischemia/oligemia accounting for technical limitations.    RAPID ANALYSIS: Unreliable secondary to above.      Impression    IMPRESSION:   HEAD CT:  1.  Bifrontal right greater than left intraparenchymal hemorrhages (hemorrhage volume estimated at 18 cc and 1 cc respectively); as well as scattered subarachnoid hemorrhage.  2.  Negative for acute infarct. Negative for herniation changes or evidence of acute hydrocephalus.    HEAD CTA:   1.  No large vessel occlusion. No severe stenosis.  2.  No aneurysm, or high flow vascular malformation identified.    NECK CTA:  1.  No hemodynamically significant stenosis in the neck vessels.   2.  No evidence for acute internal carotid or vertebral artery dissection.   3.  Suspect fibromuscular dysplasia involving bilateral internal carotid arteries and possibly the left vertebral artery.  4.  Chronic aortic dissection extending into the right proximal common carotid artery not significantly changed, and better appreciated on dedicated imaging    CT PERFUSION:  1.  No core infarct on perfusion images.      [Critical Result: Acute intracranial hemorrhage]    Finding was identified on 7/25/2024 1:41 PM CDT.     Chelsy Rodriguez PA-C was contacted by me on 7/25/2024 1:29 PM CDT and verbalized understanding of the critical result.   CT Head Perfusion w Contrast - For Tier 2 Stroke   Result Value    Radiologist flags Acute intracranial hemorrhage (AA)     Narrative    EXAM: CTA HEAD NECK W CONTRAST, CT HEAD PERFUSION W CONTRAST  LOCATION: [facility]  DATE: 7/25/2024    INDICATION: Code Stroke to evaluate for potential thrombolysis and thrombectomy. PLEASE READ IMMEDIATELY.  COMPARISON: 9/22/2016, 9/21/2023.  TECHNIQUE: Head and neck CT angiogram with IV contrast. Noncontrast head CT followed by axial helical CT images of the head and neck vessels obtained during the arterial phase of intravenous contrast administration. Axial 2D reconstructed images and   multiplanar 3D MIP reconstructed images of the head and neck vessels were performed by the technologist. Additional CT cerebral perfusion was performed utilizing a second contrast bolus. Perfusion data were post processed with generation of standard   perfusion maps and estimation of ischemic/infarcted volumes utilizing standard threshold values. Dose reduction techniques were used. All stenosis measurements made according to NASCET criteria unless otherwise specified.  CONTRAST: isovue 370 67 ml (accession [id number]), isovue 370 50 ml (accession [id number])    FINDINGS:   NONCONTRAST HEAD CT:   INTRACRANIAL CONTENTS: Bifrontal intraparenchymal hyperdensities measuring 31 x 43 x 27 mm on the right, and 12 x 13 x 16 mm on the left (estimated volume 18 cc and 1 cc on the left) . Bifrontal and left intrasylvian extra-axial hyperdense material. No   significant midline shift, mass effect. No evidence of acute hydrocephalus. No area of loss of gray-white matter differentiation.     VISUALIZED ORBITS/SINUSES/MASTOIDS: No intraorbital abnormality. No paranasal sinus mucosal disease. No middle ear or mastoid effusion.    BONES/SOFT TISSUES: No acute abnormality.    HEAD CTA:  ANTERIOR CIRCULATION: No stenosis/occlusion, aneurysm, or high flow vascular malformation. Fetal origin of the left posterior cerebral artery from the anterior circulation.    POSTERIOR CIRCULATION: No stenosis/occlusion,  aneurysm, or high flow vascular malformation. Balanced vertebral arteries supply a normal basilar artery.     DURAL VENOUS SINUSES: Not well evaluated on a technical basis.    NECK CTA:  RIGHT CAROTID: No measurable stenosis or dissection distal to the chronic aortic dissection. Tortuous beaded appearance of the right proximal/mid internal carotid artery.    LEFT CAROTID: No measurable stenosis or dissection. Tortuous beaded appearance of the proximal/left internal carotid artery.    VERTEBRAL ARTERIES: Tortuous left V2 segment vertebral artery. No focal stenosis or dissection. Balanced vertebral arteries.    AORTIC ARCH: Classic aortic arch. Chronic dissection extending into the right proximal common carotid artery.    NONVASCULAR STRUCTURES: Stable right thyroid nodule.    CT PERFUSION:  PERFUSION MAPS: Symmetrical cerebral perfusion with the exception of the region of the right frontal intraparenchymal hemorrhage. No focal deficits in cerebral blood flow or volume to suggest ischemia/oligemia accounting for technical limitations.    RAPID ANALYSIS: Unreliable secondary to above.      Impression    IMPRESSION:   HEAD CT:  1.  Bifrontal right greater than left intraparenchymal hemorrhages (hemorrhage volume estimated at 18 cc and 1 cc respectively); as well as scattered subarachnoid hemorrhage.  2.  Negative for acute infarct. Negative for herniation changes or evidence of acute hydrocephalus.    HEAD CTA:   1.  No large vessel occlusion. No severe stenosis.  2.  No aneurysm, or high flow vascular malformation identified.    NECK CTA:  1.  No hemodynamically significant stenosis in the neck vessels.   2.  No evidence for acute internal carotid or vertebral artery dissection.   3.  Suspect fibromuscular dysplasia involving bilateral internal carotid arteries and possibly the left vertebral artery.  4.  Chronic aortic dissection extending into the right proximal common carotid artery not significantly changed, and  better appreciated on dedicated imaging    CT PERFUSION:  1.  No core infarct on perfusion images.      [Critical Result: Acute intracranial hemorrhage]    Finding was identified on 7/25/2024 1:41 PM CDT.     Chelsy Rodriguez PA-C was contacted by me on 7/25/2024 1:29 PM CDT and verbalized understanding of the critical result.

## 2024-07-25 NOTE — PROGRESS NOTES
Reviewed history, imaging and plans with manuel Verma EDMD, [facility] BERNADINE    Plan  -no urgent neurosurgical intervention   -ICU under intensivist or hospitalist service  -stroke neurology involvement  -rpt head CT 4 hours   -HOB30  -SBP<140  -Na>135    All in agreement  Full consult taking place by Neurosurgery NP at [facility]     Indu Rene PA-C  [facility]  [address]  [address]  [place] [address]  Tel [phone]  Fax [phone]  Text page via [facility] Paging/Directory

## 2024-07-25 NOTE — CONSULTS
[facility]    Stroke Telephone Note    I was called by  on 07/25/24 regarding patient Alyssa Higginbotham. The patient is a 69 year old female with PMH of migraine, HLD, HTN, aortic dissection, marfan syndrome, s/p AVR on coumadin. She presents to the ED for evaluation of HA and AMS. 2 days prio (7/23) she had onset of severe headache that responded to her typical migraine medications. Then yesterday afternoon or evening she has onset of severe headache not responsive to her typical abortive therapies and with associated confusion. Per ED provider on examination patient has significant AMS with inattention and difficulty following commands; she also has subtle L facial droop and notable L hand weakness. /77. INR 2.27      Vitals  BP: (!) 151/69   Pulse: 87   Resp: 23   Temp: 97.8  F (36.6  C)   Weight: 51.3 kg (113 lb)    Stroke Code Data (for stroke code without tele)  Stroke code activated 07/25/24  1308   Stroke provider first response 07/25/24  1307   Last known normal 07/24/24         Time of discovery (or onset of symptoms) 07/24/24      Head CT read by Stroke Neuro Provider 07/25/24  1316   Was stroke code de-escalated? No           Imaging Findings  CT head: bifrontal R>L intraparenchymal hemorrhage, scattered SAH  CTA head/neck: No LVO, significant stenosis or dissection   CT perfusion: no perfusion deficit     Intravenous Thrombolysis  Not given due to:   - active bleeding    Endovascular Treatment  Not initiated due to absence of proximal vessel occlusion    Impression  bifrontal R>L intraparenchymal hemorrhage, scattered SAH of unclear etiology     Recommendations   -transfer to [facility] for ICU  -reverse coumadin  -BP goal <140  -elevate HOB  -repeat head CT in 4 hours   -neurosurgery consultation     Case discussed with vascular neurology attending Dr. Joshi.    My recommendations are based on the information provided over the phone by Alyssa Higginbotham's in-person  "providers. They are not intended to replace the clinical judgment of her in-person providers. I was not requested to personally see or examine the patient at this time.     BETSY Cota CNP  Vascular Neurology    To page me or covering stroke neurology team member, click here: [employer]  Choose \"On Call\" tab at top, then select \"NEUROLOGY/ALL SITES\" from middle drop-down box, press Enter, then look for \"stroke\" or \"telestroke\" for your site.   "

## 2024-07-26 ENCOUNTER — APPOINTMENT (OUTPATIENT)
Dept: OCCUPATIONAL THERAPY | Facility: CLINIC | Age: 69
DRG: 064 | End: 2024-07-26
Attending: STUDENT IN AN ORGANIZED HEALTH CARE EDUCATION/TRAINING PROGRAM
Payer: COMMERCIAL

## 2024-07-26 ENCOUNTER — APPOINTMENT (OUTPATIENT)
Dept: CT IMAGING | Facility: CLINIC | Age: 69
DRG: 064 | End: 2024-07-26
Attending: NURSE PRACTITIONER
Payer: COMMERCIAL

## 2024-07-26 ENCOUNTER — APPOINTMENT (OUTPATIENT)
Dept: CARDIOLOGY | Facility: CLINIC | Age: 69
DRG: 064 | End: 2024-07-26
Payer: COMMERCIAL

## 2024-07-26 ENCOUNTER — APPOINTMENT (OUTPATIENT)
Dept: MRI IMAGING | Facility: CLINIC | Age: 69
DRG: 064 | End: 2024-07-26
Payer: COMMERCIAL

## 2024-07-26 ENCOUNTER — HOSPITAL ENCOUNTER (INPATIENT)
Dept: NEUROLOGY | Facility: CLINIC | Age: 69
Discharge: HOME | DRG: 064 | End: 2024-07-26
Payer: COMMERCIAL

## 2024-07-26 ENCOUNTER — APPOINTMENT (OUTPATIENT)
Dept: PHYSICAL THERAPY | Facility: CLINIC | Age: 69
DRG: 064 | End: 2024-07-26
Attending: STUDENT IN AN ORGANIZED HEALTH CARE EDUCATION/TRAINING PROGRAM
Payer: COMMERCIAL

## 2024-07-26 LAB
ANION GAP SERPL CALCULATED.3IONS-SCNC: 11 MMOL/L (ref 7–15)
ATRIAL RATE - MUSE: 88 BPM
BUN SERPL-MCNC: 12.5 MG/DL (ref 8–23)
CALCIUM SERPL-MCNC: 9.1 MG/DL (ref 8.8–10.4)
CHLORIDE SERPL-SCNC: 93 MMOL/L (ref 98–107)
CREAT SERPL-MCNC: 0.6 MG/DL (ref 0.51–0.95)
DIASTOLIC BLOOD PRESSURE - MUSE: NORMAL MMHG
EGFRCR SERPLBLD CKD-EPI 2021: >90 ML/MIN/1.73M2
GLUCOSE BLDC GLUCOMTR-MCNC: 106 MG/DL (ref 70–99)
GLUCOSE BLDC GLUCOMTR-MCNC: 120 MG/DL (ref 70–99)
GLUCOSE BLDC GLUCOMTR-MCNC: 122 MG/DL (ref 70–99)
GLUCOSE BLDC GLUCOMTR-MCNC: 161 MG/DL (ref 70–99)
GLUCOSE BLDC GLUCOMTR-MCNC: 76 MG/DL (ref 70–99)
GLUCOSE SERPL-MCNC: 117 MG/DL (ref 70–99)
HCO3 SERPL-SCNC: 25 MMOL/L (ref 22–29)
INR PPP: 1.12 (ref 0.85–1.15)
INR PPP: 1.14 (ref 0.85–1.15)
INR PPP: 1.14 (ref 0.85–1.15)
INTERPRETATION ECG - MUSE: NORMAL
LVEF ECHO: NORMAL
P AXIS - MUSE: 73 DEGREES
POTASSIUM SERPL-SCNC: 3.3 MMOL/L (ref 3.4–5.3)
PR INTERVAL - MUSE: 190 MS
QRS DURATION - MUSE: 94 MS
QT - MUSE: 378 MS
QTC - MUSE: 457 MS
R AXIS - MUSE: 61 DEGREES
SODIUM SERPL-SCNC: 127 MMOL/L (ref 135–145)
SODIUM SERPL-SCNC: 127 MMOL/L (ref 135–145)
SODIUM SERPL-SCNC: 129 MMOL/L (ref 135–145)
SODIUM SERPL-SCNC: 129 MMOL/L (ref 135–145)
SODIUM SERPL-SCNC: 133 MMOL/L (ref 135–145)
SODIUM SERPL-SCNC: 134 MMOL/L (ref 135–145)
SODIUM SERPL-SCNC: 135 MMOL/L (ref 135–145)
SYSTOLIC BLOOD PRESSURE - MUSE: NORMAL MMHG
T AXIS - MUSE: 38 DEGREES
TROPONIN T SERPL HS-MCNC: 19 NG/L
TROPONIN T SERPL HS-MCNC: 193 NG/L
TROPONIN T SERPL HS-MCNC: 210 NG/L
TROPONIN T SERPL HS-MCNC: 234 NG/L
TROPONIN T SERPL HS-MCNC: 288 NG/L
VENTRICULAR RATE- MUSE: 88 BPM

## 2024-07-26 PROCEDURE — 255N000002 HC RX 255 OP 636: Performed by: STUDENT IN AN ORGANIZED HEALTH CARE EDUCATION/TRAINING PROGRAM

## 2024-07-26 PROCEDURE — 93306 TTE W/DOPPLER COMPLETE: CPT

## 2024-07-26 PROCEDURE — 84484 ASSAY OF TROPONIN QUANT: CPT | Performed by: STUDENT IN AN ORGANIZED HEALTH CARE EDUCATION/TRAINING PROGRAM

## 2024-07-26 PROCEDURE — 99232 SBSQ HOSP IP/OBS MODERATE 35: CPT | Performed by: NURSE PRACTITIONER

## 2024-07-26 PROCEDURE — 70450 CT HEAD/BRAIN W/O DYE: CPT

## 2024-07-26 PROCEDURE — 97530 THERAPEUTIC ACTIVITIES: CPT | Mod: GP | Performed by: PHYSICAL THERAPIST

## 2024-07-26 PROCEDURE — 36415 COLL VENOUS BLD VENIPUNCTURE: CPT

## 2024-07-26 PROCEDURE — 250N000009 HC RX 250: Performed by: STUDENT IN AN ORGANIZED HEALTH CARE EDUCATION/TRAINING PROGRAM

## 2024-07-26 PROCEDURE — A9585 GADOBUTROL INJECTION: HCPCS | Performed by: STUDENT IN AN ORGANIZED HEALTH CARE EDUCATION/TRAINING PROGRAM

## 2024-07-26 PROCEDURE — 200N000001 HC R&B ICU

## 2024-07-26 PROCEDURE — 70546 MR ANGIOGRAPH HEAD W/O&W/DYE: CPT

## 2024-07-26 PROCEDURE — 99418 PROLNG IP/OBS E/M EA 15 MIN: CPT | Performed by: STUDENT IN AN ORGANIZED HEALTH CARE EDUCATION/TRAINING PROGRAM

## 2024-07-26 PROCEDURE — 99233 SBSQ HOSP IP/OBS HIGH 50: CPT | Performed by: STUDENT IN AN ORGANIZED HEALTH CARE EDUCATION/TRAINING PROGRAM

## 2024-07-26 PROCEDURE — 95720 EEG PHY/QHP EA INCR W/VEEG: CPT | Performed by: PSYCHIATRY & NEUROLOGY

## 2024-07-26 PROCEDURE — 99291 CRITICAL CARE FIRST HOUR: CPT

## 2024-07-26 PROCEDURE — 999N000052 EEG VIDEO 12-26 HR UNMONITORED

## 2024-07-26 PROCEDURE — 97530 THERAPEUTIC ACTIVITIES: CPT | Mod: GO | Performed by: OCCUPATIONAL THERAPIST

## 2024-07-26 PROCEDURE — 85610 PROTHROMBIN TIME: CPT | Performed by: NURSE PRACTITIONER

## 2024-07-26 PROCEDURE — 84295 ASSAY OF SERUM SODIUM: CPT

## 2024-07-26 PROCEDURE — 272N000452 HC KIT SHRLOCK 5FR POWER PICC TRIPLE LUMEN

## 2024-07-26 PROCEDURE — 97161 PT EVAL LOW COMPLEX 20 MIN: CPT | Mod: GP | Performed by: PHYSICAL THERAPIST

## 2024-07-26 PROCEDURE — 84295 ASSAY OF SERUM SODIUM: CPT | Performed by: STUDENT IN AN ORGANIZED HEALTH CARE EDUCATION/TRAINING PROGRAM

## 2024-07-26 PROCEDURE — 93005 ELECTROCARDIOGRAM TRACING: CPT

## 2024-07-26 PROCEDURE — 70553 MRI BRAIN STEM W/O & W/DYE: CPT

## 2024-07-26 PROCEDURE — 93010 ELECTROCARDIOGRAM REPORT: CPT | Performed by: INTERNAL MEDICINE

## 2024-07-26 PROCEDURE — 250N000011 HC RX IP 250 OP 636: Performed by: STUDENT IN AN ORGANIZED HEALTH CARE EDUCATION/TRAINING PROGRAM

## 2024-07-26 PROCEDURE — 36415 COLL VENOUS BLD VENIPUNCTURE: CPT | Performed by: STUDENT IN AN ORGANIZED HEALTH CARE EDUCATION/TRAINING PROGRAM

## 2024-07-26 PROCEDURE — 97166 OT EVAL MOD COMPLEX 45 MIN: CPT | Mod: GO | Performed by: OCCUPATIONAL THERAPIST

## 2024-07-26 PROCEDURE — 93306 TTE W/DOPPLER COMPLETE: CPT | Mod: 26 | Performed by: INTERNAL MEDICINE

## 2024-07-26 PROCEDURE — 36569 INSJ PICC 5 YR+ W/O IMAGING: CPT

## 2024-07-26 PROCEDURE — 250N000011 HC RX IP 250 OP 636

## 2024-07-26 PROCEDURE — 85610 PROTHROMBIN TIME: CPT | Performed by: STUDENT IN AN ORGANIZED HEALTH CARE EDUCATION/TRAINING PROGRAM

## 2024-07-26 PROCEDURE — 36415 COLL VENOUS BLD VENIPUNCTURE: CPT | Performed by: NURSE PRACTITIONER

## 2024-07-26 RX ORDER — 3% SODIUM CHLORIDE 3 G/100ML
INJECTION, SOLUTION INTRAVENOUS CONTINUOUS
Status: DISCONTINUED | OUTPATIENT
Start: 2024-07-26 | End: 2024-07-27

## 2024-07-26 RX ORDER — GADOBUTROL 604.72 MG/ML
10 INJECTION INTRAVENOUS ONCE
Status: COMPLETED | OUTPATIENT
Start: 2024-07-26 | End: 2024-07-26

## 2024-07-26 RX ORDER — POTASSIUM CHLORIDE 29.8 MG/ML
20 INJECTION INTRAVENOUS
Status: COMPLETED | OUTPATIENT
Start: 2024-07-26 | End: 2024-07-26

## 2024-07-26 RX ADMIN — MIDAZOLAM 1.5 MG: 1 INJECTION INTRAMUSCULAR; INTRAVENOUS at 15:55

## 2024-07-26 RX ADMIN — SODIUM CHLORIDE: 3 INJECTION, SOLUTION INTRAVENOUS at 12:53

## 2024-07-26 RX ADMIN — LEVETIRACETAM 500 MG: 5 INJECTION INTRAVENOUS at 21:19

## 2024-07-26 RX ADMIN — POTASSIUM CHLORIDE 20 MEQ: 29.8 INJECTION, SOLUTION INTRAVENOUS at 21:38

## 2024-07-26 RX ADMIN — LEVETIRACETAM 500 MG: 5 INJECTION INTRAVENOUS at 09:41

## 2024-07-26 RX ADMIN — POTASSIUM CHLORIDE 20 MEQ: 29.8 INJECTION, SOLUTION INTRAVENOUS at 19:51

## 2024-07-26 RX ADMIN — GADOBUTROL 10 ML: 604.72 INJECTION INTRAVENOUS at 17:48

## 2024-07-26 RX ADMIN — LIDOCAINE HYDROCHLORIDE 2 ML: 10 INJECTION, SOLUTION EPIDURAL; INFILTRATION; INTRACAUDAL; PERINEURAL at 11:23

## 2024-07-26 ASSESSMENT — ACTIVITIES OF DAILY LIVING (ADL)
PREVIOUS_RESPONSIBILITIES: MEAL PREP;HOUSEKEEPING;LAUNDRY;SHOPPING;YARDWORK;MEDICATION MANAGEMENT;FINANCES;DRIVING
ADLS_ACUITY_SCORE: 47
ADLS_ACUITY_SCORE: 47
ADLS_ACUITY_SCORE: 32
ADLS_ACUITY_SCORE: 47
ADLS_ACUITY_SCORE: 47
ADLS_ACUITY_SCORE: 32
ADLS_ACUITY_SCORE: 32
VISION_MANAGEMENT: PRESCRIPTION GLASSES
ADLS_ACUITY_SCORE: 32
ADLS_ACUITY_SCORE: 32
ADLS_ACUITY_SCORE: 47
ADLS_ACUITY_SCORE: 47
WEAR_GLASSES_OR_BLIND: YES
ADLS_ACUITY_SCORE: 32
ADLS_ACUITY_SCORE: 47
ADLS_ACUITY_SCORE: 32
ADLS_ACUITY_SCORE: 47
ADLS_ACUITY_SCORE: 47
ADLS_ACUITY_SCORE: 32

## 2024-07-26 NOTE — PLAN OF CARE
Problem: Adult Inpatient Plan of Care  Goal: Readiness for Transition of Care  Outcome: Not Progressing   Goal Outcome Evaluation:      Plan of Care Reviewed With: family          Outcome Evaluation: discussed pts latest CT results and plan for the night

## 2024-07-26 NOTE — PROGRESS NOTES
Neuro: pt does not participate fully with exam. She will follow brief commands but not all. Making exam difficult. Pt is alert to self and place, moves all 4 extremities equally. PERRLA, restless in bed, denies pain. Minimal left sided facial droop which looks to have resolved at this time.   Blood pressure parameters maintained with nicardipine.   Will continue to monitor for the remainder of the shift.

## 2024-07-26 NOTE — PROCEDURES
[facility]    Triple Lumen PICC Placement    Date/Time: 7/26/2024 11:41 AM    Performed by: Quita Ga RN  Authorized by: Darshana Erickson MD  Indications: vascular access      UNIVERSAL PROTOCOL   Site Marked: Yes  Prior Images Obtained and Reviewed:  Yes  Required items: Required blood products, implants, devices and special equipment available    Patient identity confirmed:  Verbally with patient, hospital-assigned identification number and arm band  NA - No sedation, light sedation, or local anesthesia  Confirmation Checklist:  Patient's identity using two indicators, procedure was appropriate and matched the consent or emergent situation, correct equipment/implants were available and relevant allergies  Time out: Immediately prior to the procedure a time out was called    Universal Protocol: the Joint Commission Universal Protocol was followed    Preparation: Patient was prepped and draped in usual sterile fashion    ESBL (mL):  3     ANESTHESIA    Local Anesthetic:  Lidocaine 1% without epinephrine  Anesthetic Total (mL):  1      SEDATION    Patient Sedated: No        Preparation: skin prepped with ChloraPrep  Skin prep agent: skin prep agent completely dried prior to procedure  Sterile barriers: maximum sterile barriers were used: cap, mask, sterile gown, sterile gloves, and large sterile sheet  Hand hygiene: hand hygiene performed prior to central venous catheter insertion  Type of line used: PICC  Catheter type: triple lumen  Lumen type: power PICC and valved  Lumen Identification: Red, Gray and White  Catheter size: 5 Fr  Brand: Bard  Placement method: MST and ultrasound  Number of attempts: 1  Difficulty threading catheter: no  Successful placement: yes  Orientation: right    Location: basilic vein  Tip Location: SVC  Arm circumference: adults 15 cm  Extremity circumference: 26  Visible catheter length: 3  Total catheter length: 40  Dressing and securement:  chlorhexidine disc applied, occlusive dressing applied and securement device  Post procedure assessment: blood return through all ports and placement verified by 3CG technology  PROCEDURE   Patient Tolerance:  Patient tolerated the procedure well with no immediate complications   Picc placed without difficulty/picc ok to use  Disposal: sharps and needle count correct at the end of procedure, needles and guidewire disposed in sharps container

## 2024-07-26 NOTE — PROVIDER NOTIFICATION
Discussed wit neurosurgery who recommended 3% saline for hyponatremia in the setting of IPH.   Unable to run 3% through peripheral line. PICC line requested however pt now has fever.   Will switch to 2% saline through peripheral line for now   Q4H Na   Rectal tylenol for fever  Blood culture collected     Darshana Erickson MD

## 2024-07-26 NOTE — PROGRESS NOTES
[facility]    Medicine Progress Note - Hospitalist Service    Date of Admission:  7/25/2024    Assessment & Plan      Alyssa Higginbotham is a 69 year old female with past medical history significant for chronic aortic dissection and mechanical aortic valve on chronic warfarin, hypertension, hyperlipidemia, known meningioma, depression, hypothyroidism admitted on 7/25/2024 nontraumatic bifrontal right greater than left intraparenchymal hemorrhages and scattered subarachnoid hemorrhage.    Anticoagulation associated, non-traumatic bifrontal intraparenchymal hemorrhages, without brain compression  Subarachnoid hemorrhages   Hx of Meningioma   Acute Toxic metabolic encephalopathy  Patient presented to an outside emergency department with headache and confusion.  *Head CT shows bifrontal right greater than left intraparenchymal hemorrhages as well as scattered subarachnoid hemorrhage.  Negative for acute infarct or large vessel occlusion.  *INR was in the therapeutic range at 2.27. She was given vitamin K and Kcentra for warfarin reversal.  *Patient transferred to [facility] for stroke neurology and neurosurgery consultation.  * Serial CTs have been stable, pt still lethargic and intermittently following commands.   -Admitted to ICU  -Neurosurgery consultation, recommendations appreciated  -Stroke neurology consultation, recommendations appreciated  -Neurochecks every 2 hours  -SBP goal less than 140 mmHg  -HOB greater than 30 degrees  -MRI brain w/ and w/o contrast   -Monitor IN/R  -No surgical intervention indicated at this time  -PT/OT/SLP    Hyponatremia   Sodium is 127 on admission. Suspect related to hydrochlorothiazide.   * started NS but sodium dropped to 125. Urine osm 718 and urine sodium 72   * Started 2% saline on 7/25, sodium now up to 129  * 7/26 PICC line placement for initiation of 3% saline   - Fluid restrict - pt is currently NPO   - Monitor sodium closely, target  normonatremia per neurosurgery  - Hold Maxxide.     Troponin elevation   Troponin trend 10>19>210.   EKG showed T wave inversions in V1-V4 which are new since 7/25   Pt denies chest pain.  - TTE ordered and pending   - Continue trending troponin   - Cardiology consulted     Chronic aortic dissection status post aortic valve replacement  Long-term current use of anticoagulant  Marfan syndrome  Pt with known ascending aortic dissection which extends up to carotids and down to the iliacs s/p mechanical aortic valve replacement (25 years ago)   Recent ECHO on Oct 2023 showed stable valvular function of the mechanical valve. EF 55-60%.   - Hold PTA warfarin due to bleed   - Hold PTA metoprolol, maxxide and simvastatin while NPO     Hypertension  Hyperlipidemia  - Hold PTA metoprolol, Maxxide and simvastatin while NPO    Hypothyroidism  - Hold PTA levothyroxine while NPO     Chronic Migraines  - Hold PTA Nurtec, Butalbital-acetaminophen-caffeine, and Imitrex while NPO     MGUS, IgM Lambda   Follows with heme/onc. Stable.     Diet: NPO  DVT Prophylaxis: Pneumatic Compression Devices  Lopez Catheter: Not present  Lines: None     Cardiac Monitoring: None  Code Status: Full Code           Diet: NPO for Medical/Clinical Reasons Except for: No Exceptions    DVT Prophylaxis: Pneumatic Compression Devices  Lopez Catheter: PRESENT, indication: ICU only: hourly urine output needed for patient care  Lines: None     Cardiac Monitoring: ACTIVE order. Indication: ICU  Code Status: Full Code      Clinically Significant Risk Factors Present on Admission        # Hypokalemia: Lowest K = 3.3 mmol/L in last 2 days, will replace as needed  # Hyponatremia: Lowest Na = 125 mmol/L in last 2 days, will monitor as appropriate       # Drug Induced Coagulation Defect: home medication list includes an anticoagulant medication    # Hypertension: Noted on problem list                    Disposition Plan     Medically Ready for Discharge: Anticipated  in 5+ Days             Darshana Erickson MD  Hospitalist Service  [facility]  Securely message with [employer] (more info)  Text page via [email or web address] Paging/Directory   ______________________________________________________________________    Interval History   NAEO. Pt is minimally participatory with interview and examination. She denies chest pain or shortness of breath. Moving all extremities spontaneously.     Physical Exam   Vital Signs: Temp: 97.5  F (36.4  C) Temp src: Axillary BP: 120/74 Pulse: 88   Resp: 13 SpO2: 96 % O2 Device: None (Room air)    Weight: 112 lbs 14.01 oz    Constitutional: Awake, alert, cooperative, no apparent distress.  Eyes: Conjunctiva and pupils examined and normal.  HEENT: Moist mucous membranes, normal dentition.  Respiratory: Clear to auscultation bilaterally, no crackles or wheezing.  Cardiovascular: Regular rate and rhythm, normal S1 and S2, and III/VI systolic murmur noted.  GI: Soft, non-distended, non-tender, normal bowel sounds.  Skin: No rashes, no cyanosis, no edema.  Musculoskeletal: No joint swelling, erythema or tenderness.  Neurologic: moves all extremities spontaneously. Does not follow commands.   Psychiatric: lethargic, flat     Medical Decision Making       75 MINUTES SPENT BY ME on the date of service doing chart review, history, exam, documentation & further activities per the note.      Data     I have personally reviewed the following data over the past 24 hrs:    N/A  \   N/A   / N/A     129 (L); 129 (L) 93 (L) 12.5 /  117 (H)   3.3 (L) 25 0.60 \     Trop: 210 (HH) BNP: N/A     TSH: 1.94 T4: N/A A1C: 5.5     Procal: N/A CRP: 13.80 (H) Lactic Acid: N/A       INR:  1.12 PTT:  N/A   D-dimer:  N/A Fibrinogen:  N/A       Imaging results reviewed over the past 24 hrs:   Recent Results (from the past 24 hour(s))   CT Head w/o Contrast    Narrative    EXAM: CT HEAD W/O CONTRAST  LOCATION: [facility]  DATE:  7/25/2024    INDICATION: Bifrontal R>L IPH and SAH, four hour stability scan.  COMPARISON: CT angiogram of the head and neck 7/25/2024.  TECHNIQUE: Routine CT Head without IV contrast. Multiplanar reformats. Dose reduction techniques were used.    FINDINGS:  INTRACRANIAL CONTENTS:   Mildly increased conspicuity of small parenchymal hemorrhage in the inferolateral right temporal lobe measuring 6 mm (series 5 image 39). Small-volume subarachnoid hemorrhage along the inferolateral right temporal region otherwise appears grossly   similar.    No significant interval change in the dominant right frontal lobe parenchymal hematoma measuring approximately 43 mm AP x 31 mm transverse x 29 mm craniocaudal with mild-to-moderate surrounding vasogenic edema. Likewise, no significant change in the left   frontal lobe parenchymal hematoma measuring 16 mm AP x 16 mm transverse x 18 mm craniocaudal. There appears to be thin subdural hemorrhage along the anterosuperior aspects of both frontal lobes that appears unchanged. There is scattered   small-to-moderate subarachnoid hemorrhage, primarily in the frontal regions bilaterally and also in the left sylvian fissure and temporoparietal junction region. This does not appear significantly changed from prior. No definite new intracranial   hemorrhage identified.    Mild local mass effect in the bilateral frontal regions appears unchanged. There is no significant midline shift/herniation. The ventricles appear normal in size and configuration. The basal cisterns are patent. There is an unchanged small chronic right   cerebellar infarct. Mild generalized cerebral volume loss is present, as before.    VISUALIZED ORBITS/SINUSES/MASTOIDS: No intraorbital abnormality. No paranasal sinus mucosal disease. No middle ear or mastoid effusion.    BONES/SOFT TISSUES: No acute abnormality. Bilateral temporomandibular joint degenerative changes.      Impression    IMPRESSION:  1.  Mildly increased  conspicuity of a small parenchymal hemorrhage in the inferolateral right temporal lobe measuring 6 mm with adjacent small-volume subarachnoid hemorrhage.  2.  Otherwise, no significant interval change in multicompartment intracranial hemorrhage, including dominant parenchymal hematoma is in the bilateral frontal lobes, right larger than left, as described.  3.  Mild local mass effect primarily in the frontal regions is unchanged. No significant midline shift/herniation.  4.  Continued follow-up is suggested.   CT Head w/o Contrast    Narrative    CT SCAN OF THE HEAD WITHOUT CONTRAST   7/26/2024 9:04 AM     HISTORY: Bifrontal hemorrhage.    TECHNIQUE:  Axial images of the head and coronal reformations without  IV contrast material. Radiation dose for this scan was reduced using  automated exposure control, adjustment of the mA and/or kV according  to patient size, or iterative reconstruction technique.    COMPARISON: 7/25/2024    FINDINGS: No significant change in size of the bilateral frontal lobe  parenchymal contusions. For example, the larger right frontal  hemorrhage measures 4.2 x 3.0 x 2.6 cm, previously 4.0 x 3.0 x 2.6 cm  when measured similarly. The smaller left-sided hemorrhage measures 17  x 16 x 14 mm, previously 17 x 17 x 14 mm. Small right inferolateral  temporal parenchymal hemorrhage appears similar. Similar extent of  multifocal bilateral frontal, right temporal, left parietal, and  sylvian fissure subarachnoid hemorrhage. Tiny subdural hemorrhages  along the bilateral frontal lobes appear similar. No definite new or  enlarging hemorrhage. Edema surrounding the bilateral frontal  contusions without significant mass effect. No midline shift. Small  chronic lacunar infarct in the left cerebellar hemisphere. The  ventricles are normal in size, shape and configuration. Mild diffuse  parenchymal volume loss. Mild patchy periventricular white matter  hypodensities which are nonspecific, but likely related  to chronic  microvascular ischemic disease.     The visualized portions of the sinuses and mastoids appear normal. The  bony calvarium and bones of the skull base appear intact.       Impression    IMPRESSION:     1. Overall stable examination compared to 7/26/2024 with  multicompartment intracranial hemorrhages, as described above.  2. No definite new or enlarging intracranial hemorrhage.    NIKOLAS ARGUELLES MD         SYSTEM ID:  [id number]

## 2024-07-26 NOTE — PLAN OF CARE
Problem: Adult Inpatient Plan of Care  Goal: Plan of Care Review  Description: The Plan of Care Review/Shift note should be completed every shift.  The Outcome Evaluation is a brief statement about your assessment that the patient is improving, declining, or no change.  This information will be displayed automatically on your shift  note.  Outcome: Not Progressing  Flowsheets (Taken 7/26/2024 1851)  Outcome Evaluation: Pt remains lethargic, restless, intermittently following commands and answering questions. Able to move all extremities, intermittently confused. Tele SR/ST. VSS on RA, SBP between 130-150. 3% NS started with new R PICC. Trending troponins and Na. Completed ECHO, EEG initiated, MRI/MRV. Remains NPO. Lopez removed at 1807, pt with minimal urine output throughout day.   Plan of Care Reviewed With:   patient   spouse   sibling  Overall Patient Progress: no change     Problem: Adult Inpatient Plan of Care  Goal: Absence of Hospital-Acquired Illness or Injury  Intervention: Prevent Skin Injury  Recent Flowsheet Documentation  Taken 7/26/2024 1800 by Cherie Cerrato RN  Body Position: side-lying 90 degrees  Taken 7/26/2024 1600 by Cherie Cerrato, RN  Body Position: position changed independently  Skin Protection:   adhesive use limited   incontinence pads utilized   pulse oximeter probe site changed   silicone foam dressing in place  Device Skin Pressure Protection:   absorbent pad utilized/changed   positioning supports utilized   pressure points protected   tubing/devices free from skin contact  Taken 7/26/2024 1200 by Cherie Cerrato, RN  Body Position: turned  Skin Protection:   adhesive use limited   incontinence pads utilized   pulse oximeter probe site changed   silicone foam dressing in place  Device Skin Pressure Protection:   absorbent pad utilized/changed   positioning supports utilized   pressure points protected   tubing/devices free from skin contact  Taken 7/26/2024 1000 by Cherie Cerrato  RN  Body Position: turned  Taken 7/26/2024 0800 by Cherie Cerrato RN  Body Position: turned  Skin Protection:   adhesive use limited   incontinence pads utilized   pulse oximeter probe site changed   silicone foam dressing in place  Device Skin Pressure Protection:   absorbent pad utilized/changed   positioning supports utilized   pressure points protected   tubing/devices free from skin contact     Problem: Adult Inpatient Plan of Care  Goal: Absence of Hospital-Acquired Illness or Injury  Intervention: Prevent and Manage VTE (Venous Thromboembolism) Risk  Recent Flowsheet Documentation  Taken 7/26/2024 1600 by Cherie Cerrato RN  VTE Prevention/Management: SCDs on (sequential compression devices)  Taken 7/26/2024 1200 by Cherie Cerrato RN  VTE Prevention/Management: SCDs on (sequential compression devices)  Taken 7/26/2024 0800 by Cherie Cerrato RN  VTE Prevention/Management: SCDs on (sequential compression devices)

## 2024-07-26 NOTE — PROGRESS NOTES
Neurosurgery Progress Note:    Date of service: 7/26/2024    Assessment: Alyssa Higginbotham is a 69 year old female with a past medical history of type a aortic dissection in 2000 on Coumadin for mechanical aortic valve admitted on 7/25/2024 for altered mental status.  Patient was found to have bifrontal IPH with scattered SAH.     Clinically Significant Risk Factors Present on Admission       # Hypokalemia: Lowest K = 3.3 mmol/L in last 2 days, will replace as needed  # Hyponatremia: Lowest Na = 125 mmol/L in last 2 days, will monitor as appropriate        # Drug Induced Coagulation Defect: home medication list includes an anticoagulant medication               Plan:  - Neuro checks/vital signs: Q2 hour  - SBP: <150 mmHg  - Na goal of >135, please correct appropriately.  Recommend Q4 hour sodium checks   - Keppra 500 mg BID per stroke recommendations   - HOB >30 degrees   - Continue to hold Warfarin  - No neurosurgical intervention currently indicated   - Please contact Neurosurgery with change in neurologic examination   - Remainder of cares per Stroke and Medicine team             Laurita Irwin, BETSY, CNP  7/26/2024  Department of Neurosurgery  Pager: [phone]    I have spent a total of 35 minutes total time counseling, coordination, and chart review, over 50% of the time was spent in direct patient care.       Interval History:  Increased size of right IPH overnight, repeat head CT stable this morning.   Serum sodium remains low, patient receiving 2% infusion- team declined to place PICC line for 3% administration overnight given concern for infection.  INR 1.12 this morning.           Objective:   Temp:  [97.5  F (36.4  C)-101.4  F (38.6  C)] 97.5  F (36.4  C)  Pulse:  [] 88  Resp:  [9-58] 13  BP: ()/(55-98) 120/74  SpO2:  [95 %-100 %] 96 %  I/O last 3 completed shifts:  In: 435.92 [I.V.:435.92]  Out: 405 [Urine:405]    Gen:   Neurologic:  - Alert & Oriented to person, place   - Does  not follow commands    - Speech dysarthric   - possible left neglect  - PERRL, EOMI  - Face symmetric    - Patient will not follow commands, will move all four extremities to noxious stimuli and spontaneously         LABS  Recent Labs   Lab Test 07/25/24  1329 04/17/24  0713 03/19/24  0908   WBC 11.8* 4.0 3.8*   HGB 13.2 12.5 12.9   MCV 77* 81 80    189 186       Recent Labs   Lab Test 07/26/24  0850 07/26/24  0759 07/26/24  0557 07/26/24  0409 07/26/24  0224 07/25/24  1620 07/25/24  1329 07/25/24  1305 03/19/24  0908   *  129*  --  127*  --  127*   < > 127*  --  140   POTASSIUM 3.3*  --   --   --   --   --  3.4  --  4.1   CHLORIDE 93*  --   --   --   --   --  87*  --  100   CO2 25  --   --   --   --   --  27  --  29   BUN 12.5  --   --   --   --   --  13.2  --  19.6   CR 0.60  --   --   --   --   --  0.65  --  0.91   ANIONGAP 11  --   --   --   --   --  13  --  11   AMI 9.1  --   --   --   --   --  9.2  --  9.9   * 120*  --  122*  --    < > 154*   < > 92    < > = values in this interval not displayed.       IMAGING    Recent Results (from the past 24 hour(s))   CTA Head Neck with Contrast   Result Value    Radiologist flags Acute intracranial hemorrhage (AA)    Narrative    EXAM: CTA HEAD NECK W CONTRAST, CT HEAD PERFUSION W CONTRAST  LOCATION: [facility]  DATE: 7/25/2024    INDICATION: Code Stroke to evaluate for potential thrombolysis and thrombectomy. PLEASE READ IMMEDIATELY.  COMPARISON: 9/22/2016, 9/21/2023.  TECHNIQUE: Head and neck CT angiogram with IV contrast. Noncontrast head CT followed by axial helical CT images of the head and neck vessels obtained during the arterial phase of intravenous contrast administration. Axial 2D reconstructed images and   multiplanar 3D MIP reconstructed images of the head and neck vessels were performed by the technologist. Additional CT cerebral perfusion was performed utilizing a second contrast bolus. Perfusion data were post  processed with generation of standard   perfusion maps and estimation of ischemic/infarcted volumes utilizing standard threshold values. Dose reduction techniques were used. All stenosis measurements made according to NASCET criteria unless otherwise specified.  CONTRAST: isovue 370 67 ml (accession [id number]), isovue 370 50 ml (accession [id number])    FINDINGS:   NONCONTRAST HEAD CT:   INTRACRANIAL CONTENTS: Bifrontal intraparenchymal hyperdensities measuring 31 x 43 x 27 mm on the right, and 12 x 13 x 16 mm on the left (estimated volume 18 cc and 1 cc on the left) . Bifrontal and left intrasylvian extra-axial hyperdense material. No   significant midline shift, mass effect. No evidence of acute hydrocephalus. No area of loss of gray-white matter differentiation.     VISUALIZED ORBITS/SINUSES/MASTOIDS: No intraorbital abnormality. No paranasal sinus mucosal disease. No middle ear or mastoid effusion.    BONES/SOFT TISSUES: No acute abnormality.    HEAD CTA:  ANTERIOR CIRCULATION: No stenosis/occlusion, aneurysm, or high flow vascular malformation. Fetal origin of the left posterior cerebral artery from the anterior circulation.    POSTERIOR CIRCULATION: No stenosis/occlusion, aneurysm, or high flow vascular malformation. Balanced vertebral arteries supply a normal basilar artery.     DURAL VENOUS SINUSES: Not well evaluated on a technical basis.    NECK CTA:  RIGHT CAROTID: No measurable stenosis or dissection distal to the chronic aortic dissection. Tortuous beaded appearance of the right proximal/mid internal carotid artery.    LEFT CAROTID: No measurable stenosis or dissection. Tortuous beaded appearance of the proximal/left internal carotid artery.    VERTEBRAL ARTERIES: Tortuous left V2 segment vertebral artery. No focal stenosis or dissection. Balanced vertebral arteries.    AORTIC ARCH: Classic aortic arch. Chronic dissection extending into the right proximal common carotid artery.    NONVASCULAR  STRUCTURES: Stable right thyroid nodule.    CT PERFUSION:  PERFUSION MAPS: Symmetrical cerebral perfusion with the exception of the region of the right frontal intraparenchymal hemorrhage. No focal deficits in cerebral blood flow or volume to suggest ischemia/oligemia accounting for technical limitations.    RAPID ANALYSIS: Unreliable secondary to above.      Impression    IMPRESSION:   HEAD CT:  1.  Bifrontal right greater than left intraparenchymal hemorrhages (hemorrhage volume estimated at 18 cc and 1 cc respectively); as well as scattered subarachnoid hemorrhage.  2.  Negative for acute infarct. Negative for herniation changes or evidence of acute hydrocephalus.    HEAD CTA:   1.  No large vessel occlusion. No severe stenosis.  2.  No aneurysm, or high flow vascular malformation identified.    NECK CTA:  1.  No hemodynamically significant stenosis in the neck vessels.   2.  No evidence for acute internal carotid or vertebral artery dissection.   3.  Suspect fibromuscular dysplasia involving bilateral internal carotid arteries and possibly the left vertebral artery.  4.  Chronic aortic dissection extending into the right proximal common carotid artery not significantly changed, and better appreciated on dedicated imaging    CT PERFUSION:  1.  No core infarct on perfusion images.      [Critical Result: Acute intracranial hemorrhage]    Finding was identified on 7/25/2024 1:41 PM CDT.     Chelsy Rodriguez PA-C was contacted by me on 7/25/2024 1:29 PM CDT and verbalized understanding of the critical result.   CT Head Perfusion w Contrast - For Tier 2 Stroke   Result Value    Radiologist flags Acute intracranial hemorrhage (AA)    Narrative    EXAM: CTA HEAD NECK W CONTRAST, CT HEAD PERFUSION W CONTRAST  LOCATION: [facility]  DATE: 7/25/2024    INDICATION: Code Stroke to evaluate for potential thrombolysis and thrombectomy. PLEASE READ IMMEDIATELY.  COMPARISON: 9/22/2016,  9/21/2023.  TECHNIQUE: Head and neck CT angiogram with IV contrast. Noncontrast head CT followed by axial helical CT images of the head and neck vessels obtained during the arterial phase of intravenous contrast administration. Axial 2D reconstructed images and   multiplanar 3D MIP reconstructed images of the head and neck vessels were performed by the technologist. Additional CT cerebral perfusion was performed utilizing a second contrast bolus. Perfusion data were post processed with generation of standard   perfusion maps and estimation of ischemic/infarcted volumes utilizing standard threshold values. Dose reduction techniques were used. All stenosis measurements made according to NASCET criteria unless otherwise specified.  CONTRAST: isovue 370 67 ml (accession [id number]), isovue 370 50 ml (accession [id number])    FINDINGS:   NONCONTRAST HEAD CT:   INTRACRANIAL CONTENTS: Bifrontal intraparenchymal hyperdensities measuring 31 x 43 x 27 mm on the right, and 12 x 13 x 16 mm on the left (estimated volume 18 cc and 1 cc on the left) . Bifrontal and left intrasylvian extra-axial hyperdense material. No   significant midline shift, mass effect. No evidence of acute hydrocephalus. No area of loss of gray-white matter differentiation.     VISUALIZED ORBITS/SINUSES/MASTOIDS: No intraorbital abnormality. No paranasal sinus mucosal disease. No middle ear or mastoid effusion.    BONES/SOFT TISSUES: No acute abnormality.    HEAD CTA:  ANTERIOR CIRCULATION: No stenosis/occlusion, aneurysm, or high flow vascular malformation. Fetal origin of the left posterior cerebral artery from the anterior circulation.    POSTERIOR CIRCULATION: No stenosis/occlusion, aneurysm, or high flow vascular malformation. Balanced vertebral arteries supply a normal basilar artery.     DURAL VENOUS SINUSES: Not well evaluated on a technical basis.    NECK CTA:  RIGHT CAROTID: No measurable stenosis or dissection distal to the chronic aortic  dissection. Tortuous beaded appearance of the right proximal/mid internal carotid artery.    LEFT CAROTID: No measurable stenosis or dissection. Tortuous beaded appearance of the proximal/left internal carotid artery.    VERTEBRAL ARTERIES: Tortuous left V2 segment vertebral artery. No focal stenosis or dissection. Balanced vertebral arteries.    AORTIC ARCH: Classic aortic arch. Chronic dissection extending into the right proximal common carotid artery.    NONVASCULAR STRUCTURES: Stable right thyroid nodule.    CT PERFUSION:  PERFUSION MAPS: Symmetrical cerebral perfusion with the exception of the region of the right frontal intraparenchymal hemorrhage. No focal deficits in cerebral blood flow or volume to suggest ischemia/oligemia accounting for technical limitations.    RAPID ANALYSIS: Unreliable secondary to above.      Impression    IMPRESSION:   HEAD CT:  1.  Bifrontal right greater than left intraparenchymal hemorrhages (hemorrhage volume estimated at 18 cc and 1 cc respectively); as well as scattered subarachnoid hemorrhage.  2.  Negative for acute infarct. Negative for herniation changes or evidence of acute hydrocephalus.    HEAD CTA:   1.  No large vessel occlusion. No severe stenosis.  2.  No aneurysm, or high flow vascular malformation identified.    NECK CTA:  1.  No hemodynamically significant stenosis in the neck vessels.   2.  No evidence for acute internal carotid or vertebral artery dissection.   3.  Suspect fibromuscular dysplasia involving bilateral internal carotid arteries and possibly the left vertebral artery.  4.  Chronic aortic dissection extending into the right proximal common carotid artery not significantly changed, and better appreciated on dedicated imaging    CT PERFUSION:  1.  No core infarct on perfusion images.      [Critical Result: Acute intracranial hemorrhage]    Finding was identified on 7/25/2024 1:41 PM CDT.     Chelsy Rodriguez PA-C was contacted by me on 7/25/2024 1:29 PM  CDT and verbalized understanding of the critical result.   CT Head w/o Contrast    Narrative    EXAM: CT HEAD W/O CONTRAST  LOCATION: [facility]  DATE: 7/25/2024    INDICATION: Bifrontal R>L IPH and SAH, four hour stability scan.  COMPARISON: CT angiogram of the head and neck 7/25/2024.  TECHNIQUE: Routine CT Head without IV contrast. Multiplanar reformats. Dose reduction techniques were used.    FINDINGS:  INTRACRANIAL CONTENTS:   Mildly increased conspicuity of small parenchymal hemorrhage in the inferolateral right temporal lobe measuring 6 mm (series 5 image 39). Small-volume subarachnoid hemorrhage along the inferolateral right temporal region otherwise appears grossly   similar.    No significant interval change in the dominant right frontal lobe parenchymal hematoma measuring approximately 43 mm AP x 31 mm transverse x 29 mm craniocaudal with mild-to-moderate surrounding vasogenic edema. Likewise, no significant change in the left   frontal lobe parenchymal hematoma measuring 16 mm AP x 16 mm transverse x 18 mm craniocaudal. There appears to be thin subdural hemorrhage along the anterosuperior aspects of both frontal lobes that appears unchanged. There is scattered   small-to-moderate subarachnoid hemorrhage, primarily in the frontal regions bilaterally and also in the left sylvian fissure and temporoparietal junction region. This does not appear significantly changed from prior. No definite new intracranial   hemorrhage identified.    Mild local mass effect in the bilateral frontal regions appears unchanged. There is no significant midline shift/herniation. The ventricles appear normal in size and configuration. The basal cisterns are patent. There is an unchanged small chronic right   cerebellar infarct. Mild generalized cerebral volume loss is present, as before.    VISUALIZED ORBITS/SINUSES/MASTOIDS: No intraorbital abnormality. No paranasal sinus mucosal disease. No middle ear  or mastoid effusion.    BONES/SOFT TISSUES: No acute abnormality. Bilateral temporomandibular joint degenerative changes.      Impression    IMPRESSION:  1.  Mildly increased conspicuity of a small parenchymal hemorrhage in the inferolateral right temporal lobe measuring 6 mm with adjacent small-volume subarachnoid hemorrhage.  2.  Otherwise, no significant interval change in multicompartment intracranial hemorrhage, including dominant parenchymal hematoma is in the bilateral frontal lobes, right larger than left, as described.  3.  Mild local mass effect primarily in the frontal regions is unchanged. No significant midline shift/herniation.  4.  Continued follow-up is suggested.   CT Head w/o Contrast    Narrative    CT SCAN OF THE HEAD WITHOUT CONTRAST   7/26/2024 9:04 AM     HISTORY: Bifrontal hemorrhage.    TECHNIQUE:  Axial images of the head and coronal reformations without  IV contrast material. Radiation dose for this scan was reduced using  automated exposure control, adjustment of the mA and/or kV according  to patient size, or iterative reconstruction technique.    COMPARISON: 7/25/2024    FINDINGS: No significant change in size of the bilateral frontal lobe  parenchymal contusions. For example, the larger right frontal  hemorrhage measures 4.2 x 3.0 x 2.6 cm, previously 4.0 x 3.0 x 2.6 cm  when measured similarly. The smaller left-sided hemorrhage measures 17  x 16 x 14 mm, previously 17 x 17 x 14 mm. Small right inferolateral  temporal parenchymal hemorrhage appears similar. Similar extent of  multifocal bilateral frontal, right temporal, left parietal, and  sylvian fissure subarachnoid hemorrhage. Tiny subdural hemorrhages  along the bilateral frontal lobes appear similar. No definite new or  enlarging hemorrhage. Edema surrounding the bilateral frontal  contusions without significant mass effect. No midline shift. Small  chronic lacunar infarct in the left cerebellar hemisphere. The  ventricles are  normal in size, shape and configuration. Mild diffuse  parenchymal volume loss. Mild patchy periventricular white matter  hypodensities which are nonspecific, but likely related to chronic  microvascular ischemic disease.     The visualized portions of the sinuses and mastoids appear normal. The  bony calvarium and bones of the skull base appear intact.       Impression    IMPRESSION:     1. Overall stable examination compared to 7/26/2024 with  multicompartment intracranial hemorrhages, as described above.  2. No definite new or enlarging intracranial hemorrhage.    NIKOLAS ARGUELLES MD         SYSTEM ID:  [id number]

## 2024-07-26 NOTE — PROGRESS NOTES
INITIAL REPORT of Video-EEG Monitoring              DATE OF RECORDIN2024         I reviewed the first 2 hours of video-EEG monitoring of Alyssa Higginbotham.        The EEG during waking was abnormal due to generalized delta-theta slowing, with intermixture of faster alpha-beta activities.  No interictal epileptiform abnormalities and no electrographic seizures were observed.         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  This recording excludes non-convulsive status epilepticus as a possible cause of this encephalopathy.    Screening for intermittent seizures will require a longer period of recording.   Jonathon Payton M.D.

## 2024-07-26 NOTE — PROGRESS NOTES
In room to place PICC, patient has developed a fever.  Page out to Dr. Erickson to discuss PICC placement.  Discuss alternatives with pharmacist, 2% would be viable option absent central access.  First set of blood cultures just drawn approx 2 hours prior to ICU arrival.

## 2024-07-26 NOTE — PROGRESS NOTES
"   07/26/24 1000   Appointment Info   Signing Clinician's Name / Credentials (PT) Heather Wong PT, DPT   Rehab Comments (PT) Co-Rx with OT for optimal mobility potential.   Living Environment   People in Home spouse   Current Living Arrangements house   Living Environment Comments Pt doesn't answer questions for home set up, but 2 sisters are present and they provided subjective info for the pt.   Self-Care   Usual Activity Tolerance excellent   Current Activity Tolerance poor   Regular Exercise Yes   Activity/Exercise Type biking;walking  (Walks 2 hrs a day, bikes regular. \"She is very active and strong,\" per Sisters.)   Exercise Amount/Frequency daily   Equipment Currently Used at Home none   Fall history within last six months no   Activity/Exercise/Self-Care Comment Per Sisters pt is independent at [facility] [place] with mobility and ADLs.   General Information   Onset of Illness/Injury or Date of Surgery 07/25/24   Referring Physician Darshana Erickson MD   Patient/Family Therapy Goals Statement (PT) None stated.   Pertinent History of Current Problem (include personal factors and/or comorbidities that impact the POC) 70 yo female adm to an OSH with confusion and headache, found to have nontraumatic bifrontal right greater than left intraparenchymal hemorrhages and scattered subarachnoid hemorrhage. PMH includes chronic aortic dissection and mechanical aortic valve on chronic warfarin, hypertension, hyperlipidemia, known meningioma, depression, hypothyroidism.   Existing Precautions/Restrictions fall   Cognition   Affect/Mental Status (Cognition) low arousal/lethargic;flat/blunted affect   Orientation Status (Cognition) person;place  (Knows she is at the hospital, still thinks she is at [facility] -\"Because of blood pressure.\" Unable to state the date. Did report she is in [place].)   Follows Commands (Cognition) follows one-step commands;0-24% accuracy;25-49% accuracy   Behavioral Issues withdrawn   Safety Deficit " (Cognition) impulsivity;insight into deficits/self-awareness;judgment;problem-solving;safety precautions awareness;at risk behavior observed;awareness of need for assistance;ability to follow commands;moderate deficit   Cognitive Status Comments Defer to OT. Pt is too lethargic to get a good sense. Answeres and follows ~ 25% of the time, otherwise is falling asleep.   Pain Assessment   Patient Currently in Pain Yes, see Vital Sign flowsheet   Posture    Posture Protracted shoulders;Kyphosis   Range of Motion (ROM)   ROM Comment B LEs and UEs WFL as obs pt moves willingly, but not necessarily to command.   Strength (Manual Muscle Testing)   Strength Comments Demonstrates antigravity strength and good resistive strength. Pt does not follow for MMTs.   Bed Mobility   Comment, (Bed Mobility) Sit pivot to EOB, Mod A x 2.   Transfers   Comment, (Transfers) Pt too resistant to stand.   Gait/Stairs (Locomotion)   Comment, (Gait/Stairs) Unable to assess.   Balance   Balance Comments Sitting balance, CGA to Max A at times. Pt continues to try and return to supine even with constant re-orienation and cues for upright.   Sensory Examination   Sensory Perception Comments Responds to pain, fingernail squeeze.   Coordination   Coordination Comments Seems to move the UEs and LEs appropriate to movements she wants to complete.   Clinical Impression   Criteria for Skilled Therapeutic Intervention Yes, treatment indicated   PT Diagnosis (PT) Impaired functional activity tolerance   Influenced by the following impairments Generalized weakness, significant fatigue, impaired command following, decreased balanance   Functional limitations due to impairments Decreased functional independence with mobility   Clinical Presentation (PT Evaluation Complexity) stable   Clinical Presentation Rationale see MR   Clinical Decision Making (Complexity) low complexity   Planned Therapy Interventions (PT) balance training;bed mobility training;gait  training;home exercise program;neuromuscular re-education;patient/family education;ROM (range of motion);stair training;strengthening;stretching;transfer training;progressive activity/exercise;risk factor education;home program guidelines   Risk & Benefits of therapy have been explained evaluation/treatment results reviewed;care plan/treatment goals reviewed;risks/benefits reviewed;current/potential barriers reviewed;participants voiced agreement with care plan;participants included;patient   PT Total Evaluation Time   PT Eval, Low Complexity Minutes (75623) 10   Physical Therapy Goals   PT Frequency Daily   PT Predicted Duration/Target Date for Goal Attainment 08/03/24   PT Goals Bed Mobility;Transfers;Gait;Stairs   PT: Bed Mobility Independent;Supine to/from sit;Rolling   PT: Transfers Independent;Sit to/from stand;Bed to/from chair   PT: Gait Supervision/stand-by assist;Greater than 200 feet   PT: Stairs Supervision/stand-by assist;Greater than 10 stairs;Rail on both sides   Interventions   Interventions Quick Adds Therapeutic Activity   Therapeutic Activity   Therapeutic Activities: dynamic activities to improve functional performance Minutes (03376) 15   Treatment Detail/Skilled Intervention Initiated upright with abed placed in chair position. Pt opens eyes to name, but doesn' tkeep them open. Constant cues to open eyes. Dangled x 8 min with verbal, tactile and visual cues for upright. Cued for gaze, tactile cues for hand placement. Pt resistant at times, continues to cross legs and attempt to return to supine. CGA x 2 sit>L side, dependent boost for optimal positioning. HR up to 115 with upright, difficult to get BP but stays 120s systolic pre, during and psot act. Alarm on, HOB 30 degrees, all needs in rec, family at bedside (and sitter present) at PT/OT exit.   PT Discharge Planning   PT Plan Transfers, gait as able, monitor sodium   PT Discharge Recommendation (DC Rec) home with assist   PT Rationale for  DC Rec Currently the pt is A x 2 secondary to lethargy and difficulty following commands. At baseline the pt is independent and quite active physically. She is retired and lives with her  in a house. Anticipate with medical stability the pt will be SBA or less with allfunctional mobility.   PT Brief overview of current status A x 2 for EOB dangle   Total Session Time   Timed Code Treatment Minutes 15   Total Session Time (sum of timed and untimed services) 25

## 2024-07-26 NOTE — PROGRESS NOTES
07/26/24 0940   Appointment Info   Signing Clinician's Name / Credentials (OT) Rut Mandujano OTR/l   Rehab Comments (OT) Initial Evaluation, Co-Rx with PT for optimal functional mobility and ADL potential.   Living Environment   People in Home spouse   Current Living Arrangements house   Home Accessibility stairs to enter home   Transportation Anticipated family or friend will provide   Living Environment Comments Pt too lethargic and confused at this time to provide PLOF. Pt's 2 sisters arrived at the end of the session and able to provide PLOF.   Self-Care   Regular Exercise Yes   Activity/Exercise Type walking;biking   Exercise Amount/Frequency daily  (walks 2 hours a day, also bikes per pt's sisters)   Activity/Exercise/Self-Care Comment Per pt's sisters, pt very independent with all ADL/IADl's and functional mobility prior to admit.   Instrumental Activities of Daily Living (IADL)   Previous Responsibilities meal prep;housekeeping;laundry;shopping;yardwork;medication management;finances;driving   IADL Comments Pt is retired and active per pt's sisters.   General Information   Onset of Illness/Injury or Date of Surgery 07/25/24   Referring Physician Darshana Erickson MD   Patient/Family Therapy Goal Statement (OT) none stated   Additional Occupational Profile Info/Pertinent History of Current Problem Alyssa Higginbotham is a 69 year old female with past medical history significant for chronic aortic dissection and mechanical aortic valve on chronic warfarin, hypertension, hyperlipidemia, known meningioma, depression, hypothyroidism admitted on 7/25/2024 nontraumatic bifrontal right greater than left intraparenchymal hemorrhages and scattered subarachnoid hemorrhage.   Existing Precautions/Restrictions fall;other (see comments)  (SBP goal less than 140 mmHg,  HOB greater than 30 degrees)   Cognitive Status Examination   Orientation Status person;place   Affect/Mental Status (Cognitive) low  arousal/lethargic;confused  (restless)   Follows Commands delayed response/completion;increased processing time needed;physical/tactile prompts required;repetition of directions required;verbal cues/prompting required  (following 25-30% of the time)   Safety Deficit impulsivity;at risk behavior observed;insight into deficits/self-awareness;judgment;severe deficit   Attention Deficit arousal/alertness;moderate deficit;severe deficit   Executive Function Deficit insight/awareness of deficits;information processing;severe deficit;moderate deficit   Cognitive Status Comments Cont to monior cognition. pts sodium low.   Visual Perception   Impact of Vision Impairment on Function (Vision) difficult to assess vision as pt's eyes closed most of the session and difficulty followiong commands. pt appeared to have equal attention bilaterally.   Sensory   Sensory Comments not noted cont to monitor   Pain Assessment   Patient Currently in Pain   (did not indicate)   Range of Motion Comprehensive   Comment, General Range of Motion Appears to have good AROM WFL   Strength Comprehensive (MMT)   Comment, General Manual Muscle Testing (MMT) Assessment B UE and LE strength appears to be symmetrical.   Muscle Tone Assessment   Muscle Tone Quick Adds No deficits were identified   Coordination   Coordination Comments unable to assess due to impaired command following.   Bed Mobility   Supine-Sit [place] (Bed Mobility) moderate assist (50% patient effort);2 person assist   Sit-Supine [place] (Bed Mobility) minimum assist (75% patient effort);2 person assist   Transfers   Transfer Comments transfers not assessed due to lethargy and decreased command following.   Upper Body Dressing Assessment/Training   [place] Level (Upper Body Dressing) moderate assist (50% patient effort)   Lower Body Dressing Assessment/Training   [place] Level (Lower Body Dressing) maximum assist (25% patient effort)   Grooming Assessment/Training    [place] Level (Grooming) moderate assist (50% patient effort)   Clinical Impression   Criteria for Skilled Therapeutic Interventions Met (OT) Yes, treatment indicated   OT Diagnosis impaired I with ADL's and functional mobility   OT Problem List-Impairments impacting ADL problems related to;activity tolerance impaired;balance;cognition  (cont to monitor)   Assessment of Occupational Performance 3-5 Performance Deficits   Identified Performance Deficits impaired I with dressing, toileting, showering, med mgmt, yard work, community mobility, etc   Planned Therapy Interventions (OT) ADL retraining;cognition;transfer training;home program guidelines;progressive activity/exercise   Clinical Decision Making Complexity (OT) detailed assessment/moderate complexity   Risk & Benefits of therapy have been explained evaluation/treatment results reviewed;care plan/treatment goals reviewed;risks/benefits reviewed;current/potential barriers reviewed;participants voiced agreement with care plan;participants included;patient   OT Total Evaluation Time   OT Eval, Moderate Complexity Minutes (98014) 10   OT Goals   Therapy Frequency (OT) Daily   OT Predicted Duration/Target Date for Goal Attainment 07/31/24   OT Goals Hygiene/Grooming;Upper Body Dressing;Lower Body Dressing;Toilet Transfer/Toileting;Cognition;OT Goal 1   OT: Hygiene/Grooming supervision/stand-by assist;while standing   OT: Upper Body Dressing Supervision/stand-by assist   OT: Lower Body Dressing Supervision/stand-by assist   OT: Toilet Transfer/Toileting Supervision/stand-by assist;toilet transfer;cleaning and garment management;using adaptive equipment   OT: Cognitive Patient/caregiver will verbalize understanding of cognitive assessment results/recommendations as needed for safe discharge planning   OT: Goal 1 Pt will follow 1 step commands 100% of the time during ADL's and functional mobility.   Therapeutic Activities   Therapeutic Activity Minutes (51403)  15   Symptoms noted during/after treatment fatigue   Treatment Detail/Skilled Intervention OT: pt lethargic, will open eyes briefly and respond approx 25% of the time during session. pt oriented to self and reports at [facility], reports she came to hospital for BP issues, pt reoriented but did not appear to retain information. HOB elevated and bed positioned in a chair position, VSS. pt encouraged to sit at EOB, supine to sit with varied MIN to max A, pt sat at EOB also with varied A, as pt reports that she's cold and grabing for blankets, etc and trying to lay down at times, BP taken while seated at EOB, and appears stable, but difficult getting a good reason as pt moving. sit to supine with CGA x 2 and to A with managing lines. BP stable once back in bed and position. RN present at end of session as well as pt's 2 sisters and sitter.   OT Discharge Planning   OT Plan OT plan; command following, sit at EOB and transfer to chair or BSC pending on alertness, g/h and/or dressing, orientation? check sodium level   OT Discharge Recommendation (DC Rec) home with assist;home with home care occupational therapy;home with outpatient occupational therapy   OT Rationale for DC Rec Prior to admit pt very independent per pt's sisters, pt is retired and I with all ADL/IADl's and functional mobility. pt walks and bikes daily. Pt currently limited due to lethargy, impaired command following requiring varied A of 2 for sitting at EOB. Anticipate with cont'd medical mgmt and therapy, pt may progress to be able to return home and may require home  or OP OT for further cognitive assessment, etc and 24hr S and A with all IADL's. however, if does not progress, may need rehab. Cont to assess safety discharge plan.   OT Brief overview of current status A of 2 to sit at EOB, lethargic and impaired command following approx 25-30% of time.   Total Session Time   Timed Code Treatment Minutes 15   Total Session Time (sum of timed and untimed  services) 25

## 2024-07-26 NOTE — PROGRESS NOTES
[facility]    Vascular Neurology Progress Note    Interval History     Transferred from the ED to the ICU overnight. Repeat CTH this morning revealed stable multi-compartmental IPH/CH.     Max Temp: 101.4  SBP range: 104 - 125  Heart rate: 91 - 122  Sodium: 127->125->127->127->129  INR: 1.14  Blood glucose: 117  Blood cultures: No growth after 12 hours     Hospital Course     Chief complaint: Stroke    Alyssa Higginbotham is a 69 year old female with a PMH of migraine, HLD, HTN, aortic dissection (2000), marfan syndrome, s/p AVR (mechanical) on coumadin. She presents to the ED for evaluation of headache and AMS. 2 days prior (7/23) she had onset of severe headache that responded to her typical migraine medications. Then yesterday afternoon/evening she has onset of severe headache that was not responsive to her typical abortive therapies. Per Jeferson () Jennifer was not getting out of bed and not responding to his questions. He reports she was able to walk to the bathroom yesterday morning.  On arrival to [facility] /77. INR 2.27. aPTT: 36. She was given 1000 mg of Keppra. Warfarin was reversed with Kcentra and Vitamin K. INR post reversal interventions was 1.24. Patient was transferred from [facility] to [facility]. No recent head trauma, falls or car accident. Regarding her migraine history she typically has one every 12-14 days.     Assessment and Plan     Non traumatic, bifrontal Right > Left intraparenchymal hemorrhage with scattered SAH while on warfarin,  unclear etiology of IPH  - Neurochecks and Vital Signs every 4 hours  - Systolic BP Goal: 130 - 150, avoid periods of hypotension   - Head of bed elevated >30 degrees  - Hold anticoagulation/anti-platelet/NSAIDs medications  - Continue Keppra 500 mg twice daily   - Hold PTA statin given IPH  - Bedside Glucose Monitoring  - Appreciate Neurosurgery recommendations, per notes no surgical intervention at this time  - PT/OT/SLP as able  - Stroke  "Education  - Euthermia, Euglycemia    Diagnostics:  - Continue Telemetry  - Imaging:              - MRI Brain with and without contrast with SWI sequence              - MRV Brain with contrast  - Blood cultures (continue to trend)  - EEG (ordered)     DVT: SCDs    Patient Follow-up    - final recommendation pending work-up  -In 6 to 8 weeks repeat MRI brain with and without contrast (not yet ordered)    We will continue to follow.     Starla Parmar PA-C  Vascular Neurology    To page me or covering stroke neurology team member, click here: [facility]  Choose \"On Call\" tab at top, then select \"NEUROLOGY/ALL SITES\" from middle drop-down box, press Enter, then look for \"stroke\" or \"telestroke\" for your site.    Physical Examination     Temp: 98.9  F (37.2  C) Temp src: Axillary BP: 117/65 Pulse: 91   Resp: 16 SpO2: 98 % O2 Device: None (Room air)      General Exam  General:  patient lying in bed without any acute distress    HEENT:  normocephalic/atraumatic  Pulmonary:  no respiratory distress    Neuro Exam - AM exam  Mental Status:  drowsy, not participative in examination, able to say \"ouch\" to noxious stimuli, no verbal response to LOC questions (age, full name), not following commands  Cranial Nerves:  blinks to threat in bilaterally when examiner opens eyes, resists eye opening, pupils reactive  Motor:  responds to noxious stimuli in all 4 extremities, able to move all limbs spontaneously, squeezed bilateral hands equally   Sensory:  responds to noxious stimuli in all 4 extremities  Coordination:  Deferred  Station/Gait:  Deferred    Afternoon Exam  Mental status: drowsy, able to say \"don't want to lift my leg,\" difficulty following commands   Cranial nerves, resists eye opening, pupils reactive, hearing intact to conversation  Motor: no abnormal movements, able to move limbs spontaneously, able to elevate RUE to antigravity,  able to squeeze right hand > Left hand, briefly hold LLE to antigravity before " drifting down to bed, when RLE is lifted by examiner it immediately drifts down to the bed (suspect this is due more to difficulty following commands rather than weakness)  Sensory: responds to noxious stimuli in all 4 extremities, ?left sided neglect      Imaging/Labs   (Bolded imaging and labs new and/or personally reviewed or re-reviewed by me today CT)    MRI/Head CT MRI Brain: Pending     CTH 4 hour stability scan: Mildly increased conspicuity of a small parenchymal hemorrhage in the inferolateral right temporal lobe measuring 6 mm with adjacent small-volume subarachnoid hemorrhage. Mild local mass effect primarily in the frontal regions is unchanged. No significant midline shift/herniation   CTH @ 1:27 pm: Bifrontal right greater than left intraparenchymal hemorrhages (hemorrhage volume estimated at 18 cc and 1 cc respectively); as well as scattered subarachnoid hemorrhage    Intracranial Vasculature CTA Head: No LVO or severe stenosis.   Cervical Vasculature CTA Neck: No LVO. Chronic arotic dissection extending into the right proximal common carotid artery    CT Perfusion No cofe infarct    MRV Pending      Echocardiogram TTE:    EKG/Telemetry Sinus rhythm   Septal infarct (cited on or before 30-Jan-2023)    Other Testing INR: 2.27 ->1.24  CRP: 13.80 (elevated)  ESR: 13    Blood cultures: No growth after 12 hours      A1C  7/25/2024: 5.5 %   Troponin 7/26/2024: 19 ng/L     Other labs reviewed by me today:  INR, Sodium, BMP     Time Spent on this Encounter   Billing: I personally examined and evaluated the patient today. At the time of my evaluation and management the patient was in critical condition today due to IPH. I personally managed examination, review of images. Key decisions made today included see prior. I spent a total of 40 minutes providing critical care services, evaluating the patient, directing care and reviewing laboratory values and radiologic reports.

## 2024-07-27 ENCOUNTER — APPOINTMENT (OUTPATIENT)
Dept: SPEECH THERAPY | Facility: CLINIC | Age: 69
DRG: 064 | End: 2024-07-27
Payer: COMMERCIAL

## 2024-07-27 ENCOUNTER — HOSPITAL ENCOUNTER (INPATIENT)
Dept: NEUROLOGY | Facility: CLINIC | Age: 69
Discharge: HOME | DRG: 064 | End: 2024-07-27
Payer: COMMERCIAL

## 2024-07-27 LAB
ALBUMIN SERPL BCG-MCNC: 3.6 G/DL (ref 3.5–5.2)
ALP SERPL-CCNC: 63 U/L (ref 40–150)
ALT SERPL W P-5'-P-CCNC: 17 U/L (ref 0–50)
ANION GAP SERPL CALCULATED.3IONS-SCNC: 14 MMOL/L (ref 7–15)
ANION GAP SERPL CALCULATED.3IONS-SCNC: 9 MMOL/L (ref 7–15)
AST SERPL W P-5'-P-CCNC: 34 U/L (ref 0–45)
ATRIAL RATE - MUSE: 90 BPM
BILIRUB SERPL-MCNC: 0.4 MG/DL
BUN SERPL-MCNC: 13.9 MG/DL (ref 8–23)
BUN SERPL-MCNC: 15.2 MG/DL (ref 8–23)
CALCIUM SERPL-MCNC: 8.2 MG/DL (ref 8.8–10.4)
CALCIUM SERPL-MCNC: 8.7 MG/DL (ref 8.8–10.4)
CHLORIDE SERPL-SCNC: 102 MMOL/L (ref 98–107)
CHLORIDE SERPL-SCNC: 109 MMOL/L (ref 98–107)
CREAT SERPL-MCNC: 0.57 MG/DL (ref 0.51–0.95)
CREAT SERPL-MCNC: 0.59 MG/DL (ref 0.51–0.95)
DIASTOLIC BLOOD PRESSURE - MUSE: NORMAL MMHG
EGFRCR SERPLBLD CKD-EPI 2021: >90 ML/MIN/1.73M2
EGFRCR SERPLBLD CKD-EPI 2021: >90 ML/MIN/1.73M2
ERYTHROCYTE [DISTWIDTH] IN BLOOD BY AUTOMATED COUNT: 13.2 % (ref 10–15)
GLUCOSE BLDC GLUCOMTR-MCNC: 80 MG/DL (ref 70–99)
GLUCOSE BLDC GLUCOMTR-MCNC: 87 MG/DL (ref 70–99)
GLUCOSE BLDC GLUCOMTR-MCNC: 90 MG/DL (ref 70–99)
GLUCOSE SERPL-MCNC: 122 MG/DL (ref 70–99)
GLUCOSE SERPL-MCNC: 89 MG/DL (ref 70–99)
HCO3 SERPL-SCNC: 21 MMOL/L (ref 22–29)
HCO3 SERPL-SCNC: 26 MMOL/L (ref 22–29)
HCT VFR BLD AUTO: 31.8 % (ref 35–47)
HGB BLD-MCNC: 10.4 G/DL (ref 11.7–15.7)
INTERPRETATION ECG - MUSE: NORMAL
MCH RBC QN AUTO: 26.2 PG (ref 26.5–33)
MCHC RBC AUTO-ENTMCNC: 32.7 G/DL (ref 31.5–36.5)
MCV RBC AUTO: 80 FL (ref 78–100)
P AXIS - MUSE: 65 DEGREES
PLATELET # BLD AUTO: 178 10E3/UL (ref 150–450)
POTASSIUM SERPL-SCNC: 3.3 MMOL/L (ref 3.4–5.3)
POTASSIUM SERPL-SCNC: 3.5 MMOL/L (ref 3.4–5.3)
PR INTERVAL - MUSE: 168 MS
PROT SERPL-MCNC: 6 G/DL (ref 6.4–8.3)
QRS DURATION - MUSE: 88 MS
QT - MUSE: 340 MS
QTC - MUSE: 415 MS
R AXIS - MUSE: 23 DEGREES
RBC # BLD AUTO: 3.97 10E6/UL (ref 3.8–5.2)
SODIUM SERPL-SCNC: 137 MMOL/L (ref 135–145)
SODIUM SERPL-SCNC: 142 MMOL/L (ref 135–145)
SODIUM SERPL-SCNC: 144 MMOL/L (ref 135–145)
SODIUM SERPL-SCNC: 144 MMOL/L (ref 135–145)
SYSTOLIC BLOOD PRESSURE - MUSE: NORMAL MMHG
T AXIS - MUSE: -44 DEGREES
TROPONIN T SERPL HS-MCNC: 100 NG/L
TROPONIN T SERPL HS-MCNC: 116 NG/L
TROPONIN T SERPL HS-MCNC: 141 NG/L
VENTRICULAR RATE- MUSE: 90 BPM
WBC # BLD AUTO: 7.8 10E3/UL (ref 4–11)

## 2024-07-27 PROCEDURE — 82040 ASSAY OF SERUM ALBUMIN: CPT | Performed by: PHYSICIAN ASSISTANT

## 2024-07-27 PROCEDURE — 120N000001 HC R&B MED SURG/OB

## 2024-07-27 PROCEDURE — 95813 EEG EXTND MNTR 61-119 MIN: CPT

## 2024-07-27 PROCEDURE — 80053 COMPREHEN METABOLIC PANEL: CPT | Performed by: STUDENT IN AN ORGANIZED HEALTH CARE EDUCATION/TRAINING PROGRAM

## 2024-07-27 PROCEDURE — 250N000011 HC RX IP 250 OP 636: Performed by: HOSPITALIST

## 2024-07-27 PROCEDURE — 84295 ASSAY OF SERUM SODIUM: CPT | Performed by: STUDENT IN AN ORGANIZED HEALTH CARE EDUCATION/TRAINING PROGRAM

## 2024-07-27 PROCEDURE — 84484 ASSAY OF TROPONIN QUANT: CPT | Performed by: STUDENT IN AN ORGANIZED HEALTH CARE EDUCATION/TRAINING PROGRAM

## 2024-07-27 PROCEDURE — 250N000011 HC RX IP 250 OP 636

## 2024-07-27 PROCEDURE — 92610 EVALUATE SWALLOWING FUNCTION: CPT | Mod: GN

## 2024-07-27 PROCEDURE — 95813 EEG EXTND MNTR 61-119 MIN: CPT | Mod: 26 | Performed by: PSYCHIATRY & NEUROLOGY

## 2024-07-27 PROCEDURE — 85027 COMPLETE CBC AUTOMATED: CPT | Performed by: PHYSICIAN ASSISTANT

## 2024-07-27 PROCEDURE — 99232 SBSQ HOSP IP/OBS MODERATE 35: CPT | Performed by: HOSPITALIST

## 2024-07-27 PROCEDURE — 99222 1ST HOSP IP/OBS MODERATE 55: CPT | Performed by: INTERNAL MEDICINE

## 2024-07-27 PROCEDURE — 250N000011 HC RX IP 250 OP 636: Performed by: PHYSICIAN ASSISTANT

## 2024-07-27 PROCEDURE — 250N000011 HC RX IP 250 OP 636: Performed by: STUDENT IN AN ORGANIZED HEALTH CARE EDUCATION/TRAINING PROGRAM

## 2024-07-27 PROCEDURE — 250N000013 HC RX MED GY IP 250 OP 250 PS 637: Performed by: HOSPITALIST

## 2024-07-27 PROCEDURE — 99291 CRITICAL CARE FIRST HOUR: CPT | Mod: 25 | Performed by: PHYSICIAN ASSISTANT

## 2024-07-27 PROCEDURE — 84132 ASSAY OF SERUM POTASSIUM: CPT | Performed by: STUDENT IN AN ORGANIZED HEALTH CARE EDUCATION/TRAINING PROGRAM

## 2024-07-27 PROCEDURE — 99207 EEG 61-119 MINUTES: CPT | Performed by: PSYCHIATRY & NEUROLOGY

## 2024-07-27 PROCEDURE — 84295 ASSAY OF SERUM SODIUM: CPT | Performed by: HOSPITALIST

## 2024-07-27 RX ORDER — LABETALOL HYDROCHLORIDE 5 MG/ML
10 INJECTION, SOLUTION INTRAVENOUS EVERY 10 MIN PRN
Status: DISCONTINUED | OUTPATIENT
Start: 2024-07-27 | End: 2024-07-29 | Stop reason: HOSPADM

## 2024-07-27 RX ORDER — LEVOTHYROXINE SODIUM 88 UG/1
88 TABLET ORAL
Status: DISCONTINUED | OUTPATIENT
Start: 2024-07-28 | End: 2024-07-29 | Stop reason: HOSPADM

## 2024-07-27 RX ORDER — LABETALOL HYDROCHLORIDE 5 MG/ML
10 INJECTION, SOLUTION INTRAVENOUS EVERY 10 MIN PRN
Status: DISCONTINUED | OUTPATIENT
Start: 2024-07-27 | End: 2024-07-27

## 2024-07-27 RX ORDER — SIMVASTATIN 20 MG
20 TABLET ORAL AT BEDTIME
Status: DISCONTINUED | OUTPATIENT
Start: 2024-07-27 | End: 2024-07-28

## 2024-07-27 RX ORDER — HYDRALAZINE HYDROCHLORIDE 20 MG/ML
10 INJECTION INTRAMUSCULAR; INTRAVENOUS EVERY 6 HOURS PRN
Status: DISCONTINUED | OUTPATIENT
Start: 2024-07-27 | End: 2024-07-29 | Stop reason: HOSPADM

## 2024-07-27 RX ORDER — HYDRALAZINE HYDROCHLORIDE 20 MG/ML
10 INJECTION INTRAMUSCULAR; INTRAVENOUS EVERY 6 HOURS PRN
Status: DISCONTINUED | OUTPATIENT
Start: 2024-07-27 | End: 2024-07-27

## 2024-07-27 RX ORDER — POTASSIUM CHLORIDE 1.5 G/1.58G
40 POWDER, FOR SOLUTION ORAL ONCE
Status: COMPLETED | OUTPATIENT
Start: 2024-07-27 | End: 2024-07-27

## 2024-07-27 RX ORDER — LIDOCAINE 40 MG/G
CREAM TOPICAL
Status: DISCONTINUED | OUTPATIENT
Start: 2024-07-27 | End: 2024-07-29 | Stop reason: HOSPADM

## 2024-07-27 RX ORDER — LEVOTHYROXINE SODIUM 75 UG/1
75 TABLET ORAL
Status: DISCONTINUED | OUTPATIENT
Start: 2024-07-27 | End: 2024-07-29 | Stop reason: HOSPADM

## 2024-07-27 RX ORDER — POTASSIUM CHLORIDE 1500 MG/1
40 TABLET, EXTENDED RELEASE ORAL ONCE
Status: COMPLETED | OUTPATIENT
Start: 2024-07-27 | End: 2024-07-27

## 2024-07-27 RX ORDER — LABETALOL HYDROCHLORIDE 5 MG/ML
10-20 INJECTION, SOLUTION INTRAVENOUS EVERY 10 MIN PRN
Status: DISCONTINUED | OUTPATIENT
Start: 2024-07-27 | End: 2024-07-27

## 2024-07-27 RX ORDER — METOPROLOL SUCCINATE 100 MG/1
100 TABLET, EXTENDED RELEASE ORAL DAILY
Status: DISCONTINUED | OUTPATIENT
Start: 2024-07-27 | End: 2024-07-29 | Stop reason: HOSPADM

## 2024-07-27 RX ORDER — ENALAPRILAT 1.25 MG/ML
1.25 INJECTION INTRAVENOUS EVERY 6 HOURS PRN
Status: DISCONTINUED | OUTPATIENT
Start: 2024-07-27 | End: 2024-07-27

## 2024-07-27 RX ADMIN — ONDANSETRON 4 MG: 2 INJECTION INTRAMUSCULAR; INTRAVENOUS at 15:57

## 2024-07-27 RX ADMIN — LEVETIRACETAM 500 MG: 5 INJECTION INTRAVENOUS at 20:09

## 2024-07-27 RX ADMIN — LEVOTHYROXINE SODIUM 75 MCG: 75 TABLET ORAL at 14:32

## 2024-07-27 RX ADMIN — LEVETIRACETAM 500 MG: 5 INJECTION INTRAVENOUS at 09:05

## 2024-07-27 RX ADMIN — SIMVASTATIN 20 MG: 20 TABLET, FILM COATED ORAL at 21:36

## 2024-07-27 RX ADMIN — METOPROLOL SUCCINATE 100 MG: 50 TABLET, EXTENDED RELEASE ORAL at 10:44

## 2024-07-27 RX ADMIN — HYDRALAZINE HYDROCHLORIDE 10 MG: 20 INJECTION INTRAMUSCULAR; INTRAVENOUS at 15:05

## 2024-07-27 RX ADMIN — PROCHLORPERAZINE EDISYLATE 5 MG: 5 INJECTION INTRAMUSCULAR; INTRAVENOUS at 17:09

## 2024-07-27 RX ADMIN — LABETALOL HYDROCHLORIDE 10 MG: 5 INJECTION INTRAVENOUS at 14:32

## 2024-07-27 RX ADMIN — POTASSIUM CHLORIDE 20 MEQ: 1500 TABLET, EXTENDED RELEASE ORAL at 19:52

## 2024-07-27 ASSESSMENT — ACTIVITIES OF DAILY LIVING (ADL)
ADLS_ACUITY_SCORE: 30
ADLS_ACUITY_SCORE: 32
ADLS_ACUITY_SCORE: 32
ADLS_ACUITY_SCORE: 30
ADLS_ACUITY_SCORE: 30
ADLS_ACUITY_SCORE: 26
ADLS_ACUITY_SCORE: 32
ADLS_ACUITY_SCORE: 30
ADLS_ACUITY_SCORE: 32
ADLS_ACUITY_SCORE: 30
ADLS_ACUITY_SCORE: 32
ADLS_ACUITY_SCORE: 32
ADLS_ACUITY_SCORE: 30
ADLS_ACUITY_SCORE: 32
ADLS_ACUITY_SCORE: 30
ADLS_ACUITY_SCORE: 32
ADLS_ACUITY_SCORE: 30
ADLS_ACUITY_SCORE: 32
ADLS_ACUITY_SCORE: 29

## 2024-07-27 NOTE — PROVIDER NOTIFICATION
Called by Stroke Fellow zaneing patient for transfer to  after reviewing MRI results. YESENIA Gomez notified.

## 2024-07-27 NOTE — PROGRESS NOTES
[facility]    Medicine Progress Note - Hospitalist Service    Date of Admission:  7/25/2024    Assessment & Plan   69-year-old female with past medical history of type A aortic dissection in 2000, chronic warfarin anticoagulation for mechanical aortic valve, admitted 7/25/2024 for altered mental status and concerns of bifrontal intraparenchymal hemorrhage and scattered subarachnoid hemorrhage.    Nontraumatic, bifrontal intraparenchymal hemorrhage with scattered subarachnoid hemorrhage  Chronic warfarin anticoagulation, history of mechanical aortic valve - on HOLD  History of type A aortic dissection, 2000  Elevated troponin  Essential hypertension  History of Marfan syndrome  History of meningioma  Acute toxic-metabolic encephalopathy  Mental status changes on admission with nontraumatic intraparenchymal hemorrhage with subarachnoid hemorrhage, for details see admission note.  Chronic warfarin anticoagulation prior to admission for indication of mechanical aortic valve.  Stroke neurology and neurosurgery consulted on admission.  ICU admission.    Management of intraparenchymal and subarachnoid hemorrhage as per neurosurgery and stroke neurology.  All anticoagulation on  HOLD.  Cardiology consulted given mechanical aortic valve and anticoagulation prior to admission, as well as elevated troponin.  Transthoracic echocardiogram (TTE) 7/26, see report.  Blood pressure control.  Metoprolol restarted 7/27.  IV nicardipine discontinued.  Therapy services consulted.  Keppra as per neurosurgery.  Mildly elevated troponin, possible demand ischemia in the setting of acute illness and intracranial hemorrhage.  Cardiac telemetry.  Monitor.  Echocardiogram as above.  Monitor mental status, altered on admission likely secondary to acute intracranial hemorrhage.  Neurochecks.  AM labs as ordered.    Hyperlipidemia  Prior to admission simvastatin held on admission, reassess at  discharge    Hypothyroidism  Continue levothyroxine    Hyponatremia, resolved  Monitor serum sodium periodically.  Sodium 125 on 7/25, subsequently 127, 129, 133, 135, and 144 on 7/27.  Management as per neurosurgery and stroke neurology.  Recent Labs   Lab 07/27/24  0427 07/27/24  0202 07/26/24  2205 07/26/24  1837 07/26/24  1456 07/26/24  0850     144 142 135 133* 134* 129*  129*     History migraine headaches  Noted, monitor.    Monoclonal gammopathy of undetermined significance (MGUS), IgM lambda  Noted, follow-up with primary clinic provider.    Weakness and deconditioning   Therapy services consulted as above.  Activity orders as per surgery.    Disposition  Estimated length of stay 3 to 4 days  Anticipated discharge to home vs transitional care unit         Diet: Combination Diet Soft and Bite Sized Diet (level 6); Thin Liquids (level 0)    DVT Prophylaxis: Pneumatic Compression Devices  Lopez Catheter: Not present  Lines: PRESENT      PICC 07/26/24 Triple Lumen Right Basilic-Site Assessment: WDL      Cardiac Monitoring: ACTIVE order. Indication: ICU  Code Status: Full Code      Clinically Significant Risk Factors        # Hypokalemia: Lowest K = 3.3 mmol/L in last 2 days, will replace as needed  # Hyponatremia: Lowest Na = 125 mmol/L in last 2 days, will monitor as appropriate  # Hypocalcemia: Lowest Ca = 8.2 mg/dL in last 2 days, will monitor and replace as appropriate         # Hypertension: Noted on problem list         #Precipitous drop in Hgb/Hct: Lowest Hgb this hospitalization: 10.4 g/dL. Will continue to monitor and treat/transfuse as appropriate.                Disposition Plan     Medically Ready for Discharge: Anticipated in 2-4 Days             Luciano Hill MD  Hospitalist Service  [facility]  Securely message with [employer] (more info)  Text page via [facility] Paging/Directory   ______________________________________________________________________    Interval  History   First visit with patient today in ICU, admitted for concerns of bifrontal intraparenchymal hemorrhage with scattered subarachnoid hemorrhage.  Sitting up in bed, pleasant and cooperative.  Mild headache.  No nausea or vomiting.  No shortness of breath or chest pain.  Neurosurgery and stroke neurology teams consulted and following.  Anticipate transfer out of ICU today.  Care plan reviewed with ICU nursing staff.    Physical Exam   Vital Signs: Temp: 99  F (37.2  C) Temp src: Oral BP: (!) 150/89 Pulse: 92   Resp: 18 SpO2: 95 % O2 Device: None (Room air)    Weight: 115 lbs 15.39 oz    GENERAL awake and alert, no acute distress  LUNGS no wheezes or crackles  HEART S1, S2 with RRR  ABDOMEN soft, nontender  EXTREMITIES without significant edema  SKIN warm and dry  NEURO moves upper and lower extremities spontaneously and to command  MENTAL STATUS answering questions and following simple commands    Medical Decision Making             Data     I have personally reviewed the following data over the past 24 hrs:    7.8  \   10.4 (L)   / 178     144; 144 109 (H) 13.9 /  90   3.5; 3.5 21 (L) 0.59 \     ALT: 17 AST: 34 AP: 63 TBILI: 0.4   ALB: 3.6 TOT PROTEIN: 6.0 (L) LIPASE: N/A     Trop: 100 (HH) BNP: N/A     INR:  1.14 PTT:  N/A   D-dimer:  N/A Fibrinogen:  N/A       Imaging results reviewed over the past 24 hrs:   Recent Results (from the past 24 hour(s))   Echocardiogram Complete   Result Value    LVEF  65-70%    Narrative    [id number]  [id number]  [id number]  [id number]^KIAN OWENS^JOSE     [facility]  Echocardiography Laboratory  [address] [place] [address]     Name: KEYONNA PHELPS  MRN: [id number]  : 1955  Study Date: 2024 11:39 AM  Age: 69 yrs  Gender: Female  Patient Location: [facility]  Reason For Study: CVA  Ordering Physician: JOSE ARAUZ  Referring Physician: Rafaela Woods  Performed By: [place]     BSA: 1.5 m2  Height: 62 in  Weight: 112  lb  HR: 89  BP: 132/78 mmHg  ______________________________________________________________________________  Procedure  Complete Portable Echo Adult. Technically difficult study. Poor acoustic  windows.  ______________________________________________________________________________  Interpretation Summary     Technically difficult study. Poor acoustic windows. Patient has a history of  mechanical AVR and aortic root replacement unclear details many years ago.     The visual ejection fraction is 65-70%.  The right ventricle is normal in size and function.  There is a mechanical aortic valve. Peak velocity is 3.8 m/sec with MG of 30  mm Hg higher than prior. AT is normal. Etiology of increased gradients  unclear. Valve is not well seen.  There is no pericardial effusion.  ______________________________________________________________________________  Left Ventricle  The left ventricle is normal in size. The visual ejection fraction is 65-70%.  Hyperdynamic left ventricular function.     Right Ventricle  The right ventricle is normal in size and function.     Mitral Valve  The mitral valve is normal in structure and function. There is trace mitral  regurgitation. There is no mitral valve stenosis.     Tricuspid Valve  The tricuspid valve is not well visualized, but is grossly normal. The right  ventricular systolic pressure is approximated at 26.6 mmHg plus the right  atrial pressure. There is trace to mild tricuspid regurgitation.     Aortic Valve  The aortic valve is not well visualized. There is mild (1+) aortic  regurgitation. The mean AoV pressure gradient is 28.9 mmHg. The peak AoV  pressure gradient is 56.0 mmHg. The calculated aortic valve are is 0.94 cm^2.  There is a mechanical aortic valve.     Pulmonic Valve  The pulmonic valve is not well visualized.     Vessels  The aortic root is normal size. The aortic root is not well visualized. Normal  size ascending aorta. The inferior vena cava was normal in size  with preserved  respiratory variability.     Pericardium  There is no pericardial effusion.     ______________________________________________________________________________  MMode/2D Measurements & Calculations  IVSd: 1.0 cm  LVIDd: 4.2 cm  LVIDs: 2.3 cm  LVPWd: 0.99 cm  FS: 44.4 %  LV mass(C)d: 137.1 grams  LV mass(C)dI: 91.7 grams/m2  LA dimension: 2.1 cm  asc Aorta Diam: 2.8 cm  LVOT diam: 1.8 cm  LVOT area: 2.6 cm2     Asc Ao diam index BSA (cm/m2): 1.9  Asc Ao diam index Ht(cm/m): 1.8  LA Volume (BP): 34.7 ml  LA Volume Index (BP): 23.3 ml/m2  RWT: 0.47  TAPSE: 2.2 cm     Doppler Measurements & Calculations  Ao V2 max: 373.3 cm/sec  Ao max P.0 mmHg  Ao V2 mean: 249.3 cm/sec  Ao mean P.9 mmHg  Ao V2 VTI: 66.7 cm  JACIEL(I,D): 0.94 cm2  JACIEL(V,D): 0.86 cm2  Ao acc time: 0.09 sec     LV V1 max P.0 mmHg  LV V1 max: 122.1 cm/sec  LV V1 VTI: 24.0 cm  SV(LVOT): 63.0 ml  SI(LVOT): 42.1 ml/m2  PA acc time: 0.13 sec  TR max crow: 257.9 cm/sec  TR max P.6 mmHg  AV Crow Ratio (DI): 0.33  JACIEL Index (cm2/m2): 0.63     ______________________________________________________________________________  Report approved by: Adrienne Lang 2024 04:07 PM         MR Brain w/o & w Contrast    Narrative    EXAM: MR BRAIN W/O and W CONTRAST, MRV BRAIN  W/O and W CONTRAST  LOCATION: [facility]  DATE: 2024    INDICATION: Bifrontal IPH and scattered SAH, please add SWI sequence, timed for   COMPARISON: CT head 2024  CONTRAST: 10 mL Gadavist  TECHNIQUE:   1) Routine multiplanar multisequence head MRI without and with intravenous contrast.  2) Phase contrast and 2-D time-of-flight head MRV performed after administration of intravenous contrast.    FINDINGS:  HEAD MRI:  Images mildly compromised by motion.  INTRACRANIAL CONTENTS: No acute or subacute infarct. Redemonstrated bifrontal intraparenchymal hematomas, stable and similar compared to CT of 2024. The right frontal  hematoma measures 4.2 x 3.7 cm in the axial plane; the left frontal hematoma   measures 1.4 x 1.4 cm in the axial plane. Mild surrounding vasogenic edema around bilateral hematomas with localized regional mass effect. No midline shift. Subarachnoid hemorrhage in the sulci of anterior inferior frontal lobes, left parietal lobe is   redemonstrated unchanged in volume. Scattered nonspecific T2/FLAIR hyperintensities within the cerebral white matter most consistent with mild chronic microvascular ischemic change. 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 Mild generalized cerebral atrophy. No   hydrocephalus. Normal position of the cerebellar tonsils. Mild enhancement of the right frontal hematoma. Redemonstrated 4 mm left frontal superior convexity enhancing extra-axial lesion, presumed meningioma.    SELLA: No abnormality accounting for technique.    OSSEOUS STRUCTURES/SOFT TISSUES: Normal marrow signal. The major intracranial vascular flow voids are maintained.     ORBITS: No abnormality accounting for technique.     SINUSES/MASTOIDS: Mucosal thickening primarily involving the ethmoid air cells. No middle ear or mastoid effusion.     HEAD MRV:   DURAL SINUSES: No significant stenosis or occlusion. Balanced transverse and sigmoid sinuses.    INTERNAL CEREBRAL VEINS: No significant stenosis or occlusion.      MAJOR CORTICAL VENOUS BRANCHES: No significant stenosis or occlusion.      Impression    IMPRESSION:  HEAD MRI:   1.  No acute infarct or new hemorrhage.   2.  Stable and similar bifrontal intraparenchymal hematomas, the degree of mass effect, unchanged compared to CT of 7/25/2024.  3.  Stable and similar moderate volume subarachnoid hemorrhage in the left parietal sulci and bilateral anterior inferior frontal sulci.    HEAD MRV:   1.  No dural venous sinus thrombosis or significant stenosis.  2.  Limited visualization of cortical superficial veins in the right frontal convexity. This could be secondary to cortical vein  thrombosis or limited venous filling/venous compression resulting from the regional hematoma and vasogenic edema.    MRV Brain wo & w Contrast    Narrative    EXAM: MR BRAIN W/O and W CONTRAST, MRV BRAIN  W/O and W CONTRAST  LOCATION: [facility]  DATE: 7/26/2024    INDICATION: Bifrontal IPH and scattered SAH, please add SWI sequence, timed for 7 26  COMPARISON: CT head 7/25/2024  CONTRAST: 10 mL Gadavist  TECHNIQUE:   1) Routine multiplanar multisequence head MRI without and with intravenous contrast.  2) Phase contrast and 2-D time-of-flight head MRV performed after administration of intravenous contrast.    FINDINGS:  HEAD MRI:  Images mildly compromised by motion.  INTRACRANIAL CONTENTS: No acute or subacute infarct. Redemonstrated bifrontal intraparenchymal hematomas, stable and similar compared to CT of 7/25/2024. The right frontal hematoma measures 4.2 x 3.7 cm in the axial plane; the left frontal hematoma   measures 1.4 x 1.4 cm in the axial plane. Mild surrounding vasogenic edema around bilateral hematomas with localized regional mass effect. No midline shift. Subarachnoid hemorrhage in the sulci of anterior inferior frontal lobes, left parietal lobe is   redemonstrated unchanged in volume. Scattered nonspecific T2/FLAIR hyperintensities within the cerebral white matter most consistent with mild chronic microvascular ischemic change. 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 Mild generalized cerebral atrophy. No   hydrocephalus. Normal position of the cerebellar tonsils. Mild enhancement of the right frontal hematoma. Redemonstrated 4 mm left frontal superior convexity enhancing extra-axial lesion, presumed meningioma.    SELLA: No abnormality accounting for technique.    OSSEOUS STRUCTURES/SOFT TISSUES: Normal marrow signal. The major intracranial vascular flow voids are maintained.     ORBITS: No abnormality accounting for technique.     SINUSES/MASTOIDS: Mucosal thickening primarily  involving the ethmoid air cells. No middle ear or mastoid effusion.     HEAD MRV:   DURAL SINUSES: No significant stenosis or occlusion. Balanced transverse and sigmoid sinuses.    INTERNAL CEREBRAL VEINS: No significant stenosis or occlusion.      MAJOR CORTICAL VENOUS BRANCHES: No significant stenosis or occlusion.      Impression    IMPRESSION:  HEAD MRI:   1.  No acute infarct or new hemorrhage.   2.  Stable and similar bifrontal intraparenchymal hematomas, the degree of mass effect, unchanged compared to CT of 7/25/2024.  3.  Stable and similar moderate volume subarachnoid hemorrhage in the left parietal sulci and bilateral anterior inferior frontal sulci.    HEAD MRV:   1.  No dural venous sinus thrombosis or significant stenosis.  2.  Limited visualization of cortical superficial veins in the right frontal convexity. This could be secondary to cortical vein thrombosis or limited venous filling/venous compression resulting from the regional hematoma and vasogenic edema.

## 2024-07-27 NOTE — CONSULTS
[facility]    Electrophysiology Consultation     Date of Admission:  7/25/2024  Date of Consult: 07/27/24    Assessment & Plan   Aylssa Higginbotham is a 69 year old female who was admitted on 7/25/2024. We were asked to see the patient for troponin elevation and mechanical aortic valve replacement.  Troponin elevation up to 280 now trending down, likely due to demand.  EKG shows sinus rhythm, ST-T changes not significantly different compared to previous EKG. the patient reports no chest pain or shortness of breath.  Subarachnoid hemorrhages of unclear etiology.  Anticoagulation currently on hold.  Echocardiogram shows preserved LV function.  Mechanical aortic valve seen with elevated gradient, but the valve was not well-visualized.  Patient has no heart failure symptoms and blood pressures have been normal, now trending up.  Permissive hypertension per neurology team.  PTA metoprolol was restarted today at a lower dose of 100 mg daily.  Her triamterene-hydrochlorothiazide is still on hold.  She has as needed labetalol and hydralazine ordered.      - No further ischemic evaluation at this time  - Resume anticoagulation when cleared by neuro  - Consider further imaging of the aortic valve with a DONAVAN or CT  - Permissive hypertension per neurology team      Arnie Lawrence MD        Code Status    Full Code    Reason for Consult   Reason for consult: Consult performed at the request of the attending physician, Darshana Erickson MD, for evaluation of troponin elevation and mechanical aortic valve      Chief Complaint   Headache and altered mental status    History is obtained from the patient and EPIC chart.      History of Present Illness   Alyssa Higginbotham is a 69 year old female with hypertension, hyperlipidemia, hypothyroidism, Marfan syndrome with history of aortic dissection s/p mechanical aortic valve, who was admitted on 7/25/2024 with headaches and was found to have intra parenchymal and  subarachnoid hemorrhages.  The patient initially presented to an outside ED with headache and confusion.  Her  stated that her headache started a couple of days ago and on the morning of admission she was noted to be confused when getting out of bed.  There were no reports of any recent trauma.  The patient still has limited memories of when she was admitted to the hospital.  Her  and 2 sisters are at bedside and provides some of the history.  She has not had any recent illnesses and no recent medication changes.  She is on triamterene-hydrochlorothiazide 75-50 mg half tablet daily and metoprolol 200 mg daily at home.  She notes that her blood pressures have been normal, typically running 120 systolic at home.  She has been doing well on warfarin with frequent INR checks which have been within a therapeutic range.  Her INR was noted to be therapeutic at 2.2 on admission.  She was given vitamin K and Kcentra for warfarin reversal upon admission.            Past Medical History   I have reviewed this patient's medical history and updated it with pertinent information if needed.   Past Medical History:   Diagnosis Date    Benign meningioma of brain (H) 03/2015    initially seen on CT of head that was obtained after a fall. Frontal lobe, confirmed on an MRI Mar 2015, 3 mm    Bunion     Chronic headache     Depression     Heart murmur     Hepatitis C     Hyperlipidemia     Hypothyroid     Irregular heart rate     Nasal fracture 02/28/2015    fell face first on sideache    Osteoporosis     Other acquired deformity of toe     Stroke (H)     on CT scan       Past Surgical History   I have reviewed this patient's surgical history and updated it with pertinent information if needed.  Past Surgical History:   Procedure Laterality Date    AORTIC VALVE REPLACEMENT   2000    ARTHROPLASTY TOE(S) Right 2/13/2023    Procedure: First metatarsal phalangeal joint implant arthroplasty right foot;  Surgeon: Chin Riojas,  DPM;  Location: [facility]    BIOPSY BREAST Left 2005    benign    COLONOSCOPY  2011    REPAIR THORACIC AORTA   2000       Prior to Admission Medications   Prior to Admission Medications   Prescriptions Last Dose Informant Patient Reported? Taking?   SUMAtriptan (IMITREX) 5 MG/ACT nasal spray   No Yes   Sig: Spray 1 spray in nostril as needed for migraine May repeat dose x1 after at least 2 hours if migraine persists   amitriptyline (ELAVIL) 10 MG tablet 7/24/2024  No Yes   Sig: Take 1 tablet (10 mg) by mouth at bedtime   butalbital-acetaminophen-caffeine (ESGIC) -40 MG tablet   No Yes   Sig: Take 2 tablets by mouth every 8 hours as needed for headaches [BUTALBITAL-ACETAMINOPHEN-CAFFEINE (FIORICET, ESGIC) -40 MG PER TABLET] TAKE 1 TO 2 TABLETS BY MOUTH EVERY 8 HOURS AS NEEDED FOR PAIN. MAX OF 4000 MG ACETAMINOPHEN PER 24 HOURS Strength: -40 mg   clonazePAM (KLONOPIN) 0.5 MG tablet   No Yes   Sig: TAKE ONE TABLET BY MOUTH ONCE NIGHTLY AS NEEDED FOR ANXIETY OR SLEEP   levothyroxine (SYNTHROID/LEVOTHROID) 75 MCG tablet 7/24/2024  No Yes   Sig: TAKE 1 TABLET ON WEDNESDAY, SATURDAY   levothyroxine (SYNTHROID/LEVOTHROID) 88 MCG tablet Past Week  No Yes   Sig: Take 1 tablet of 88mcg by mouth Monday, Tuesday, Thursday, Friday and Sunday. Will take 75mcg the other two days.   metoprolol succinate ER (TOPROL XL) 200 MG 24 hr tablet 7/24/2024  No Yes   Sig: Take 1 tablet (200 mg) by mouth daily   multivitamin (ONE A DAY) per tablet 7/24/2024  Yes Yes   Sig: [MULTIVITAMIN (ONE A DAY) PER TABLET] Take 1 tablet by mouth daily.    rimegepant (NURTEC) 75 MG ODT tablet   No Yes   Sig: Place 1 tablet (75 mg) under the tongue daily as needed for migraine Maximum of 1 tablet every 24 hours.   simvastatin (ZOCOR) 20 MG tablet 7/24/2024  No Yes   Sig: Take 1 tablet (20 mg) by mouth at bedtime TAKE 1 TABLET AT BEDTIME (DUE FOR AN OFFICE VISIT)   triamterene-HCTZ (MAXZIDE) 75-50 MG tablet 7/24/2024  No Yes   Sig:  Take 0.5 tablets by mouth daily   warfarin ANTICOAGULANT (COUMADIN) 1 MG tablet Not Taking  No No   Sig: Take 4 tablets (4mg) by mouth on Fridays, or as directed. Adjust dose based on INR results as directed.   Patient not taking: Reported on 7/25/2024   warfarin ANTICOAGULANT (COUMADIN) 5 MG tablet 7/24/2024  No Yes   Sig: Take 1 tablet (5mg) by mouth 6 days of the week, Sun, Mon, Tues, Wed, Thurs, Sat, or as directed.  Adjust dose based on INR results.   Patient taking differently: Take 5 mg by mouth daily      Facility-Administered Medications: None         Medications   Current Facility-Administered Medications   Medication Dose Route Frequency Provider Last Rate Last Admin    niCARdipine 40 mg in 200 mL NS (CARDENE) infusion  0.5-15 mg/hr Intravenous Continuous Darshana Erickson MD   Held at 07/26/24 0403     Current Facility-Administered Medications   Medication Dose Route Frequency Provider Last Rate Last Admin    levETIRAcetam (KEPPRA) intermittent infusion 500 mg  500 mg Intravenous Q12H Starla Ricks PA-C   500 mg at 07/27/24 0905    metoprolol succinate ER (TOPROL XL) 24 hr tablet 100 mg  100 mg Oral Daily Luciano Hill MD   100 mg at 07/27/24 1044    sodium chloride (PF) 0.9% PF flush 10-40 mL  10-40 mL Intracatheter Q8H Darshana Erickson MD   10 mL at 07/27/24 0955    sodium chloride (PF) 0.9% PF flush 10-40 mL  10-40 mL Intracatheter Q7 Days Darshana Erickson MD   10 mL at 07/25/24 2147       Allergies   Allergies   Allergen Reactions    Codeine Other (See Comments)     Faints    Demerol [Meperidine] Other (See Comments)     Reaction not reported by patient., Patient states she felt awful.       Social History   I have updated and reviewed the following Social History Narrative:   Social History     Social History Narrative    Not on file        Family History   I have reviewed this patient's family history and updated it with pertinent information if needed.   Family History    Problem Relation Age of Onset    Heart Disease Mother     Pancreatic Cancer Father 70        history of smoking    Ovarian Cancer Sister 67        stage III, fatal    Skin Cancer Sister     Atrial fibrillation Sister     Diabetes No family hx of     Alzheimer Disease No family hx of        Review of Systems   A complete 10-point review of systems was done and is negative with the exceptions noted in HPI.      Physical Exam   Temp: 99  F (37.2  C) Temp src: Oral BP: (!) 150/89 Pulse: 90   Resp: (!) 31 SpO2: 95 % O2 Device: None (Room air)    Vital Signs with Ranges  Temp:  [97.4  F (36.3  C)-99  F (37.2  C)] 99  F (37.2  C)  Pulse:  [] 90  Resp:  [11-31] 31  BP: (118-155)/(61-90) 150/89  SpO2:  [92 %-98 %] 95 %  115 lbs 15.39 oz    General: Pleasant, no acute distress. Sitting up in chair  Resp: Breathing comfortably on RA  Card: Regular rate and rhythm   Neuro: Awake, alert, answers questions appropriately       Diagnostics:  I personally reviewed the patient's EKG, lab work, and pertinent imaging    Recent Labs   Lab Test 07/27/24  0954 07/27/24  0427 07/27/24  0202 07/26/24  2205 07/26/24  1456 07/26/24  0850 07/25/24  1620 07/25/24  1329 04/17/24  0713   WBC 7.8  --   --   --   --   --   --  11.8* 4.0   HGB 10.4*  --   --   --   --   --   --  13.2 12.5   HCT 31.8*  --   --   --   --   --   --  37.6 38.0   MCV 80  --   --   --   --   --   --  77* 81     --   --   --   --   --   --  237 189   NA  --  144  144 142 135   < > 129*  129*   < > 127*  --    CO2  --  21*  --   --   --  25 --  27  --    BUN  --  13.9  --   --   --  12.5  --  13.2  --    CR  --  0.59  --   --   --  0.60  --  0.65  --    PTT  --   --   --   --   --   --   --  36  --     < > = values in this interval not displayed.       Last Comprehensive Metabolic Panel:  Lab Results   Component Value Date     07/27/2024     07/27/2024    POTASSIUM 3.5 07/27/2024    POTASSIUM 3.5 07/27/2024    CHLORIDE 109 (H) 07/27/2024    CO2  21 (L) 07/27/2024    ANIONGAP 14 07/27/2024    GLC 90 07/27/2024    BUN 13.9 07/27/2024    CR 0.59 07/27/2024    GFRESTIMATED >90 07/27/2024    AMI 8.2 (L) 07/27/2024         Telemetry:  Sinus rhythm, rate 60's    EKG  7/26/2024-sinus rhythm rate 90 bpm.  Inferolateral ST depression      Results for orders placed or performed during the hospital encounter of 07/25/24 (from the past 24 hour(s))   Triple Lumen PICC Placement    Narrative    Quita Ga RN     7/26/2024 11:44 AM  [facility]    Triple Lumen PICC Placement    Date/Time: 7/26/2024 11:41 AM    Performed by: Quita Ga RN  Authorized by: Darshana Erickson MD  Indications: vascular access      UNIVERSAL PROTOCOL   Site Marked: Yes  Prior Images Obtained and Reviewed:  Yes  Required items: Required blood products, implants, devices and special   equipment available    Patient identity confirmed:  Verbally with patient, hospital-assigned   identification number and arm band  NA - No sedation, light sedation, or local anesthesia  Confirmation Checklist:  Patient's identity using two indicators,   procedure was appropriate and matched the consent or emergent situation,   correct equipment/implants were available and relevant allergies  Time out: Immediately prior to the procedure a time out was called    Universal Protocol: the Joint Commission Universal Protocol was followed    Preparation: Patient was prepped and draped in usual sterile fashion    ESBL (mL):  3     ANESTHESIA    Local Anesthetic:  Lidocaine 1% without epinephrine  Anesthetic Total (mL):  1      SEDATION    Patient Sedated: No        Preparation: skin prepped with ChloraPrep  Skin prep agent: skin prep agent completely dried prior to procedure  Sterile barriers: maximum sterile barriers were used: cap, mask, sterile   gown, sterile gloves, and large sterile sheet  Hand hygiene: hand hygiene performed prior to central venous catheter    insertion  Type of line used: PICC  Catheter type: triple lumen  Lumen type: power PICC and valved  Lumen Identification: Red, Gray and White  Catheter size: 5 Fr  Brand: Bard  Placement method: MST and ultrasound  Number of attempts: 1  Difficulty threading catheter: no  Successful placement: yes  Orientation: right    Location: basilic vein  Tip Location: SVC  Arm circumference: adults 15 cm  Extremity circumference: 26  Visible catheter length: 3  Total catheter length: 40  Dressing and securement: chlorhexidine disc applied, occlusive dressing   applied and securement device  Post procedure assessment: blood return through all ports and placement   verified by 3CG technology  PROCEDURE   Patient Tolerance:  Patient tolerated the procedure well with no immediate   complications   Picc placed without difficulty/picc ok to use  Disposal: sharps and needle count correct at the end of procedure, needles   and guidewire disposed in sharps container   Echocardiogram Complete   Result Value Ref Range    LVEF  65-70%     Narrative    [id number]  [id number]  [id number]  [id number]^KIAN OWENS^JOSE     [facility]  Echocardiography Laboratory  [address] [place] [address]     Name: KEYONNA PHELPS  MRN: [id number]  : 1955  Study Date: 2024 11:39 AM  Age: 69 yrs  Gender: Female  Patient Location: [facility]  Reason For Study: CVA  Ordering Physician: JOSE ARAUZ  Referring Physician: Rafaela Woods  Performed By: [place]     BSA: 1.5 m2  Height: 62 in  Weight: 112 lb  HR: 89  BP: 132/78 mmHg  ______________________________________________________________________________  Procedure  Complete Portable Echo Adult. Technically difficult study. Poor acoustic  windows.  ______________________________________________________________________________  Interpretation Summary     Technically difficult study. Poor acoustic windows. Patient has a history of  mechanical AVR and  aortic root replacement unclear details many years ago.     The visual ejection fraction is 65-70%.  The right ventricle is normal in size and function.  There is a mechanical aortic valve. Peak velocity is 3.8 m/sec with MG of 30  mm Hg higher than prior. AT is normal. Etiology of increased gradients  unclear. Valve is not well seen.  There is no pericardial effusion.  ______________________________________________________________________________  Left Ventricle  The left ventricle is normal in size. The visual ejection fraction is 65-70%.  Hyperdynamic left ventricular function.     Right Ventricle  The right ventricle is normal in size and function.     Mitral Valve  The mitral valve is normal in structure and function. There is trace mitral  regurgitation. There is no mitral valve stenosis.     Tricuspid Valve  The tricuspid valve is not well visualized, but is grossly normal. The right  ventricular systolic pressure is approximated at 26.6 mmHg plus the right  atrial pressure. There is trace to mild tricuspid regurgitation.     Aortic Valve  The aortic valve is not well visualized. There is mild (1+) aortic  regurgitation. The mean AoV pressure gradient is 28.9 mmHg. The peak AoV  pressure gradient is 56.0 mmHg. The calculated aortic valve are is 0.94 cm^2.  There is a mechanical aortic valve.     Pulmonic Valve  The pulmonic valve is not well visualized.     Vessels  The aortic root is normal size. The aortic root is not well visualized. Normal  size ascending aorta. The inferior vena cava was normal in size with preserved  respiratory variability.     Pericardium  There is no pericardial effusion.     ______________________________________________________________________________  MMode/2D Measurements & Calculations  IVSd: 1.0 cm  LVIDd: 4.2 cm  LVIDs: 2.3 cm  LVPWd: 0.99 cm  FS: 44.4 %  LV mass(C)d: 137.1 grams  LV mass(C)dI: 91.7 grams/m2  LA dimension: 2.1 cm  asc Aorta Diam: 2.8 cm  LVOT diam: 1.8  cm  LVOT area: 2.6 cm2     Asc Ao diam index BSA (cm/m2): 1.9  Asc Ao diam index Ht(cm/m): 1.8  LA Volume (BP): 34.7 ml  LA Volume Index (BP): 23.3 ml/m2  RWT: 0.47  TAPSE: 2.2 cm     Doppler Measurements & Calculations  Ao V2 max: 373.3 cm/sec  Ao max P.0 mmHg  Ao V2 mean: 249.3 cm/sec  Ao mean P.9 mmHg  Ao V2 VTI: 66.7 cm  JACIEL(I,D): 0.94 cm2  JACIEL(V,D): 0.86 cm2  Ao acc time: 0.09 sec     LV V1 max P.0 mmHg  LV V1 max: 122.1 cm/sec  LV V1 VTI: 24.0 cm  SV(LVOT): 63.0 ml  SI(LVOT): 42.1 ml/m2  PA acc time: 0.13 sec  TR max crow: 257.9 cm/sec  TR max P.6 mmHg  AV Crow Ratio (DI): 0.33  JACIEL Index (cm2/m2): 0.63     ______________________________________________________________________________  Report approved by: Adrienne Lang 2024 04:07 PM         INR   Result Value Ref Range    INR 1.14 0.85 - 1.15   Sodium   Result Value Ref Range    Sodium 134 (L) 135 - 145 mmol/L   Troponin T, High Sensitivity   Result Value Ref Range    Troponin T, High Sensitivity 288 (HH) <=14 ng/L   MR Brain w/o & w Contrast    Narrative    EXAM: MR BRAIN W/O and W CONTRAST, MRV BRAIN  W/O and W CONTRAST  LOCATION: [facility]  DATE: 2024    INDICATION: Bifrontal IPH and scattered SAH, please add SWI sequence, timed for   COMPARISON: CT head 2024  CONTRAST: 10 mL Gadavist  TECHNIQUE:   1) Routine multiplanar multisequence head MRI without and with intravenous contrast.  2) Phase contrast and 2-D time-of-flight head MRV performed after administration of intravenous contrast.    FINDINGS:  HEAD MRI:  Images mildly compromised by motion.  INTRACRANIAL CONTENTS: No acute or subacute infarct. Redemonstrated bifrontal intraparenchymal hematomas, stable and similar compared to CT of 2024. The right frontal hematoma measures 4.2 x 3.7 cm in the axial plane; the left frontal hematoma   measures 1.4 x 1.4 cm in the axial plane. Mild surrounding vasogenic edema around bilateral  hematomas with localized regional mass effect. No midline shift. Subarachnoid hemorrhage in the sulci of anterior inferior frontal lobes, left parietal lobe is   redemonstrated unchanged in volume. Scattered nonspecific T2/FLAIR hyperintensities within the cerebral white matter most consistent with mild chronic microvascular ischemic change. 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 Mild generalized cerebral atrophy. No   hydrocephalus. Normal position of the cerebellar tonsils. Mild enhancement of the right frontal hematoma. Redemonstrated 4 mm left frontal superior convexity enhancing extra-axial lesion, presumed meningioma.    SELLA: No abnormality accounting for technique.    OSSEOUS STRUCTURES/SOFT TISSUES: Normal marrow signal. The major intracranial vascular flow voids are maintained.     ORBITS: No abnormality accounting for technique.     SINUSES/MASTOIDS: Mucosal thickening primarily involving the ethmoid air cells. No middle ear or mastoid effusion.     HEAD MRV:   DURAL SINUSES: No significant stenosis or occlusion. Balanced transverse and sigmoid sinuses.    INTERNAL CEREBRAL VEINS: No significant stenosis or occlusion.      MAJOR CORTICAL VENOUS BRANCHES: No significant stenosis or occlusion.      Impression    IMPRESSION:  HEAD MRI:   1.  No acute infarct or new hemorrhage.   2.  Stable and similar bifrontal intraparenchymal hematomas, the degree of mass effect, unchanged compared to CT of 7/25/2024.  3.  Stable and similar moderate volume subarachnoid hemorrhage in the left parietal sulci and bilateral anterior inferior frontal sulci.    HEAD MRV:   1.  No dural venous sinus thrombosis or significant stenosis.  2.  Limited visualization of cortical superficial veins in the right frontal convexity. This could be secondary to cortical vein thrombosis or limited venous filling/venous compression resulting from the regional hematoma and vasogenic edema.    MRV Brain wo & w Contrast    Narrative    EXAM: MR BRAIN  W/O and W CONTRAST, MRV BRAIN  W/O and W CONTRAST  LOCATION: [facility]  DATE: 7/26/2024    INDICATION: Bifrontal IPH and scattered SAH, please add SWI sequence, timed for 7 26  COMPARISON: CT head 7/25/2024  CONTRAST: 10 mL Gadavist  TECHNIQUE:   1) Routine multiplanar multisequence head MRI without and with intravenous contrast.  2) Phase contrast and 2-D time-of-flight head MRV performed after administration of intravenous contrast.    FINDINGS:  HEAD MRI:  Images mildly compromised by motion.  INTRACRANIAL CONTENTS: No acute or subacute infarct. Redemonstrated bifrontal intraparenchymal hematomas, stable and similar compared to CT of 7/25/2024. The right frontal hematoma measures 4.2 x 3.7 cm in the axial plane; the left frontal hematoma   measures 1.4 x 1.4 cm in the axial plane. Mild surrounding vasogenic edema around bilateral hematomas with localized regional mass effect. No midline shift. Subarachnoid hemorrhage in the sulci of anterior inferior frontal lobes, left parietal lobe is   redemonstrated unchanged in volume. Scattered nonspecific T2/FLAIR hyperintensities within the cerebral white matter most consistent with mild chronic microvascular ischemic change. 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 Mild generalized cerebral atrophy. No   hydrocephalus. Normal position of the cerebellar tonsils. Mild enhancement of the right frontal hematoma. Redemonstrated 4 mm left frontal superior convexity enhancing extra-axial lesion, presumed meningioma.    SELLA: No abnormality accounting for technique.    OSSEOUS STRUCTURES/SOFT TISSUES: Normal marrow signal. The major intracranial vascular flow voids are maintained.     ORBITS: No abnormality accounting for technique.     SINUSES/MASTOIDS: Mucosal thickening primarily involving the ethmoid air cells. No middle ear or mastoid effusion.     HEAD MRV:   DURAL SINUSES: No significant stenosis or occlusion. Balanced transverse and sigmoid  sinuses.    INTERNAL CEREBRAL VEINS: No significant stenosis or occlusion.      MAJOR CORTICAL VENOUS BRANCHES: No significant stenosis or occlusion.      Impression    IMPRESSION:  HEAD MRI:   1.  No acute infarct or new hemorrhage.   2.  Stable and similar bifrontal intraparenchymal hematomas, the degree of mass effect, unchanged compared to CT of 7/25/2024.  3.  Stable and similar moderate volume subarachnoid hemorrhage in the left parietal sulci and bilateral anterior inferior frontal sulci.    HEAD MRV:   1.  No dural venous sinus thrombosis or significant stenosis.  2.  Limited visualization of cortical superficial veins in the right frontal convexity. This could be secondary to cortical vein thrombosis or limited venous filling/venous compression resulting from the regional hematoma and vasogenic edema.    Sodium   Result Value Ref Range    Sodium 133 (L) 135 - 145 mmol/L   Troponin T, High Sensitivity   Result Value Ref Range    Troponin T, High Sensitivity 234 (HH) <=14 ng/L   Glucose by meter   Result Value Ref Range    GLUCOSE BY METER POCT 106 (H) 70 - 99 mg/dL   Glucose by meter   Result Value Ref Range    GLUCOSE BY METER POCT 76 70 - 99 mg/dL   Sodium   Result Value Ref Range    Sodium 135 135 - 145 mmol/L   Troponin T, High Sensitivity   Result Value Ref Range    Troponin T, High Sensitivity 193 (HH) <=14 ng/L   Glucose by meter   Result Value Ref Range    GLUCOSE BY METER POCT 87 70 - 99 mg/dL   Potassium   Result Value Ref Range    Potassium 3.5 3.4 - 5.3 mmol/L   Sodium   Result Value Ref Range    Sodium 142 135 - 145 mmol/L   Troponin T, High Sensitivity   Result Value Ref Range    Troponin T, High Sensitivity 141 (HH) <=14 ng/L   Sodium   Result Value Ref Range    Sodium 144 135 - 145 mmol/L   Troponin T, High Sensitivity   Result Value Ref Range    Troponin T, High Sensitivity 116 (HH) <=14 ng/L   Glucose by meter   Result Value Ref Range    GLUCOSE BY METER POCT 80 70 - 99 mg/dL   Potassium    Result Value Ref Range    Potassium 3.5 3.4 - 5.3 mmol/L   Comprehensive metabolic panel   Result Value Ref Range    Sodium 144 135 - 145 mmol/L    Potassium 3.5 3.4 - 5.3 mmol/L    Carbon Dioxide (CO2) 21 (L) 22 - 29 mmol/L    Anion Gap 14 7 - 15 mmol/L    Urea Nitrogen 13.9 8.0 - 23.0 mg/dL    Creatinine 0.59 0.51 - 0.95 mg/dL    GFR Estimate >90 >60 mL/min/1.73m2    Calcium 8.2 (L) 8.8 - 10.4 mg/dL    Chloride 109 (H) 98 - 107 mmol/L    Glucose 89 70 - 99 mg/dL    Alkaline Phosphatase 63 40 - 150 U/L    AST 34 0 - 45 U/L    ALT 17 0 - 50 U/L    Protein Total 6.0 (L) 6.4 - 8.3 g/dL    Albumin 3.6 3.5 - 5.2 g/dL    Bilirubin Total 0.4 <=1.2 mg/dL   Glucose by meter   Result Value Ref Range    GLUCOSE BY METER POCT 90 70 - 99 mg/dL   Troponin T, High Sensitivity   Result Value Ref Range    Troponin T, High Sensitivity 100 (HH) <=14 ng/L   CBC with platelets   Result Value Ref Range    WBC Count 7.8 4.0 - 11.0 10e3/uL    RBC Count 3.97 3.80 - 5.20 10e6/uL    Hemoglobin 10.4 (L) 11.7 - 15.7 g/dL    Hematocrit 31.8 (L) 35.0 - 47.0 %    MCV 80 78 - 100 fL    MCH 26.2 (L) 26.5 - 33.0 pg    MCHC 32.7 31.5 - 36.5 g/dL    RDW 13.2 10.0 - 15.0 %    Platelet Count 178 150 - 450 10e3/uL     *Note: Due to a large number of results and/or encounters for the requested time period, some results have not been displayed. A complete set of results can be found in Results Review.         Clinically Significant Risk Factors        # Hypokalemia: Lowest K = 3.3 mmol/L in last 2 days, will replace as needed  # Hyponatremia: Lowest Na = 125 mmol/L in last 2 days, will monitor as appropriate  # Hypocalcemia: Lowest Ca = 8.2 mg/dL in last 2 days, will monitor and replace as appropriate         # Hypertension: Noted on problem list         #Precipitous drop in Hgb/Hct: Lowest Hgb this hospitalization: 10.4 g/dL. Will continue to monitor and treat/transfuse as appropriate.               Heart Valve Replacement

## 2024-07-27 NOTE — PLAN OF CARE
"  Problem: Adult Inpatient Plan of Care  Goal: Plan of Care Review  Description: The Plan of Care Review/Shift note should be completed every shift.  The Outcome Evaluation is a brief statement about your assessment that the patient is improving, declining, or no change.  This information will be displayed automatically on your shift  note.  Outcome: Progressing  Flowsheets (Taken 7/27/2024 1716)  Outcome Evaluation:   No acute neuro changes   Patient alert and oriented x4, however intermittently slow to respond / uncooperative and/or unable to follow some directions. EEG discontinued per neurocrit. Hypertensive, BP responded to PRN Hydralazine administration. On room air, sats WDL. Ate two small meals. Slightly nauseated at end of shift, Zofran given, reports relief of symptoms. Voided x2   One BM   Family at bedside throughout the day, all questions answered and concerns addressed. Transferred to station 73 for continuation of cares.  Plan of Care Reviewed With: patient  Overall Patient Progress: improving  Goal: Patient-Specific Goal (Individualized)  Description: You can add care plan individualizations to a care plan. Examples of Individualization might be:  \"Parent requests to be called daily at 9am for status\", \"I have a hard time hearing out of my right ear\", or \"Do not touch me to wake me up as it startles  me\".  Outcome: Progressing  Goal: Absence of Hospital-Acquired Illness or Injury  Outcome: Progressing  Intervention: Identify and Manage Fall Risk  Recent Flowsheet Documentation  Taken 7/27/2024 1600 by Bart Chapin, RN  Safety Promotion/Fall Prevention:   activity supervised   lighting adjusted   room door open   room near nurse's station  Taken 7/27/2024 1200 by Bart Chapin, RN  Safety Promotion/Fall Prevention:   activity supervised   lighting adjusted   room door open   room near nurse's station  Taken 7/27/2024 0800 by Bart Chapin, RN  Safety Promotion/Fall Prevention:   activity " supervised   lighting adjusted   room door open   room near nurse's station  Intervention: Prevent Skin Injury  Recent Flowsheet Documentation  Taken 7/27/2024 1600 by Bart Chapin, RN  Body Position: position changed independently  Skin Protection:   adhesive use limited   incontinence pads utilized   pulse oximeter probe site changed   silicone foam dressing in place  Device Skin Pressure Protection:   absorbent pad utilized/changed   adhesive use limited  Taken 7/27/2024 1400 by Bart Chapin, RN  Body Position: position changed independently  Taken 7/27/2024 1200 by Bart Chapin, RN  Body Position: position changed independently  Skin Protection:   adhesive use limited   incontinence pads utilized   pulse oximeter probe site changed   silicone foam dressing in place  Device Skin Pressure Protection:   absorbent pad utilized/changed   adhesive use limited  Taken 7/27/2024 1000 by Bart Chapin RN  Body Position: position changed independently  Taken 7/27/2024 0800 by Bart Chapin, RN  Body Position: position changed independently  Skin Protection:   adhesive use limited   incontinence pads utilized   pulse oximeter probe site changed   silicone foam dressing in place  Device Skin Pressure Protection:   absorbent pad utilized/changed   adhesive use limited  Intervention: Prevent and Manage VTE (Venous Thromboembolism) Risk  Recent Flowsheet Documentation  Taken 7/27/2024 1600 by Bart Chapin RN  VTE Prevention/Management: SCDs on (sequential compression devices)  Taken 7/27/2024 1200 by Bart Chapin RN  VTE Prevention/Management: SCDs on (sequential compression devices)  Taken 7/27/2024 0800 by Bart Chapin RN  VTE Prevention/Management: SCDs on (sequential compression devices)  Intervention: Prevent Infection  Recent Flowsheet Documentation  Taken 7/27/2024 1600 by Bart Chapin, RN  Infection Prevention:   cohorting utilized   rest/sleep promoted   single patient room provided  Taken  7/27/2024 1200 by Bart Chapin, RN  Infection Prevention:   cohorting utilized   rest/sleep promoted   single patient room provided  Taken 7/27/2024 0800 by Bart Chapin, RN  Infection Prevention:   cohorting utilized   rest/sleep promoted   single patient room provided  Goal: Optimal Comfort and Wellbeing  Outcome: Progressing  Intervention: Monitor Pain and Promote Comfort  Recent Flowsheet Documentation  Taken 7/27/2024 1600 by Bart Chapin, RN  Pain Management Interventions:   rest   repositioned   relaxation techniques promoted   quiet environment facilitated   pillow support provided   care clustered   emotional support  Taken 7/27/2024 1200 by Bart Chapin, RN  Pain Management Interventions:   rest   repositioned   relaxation techniques promoted   quiet environment facilitated   pillow support provided   care clustered   emotional support  Taken 7/27/2024 0800 by Bart Chapin, RN  Pain Management Interventions:   rest   repositioned   relaxation techniques promoted   quiet environment facilitated   pillow support provided   care clustered   emotional support  Intervention: Provide Person-Centered Care  Recent Flowsheet Documentation  Taken 7/27/2024 1600 by Bart Chapin, RN  Trust Relationship/Rapport:   care explained   choices provided   emotional support provided   questions encouraged   questions answered   empathic listening provided   reassurance provided   thoughts/feelings acknowledged  Taken 7/27/2024 1200 by Bart Chapin, RN  Trust Relationship/Rapport:   care explained   choices provided   emotional support provided   questions encouraged   questions answered   empathic listening provided   reassurance provided   thoughts/feelings acknowledged  Taken 7/27/2024 0800 by Bart Chapin, RN  Trust Relationship/Rapport:   care explained   choices provided   emotional support provided   questions encouraged   questions answered   empathic listening provided   reassurance provided    thoughts/feelings acknowledged  Goal: Readiness for Transition of Care  Outcome: Progressing   Goal Outcome Evaluation:      Plan of Care Reviewed With: patient    Overall Patient Progress: improvingOverall Patient Progress: improving    Outcome Evaluation: No acute neuro changes; Patient alert and oriented x4, however intermittently slow to respond / uncooperative and/or unable to follow some directions. EEG discontinued per neurocrit. Hypertensive, BP responded to PRN Hydralazine administration. On room air, sats WDL. Ate two small meals. Slightly nauseated at end of shift, Zofran given, reports relief of symptoms. Voided x2; One BM; Family at bedside throughout the day, all questions answered and concerns addressed. Transferred to station 73 for continuation of cares.

## 2024-07-27 NOTE — PLAN OF CARE
Reason for Admission: SAH, IPH    Cognitive/Mentation: A/Ox 4  Neuros/CMS: Intact ex inconsistent to follow commands  VS: Stable on RA, SBP goal <160.   Tele: SR  /GI: Continent. Last BM 7/27.   Pulmonary: LS clear.  Pain: denies.     Drains/Lines: R PICC SL, R & L PIV SL  Skin: intact ex scattered scabbing  Activity: Assist x 1 with GB.  Diet: Soft/bite sized with thin liquids. Takes pills whole.     Therapies recs: TCU  Discharge: pending    Aggression Stoplight Tool: green/yellow for some impulsivity    End of shift summary: Pt arrived from ICU around 1630. Nauseous upon arrival, compazine given.

## 2024-07-27 NOTE — PLAN OF CARE
Goal Outcome Evaluation:    Overall Patient Progress: improving    Outcome Evaluation: Pt remains in ICU on cEEG post IPH w/ scattered SAH. Neuro: Lethargic, A/Ox4, FRANKLIN, follows commands, stubborn attitude complicating accurate neuro assessment. CV: SR, SBP between 100-150. Resp: RA. GI: NPO. : Due to void. BS q2h per protocol. BS @ 0600- 307 and asymptomatic. Skin: scattered scabs. Refused repositioning, bath, and oral cares. Labs: Trop 116, Na 144, K 3.5. 3% NS @15. Plan: Cards consult placed s/t elevated trops & aortic valve gradients. Tx to 73 when bed available.      Problem: Adult Inpatient Plan of Care  Goal: Absence of Hospital-Acquired Illness or Injury  Intervention: Identify and Manage Fall Risk  Recent Flowsheet Documentation  Taken 7/27/2024 0400 by Patricia Medrano RN  Safety Promotion/Fall Prevention:   activity supervised   lighting adjusted   room door open   room near nurse's station  Taken 7/27/2024 0000 by Patricia Medrano RN  Safety Promotion/Fall Prevention:   activity supervised   lighting adjusted   room door open   room near nurse's station  Taken 7/26/2024 2000 by Patricia Medrano RN  Safety Promotion/Fall Prevention:   activity supervised   lighting adjusted   room door open   room near nurse's station     Problem: Adult Inpatient Plan of Care  Goal: Absence of Hospital-Acquired Illness or Injury  Intervention: Prevent and Manage VTE (Venous Thromboembolism) Risk  Recent Flowsheet Documentation  Taken 7/27/2024 0400 by Patricia Medrano RN  VTE Prevention/Management: SCDs on (sequential compression devices)  Taken 7/27/2024 0000 by Patricia Medrano RN  VTE Prevention/Management: patient refused intervention  Taken 7/26/2024 2000 by Patricia Medrano RN  VTE Prevention/Management: patient refused intervention     Problem: Stroke, Intracerebral Hemorrhage  Goal: Optimal Cerebral Tissue Perfusion  Intervention: Protect and Optimize Cerebral  Perfusion  Recent Flowsheet Documentation  Taken 7/27/2024 0400 by Patricia Medrano, RN  Sensory Stimulation Regulation: care clustered  Taken 7/27/2024 0000 by Patricia Medrano, RN  Sensory Stimulation Regulation: care clustered  Taken 7/26/2024 2000 by Patricia Medrano, RN  Sensory Stimulation Regulation: care clustered

## 2024-07-27 NOTE — PROGRESS NOTES
"Clinical Swallow Evaluation (CSE):     07/27/24 0844   Appointment Info   Signing Clinician's Name / Credentials (SLP) Pam Cordoba MS CCC-SLP   General Information   Onset of Illness/Injury or Date of Surgery 07/25/24   Referring Physician Darshana Erickson MD   Patient/Family Therapy Goal Statement (SLP) To have pop   Pertinent History of Current Problem   Per provider \"Alyssa Higginbotham is a 69 year old female with past medical history significant for chronic aortic dissection and mechanical aortic valve on chronic warfarin, hypertension, hyperlipidemia, known meningioma, depression, hypothyroidism admitted on 7/25/2024 nontraumatic bifrontal right greater than left intraparenchymal hemorrhages and scattered subarachnoid hemorrhage.\" NPO until SLP.     General Observations   Pt alert, reduced eye contact and at times would not respond, but was participatory for 80% of evaluation, conversation, direction following.     Pain Assessment   Patient Currently in Pain No   Type of Evaluation   Type of Evaluation Swallow Evaluation   Oral Motor   Oral Musculature   (reduced direction following/participation but no overt weakness informally)   Vocal Quality/Secretion Management (Oral Motor)   Vocal Quality (Oral Motor) WNL   Secretion Management (Oral Motor) WNL   General Swallowing Observations   Past History of Dysphagia None per EMR or pt report   Respiratory Support room air   Current Diet/Method of Nutritional Intake (General Swallowing Observations, NIS) NPO   Swallowing Evaluation Clinical swallow evaluation   Clinical Swallow Evaluation   Feeding Assistance set up only required   Clinical Swallow Evaluation Textures Trialed thin liquids;pureed;soft & bite-sized   Clinical Swallow Eval: Thin Liquid Texture Trial   Mode of Presentation, Thin Liquids cup;straw;self-fed   Volume of Liquid or Food Presented 6 oz   Oral Phase of Swallow   (occasional mild oral holding/delay)   Pharyngeal Phase of Swallow intact "   Diagnostic Statement No overt clinical signs/sx aspiration noted   Clinical Swallow Evaluation: Puree Solid Texture Trial   Mode of Presentation, Puree spoon;self-fed   Volume of Puree Presented 2 oz   Oral Phase, Puree WFL   Pharyngeal Phase, Puree intact   Diagnostic Statement No overt clinical signs/sx aspiration noted   Clinical Swallow Eval: Soft & Bite Sized   Mode of Presentation spoon;fed by clinician   Volume Presented x3 bites; declined further chewable solids   Oral Phase   (mild prolonged but appearingly complete mastication and oral clearance with liquid wash)   Pharyngeal Phase intact   Diagnostic Statement No overt clinical signs/sx aspiration noted   Esophageal Phase of Swallow   Patient reports or presents with symptoms of esophageal dysphagia No   Swallowing Recommendations   Diet Consistency Recommendations soft & bite-sized (level 6);thin liquids (level 0)   Supervision Level for Intake close supervision needed   Mode of Delivery Recommendations bolus size, small;slow rate of intake   Swallowing Maneuver Recommendations alternate food and liquid intake  (PRN)   Monitoring/Assistance Required (Eating/Swallowing) check mouth frequently for oral residue/pocketing;cue for finger/lingual sweep if oral pocketing present;stop eating activities when fatigue is present;monitor for cough or change in vocal quality with intake   Recommended Feeding/Eating Techniques (Swallow Eval) maintain upright sitting position for eating;maintain upright posture during/after eating for 30 minutes;minimize distractions during oral intake;set-up and prepare tray   Medication Administration Recommendations, Swallowing (SLP) whole as tolerated   Instrumental Assessment Recommendations instrumental evaluation not recommended at this time  (monitor need if clinical changes/concerns arise)   General Therapy Interventions   Planned Therapy Interventions Dysphagia Treatment   Clinical Impression   Criteria for Skilled  Therapeutic Interventions Met (SLP Eval) Yes, treatment indicated   SLP Diagnosis minimal oral, potential pharyngeal dysphagia   Risks & Benefits of therapy have been explained evaluation/treatment results reviewed;care plan/treatment goals reviewed;risks/benefits reviewed;current/potential barriers reviewed;participants voiced agreement with care plan;participants included;patient   Clinical Impression Comments   Clinical swallow evaluation completed with thin liquids, puree solids, soft/bite sized solids. Labial seal and oral containment are WFL. Mild prolonged but appearingly complete mastication and oral clearance with soft/bite sized solids, consistently utilizing a liquid wash following. Notable laryngeal elevation to palpation. No overt clinical signs/sx aspiration across trials. Appropriate for PO intiation.     SLP Total Evaluation Time   Eval: oral/pharyngeal swallow function, clinical swallow Minutes (39011) 14   SLP Goals   Therapy Frequency (SLP Eval) 5 times/week   SLP Predicted Duration/Target Date for Goal Attainment 08/03/24   SLP Goals Swallow   SLP: Safely tolerate diet without signs/symptoms of aspiration Regular diet;Thin liquids;With use of swallow precautions;Independently   SLP Discharge Planning   SLP Plan PO tolerance, trial regular   SLP Discharge Recommendation Transitional Care Facility   SLP Rationale for DC Rec Pt below baseline for swallow and communication function   SLP Brief overview of current status  Recommend: soft/bite sized solids with thin liquids when fully alert and upright, slow rate, liquid wash PRN to ensure oral clearance; hold if any neuro changes or concerns with tolerance/swallow function arise   Total Session Time   Total Session Time (sum of timed and untimed services) 14

## 2024-07-27 NOTE — PROGRESS NOTES
[facility]    Neurosurgery  Daily Note    Assessment & Plan   Alyssa Higginbotham is a 69 year old female with a past medical history of type a aortic dissection in 2000 on Coumadin for mechanical aortic valve admitted on 7/25/2024 for altered mental status.  Patient was found to have bifrontal IPH with scattered SAH.     AM ROUNDS: significant improvement in exam. Denies headache, weakness, n/v.   Exam: significantly improved compared to admission  Labs: Na 144 this AM    EXAM: MR BRAIN W/O and W CONTRAST, MRV BRAIN  W/O and W CONTRAST  LOCATION: [facility]  DATE: 7/26/2024                                           IMPRESSION:  HEAD MRI:   1.  No acute infarct or new hemorrhage.   2.  Stable and similar bifrontal intraparenchymal hematomas, the degree of mass effect, unchanged compared to CT of 7/25/2024.  3.  Stable and similar moderate volume subarachnoid hemorrhage in the left parietal sulci and bilateral anterior inferior frontal sulci.     HEAD MRV:   1.  No dural venous sinus thrombosis or significant stenosis.  2.  Limited visualization of cortical superficial veins in the right frontal convexity. This could be secondary to cortical vein thrombosis or limited venous filling/venous compression resulting from the regional hematoma and vasogenic edema.     Plan:  -no urgent neurosurgical intervention indicated  -cares per stroke neuro   -will continue to follow    Dr. Hayes in agreement    Indu Rene PA-C  [facility]  [address] [place] [id number]  Tel [phone]  Fax [phone]  Text page via [facility] Paging/Directory    Active Problems:    Subarachnoid hemorrhage (H)    Intraparenchymal hemorrhage of brain (H)     Indu Rhoades PA-C    Interval History   Stable     Physical Exam   Temp: 99  F (37.2  C) Temp src: Oral BP: (!) 150/89 Pulse: 90   Resp: (!) 31 SpO2: 95 % O2 Device: None (Room air)    Vitals:     07/25/24 2200 07/26/24 0400 07/26/24 2200   Weight: 51.2 kg (112 lb 14 oz) 51.2 kg (112 lb 14 oz) 52.6 kg (115 lb 15.4 oz)     Vital Signs with Ranges  Temp:  [97.4  F (36.3  C)-99  F (37.2  C)] 99  F (37.2  C)  Pulse:  [] 90  Resp:  [11-31] 31  BP: (118-155)/(61-90) 150/89  SpO2:  [92 %-98 %] 95 %  I/O last 3 completed shifts:  In: 487.25 [I.V.:487.25]  Out: 310 [Urine:310]    Awake, alert, oriented x 3  Left droop. Otherwise II-XII grossly intact  LUE 4/5  RUE BLE 5/5   Negative pronator drift  Negative clonus       Medications   Current Facility-Administered Medications   Medication Dose Route Frequency Provider Last Rate Last Admin    niCARdipine 40 mg in 200 mL NS (CARDENE) infusion  0.5-15 mg/hr Intravenous Continuous Darshana Erickson MD   Held at 07/26/24 0403      Current Facility-Administered Medications   Medication Dose Route Frequency Provider Last Rate Last Admin    levETIRAcetam (KEPPRA) intermittent infusion 500 mg  500 mg Intravenous Q12H Starla Ricks PA-C   500 mg at 07/27/24 0905    metoprolol succinate ER (TOPROL XL) 24 hr tablet 100 mg  100 mg Oral Daily Luciano Hill MD   100 mg at 07/27/24 1044    sodium chloride (PF) 0.9% PF flush 10-40 mL  10-40 mL Intracatheter Q8H Darshana Erickson MD   10 mL at 07/27/24 0955    sodium chloride (PF) 0.9% PF flush 10-40 mL  10-40 mL Intracatheter Q7 Days Darshana Erickson MD   10 mL at 07/25/24 1576

## 2024-07-27 NOTE — PROGRESS NOTES
[facility]    Vascular Neurology Progress Note    Interval History     BP in 110s-mid 150s overnight    EEG ongoing, still on hypertonic saline 3% at 15/h which prevented her from leaving ICU    She reports that she feels fine today, looks much much better per  who just arrived      Hospital Course     Chief complaint: Roni Higginbotham is a 69 year old female with a PMH of migraine, HLD, HTN, aortic dissection, marfan syndrome, mechanical AVR on coumadin. She presents to the ED for evaluation of headache and AMS. 2 days prior (7/23) she had onset of severe headache that responded to her typical migraine medications. Then yesterday afternoon/evening she has onset of severe headache that was not responsive to her typical abortive therapies. She was not getting out of bed and not responding.     On arrival to ER /77. INR 2.2--> 1.24 after Kcentra & Vit K. She was given 1000 mg of Keppra.  No recent head trauma, falls or car accident.     Assessment and Plan     Non traumatic, bifrontal Right > Left intraparenchymal hemorrhage with scattered SAH while on warfarin,  unclear etiology   - Neurochecks and Vital Signs every 4 hours, okay to transfer to floor  - Systolic BP Goal: 130 - 150, avoid periods of hypotension   - Head of bed elevated >30 degrees  - Hold anticoagulation/anti-platelet/NSAIDs medications  - Continue Keppra 500 mg twice daily , switch to p.o. when able to take p.o. reliably  - Hold PTA statin given IPH  - PT/OT/SLP as able  - Euthermia, Euglycemia  -Brain MRI with and without contrast in 6 to 8 weeks as an outpatient (not yet ordered)  -Stat head CT with any change in neurologic exam  - Blood cultures (continue to trend)  - Await ongoing further EEG read, but given her neurologic exam this morning I think we can probably stop it today    Hyponatremia, as low as 125 on 7/25, ?due to home hctz  -Has been stable now, so will stop hypertonic saline and  "continue to check BMP every 12 hours for now, goal normonatremia ideally 135+, hypernatremia acceptable    Mechanical aortic valve  -Will need to decide when she can restart anticoagulation for her mechanical aortic valve, possibly in about 2 weeks depending on rest of workup       DVT: SCDs, and will also start pharmacologic DVT ppx today    Patient Follow-up    - final recommendation pending work-up      We will continue to follow.     Avril Abreu PA-C  Vascular Neurology    To page me or covering stroke neurology team member, click here: AMCOM  Choose \"On Call\" tab at top, then select \"NEUROLOGY/ALL SITES\" from middle drop-down box, press Enter, then look for \"stroke\" or \"telestroke\" for your site.    Physical Examination     Temp: 99  F (37.2  C) Temp src: Oral BP: (!) 155/79 Pulse: 89   Resp: (!) 31 SpO2: 95 % O2 Device: None (Room air)      General: Sitting up in bed no acute distress, EEG leads on head    Neurologic exam:    Mental status: Drowsy but opens eyes once she is stimulated enough.  Oriented to person, place, month, year, situation.  Able to name objects easily and repeats long sentences easily    Cranial nerves: Pupils equal round and reactive to light, extraocular movements intact, appears to have a left visual field cut, face symmetric, hearing intact    Motor: Generally weak throughout but no obvious hemiparesis noted.  Somewhat poor effort combined with possibly misunderstanding instructions    Sensation: Noxious deferred, appears to be confused with light touch sensation testing and answers wrong at times on both sides of the body    Coordination: Deferred    Gait: Deferred      Imaging/Labs   (Bolded imaging and labs new and/or personally reviewed or re-reviewed by me today [facility])    MRI/Head CT MRI Brain: No new hemorrhage, stable hematomas and subarachnoid hemorrhage.  No infarct      MRV head showed no dural venous sinus thrombosis.  Limited visualization of the cortical superficial veins " in the right frontal convexity, could be due to cortical vein thrombosis or compression   Intracranial Vasculature CTA Head: No LVO or severe stenosis.   Cervical Vasculature CTA Neck: No LVO. Chronic arotic dissection extending into the right proximal common carotid artery       Echocardiogram TTE: EF 65 to 70%, mechanical aortic valve seen, no mention of atrial size   EKG/Telemetry Sinus rhythm      Other EEG 7/26: No interictal epileptiform abnormalities and no electrographic seizures were observed.      LDL 71 as an outpatient a month ago   A1C  7/25/2024: 5.5 %   Troponin 7/27/2024: 116 ng/L     Other labs reviewed by me today:  Sodium 142 this morning, troponin peaked yesterday at 234 now down to 141    Blood cultures: Negative after 24 hours    Time Spent on this Encounter   Billing: I personally examined and evaluated the patient today. At the time of my evaluation and management the patient was in critical condition today due to IPH.  Key decisions made today included stopping hypertonic saline. I spent a total of 40 minutes providing critical care services, evaluating the patient, directing care and reviewing laboratory values and radiologic reports.

## 2024-07-28 ENCOUNTER — APPOINTMENT (OUTPATIENT)
Dept: CT IMAGING | Facility: CLINIC | Age: 69
DRG: 064 | End: 2024-07-28
Attending: PHYSICIAN ASSISTANT
Payer: COMMERCIAL

## 2024-07-28 ENCOUNTER — APPOINTMENT (OUTPATIENT)
Dept: SPEECH THERAPY | Facility: CLINIC | Age: 69
DRG: 064 | End: 2024-07-28
Attending: PHYSICIAN ASSISTANT
Payer: COMMERCIAL

## 2024-07-28 LAB
ANION GAP SERPL CALCULATED.3IONS-SCNC: 11 MMOL/L (ref 7–15)
BASOPHILS # BLD AUTO: 0 10E3/UL (ref 0–0.2)
BASOPHILS NFR BLD AUTO: 0 %
BUN SERPL-MCNC: 16.1 MG/DL (ref 8–23)
CALCIUM SERPL-MCNC: 8.6 MG/DL (ref 8.8–10.4)
CHLORIDE SERPL-SCNC: 104 MMOL/L (ref 98–107)
CREAT SERPL-MCNC: 0.62 MG/DL (ref 0.51–0.95)
EGFRCR SERPLBLD CKD-EPI 2021: >90 ML/MIN/1.73M2
EOSINOPHIL # BLD AUTO: 0 10E3/UL (ref 0–0.7)
EOSINOPHIL NFR BLD AUTO: 0 %
ERYTHROCYTE [DISTWIDTH] IN BLOOD BY AUTOMATED COUNT: 13.6 % (ref 10–15)
GLUCOSE BLDC GLUCOMTR-MCNC: 93 MG/DL (ref 70–99)
GLUCOSE SERPL-MCNC: 90 MG/DL (ref 70–99)
HCO3 SERPL-SCNC: 23 MMOL/L (ref 22–29)
HCT VFR BLD AUTO: 31.2 % (ref 35–47)
HGB BLD-MCNC: 10.5 G/DL (ref 11.7–15.7)
IMM GRANULOCYTES # BLD: 0 10E3/UL
IMM GRANULOCYTES NFR BLD: 1 %
LYMPHOCYTES # BLD AUTO: 0.7 10E3/UL (ref 0.8–5.3)
LYMPHOCYTES NFR BLD AUTO: 11 %
MCH RBC QN AUTO: 27.1 PG (ref 26.5–33)
MCHC RBC AUTO-ENTMCNC: 33.7 G/DL (ref 31.5–36.5)
MCV RBC AUTO: 81 FL (ref 78–100)
MONOCYTES # BLD AUTO: 0.5 10E3/UL (ref 0–1.3)
MONOCYTES NFR BLD AUTO: 8 %
NEUTROPHILS # BLD AUTO: 5.5 10E3/UL (ref 1.6–8.3)
NEUTROPHILS NFR BLD AUTO: 81 %
NRBC # BLD AUTO: 0 10E3/UL
NRBC BLD AUTO-RTO: 0 /100
PLATELET # BLD AUTO: 181 10E3/UL (ref 150–450)
POTASSIUM SERPL-SCNC: 3.6 MMOL/L (ref 3.4–5.3)
POTASSIUM SERPL-SCNC: 3.6 MMOL/L (ref 3.4–5.3)
RBC # BLD AUTO: 3.87 10E6/UL (ref 3.8–5.2)
SODIUM SERPL-SCNC: 138 MMOL/L (ref 135–145)
WBC # BLD AUTO: 6.8 10E3/UL (ref 4–11)

## 2024-07-28 PROCEDURE — 85025 COMPLETE CBC W/AUTO DIFF WBC: CPT | Performed by: HOSPITALIST

## 2024-07-28 PROCEDURE — 250N000013 HC RX MED GY IP 250 OP 250 PS 637: Performed by: HOSPITALIST

## 2024-07-28 PROCEDURE — 70450 CT HEAD/BRAIN W/O DYE: CPT

## 2024-07-28 PROCEDURE — 99233 SBSQ HOSP IP/OBS HIGH 50: CPT | Mod: FS | Performed by: PSYCHIATRY & NEUROLOGY

## 2024-07-28 PROCEDURE — 92526 ORAL FUNCTION THERAPY: CPT | Mod: GN

## 2024-07-28 PROCEDURE — 250N000011 HC RX IP 250 OP 636: Performed by: HOSPITALIST

## 2024-07-28 PROCEDURE — 80048 BASIC METABOLIC PNL TOTAL CA: CPT | Performed by: HOSPITALIST

## 2024-07-28 PROCEDURE — 250N000011 HC RX IP 250 OP 636: Performed by: PHYSICIAN ASSISTANT

## 2024-07-28 PROCEDURE — 84132 ASSAY OF SERUM POTASSIUM: CPT | Performed by: HOSPITALIST

## 2024-07-28 PROCEDURE — 250N000013 HC RX MED GY IP 250 OP 250 PS 637: Performed by: PHYSICIAN ASSISTANT

## 2024-07-28 PROCEDURE — 99232 SBSQ HOSP IP/OBS MODERATE 35: CPT | Performed by: HOSPITALIST

## 2024-07-28 PROCEDURE — 120N000001 HC R&B MED SURG/OB

## 2024-07-28 PROCEDURE — 99418 PROLNG IP/OBS E/M EA 15 MIN: CPT | Mod: FS | Performed by: PSYCHIATRY & NEUROLOGY

## 2024-07-28 RX ORDER — ACETAMINOPHEN 325 MG/1
325 TABLET ORAL EVERY 4 HOURS PRN
Status: DISCONTINUED | OUTPATIENT
Start: 2024-07-28 | End: 2024-07-29 | Stop reason: HOSPADM

## 2024-07-28 RX ORDER — LEVETIRACETAM 500 MG/1
500 TABLET ORAL 2 TIMES DAILY
Status: DISCONTINUED | OUTPATIENT
Start: 2024-07-28 | End: 2024-07-28

## 2024-07-28 RX ORDER — HEPARIN SODIUM 5000 [USP'U]/.5ML
5000 INJECTION, SOLUTION INTRAVENOUS; SUBCUTANEOUS EVERY 8 HOURS
Status: DISCONTINUED | OUTPATIENT
Start: 2024-07-28 | End: 2024-07-29 | Stop reason: HOSPADM

## 2024-07-28 RX ADMIN — LEVETIRACETAM 500 MG: 500 TABLET, FILM COATED ORAL at 09:38

## 2024-07-28 RX ADMIN — ONDANSETRON 4 MG: 2 INJECTION INTRAMUSCULAR; INTRAVENOUS at 22:36

## 2024-07-28 RX ADMIN — HEPARIN SODIUM 5000 UNITS: 5000 INJECTION, SOLUTION INTRAVENOUS; SUBCUTANEOUS at 17:13

## 2024-07-28 RX ADMIN — ACETAMINOPHEN 325 MG: 325 TABLET, FILM COATED ORAL at 13:21

## 2024-07-28 RX ADMIN — METOPROLOL SUCCINATE 100 MG: 50 TABLET, EXTENDED RELEASE ORAL at 09:38

## 2024-07-28 RX ADMIN — PROCHLORPERAZINE EDISYLATE 5 MG: 5 INJECTION INTRAMUSCULAR; INTRAVENOUS at 09:20

## 2024-07-28 RX ADMIN — HEPARIN SODIUM 5000 UNITS: 5000 INJECTION, SOLUTION INTRAVENOUS; SUBCUTANEOUS at 09:39

## 2024-07-28 RX ADMIN — LEVOTHYROXINE SODIUM 88 MCG: 88 TABLET ORAL at 06:55

## 2024-07-28 RX ADMIN — ACETAMINOPHEN 325 MG: 325 TABLET, FILM COATED ORAL at 17:13

## 2024-07-28 RX ADMIN — ACETAMINOPHEN 650 MG: 650 SUPPOSITORY RECTAL at 09:25

## 2024-07-28 ASSESSMENT — ACTIVITIES OF DAILY LIVING (ADL)
ADLS_ACUITY_SCORE: 26

## 2024-07-28 NOTE — PROGRESS NOTES
[facility]    Medicine Progress Note - Hospitalist Service    Date of Admission:  7/25/2024    Assessment & Plan   69-year-old female with past medical history of type A aortic dissection in 2000, chronic warfarin anticoagulation for mechanical aortic valve, admitted 7/25/2024 for altered mental status and concerns of bifrontal intraparenchymal hemorrhage and scattered subarachnoid hemorrhage.    Nontraumatic, bifrontal intraparenchymal hemorrhage (IPH) with scattered subarachnoid hemorrhage (SAH)  Chronic warfarin anticoagulation, history of mechanical aortic valve - on HOLD  History of type A aortic dissection, 2000  Elevated troponin  Essential hypertension  History of Marfan syndrome  History of meningioma  Acute toxic-metabolic encephalopathy, resolving  Mental status changes on admission with nontraumatic intraparenchymal hemorrhage with subarachnoid hemorrhage, for details see admission note.  Chronic warfarin anticoagulation prior to admission for indication of mechanical aortic valve.  Stroke neurology and neurosurgery consulted on admission.  ICU admission.  Transfer out of ICU 7/27.    Management of intraparenchymal and subarachnoid hemorrhage as per neurosurgery and stroke neurology teams  Head CT 7/28 stable  All anticoagulation on HOLD (warfarin for AVR prior to admission)  Cardiology consulted 7/27 given mechanical aortic valve and  anticoagulation concerns with mechanical aortic valve, and elevated troponin.  Transthoracic echocardiogram (TTE) 7/26, see report.  Blood pressure control, goal  to 150.  Continue metoprolol, reduced dose compared to prior to admission, reassess daily.  Therapy services consulted (PT, OT, SLP)  Pain control, avoid non-steroidal anti-inflammatory drugs (NSAIDs), see hospital orders  Keppra as per neurosurgery.  Mildly elevated troponin, possible demand ischemia in the setting of acute illness and intracranial hemorrhage.  Cardiology  consulted as above.  Ongoing cardiac telemetry.  Monitor for symptoms.  Monitor mental status, altered on admission likely secondary to acute intracranial hemorrhage.  Neurochecks.  DVT prophylaxis as per neurosurgery  AM labs as ordered.    Hyperlipidemia  Prior to admission simvastatin held on admission, reassess at discharge, as per neurology    Hypothyroidism  Continue levothyroxine    Hyponatremia, resolved  Monitor serum sodium periodically.  Sodium 125 on 7/25, subsequently 127, 129, 133, 135, and 144 on 7/27.  Management as per neurosurgery and stroke neurology.  Recent Labs   Lab 07/28/24  0556 07/27/24  1801 07/27/24  0427 07/27/24  0202 07/26/24  2205 07/26/24  1837    137 144  144 142 135 133*     History migraine headaches  Noted, stable, monitor.    Monoclonal gammopathy of undetermined significance (MGUS), IgM lambda  Noted, follow-up with primary clinic provider.    Weakness and deconditioning   Therapy services consulted as above.  Activity orders as per surgery.    Disposition  Estimated length of stay 2 days  Anticipated discharge to home vs transitional care unit     Change in hospitalist provider tomorrow with one of my [facility] hospitalist colleagues assuming care.          Diet: Combination Diet Soft and Bite Sized Diet (level 6); Thin Liquids (level 0)  Room Service    DVT Prophylaxis: Pneumatic Compression Devices  Lopez Catheter: Not present  Lines: PRESENT      PICC 07/26/24 Triple Lumen Right Basilic-Site Assessment: WDL      Cardiac Monitoring: ACTIVE order. Indication: Stroke, acute (48 hours)  Code Status: Full Code      Clinically Significant Risk Factors        # Hypokalemia: Lowest K = 3.3 mmol/L in last 2 days, will replace as needed   # Hypocalcemia: Lowest Ca = 8.2 mg/dL in last 2 days, will monitor and replace as appropriate         # Hypertension: Noted on problem list         #Precipitous drop in Hgb/Hct: Lowest Hgb this hospitalization: 10.4 g/dL. Will  continue to monitor and treat/transfuse as appropriate.                Disposition Plan     Medically Ready for Discharge: Anticipated in 2-4 Days             Luciano Hill MD  Hospitalist Service  [facility]  Securely message with [employer] (more info)  Text page via [employer] Paging/Directory   ______________________________________________________________________    Interval History   In bed,  and family at bedside.  Mild frontal headache.  Repeat head CT today.  Transfer out of ICU yesterday.  Blood pressure controlled.  No new symptoms reported.    Physical Exam   Vital Signs: Temp: 97.7  F (36.5  C) Temp src: Oral BP: 126/68 Pulse: 62   Resp: 16 SpO2: 98 % O2 Device: None (Room air)    Weight: 115 lbs 15.39 oz    GENERAL awake and alert, no acute distress  LUNGS no wheezes or crackles  HEART S1, S2 with RRR  ABDOMEN soft, nontender  EXTREMITIES without significant edema  SKIN warm and dry  NEURO moves upper and lower extremities spontaneously and to command  MENTAL STATUS answering questions and following simple commands    Medical Decision Making             Data     I have personally reviewed the following data over the past 24 hrs:    6.8  \   10.5 (L)   / 181     138 104 16.1 /  90   3.6 23 0.62 \       Imaging results reviewed over the past 24 hrs:   Recent Results (from the past 24 hour(s))   CT Head w/o Contrast    Narrative    EXAM: CT HEAD WITHOUT CONTRAST  LOCATION: [facility]  DATE: 07/28/2024    INDICATION: Follow-up multicompartment intracranial hemorrhage.  COMPARISON: CT of the head dated 07/26/2024. MRI head 07/26/2024.  TECHNIQUE: Routine CT Head without IV contrast. Multiplanar reformats. Dose reduction techniques were used.    FINDINGS:  INTRACRANIAL CONTENTS: No significant interval change in the bilateral frontal lobe parenchymal hematomas. The largest lesion in the right frontal lobe measures approximately 41 mm x 27 mm in the  axial plane (series 2 image 15). Thin subdural hematomas   overlying the bilateral frontal poles measuring up to approximately 4-5 mm in thickness (series 2 image 13), unchanged. Small to moderate volume scattered acute subarachnoid hemorrhage, not significantly changed from most recent exams. Vasogenic edema   surrounding the right frontal lobe parenchymal hematoma with some hypoattenuation of the cortex adjacent to the hematoma, as before. Mild vasogenic edema surrounding the left frontal parenchymal hematoma. This is unchanged. No significant midline   shift/herniation. No definite new intracranial hemorrhage identified. There is a background of mild generalized brain parenchymal volume loss. Couple of small chronic infarcts in the right cerebellar hemisphere, unchanged.    VISUALIZED ORBITS/SINUSES/MASTOIDS: No intraorbital abnormality. No paranasal sinus mucosal disease. No middle ear or mastoid effusion.    BONES/SOFT TISSUES: No acute abnormality.      Impression    IMPRESSION:  1.  No significant interval change in multicompartment intracranial hemorrhage, as described.  2.  Dominant right greater than left anterior frontal lobe parenchymal hematomas, thin bifrontal subdural hematomas, and scattered subarachnoid hemorrhage, as described.  3.  Similar degree of local mass effect. No significant midline shift/herniation. No new intracranial hemorrhage is identified.

## 2024-07-28 NOTE — PROGRESS NOTES
[facility]    Neurosurgery  Daily Note    Assessment & Plan   Alyssa Higginbotham is a 69 year old female with a past medical history of type a aortic dissection in 2000 on Coumadin for mechanical aortic valve admitted on 7/25/2024 for altered mental status. Patient was found to have bifrontal IPH with scattered SAH.     Exam stable  Stable head CT    Narrative & Impression   EXAM: CT HEAD WITHOUT CONTRAST  LOCATION: [facility]  DATE: 07/28/2024                                                      IMPRESSION:  1.  No significant interval change in multicompartment intracranial hemorrhage, as described.  2.  Dominant right greater than left anterior frontal lobe parenchymal hematomas, thin bifrontal subdural hematomas, and scattered subarachnoid hemorrhage, as described.  3.  Similar degree of local mass effect. No significant midline shift/herniation. No new intracranial hemorrhage is identified.        Plan:  -no urgent neurosurgical intervention indicated  -cares per stroke neuro     Follow up 2 weeks with head CT, our team will coordinate  Will sign off at this time   Dr. Hayes has reviewed history, imaging and plans; in agreement    Indu Rene PA-C  [facility] Neurosurgery  [address] [place] [address]  Tel [phone]  Fax [phone]  Text page via [facility] Paging/Directory    Active Problems:    Subarachnoid hemorrhage (H)    Intraparenchymal hemorrhage of brain (H)     Indu Rhoades PA-C    Interval History   Stable     Physical Exam   Temp: 97.7  F (36.5  C) Temp src: Oral BP: 126/68 Pulse: 62   Resp: 16 SpO2: 98 % O2 Device: None (Room air)    Vitals:    07/25/24 2200 07/26/24 0400 07/26/24 2200   Weight: 51.2 kg (112 lb 14 oz) 51.2 kg (112 lb 14 oz) 52.6 kg (115 lb 15.4 oz)     Vital Signs with Ranges  Temp:  [97.3  F (36.3  C)-99.4  F (37.4  C)] 97.7  F (36.5  C)  Pulse:  [62-88] 62  Resp:  [16-24] 16  BP:  (122-173)/(62-93) 126/68  SpO2:  [93 %-98 %] 98 %  I/O last 3 completed shifts:  In: 900 [P.O.:900]  Out: 250 [Urine:250]    Awake, alert, oriented x3  Slight left facial droop. LUE 4/5  Remainder intact       Medications   Current Facility-Administered Medications   Medication Dose Route Frequency Provider Last Rate Last Admin      Current Facility-Administered Medications   Medication Dose Route Frequency Provider Last Rate Last Admin    heparin ANTICOAGULANT injection 5,000 Units  5,000 Units Subcutaneous Q8H Avril Abreu PA-C   5,000 Units at 07/28/24 0939    levETIRAcetam (KEPPRA) tablet 500 mg  500 mg Oral BID Avril Abreu PA-C   500 mg at 07/28/24 0938    levothyroxine (SYNTHROID/LEVOTHROID) tablet 75 mcg  75 mcg Oral Once per day on Wednesday Saturday Luciano Hill MD   75 mcg at 07/27/24 1432    levothyroxine (SYNTHROID/LEVOTHROID) tablet 88 mcg  88 mcg Oral Once per day on Sunday Monday Tuesday Thursday Friday Luciano Hill MD   88 mcg at 07/28/24 0655    metoprolol succinate ER (TOPROL XL) 24 hr tablet 100 mg  100 mg Oral Daily Luciano Hill MD   100 mg at 07/28/24 0938    sodium chloride (PF) 0.9% PF flush 10-40 mL  10-40 mL Intracatheter Q8H Luciano Hill MD   3 mL at 07/28/24 0944    sodium chloride (PF) 0.9% PF flush 10-40 mL  10-40 mL Intracatheter Q7 Days Luciano Hill MD   10 mL at 07/25/24 2147    sodium chloride (PF) 0.9% PF flush 3 mL  3 mL Intracatheter Q8H Luciano Hill MD   3 mL at 07/28/24 0944

## 2024-07-28 NOTE — PROGRESS NOTES
[facility]    Vascular Neurology Progress Note    Interval History     BPs much better, all less than 140 since about 4 PM yesterday.  Transferred out of the ICU.    No new imaging.  Afebrile    Feels a lot better, up seen [place] with physical therapy and seems to be having less of a right gaze preference.    Hospital Course     Chief complaint: Stroke    Alyssa Higginbotham is a 69 year old female with a PMH of migraine, HLD, HTN, aortic dissection, marfan syndrome, mechanical AVR on coumadin. She presents to the ED for evaluation of headache and AMS. 2 days prior (7/23) she had onset of severe headache that responded to her typical migraine medications. Then yesterday afternoon/evening she has onset of severe headache that was not responsive to her typical abortive therapies. She was not getting out of bed and not responding.     On arrival to ER /77. INR 2.2--> 1.24 after Kcentra & Vit K. She was given 1000 mg of Keppra.  No recent head trauma, falls or car accident.     Assessment and Plan     Non traumatic, bifrontal Right > Left intraparenchymal hemorrhage with scattered SAH while on warfarin,  unclear etiology   - Neurochecks and Vital Signs every 4 hours  - Systolic BP Goal: <160 while admitted,   - Hold anticoagulation/anti-platelet/NSAIDs medications except for DVT ppx  - Stop Keppra given that her neuro exam is so good.  There is never any proven or suspected seizure, she was just very encephalopathic.  - PT/OT/SLP as able  - Euthermia, Euglycemia  - Outpatient 7T Brain MRI with and without contrast in 6 to 8 weeks as an outpatient (not yet ordered)-to help evaluate for any underlying lesion such as a mass which could have bled and caused the hemorrhage, although this is suspected to be less likely given the multifocal nature of the hemorrhage.  Also evaluate for any changes consistent with amyloid angiopathy.. Hold PTA statin for now, discuss at neurology  "follow up about restarting after reviewing 7T: Note: If the 7T MRI cannot be done with her mechanical valve, then she needs a regular brain with and without in about 8 weeks instead  - Await final blood cultures    Hyponatremia, as low as 125 on 7/25, ?due to home hctz  -Has been stable now, off 3% now, goal normonatremia ideally 135+, hypernatremia acceptable.  Switch to daily checks    Mechanical aortic valve  - Recommend restarting Coumadin August 8, 2024 which is 2 weeks from the date of the bleed.  Will involve stroke RN CC to help with this transition and to help with scheduling the 7T MRI. The 2022 AHA ICH guidelines suggest that restarting anticoagulation in about 2 weeks for patients with mechanical valves and ICH is reasonable. Check head CT beforehand on Aug 6-7.       DVT: SCDs and pharmacologic ppx    Patient Follow-up    - in the next 1 week(s) with PCP  - in 6-8 weeks with general neurology or stroke [facility] ([phone])      We will continue to follow.     Avril Abreu PA-C  Vascular Neurology    To page me or covering stroke neurology team member, click here: AMCOM  Choose \"On Call\" tab at top, then select \"NEUROLOGY/ALL SITES\" from middle drop-down box, press Enter, then look for \"stroke\" or \"telestroke\" for your site.    Physical Examination     Temp: 97.7  F (36.5  C) Temp src: Oral BP: 126/68 Pulse: 62   Resp: 16 SpO2: 98 % O2 Device: None (Room air)      General: Sitting up in bed no acute distress, EEG leads on head    Neurologic exam:    Mental status: Drowsy but opens eyes once she is stimulated enough.  Oriented to person, place, month, year, situation.  Able to name objects easily and repeats long sentences easily    Cranial nerves: Pupils equal round and reactive to light, extraocular movements intact, appears to have a left visual field cut, face symmetric, hearing intact    Motor: Generally weak throughout but no obvious hemiparesis noted.  Somewhat poor effort combined with possibly " misunderstanding instructions    Sensation: Noxious deferred, appears to be confused with light touch sensation testing and answers wrong at times on both sides of the body    Coordination: Deferred    Gait: Deferred      Imaging/Labs   (Bolded imaging and labs new and/or personally reviewed or re-reviewed by me today [facility])    MRI/Head CT MRI Brain: No new hemorrhage, stable hematomas and subarachnoid hemorrhage.  No infarct    MRV head showed no dural venous sinus thrombosis.  Limited visualization of the cortical superficial veins in the right frontal convexity, could be due to cortical vein thrombosis or compression   Intracranial Vasculature CTA Head: No LVO or severe stenosis.   Cervical Vasculature CTA Neck: No LVO. Chronic arotic dissection extending into the right proximal common carotid artery       Echocardiogram TTE: EF 65 to 70%, mechanical aortic valve seen, no mention of atrial size   EKG/Telemetry Sinus rhythm      Other EEG 7/26: No interictal epileptiform abnormalities and no electrographic seizures were observed.      LDL 71 as an outpatient a month ago   A1C  7/25/2024: 5.5 %   Troponin 7/27/2024: 100 ng/L     Other labs reviewed by me today:  Sodium 138 today, glucose 90, CBC unremarkable    Blood cultures: Negative after 48 hours    Time Spent on this Encounter         55 MINUTES SPENT BY ME on the date of service doing chart review, history, exam, documentation & further activities per the note.

## 2024-07-28 NOTE — PLAN OF CARE
Reason for Admission: SAH, IPH    Cognitive/Mentation: A/Ox 4  Neuros/CMS: Intact ex generalised weakness, follows commands inconsistently  VS: Stable on RA, SBP goal <160. .   Tele: NSR.  /GI: Continent. Last BM 7/27.   Pulmonary: LS clear.  Pain: denies.     Drains/Lines: R PICC-SL, R PIV-SL  Skin:  intact ex scattered scabbing  Activity: Assist x 1 with GB/walker.  Diet:  Soft/bite sized with thin liquids. Takes pills whole.     Therapies recs: TCU  Discharge: pending    Aggression Stoplight Tool: green    End of shift summary: patient sleeping most of the shift, inconsistently follows commands mostly refusing. Only took 1 tablet of her Potassium tablet replacement-does not like the taste.Had to do a lot of coaxing to have her transfer from recliner to her bed, otherwise calm.

## 2024-07-28 NOTE — PLAN OF CARE
Reason for Admission: SAH, IPH     Cognitive/Mentation: A/Ox 4  Neuros/CMS: Intact ex generalized weakness  VS: Stable on RA, SBP <160.   Tele: SR.  /GI: Continent. Last BM 7/27.   Pulmonary: LS clear.  Pain: 2-6/10 headache, Tylenol given, improving.    Drains/Lines: R PICC SL, R PIV SL  Skin: intact ex scattered scabbing  Activity: Assist x 1 with GB.  Diet: Soft/bite sized with thin liquids. Takes pills whole.     Therapies recs: home w/ assist  Discharge: pending    Aggression Stoplight Tool: green    End of shift summary: Repeat head CT completed, stable. Intermittent nausea, compazine given, zofran ineffective per pt.

## 2024-07-29 ENCOUNTER — APPOINTMENT (OUTPATIENT)
Dept: SPEECH THERAPY | Facility: CLINIC | Age: 69
DRG: 064 | End: 2024-07-29
Attending: PHYSICIAN ASSISTANT
Payer: COMMERCIAL

## 2024-07-29 VITALS
RESPIRATION RATE: 16 BRPM | OXYGEN SATURATION: 98 % | WEIGHT: 115.96 LBS | SYSTOLIC BLOOD PRESSURE: 149 MMHG | TEMPERATURE: 98.6 F | DIASTOLIC BLOOD PRESSURE: 82 MMHG | BODY MASS INDEX: 21.21 KG/M2 | HEART RATE: 70 BPM

## 2024-07-29 LAB
ANION GAP SERPL CALCULATED.3IONS-SCNC: 7 MMOL/L (ref 7–15)
BUN SERPL-MCNC: 12.5 MG/DL (ref 8–23)
CALCIUM SERPL-MCNC: 8.2 MG/DL (ref 8.8–10.4)
CHLORIDE SERPL-SCNC: 101 MMOL/L (ref 98–107)
CREAT SERPL-MCNC: 0.54 MG/DL (ref 0.51–0.95)
EGFRCR SERPLBLD CKD-EPI 2021: >90 ML/MIN/1.73M2
GLUCOSE BLDC GLUCOMTR-MCNC: 104 MG/DL (ref 70–99)
GLUCOSE SERPL-MCNC: 104 MG/DL (ref 70–99)
HCO3 SERPL-SCNC: 29 MMOL/L (ref 22–29)
POTASSIUM SERPL-SCNC: 3.2 MMOL/L (ref 3.4–5.3)
POTASSIUM SERPL-SCNC: 4 MMOL/L (ref 3.4–5.3)
SODIUM SERPL-SCNC: 137 MMOL/L (ref 135–145)

## 2024-07-29 PROCEDURE — 250N000013 HC RX MED GY IP 250 OP 250 PS 637: Performed by: INTERNAL MEDICINE

## 2024-07-29 PROCEDURE — 84132 ASSAY OF SERUM POTASSIUM: CPT | Performed by: INTERNAL MEDICINE

## 2024-07-29 PROCEDURE — 99232 SBSQ HOSP IP/OBS MODERATE 35: CPT | Performed by: INTERNAL MEDICINE

## 2024-07-29 PROCEDURE — 250N000013 HC RX MED GY IP 250 OP 250 PS 637: Performed by: HOSPITALIST

## 2024-07-29 PROCEDURE — 80048 BASIC METABOLIC PNL TOTAL CA: CPT | Performed by: PHYSICIAN ASSISTANT

## 2024-07-29 PROCEDURE — 999N000040 HC STATISTIC CONSULT NO CHARGE VASC ACCESS

## 2024-07-29 PROCEDURE — 92526 ORAL FUNCTION THERAPY: CPT | Mod: GN | Performed by: SPEECH-LANGUAGE PATHOLOGIST

## 2024-07-29 PROCEDURE — 250N000011 HC RX IP 250 OP 636: Performed by: PHYSICIAN ASSISTANT

## 2024-07-29 PROCEDURE — 99239 HOSP IP/OBS DSCHRG MGMT >30: CPT | Performed by: INTERNAL MEDICINE

## 2024-07-29 RX ORDER — POTASSIUM CHLORIDE 1500 MG/1
40 TABLET, EXTENDED RELEASE ORAL ONCE
Status: COMPLETED | OUTPATIENT
Start: 2024-07-29 | End: 2024-07-29

## 2024-07-29 RX ADMIN — HEPARIN SODIUM 5000 UNITS: 5000 INJECTION, SOLUTION INTRAVENOUS; SUBCUTANEOUS at 16:38

## 2024-07-29 RX ADMIN — HEPARIN SODIUM 5000 UNITS: 5000 INJECTION, SOLUTION INTRAVENOUS; SUBCUTANEOUS at 10:07

## 2024-07-29 RX ADMIN — POTASSIUM CHLORIDE 40 MEQ: 1500 TABLET, EXTENDED RELEASE ORAL at 10:07

## 2024-07-29 RX ADMIN — METOPROLOL SUCCINATE 100 MG: 50 TABLET, EXTENDED RELEASE ORAL at 10:07

## 2024-07-29 RX ADMIN — HEPARIN SODIUM 5000 UNITS: 5000 INJECTION, SOLUTION INTRAVENOUS; SUBCUTANEOUS at 00:11

## 2024-07-29 RX ADMIN — LEVOTHYROXINE SODIUM 88 MCG: 88 TABLET ORAL at 06:36

## 2024-07-29 ASSESSMENT — ACTIVITIES OF DAILY LIVING (ADL)
ADLS_ACUITY_SCORE: 26
ADLS_ACUITY_SCORE: 26
ADLS_ACUITY_SCORE: 28
ADLS_ACUITY_SCORE: 26
ADLS_ACUITY_SCORE: 26
ADLS_ACUITY_SCORE: 28
ADLS_ACUITY_SCORE: 26
ADLS_ACUITY_SCORE: 26
DEPENDENT_IADLS:: INDEPENDENT
ADLS_ACUITY_SCORE: 28
ADLS_ACUITY_SCORE: 28
ADLS_ACUITY_SCORE: 26
ADLS_ACUITY_SCORE: 28
ADLS_ACUITY_SCORE: 26

## 2024-07-29 NOTE — DISCHARGE SUMMARY
[facility]  Hospitalist Discharge Summary      Date of Admission:  7/25/2024  Date of Discharge:  7/29/2024  Discharging Provider: Daria Ackerman MD    Discharge Diagnoses   Nontraumatic, bifrontal intraparenchymal hemorrhage (IPH) with scattered subarachnoid hemorrhage (SAH)  Hx of mechanical aortic valve on chronic anticoagulation with warfarin  Acute toxic-metabolic encephalopathy  History of type A aortic dissection, 2000  Elevated troponin due to demand ischemia, NSTEMI type 2  Essential hypertension  History of Marfan syndrome  History of meningioma  Hyponatremia  Hypothyroidism  Hyperlipidemia  MGUS, IgM lambda  Hx of migraines  Generalized weakness, physical deconditioning      Follow-ups Needed After Discharge   Follow-up Appointments     Follow-up and recommended labs and tests       Your Neurosurgical follow-up appointments have been recommended 2 weeks   with head CT. You may call [phone] to make, confirm or change your   appointment dates and/or times.          Unresulted Labs Ordered in the Past 30 Days of this Admission       Date and Time Order Name Status Description    7/25/2024  5:20 PM Blood Culture Arm, Left Preliminary             Discharge Disposition   Discharged to home  Condition at discharge: Stable    Hospital Course   Alyssa Higginbotham is a 69-year-old female with hx of mechanical aortic valve replacement on chronic AC with warfarin and prior type A aortic dissection (2000) who presented on 7/25/2024 with AMS, and was found to have a non-traumatic bifrontal intraparenchymal hemorrhage and scattered subarachnoid hemorrhage. She improved with non-operative management, and is now discharging home with home RN/PT/OT.      Nontraumatic, bifrontal intraparenchymal hemorrhage (IPH) with scattered subarachnoid hemorrhage (SAH)  Hx of mechanical aortic valve on chronic anticoagulation with warfarin  *Initially admitted to ICU. Transferred out of ICU on  7/27  *Neurosurgery and Neurocritical care consulted on admission. Medical management recommended  *Repeat head CT 7/28 stable with gradual improvement in mental status  *Initially treated with levetiracetam for seizure; discontinued prior to discharge per Stroke Neuro due to remarkable improvement in neuro status  *Cleared by PT/OT/SLP this admission. Home PT/OT ordered at discharge  *Warfarin held this admission and remains on hold at the time of discharge. Plan to tentatively resume per Neurosurgery recs on Aug 8, 2024. Patient follows with Cardiology (Dr. Richardson) at [facility], and will arrange short-term follow up at discharge.  *Patient will follow up with Neurosurgery in 2 weeks with repeat head CT    Elevated troponin due to demand ischemia/NSTEMI type 2  *Initial trop 12, peaked at 288 with no ischemic changes on EKG and no cardiac symptoms. Cardiology was consulted; did not feel further ischemic eval was needed  *TTE 7/26 showed EF 65-70%, no WMA. Mechanical aortic valve with increased gradient compared to prior (30 mmHg)  *Cardiology recommending repeat TTE in 4-8 weeks with potential DONAVAN or CT if gradients remain elevated. Pt will arrange short term follow up with her primary cardiologist    Hyponatremia: Resolved    Other chronic and stable medical conditions Conts on home meds as previously prescribed  Hypertension  Hx of type A aortic dissection (2000)  Marfan Syndrome  Hyperlipidemia  Hypothyroidism  MGUS, IgM lambda  History migraine headaches  Hx of meningioma    Consultations This Hospital Stay   NEUROLOGY IP STROKE CONSULT  NEUROSURGERY IP CONSULT  CARDIOLOGY IP CONSULT  OCCUPATIONAL THERAPY ADULT IP CONSULT  PHYSICAL THERAPY ADULT IP CONSULT  CARE MANAGEMENT / SOCIAL WORK IP CONSULT  SPEECH LANGUAGE PATH ADULT IP CONSULT  PHARMACY CONSULT  VASCULAR ACCESS ADULT IP CONSULT    Code Status   Full Code    Time Spent on this Encounter   Daria BLACK MD, personally saw the patient today  and spent 35 minutes discharging this patient.       MD MESSI [facility] NEUROSCIENCE UNIT  [address] [place] [address]  Phone: [phone]  ______________________________________________________________________    Physical Exam   Vital Signs: Temp: 98.6  F (37  C) Temp src: Oral BP: (!) 149/82 Pulse: 70   Resp: 16 SpO2: 98 % O2 Device: None (Room air)    Weight: 115 lbs 15.39 oz    Constitutional: Sitting up in chair, NAD  HEENT: Sclera white, conjunctiva clear, EOMI, MMM  Respiratory: Breathing non-labored. Lungs CTAB - no wheezes/crackles/rhonchi  Cardiovascular: Heart RRR, metallic S2. No pedal edema.   GI: +BS. Abd soft/NT  Skin: Warm and dry. No rash.  Musculoskeletal: Normal muscle bulk and tone  Neurologic: Alert and appropriate. FRANKLIN  Psychiatric: Calm and cooperative    Primary Care Physician   Rafaela Woods    Discharge Orders      CT Head w/o contrast*     MR Brain w/o & w Contrast     CT Head w/o contrast*     Primary Care - Care Coordination Referral      Follow-Up with Cardiology [facility]      Home Care Referral      Follow-up and recommended labs and tests     Your Neurosurgical follow-up appointments have been recommended 2 weeks with head CT. You may call [phone] to make, confirm or change your appointment dates and/or times.     Reason for your hospital stay    Intracranial bleed in the setting of warfarin therapy     Activity    Your activity upon discharge: activity as tolerated     Diet    Follow this diet upon discharge: Regular diet     Stroke Hospital Follow Up (for neurologist use only)    [facility] will call you to coordinate care as prescribed by your provider. If you don t hear from a representative within 2 business days, please call [phone].         Significant Results and Procedures   Most Recent 3 CBC's:  Recent Labs   Lab Test 07/28/24  0556 07/27/24  0954 07/25/24  1329   WBC 6.8 7.8 11.8*   HGB 10.5* 10.4* 13.2   MCV  81 80 77*    178 237     Most Recent 3 BMP's:  Recent Labs   Lab Test 07/29/24  1355 07/29/24  0608 07/28/24  0556 07/28/24  0209 07/28/24  0025 07/27/24  1801   NA  --  137 138  --   --  137   POTASSIUM 4.0 3.2* 3.6   < >  --  3.3*   CHLORIDE  --  101 104  --   --  102   CO2  --  29 23  --   --  26   BUN  --  12.5 16.1  --   --  15.2   CR  --  0.54 0.62  --   --  0.57   ANIONGAP  --  7 11  --   --  9   AMI  --  8.2* 8.6*  --   --  8.7*   GLC  --  104* 90  --  93 122*    < > = values in this interval not displayed.   ,   Results for orders placed or performed during the hospital encounter of 07/25/24   CT Head w/o Contrast    Narrative    EXAM: CT HEAD W/O CONTRAST  LOCATION: [facility]  DATE: 7/25/2024    INDICATION: Bifrontal R>L IPH and SAH, four hour stability scan.  COMPARISON: CT angiogram of the head and neck 7/25/2024.  TECHNIQUE: Routine CT Head without IV contrast. Multiplanar reformats. Dose reduction techniques were used.    FINDINGS:  INTRACRANIAL CONTENTS:   Mildly increased conspicuity of small parenchymal hemorrhage in the inferolateral right temporal lobe measuring 6 mm (series 5 image 39). Small-volume subarachnoid hemorrhage along the inferolateral right temporal region otherwise appears grossly   similar.    No significant interval change in the dominant right frontal lobe parenchymal hematoma measuring approximately 43 mm AP x 31 mm transverse x 29 mm craniocaudal with mild-to-moderate surrounding vasogenic edema. Likewise, no significant change in the left   frontal lobe parenchymal hematoma measuring 16 mm AP x 16 mm transverse x 18 mm craniocaudal. There appears to be thin subdural hemorrhage along the anterosuperior aspects of both frontal lobes that appears unchanged. There is scattered   small-to-moderate subarachnoid hemorrhage, primarily in the frontal regions bilaterally and also in the left sylvian fissure and temporoparietal junction region. This does  not appear significantly changed from prior. No definite new intracranial   hemorrhage identified.    Mild local mass effect in the bilateral frontal regions appears unchanged. There is no significant midline shift/herniation. The ventricles appear normal in size and configuration. The basal cisterns are patent. There is an unchanged small chronic right   cerebellar infarct. Mild generalized cerebral volume loss is present, as before.    VISUALIZED ORBITS/SINUSES/MASTOIDS: No intraorbital abnormality. No paranasal sinus mucosal disease. No middle ear or mastoid effusion.    BONES/SOFT TISSUES: No acute abnormality. Bilateral temporomandibular joint degenerative changes.      Impression    IMPRESSION:  1.  Mildly increased conspicuity of a small parenchymal hemorrhage in the inferolateral right temporal lobe measuring 6 mm with adjacent small-volume subarachnoid hemorrhage.  2.  Otherwise, no significant interval change in multicompartment intracranial hemorrhage, including dominant parenchymal hematoma is in the bilateral frontal lobes, right larger than left, as described.  3.  Mild local mass effect primarily in the frontal regions is unchanged. No significant midline shift/herniation.  4.  Continued follow-up is suggested.   MR Brain w/o & w Contrast    Narrative    EXAM: MR BRAIN W/O and W CONTRAST, MRV BRAIN  W/O and W CONTRAST  LOCATION: [facility]  DATE: 7/26/2024    INDICATION: Bifrontal IPH and scattered SAH, please add SWI sequence, timed for 7 26  COMPARISON: CT head 7/25/2024  CONTRAST: 10 mL Gadavist  TECHNIQUE:   1) Routine multiplanar multisequence head MRI without and with intravenous contrast.  2) Phase contrast and 2-D time-of-flight head MRV performed after administration of intravenous contrast.    FINDINGS:  HEAD MRI:  Images mildly compromised by motion.  INTRACRANIAL CONTENTS: No acute or subacute infarct. Redemonstrated bifrontal intraparenchymal hematomas, stable  and similar compared to CT of 7/25/2024. The right frontal hematoma measures 4.2 x 3.7 cm in the axial plane; the left frontal hematoma   measures 1.4 x 1.4 cm in the axial plane. Mild surrounding vasogenic edema around bilateral hematomas with localized regional mass effect. No midline shift. Subarachnoid hemorrhage in the sulci of anterior inferior frontal lobes, left parietal lobe is   redemonstrated unchanged in volume. Scattered nonspecific T2/FLAIR hyperintensities within the cerebral white matter most consistent with mild chronic microvascular ischemic change. 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 Mild generalized cerebral atrophy. No   hydrocephalus. Normal position of the cerebellar tonsils. Mild enhancement of the right frontal hematoma. Redemonstrated 4 mm left frontal superior convexity enhancing extra-axial lesion, presumed meningioma.    SELLA: No abnormality accounting for technique.    OSSEOUS STRUCTURES/SOFT TISSUES: Normal marrow signal. The major intracranial vascular flow voids are maintained.     ORBITS: No abnormality accounting for technique.     SINUSES/MASTOIDS: Mucosal thickening primarily involving the ethmoid air cells. No middle ear or mastoid effusion.     HEAD MRV:   DURAL SINUSES: No significant stenosis or occlusion. Balanced transverse and sigmoid sinuses.    INTERNAL CEREBRAL VEINS: No significant stenosis or occlusion.      MAJOR CORTICAL VENOUS BRANCHES: No significant stenosis or occlusion.      Impression    IMPRESSION:  HEAD MRI:   1.  No acute infarct or new hemorrhage.   2.  Stable and similar bifrontal intraparenchymal hematomas, the degree of mass effect, unchanged compared to CT of 7/25/2024.  3.  Stable and similar moderate volume subarachnoid hemorrhage in the left parietal sulci and bilateral anterior inferior frontal sulci.    HEAD MRV:   1.  No dural venous sinus thrombosis or significant stenosis.  2.  Limited visualization of cortical superficial veins in the right  frontal convexity. This could be secondary to cortical vein thrombosis or limited venous filling/venous compression resulting from the regional hematoma and vasogenic edema.    CT Head w/o Contrast    Narrative    CT SCAN OF THE HEAD WITHOUT CONTRAST   7/26/2024 9:04 AM     HISTORY: Bifrontal hemorrhage.    TECHNIQUE:  Axial images of the head and coronal reformations without  IV contrast material. Radiation dose for this scan was reduced using  automated exposure control, adjustment of the mA and/or kV according  to patient size, or iterative reconstruction technique.    COMPARISON: 7/25/2024    FINDINGS: No significant change in size of the bilateral frontal lobe  parenchymal contusions. For example, the larger right frontal  hemorrhage measures 4.2 x 3.0 x 2.6 cm, previously 4.0 x 3.0 x 2.6 cm  when measured similarly. The smaller left-sided hemorrhage measures 17  x 16 x 14 mm, previously 17 x 17 x 14 mm. Small right inferolateral  temporal parenchymal hemorrhage appears similar. Similar extent of  multifocal bilateral frontal, right temporal, left parietal, and  sylvian fissure subarachnoid hemorrhage. Tiny subdural hemorrhages  along the bilateral frontal lobes appear similar. No definite new or  enlarging hemorrhage. Edema surrounding the bilateral frontal  contusions without significant mass effect. No midline shift. Small  chronic lacunar infarct in the left cerebellar hemisphere. The  ventricles are normal in size, shape and configuration. Mild diffuse  parenchymal volume loss. Mild patchy periventricular white matter  hypodensities which are nonspecific, but likely related to chronic  microvascular ischemic disease.     The visualized portions of the sinuses and mastoids appear normal. The  bony calvarium and bones of the skull base appear intact.       Impression    IMPRESSION:     1. Overall stable examination compared to 7/26/2024 with  multicompartment intracranial hemorrhages, as described above.  2.  No definite new or enlarging intracranial hemorrhage.    NIKOLAS ARGUELLES MD         SYSTEM ID:  [id number]   MRV Brain wo & w Contrast    Narrative    EXAM: MR BRAIN W/O and W CONTRAST, MRV BRAIN  W/O and W CONTRAST  LOCATION: [facility]  DATE: 7/26/2024    INDICATION: Bifrontal IPH and scattered SAH, please add SWI sequence, timed for 7 26  COMPARISON: CT head 7/25/2024  CONTRAST: 10 mL Gadavist  TECHNIQUE:   1) Routine multiplanar multisequence head MRI without and with intravenous contrast.  2) Phase contrast and 2-D time-of-flight head MRV performed after administration of intravenous contrast.    FINDINGS:  HEAD MRI:  Images mildly compromised by motion.  INTRACRANIAL CONTENTS: No acute or subacute infarct. Redemonstrated bifrontal intraparenchymal hematomas, stable and similar compared to CT of 7/25/2024. The right frontal hematoma measures 4.2 x 3.7 cm in the axial plane; the left frontal hematoma   measures 1.4 x 1.4 cm in the axial plane. Mild surrounding vasogenic edema around bilateral hematomas with localized regional mass effect. No midline shift. Subarachnoid hemorrhage in the sulci of anterior inferior frontal lobes, left parietal lobe is   redemonstrated unchanged in volume. Scattered nonspecific T2/FLAIR hyperintensities within the cerebral white matter most consistent with mild chronic microvascular ischemic change. 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 Mild generalized cerebral atrophy. No   hydrocephalus. Normal position of the cerebellar tonsils. Mild enhancement of the right frontal hematoma. Redemonstrated 4 mm left frontal superior convexity enhancing extra-axial lesion, presumed meningioma.    SELLA: No abnormality accounting for technique.    OSSEOUS STRUCTURES/SOFT TISSUES: Normal marrow signal. The major intracranial vascular flow voids are maintained.     ORBITS: No abnormality accounting for technique.     SINUSES/MASTOIDS: Mucosal thickening primarily involving the  ethmoid air cells. No middle ear or mastoid effusion.     HEAD MRV:   DURAL SINUSES: No significant stenosis or occlusion. Balanced transverse and sigmoid sinuses.    INTERNAL CEREBRAL VEINS: No significant stenosis or occlusion.      MAJOR CORTICAL VENOUS BRANCHES: No significant stenosis or occlusion.      Impression    IMPRESSION:  HEAD MRI:   1.  No acute infarct or new hemorrhage.   2.  Stable and similar bifrontal intraparenchymal hematomas, the degree of mass effect, unchanged compared to CT of 7/25/2024.  3.  Stable and similar moderate volume subarachnoid hemorrhage in the left parietal sulci and bilateral anterior inferior frontal sulci.    HEAD MRV:   1.  No dural venous sinus thrombosis or significant stenosis.  2.  Limited visualization of cortical superficial veins in the right frontal convexity. This could be secondary to cortical vein thrombosis or limited venous filling/venous compression resulting from the regional hematoma and vasogenic edema.    CT Head w/o Contrast    Narrative    EXAM: CT HEAD WITHOUT CONTRAST  LOCATION: [facility]  DATE: 07/28/2024    INDICATION: Follow-up multicompartment intracranial hemorrhage.  COMPARISON: CT of the head dated 07/26/2024. MRI head 07/26/2024.  TECHNIQUE: Routine CT Head without IV contrast. Multiplanar reformats. Dose reduction techniques were used.    FINDINGS:  INTRACRANIAL CONTENTS: No significant interval change in the bilateral frontal lobe parenchymal hematomas. The largest lesion in the right frontal lobe measures approximately 41 mm x 27 mm in the axial plane (series 2 image 15). Thin subdural hematomas   overlying the bilateral frontal poles measuring up to approximately 4-5 mm in thickness (series 2 image 13), unchanged. Small to moderate volume scattered acute subarachnoid hemorrhage, not significantly changed from most recent exams. Vasogenic edema   surrounding the right frontal lobe parenchymal hematoma with some  hypoattenuation of the cortex adjacent to the hematoma, as before. Mild vasogenic edema surrounding the left frontal parenchymal hematoma. This is unchanged. No significant midline   shift/herniation. No definite new intracranial hemorrhage identified. There is a background of mild generalized brain parenchymal volume loss. Couple of small chronic infarcts in the right cerebellar hemisphere, unchanged.    VISUALIZED ORBITS/SINUSES/MASTOIDS: No intraorbital abnormality. No paranasal sinus mucosal disease. No middle ear or mastoid effusion.    BONES/SOFT TISSUES: No acute abnormality.      Impression    IMPRESSION:  1.  No significant interval change in multicompartment intracranial hemorrhage, as described.  2.  Dominant right greater than left anterior frontal lobe parenchymal hematomas, thin bifrontal subdural hematomas, and scattered subarachnoid hemorrhage, as described.  3.  Similar degree of local mass effect. No significant midline shift/herniation. No new intracranial hemorrhage is identified.       Echocardiogram Complete     Value    LVEF  65-70%    Narrative    [id number]  [id number]  [id number]  [id number]^KIAN OWENS^JOSE     [facility]  Echocardiography Laboratory  [address] [place] [address]     Name: KEYONNA PHELPS  MRN: [id number]  : 1955  Study Date: 2024 11:39 AM  Age: 69 yrs  Gender: Female  Patient Location: [facility]  Reason For Study: CVA  Ordering Physician: JOSE ARAUZ  Referring Physician: Rafaela Woods  Performed By: [place]     BSA: 1.5 m2  Height: 62 in  Weight: 112 lb  HR: 89  BP: 132/78 mmHg  ______________________________________________________________________________  Procedure  Complete Portable Echo Adult. Technically difficult study. Poor acoustic  windows.  ______________________________________________________________________________  Interpretation Summary     Technically difficult study. Poor acoustic windows. Patient  has a history of  mechanical AVR and aortic root replacement unclear details many years ago.     The visual ejection fraction is 65-70%.  The right ventricle is normal in size and function.  There is a mechanical aortic valve. Peak velocity is 3.8 m/sec with MG of 30  mm Hg higher than prior. AT is normal. Etiology of increased gradients  unclear. Valve is not well seen.  There is no pericardial effusion.  ______________________________________________________________________________  Left Ventricle  The left ventricle is normal in size. The visual ejection fraction is 65-70%.  Hyperdynamic left ventricular function.     Right Ventricle  The right ventricle is normal in size and function.     Mitral Valve  The mitral valve is normal in structure and function. There is trace mitral  regurgitation. There is no mitral valve stenosis.     Tricuspid Valve  The tricuspid valve is not well visualized, but is grossly normal. The right  ventricular systolic pressure is approximated at 26.6 mmHg plus the right  atrial pressure. There is trace to mild tricuspid regurgitation.     Aortic Valve  The aortic valve is not well visualized. There is mild (1+) aortic  regurgitation. The mean AoV pressure gradient is 28.9 mmHg. The peak AoV  pressure gradient is 56.0 mmHg. The calculated aortic valve are is 0.94 cm^2.  There is a mechanical aortic valve.     Pulmonic Valve  The pulmonic valve is not well visualized.     Vessels  The aortic root is normal size. The aortic root is not well visualized. Normal  size ascending aorta. The inferior vena cava was normal in size with preserved  respiratory variability.     Pericardium  There is no pericardial effusion.     ______________________________________________________________________________  MMode/2D Measurements & Calculations  IVSd: 1.0 cm  LVIDd: 4.2 cm  LVIDs: 2.3 cm  LVPWd: 0.99 cm  FS: 44.4 %  LV mass(C)d: 137.1 grams  LV mass(C)dI: 91.7 grams/m2  LA dimension: 2.1 cm  asc Aorta  Diam: 2.8 cm  LVOT diam: 1.8 cm  LVOT area: 2.6 cm2     Asc Ao diam index BSA (cm/m2): 1.9  Asc Ao diam index Ht(cm/m): 1.8  LA Volume (BP): 34.7 ml  LA Volume Index (BP): 23.3 ml/m2  RWT: 0.47  TAPSE: 2.2 cm     Doppler Measurements & Calculations  Ao V2 max: 373.3 cm/sec  Ao max P.0 mmHg  Ao V2 mean: 249.3 cm/sec  Ao mean P.9 mmHg  Ao V2 VTI: 66.7 cm  JACIEL(I,D): 0.94 cm2  JACIEL(V,D): 0.86 cm2  Ao acc time: 0.09 sec     LV V1 max P.0 mmHg  LV V1 max: 122.1 cm/sec  LV V1 VTI: 24.0 cm  SV(LVOT): 63.0 ml  SI(LVOT): 42.1 ml/m2  PA acc time: 0.13 sec  TR max crow: 257.9 cm/sec  TR max P.6 mmHg  AV Crow Ratio (DI): 0.33  JACIEL Index (cm2/m2): 0.63     ______________________________________________________________________________  Report approved by: Adrienne Lang 2024 04:07 PM           *Note: Due to a large number of results and/or encounters for the requested time period, some results have not been displayed. A complete set of results can be found in Results Review.       Discharge Medications   Current Discharge Medication List        CONTINUE these medications which have NOT CHANGED    Details   amitriptyline (ELAVIL) 10 MG tablet Take 1 tablet (10 mg) by mouth at bedtime  Qty: 90 tablet, Refills: 3    Associated Diagnoses: Anxiety state      butalbital-acetaminophen-caffeine (ESGIC) -40 MG tablet Take 2 tablets by mouth every 8 hours as needed for headaches [BUTALBITAL-ACETAMINOPHEN-CAFFEINE (FIORICET, ESGIC) -40 MG PER TABLET] TAKE 1 TO 2 TABLETS BY MOUTH EVERY 8 HOURS AS NEEDED FOR PAIN. MAX OF 4000 MG ACETAMINOPHEN PER 24 HOURS Strength: -40 mg  Qty: 60 tablet, Refills: 3    Associated Diagnoses: Controlled substance agreement signed      clonazePAM (KLONOPIN) 0.5 MG tablet TAKE ONE TABLET BY MOUTH ONCE NIGHTLY AS NEEDED FOR ANXIETY OR SLEEP  Qty: 90 tablet, Refills: 0    Associated Diagnoses: Anxiety state      !! levothyroxine (SYNTHROID/LEVOTHROID) 75 MCG tablet TAKE 1  TABLET ON WEDNESDAY, SATURDAY  Qty: 52 tablet, Refills: 3    Associated Diagnoses: Thyroid nodule      !! levothyroxine (SYNTHROID/LEVOTHROID) 88 MCG tablet Take 1 tablet of 88mcg by mouth Monday, Tuesday, Thursday, Friday and Sunday. Will take 75mcg the other two days.  Qty: 90 tablet, Refills: 0    Associated Diagnoses: Thyroid nodule      metoprolol succinate ER (TOPROL XL) 200 MG 24 hr tablet Take 1 tablet (200 mg) by mouth daily  Qty: 90 tablet, Refills: 3    Associated Diagnoses: Essential hypertension, malignant      multivitamin (ONE A DAY) per tablet [MULTIVITAMIN (ONE A DAY) PER TABLET] Take 1 tablet by mouth daily.       rimegepant (NURTEC) 75 MG ODT tablet Place 1 tablet (75 mg) under the tongue daily as needed for migraine Maximum of 1 tablet every 24 hours.  Qty: 8 tablet, Refills: 0    Associated Diagnoses: Migraine without aura and without status migrainosus, not intractable      simvastatin (ZOCOR) 20 MG tablet Take 1 tablet (20 mg) by mouth at bedtime TAKE 1 TABLET AT BEDTIME (DUE FOR AN OFFICE VISIT)  Qty: 90 tablet, Refills: 1    Associated Diagnoses: Hyperlipidemia LDL goal <130      SUMAtriptan (IMITREX) 5 MG/ACT nasal spray Spray 1 spray in nostril as needed for migraine May repeat dose x1 after at least 2 hours if migraine persists  Qty: 1 each, Refills: 3    Associated Diagnoses: Migraine without aura and without status migrainosus, not intractable      triamterene-HCTZ (MAXZIDE) 75-50 MG tablet Take 0.5 tablets by mouth daily  Qty: 45 tablet, Refills: 3    Associated Diagnoses: Essential hypertension       !! - Potential duplicate medications found. Please discuss with provider.        STOP taking these medications       warfarin ANTICOAGULANT (COUMADIN) 1 MG tablet Comments:   Reason for Stopping:         warfarin ANTICOAGULANT (COUMADIN) 5 MG tablet Comments:   Reason for Stopping:             Allergies   Allergies   Allergen Reactions    Codeine Other (See Comments)     Faints    Demerol  [Meperidine] Other (See Comments)     Reaction not reported by patient., Patient states she felt awful.

## 2024-07-29 NOTE — PROGRESS NOTES
Care Management Discharge Note    Discharge Date: 07/29/2024       Discharge Disposition: Home, Home Care    Discharge Services:      Discharge DME:      Discharge Transportation: family or friend will provide    Private pay costs discussed: Not applicable    Does the patient's insurance plan have a 3 day qualifying hospital stay waiver?  Yes     Which insurance plan 3 day waiver is available? Alternative insurance waiver    Will the waiver be used for post-acute placement? No    PAS Confirmation Code:    Patient/family educated on Medicare website which has current facility and service quality ratings:      Education Provided on the Discharge Plan:    Persons Notified of Discharge Plans:   Patient/Family in Agreement with the Plan: yes    Handoff Referral Completed: Yes    Additional Information:  [facility] has accepted for home care services.    Brenda Mesa RN, BSN, PHN  Inpatient Care Coordination  [facility]  Phone: [phone]

## 2024-07-29 NOTE — PLAN OF CARE
Occupational Therapy Discharge Summary    Reason for therapy discharge:    Discharged to home with home therapy.    Progress towards therapy goal(s). See goals on Care Plan in [facility] electronic health record for goal details.  Goals partially met.  Barriers to achieving goals:   discharge from facility.    Therapy recommendation(s):      Continued therapy is recommended.  Rationale/Recommendations:   .OT Rationale for DC Rec: Prior to admit pt very independent per pt's sisters, pt is retired and I with all ADL/IADl's and functional mobility. pt walks and bikes daily. Pt currently limited due to lethargy, impaired command following requiring varied A of 2 for sitting at EOB. Anticipate with cont'd medical mgmt and therapy, pt may progress to be able to return home and may require home  or OP OT for further cognitive assessment, etc and 24hr S and A with all IADL's. however, if does not progress, may need rehab. Cont to assess safety discharge plan.    Writing therapist did not treat pt, note written based on previous treating therapist notes and recommendations. Please see chart and flow sheet for additional details.

## 2024-07-29 NOTE — DISCHARGE INSTRUCTIONS
Tentative plan is to resume warfarin on Aug 8. Please make sure you have follow up with Cardiology and/or Neurosurgery prior to that time    HOMECARE NOTE:   Your doctor has ordered home care to help you after your hospital stay.  The staff will contact you to schedule your first visit.  This service will be provided by [facility].  If you have any question, or have not received a call within 48 hours of discharge, please call them at [phone], choose option #1 for Home Health, then choose option #1 for scheduling.    *please see homecare quality ratings for all homecares in your area at www.medicare.gov

## 2024-07-29 NOTE — PROVIDER NOTIFICATION
Paged neurology fellow about patient discharge. Neurology fellow returned page, patient okay to discontinue from their perspective. Hospitalist notified, patient discharged.

## 2024-07-29 NOTE — PROGRESS NOTES
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  Previously, it has been as high as 22 to 23 mm.  Part of the increase can be related to hyperdynamic LV systolic function and hypertension in setting of the intracranial bleed.    Discussion  For now, will need to continue to hold warfarin given the recent intracranial bleed.  According to neurology and neurosurgery, they recommended restarting warfarin on August 8.  Patient will need a follow-up echocardiogram in 4 to 8 weeks to reassess the gradients across the mechanical aortic valve.  If there is still high, may need further evaluation with fluoroscopy or DONAVAN or CT of the mechanical aortic valve to assess for valve function.  Patient follows with Dr. Richardson at [facility] and will make an appointment with their team to be seen after discharge.    Plan discussed with the patient and the family members were at bedside.  We will sign off.  Recall if questions.  35mins spent in chart review, review of echo images, prior echo reports and cardiology notes, pt visit and documentation.                    Interval History:     doing well; no cp, sob, n/v/d, or abd pain.          Review of Systems:     The 5 point Review of Systems is negative other than noted in the HPI          Physical Exam:        Blood pressure (!) 157/115, pulse 69, temperature 98  F (36.7  C), temperature source Oral, resp. rate 16, weight 52.6 kg (115 lb 15.4 oz), SpO2 97%, not currently breastfeeding.  Vitals:     07/25/24 2200 07/26/24 0400 07/26/24 2200   Weight: 51.2 kg (112 lb 14 oz) 51.2 kg (112 lb 14 oz) 52.6 kg (115 lb 15.4 oz)     Weights since admission  Vitals:    07/25/24 2200 07/26/24 0400 07/26/24 2200   Weight: 51.2 kg (112 lb 14 oz) 51.2 kg (112 lb 14 oz) 52.6 kg (115 lb 15.4 oz)     Vital Signs with Ranges  Temp:  [97.8  F (36.6  C)-99.5  F (37.5  C)] 98  F (36.7  C)  Pulse:  [65-75] 69  Resp:  [16-17] 16  BP: (126-160)/() 157/115  SpO2:  [96 %-98 %] 97 %  I/O's Last 24 hours  I/O last 3 completed shifts:  In: 630 [P.O.:630]  Out: -   Net I/O since admission  07/24 0700 - 07/29 0659  In: 2453.17 [P.O.:1530; I.V.:923.17]  Out: 965 [Urine:965]  Net: 1488.17    EXAM:    Constitutional:   in no apparent distress     Lungs:   normal     Cardiovascular:   murmurs include systolic murmur II/VI located at right upper sternal border with radiation to the carotids     Extremities and Back:   No edema     Neurological:   No gross or focal neurologic abnormalities            Medications:        Current Facility-Administered Medications   Medication Dose Route Frequency Provider Last Rate Last Admin    heparin ANTICOAGULANT injection 5,000 Units  5,000 Units Subcutaneous Q8H Avril Abreu PA-C   5,000 Units at 07/29/24 1007    levothyroxine (SYNTHROID/LEVOTHROID) tablet 75 mcg  75 mcg Oral Once per day on Wednesday Saturday Luciano Hill MD   75 mcg at 07/27/24 1432    levothyroxine (SYNTHROID/LEVOTHROID) tablet 88 mcg  88 mcg Oral Once per day on Sunday Monday Tuesday Thursday Friday Luciano Hill MD   88 mcg at 07/29/24 0636    metoprolol succinate ER (TOPROL XL) 24 hr tablet 100 mg  100 mg Oral Daily Luciano Hill MD   100 mg at 07/29/24 1007    sodium chloride (PF) 0.9% PF flush 10-40 mL  10-40 mL Intracatheter Q8H Luciano Hill MD   10 mL at 07/29/24 1009    sodium chloride (PF) 0.9% PF flush 10-40 mL  10-40 mL Intracatheter Q7 Days Luciano Hill MD   10 mL at 07/25/24 8178  "   sodium chloride (PF) 0.9% PF flush 3 mL  3 mL Intracatheter Q8H Luciano Hill MD   3 mL at 07/29/24 1008            Data:      All new lab and imaging data was reviewed.   Recent Labs   Lab Test 07/28/24  0556 07/27/24  0954 07/26/24  1456 07/26/24  0928 07/26/24  0557 07/25/24  1620 07/25/24  1329   WBC 6.8 7.8  --   --   --   --  11.8*   HGB 10.5* 10.4*  --   --   --   --  13.2   MCV 81 80  --   --   --   --  77*    178  --   --   --   --  237   INR  --   --  1.14 1.12 1.14   < > 2.27*    < > = values in this interval not displayed.      Recent Labs   Lab Test 07/29/24  0608 07/28/24  0556 07/28/24  0209 07/28/24  0025 07/27/24  1801    138  --   --  137   POTASSIUM 3.2* 3.6 3.6  --  3.3*   CHLORIDE 101 104  --   --  102   CO2 29 23  --   --  26   BUN 12.5 16.1  --   --  15.2   CR 0.54 0.62  --   --  0.57   ANIONGAP 7 11  --   --  9   AMI 8.2* 8.6*  --   --  8.7*   * 90  --  93 122*     No lab results found.    Invalid input(s): \"TROP\", \"TROPONINIES\"       Imaging:   No results found for this or any previous visit (from the past 24 hour(s)).   "

## 2024-07-29 NOTE — PLAN OF CARE
Physical Therapy Discharge Summary    Reason for therapy discharge:    Discharged to home with home therapy.    Progress towards therapy goal(s). See goals on Care Plan in [facility] electronic health record for goal details.  Goals partially met.  Barriers to achieving goals:   discharge from facility.    Therapy recommendation(s):    Continued therapy is recommended.  Rationale/Recommendations:   . PT Discharge Planning:    PT Plan: Transfers, gait as able, monitor sodium  PT Discharge Recommendation (DC Rec): home with assist  PT Rationale for DC Rec: Currently the pt is A x 2 secondary to lethargy and difficulty following commands. At baseline the pt is independent and quite active physically. She is retired and lives with her  in a house. Anticipate with medical stability the pt will be SBA or less with allfunctional mobility.  PT Brief overview of current status: A x 2 for EOB dangle    Recommendation above provided by last treating therapist.

## 2024-07-29 NOTE — CONSULTS
Care Management Initial Consult    General Information  Assessment completed with: Patient, Spouse or significant other,    Type of CM/SW Visit: Initial Assessment    Primary Care Provider verified and updated as needed: Yes   Readmission within the last 30 days: no previous admission in last 30 days      Reason for Consult: discharge planning  Advance Care Planning: Advance Care Planning Reviewed: no concerns identified          Communication Assessment  Patient's communication style: spoken language (English or Bilingual)    Hearing Difficulty or Deaf: no   Wear Glasses or Blind: yes    Cognitive  Cognitive/Neuro/Behavioral: WDL  Level of Consciousness: alert  Arousal Level: opens eyes spontaneously  Orientation: oriented x 4  Mood/Behavior: calm, cooperative  Best Language: 0 - No aphasia  Speech: clear, spontaneous, logical    Living Environment:   People in home: spouse     Current living Arrangements:        Able to return to prior arrangements: yes       Family/Social Support:  Care provided by: self  Provides care for: no one  Marital Status:     Cecilio       Description of Support System: Supportive         Current Resources:   Patient receiving home care services: No     Community Resources:    Equipment currently used at home: grab bar, tub/shower, grab bar, toilet, tub bench  Supplies currently used at home:      Employment/Financial:  Employment Status: retired        Financial Concerns: none           Does the patient's insurance plan have a 3 day qualifying hospital stay waiver?  Yes     Which insurance plan 3 day waiver is available? Alternative insurance waiver    Will the waiver be used for post-acute placement? No    Lifestyle & Psychosocial Needs:  Social Determinants of Health     Food Insecurity: Low Risk  (3/19/2024)    Food Insecurity     Within the past 12 months, did you worry that your food would run out before you got money to buy more?: No     Within the past 12 months, did  the food you bought just not last and you didn t have money to get more?: No   Depression: Not at risk (3/19/2024)    PHQ-2     PHQ-2 Score: 1   Housing Stability: Low Risk  (3/19/2024)    Housing Stability     Do you have housing? : Yes     Are you worried about losing your housing?: No   Tobacco Use: Low Risk  (6/5/2024)    Patient History     Smoking Tobacco Use: Never     Smokeless Tobacco Use: Never     Passive Exposure: Past   Financial Resource Strain: Low Risk  (3/19/2024)    Financial Resource Strain     Within the past 12 months, have you or your family members you live with been unable to get utilities (heat, electricity) when it was really needed?: No   Alcohol Use: Not on file   Transportation Needs: Low Risk  (3/19/2024)    Transportation Needs     Within the past 12 months, has lack of transportation kept you from medical appointments, getting your medicines, non-medical meetings or appointments, work, or from getting things that you need?: No   Physical Activity: Sufficiently Active (3/19/2024)    Exercise Vital Sign     Days of Exercise per Week: 7 days     Minutes of Exercise per Session: 90 min   Interpersonal Safety: Low Risk  (3/19/2024)    Interpersonal Safety     Do you feel physically and emotionally safe where you currently live?: Yes     Within the past 12 months, have you been hit, slapped, kicked or otherwise physically hurt by someone?: No     Within the past 12 months, have you been humiliated or emotionally abused in other ways by your partner or ex-partner?: No   Stress: No Stress Concern Present (3/19/2024)    [employer] [place] [employer] - Occupational Stress Questionnaire     Feeling of Stress : Only a little   Social Connections: Unknown (3/19/2024)    Social Connection and Isolation Panel [NHANES]     Frequency of Communication with Friends and Family: Not on file     Frequency of Social Gatherings with Friends and Family: Once a week     Attends Gnosticism Services:  Not on file     Active Member of Clubs or Organizations: Not on file     Attends Club or Organization Meetings: Not on file     Marital Status: Not on file   Health Literacy: Not on file       Functional Status:  Prior to admission patient needed assistance:   Dependent ADLs:: Independent  Dependent IADLs:: Independent       Mental Health Status:  Mental Health Status: No Current Concerns       Chemical Dependency Status:  Chemical Dependency Status: No Current Concerns             Values/Beliefs:  Spiritual, Cultural Beliefs, Yazdanism Practices, Values that affect care: no               Additional Information:  CM consult for discharge planning.   Per chart review, patient presented to ED with mental status changes and admitted for nontraumatic intraparenchymal hemorrhage with subarachnoid hemorrhage.  Met with patient and her .  She will be discharged today.  Patient and spouse both retired.  Spouse states he is always home with her.  Reviewed recommendation for HHC and explained difference between HHC and OP therapies.  They are in agreement with HHC.  No other discharge needs identified at this time.    Referral sent to [facility] for RN, PT and OT.    Brenda Mesa RN, BSN, PHN  Inpatient Care Coordination  [facility]  Phone: [phone]

## 2024-07-29 NOTE — PLAN OF CARE
Reason for Admission: SAH, IPH    Cognitive/Mentation: A/Ox 4  Neuros/CMS: Intact ex generalised weakness  VS: Stable on RA, SBP goal <160. .   Tele: NSR.  /GI: Continent. Last BM 7/27.   Pulmonary: LS clear.  Pain: denies.     Drains/Lines: R PICC-SL, R PIV-SL  Skin:  intact ex scattered scabbing  Activity: Assist x 1 with GB.  Diet:  Soft/bite sized with thin liquids. Takes pills whole.     Therapies recs: home w/ assist  Discharge: pending    Aggression Stoplight Tool: green    End of shift summary: pleasant and calm this shift, was nauseated at around 2236 and IV Zofran given with relief. Slept comfortably most of the shift.

## 2024-07-30 ENCOUNTER — PATIENT OUTREACH (OUTPATIENT)
Dept: CARE COORDINATION | Facility: CLINIC | Age: 69
End: 2024-07-30
Payer: COMMERCIAL

## 2024-07-30 LAB — BACTERIA BLD CULT: NO GROWTH

## 2024-07-30 NOTE — PROGRESS NOTES
Clinic Care Coordination Contact  Transitions of Care Outreach  Chief Complaint   Patient presents with    Clinic Care Coordination - Post Hospital       Most Recent Admission Date: 7/25/2024   Most Recent Admission Diagnosis: Subarachnoid hemorrhage (H) - I60.9  Intraparenchymal hemorrhage of brain (H) - I61.9     Most Recent Discharge Date: 7/29/2024   Most Recent Discharge Diagnosis: Subarachnoid hemorrhage (H) - I60.9  Intraparenchymal hemorrhage of brain (H) - I61.9  S/P AVR (aortic valve replacement) - Z95.2     Transitions of Care Assessment    Discharge Assessment  How are you doing now that you are home?: spoke with Jennifer.  She stated that she still has a 'low grade migraine'.  She is taking Tylenol and is managing it.  Instructed if she has any worsening of symptoms to call nurse triage or return to the ED.  She stated an understanding.  Referred to page 3 of her AVS with her.  Discussed her cardiology and stroke neurology referrals.  She is aware they will be calling her to schedule however has the numbers to call if she does not hear from them in 2 days.  She has heard from home care.  She denies any CCC needs.  Both she and her  are retired and has him available for any needs that may arise.  How are your symptoms? (Red Flag symptoms escalate to triage hotline per guidelines): Improved  Do you know how to contact your clinic care team if you have future questions or changes to your health status? : Yes  Does the patient have their discharge instructions? : Yes  Does the patient have questions regarding their discharge instructions? : No  Were you started on any new medications or were there changes to any of your previous medications? : Yes  Does the patient have all of their medications?: Yes  Do you have questions regarding any of your medications? : No  Do you have all of your needed medical supplies or equipment (DME)?  (i.e. oxygen tank, CPAP, cane, etc.): Yes    Post-op ([facility]W CTA Only)  If  the patient had a surgery or procedure, do they have any questions for a nurse?: No    Post-op (Clinicians Only)  Did the patient have surgery or a procedure: Yes (PICC line placement, has been removed upon discharge)  Incision: closed;healing  Drainage: No  Bleeding: none  Fever: No  Chills: No  Closure: none  Eating & Drinking: eating and drinking without complaints/concerns  PO Intake: regular diet        Follow up Plan     Discharge Follow-Up  Discharge follow up appointment scheduled in alignment with recommended follow up timeframe or Transitions of Risk Category? (Low = within 30 days; Moderate= within 14 days; High= within 7 days): No (not recommended for any followup within this time frame per the AVS)    Future Appointments   Date Time Provider Department Center   8/27/2024 11:20 AM MICCT1 [facility] [place] [place]   8/28/2024 10:00 AM Elsa Martin NP [facility]       Outpatient Plan as outlined on AVS reviewed with patient.    For any urgent concerns, please contact our 24 hour nurse triage line: [phone] ([phone])       Susan Kay RN

## 2024-07-30 NOTE — PLAN OF CARE
"  Problem: Adult Inpatient Plan of Care  Goal: Plan of Care Review  Description: The Plan of Care Review/Shift note should be completed every shift.  The Outcome Evaluation is a brief statement about your assessment that the patient is improving, declining, or no change.  This information will be displayed automatically on your shift  note.  Outcome: Met  Goal: Patient-Specific Goal (Individualized)  Description: You can add care plan individualizations to a care plan. Examples of Individualization might be:  \"Parent requests to be called daily at 9am for status\", \"I have a hard time hearing out of my right ear\", or \"Do not touch me to wake me up as it startles  me\".  Outcome: Met  Goal: Absence of Hospital-Acquired Illness or Injury  Outcome: Met  Intervention: Identify and Manage Fall Risk  Recent Flowsheet Documentation  Taken 7/29/2024 1545 by Tony Goddard RN  Safety Promotion/Fall Prevention: safety round/check completed  Taken 7/29/2024 0920 by Tony Goddard RN  Safety Promotion/Fall Prevention: safety round/check completed  Intervention: Prevent Skin Injury  Recent Flowsheet Documentation  Taken 7/29/2024 1545 by Tony Goddard RN  Body Position: position changed independently  Skin Protection:   adhesive use limited   incontinence pads utilized  Device Skin Pressure Protection:   absorbent pad utilized/changed   adhesive use limited  Taken 7/29/2024 0920 by Tony Goddard RN  Body Position: position changed independently  Skin Protection:   adhesive use limited   incontinence pads utilized  Device Skin Pressure Protection:   absorbent pad utilized/changed   adhesive use limited  Intervention: Prevent and Manage VTE (Venous Thromboembolism) Risk  Recent Flowsheet Documentation  Taken 7/29/2024 1545 by Tony Goddard RN  VTE Prevention/Management: SCDs off (sequential compression devices)  Taken 7/29/2024 0920 by Tony Goddard RN  VTE Prevention/Management: SCDs off (sequential " compression devices)  Intervention: Prevent Infection  Recent Flowsheet Documentation  Taken 7/29/2024 1545 by Tony Goddard RN  Infection Prevention:   hand hygiene promoted   equipment surfaces disinfected   rest/sleep promoted   single patient room provided  Taken 7/29/2024 0920 by Tony Goddard RN  Infection Prevention:   hand hygiene promoted   equipment surfaces disinfected   rest/sleep promoted   single patient room provided  Goal: Optimal Comfort and Wellbeing  Outcome: Met  Intervention: Provide Person-Centered Care  Recent Flowsheet Documentation  Taken 7/29/2024 1545 by Tony Goddard RN  Trust Relationship/Rapport:   care explained   choices provided   questions answered   reassurance provided   thoughts/feelings acknowledged  Taken 7/29/2024 0920 by Tony Goddard RN  Trust Relationship/Rapport:   care explained   choices provided   questions answered   reassurance provided   thoughts/feelings acknowledged  Goal: Readiness for Transition of Care  Outcome: Met     Problem: Stroke, Intracerebral Hemorrhage  Goal: Optimal Coping  Outcome: Met  Intervention: Support Psychosocial Response to Stroke  Recent Flowsheet Documentation  Taken 7/29/2024 1545 by Tony Goddard RN  Supportive Measures:   active listening utilized   positive reinforcement provided   verbalization of feelings encouraged  Family/Support System Care: presence promoted  Taken 7/29/2024 0920 by Tony Goddard RN  Supportive Measures:   active listening utilized   positive reinforcement provided   verbalization of feelings encouraged  Family/Support System Care: presence promoted  Goal: Effective Bowel Elimination  Outcome: Met  Goal: Optimal Cerebral Tissue Perfusion  Outcome: Met  Intervention: Protect and Optimize Cerebral Perfusion  Recent Flowsheet Documentation  Taken 7/29/2024 1545 by Tony Goddard RN  Sensory Stimulation Regulation:   care clustered   quiet environment promoted   auditory stimulation  provided   lighting decreased  Cerebral Perfusion Promotion: blood pressure monitored  Fluid/Electrolyte Management: fluids provided  Taken 7/29/2024 0920 by Tony Goddard RN  Sensory Stimulation Regulation:   care clustered   quiet environment promoted   auditory stimulation provided   lighting decreased  Cerebral Perfusion Promotion: blood pressure monitored  Fluid/Electrolyte Management: fluids provided  Goal: Optimal Cognitive Function  Outcome: Met  Intervention: Optimize Cognitive Function  Recent Flowsheet Documentation  Taken 7/29/2024 1545 by Tony Goddard RN  Sensory Stimulation Regulation:   care clustered   quiet environment promoted   auditory stimulation provided   lighting decreased  Reorientation Measures:   clock in view   calendar in view  Environment Familiarity/Consistency: daily routine followed  Taken 7/29/2024 0920 by Tony Goddard RN  Sensory Stimulation Regulation:   care clustered   quiet environment promoted   auditory stimulation provided   lighting decreased  Reorientation Measures:   clock in view   calendar in view  Environment Familiarity/Consistency: daily routine followed  Goal: Effective Communication Skills  Outcome: Met  Intervention: Optimize Communication Skills  Recent Flowsheet Documentation  Taken 7/29/2024 1545 by Tony Goddard RN  Communication Enhancement Strategies:   call light answered in person   one-step directions provided   extra time allowed for response   repetition utilized  Taken 7/29/2024 0920 by Tony Goddard RN  Communication Enhancement Strategies:   call light answered in person   one-step directions provided   extra time allowed for response   repetition utilized  Goal: Optimal Functional Ability  Outcome: Met  Intervention: Optimize Functional Ability  Recent Flowsheet Documentation  Taken 7/29/2024 1545 by Tony Goddard RN  Activity Management:   ambulated to bathroom   ambulated in room   up in chair  Taken 7/29/2024 0920 by  Tony Goddard RN  Activity Management:   ambulated to bathroom   ambulated in room   up in chair  Goal: Optimal Nutrition Intake  Outcome: Met  Intervention: Promote and Optimize Fluid and Food Intake  Recent Flowsheet Documentation  Taken 7/29/2024 1545 by Tony Goddard RN  Oral Nutrition Promotion: physical activity promoted  Taken 7/29/2024 0920 by Tony Goddard RN  Oral Nutrition Promotion: physical activity promoted  Goal: Optimal Pain Control and Function  Outcome: Met  Goal: Effective Oxygenation and Ventilation  Outcome: Met  Intervention: Optimize Oxygenation and Ventilation  Recent Flowsheet Documentation  Taken 7/29/2024 1545 by Tony Goddard RN  Head of Bed (HOB) Positioning: HOB at 30 degrees  Taken 7/29/2024 0920 by Tony Goddard RN  Head of Bed (HOB) Positioning: HOB at 30 degrees  Goal: Improved Sensorimotor Function  Outcome: Met  Intervention: Optimize Range of Motion, Motor Control and Function  Recent Flowsheet Documentation  Taken 7/29/2024 1545 by Tony Goddard RN  Range of Motion: active ROM (range of motion) encouraged  Positioning/Transfer Devices:   pillows   in use  Taken 7/29/2024 0920 by Tony Goddard RN  Range of Motion: active ROM (range of motion) encouraged  Positioning/Transfer Devices:   pillows   in use  Intervention: Optimize Sensory and Perceptual Ability  Recent Flowsheet Documentation  Taken 7/29/2024 1545 by Tony Goddard RN  Pressure Reduction Techniques: frequent weight shift encouraged  Taken 7/29/2024 0920 by Tony Goddard RN  Pressure Reduction Techniques: frequent weight shift encouraged  Goal: Safe and Effective Swallow  Outcome: Met  Intervention: Optimize Eating and Swallowing  Recent Flowsheet Documentation  Taken 7/29/2024 1545 by Tony Goddard RN  Aspiration Precautions:   awake/alert before oral intake   upright posture maintained  Taken 7/29/2024 0920 by Tony Goddard RN  Aspiration Precautions:   awake/alert before  oral intake   upright posture maintained  Goal: Effective Urinary Elimination  Outcome: Met  Intervention: Promote Effective Bladder Elimination  Recent Flowsheet Documentation  Taken 7/29/2024 1545 by Tony Goddard RN  Urinary Elimination Promotion: toileting offered  Taken 7/29/2024 0920 by Tony Goddard RN  Urinary Elimination Promotion: toileting offered     Problem: Risk for Delirium  Goal: Optimal Coping  Outcome: Met  Intervention: Optimize Psychosocial Adjustment to Delirium  Recent Flowsheet Documentation  Taken 7/29/2024 1545 by Tony Goddard RN  Supportive Measures:   active listening utilized   positive reinforcement provided   verbalization of feelings encouraged  Family/Support System Care: presence promoted  Taken 7/29/2024 0920 by Tony Goddard RN  Supportive Measures:   active listening utilized   positive reinforcement provided   verbalization of feelings encouraged  Family/Support System Care: presence promoted  Goal: Improved Behavioral Control  Outcome: Met  Intervention: Prevent and Manage Agitation  Recent Flowsheet Documentation  Taken 7/29/2024 1545 by Tony Goddard RN  Environment Familiarity/Consistency: daily routine followed  Taken 7/29/2024 0920 by Tony Goddard RN  Environment Familiarity/Consistency: daily routine followed  Intervention: Minimize Safety Risk  Recent Flowsheet Documentation  Taken 7/29/2024 1545 by Tony Goddard RN  Communication Enhancement Strategies:   call light answered in person   one-step directions provided   extra time allowed for response   repetition utilized  Enhanced Safety Measures: review medications for side effects with activity  Trust Relationship/Rapport:   care explained   choices provided   questions answered   reassurance provided   thoughts/feelings acknowledged  Taken 7/29/2024 0920 by Tony Goddard RN  Communication Enhancement Strategies:   call light answered in person   one-step directions provided   extra  time allowed for response   repetition utilized  Enhanced Safety Measures: review medications for side effects with activity  Trust Relationship/Rapport:   care explained   choices provided   questions answered   reassurance provided   thoughts/feelings acknowledged  Goal: Improved Attention and Thought Clarity  Outcome: Met  Intervention: Maximize Cognitive Function  Recent Flowsheet Documentation  Taken 7/29/2024 1545 by Tony Goddard RN  Sensory Stimulation Regulation:   care clustered   quiet environment promoted   auditory stimulation provided   lighting decreased  Reorientation Measures:   clock in view   calendar in view  Taken 7/29/2024 0920 by Tony Goddard RN  Sensory Stimulation Regulation:   care clustered   quiet environment promoted   auditory stimulation provided   lighting decreased  Reorientation Measures:   clock in view   calendar in view  Goal: Improved Sleep  Outcome: Met     Pt. discharged at 1910 to home, and left with personal belongings. Pt. received complete discharge paperwork. Pt. was given times of last dose for all discharge medications in writing on discharge medication sheets. Discharge teaching included all medication, pain management, activity restrictions, and signs and symptoms of infection. Pt. to follow up with primary in 1 week. Pt. had no further questions at the time of discharge and no unmet needs were identified.

## 2024-07-31 ENCOUNTER — TELEPHONE (OUTPATIENT)
Dept: NEUROLOGY | Facility: CLINIC | Age: 69
End: 2024-07-31
Payer: COMMERCIAL

## 2024-07-31 NOTE — TELEPHONE ENCOUNTER
Stroke RN Care Coordination - Unable to Reach / Voicemail Note     Stroke RN Care Coordinator Outreach:  Clinic Care Coordination - Follow-up (Stroke Follow-Up Imaging and Appt)     Outreach attempted x 1.      Left message on patient's voicemail with call back information and requested return call.    Stroke RN Care Coordinator  will send message regarding follow up recommendations and scheduling numbers  via [employer]. Stroke RN Care Coordinator will try to reach patient again in 1-2 business days.    Lynette Nicholson BS, RN, SCRN  RN Stroke Neurology Care Coordinator  [facility] Neuroscience Service Line

## 2024-07-31 NOTE — TELEPHONE ENCOUNTER
----- Message from Avril Abreu sent at 7/30/2024  3:53 PM CDT -----  Regarding: RE: Imaging  Correct  ----- Message -----  From: Lynette Nicholson, RN  Sent: 7/30/2024   3:23 PM CDT  To: Avril Abreu PA-C  Subject: Imaging                                          So Pt is currently scheduled for CT on 8/27, prior to 8/28 neurosurgery follow up.    We want to ADD a CT on 8/6 and then 7T MRI end of September with [facility] appt after?       Lynette Nicholson BS, RN, SCRN  RN Stroke Neurology Care Coordinator  [facility] Neuroscience Service Line

## 2024-08-06 NOTE — TELEPHONE ENCOUNTER
Stroke RN Care Coordination - Unable to Reach / Voicemail Note     Stroke RN Care Coordinator Outreach:  Clinic Care Coordination - Follow-up (Stroke Follow-Up Imaging and Appt)     Outreach attempted x 2.      Left message on patient's voicemail with call back information and requested return call.    Stroke RN Care Coordinator will not make any more outreaches at this time, but will follow up with pt in about 3-4 weeks after anticipated completion of neurosurgery appts on 8/27 and 8/28. Pt will still need 7T MRI mid September, followed by stroke [facility] appt.    Pt should have CT done this week, to resume warfarin on 8/8, but RNCC has been unable to formally connect with pt to assist in coordination of that imaging. Pt is scheduled for a CT and neurosurgery appt on 8/27 and 8/28. Hopefully she will attend these appts and be cleared to resume AC. [facility] scheduling has attempted x2 to schedule 7T MRI and RNCC has now attempted x2 via phone and through [employer] messages.     Routing to provider for awareness.     Lynette Nicholson BS, RN, SCRN  RN Stroke Neurology Care Coordinator  [facility] Neuroscience Service Line

## 2024-08-07 ENCOUNTER — TELEPHONE (OUTPATIENT)
Dept: FAMILY MEDICINE | Facility: CLINIC | Age: 69
End: 2024-08-07
Payer: COMMERCIAL

## 2024-08-07 NOTE — TELEPHONE ENCOUNTER
Called and talked with Jennifer     - reported she was found to have a stroke on 7/25/24.  She does not recall anything, and only recalls that she was in the hospital.   - did report having a bad migraine headache the evening before, and had taken her Butalbital.     - she was inquiring when she can start warfarin.  Did not want to be without warfarin too long.     - COTY (this writer) advised to get clearance with her Neurologist.  Usually they will repeat CT scan of the head, to check left intraparenchymal hemorrhages  status, before getting cleared to resume warfarin.   - reported she is scheduled for CT scan of head on 8/27//24.     - Jennifer will keep me updated when she will be resumed on warfarin.

## 2024-08-07 NOTE — TELEPHONE ENCOUNTER
General Call      Reason for Call:  patient called and would like a callback from [facility] anti coag nurse.    Patient wondering if needs an appt.  Patient had a stroke and hasn't come into to get INR checked.    Would like to speak with her.  Thank you.    What are your questions or concerns:  yes    Date of last appointment with provider: na    Could we send this information to you in [employer] or would you prefer to receive a phone call?:   Patient would prefer a phone call   Okay to leave a detailed message?: Yes at Cell number on file [phone]

## 2024-08-08 NOTE — TELEPHONE ENCOUNTER
"Incoming call from spouse Jeferson (C2C on file), inquiring about when Jennifer can restart warfarin. Reiterated message below, that this is to be cleared by neurology and CT scan must be done first to check status of  left intraparenchymal hemorrhages. CT scan originally scheduled for soonest available on 8/27, patient would like to get in sooner. Writer placed call to imaging scheduling and CT scan was moved up to 8/9 (per hospital notes, \"Patient will follow up with Neurosurgery in 2 weeks with repeat head CT\"). Advised that patient should follow-up with neurosurgery re CT results and resumption of warfarin.   Patient/spouse in agreement.   "

## 2024-08-09 ENCOUNTER — TELEPHONE (OUTPATIENT)
Dept: CARE COORDINATION | Facility: CLINIC | Age: 69
End: 2024-08-09
Payer: COMMERCIAL

## 2024-08-09 ENCOUNTER — TELEPHONE (OUTPATIENT)
Dept: FAMILY MEDICINE | Facility: CLINIC | Age: 69
End: 2024-08-09
Payer: COMMERCIAL

## 2024-08-09 ENCOUNTER — HOSPITAL ENCOUNTER (OUTPATIENT)
Dept: CT IMAGING | Facility: HOSPITAL | Age: 69
Discharge: HOME | End: 2024-08-09
Attending: PHYSICIAN ASSISTANT | Admitting: PHYSICIAN ASSISTANT
Payer: COMMERCIAL

## 2024-08-09 DIAGNOSIS — Z79.01 LONG TERM CURRENT USE OF ANTICOAGULANT THERAPY: ICD-10-CM

## 2024-08-09 DIAGNOSIS — Z95.2 S/P AVR (AORTIC VALVE REPLACEMENT): Primary | ICD-10-CM

## 2024-08-09 PROCEDURE — 70450 CT HEAD/BRAIN W/O DYE: CPT

## 2024-08-09 NOTE — TELEPHONE ENCOUNTER
Stroke RN Care Coordination - Unable to Reach / Voicemail Note     Stroke RN Care Coordinator Outreach:  Results (Head CT) and Clinic Care Coordination - Follow-up (Resumption of AC Pending Results)     Imaging reviewed by Dr. Joshi and clearance given to resume AC at this time.     Outreach attempted x 1.      Left message on patient's voicemail advising that she can resume AC and to contact her AC team for coordination of that.    Stroke RN Care Coordinator  also sent approval to resume AC  via [employer]. Stroke RN Care Coordinator routing encounter to AC and PCP teams for their awareness.    Lynette Nicholson BS, RN, SCRN  RN Stroke Neurology Care Coordinator  [facility] Neuroscience Service Line

## 2024-08-09 NOTE — TELEPHONE ENCOUNTER
Stroke RN Care Coordination - Chart Review Note    SITUATION     Alyssa Higginbotham is a 69 year old female who is receiving support for:  Results (Head CT) and Clinic Care Coordination - Follow-up (Resumption of AC Pending Results)    BACKGROUND     Patient admitted to [facility] 7/25/-7/29 for IPH w/ SAH. Warfarin held x2 weeks pending repeat head CT for stability.    ASSESSMENT     Patient completed head CT this morning and is wanting to know if she can resume AC.    PLAN     Routing to stroke [facility] pool to please review with attending and advise on AC resumption.    Lynette Nicholson BS, RN, SCRN  RN Stroke Neurology Care Coordinator  [facility] Neuroscience Service Line

## 2024-08-09 NOTE — TELEPHONE ENCOUNTER
Called and talked with Jennifer,     - was off warfarin for 15 days from 7/25 - 8/8/24.   - DX: intracranial hge / subarachnoid hge.      - see message below from Neurologist - given clearance to resume warfarin.   - Jennifer also reported, she got cleared from Neurologist to resume warfarin.   - patient had reported, she did not want to be off warfarin too long.   - scheduled for CT of head on 8/9/24 and 8/27/24.     - advised to keep her regiment the same and we will adjust dose based on her daily / weekly habits, to ensure INR stability between 2.0 - 2.5   - we will need to monitor 2x/we until maintenance dose has been achieved.     - INR scheduled on 8/13/24 @ [facility]

## 2024-08-09 NOTE — TELEPHONE ENCOUNTER
General Call    Contacts       Contact Date/Time Type Contact Phone/Fax    08/09/2024 03:17 PM CDT Phone (Incoming) Jennifer Higginbotham (Self) [phone] ()          Reason for Call:  speak to ACN    What are your questions or concerns:  patient was told to stay off coumadin for 2 weeks, had a CT scan today and that cleared her to go back on Coumadin, but needs to check with anti-coag first-  Needs dosing information     Date of last appointment with provider:     Could we send this information to you in [employer] or would you prefer to receive a phone call?:   Patient would prefer a phone call   Okay to leave a detailed message?: Yes at Home number on file [phone] ([place])

## 2024-08-09 NOTE — TELEPHONE ENCOUNTER
General Call     Contacts         Contact Date/Time Type Contact Phone/Fax     08/09/2024 03:17 PM CDT Phone (Incoming) Jennifer Higginbotham (Self) [phone] ()             Reason for Call:  speak to ACN     What are your questions or concerns:  patient was told to stay off coumadin for 2 weeks, had a CT scan today and that cleared her to go back on Coumadin, but needs to check with anti-coag first-  Needs dosing information      Date of last appointment with provider:      Could we send this information to you in [employer] or would you prefer to receive a phone call?:   Patient would prefer a phone call   Okay to leave a detailed message?: Yes at Home number on file [phone] ([place])

## 2024-08-12 LAB
ATRIAL RATE - MUSE: 88 BPM
DIASTOLIC BLOOD PRESSURE - MUSE: NORMAL MMHG
INTERPRETATION ECG - MUSE: NORMAL
P AXIS - MUSE: 101 DEGREES
PR INTERVAL - MUSE: 162 MS
QRS DURATION - MUSE: 84 MS
QT - MUSE: 368 MS
QTC - MUSE: 445 MS
R AXIS - MUSE: 57 DEGREES
SYSTOLIC BLOOD PRESSURE - MUSE: NORMAL MMHG
T AXIS - MUSE: 36 DEGREES
VENTRICULAR RATE- MUSE: 88 BPM

## 2024-08-13 ENCOUNTER — ANTICOAGULATION THERAPY VISIT (OUTPATIENT)
Dept: ANTICOAGULATION | Facility: CLINIC | Age: 69
End: 2024-08-13

## 2024-08-13 ENCOUNTER — LAB (OUTPATIENT)
Dept: CARDIOLOGY | Facility: CLINIC | Age: 69
End: 2024-08-13
Payer: COMMERCIAL

## 2024-08-13 ENCOUNTER — DOCUMENTATION ONLY (OUTPATIENT)
Dept: ANTICOAGULATION | Facility: CLINIC | Age: 69
End: 2024-08-13

## 2024-08-13 DIAGNOSIS — Z79.01 LONG TERM CURRENT USE OF ANTICOAGULANT THERAPY: ICD-10-CM

## 2024-08-13 DIAGNOSIS — Z95.2 S/P AVR (AORTIC VALVE REPLACEMENT): Primary | ICD-10-CM

## 2024-08-13 DIAGNOSIS — Z95.2 S/P AVR (AORTIC VALVE REPLACEMENT): ICD-10-CM

## 2024-08-13 LAB — INR POINT OF CARE: 1.5 (ref 0.9–1.1)

## 2024-08-13 PROCEDURE — 85610 PROTHROMBIN TIME: CPT

## 2024-08-13 PROCEDURE — 36416 COLLJ CAPILLARY BLOOD SPEC: CPT

## 2024-08-13 NOTE — PROGRESS NOTES
ANTICOAGULATION MANAGEMENT     Alyssa Higginbotham 69 year old female is on warfarin with subtherapeutic INR result. (Goal INR 2.0-2.5)    Recent labs: (last 7 days)     08/13/24  0753   INR 1.5*       ASSESSMENT     Warfarin Lab Questionnaire    Warfarin Doses Last 7 Days      8/12/2024     3:47 PM   Dose in Tablet or Mg   TAB or MG? milligram (mg)     Pt Rptd Dose SUNDAY MONDAY TUESDAY WED THURS FRIDAY SATURDAY 8/12/2024   3:47 PM 5 5 5 5 5 5 5         8/12/2024   Warfarin Lab Questionnaire   Missed doses within past 14 days? Yes - was off warfarin for 15 days, from 7/25-8/8/24.   If yes; please list when: Monday, Tuesday, Wednesday, Thursday.     Changes in diet or alcohol within past 14 days? No   Medication changes since last result? No - 5th day after resuming warfarin on 8/9/24     Injuries or illness since last result? No - reported doing well.        - 8/9/24 was cleared by Neurologist to restart warfarin. 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     New shortness of breath, severe headaches or sudden changes in vision since last result? No   Abnormal bleeding since last result? Yes   If yes, please explain: Stroke   Upcoming surgery, procedure? No   Best number to call with results? [phone]        Previous result: Subtherapeutic d/t reversal of warfarin on 7/25/24.    INR on 7/25/24 was therapeutic at 2.27    Additional findings: None       PLAN     Recommended plan for no diet, medication or health factor changes affecting INR     Dosing Instructions: Continue your current warfarin dose with next INR in 3 days       Summary  As of 8/13/2024      Full warfarin instructions:  5 mg every day   Next INR check:  8/16/2024               Telephone call with Jennifer who verbalizes understanding and agrees to plan    Lab visit scheduled - INR  on 8/16/24 @ [facility]    Education provided: Taking warfarin: Importance of taking warfarin as instructed  Goal range and lab monitoring: goal range and significance of current result  Dietary considerations: importance of consistent vitamin K intake, impact of vitamin K foods on INR, vitamin K content of foods, Impact of protein intake on INR , and importance of notifying [facility] to changes in diet  Healthy lifestyle considerations: (or any changes with her regimen)  Importance of notifying anticoagulation clinic for: changes in medications; a sooner lab recheck maybe needed    Plan made with [facility] Pharmacist Brenda Pablo, RN  Anticoagulation Clinic  8/13/2024    _______________________________________________________________________     Anticoagulation Episode Summary       Current INR goal:  2.0-2.5   TTR:  44.2% (11.5 mo)   Target end date:  Indefinite   Send INR reminders to:  [facility]    Indications    S/P AVR (aortic valve replacement) [Z95.2]  Long term current use of anticoagulant therapy [Z79.01]             Comments:  6/2/23 INR target goal changed to 2.0 - 2.5 d/t daily bleeding issues.  (Sensitivity)  Goal range changed 2/20/19 per cardiology             Anticoagulation Care Providers       Provider Role Specialty Phone number    Rafaela Woods MD Referring Family Medicine [phone]

## 2024-08-13 NOTE — PROGRESS NOTES
Dr. Woods,     - Here is the message to Jennifer Higginbotham from Neurologist, giving her clearance to resume warfarin.   - she had CT of the head on 8/9/24 and another one to repeat on 8/27/24.      Lynette Nicholson, RN   to Jennifer Higginbotham   CD      8/9/24 12:38 PM  Radha Rodriguez,     We reviewed your CT form this morning and you are okay to restart your warfarin now. Please contact your anticoagulation team to coordinate that.      I will have our schedulers contact you to arrange your follow up MRI and stroke clinic visit for September.        Lynette Nicholson BS, RN, SCRN  RN Stroke Neurology Care Coordinator  [facility] Neuroscience Service Line    Last read by Jennifer Higginbotham at  3:08 PM on 8/12/2024.

## 2024-08-13 NOTE — PROGRESS NOTES
ANTICOAGULATION CLINIC REFERRAL RENEWAL REQUEST       An annual renewal order is required for all patients referred to [facility].?  Please review and sign the pended referral order for Alyssa NAYELI FrazierNeftaly.       ANTICOAGULATION SUMMARY      Warfarin indication(s)   - S/p AVR (mechanical tilting disc), and Hx of aortic dissection w/ repair of the dissection, 2000.     Mechanical heart valve present?  Mechanical AVR- [place] (Tilting disc), [place] ([place]) or Monoleaflet valve       Current goal range   INR: 2.0-2.5     Goal appropriate for indication? Goal of 2.0 - 2,5 verified with Dr. Woods , on 6/2/23 due to epistaxis events. /     Time in Therapeutic Range (TTR)  (Goal > 60%) 44.2 %       Office visit with referring provider's group within last year Yes on 3/19/24 with BERNADINE Durán, YESENIA  [facility]

## 2024-08-14 ENCOUNTER — TELEPHONE (OUTPATIENT)
Dept: VASCULAR SURGERY | Facility: CLINIC | Age: 69
End: 2024-08-14
Payer: COMMERCIAL

## 2024-08-14 NOTE — TELEPHONE ENCOUNTER
GILBERTOB to schedule CTA/1yr follow up with Dr. Nassar around November. [phone]  _________________________________  Loly Hood RN  [facility]  Needs 1 year follow-up with fellow clinic or ET and CTA chest/abd/pelvis

## 2024-08-16 ENCOUNTER — ANTICOAGULATION THERAPY VISIT (OUTPATIENT)
Dept: ANTICOAGULATION | Facility: CLINIC | Age: 69
End: 2024-08-16

## 2024-08-16 ENCOUNTER — LAB (OUTPATIENT)
Dept: CARDIOLOGY | Facility: CLINIC | Age: 69
End: 2024-08-16
Payer: COMMERCIAL

## 2024-08-16 DIAGNOSIS — Z95.2 S/P AVR (AORTIC VALVE REPLACEMENT): Primary | ICD-10-CM

## 2024-08-16 DIAGNOSIS — Z79.01 LONG TERM CURRENT USE OF ANTICOAGULANT THERAPY: ICD-10-CM

## 2024-08-16 DIAGNOSIS — Z95.2 S/P AVR (AORTIC VALVE REPLACEMENT): ICD-10-CM

## 2024-08-16 LAB — INR POINT OF CARE: 2.4 (ref 0.9–1.1)

## 2024-08-16 PROCEDURE — 85610 PROTHROMBIN TIME: CPT

## 2024-08-16 PROCEDURE — 36416 COLLJ CAPILLARY BLOOD SPEC: CPT

## 2024-08-16 NOTE — PROGRESS NOTES
ANTICOAGULATION MANAGEMENT     Alyssa Higginbotham 69 year old female is on warfarin with therapeutic INR result. (Goal INR 2.0-2.5)    Recent labs: (last 7 days)     08/16/24  0734   INR 2.4*       ASSESSMENT     Source(s): Chart Review and Patient/Caregiver Call     Warfarin doses taken: Warfarin taken as instructed  Diet: No new diet changes identified  Medication/supplement changes:  Yes   8th day after resuming warfarin on 8/9/24.   8/9/24 Neurologist cleared to resume warfarin.  New illness, injury, or hospitalization: Yes:   Hospitalized from 7/25-29/24 for Nontraumatic, bifrontal intraparenchymal hemorrhage (IPH) with scattered subarachnoid hemorrhage.  Signs or symptoms of bleeding or clotting: No  Previous result: Subtherapeutic at 1.5 on 8/13/24.   (Warfarin restarted on 8/9/25.  Was off warfarin for 15 days, from 7/25 - 8/8/24.)  Additional findings:  Scheduled for repeat follow-up CT scan of head on 8/27/24.       PLAN     Recommended plan for no diet, medication or health factor changes affecting INR     Dosing Instructions: partial hold then continue your current warfarin dose with next INR in 3 days       Summary  As of 8/16/2024      Full warfarin instructions:  8/16: 4 mg; Otherwise 5 mg every day   Next INR check:  8/20/2024               Telephone call with Jennifer who verbalizes understanding and agrees to plan   - ensure to stay consistent with Vitamin-K intake.   - will monitor INR closely d/t recently resumed warfarin on 8/9/24   - denied any episodes of active bleeding  (epistaxis)    Lab visit scheduled - INR on 8/19/24 @ [facility].    Education provided: Taking warfarin: Importance of taking warfarin as instructed  Goal range and lab monitoring: goal range and significance of current result  Dietary considerations: importance of consistent vitamin K intake  Symptom monitoring: monitoring for bleeding signs and symptoms    Plan made per [facility] anticoagulation protocol    Aylin Pablo,  RN  Anticoagulation Clinic  8/16/2024    _______________________________________________________________________     Anticoagulation Episode Summary       Current INR goal:  2.0-2.5   TTR:  44.5% (11.5 mo)   Target end date:  Indefinite   Send INR reminders to:  [facility]    Indications    S/P AVR (aortic valve replacement) [Z95.2]  Long term current use of anticoagulant therapy [Z79.01]             Comments:  6/2/23 INR target goal changed to 2.0 - 2.5 d/t daily bleeding issues.  (Sensitivity)  Goal range changed 2/20/19 per cardiology             Anticoagulation Care Providers       Provider Role Specialty Phone number    Rafaela Woods MD Referring Family Medicine [phone]

## 2024-08-19 ENCOUNTER — LAB (OUTPATIENT)
Dept: CARDIOLOGY | Facility: CLINIC | Age: 69
End: 2024-08-19
Payer: COMMERCIAL

## 2024-08-19 ENCOUNTER — ANTICOAGULATION THERAPY VISIT (OUTPATIENT)
Dept: ANTICOAGULATION | Facility: CLINIC | Age: 69
End: 2024-08-19

## 2024-08-19 DIAGNOSIS — Z95.2 S/P AVR (AORTIC VALVE REPLACEMENT): ICD-10-CM

## 2024-08-19 DIAGNOSIS — Z79.01 LONG TERM CURRENT USE OF ANTICOAGULANT THERAPY: ICD-10-CM

## 2024-08-19 DIAGNOSIS — Z95.2 S/P AVR (AORTIC VALVE REPLACEMENT): Primary | ICD-10-CM

## 2024-08-19 LAB — INR POINT OF CARE: 2.4 (ref 0.9–1.1)

## 2024-08-19 PROCEDURE — 36416 COLLJ CAPILLARY BLOOD SPEC: CPT

## 2024-08-19 PROCEDURE — 85610 PROTHROMBIN TIME: CPT

## 2024-08-19 NOTE — PROGRESS NOTES
ANTICOAGULATION MANAGEMENT     Alyssa Higginbotham 69 year old female is on warfarin with therapeutic INR result. (Goal INR 2.0-2.5)    Recent labs: (last 7 days)     08/19/24  0753   INR 2.4*       ASSESSMENT     Source(s): Chart Review and Patient/Caregiver Call     Warfarin doses taken: Warfarin taken as instructed   Was OFF warfarin for 15 days, from 7/25 - 8/8/24.  Diet: No new diet changes identified  Medication/supplement changes: None noted   11th day on warfarin.   Warfarin resumed on 8/9/24.   8/9/24 Neurologist cleared to resume warfarin.  New illness, injury, or hospitalization: No.   Hospitalized from 7/25-29/24 for Nontraumatic, bifrontal intraparenchymal hemorrhage (IPH) with scattered subarachnoid hemorrhage.   Signs or symptoms of bleeding or clotting: No  Previous result: Therapeutic last visit at 2.4; previously outside of goal range at 1.5  Additional findings:  did offer enrollment for home INR Monitor, d/t frequency of INR testing.  At this time she has declined and will think about it.       PLAN     Recommended plan for no diet, medication or health factor changes affecting INR     Dosing Instructions: Continue your current warfarin dose with next INR in 1 week       Summary  As of 8/19/2024      Full warfarin instructions:  5 mg every day   Next INR check:  8/26/2024               Telephone call with Jennifer who verbalizes understanding and agrees to plan    Lab visit scheduled - INR on 8/26/24 @ [facility].    Education provided: Taking warfarin: Importance of taking warfarin as instructed  Goal range and lab monitoring: goal range and significance of current result    Plan made per [facility] anticoagulation protocol    Aylin Pablo, RN  Anticoagulation Clinic  8/19/2024    _______________________________________________________________________     Anticoagulation Episode Summary       Current INR goal:  2.0-2.5   TTR:  45.4% (11.5 mo)   Target end date:  Indefinite   [facility]  [facility]    Indications    S/P AVR (aortic valve replacement) [Z95.2]  Long term current use of anticoagulant therapy [Z79.01]             Comments:  6/2/23 INR target goal changed to 2.0 - 2.5 d/t daily bleeding issues.  (Sensitivity)  Goal range changed 2/20/19 per cardiology             Anticoagulation Care Providers       Provider Role Specialty Phone number    Rafaela Woods MD Referring [facility] Medicine [phone]

## 2024-08-22 DIAGNOSIS — E04.1 THYROID NODULE: ICD-10-CM

## 2024-08-22 RX ORDER — LEVOTHYROXINE SODIUM 88 UG/1
TABLET ORAL
Qty: 90 TABLET | Refills: 1 | Status: SHIPPED | OUTPATIENT
Start: 2024-08-22

## 2024-08-22 NOTE — TELEPHONE ENCOUNTER
Pending Prescriptions:                       Disp   Refills    levothyroxine (SYNTHROID/LEVOTHROID) 88 M*90 tab*3            Sig: Take 1 tablet of 88mcg by mouth Monday, Tuesday,           Thursday, Friday and Sunday. Will take 75mcg the           other two days.

## 2024-08-23 ENCOUNTER — DOCUMENTATION ONLY (OUTPATIENT)
Dept: ANTICOAGULATION | Facility: CLINIC | Age: 69
End: 2024-08-23

## 2024-08-23 ENCOUNTER — HOSPITAL ENCOUNTER (EMERGENCY)
Facility: HOSPITAL | Age: 69
Discharge: HOME | End: 2024-08-23
Attending: EMERGENCY MEDICINE | Admitting: EMERGENCY MEDICINE
Payer: COMMERCIAL

## 2024-08-23 ENCOUNTER — APPOINTMENT (OUTPATIENT)
Dept: CT IMAGING | Facility: HOSPITAL | Age: 69
End: 2024-08-23
Attending: EMERGENCY MEDICINE
Payer: COMMERCIAL

## 2024-08-23 ENCOUNTER — TELEPHONE (OUTPATIENT)
Dept: FAMILY MEDICINE | Facility: CLINIC | Age: 69
End: 2024-08-23

## 2024-08-23 VITALS
RESPIRATION RATE: 18 BRPM | SYSTOLIC BLOOD PRESSURE: 182 MMHG | HEIGHT: 62 IN | OXYGEN SATURATION: 99 % | DIASTOLIC BLOOD PRESSURE: 82 MMHG | BODY MASS INDEX: 20.8 KG/M2 | TEMPERATURE: 98 F | HEART RATE: 61 BPM | WEIGHT: 113 LBS

## 2024-08-23 DIAGNOSIS — Z79.01 LONG TERM CURRENT USE OF ANTICOAGULANT THERAPY: ICD-10-CM

## 2024-08-23 DIAGNOSIS — R51.9 NONINTRACTABLE HEADACHE, UNSPECIFIED CHRONICITY PATTERN, UNSPECIFIED HEADACHE TYPE: ICD-10-CM

## 2024-08-23 DIAGNOSIS — Z95.2 S/P AVR (AORTIC VALVE REPLACEMENT): Primary | ICD-10-CM

## 2024-08-23 LAB
ANION GAP SERPL CALCULATED.3IONS-SCNC: 11 MMOL/L (ref 7–15)
BASOPHILS # BLD AUTO: 0 10E3/UL (ref 0–0.2)
BASOPHILS NFR BLD AUTO: 0 %
BUN SERPL-MCNC: 16.6 MG/DL (ref 8–23)
CALCIUM SERPL-MCNC: 9.4 MG/DL (ref 8.8–10.4)
CHLORIDE SERPL-SCNC: 98 MMOL/L (ref 98–107)
CREAT SERPL-MCNC: 0.79 MG/DL (ref 0.51–0.95)
EGFRCR SERPLBLD CKD-EPI 2021: 81 ML/MIN/1.73M2
EOSINOPHIL # BLD AUTO: 0 10E3/UL (ref 0–0.7)
EOSINOPHIL NFR BLD AUTO: 1 %
ERYTHROCYTE [DISTWIDTH] IN BLOOD BY AUTOMATED COUNT: 14.2 % (ref 10–15)
GLUCOSE SERPL-MCNC: 102 MG/DL (ref 70–99)
HCO3 SERPL-SCNC: 28 MMOL/L (ref 22–29)
HCT VFR BLD AUTO: 37.7 % (ref 35–47)
HGB BLD-MCNC: 12.2 G/DL (ref 11.7–15.7)
IMM GRANULOCYTES # BLD: 0 10E3/UL
IMM GRANULOCYTES NFR BLD: 0 %
INR PPP: 2.21 (ref 0.85–1.15)
LYMPHOCYTES # BLD AUTO: 0.7 10E3/UL (ref 0.8–5.3)
LYMPHOCYTES NFR BLD AUTO: 14 %
MCH RBC QN AUTO: 26.5 PG (ref 26.5–33)
MCHC RBC AUTO-ENTMCNC: 32.4 G/DL (ref 31.5–36.5)
MCV RBC AUTO: 82 FL (ref 78–100)
MONOCYTES # BLD AUTO: 0.4 10E3/UL (ref 0–1.3)
MONOCYTES NFR BLD AUTO: 8 %
NEUTROPHILS # BLD AUTO: 3.7 10E3/UL (ref 1.6–8.3)
NEUTROPHILS NFR BLD AUTO: 76 %
NRBC # BLD AUTO: 0 10E3/UL
NRBC BLD AUTO-RTO: 0 /100
PLATELET # BLD AUTO: 197 10E3/UL (ref 150–450)
POTASSIUM SERPL-SCNC: 4 MMOL/L (ref 3.4–5.3)
RBC # BLD AUTO: 4.6 10E6/UL (ref 3.8–5.2)
SODIUM SERPL-SCNC: 137 MMOL/L (ref 135–145)
WBC # BLD AUTO: 4.9 10E3/UL (ref 4–11)

## 2024-08-23 PROCEDURE — 250N000011 HC RX IP 250 OP 636: Performed by: EMERGENCY MEDICINE

## 2024-08-23 PROCEDURE — 80048 BASIC METABOLIC PNL TOTAL CA: CPT | Performed by: EMERGENCY MEDICINE

## 2024-08-23 PROCEDURE — 99285 EMERGENCY DEPT VISIT HI MDM: CPT | Mod: 25

## 2024-08-23 PROCEDURE — 36415 COLL VENOUS BLD VENIPUNCTURE: CPT | Performed by: EMERGENCY MEDICINE

## 2024-08-23 PROCEDURE — 70496 CT ANGIOGRAPHY HEAD: CPT

## 2024-08-23 PROCEDURE — 96374 THER/PROPH/DIAG INJ IV PUSH: CPT | Mod: 59

## 2024-08-23 PROCEDURE — 85025 COMPLETE CBC W/AUTO DIFF WBC: CPT | Performed by: EMERGENCY MEDICINE

## 2024-08-23 PROCEDURE — 250N000013 HC RX MED GY IP 250 OP 250 PS 637: Performed by: EMERGENCY MEDICINE

## 2024-08-23 PROCEDURE — 85610 PROTHROMBIN TIME: CPT | Performed by: EMERGENCY MEDICINE

## 2024-08-23 RX ORDER — IOPAMIDOL 755 MG/ML
67 INJECTION, SOLUTION INTRAVASCULAR ONCE
Status: COMPLETED | OUTPATIENT
Start: 2024-08-23 | End: 2024-08-23

## 2024-08-23 RX ORDER — BUTALBITAL, ACETAMINOPHEN AND CAFFEINE 50; 325; 40 MG/1; MG/1; MG/1
1 TABLET ORAL ONCE
Status: COMPLETED | OUTPATIENT
Start: 2024-08-23 | End: 2024-08-23

## 2024-08-23 RX ORDER — ONDANSETRON 2 MG/ML
4 INJECTION INTRAMUSCULAR; INTRAVENOUS ONCE
Status: COMPLETED | OUTPATIENT
Start: 2024-08-23 | End: 2024-08-23

## 2024-08-23 RX ADMIN — BUTALBITAL, ACETAMINOPHEN AND CAFFEINE 1 TABLET: 325; 50; 40 TABLET ORAL at 11:09

## 2024-08-23 RX ADMIN — IOPAMIDOL 67 ML: 755 INJECTION, SOLUTION INTRAVENOUS at 12:34

## 2024-08-23 RX ADMIN — ONDANSETRON 4 MG: 2 INJECTION INTRAMUSCULAR; INTRAVENOUS at 12:31

## 2024-08-23 ASSESSMENT — ACTIVITIES OF DAILY LIVING (ADL)
ADLS_ACUITY_SCORE: 38

## 2024-08-23 ASSESSMENT — COLUMBIA-SUICIDE SEVERITY RATING SCALE - C-SSRS
1. IN THE PAST MONTH, HAVE YOU WISHED YOU WERE DEAD OR WISHED YOU COULD GO TO SLEEP AND NOT WAKE UP?: NO
6. HAVE YOU EVER DONE ANYTHING, STARTED TO DO ANYTHING, OR PREPARED TO DO ANYTHING TO END YOUR LIFE?: NO
2. HAVE YOU ACTUALLY HAD ANY THOUGHTS OF KILLING YOURSELF IN THE PAST MONTH?: NO

## 2024-08-23 NOTE — PROGRESS NOTES
ANTICOAGULATION  MANAGEMENT: Discharge Review    Alyssa Higginbotham chart reviewed for anticoagulation continuity of care    Emergency room visit on 8 /23/2024 for non-intractable headach / right temple focal throbbing HA    - Hx of Migraines.    Discharge disposition: Home    Results:    Recent labs: (last 7 days)     08/19/24  0753 08/23/24  1053   INR 2.4* 2.21*     Anticoagulation inpatient management:     not applicable     Anticoagulation discharge instructions:     Warfarin dosing: home regimen continued   Bridging: No   INR goal change: No      Medication changes affecting anticoagulation: No    Additional factors affecting anticoagulation: No     PLAN     Agree with discharge plan for follow up:   - advised to Hospital follow-up with PCP. - already scheduled on 8/29/24.    Patient not contacted - INR scheduled on 8/26/24 @ [facility].    ( Usually goes to [facility], at  7a, however, they have no opening.    Warfarin just resumed on 8/9/24 after holding warfarin for 15 days, due to SAH ;/ ICH.    Anticoagulation Calendar updated    Aylin Pablo RN

## 2024-08-23 NOTE — ED PROVIDER NOTES
EMERGENCY DEPARTMENT ENCOUNTER      NAME: Alyssa Higginbotham  AGE: 69 year old female  YOB: 1955  MRN: [id number]  EVALUATION DATE & TIME: 8/23/2024 10:14 AM    PCP: Rafaela Woods    ED PROVIDER: Chanelle Ceja M.D.      Chief Complaint   Patient presents with    Dizziness         FINAL IMPRESSION:  1. Nonintractable headache, unspecified chronicity pattern, unspecified headache type          ED COURSE & MEDICAL DECISION MAKING:    ED Course as of 08/23/24 1331   Fri Aug 23, 2024   1041 Pt with right temple focal throbbing HA 8/10 like her known established chronic migraines for which she takes butalbital AND with resolved episode of feeling of world spinning that lasted 1 min like prior vertigo episodes now fully resolved, neuro intact with NIHSS 0 and lasted slihgtly longer than average, no fall, pending INR, CTA head/neck to ensure no worsening hemorrhage with SDH in July a bit over a month ago, now back on coumadin wtih INR 2.4 recently, to ensure no extension of hemorrhage or other anomalies and will reassess, patient and  ok with plan and patient able to ambulate and move position without vertigo   1226 INR 2.2 and BMP and CBC WNL, patient back from CT and did vomit, reports no other symptoms and notes she normally vomits when she has a migraine, feels like the migraine is improving, typically does improve to a degree after vomiting, neuro intact and no AMS or vertigo, given zofran and will again reassess. Pt and  updated re: INR 2.2 and plan to continue warfarin at home dose if no anomalies on CT or worsening bleeding   1302 CT with evolving intraparenchymal hemorrhage findings with improving edema, I did chart review and [facility], I paged to discuss f/u plans with them with patient back on coumadin with AVR history   1309 I spoke with neurosurgery Lisa who reviewed case, chart and neurology recommendations, recommends dispo with  neurology team in the outpatient setting rather than neurosurgery and with chart review, plan for outpatient f/u with MRI in 6-8 weeks. Patient ok with plan and with improvement on imaging and neuro intact, patient feeling ok. Patient discharged after being provided with extensive anticipatory guidance and given return precautions, importance of PMD follow-up emphasized.        Pertinent Labs & Imaging studies reviewed. (See chart for details)      At the conclusion of the encounter I discussed the results of all of the tests and the disposition. The questions were answered. The patient or family acknowledged understanding and was agreeable with the care plan.     MEDICATIONS GIVEN IN THE EMERGENCY:  Medications   butalbital-acetaminophen-caffeine (ESGIC) per tablet 1 tablet (1 tablet Oral $Given 8/23/24 1109)   ondansetron (ZOFRAN) injection 4 mg (4 mg Intravenous $Given 8/23/24 1231)   iopamidol (ISOVUE-370) solution 67 mL (67 mLs Intravenous $Given 8/23/24 1234)       NEW PRESCRIPTIONS STARTED AT TODAY'S ER VISIT  New Prescriptions    No medications on file          =================================================================    HPI      Alyssa Higginbotham is a 69 year old female with PMHx of vertigo, migraine headaches, tension headaches, mental health history, meningioma, aortic valve replacement on warfarin, burachnoid hemorrhage with a warfarin pause from July 25th-August 8th 2024, repeat head CT on August 9, resumed warfarin August 9th, and has upcoming repeat head CT on August 27th who presents to the ED today via private vehicle with dizziness and migraine.    Patient explains she was on a walk with her  this morning around 8 am when she felt an onset of vertigo, and felt like she was going to fall over. This morning, she woke up with a migraine, but denies any feeling of vertigo until the incident. Currently, patient denies any feelings of vertigo and states that the vertigo lasted for about 60  seconds at 8 am and has not since returned. Patient also states she has tinnitus. She says she has had incidents of vertigo in the past, and this incident felt similar to those in the past. Patient denies any loss of feeling in extremities, and denies any recent falls or new pain. Patient states she feels like she is going to vomit from the dizziness. She rates her migraine as an 8/10 for pain. She had a recent INR test on Monday (08/19/24) which revealed a score of 2.4. Patient denies starting any new medications.      REVIEW OF SYSTEMS   All other systems reviewed and are negative except as noted above in HPI.    PAST MEDICAL HISTORY:  Past Medical History:   Diagnosis Date    Benign meningioma of brain (H) 03/2015    initially seen on CT of head that was obtained after a fall. Frontal lobe, confirmed on an MRI Mar 2015, 3 mm    Bunion     Chronic headache     Depression     Heart murmur     Hepatitis C     Hyperlipidemia     Hypothyroid     Irregular heart rate     Nasal fracture 02/28/2015    fell face first on sideache    Osteoporosis     Other acquired deformity of toe     Stroke (H)     on CT scan       PAST SURGICAL HISTORY:  Past Surgical History:   Procedure Laterality Date    AORTIC VALVE REPLACEMENT   2000    ARTHROPLASTY TOE(S) Right 2/13/2023    Procedure: First metatarsal phalangeal joint implant arthroplasty right foot;  Surgeon: Chin Riojas DPM;  Location: [facility]    BIOPSY BREAST Left 2005    benign    COLONOSCOPY  2011    REPAIR THORACIC AORTA   2000       CURRENT MEDICATIONS:    amitriptyline (ELAVIL) 10 MG tablet  butalbital-acetaminophen-caffeine (ESGIC) -40 MG tablet  clonazePAM (KLONOPIN) 0.5 MG tablet  levothyroxine (SYNTHROID/LEVOTHROID) 75 MCG tablet  levothyroxine (SYNTHROID/LEVOTHROID) 88 MCG tablet  metoprolol succinate ER (TOPROL XL) 200 MG 24 hr tablet  multivitamin (ONE A DAY) per tablet  rimegepant (NURTEC) 75 MG ODT tablet  simvastatin (ZOCOR) 20 MG  tablet  SUMAtriptan (IMITREX) 5 MG/ACT nasal spray  triamterene-HCTZ (MAXZIDE) 75-50 MG tablet        ALLERGIES:  Allergies   Allergen Reactions    Codeine Other (See Comments)     Faints    Demerol [Meperidine] Other (See Comments)     Reaction not reported by patient., Patient states she felt awful.       FAMILY HISTORY:  Family History   Problem Relation Age of Onset    Heart Disease Mother     Pancreatic Cancer Father 70        history of smoking    Ovarian Cancer Sister 67        stage III, fatal    Skin Cancer Sister     Atrial fibrillation Sister     Diabetes No family hx of     Alzheimer Disease No family hx of        SOCIAL HISTORY:   Social History     Socioeconomic History    Marital status:     Number of children:  0   Tobacco Use    Smoking status: Never     Passive exposure: Past    Smokeless tobacco: Never   Vaping Use    Vaping status: Never Used   Substance and Sexual Activity    Alcohol use: No    Drug use: No    Sexual activity: Not Currently     Social Determinants of Health     Financial Resource Strain: Low Risk  (3/19/2024)    Financial Resource Strain     Within the past 12 months, have you or your family members you live with been unable to get utilities (heat, electricity) when it was really needed?: No   Food Insecurity: Low Risk  (3/19/2024)    Food Insecurity     Within the past 12 months, did you worry that your food would run out before you got money to buy more?: No     Within the past 12 months, did the food you bought just not last and you didn t have money to get more?: No   Transportation Needs: Low Risk  (3/19/2024)    Transportation Needs     Within the past 12 months, has lack of transportation kept you from medical appointments, getting your medicines, non-medical meetings or appointments, work, or from getting things that you need?: No   Physical Activity: Sufficiently Active (3/19/2024)    Exercise Vital Sign     Days of Exercise per Week: 7 days     Minutes of  "Exercise per Session: 90 min   Stress: No Stress Concern Present (3/19/2024)    [place] [employer] - Occupational Stress Questionnaire     Feeling of Stress : Only a little   Social Connections: Unknown (3/19/2024)    Social Connection and Isolation Panel [NHANES]     Frequency of Social Gatherings with Friends and Family: Once a week   Interpersonal Safety: Low Risk  (3/19/2024)    Interpersonal Safety     Do you feel physically and emotionally safe where you currently live?: Yes     Within the past 12 months, have you been hit, slapped, kicked or otherwise physically hurt by someone?: No     Within the past 12 months, have you been humiliated or emotionally abused in other ways by your partner or ex-partner?: No   Housing Stability: Low Risk  (3/19/2024)    Housing Stability     Do you have housing? : Yes     Are you worried about losing your housing?: No       VITALS:  Patient Vitals for the past 24 hrs:   BP Temp Temp src Pulse Resp SpO2 Height Weight   08/23/24 1130 (!) 173/84 -- -- 65 -- 98 % -- --   08/23/24 1047 (!) 166/82 -- -- -- -- -- -- --   08/23/24 1023 (!) 175/83 -- -- 67 -- 98 % -- --   08/23/24 1010 (!) 192/99 98  F (36.7  C) Oral -- 18 100 % 1.575 m (5' 2\") 51.3 kg (113 lb)       PHYSICAL EXAM    GENERAL: Awake, alert.  In no acute distress.   HEENT: Normocephalic, atraumatic.  Pupils equal, round and reactive.  Conjunctiva normal.  EOMI.  NECK: No stridor or apparent deformity.  PULMONARY: Symmetrical breath sounds without distress.  Lungs clear to auscultation bilaterally without wheezes, rhonchi or rales.  CARDIO: Regular rate and rhythm.  No significant murmur, rub or gallop.  Radial pulses strong and symmetrical.  ABDOMINAL: Abdomen soft, non-distended and non-tender to palpation.  No CVAT, no palpable hepatosplenomegaly.  EXTREMITIES: No lower extremity swelling or edema.    NEURO: Alert and oriented to person, place and time.  Cranial nerves grossly intact.  No focal " motor deficit.  PSYCH: Normal mood and affect  SKIN: No rashes      LAB:  All pertinent labs reviewed and interpreted.  Results for orders placed or performed during the hospital encounter of 08/23/24   CTA Head Neck with Contrast    Impression    IMPRESSION:   HEAD CT:  1.  Evolving parenchymal hemorrhage in the bilateral frontal lobes with diminished low attenuating changes/edema and mass effect from previous exam.  2.  No new intracranial hemorrhage.    HEAD CTA:   1.  No significant stenosis, aneurysm, or high flow vascular malformation identified.  2.  Variant [place] of [place] anatomy as above.    NECK CTA:  1.  No hemodynamically significant stenosis of the neck vessels.  2.  Fibromuscular dysplasia involving the bilateral internal carotid and bilateral vertebral arteries.  3.  No interval change in appearance of chronic aortic dissection extending into the right common carotid artery.   Result Value Ref Range    INR 2.21 (H) 0.85 - 1.15   Basic metabolic panel   Result Value Ref Range    Sodium 137 135 - 145 mmol/L    Potassium 4.0 3.4 - 5.3 mmol/L    Chloride 98 98 - 107 mmol/L    Carbon Dioxide (CO2) 28 22 - 29 mmol/L    Anion Gap 11 7 - 15 mmol/L    Urea Nitrogen 16.6 8.0 - 23.0 mg/dL    Creatinine 0.79 0.51 - 0.95 mg/dL    GFR Estimate 81 >60 mL/min/1.73m2    Calcium 9.4 8.8 - 10.4 mg/dL    Glucose 102 (H) 70 - 99 mg/dL   CBC with platelets and differential   Result Value Ref Range    WBC Count 4.9 4.0 - 11.0 10e3/uL    RBC Count 4.60 3.80 - 5.20 10e6/uL    Hemoglobin 12.2 11.7 - 15.7 g/dL    Hematocrit 37.7 35.0 - 47.0 %    MCV 82 78 - 100 fL    MCH 26.5 26.5 - 33.0 pg    MCHC 32.4 31.5 - 36.5 g/dL    RDW 14.2 10.0 - 15.0 %    Platelet Count 197 150 - 450 10e3/uL    % Neutrophils 76 %    % Lymphocytes 14 %    % Monocytes 8 %    % Eosinophils 1 %    % Basophils 0 %    % Immature Granulocytes 0 %    NRBCs per 100 WBC 0 <1 /100    Absolute Neutrophils 3.7 1.6 - 8.3 10e3/uL    Absolute Lymphocytes 0.7 (L)  0.8 - 5.3 10e3/uL    Absolute Monocytes 0.4 0.0 - 1.3 10e3/uL    Absolute Eosinophils 0.0 0.0 - 0.7 10e3/uL    Absolute Basophils 0.0 0.0 - 0.2 10e3/uL    Absolute Immature Granulocytes 0.0 <=0.4 10e3/uL    Absolute NRBCs 0.0 10e3/uL       RADIOLOGY:  Reviewed all pertinent imaging. Please see official radiology report.  CTA Head Neck with Contrast   Final Result   IMPRESSION:    HEAD CT:   1.  Evolving parenchymal hemorrhage in the bilateral frontal lobes with diminished low attenuating changes/edema and mass effect from previous exam.   2.  No new intracranial hemorrhage.      HEAD CTA:    1.  No significant stenosis, aneurysm, or high flow vascular malformation identified.   2.  Variant [place] of [place] anatomy as above.      NECK CTA:   1.  No hemodynamically significant stenosis of the neck vessels.   2.  Fibromuscular dysplasia involving the bilateral internal carotid and bilateral vertebral arteries.   3.  No interval change in appearance of chronic aortic dissection extending into the right common carotid artery.           Chanelle Ceja MD  08/23/24 5834

## 2024-08-23 NOTE — ED TRIAGE NOTES
Pt coming in with c/o dizziness and feeling off balance. Pt had a brain bleed in July.  Pt also states has a migraine.   NO other neuro deficits.

## 2024-08-23 NOTE — TELEPHONE ENCOUNTER
Reason for Call:  Appointment Request    Patient requesting this type of appt:  Hospital/ED Follow-Up     Requested provider: Rafaela Woods    Reason patient unable to be scheduled: Not within requested timeframe    When does patient want to be seen/preferred time: 3-7 days    Comments: any provider at [facility]    [facility] or would you prefer to receive a phone call?:   Patient would prefer a phone call   Okay to leave a detailed message?: Yes at Home number on file [phone] (home)    Call taken on 8/23/2024 at 2:23 PM by Mia LAIRD

## 2024-08-26 ENCOUNTER — TELEPHONE (OUTPATIENT)
Dept: LAB | Facility: CLINIC | Age: 69
End: 2024-08-26

## 2024-08-26 ENCOUNTER — TELEPHONE (OUTPATIENT)
Dept: NEUROSURGERY | Facility: CLINIC | Age: 69
End: 2024-08-26

## 2024-08-26 ENCOUNTER — ANTICOAGULATION THERAPY VISIT (OUTPATIENT)
Dept: ANTICOAGULATION | Facility: CLINIC | Age: 69
End: 2024-08-26

## 2024-08-26 ENCOUNTER — LAB (OUTPATIENT)
Dept: CARDIOLOGY | Facility: CLINIC | Age: 69
End: 2024-08-26
Payer: COMMERCIAL

## 2024-08-26 DIAGNOSIS — Z95.2 S/P AVR (AORTIC VALVE REPLACEMENT): ICD-10-CM

## 2024-08-26 DIAGNOSIS — Z79.01 LONG TERM CURRENT USE OF ANTICOAGULANT THERAPY: ICD-10-CM

## 2024-08-26 DIAGNOSIS — Z95.2 S/P AVR (AORTIC VALVE REPLACEMENT): Primary | ICD-10-CM

## 2024-08-26 LAB — INR POINT OF CARE: 2.6 (ref 0.9–1.1)

## 2024-08-26 PROCEDURE — 85610 PROTHROMBIN TIME: CPT

## 2024-08-26 PROCEDURE — 36416 COLLJ CAPILLARY BLOOD SPEC: CPT

## 2024-08-26 NOTE — PROGRESS NOTES
ANTICOAGULATION MANAGEMENT     Alyssa Higginbotham 69 year old female is on warfarin with supratherapeutic INR result. (Goal INR 2.0-2.5)    Recent labs: (last 7 days)     08/26/24  0755   INR 2.6*       ASSESSMENT     Source(s): Chart Review  Previous INR was Therapeutic last visit; previously outside of goal range (considering 2 weeks apart)  Medication, diet, health changes since last INR chart reviewed: ER visit on 8/23/24 for nonintractable HA + previously admitted 7/25/24-7/29/24 for nontraumatic, bifrontal intraparenchymal hemorrhage (IPH) with scattered subarachnoid hemorrhage (SAH)        PLAN     Unable to reach [facility] today.    Left message to return call to [facility] for assessment    Follow up required to assess for changes  and discuss out of range result     Benita Patel RN  Anticoagulation Clinic  8/26/2024

## 2024-08-26 NOTE — TELEPHONE ENCOUNTER
Writer returned call to patient and reviewed INR level and dosing plan. See [facility] encounter. JOJO EscamillaN, RN

## 2024-08-26 NOTE — PROGRESS NOTES
ANTICOAGULATION MANAGEMENT     Alyssa Higginbotham 69 year old female is on warfarin with supratherapeutic INR result. (Goal INR 2.0-2.5)    Recent labs: (last 7 days)     08/26/24  0755   INR 2.6*       ASSESSMENT     Source(s): Chart Review and Patient/Caregiver Call     Warfarin doses taken: Warfarin taken as instructed  Diet: No new diet changes identified  Medication/supplement changes: None noted  New illness, injury, or hospitalization: ER visit on 8/23/24 for nonintractable HA + previously admitted 7/25/24-7/29/24 for nontraumatic, bifrontal intraparenchymal hemorrhage (IPH) with scattered subarachnoid hemorrhage (SAH) - patient denies any HA sx or new concerns  Signs or symptoms of bleeding or clotting: Minor nosebleed over the weekend  Previous result: Therapeutic last visit; previously outside of goal range  Additional findings: None       PLAN     Recommended plan for temporary change(s) affecting INR     Dosing Instructions: decrease your warfarin dose (2.9% change) with next INR in 1 week       Summary  As of 8/26/2024      Full warfarin instructions:  4 mg every Mon; 5 mg all other days   Next INR check:  8/29/2024               Telephone call with Jennifer who verbalizes understanding and agrees to plan    Check at provider office visit    Education provided: Please call back if any changes to your diet, medications or how you've been taking warfarin  Symptom monitoring: monitoring for bleeding signs and symptoms and monitoring for clotting signs and symptoms    Plan made per [facility] anticoagulation protocol    Benita Patel, RN  Anticoagulation Clinic  8/26/2024    _______________________________________________________________________     Anticoagulation Episode Summary       Current INR goal:  2.0-2.5   TTR:  47.2% (11.5 mo)   Target end date:  Indefinite   [facility]    Indications    S/P AVR (aortic valve replacement) [Z95.2]  Long term current use of  anticoagulant therapy [Z79.01]             Comments:  6/2/23 INR target goal changed to 2.0 - 2.5 d/t daily bleeding issues.  (Sensitivity)  Goal range changed 2/20/19 per cardiology             Anticoagulation Care Providers       Provider Role Specialty Phone number    Rafaela Woods MD Referring Family Medicine [phone]

## 2024-08-26 NOTE — TELEPHONE ENCOUNTER
Reason for Call:  Other call back    Detailed comments: Just missed marco from Benita zepeda call her back    Phone Number Patient can be reached at: Cell number on file:    Telephone Information:   Mobile [phone]       Best Time: any    Can we leave a detailed message on this number? YES    Call taken on 8/26/2024 at 1:46 PM by Naz Lee

## 2024-08-27 ENCOUNTER — TELEPHONE (OUTPATIENT)
Dept: NEUROLOGY | Facility: CLINIC | Age: 69
End: 2024-08-27

## 2024-08-27 NOTE — TELEPHONE ENCOUNTER
Stroke RN Care Coordination - Follow-Up Outreach Note     SITUATION     Alyssa Higginbotham is a 69 year old female who is receiving support for:  Clinic Care Coordination - Follow-up (Brain MRI - On 3T instead of 7T)    BACKGROUND     Received message from [facility] scheduling that they were unable to clear pt for 7T MRI.     Reached out to ordering provider K.Bard VILLATORO and inquired if she would like pt to have 3T scan done prior to HFU on 9/26. Was told that it can either be done prior to discussed by Aranza ENRIQUE at the appt.     Noted TE from neurosurgery on 8/26 recommending pt follow up with our team and complete MRI prior.    ASSESSMENT     Spoke with imaging scheduling and confirmed that they do not need a new order to schedule pt on the 3T versus the 7T. I asked that they contact pt directly and that imaging be completed prior to 9/26 appt with our clinic team. I provided them with my direct callback number if they are unable to get pt scheduled for the 3T prior.     Sent pt a [employer] message informing her of results of CT from today showing improvement in her previous bleed and no new signs of bleeding. Also explained rec for 3T MRI in [place] of not being approved for the 7T. Told her that I will have imaging scheduling contact her directly to coordinate appt prior to her OP OV with us on 9/26. Provided my direct callback information if she has questions.    PLAN     Follow-up plan:  RN[facility] will chart review later this week to confirm MRI is scheduled.    Lynette Nicholson BS, RN, SCRN  RN Stroke Neurology Care Coordinator  [facility] Neuroscience Service Line

## 2024-08-29 ENCOUNTER — OFFICE VISIT (OUTPATIENT)
Dept: FAMILY MEDICINE | Facility: CLINIC | Age: 69
End: 2024-08-29
Payer: COMMERCIAL

## 2024-08-29 ENCOUNTER — ANTICOAGULATION THERAPY VISIT (OUTPATIENT)
Dept: ANTICOAGULATION | Facility: CLINIC | Age: 69
End: 2024-08-29

## 2024-08-29 ENCOUNTER — TELEPHONE (OUTPATIENT)
Dept: NEUROLOGY | Facility: CLINIC | Age: 69
End: 2024-08-29

## 2024-08-29 VITALS
DIASTOLIC BLOOD PRESSURE: 79 MMHG | HEIGHT: 62 IN | RESPIRATION RATE: 16 BRPM | OXYGEN SATURATION: 100 % | HEART RATE: 68 BPM | BODY MASS INDEX: 20.8 KG/M2 | SYSTOLIC BLOOD PRESSURE: 134 MMHG | WEIGHT: 113 LBS

## 2024-08-29 DIAGNOSIS — Z79.01 LONG TERM CURRENT USE OF ANTICOAGULANT THERAPY: ICD-10-CM

## 2024-08-29 DIAGNOSIS — Z95.2 S/P AVR (AORTIC VALVE REPLACEMENT): Primary | ICD-10-CM

## 2024-08-29 DIAGNOSIS — Z95.2 S/P AVR (AORTIC VALVE REPLACEMENT): ICD-10-CM

## 2024-08-29 DIAGNOSIS — R42 DIZZINESS: Primary | ICD-10-CM

## 2024-08-29 DIAGNOSIS — F41.1 ANXIETY STATE: ICD-10-CM

## 2024-08-29 DIAGNOSIS — I10 ESSENTIAL HYPERTENSION: ICD-10-CM

## 2024-08-29 DIAGNOSIS — I60.9 SUBARACHNOID HEMORRHAGE (H): ICD-10-CM

## 2024-08-29 LAB — INR BLD: 2.2 (ref 0.9–1.1)

## 2024-08-29 PROCEDURE — 36416 COLLJ CAPILLARY BLOOD SPEC: CPT | Performed by: FAMILY MEDICINE

## 2024-08-29 PROCEDURE — 99214 OFFICE O/P EST MOD 30 MIN: CPT | Performed by: FAMILY MEDICINE

## 2024-08-29 PROCEDURE — 85610 PROTHROMBIN TIME: CPT | Performed by: FAMILY MEDICINE

## 2024-08-29 NOTE — TELEPHONE ENCOUNTER
[facility] Call Center    Phone Message    May a detailed message be left on voicemail: yes     Reason for Call: Order(s): Other:   Reason for requested: 3T MRI   Date needed: ASAP   Provider name: Avril Abreu     Pt is requesting orders for 3T MRI to be sent to:   [facility]    [address] [place] [address] [address], [place], [place] [address]  Phone: [phone]    Please contact Jennifer for further information at: [phone]     Action Taken: Other: Neurology     Travel Screening: Not Applicable     Date of Service:

## 2024-08-29 NOTE — PROGRESS NOTES
Assessment & Plan     ICD-10-CM    1. Dizziness  R42       2. Anxiety  F41.1       3. Essential hypertension  I10       4. Subarachnoid hemorrhage (H)  I60.9       5. S/P AVR (aortic valve replacement)  Z95.2 INR point of care (finger stick)      6. Long term current use of anticoagulant therapy  Z79.01 INR point of care (finger stick)        Patient presents today for follow-up of emergency room discharge.  She is a 69-year-old female with past history of vertigo, migraine headache, anxiety per evaluation, aortic valve replacement on warfarin, subarachnoid hemorrhage in July resulting in a pause in her therapy.  She resumed warfarin on August 9 and on 23rd felt dizzy with spinning sensation and went to emergency room.  Her exam was normal, CTA head and neck was normal.  This showed an evolving intraparenchymal hemorrhage with improving edema.  She was discharged to follow-up with neurology and has an upcoming appointment.  Following issues addressed  1: Dizziness.  Discussed in detail between dizziness and vertigo.  It does appear that patient had an episode of vertigo.  She has had physical therapy in the past and does know of the exercise.  Currently she is asymptomatic.  2: Anxiety: Patient's initial blood pressure is high and she does have previous clinical blood pressure that are high.  Discussed underlying anxiety and to discuss with PCP to discuss long-term management.  Currently she does take clonazepam at night.  She is agreeable  3: Subarachnoid hemorrhage: Stable.  4: Essential hypertension: Stable.  May even have lower blood pressures at home as when I repeat checked her blood pressure in clinic it had come down.  5: Status post AVR on anticoagulant: Patient had borderline elevated INR and repeat check being done today.    MED REC REQUIRED  Post Medication Reconciliation Status:  Discharge medications reconciled, continue medications without change    MEDICATIONS:  Continue current medications without  change  See Patient Instructions    Palmer Rodriguez is a 69 year old, presenting for the following health issues:  Hospital F/U ([facility] follow up- pt is still having issues on and off. Good days and bad days. When bending down gets a lot of pressure and pain in the head. )        8/29/2024     8:56 AM   Additional Questions   Roomed by Agustina Mathis CMA   Accompanied by Spouse     [facility]         Hospital Follow-up Visit:    Hospital/Nursing Home/ Rehab Facility: [facility]  Date of Admission: 08/23/2024  Date of Discharge: 08/23/2024  Reason(s) for Admission: Dizziness and headache   Was the patient in the ICU or did the patient experience delirium during hospitalization?  No  Do you have any other stressors you would like to discuss with your provider? No    Problems taking medications regularly:  None  Medication changes since discharge: None  Problems adhering to non-medication therapy:  None    Summary of hospitalization:  [facility] [place] discharge summary reviewed  Diagnostic Tests/Treatments reviewed.  Follow up needed: Follow-up with neurology  Other Healthcare Providers Involved in Patient s Care:         Specialist appointment - with neurology and PCP  Update since discharge: improved.         Plan of care communicated with patient and family           Patient Active Problem List   Diagnosis    Mitral Valve Disorder    Dissection Of The Aorta    Anxiety    Tension-type Headache    Marfan Syndrome    Hypothyroidism    Hyperlipidemia    Depression    Migraine    Aortic Valve Disorder    Myalgia And Myositis    Meningioma (H)-initially seen on CT of head that was obtained after a fall. Frontal lobe, confirmed on an MRI Mar 2015, 3 mm    Concussion syndrome    Vertigo    PTSD (post-traumatic stress disorder)    Thyroid nodule    Family history of ovarian cancer    PADDY (generalized anxiety disorder)    Presbyopia    Long term current use of anticoagulant therapy     S/P AVR (aortic valve replacement)    Essential hypertension    Hallux limitus, right    Adenomatous colon polyp-about 2010, scope clear in 2016    Benign essential hypertension    Abnormal SPEP    Osteopenia of multiple sites    Subarachnoid hemorrhage (H)    Intraparenchymal hemorrhage of brain (H)     Current Outpatient Medications   Medication Sig Dispense Refill    amitriptyline (ELAVIL) 10 MG tablet Take 1 tablet (10 mg) by mouth at bedtime 90 tablet 3    clonazePAM (KLONOPIN) 0.5 MG tablet TAKE ONE TABLET BY MOUTH ONCE NIGHTLY AS NEEDED FOR ANXIETY OR SLEEP 90 tablet 0    levothyroxine (SYNTHROID/LEVOTHROID) 75 MCG tablet TAKE 1 TABLET ON WEDNESDAY, SATURDAY 52 tablet 3    levothyroxine (SYNTHROID/LEVOTHROID) 88 MCG tablet Take 1 tablet of 88mcg by mouth Monday, Tuesday, Thursday, Friday and Sunday. Will take 75mcg the other two days. 90 tablet 1    metoprolol succinate ER (TOPROL XL) 200 MG 24 hr tablet Take 1 tablet (200 mg) by mouth daily 90 tablet 3    multivitamin (ONE A DAY) per tablet [MULTIVITAMIN (ONE A DAY) PER TABLET] Take 1 tablet by mouth daily.       rimegepant (NURTEC) 75 MG ODT tablet Place 1 tablet (75 mg) under the tongue daily as needed for migraine Maximum of 1 tablet every 24 hours. 8 tablet 0    simvastatin (ZOCOR) 20 MG tablet Take 1 tablet (20 mg) by mouth at bedtime TAKE 1 TABLET AT BEDTIME (DUE FOR AN OFFICE VISIT) 90 tablet 1    triamterene-HCTZ (MAXZIDE) 75-50 MG tablet Take 0.5 tablets by mouth daily 45 tablet 3    butalbital-acetaminophen-caffeine (ESGIC) -40 MG tablet Take 2 tablets by mouth every 8 hours as needed for headaches [BUTALBITAL-ACETAMINOPHEN-CAFFEINE (FIORICET, ESGIC) -40 MG PER TABLET] TAKE 1 TO 2 TABLETS BY MOUTH EVERY 8 HOURS AS NEEDED FOR PAIN. MAX OF 4000 MG ACETAMINOPHEN PER 24 HOURS Strength: -40 mg (Patient not taking: Reported on 8/29/2024) 60 tablet 3     No current facility-administered medications for this visit.         Review of  "Systems  Constitutional, neuro, ENT, endocrine, pulmonary, cardiac, gastrointestinal, genitourinary, musculoskeletal, integument and psychiatric systems are negative, except as otherwise noted.      Objective    BP (!) 144/90 (BP Location: Left arm, Patient Position: Sitting, Cuff Size: Adult Regular)   Pulse 71   Resp 16   Ht 1.575 m (5' 2\")   Wt 51.3 kg (113 lb)   SpO2 100%   BMI 20.67 kg/m    Body mass index is 20.67 kg/m .  Physical Exam   GENERAL: alert and no distress  EYES: Eyes grossly normal to inspection, PERRL and conjunctivae and sclerae normal  HENT: ear canals and TM's normal, nose and mouth without ulcers or lesions  NECK: no adenopathy, no asymmetry, masses, or scars  RESP: lungs clear to auscultation - no rales, rhonchi or wheezes  CV: regular rate and rhythm, normal S1 S2, no S3 or S4, no murmur, click or rub, no peripheral edema  ABDOMEN: soft, nontender, no hepatosplenomegaly, no masses and bowel sounds normal  MS: no gross musculoskeletal defects noted, no edema  Neurological exam: gait normal, alert and oriented X 3, reflexes active and equal, R handed, speech normal, mental status intact, cranial nerves 2-12 intact, including heel, toe, and tandem walking normal, Romberg negative, muscle tone normal, muscle strength normal, finger to nose normal, reflexes normal and symmetric      Lab on 08/26/2024   Component Date Value Ref Range Status    INR Point of Care 08/26/2024 2.6 (A)  0.9 - 1.1 Final           Signed Electronically by: Jose Manuel Hensley MD    "

## 2024-08-29 NOTE — PROGRESS NOTES
ANTICOAGULATION MANAGEMENT     Alyssa Higginbotham 69 year old female is on warfarin with therapeutic INR result. (Goal INR 2.0-2.5)    Recent labs: (last 7 days)     08/29/24  0929   INR 2.2*       ASSESSMENT     Source(s): Chart Review  Previous INR was Supratherapeutic  Medication, diet, health changes since last INR chart reviewed; none identified ov today, no affecting changes to meds         PLAN     Recommended plan for no diet, medication or health factor changes affecting INR     Dosing Instructions: Continue your current warfarin dose with next INR in 2 weeks       Summary  As of 8/29/2024      Full warfarin instructions:  4 mg every Mon; 5 mg all other days   Next INR check:  9/12/2024               Detailed voice message left for Jennifer with dosing instructions and follow up date.     Contact [phone] to schedule and with any changes, questions or concerns.     Education provided: Please call back if any changes to your diet, medications or how you've been taking warfarin    Plan made per [facility] anticoagulation protocol    Eneida Suarez RN  Anticoagulation Clinic  8/29/2024    _______________________________________________________________________     Anticoagulation Episode Summary       Current INR goal:  2.0-2.5   TTR:  47.9% (11.5 mo)   Target end date:  Indefinite   Send INR reminders to:  [facility]    Indications    S/P AVR (aortic valve replacement) [Z95.2]  Long term current use of anticoagulant therapy [Z79.01]             Comments:  6/2/23 INR target goal changed to 2.0 - 2.5 d/t daily bleeding issues.  (Sensitivity)  Goal range changed 2/20/19 per cardiology             Anticoagulation Care Providers       Provider Role Specialty Phone number    Rafaela Woods MD Referring Family Medicine [phone]

## 2024-08-29 NOTE — TELEPHONE ENCOUNTER
See mychart encounter for documentation from this AM.     Noted pt still not scheduled for 3T MRI and no updated notes on the order indicating a call was attempted yet.     Spoke with imaging scheduling and asked they call pt this afternoon to schedule. Will check pt's chart next week to ensure she go it scheduled prior to her stroke HFU appt on 9/26.      Lynette URIBE, RN, SCRN  RN Stroke Neurology Care Coordinator  [facility] Neuroscience Service Line

## 2024-08-30 NOTE — TELEPHONE ENCOUNTER
[facility] Call Center    Phone Message    May a detailed message be left on voicemail: yes     Reason for Call: Janell olvera Gennius called in to updated the fax number for the order. Please fax to [phone].    Action Taken: Message routed to:  Other: CS Neurology    Travel Screening: Not Applicable     Date of Service:

## 2024-08-30 NOTE — TELEPHONE ENCOUNTER
Faxed Form (Orders)-August 30, 2024 to fax number [id number]     Right Fax confirmed at 2:34 PM    Kisha Briones MA

## 2024-08-30 NOTE — TELEPHONE ENCOUNTER
Pt scheduled for 3T MRI on 9/16 with stroke [facility] follow up on 9/26. No further action required at this time.       Lynette Nicholson BS, RN, SCRN  RN Stroke Neurology Care Coordinator  [facility] Neuroscience Service Line

## 2024-09-03 DIAGNOSIS — F41.1 ANXIETY STATE: ICD-10-CM

## 2024-09-03 RX ORDER — CLONAZEPAM 0.5 MG/1
TABLET ORAL
Qty: 90 TABLET | Refills: 0 | Status: SHIPPED | OUTPATIENT
Start: 2024-09-03

## 2024-09-09 ENCOUNTER — TELEPHONE (OUTPATIENT)
Dept: LAB | Facility: CLINIC | Age: 69
End: 2024-09-09
Payer: COMMERCIAL

## 2024-09-09 NOTE — TELEPHONE ENCOUNTER
Left message for patient to call back.  See provider message below.  Is there a different reason she needs to have this checked.  Last 2 labs have been normal.  No need for a recheck at this time.

## 2024-09-09 NOTE — PROGRESS NOTES
Hi, this patient has lab appointment for TSH recheck, but no orders. Can you place an orders ASAP. Thank you!

## 2024-09-10 ENCOUNTER — LAB (OUTPATIENT)
Dept: LAB | Facility: CLINIC | Age: 69
End: 2024-09-10
Payer: COMMERCIAL

## 2024-09-10 ENCOUNTER — ANTICOAGULATION THERAPY VISIT (OUTPATIENT)
Dept: ANTICOAGULATION | Facility: CLINIC | Age: 69
End: 2024-09-10

## 2024-09-10 ENCOUNTER — TELEPHONE (OUTPATIENT)
Dept: CARDIOLOGY | Facility: CLINIC | Age: 69
End: 2024-09-10

## 2024-09-10 DIAGNOSIS — R93.1 ABNORMAL ECHOCARDIOGRAM: Primary | ICD-10-CM

## 2024-09-10 DIAGNOSIS — Z95.2 S/P AVR (AORTIC VALVE REPLACEMENT): Primary | ICD-10-CM

## 2024-09-10 DIAGNOSIS — Z79.01 LONG TERM CURRENT USE OF ANTICOAGULANT THERAPY: ICD-10-CM

## 2024-09-10 DIAGNOSIS — I71.03 DISSECTION OF THORACOABDOMINAL AORTA (H): ICD-10-CM

## 2024-09-10 DIAGNOSIS — Z95.2 S/P AVR (AORTIC VALVE REPLACEMENT): ICD-10-CM

## 2024-09-10 LAB — INR BLD: 3.1 (ref 0.9–1.1)

## 2024-09-10 PROCEDURE — 36416 COLLJ CAPILLARY BLOOD SPEC: CPT

## 2024-09-10 PROCEDURE — 85610 PROTHROMBIN TIME: CPT

## 2024-09-10 NOTE — PROGRESS NOTES
ANTICOAGULATION MANAGEMENT     Alyssa Higginbotham 69 year old female is on warfarin with supratherapeutic INR result. (Goal INR 2.0-2.5)    Recent labs: (last 7 days)     09/10/24  1505   INR 3.1*       ASSESSMENT     Source(s): Chart Review and Patient/Caregiver Call     Warfarin doses taken: Warfarin taken as instructed  Diet:  due to being dehydrated, she was drinking Propel which contains Vitamin-E may be affecting diet and INR   Was drinking propel energy drink.  Now back to just drinking water.  Medication/supplement changes: None noted  New illness, injury, or hospitalization: No.   Reported vertigo have resolved.  Signs or symptoms of bleeding or clotting: Yes:    Reported epistaxis.  Previous result: Therapeutic last visit at 2.2; previously outside of goal range at 2.6  Additional findings:  Scheduled on 9/16/24 for follow-up MR of head with and without contrast.       PLAN     Recommended plan for temporary change(s) affecting INR     Dosing Instructions: hold dose then continue your current warfarin dose with next INR in 3 days       Summary  As of 9/10/2024      Full warfarin instructions:  9/10: Hold; Otherwise 4 mg every Mon; 5 mg all other days   Next INR check:  9/17/2024               Telephone call with Jennifer who verbalizes understanding and agrees to plan   - provided education - stroke symptoms.    Lab visit scheduled - INR on 9/13/24 @ [facility].    Education provided: Taking warfarin: Importance of taking warfarin as instructed  Goal range and lab monitoring: goal range and significance of current result  Dietary considerations: importance of consistent vitamin K intake  Symptom monitoring: monitoring for bleeding signs and symptoms    Plan made per [facility] anticoagulation protocol    Aylin Pablo RN  9/10/2024  Anticoagulation Clinic  [employer] [place] for routing messages: chel PINEDA [facility] patient phone line:  [phone]        _______________________________________________________________________     Anticoagulation Episode Summary       Current INR goal:  2.0-2.5   TTR:  49.0% (11.5 mo)   Target end date:  Indefinite   Send INR reminders to:  [facility]    Indications    S/P AVR (aortic valve replacement) [Z95.2]  Long term current use of anticoagulant therapy [Z79.01]             Comments:  6/2/23 INR target goal changed to 2.0 - 2.5 d/t daily bleeding issues.  (Sensitivity)  Goal range changed 2/20/19 per cardiology             Anticoagulation Care Providers       Provider Role Specialty Phone number    Rafaela Woods MD Referring Family Medicine [phone]

## 2024-09-10 NOTE — TELEPHONE ENCOUNTER
[facility] Call Center    Phone Message    May a detailed message be left on voicemail: yes     Reason for Call: Other: Patient is wondering along with her CTA chest for vascular if she will need an echocardiogram as well. She states that she tries to coordinate these tests. She also states that she had an echo in April 2024 at [facility] and does not know if this will be covered because its in the same calendar year. Please call her back to discuss.        Action Taken: Other: cardiology    Travel Screening: Not Applicable  Thank you!  Specialty [facility] Center       Date of Service:

## 2024-09-11 ENCOUNTER — TELEPHONE (OUTPATIENT)
Dept: CARDIOLOGY | Facility: CLINIC | Age: 69
End: 2024-09-11
Payer: COMMERCIAL

## 2024-09-11 DIAGNOSIS — I71.03 DISSECTION OF THORACOABDOMINAL AORTA (H): ICD-10-CM

## 2024-09-11 DIAGNOSIS — Z95.2 S/P AVR (AORTIC VALVE REPLACEMENT): Primary | ICD-10-CM

## 2024-09-11 NOTE — TELEPHONE ENCOUNTER
Janell, See PC from patient wanting to confirm she does not need to repeat the echo prior to her appt with you. She had one at [facility] in July. Please advise.  -sea    =======  PC to patient to discuss recent imaging. She states was recommended to  have echo and CTA chest prior to follow-up with IRVING Maciel 10/9. She states had an echo late July, 7/26 per chart review, and is wanting to confirm she doesn't need to repeat prior to her appointment. Scheduling contact for CTA chest provided to patient. Will fwd to Janell and call with response. No further questions.  -sea

## 2024-09-11 NOTE — TELEPHONE ENCOUNTER
Patient notified of results and recommendations per IRVING Maciel. Patient verbalizes understanding and agrees with plan. No further questions or concerns at this time. -sea     =========  ----- Message -----  From: Janell Singh PA-C  Sent: 9/11/2024   3:05 PM CDT  To: Laurita Sainz RN    Yes, thanks!  ----- Message -----  From: Laurita Sainz RN  Sent: 9/11/2024   2:57 PM CDT  To: Janell Singh PA-C    The CTA has been ordered previously, ok to order echo (indication abnormal echo, increased gradient) under you?

## 2024-09-11 NOTE — TELEPHONE ENCOUNTER
----- Message -----  From: Janell Singh PA-C  Sent: 9/11/2024   2:16 PM CDT  To: Laurita Sainz RN    I have not seen this read Dr. Vega's last note it says that she needs an echo in 4 to 6 weeks patient before.  But if you look at the last cardiology note written by Dr. Joseph on 729, it says if she needs a repeat echo in 4 to 8 weeks.  So based on this, yes she does.

## 2024-09-13 ENCOUNTER — ANTICOAGULATION THERAPY VISIT (OUTPATIENT)
Dept: ANTICOAGULATION | Facility: CLINIC | Age: 69
End: 2024-09-13

## 2024-09-13 ENCOUNTER — LAB (OUTPATIENT)
Dept: CARDIOLOGY | Facility: CLINIC | Age: 69
End: 2024-09-13
Payer: COMMERCIAL

## 2024-09-13 ENCOUNTER — TELEPHONE (OUTPATIENT)
Dept: CARDIOLOGY | Facility: CLINIC | Age: 69
End: 2024-09-13

## 2024-09-13 ENCOUNTER — TELEPHONE (OUTPATIENT)
Dept: FAMILY MEDICINE | Facility: CLINIC | Age: 69
End: 2024-09-13

## 2024-09-13 DIAGNOSIS — Z95.2 S/P AVR (AORTIC VALVE REPLACEMENT): Primary | ICD-10-CM

## 2024-09-13 DIAGNOSIS — Z79.01 LONG TERM CURRENT USE OF ANTICOAGULANT THERAPY: ICD-10-CM

## 2024-09-13 DIAGNOSIS — Z95.2 S/P AVR (AORTIC VALVE REPLACEMENT): ICD-10-CM

## 2024-09-13 LAB — INR POINT OF CARE: 1.8 (ref 0.9–1.1)

## 2024-09-13 NOTE — PROGRESS NOTES
ANTICOAGULATION MANAGEMENT     Alyssa Higginbotham 69 year old female is on warfarin with subtherapeutic INR result. (Goal INR 2.0-2.5)    Recent labs: (last 7 days)     09/13/24  0733   INR 1.8*       ASSESSMENT     Source(s): Chart Review and Patient/Caregiver Call     Warfarin doses taken: Held one dose on 9/10/24, recently which may be affecting INR  Diet: No new diet changes identified  Medication/supplement changes: None noted  New illness, injury, or hospitalization: No  Signs or symptoms of bleeding or clotting: No  Previous result: Supratherapeutic at 3.1 on 9/10/24. (Had symptoms of epistaxis events).  Additional findings:  MRI scheduled and has appt with neurologist on 9/26/24.        PLAN     Recommended plan for no diet, medication or health factor changes affecting INR     Dosing Instructions: Continue your current warfarin dose with next INR in 1 week       Summary  As of 9/13/2024      Full warfarin instructions:  4 mg every Mon; 5 mg all other days   Next INR check:  9/20/2024               Telephone call with Jennifer who verbalizes understanding and agrees to plan    Lab visit scheduled - INR on 9/20/24 @ [facility]    Education provided: Taking warfarin: Importance of taking warfarin as instructed  Goal range and lab monitoring: goal range and significance of current result    Plan made per [facility] anticoagulation protocol    Aylin Pablo RN  9/13/2024  Anticoagulation Clinic  [employer] [place] for routing messages: chel GALVEZ  [facility] patient phone line: [phone]        _______________________________________________________________________     Anticoagulation Episode Summary       Current INR goal:  2.0-2.5   TTR:  49.4% (11.5 mo)   Target end date:  Indefinite   Send INR reminders to:  [facility]    Indications    S/P AVR (aortic valve replacement) [Z95.2]  Long term current use of anticoagulant therapy [Z79.01]             Comments:  6/2/23 INR target goal changed to  2.0 - 2.5 d/t daily bleeding issues.  (Sensitivity)  Goal range changed 2/20/19 per cardiology             Anticoagulation Care Providers       Provider Role Specialty Phone number    Rafaela Woods MD Referring Family Medicine [phone]

## 2024-09-13 NOTE — TELEPHONE ENCOUNTER
Spoke with pt and have her scheduled for 1 year f/unit(s). Pt understands date, time and location. PT has CTA and Echo scheduled

## 2024-09-13 NOTE — TELEPHONE ENCOUNTER
General Call      Reason for Call:  pt calling inr nurse back - pleae contact pt    What are your questions or concerns:  see above    Date of last appointment with provider: na/    Could we send this information to you in [email or web address] or would you prefer to receive a phone call?:   Patient would prefer a phone call   Okay to leave a detailed message?: No at Home number on file [phone] (home)

## 2024-09-19 ENCOUNTER — TRANSFERRED RECORDS (OUTPATIENT)
Dept: HEALTH INFORMATION MANAGEMENT | Facility: CLINIC | Age: 69
End: 2024-09-19
Payer: COMMERCIAL

## 2024-09-20 ENCOUNTER — LAB (OUTPATIENT)
Dept: CARDIOLOGY | Facility: CLINIC | Age: 69
End: 2024-09-20
Payer: COMMERCIAL

## 2024-09-20 ENCOUNTER — ANTICOAGULATION THERAPY VISIT (OUTPATIENT)
Dept: ANTICOAGULATION | Facility: CLINIC | Age: 69
End: 2024-09-20

## 2024-09-20 DIAGNOSIS — Z79.01 LONG TERM CURRENT USE OF ANTICOAGULANT THERAPY: ICD-10-CM

## 2024-09-20 DIAGNOSIS — Z95.2 S/P AVR (AORTIC VALVE REPLACEMENT): ICD-10-CM

## 2024-09-20 DIAGNOSIS — Z95.2 S/P AVR (AORTIC VALVE REPLACEMENT): Primary | ICD-10-CM

## 2024-09-20 LAB — INR POINT OF CARE: 2.3 (ref 0.9–1.1)

## 2024-09-20 NOTE — PROGRESS NOTES
ANTICOAGULATION MANAGEMENT     Alyssa Higginbotham 69 year old female is on warfarin with therapeutic INR result. (Goal INR 2.0-2.5)    Recent labs: (last 7 days)     09/20/24  0735   INR 2.3*       ASSESSMENT     Source(s): Chart Review and Patient/Caregiver Call     Warfarin doses taken: Missed dose(s) may be affecting INR   Reported possibly missing 2 doses.    (9/18/24 had nausea and vomiting, thinks she might have vomited some of her pills.  On 9/19/24 she felt drugged out after a procedure and noticed one warfarin tablet on the counter.)  Does not use a pill box.   Filled out template questionnaire.  Diet: No new diet changes identified  Medication/supplement changes: None noted  New illness, injury, or hospitalization: No   On 7/25/24  Nontraumatic, bifrontal intraparenchymal hemorrhage (IPH) with scattered subarachnoid hemorrhage    Hx of AVR (mechanical tilting disc) w/ aortic dissection w/ repair on 2000.  Signs or symptoms of bleeding or clotting: No  Previous result: Subtherapeutic at 1.8 on 9/13/24.  Additional findings: CTA scheduled and has appt with neurologist on 9/26/24.        PLAN     Recommended plan for temporary change(s) affecting INR     Dosing Instructions: booster dose then continue your current warfarin dose with next INR in 1 week       Summary  As of 9/20/2024      Full warfarin instructions:  9/20: 6 mg; Otherwise 4 mg every Mon; 5 mg all other days   Next INR check:  9/27/2024               Telephone call with Jennifer who verbalizes understanding and agrees to .   - recommended using a pill container.    Lab visit scheduled - INR on 9/27/24 @ [facility]   - scheduled for INR after CTA  appt.    Education provided: Taking warfarin: take warfarin at same time each day; preferably in the evening and Importance of taking warfarin as instructed  Goal range and lab monitoring: goal range and significance of current result  Symptom monitoring: monitoring for bleeding signs and  symptoms    Plan made per [facility] anticoagulation protocol    Aylin Pablo RN  9/20/2024  Anticoagulation Clinic  [facility] for routing messages: p CULLEN GALVZE  [facility] patient phone line: [phone]        _______________________________________________________________________     Anticoagulation Episode Summary       Current INR goal:  2.0-2.5   TTR:  49.3% (11.5 mo)   Target end date:  Indefinite   Send INR reminders to:  CULLEN GALVEZ    Indications    S/P AVR (aortic valve replacement) [Z95.2]  Long term current use of anticoagulant therapy [Z79.01]             Comments:  6/2/23 INR target goal changed to 2.0 - 2.5 d/t daily bleeding issues.  (Sensitivity)  Goal range changed 2/20/19 per cardiology             Anticoagulation Care Providers       Provider Role Specialty Phone number    Rafaela Woods MD Referring Family Medicine [phone]

## 2024-09-23 ENCOUNTER — TELEPHONE (OUTPATIENT)
Dept: NEUROLOGY | Facility: CLINIC | Age: 69
End: 2024-09-23
Payer: COMMERCIAL

## 2024-09-23 NOTE — PROGRESS NOTES
__________________________________________________________      [facility] Sepideh Garcia   [phone]  __________________________________________________________         History of Present Illness   Chief Complaint: Patient presents with:  RECHECK: Seems to be getting migraines after a bad one around the 24th of July. She's had 3 since then and she's afraid to take medicine so she lets them go until she throws up.      Alyssa Higginbotham is a 69 year old female presenting for follow-up for hemorrhage.     She was hospitalized at [facility] on 7/25/24. Prior to the hospital stay, she had a past medical history of f migraine, HLD, HTN, aortic dissection (2000), marfan syndrome, s/p AVR on coumadin. She initially presented to the [facility] ED with 2 day history of severe headache, confusion, L facial droop and L hand weakness. CT with R>L bifrontal hemorrhage + SAH. Warfarin was reversed with Kcentra and vitamin K (INR 2.2-->1.24) and she was transferred to [facility] for further management. It was recommended that she restart 2 weeks from bleed onset (Aug 8). Statin was held. Etiology of bleed was unclear. Outpatient 7T MRI was recommended but unable to be completed due to sternal wires; 3T MRI interpreted as compatible with CAA; however on personal/neurologist review does not have extensive white matter disease, dilated perivascular spaces or chronic microhemorrhage so she meets criteria for possible CAA.    She was seen in the ED 8/23/24 for headache; repeat CT/CTA were stable.     Today, Jennifer presents with her  and sister who contribute to review of systems and history.  She reports that overall she has unified gradual improvement since discharging from the hospital.  She denies any significant residual dizziness or cognitive changes (family confirm no significant change from her cognitive baseline and no pre-existing cognitive concerns).  She has had complete resolution of left  "face/hand weakness.  She denies any new neurological deficits.    She unfortunately has been struggling with migraine.  She has had migraine all of her life.  Her typical migraine will start with a sensation of being \"off\" she will then develop a brief visual aura and then developed pain behind her eye which becomes quite severe often with associated nausea/vomiting and light/sound sensitivity.  She has had 3 migraines since discharging from the hospital; she confirms these are all typical of her prior migraine with no change in quality/character.  Unfortunately she has been unsure which she is able to use to treat these migraines so she has not treated them with anything.  As a result migraines are lasting 1-2 days at a time and are very severe and disabling.  She does note that she was previously given Nurtec which she tried a couple of times prior to the hospital which was somewhat helpful.  She was unsure if she could use this now.  She would also like something to treat her nausea.  She does confirm she is entirely discontinued Fioricet.    She continues on Coumadin given her history of aortic valve replacement.  She is scheduled to see cardiology next month.  Although notes indicate that simvastatin was discontinued in the hospital, she reports that she has been continuing this with no change.  She has discontinued all caffeine.    Blood pressure is significantly elevated in clinic today; 197/107.  Blood pressure continues to be elevated >190/100s on repeat test.  She reports that she checks her blood pressure almost every day at home with averages in the 110-120/60-70s.  However she notes that she was never measured or sized for her blood pressure cuff and that the cuff is quite large for her small arm.  I question if her home readings are accurate or if they are artificially low due to incorrectly sized blood pressure cuff.    Given elevated blood pressure I recommended she be seen in the emergency " "department.  She was very reluctant to consider this.  Ultimately we opted to have her go to the medical supply store get measured for an appropriately fitted blood pressure cuff and return.  Following this the blood pressure cuff gave a reading of 166/100.  She is asymptomatic.  She was counseled to continue close blood pressure monitoring at home and follow-up with her primary care provider in less than 1 week.  She should be seen in the emergency department for any readings >170/100 or with development of neurological symptoms.    Stroke Evaluation Summarized:  New results resulted and reviewed by me today are in BOLD below.  I personally reviewed the following neuroimaging studies today and the comments above reflect my own personal interpretation of the images: images: CT head and MRI brain    MRI/Head CT MRI Brain 7/26: stable bifrontal IPH and SAH; no interval change or new hemorrhage    Brain MRI 9/19/24 ([facility]):  \"interim evolution of bifrontal prenchymal hematomas with decreased size and edema. Evolution of extra-axial collections and gyriform cortical hemorrhage is in the frontal lobes anteriorly.  No other new parenchymal lesions.  Findings are compatible with cerebral amyloid angiopathy.\"  However on personal and neurologist review, no clear evidence of dilated perivascular space, extensive white matter disease or chronic microhemorrhage; with daily overall clinical and radiographic findings are consistent with possible CAA (not probable)    CTH 7/25/24 : Bifrontal right greater than left intraparenchymal hemorrhages (hemorrhage volume estimated at 18 cc and 1 cc respectively); as well as scattered subarachnoid hemorrhage     CTH 8/9/24: decreased density of bifrontal IPH with decreased mass effect/edema    Intracranial Vasculature CTA Head: No LVO or severe stenosis.    MRV: no dural venous sinus thrombosis   Cervical Vasculature CTA Neck: No LVO. Chronic arotic dissection extending into the right " proximal common carotid artery    CT Perfusion No core infarct       Echocardiogram TTE:  LVEF 65-70%   EKG/Telemetry Sinus rhythm   Septal infarct (cited on or before 30-Jan-2023)    Other Testing INR: 2.27 ->1.24  CRP: 13.80 (elevated)  ESR: 13     Blood cultures: no growth     EEG 7/26: No interictal epileptiform abnormalities and no electrographic seizures were observed.      Labs Lab Results   Component Value Date    LDL 71 06/06/2024    A1C 5.5 07/25/2024    CTROPT 100 (HH) 07/27/2024    INR 2.3 (A) 09/20/2024    INR 1.8 (A) 09/13/2024               Home Medications     Current Outpatient Medications   Medication Sig Dispense Refill    amitriptyline (ELAVIL) 10 MG tablet Take 1 tablet (10 mg) by mouth at bedtime 90 tablet 3    clonazePAM (KLONOPIN) 0.5 MG tablet TAKE ONE TABLET BY MOUTH ONCE NIGHTLY AS NEEDED FOR ANXIETY OR SLEEP 90 tablet 0    levothyroxine (SYNTHROID/LEVOTHROID) 75 MCG tablet TAKE 1 TABLET ON WEDNESDAY, SATURDAY 52 tablet 3    levothyroxine (SYNTHROID/LEVOTHROID) 88 MCG tablet Take 1 tablet of 88mcg by mouth Monday, Tuesday, Thursday, Friday and Sunday. Will take 75mcg the other two days. 90 tablet 1    metoclopramide (REGLAN) 5 MG tablet Take 1 tablet (5 mg) by mouth 3 times daily as needed (nausea/vomiting). 30 tablet 0    metoprolol succinate ER (TOPROL XL) 200 MG 24 hr tablet Take 1 tablet (200 mg) by mouth daily 90 tablet 3    multivitamin (ONE A DAY) per tablet [MULTIVITAMIN (ONE A DAY) PER TABLET] Take 1 tablet by mouth daily.       rimegepant (NURTEC) 75 MG ODT tablet Place 1 tablet (75 mg) under the tongue daily as needed for migraine Maximum of 1 tablet every 24 hours. 8 tablet 0    simvastatin (ZOCOR) 20 MG tablet Take 1 tablet (20 mg) by mouth at bedtime TAKE 1 TABLET AT BEDTIME (DUE FOR AN OFFICE VISIT) 90 tablet 1    triamterene-HCTZ (MAXZIDE) 75-50 MG tablet Take 0.5 tablets by mouth daily 45 tablet 3     No current facility-administered medications for this visit.             "Physical Examination     Physical Exam    Estimated body mass index is 21.4 kg/m  as calculated from the following:    Height as of 8/29/24: 1.575 m (5' 2\").    Weight as of this encounter: 53.1 kg (117 lb).    BP (!) 197/107   Pulse 73   Wt 53.1 kg (117 lb)   SpO2 98%   BMI 21.40 kg/m         General Exam  General: Sitting up in chair in no acute distress  Pulmonary:  no respiratory distress    Neurologic:  Mental Status:  alert, oriented x 3, follows commands, speech clear and fluent, naming and repetition normal  Cranial Nerves:  visual fields intact, PERRL, EOMI with normal smooth pursuit, facial sensation intact and symmetric, facial movements symmetric, hearing not formally tested but intact to conversation, palate elevation symmetric and uvula midline, no dysarthria, tongue protrusion midline  Motor:  normal muscle tone and bulk, no abnormal movements, able to move all limbs spontaneously, strength 5/5 throughout upper and lower extremities, no pronator drift  Sensory:  light touch sensation intact and symmetric throughout upper and lower extremities, no extinction on double simultaneous stimulation   Coordination:  normal finger-to-nose and heel-to-shin bilaterally without dysmetria  Station/Gait:  deferred           Screenings and Questionnaires:     Tobacco:    Tobacco Use      Smoking status: Never        Passive exposure: Past      Smokeless tobacco: Never      Sleep Apnea:       Depression:      3/19/2024     8:00 AM 3/19/2024     7:54 AM   PHQ-2 ( 1999 [employer])   Q1: Little interest or pleasure in doing things 1 1   Q2: Feeling down, depressed or hopeless 0 0   PHQ-2 Score 1 1   Q1: Little interest or pleasure in doing things  Several days   Q2: Feeling down, depressed or hopeless  Not at all   PHQ-2 Score  1       Stroke Recovery and Risk Factors:      9/26/2024     7:49 AM   Stroke Questionnaire   Residual effects: Any residual effects from stroke? I have some symptoms, but I'm not sure if they're " residual effects of the stroke   Residual effects: Describe headaches   Level of independence: Could you live alone? Yes   Quality of Life: What work now or before stroke Retired   Medication: How do you take your meds Myself, from individual bottles   Risk Factor: Checking blood pressure at home Yes   Risk Factor: Usual blood pressure numbers 120/70   Risk Factor: Checking blood sugar at home No   Risk Factor: Second-hand smoke at home No   Risk Factor: How much caffeine per day went off caffeine   Who completed this questionnaire? The patient independently             Assessment and Plan   Non traumatic, bifrontal Right > Left intraparenchymal hemorrhage with scattered SAH while on warfarin.  2. Possible CAA; per outside radiology report there area features of CAA however on personal and vascular neurologist read no clear evidence of microhemorrhage, dilated perivascular spaces or extensive white matter disease so would classify this a possible (not probable) CAA.   -continues on warfarin for s/p AVR; avoid other antiplatelet/blood thinning medications and supplements  - Goal BP is <130/80 with tighter control associated with improved vascular outcomes; recommend home blood pressure monitoring and keeping a BP log for primary care follow up; recommend obtain new cuff from medical device store to assure it is properly sized    3. S/p AVR on lifelong coumadin therapy  -Continue following with cardiology  - will message cardiologist and suggest tight INR range (i.e. 2-2.5) if possible to try and reduce risk of further hemorrhage    4. HTN  -Very elevated x 2 in office today.  We had her go to the medical supply store and obtain an appropriately measured blood pressure cuff and return; on this device reading is 166/100.  She declines evaluation in the emergency department.  Given that she is asymptomatic counseled her that she may return home with close blood pressure monitoring and PCP follow-up in less than 1 week.   She should be seen in the emergency department for any readings >170/100 or if any new neurological symptoms develop.    5. Migraine  -recommend nurtec ODT + metoclopramide  5mg at onset of migraine  -referral to headache specialist     - Return in about 3 months (around 12/26/2024) for hemorrhage, with RAYMOND Medeiros only.    Stroke Education provided.  She will call us with any questions.  For any acute neurologic deficits she was advised to  go directly to the hospital rather than call the clinic.      BETSY Cota [facility]  Neurology  09/26/2024     Case was discussed and MRI images reviewed with Dr. Montalvo.     ____________________________________________________________________    Billing:  Please note: for coding purposes this visit should be billed only as established and not new, since this patient was seen by my same subspecialty team ([facility]) during the hospitalization that this visit is a follow up for.  I spent a total of 75 minutes on the day of the visit.   Time spent by me doing chart review, history and exam, documentation and further activities per the note

## 2024-09-24 NOTE — TELEPHONE ENCOUNTER
Spoke with [facility] and requested they fax the MRI report to [phone].     Routing to CSS to please look for these incoming results and scan into pt's chart. Thank you!      Lynette Nicholson BS, RN, SCRN  RN Stroke Neurology Care Coordinator  [facility] Line

## 2024-09-24 NOTE — TELEPHONE ENCOUNTER
Images reviewed. Thanks.    Can we also get the formal report from [facility]?

## 2024-09-26 ENCOUNTER — OFFICE VISIT (OUTPATIENT)
Dept: NEUROLOGY | Facility: CLINIC | Age: 69
End: 2024-09-26
Attending: PHYSICIAN ASSISTANT
Payer: COMMERCIAL

## 2024-09-26 VITALS
HEART RATE: 73 BPM | SYSTOLIC BLOOD PRESSURE: 197 MMHG | OXYGEN SATURATION: 98 % | BODY MASS INDEX: 21.4 KG/M2 | WEIGHT: 117 LBS | DIASTOLIC BLOOD PRESSURE: 107 MMHG

## 2024-09-26 DIAGNOSIS — I61.9 INTRAPARENCHYMAL HEMORRHAGE OF BRAIN (H): Primary | ICD-10-CM

## 2024-09-26 RX ORDER — METOCLOPRAMIDE 5 MG/1
5 TABLET ORAL 3 TIMES DAILY PRN
Qty: 30 TABLET | Refills: 0 | Status: SHIPPED | OUTPATIENT
Start: 2024-09-26

## 2024-09-26 NOTE — LETTER
9/26/2024      Alyssa Higginbotham  [address] [place] [address]      Dear Colleague,    Thank you for referring your patient, Alyssa Higginbotham, to the [facility] NEUROLOGY [facility]. Please see a copy of my visit note below.    __________________________________________________________      [facility]   [phone]  __________________________________________________________         History of Present Illness   Chief Complaint: Patient presents with:  RECHECK: Seems to be getting migraines after a bad one around the 24th of July. She's had 3 since then and she's afraid to take medicine so she lets them go until she throws up.      Alyssa Higginbotham is a 69 year old female presenting for follow-up for hemorrhage.     She was hospitalized at [facility] on 7/25/24. Prior to the hospital stay, she had a past medical history of f migraine, HLD, HTN, aortic dissection (2000), marfan syndrome, s/p AVR on coumadin. She initially presented to the [facility] ED with 2 day history of severe headache, confusion, L facial droop and L hand weakness. CT with R>L bifrontal hemorrhage + SAH. Warfarin was reversed with Kcentra and vitamin K (INR 2.2-->1.24) and she was transferred to [facility] for further management. It was recommended that she restart 2 weeks from bleed onset (Aug 8). Statin was held. Etiology of bleed was unclear. Outpatient 7T MRI was recommended but unable to be completed due to sternal wires; 3T MRI interpreted as compatible with CAA; however on personal/neurologist review does not have extensive white matter disease, dilated perivascular spaces or chronic microhemorrhage so she meets criteria for possible CAA.    She was seen in the ED 8/23/24 for headache; repeat CT/CTA were stable.     Today, Jennifer presents with her  and sister who contribute to review of systems and history.  She reports that overall she has unified gradual improvement  "since discharging from the hospital.  She denies any significant residual dizziness or cognitive changes (family confirm no significant change from her cognitive baseline and no pre-existing cognitive concerns).  She has had complete resolution of left face/hand weakness.  She denies any new neurological deficits.    She unfortunately has been struggling with migraine.  She has had migraine all of her life.  Her typical migraine will start with a sensation of being \"off\" she will then develop a brief visual aura and then developed pain behind her eye which becomes quite severe often with associated nausea/vomiting and light/sound sensitivity.  She has had 3 migraines since discharging from the hospital; she confirms these are all typical of her prior migraine with no change in quality/character.  Unfortunately she has been unsure which she is able to use to treat these migraines so she has not treated them with anything.  As a result migraines are lasting 1-2 days at a time and are very severe and disabling.  She does note that she was previously given Nurtec which she tried a couple of times prior to the hospital which was somewhat helpful.  She was unsure if she could use this now.  She would also like something to treat her nausea.  She does confirm she is entirely discontinued Fioricet.    She continues on Coumadin given her history of aortic valve replacement.  She is scheduled to see cardiology next month.  Although notes indicate that simvastatin was discontinued in the hospital, she reports that she has been continuing this with no change.  She has discontinued all caffeine.    Blood pressure is significantly elevated in clinic today; 197/107.  Blood pressure continues to be elevated >190/100s on repeat test.  She reports that she checks her blood pressure almost every day at home with averages in the 110-120/60-70s.  However she notes that she was never measured or sized for her blood pressure cuff and that " "the cuff is quite large for her small arm.  I question if her home readings are accurate or if they are artificially low due to incorrectly sized blood pressure cuff.    Given elevated blood pressure I recommended she be seen in the emergency department.  She was very reluctant to consider this.  Ultimately we opted to have her go to the medical supply store get measured for an appropriately fitted blood pressure cuff and return.  Following this the blood pressure cuff gave a reading of 166/100.  She is asymptomatic.  She was counseled to continue close blood pressure monitoring at home and follow-up with her primary care provider in less than 1 week.  She should be seen in the emergency department for any readings >170/100 or with development of neurological symptoms.    Stroke Evaluation Summarized:  New results resulted and reviewed by me today are in BOLD below.  I personally reviewed the following neuroimaging studies today and the comments above reflect my own personal interpretation of the images: images: CT head and MRI brain    MRI/Head CT MRI Brain 7/26: stable bifrontal IPH and SAH; no interval change or new hemorrhage    Brain MRI 9/19/24 ([facility]):  \"interim evolution of bifrontal prenchymal hematomas with decreased size and edema. Evolution of extra-axial collections and gyriform cortical hemorrhage is in the frontal lobes anteriorly.  No other new parenchymal lesions.  Findings are compatible with cerebral amyloid angiopathy.\"  However on personal and neurologist review, no clear evidence of dilated perivascular space, extensive white matter disease or chronic microhemorrhage; with daily overall clinical and radiographic findings are consistent with possible CAA (not probable)    CTH 7/25/24 : Bifrontal right greater than left intraparenchymal hemorrhages (hemorrhage volume estimated at 18 cc and 1 cc respectively); as well as scattered subarachnoid hemorrhage     CTH 8/9/24: decreased density of " bifrontal IPH with decreased mass effect/edema    Intracranial Vasculature CTA Head: No LVO or severe stenosis.    MRV: no dural venous sinus thrombosis   Cervical Vasculature CTA Neck: No LVO. Chronic arotic dissection extending into the right proximal common carotid artery    CT Perfusion No core infarct       Echocardiogram TTE:  LVEF 65-70%   EKG/Telemetry Sinus rhythm   Septal infarct (cited on or before 30-Jan-2023)    Other Testing INR: 2.27 ->1.24  CRP: 13.80 (elevated)  ESR: 13     Blood cultures: no growth     EEG 7/26: No interictal epileptiform abnormalities and no electrographic seizures were observed.      Labs Lab Results   Component Value Date    LDL 71 06/06/2024    A1C 5.5 07/25/2024    CTROPT 100 (HH) 07/27/2024    INR 2.3 (A) 09/20/2024    INR 1.8 (A) 09/13/2024               Home Medications     Current Outpatient Medications   Medication Sig Dispense Refill     amitriptyline (ELAVIL) 10 MG tablet Take 1 tablet (10 mg) by mouth at bedtime 90 tablet 3     clonazePAM (KLONOPIN) 0.5 MG tablet TAKE ONE TABLET BY MOUTH ONCE NIGHTLY AS NEEDED FOR ANXIETY OR SLEEP 90 tablet 0     levothyroxine (SYNTHROID/LEVOTHROID) 75 MCG tablet TAKE 1 TABLET ON WEDNESDAY, SATURDAY 52 tablet 3     levothyroxine (SYNTHROID/LEVOTHROID) 88 MCG tablet Take 1 tablet of 88mcg by mouth Monday, Tuesday, Thursday, Friday and Sunday. Will take 75mcg the other two days. 90 tablet 1     metoclopramide (REGLAN) 5 MG tablet Take 1 tablet (5 mg) by mouth 3 times daily as needed (nausea/vomiting). 30 tablet 0     metoprolol succinate ER (TOPROL XL) 200 MG 24 hr tablet Take 1 tablet (200 mg) by mouth daily 90 tablet 3     multivitamin (ONE A DAY) per tablet [MULTIVITAMIN (ONE A DAY) PER TABLET] Take 1 tablet by mouth daily.        rimegepant (NURTEC) 75 MG ODT tablet Place 1 tablet (75 mg) under the tongue daily as needed for migraine Maximum of 1 tablet every 24 hours. 8 tablet 0     simvastatin (ZOCOR) 20 MG tablet Take 1 tablet (20  "mg) by mouth at bedtime TAKE 1 TABLET AT BEDTIME (DUE FOR AN OFFICE VISIT) 90 tablet 1     triamterene-HCTZ (MAXZIDE) 75-50 MG tablet Take 0.5 tablets by mouth daily 45 tablet 3     No current facility-administered medications for this visit.            Physical Examination     Physical Exam    Estimated body mass index is 21.4 kg/m  as calculated from the following:    Height as of 8/29/24: 1.575 m (5' 2\").    Weight as of this encounter: 53.1 kg (117 lb).    BP (!) 197/107   Pulse 73   Wt 53.1 kg (117 lb)   SpO2 98%   BMI 21.40 kg/m         General Exam  General: Sitting up in chair in no acute distress  Pulmonary:  no respiratory distress    Neurologic:  Mental Status:  alert, oriented x 3, follows commands, speech clear and fluent, naming and repetition normal  Cranial Nerves:  visual fields intact, PERRL, EOMI with normal smooth pursuit, facial sensation intact and symmetric, facial movements symmetric, hearing not formally tested but intact to conversation, palate elevation symmetric and uvula midline, no dysarthria, tongue protrusion midline  Motor:  normal muscle tone and bulk, no abnormal movements, able to move all limbs spontaneously, strength 5/5 throughout upper and lower extremities, no pronator drift  Sensory:  light touch sensation intact and symmetric throughout upper and lower extremities, no extinction on double simultaneous stimulation   Coordination:  normal finger-to-nose and heel-to-shin bilaterally without dysmetria  Station/Gait:  deferred           Screenings and Questionnaires:     Tobacco:    Tobacco Use      Smoking status: Never        Passive exposure: Past      Smokeless tobacco: Never      Sleep Apnea:       Depression:      3/19/2024     8:00 AM 3/19/2024     7:54 AM   PHQ-2 ( 1999 [employer])   Q1: Little interest or pleasure in doing things 1 1   Q2: Feeling down, depressed or hopeless 0 0   PHQ-2 Score 1 1   Q1: Little interest or pleasure in doing things  Several days   Q2: " Feeling down, depressed or hopeless  Not at all   PHQ-2 Score  1       Stroke Recovery and Risk Factors:      9/26/2024     7:49 AM   Stroke Questionnaire   Residual effects: Any residual effects from stroke? I have some symptoms, but I'm not sure if they're residual effects of the stroke   Residual effects: Describe headaches   Level of independence: Could you live alone? Yes   Quality of Life: What work now or before stroke Retired   Medication: How do you take your meds Myself, from individual bottles   Risk Factor: Checking blood pressure at home Yes   Risk Factor: Usual blood pressure numbers 120/70   Risk Factor: Checking blood sugar at home No   Risk Factor: Second-hand smoke at home No   Risk Factor: How much caffeine per day went off caffeine   Who completed this questionnaire? The patient independently             Assessment and Plan   Non traumatic, bifrontal Right > Left intraparenchymal hemorrhage with scattered SAH while on warfarin.  2. Possible CAA; per outside radiology report there area features of CAA however on personal and vascular neurologist read no clear evidence of microhemorrhage, dilated perivascular spaces or extensive white matter disease so would classify this a possible (not probable) CAA.   -continues on warfarin for s/p AVR; avoid other antiplatelet/blood thinning medications and supplements  - Goal BP is <130/80 with tighter control associated with improved vascular outcomes; recommend home blood pressure monitoring and keeping a BP log for primary care follow up; recommend obtain new cuff from medical device store to assure it is properly sized    3. S/p AVR on lifelong coumadin therapy  -Continue following with cardiology  - will message cardiologist and suggest tight INR range (i.e. 2-2.5) if possible to try and reduce risk of further hemorrhage    4. HTN  -Very elevated x 2 in office today.  We had her go to the medical supply store and obtain an appropriately measured blood  pressure cuff and return; on this device reading is 166/100.  She declines evaluation in the emergency department.  Given that she is asymptomatic counseled her that she may return home with close blood pressure monitoring and PCP follow-up in less than 1 week.  She should be seen in the emergency department for any readings >170/100 or if any new neurological symptoms develop.    5. Migraine  -recommend nurtec ODT + metoclopramide  5mg at onset of migraine  -referral to headache specialist     - Return in about 3 months (around 12/26/2024) for hemorrhage, with RAYMOND Medeiros only.    Stroke Education provided.  She will call us with any questions.  For any acute neurologic deficits she was advised to  go directly to the hospital rather than call the clinic.      BETSY Cota CNP  Neurology  09/26/2024     Case was discussed and MRI images reviewed with Dr. Montalvo.     ____________________________________________________________________    Billing:  Please note: for coding purposes this visit should be billed only as established and not new, since this patient was seen by my same subspecialty team ([facility]) during the hospitalization that this visit is a follow up for.  I spent a total of 75 minutes on the day of the visit.   Time spent by me doing chart review, history and exam, documentation and further activities per the note          Again, thank you for allowing me to participate in the care of your patient.        Sincerely,        BETSY Cota CNP

## 2024-09-26 NOTE — NURSING NOTE
Symptoms or concerns today: Migraines she's worried about taking medication for - high BP she says is common at the doctor but is getting normal numbers at home - asymptomatic.    Med comments: Current    Who the patient is here with today:  and sister    Antonio Martins

## 2024-09-26 NOTE — PATIENT INSTRUCTIONS
1.  Continue Coumadin; avoid all other blood thinning medications (i.e. ibuprofen, Motrin, Aleve, aspirin etc.)  2.  Aggressive blood pressure management; I recommend obtaining a new blood pressure cuff so we can assure that it fits you and is accurate.  I will provide you with a prescription for this today.  Please continue to check blood pressure 1-2 times per day and keep a log.  Keep this log and take it to your visits with your cardiologist, primary care provider and your next visit with me.  3. continue Zocor for now; I will discuss with my team.  Sometimes we will stop this medication in patients who have had brain bleed.  I will follow-up with you to clarify.  4. Migraine plan:  -Take 1 tablet of Nurtec and 1 tablet of metoclopramide as soon as you feel migraine symptoms starting  -I will refer you to a headache/migraine specialist  5.  Follow-up with me in 3 months, sooner if needed

## 2024-09-27 ENCOUNTER — LAB (OUTPATIENT)
Dept: CARDIOLOGY | Facility: CLINIC | Age: 69
End: 2024-09-27
Payer: COMMERCIAL

## 2024-09-27 ENCOUNTER — HOSPITAL ENCOUNTER (OUTPATIENT)
Dept: CT IMAGING | Facility: HOSPITAL | Age: 69
Discharge: HOME | End: 2024-09-27
Attending: SURGERY | Admitting: SURGERY
Payer: COMMERCIAL

## 2024-09-27 ENCOUNTER — ANTICOAGULATION THERAPY VISIT (OUTPATIENT)
Dept: ANTICOAGULATION | Facility: CLINIC | Age: 69
End: 2024-09-27

## 2024-09-27 DIAGNOSIS — Z95.2 S/P AVR (AORTIC VALVE REPLACEMENT): ICD-10-CM

## 2024-09-27 DIAGNOSIS — Z79.01 LONG TERM CURRENT USE OF ANTICOAGULANT THERAPY: ICD-10-CM

## 2024-09-27 DIAGNOSIS — Z95.2 S/P AVR (AORTIC VALVE REPLACEMENT): Primary | ICD-10-CM

## 2024-09-27 DIAGNOSIS — I71.03 DISSECTION OF THORACOABDOMINAL AORTA (H): ICD-10-CM

## 2024-09-27 LAB — INR POINT OF CARE: 3.4 (ref 0.9–1.1)

## 2024-09-27 PROCEDURE — 85610 PROTHROMBIN TIME: CPT

## 2024-09-27 PROCEDURE — 71275 CT ANGIOGRAPHY CHEST: CPT

## 2024-09-27 PROCEDURE — 36416 COLLJ CAPILLARY BLOOD SPEC: CPT

## 2024-09-27 PROCEDURE — 250N000009 HC RX 250: Performed by: SURGERY

## 2024-09-27 PROCEDURE — 250N000011 HC RX IP 250 OP 636: Performed by: SURGERY

## 2024-09-27 RX ORDER — IOPAMIDOL 755 MG/ML
70 INJECTION, SOLUTION INTRAVASCULAR ONCE
Status: COMPLETED | OUTPATIENT
Start: 2024-09-27 | End: 2024-09-27

## 2024-09-27 RX ADMIN — SODIUM CHLORIDE 90 ML: 9 INJECTION, SOLUTION INTRAVENOUS at 08:18

## 2024-09-27 RX ADMIN — IOPAMIDOL 70 ML: 755 INJECTION, SOLUTION INTRAVENOUS at 08:17

## 2024-09-27 NOTE — PROGRESS NOTES
ANTICOAGULATION MANAGEMENT     Alyssa Higginbotham 69 year old female is on warfarin with supratherapeutic INR result. (Goal INR 2.0-2.5)    Recent labs: (last 7 days)     09/27/24  0905   INR 3.4*       ASSESSMENT     Source(s): Chart Review and Patient/Caregiver Call     Warfarin doses taken: Booster dose with 6mg on 9/20/24, recently taken which may be affecting INR  Diet: No new diet changes identified   Did have N/V during migraine attacks, but eating is OK now.  Medication/supplement changes:  Yes.   9/26/24 Nurtec one tab daily along with one tablet of Metoclopramide, as soon as migraine symptoms starts.  New illness, injury, or hospitalization: Yes:    9/26/24 Neurology follow-up stroke with concerns of migraines accompanied by nausea and vomiting. Has had 3 episodes since after her stroke.  She is afraid of taking her migraine meds.  Blood pressure has been high.   On 7/25/24  Nontraumatic, bifrontal intraparenchymal hemorrhage (IPH) with scattered subarachnoid hemorrhage               Hx of AVR (mechanical tilting disc) w/ aortic dissection w/ repair on 2000.  Signs or symptoms of bleeding or clotting: No   Has denied any active bleeding.  Previous result: Therapeutic last visit at 2.3; previously outside of goal range at 1.8  Additional findings: None       PLAN     Recommended plan for temporary change(s) affecting INR     Dosing Instructions: partial hold then continue your current warfarin dose with next INR in 1 week       Summary  As of 9/27/2024      Full warfarin instructions:  9/27: 1 mg; Otherwise 4 mg every Mon; 5 mg all other days   Next INR check:  10/4/2024               Telephone call with Jennifer who verbalizes understanding and agrees to plan    Lab visit scheduled - INR on 10/4/24 @ [facility].    Education provided: Taking warfarin: Importance of taking warfarin as instructed  Goal range and lab monitoring: goal range and significance of current result  Symptom monitoring: monitoring  for bleeding signs and symptoms    Plan made per [facility] anticoagulation protocol    Aylin Pablo RN  9/27/2024  Anticoagulation Clinic  [employer] for routing messages: chel GALVEZ  [facility] patient phone line: [phone]        _______________________________________________________________________     Anticoagulation Episode Summary       Current INR goal:  2.0-2.5   TTR:  48.9% (11.5 mo)   Target end date:  Indefinite   Send INR reminders to:  CULLEN GALVEZ    Indications    S/P AVR (aortic valve replacement) [Z95.2]  Long term current use of anticoagulant therapy [Z79.01]             Comments:  6/2/23 INR target goal changed to 2.0 - 2.5 d/t daily bleeding issues.  (Sensitivity)  Goal range changed 2/20/19 per cardiology             Anticoagulation Care Providers       Provider Role Specialty Phone number    Rafaela Woods MD Referring Family Medicine [phone]

## 2024-10-01 ENCOUNTER — HOSPITAL ENCOUNTER (OUTPATIENT)
Dept: CARDIOLOGY | Facility: HOSPITAL | Age: 69
Discharge: HOME | End: 2024-10-01
Attending: PHYSICIAN ASSISTANT | Admitting: PHYSICIAN ASSISTANT
Payer: COMMERCIAL

## 2024-10-01 DIAGNOSIS — R93.1 ABNORMAL ECHOCARDIOGRAM: ICD-10-CM

## 2024-10-01 DIAGNOSIS — E78.5 HYPERLIPIDEMIA LDL GOAL <130: ICD-10-CM

## 2024-10-01 DIAGNOSIS — I71.03 DISSECTION OF THORACOABDOMINAL AORTA (H): ICD-10-CM

## 2024-10-01 LAB — LVEF ECHO: NORMAL

## 2024-10-01 PROCEDURE — 93306 TTE W/DOPPLER COMPLETE: CPT

## 2024-10-01 PROCEDURE — 93306 TTE W/DOPPLER COMPLETE: CPT | Mod: 26 | Performed by: INTERNAL MEDICINE

## 2024-10-01 RX ORDER — SIMVASTATIN 20 MG
20 TABLET ORAL AT BEDTIME
Qty: 90 TABLET | Refills: 0 | Status: SHIPPED | OUTPATIENT
Start: 2024-10-01

## 2024-10-02 ENCOUNTER — MYC REFILL (OUTPATIENT)
Dept: FAMILY MEDICINE | Facility: CLINIC | Age: 69
End: 2024-10-02
Payer: COMMERCIAL

## 2024-10-02 DIAGNOSIS — G43.009 MIGRAINE WITHOUT AURA AND WITHOUT STATUS MIGRAINOSUS, NOT INTRACTABLE: ICD-10-CM

## 2024-10-04 ENCOUNTER — LAB (OUTPATIENT)
Dept: CARDIOLOGY | Facility: CLINIC | Age: 69
End: 2024-10-04
Payer: COMMERCIAL

## 2024-10-04 ENCOUNTER — ANTICOAGULATION THERAPY VISIT (OUTPATIENT)
Dept: ANTICOAGULATION | Facility: CLINIC | Age: 69
End: 2024-10-04

## 2024-10-04 ENCOUNTER — TELEPHONE (OUTPATIENT)
Dept: FAMILY MEDICINE | Facility: CLINIC | Age: 69
End: 2024-10-04

## 2024-10-04 DIAGNOSIS — Z79.01 LONG TERM CURRENT USE OF ANTICOAGULANT THERAPY: ICD-10-CM

## 2024-10-04 DIAGNOSIS — Z95.2 S/P AVR (AORTIC VALVE REPLACEMENT): ICD-10-CM

## 2024-10-04 DIAGNOSIS — Z95.2 S/P AVR (AORTIC VALVE REPLACEMENT): Primary | ICD-10-CM

## 2024-10-04 LAB — INR POINT OF CARE: 2.7 (ref 0.9–1.1)

## 2024-10-04 PROCEDURE — 85610 PROTHROMBIN TIME: CPT

## 2024-10-04 PROCEDURE — 36416 COLLJ CAPILLARY BLOOD SPEC: CPT

## 2024-10-04 NOTE — PROGRESS NOTES
ANTICOAGULATION MANAGEMENT     Alyssa Higginbotham 69 year old female is on warfarin with supratherapeutic INR result. (Goal INR 2.0-2.5)    Recent labs: (last 7 days)     10/04/24  0739   INR 2.7*       ASSESSMENT     Source(s): Chart Review and Patient/Caregiver Call     Warfarin doses taken: Held partical dose on 9/27/24,  recently which may be affecting INR  Diet: reported during episodes of   Migraines, she experiences N/V, which may affect her diet and INR.  Medication/supplement changes: None noted  New illness, injury, or hospitalization: Yes:      Has been monitoring her BP.   9/26/24 follow-up with Neurology due to struggling with migraines often accompanied by nausea / vomiting, that can last 1-2 days. Blood pressure has been running high at 160/100 with proper BP cuff. (Pt declined ED visit).  Recommended to follow-up with PCP in less than one week.   9/26/24 per Neurologist - suggest tight INR range (i.e. 2-2.5) if possible to try and reduce risk of further hemorrhage  Signs or symptoms of bleeding or clotting: No  Previous result: Supratherapeutic at 3.4 on 9/27/24.  Additional findings: None       PLAN     Recommended plan for ongoing change(s) affecting INR     Dosing Instructions: decrease your warfarin dose (2.9% change) with next INR in 1 week       Summary  As of 10/4/2024      Full warfarin instructions:  4 mg every Mon, Fri; 5 mg all other days   Next INR check:  10/18/2024               Telephone call with Jennifer who verbalizes understanding and agrees to plan    Lab visit scheduled    Education provided: Taking warfarin: Importance of taking warfarin as instructed  Goal range and lab monitoring: goal range and significance of current result  Dietary considerations: impact of vitamin K foods on INR  Importance of notifying anticoagulation clinic for: diarrhea, nausea/vomiting, reduced intake, cold/flu, and/or infections; a sooner lab recheck maybe needed    Plan made per [facility] anticoagulation  protocol    Alyin Pablo RN  10/4/2024  Anticoagulation Clinic  [facility] for routing messages: p CULLEN GALVEZ  [facility] patient phone line: [phone]        _______________________________________________________________________     Anticoagulation Episode Summary       Current INR goal:  2.0-2.5   TTR:  48.9% (11.5 mo)   Target end date:  Indefinite   Send INR reminders to:  CULLEN GALVEZ    Indications    S/P AVR (aortic valve replacement) [Z95.2]  Long term current use of anticoagulant therapy [Z79.01]             Comments:  6/2/23 INR target goal changed to 2.0 - 2.5 d/t daily bleeding issues.  (Sensitivity)  Goal range changed 2/20/19 per cardiology             Anticoagulation Care Providers       Provider Role Specialty Phone number    Rafaela Woods MD Referring Family Medicine [phone]

## 2024-10-04 NOTE — TELEPHONE ENCOUNTER
General Call    Contacts       Contact Date/Time Type Contact Phone/Fax    10/04/2024 02:51 PM CDT Phone (Incoming) Jennifer Higginbotham (Self) [phone] (H)          Reason for Call:  patient returning missed call from ACN team    What are your questions or concerns:  pt states she was returning missed call from acn team.  At time of call, writer did attempt to contact inr nurse on number listed x2.     Date of last appointment with provider:     Could we send this information to you in [employer] or would you prefer to receive a phone call?:   Patient would prefer a phone call   Okay to leave a detailed message?: Yes at Home number on file [phone] (home)

## 2024-10-08 ENCOUNTER — LAB (OUTPATIENT)
Dept: CARDIOLOGY | Facility: CLINIC | Age: 69
End: 2024-10-08
Payer: COMMERCIAL

## 2024-10-08 ENCOUNTER — TELEPHONE (OUTPATIENT)
Dept: CARDIOLOGY | Facility: CLINIC | Age: 69
End: 2024-10-08

## 2024-10-08 ENCOUNTER — ANTICOAGULATION THERAPY VISIT (OUTPATIENT)
Dept: ANTICOAGULATION | Facility: CLINIC | Age: 69
End: 2024-10-08

## 2024-10-08 DIAGNOSIS — Z95.2 S/P AVR (AORTIC VALVE REPLACEMENT): ICD-10-CM

## 2024-10-08 DIAGNOSIS — Z95.2 S/P AVR (AORTIC VALVE REPLACEMENT): Primary | ICD-10-CM

## 2024-10-08 DIAGNOSIS — Z79.01 LONG TERM CURRENT USE OF ANTICOAGULANT THERAPY: ICD-10-CM

## 2024-10-08 LAB — INR POINT OF CARE: 3 (ref 0.9–1.1)

## 2024-10-08 PROCEDURE — 85610 PROTHROMBIN TIME: CPT

## 2024-10-08 PROCEDURE — 36416 COLLJ CAPILLARY BLOOD SPEC: CPT

## 2024-10-08 NOTE — TELEPHONE ENCOUNTER
Spoke with pt and have her rescheduled with [facility] for 10/10 12:00pm. Pt ok'd date, time and location

## 2024-10-08 NOTE — PROGRESS NOTES
ANTICOAGULATION MANAGEMENT     Alyssa Higginbotham 69 year old female is on warfarin with supratherapeutic INR result. (Goal INR 2.0-2.5)    Recent labs: (last 7 days)     10/08/24  1315   INR 3.0*       ASSESSMENT     Source(s): Chart Review and Patient/Caregiver Call     Warfarin doses taken: Warfarin taken as instructed  Diet: No new diet changes identified  Medication/supplement changes: None noted  New illness, injury, or hospitalization: Yes:    10/8/24 one year follow-up with Heart care  - aortic arch and descending thoracic aortic dissection with thoracoabdominal aneurysm after previous repair of Robby Type A aortic dissection in 2000. Progressively worsening scoliosis   Signs or symptoms of bleeding or clotting: Yes:    Reported 2 episodes of epistaxis.  Previous result: Supratherapeutic last 2 INR results.  Additional findings: None       PLAN     Recommended plan for temporary change(s) and ongoing change(s) affecting INR     Dosing Instructions: partial hold then decrease your warfarin dose (6.1% change) with next INR in 1 week       Summary  As of 10/8/2024      Full warfarin instructions:  10/8: 2 mg; Otherwise 5 mg every Mon, Wed, Fri; 4 mg all other days   Next INR check:  10/15/2024               Telephone call with Jennifer who verbalizes understanding and agrees to plan    Lab visit scheduled - INR on 10/15/24 @ [facility]    Education provided: Taking warfarin: Importance of taking warfarin as instructed  Goal range and lab monitoring: goal range and significance of current result  Symptom monitoring: monitoring for bleeding signs and symptoms    Plan made per [facility] anticoagulation protocol    Aylin Pablo RN  10/8/2024  Anticoagulation Clinic  [employer] for routing messages: chel [facility] patient phone line: [phone]        _______________________________________________________________________     Anticoagulation Episode Summary       Current INR goal:  2.0-2.5    TTR:  48.8% (11.5 mo)   Target end date:  Indefinite   Send INR reminders to:  CULLEN GALVEZ    Indications    S/P AVR (aortic valve replacement) [Z95.2]  Long term current use of anticoagulant therapy [Z79.01]             Comments:  6/2/23 INR target goal changed to 2.0 - 2.5 d/t daily bleeding issues.  (Sensitivity)  Goal range changed 2/20/19 per cardiology             Anticoagulation Care Providers       Provider Role Specialty Phone number    aRfaela Woods MD Referring Family Medicine [phone]

## 2024-10-08 NOTE — PROGRESS NOTES
HEART CARE FOLLOW UP    Primary Care: Rafaela Woods MD  Primary Cardiologist: Dr Richardson      Assessment/Recommendations     Ascending aortic dissection extending to descending thoracic aorta and iliacs   S/p mechanical AVR, March 2000  Recent multi compartment intracranial hemorrhage, subdural hematoma 7/2024  Recent CTA showed chronic aortic dissection throughout the arch and thoracoabdominal aorta extending in to the R common iliac artery with stable configuration. Slight progression of aneurysmal degredation in the mid-descending thoracic aorta at 50 x 46 mm (previously 48 x 44 mm)  This is unfortunately a complicated issue.Patient unfortunately recently suffered a spontaneous multicompartment intracranial hemorrhage with subdural hematoma in July 2024 without associated trauma and in the setting of normal INR.  At the time, echocardiogram showed increased gradients across the mechanical AVR, peak velocity 3.8 m/s and mean gradient of 30 mmHg.  The plan was for repeat echocardiogram after the patient was out of the acute setting.  The mean aortic valve pressure gradient was 25.3 mmHg but the prosthetic aortic valve was not well-visualized. The gradient was higher on previous imaging,and in the past has been as high as 22 mmHg previously-so likely we are within a normal variable of range. In the setting of increased velocities, we sometimes consider increasing INR goal in case of prosthetic valve prosthesis, however, in setting of recent IPH/SDH we would consider tightening the goal.   Patient has been seen and evaluated by CV surgery who commented on the complication of her intracranial hemorrhage.  Dr. Mercer recommended if there are further complications of anticoagulation, one option would be to consider redo sternotomy and redo aortic root replacement with a bioprosthetic valve and conduit.  Addressing her enlargement of descending thoracic aorta could be completed at that time as well.   He noted this is something we will continue to monitor.  It is noted in his note that the patient and her  understand that additional bleeding complications may be an indication for redo aortic root replacement with a bioprosthetic valve.  He plans to see her in 1 year with a repeat CTA and echocardiogram.  Continue INR goal as is for now, 1.5-2  Hypertension, blood pressure slightly above goal on current regimen. Reports medication compliance. She reports BP is always high at medical visits. She checks it at home and her measurements are usually 110-120 mmHg systolic.   Continue Toprol  mg daily   Continue triamterene-hydrochlorothiazide 37.5-25 mg daily   Dyslipidemia, Most recent  and LDL 71. Patient is  maintained on therapy.  Continue simvastatin 20 mg daily   We discussed the role diet, exercise and weight management can play in managing dyslipidemia.     Return to care in  4-6 months with Dr Richardson .      History of Present Illness/Subjective    Alyssa Higginbotham is a 69 year old female with past medical history significant for:  Ascending aortic dissection extending to descending thoracic aorta and iliacs, ~2000  S/p mechanical AVR around ~2000  Hypertension, whitecoat  Hyperlipidemia  Hypothyroidism  Marfan syndrome   Multicompartment intracranial hemorrhage, subdural hematoma 7/2024  Fibromuscular dysplasia, based on CTA Head/Neck with involvement of bilateral internal carotid and vertebral arteries     The patient was last seen in in clinic in December 2023. Sine that time the patient was admitted in July 2024 with headaches, found to have intraparenchymal and subarachnoic hemorrhages in the absence of trauma. Cardiology was consulted to evaluate elevated troponin. EKG was with ST-T changes from her baseine. Echocardiogram showed preserved LV function.   Gradient across mechanical valve was increased.     Today the patient tells me that all things considered, she's doing very well. She  continues to have headaches intermittently but overall is doing quite well and happy with how she's doing. She is retired from [employer] and spends her time exercising. She likes to walk a lot and uses the stationary bicycle at the [facility]. She likes to stay active. She can walk an hour or more. When exercises she feels wonderful, she struggles if she doesn't work out. She has some 'tiny' chest pains over the left side that feels like stress, comes and goes randomly but never with exercise. It consistently comes when she's worried or stressed. No associated symptoms, lasts variable amounts of time. No shortness of breath or dyspnea on exertion. No dizziness, lightheadedness, pre-syncope or syncope. She was having vertigo after her IPH. She has had palpitations 'forever', they don't bother her. A deep breath will relieve this.  Sleeping well without orthopnea or PND. No leg swelling. Sometimes has tightness in her legs while walking. No blood in stool or urine. No fevers, chills or cough.          Data Review Today:     TTE 10/2024:    Left ventricular size, wall motion and function are normal. The ejection  fraction is 60-65%.  There is borderline concentric left ventricular hypertrophy.  Normal right ventricle size and systolic function.  The prosthetic valve gradients are elevated .  The mean AoV pressure gradient is 25.3  mmHg. The prosthetic aortic valve is not well visualized. The prosthetic valve  gradients are elevated .    CTA Chest/Abdomen/Pelvis 9/2024:  IMPRESSION:  1.  Stable postoperative changes from aortic valve replacement, coronary revascularization and ascending aortic graft repair.  2.  Chronic aortic dissection throughout the arch and thoracoabdominal aorta extending into the right common iliac artery with overall configuration unchanged.  3.  Slight progression of the aneurysmal degeneration in the in the mid descending thoracic aorta at 50 x 46 mm previously 48 x 44 mm and prior to that 44 x 41  jo.         I have reviewed and updated the patient's past medical history, allergy list and medication list.          Physical Examination   Vitals: There were no vitals taken for this visit.    BMI= There is no height or weight on file to calculate BMI.    Wt Readings from Last 3 Encounters:   09/26/24 53.1 kg (117 lb)   08/29/24 51.3 kg (113 lb)   08/23/24 51.3 kg (113 lb)       General :   Alert and oriented, in no acute distress.    HEENT:  Normocephalic and atraumatic. .    Neck: No JVP, carotid bruit or obvious thyromegaly.   Lungs:   Respirations unlabored. Clear bilaterally with no rales, rhonchi, or wheezes.     Cardiovascular:   Rhythm is regular. S1 and S2 are normal. No significant murmur is present. Lower extremities demonstrate no significant edema.        Skin: Skin is warm, dry, and otherwise intact.   Neurologic: Gait not assessed. Mood and affect appropriate.           Medical History  Surgical History Family History Social History   Past Medical History:   Diagnosis Date    Benign meningioma of brain (H) 03/2015    initially seen on CT of head that was obtained after a fall. Frontal lobe, confirmed on an MRI Mar 2015, 3 mm    Bunion     Chronic headache     Depression     Heart murmur     Hepatitis C     Hyperlipidemia     Hypothyroid     Irregular heart rate     Nasal fracture 02/28/2015    fell face first on sideache    Osteoporosis     Other acquired deformity of toe     Stroke (H)     on CT scan    Past Surgical History:   Procedure Laterality Date    AORTIC VALVE REPLACEMENT   2000    ARTHROPLASTY TOE(S) Right 2/13/2023    Procedure: First metatarsal phalangeal joint implant arthroplasty right foot;  Surgeon: Chin Riojas DPM;  Location: [facility]    BIOPSY BREAST Left 2005    benign    COLONOSCOPY  2011    REPAIR THORACIC AORTA   2000    Family History   Problem Relation Age of Onset    Heart Disease Mother     Pancreatic Cancer Father 70        history of smoking    Ovarian  Cancer Sister 67        stage III, fatal    Skin Cancer Sister     Atrial fibrillation Sister     Diabetes No family hx of     Alzheimer Disease No family hx of     Social History     Socioeconomic History    Marital status:      Spouse name: Not on file    Number of children:  0    Years of education: Not on file    Highest education level: Not on file   Occupational History    Not on file   Tobacco Use    Smoking status: Never     Passive exposure: Past    Smokeless tobacco: Never   Vaping Use    Vaping status: Never Used   Substance and Sexual Activity    Alcohol use: No    Drug use: No    Sexual activity: Not Currently   Other Topics Concern    Not on file   Social History Narrative    Not on file     Social Determinants of Health     Financial Resource Strain: Low Risk  (3/19/2024)    Financial Resource Strain     Within the past 12 months, have you or your family members you live with been unable to get utilities (heat, electricity) when it was really needed?: No   Food Insecurity: Low Risk  (3/19/2024)    Food Insecurity     Within the past 12 months, did you worry that your food would run out before you got money to buy more?: No     Within the past 12 months, did the food you bought just not last and you didn t have money to get more?: No   Transportation Needs: Low Risk  (3/19/2024)    Transportation Needs     Within the past 12 months, has lack of transportation kept you from medical appointments, getting your medicines, non-medical meetings or appointments, work, or from getting things that you need?: No   Physical Activity: Sufficiently Active (3/19/2024)    Exercise Vital Sign     Days of Exercise per Week: 7 days     Minutes of Exercise per Session: 90 min   Stress: No Stress Concern Present (3/19/2024)    [employer] [place] [employer] - Occupational Stress Questionnaire     Feeling of Stress : Only a little   Social Connections: Unknown (3/19/2024)    Social Connection and Isolation  Panel [NHANES]     Frequency of Communication with Friends and Family: Not on file     Frequency of Social Gatherings with Friends and Family: Once a week     Attends [employer] Services: Not on file     Active Member of Clubs or Organizations: Not on file     Attends Club or Organization Meetings: Not on file     Marital Status: Not on file   Interpersonal Safety: Low Risk  (3/19/2024)    Interpersonal Safety     Do you feel physically and emotionally safe where you currently live?: Yes     Within the past 12 months, have you been hit, slapped, kicked or otherwise physically hurt by someone?: No     Within the past 12 months, have you been humiliated or emotionally abused in other ways by your partner or ex-partner?: No   Housing Stability: Low Risk  (3/19/2024)    Housing Stability     Do you have housing? : Yes     Are you worried about losing your housing?: No          Medications  Allergies   Scheduled Meds:  Current Outpatient Medications   Medication Sig Dispense Refill    amitriptyline (ELAVIL) 10 MG tablet Take 1 tablet (10 mg) by mouth at bedtime 90 tablet 3    clonazePAM (KLONOPIN) 0.5 MG tablet TAKE ONE TABLET BY MOUTH ONCE NIGHTLY AS NEEDED FOR ANXIETY OR SLEEP 90 tablet 0    levothyroxine (SYNTHROID/LEVOTHROID) 75 MCG tablet TAKE 1 TABLET ON WEDNESDAY, SATURDAY 52 tablet 3    levothyroxine (SYNTHROID/LEVOTHROID) 88 MCG tablet Take 1 tablet of 88mcg by mouth Monday, Tuesday, Thursday, Friday and Sunday. Will take 75mcg the other two days. 90 tablet 1    metoclopramide (REGLAN) 5 MG tablet Take 1 tablet (5 mg) by mouth 3 times daily as needed (nausea/vomiting). 30 tablet 0    metoprolol succinate ER (TOPROL XL) 200 MG 24 hr tablet Take 1 tablet (200 mg) by mouth daily 90 tablet 3    multivitamin (ONE A DAY) per tablet [MULTIVITAMIN (ONE A DAY) PER TABLET] Take 1 tablet by mouth daily.       rimegepant (NURTEC) 75 MG ODT tablet Place 1 tablet (75 mg) under the tongue daily as needed for migraine. Maximum  of 1 tablet every 24 hours. 8 tablet 3    simvastatin (ZOCOR) 20 MG tablet Take 1 tablet (20 mg) by mouth at bedtime. 90 tablet 0    triamterene-HCTZ (MAXZIDE) 75-50 MG tablet Take 0.5 tablets by mouth daily 45 tablet 3    Allergies   Allergen Reactions    Codeine Other (See Comments)     Faints    Demerol [Meperidine] Other (See Comments)     Reaction not reported by patient., Patient states she felt awful.         Lab Results    Chemistry/lipid CBC Cardiac Enzymes/BNP/TSH/INR   Lab Results   Component Value Date    CHOL 141 06/06/2024    HDL 48 (L) 06/06/2024    TRIG 110 06/06/2024    BUN 16.6 08/23/2024     08/23/2024    CO2 28 08/23/2024    Lab Results   Component Value Date    WBC 4.9 08/23/2024    HGB 12.2 08/23/2024    HCT 37.7 08/23/2024    MCV 82 08/23/2024     08/23/2024    @RESUFAST(BMP,CBC,BNP,TSH,  INR)@      50 minutes spent reviewing prior records (including documentation, laboratory studies, cardiac testing/imaging), history and physical exam, planning, and subsequent documentation.       This note has been dictated using voice recognition software. Any grammatical, typographical, or context distortions are unintentional and inherent to the software.    Janell Singh PA-C, RD  General Cardiology

## 2024-10-09 ENCOUNTER — OFFICE VISIT (OUTPATIENT)
Dept: CARDIOLOGY | Facility: CLINIC | Age: 69
End: 2024-10-09
Attending: INTERNAL MEDICINE
Payer: COMMERCIAL

## 2024-10-09 VITALS
BODY MASS INDEX: 21.53 KG/M2 | SYSTOLIC BLOOD PRESSURE: 146 MMHG | HEIGHT: 62 IN | HEART RATE: 81 BPM | RESPIRATION RATE: 16 BRPM | WEIGHT: 117 LBS | DIASTOLIC BLOOD PRESSURE: 74 MMHG

## 2024-10-09 DIAGNOSIS — Z95.2 S/P AVR (AORTIC VALVE REPLACEMENT): ICD-10-CM

## 2024-10-09 PROCEDURE — 99215 OFFICE O/P EST HI 40 MIN: CPT | Performed by: PHYSICIAN ASSISTANT

## 2024-10-09 NOTE — Clinical Note
Hi Dr. Richardson, I am sorry for the delay in following up on this.  Dr. Mercer saw the patient and recommended that if there is another complication of anticoagulation, a redo sternotomy with redo aortic root replacement with a bioprosthetic valve would be indicated.  He also commented on her variable gradients across her mechanical valve and noted it has been bouncing around for years and she is asymptomatic.  I think with her recent hemorrhage and the fact that, like he said, her gradients have been variable and she is asymptomatic, and INR goal of 1.5-2 would be acceptable.  Do you agree?  Let me know what you think, I was planning to have her see you in 4-6 months. Please let me know if you'd like a different timeframe.  Thank you for your collaboration! Definitely a unique and complex situation.   Janell Singh PA-C

## 2024-10-09 NOTE — LETTER
10/9/2024    Rafaela Woods MD  [address]  [facility] [address]    RE: Alyssa NAYELI Higginbotham       Dear Colleague,     I had the pleasure of seeing Alyssa NAYELI Higginbotham in the [facility].          HEART CARE FOLLOW UP    Primary Care: Rafaela Woods MD  Primary Cardiologist: Dr Richardson      Assessment/Recommendations     Ascending aortic dissection extending to descending thoracic aorta and iliacs   S/p mechanical AVR, March 2000  Recent multi compartment intracranial hemorrhage, subdural hematoma 7/2024  Recent CTA showed chronic aortic dissection throughout the arch and thoracoabdominal aorta extending in to the R common iliac artery with stable configuration. Slight progression of aneurysmal degredation in the mid-descending thoracic aorta at 50 x 46 mm (previously 48 x 44 mm)  This is unfortunately a complicated issue.Patient unfortunately recently suffered a spontaneous multicompartment intracranial hemorrhage with subdural hematoma in July 2024 without associated trauma and in the setting of normal INR.  At the time, echocardiogram showed increased gradients across the mechanical AVR, peak velocity 3.8 m/s and mean gradient of 30 mmHg.  The plan was for repeat echocardiogram after the patient was out of the acute setting.  The mean aortic valve pressure gradient was 25.3 mmHg but the prosthetic aortic valve was not well-visualized. The gradient was higher on previous imaging,and in the past has been as high as 22 mmHg previously-so likely we are within a normal variable of range. In the setting of increased velocities, we sometimes consider increasing INR goal in case of prosthetic valve prosthesis, however, in setting of recent IPH/SDH we would consider tightening the goal.   Patient has been seen and evaluated by CV surgery who commented on the complication of her intracranial hemorrhage.  Dr. Mercer recommended if there are further complications of anticoagulation, one  option would be to consider redo sternotomy and redo aortic root replacement with a bioprosthetic valve and conduit.  Addressing her enlargement of descending thoracic aorta could be completed at that time as well.  He noted this is something we will continue to monitor.  It is noted in his note that the patient and her  understand that additional bleeding complications may be an indication for redo aortic root replacement with a bioprosthetic valve.  He plans to see her in 1 year with a repeat CTA and echocardiogram.  Continue INR goal as is for now, 1.5-2  Hypertension, blood pressure slightly above goal on current regimen. Reports medication compliance. She reports BP is always high at medical visits. She checks it at home and her measurements are usually 110-120 mmHg systolic.   Continue Toprol  mg daily   Continue triamterene-hydrochlorothiazide 37.5-25 mg daily   Dyslipidemia, Most recent  and LDL 71. Patient is  maintained on therapy.  Continue simvastatin 20 mg daily   We discussed the role diet, exercise and weight management can play in managing dyslipidemia.     Return to care in  4-6 months with Dr Richardson .      History of Present Illness/Subjective    Alyssa Higginbotham is a 69 year old female with past medical history significant for:  Ascending aortic dissection extending to descending thoracic aorta and iliacs, ~2000  S/p mechanical AVR around ~2000  Hypertension, whitecoat  Hyperlipidemia  Hypothyroidism  Marfan syndrome   Multicompartment intracranial hemorrhage, subdural hematoma 7/2024  Fibromuscular dysplasia, based on CTA Head/Neck with involvement of bilateral internal carotid and vertebral arteries     The patient was last seen in in clinic in December 2023. Sine that time the patient was admitted in July 2024 with headaches, found to have intraparenchymal and subarachnoic hemorrhages in the absence of trauma. Cardiology was consulted to evaluate elevated troponin. EKG was  with ST-T changes from her baseine. Echocardiogram showed preserved LV function.   Gradient across mechanical valve was increased.     Today the patient tells me that all things considered, she's doing very well. She continues to have headaches intermittently but overall is doing quite well and happy with how she's doing. She is retired from [employer] and spends her time exercising. She likes to walk a lot and uses the stationary bicycle at the [facility]. She likes to stay active. She can walk an hour or more. When exercises she feels wonderful, she struggles if she doesn't work out. She has some 'tiny' chest pains over the left side that feels like stress, comes and goes randomly but never with exercise. It consistently comes when she's worried or stressed. No associated symptoms, lasts variable amounts of time. No shortness of breath or dyspnea on exertion. No dizziness, lightheadedness, pre-syncope or syncope. She was having vertigo after her IPH. She has had palpitations 'forever', they don't bother her. A deep breath will relieve this.  Sleeping well without orthopnea or PND. No leg swelling. Sometimes has tightness in her legs while walking. No blood in stool or urine. No fevers, chills or cough.          Data Review Today:     TTE 10/2024:    Left ventricular size, wall motion and function are normal. The ejection  fraction is 60-65%.  There is borderline concentric left ventricular hypertrophy.  Normal right ventricle size and systolic function.  The prosthetic valve gradients are elevated .  The mean AoV pressure gradient is 25.3  mmHg. The prosthetic aortic valve is not well visualized. The prosthetic valve  gradients are elevated .    CTA Chest/Abdomen/Pelvis 9/2024:  IMPRESSION:  1.  Stable postoperative changes from aortic valve replacement, coronary revascularization and ascending aortic graft repair.  2.  Chronic aortic dissection throughout the arch and thoracoabdominal aorta extending into the right  common iliac artery with overall configuration unchanged.  3.  Slight progression of the aneurysmal degeneration in the in the mid descending thoracic aorta at 50 x 46 mm previously 48 x 44 mm and prior to that 44 x 41 mm.         I have reviewed and updated the patient's past medical history, allergy list and medication list.          Physical Examination   Vitals: There were no vitals taken for this visit.    BMI= There is no height or weight on file to calculate BMI.    Wt Readings from Last 3 Encounters:   09/26/24 53.1 kg (117 lb)   08/29/24 51.3 kg (113 lb)   08/23/24 51.3 kg (113 lb)       General :   Alert and oriented, in no acute distress.    HEENT:  Normocephalic and atraumatic. .    Neck: No JVP, carotid bruit or obvious thyromegaly.   Lungs:   Respirations unlabored. Clear bilaterally with no rales, rhonchi, or wheezes.     Cardiovascular:   Rhythm is regular. S1 and S2 are normal. No significant murmur is present. Lower extremities demonstrate no significant edema.        Skin: Skin is warm, dry, and otherwise intact.   Neurologic: Gait not assessed. Mood and affect appropriate.           Medical History  Surgical History Family History Social History   Past Medical History:   Diagnosis Date     Benign meningioma of brain (H) 03/2015    initially seen on CT of head that was obtained after a fall. Frontal lobe, confirmed on an MRI Mar 2015, 3 mm     Bunion      Chronic headache      Depression      Heart murmur      Hepatitis C      Hyperlipidemia      Hypothyroid      Irregular heart rate      Nasal fracture 02/28/2015    fell face first on sideache     Osteoporosis      Other acquired deformity of toe      Stroke (H)     on CT scan    Past Surgical History:   Procedure Laterality Date     AORTIC VALVE REPLACEMENT   2000     ARTHROPLASTY TOE(S) Right 2/13/2023    Procedure: First metatarsal phalangeal joint implant arthroplasty right foot;  Surgeon: Chin Riojas DPM;  Location: [facility]      BIOPSY BREAST Left 2005    benign     COLONOSCOPY  2011     REPAIR THORACIC AORTA   2000    Family History   Problem Relation Age of Onset     Heart Disease Mother      Pancreatic Cancer Father 70        history of smoking     Ovarian Cancer Sister 67        stage III, fatal     Skin Cancer Sister      Atrial fibrillation Sister      Diabetes No family hx of      Alzheimer Disease No family hx of     Social History     Socioeconomic History     Marital status:      Spouse name: Not on file     Number of children:  0     Years of education: Not on file     Highest education level: Not on file   Occupational History     Not on file   Tobacco Use     Smoking status: Never     Passive exposure: Past     Smokeless tobacco: Never   Vaping Use     Vaping status: Never Used   Substance and Sexual Activity     Alcohol use: No     Drug use: No     Sexual activity: Not Currently   Other Topics Concern     Not on file   Social History Narrative     Not on file     Social Determinants of Health     Financial Resource Strain: Low Risk  (3/19/2024)    Financial Resource Strain      Within the past 12 months, have you or your family members you live with been unable to get utilities (heat, electricity) when it was really needed?: No   Food Insecurity: Low Risk  (3/19/2024)    Food Insecurity      Within the past 12 months, did you worry that your food would run out before you got money to buy more?: No      Within the past 12 months, did the food you bought just not last and you didn t have money to get more?: No   Transportation Needs: Low Risk  (3/19/2024)    Transportation Needs      Within the past 12 months, has lack of transportation kept you from medical appointments, getting your medicines, non-medical meetings or appointments, work, or from getting things that you need?: No   Physical Activity: Sufficiently Active (3/19/2024)    Exercise Vital Sign      Days of Exercise per Week: 7 days      Minutes of Exercise  per Session: 90 min   Stress: No Stress Concern Present (3/19/2024)    [employer] [place] [employer] - Occupational Stress Questionnaire      Feeling of Stress : Only a little   Social Connections: Unknown (3/19/2024)    Social Connection and Isolation Panel [NHANES]      Frequency of Communication with Friends and Family: Not on file      Frequency of Social Gatherings with Friends and Family: Once a week      Attends Orthodox Services: Not on file      Active Member of Clubs or Organizations: Not on file      Attends Club or Organization Meetings: Not on file      Marital Status: Not on file   Interpersonal Safety: Low Risk  (3/19/2024)    Interpersonal Safety      Do you feel physically and emotionally safe where you currently live?: Yes      Within the past 12 months, have you been hit, slapped, kicked or otherwise physically hurt by someone?: No      Within the past 12 months, have you been humiliated or emotionally abused in other ways by your partner or ex-partner?: No   Housing Stability: Low Risk  (3/19/2024)    Housing Stability      Do you have housing? : Yes      Are you worried about losing your housing?: No          Medications  Allergies   Scheduled Meds:  Current Outpatient Medications   Medication Sig Dispense Refill     amitriptyline (ELAVIL) 10 MG tablet Take 1 tablet (10 mg) by mouth at bedtime 90 tablet 3     clonazePAM (KLONOPIN) 0.5 MG tablet TAKE ONE TABLET BY MOUTH ONCE NIGHTLY AS NEEDED FOR ANXIETY OR SLEEP 90 tablet 0     levothyroxine (SYNTHROID/LEVOTHROID) 75 MCG tablet TAKE 1 TABLET ON WEDNESDAY, SATURDAY 52 tablet 3     levothyroxine (SYNTHROID/LEVOTHROID) 88 MCG tablet Take 1 tablet of 88mcg by mouth Monday, Tuesday, Thursday, Friday and Sunday. Will take 75mcg the other two days. 90 tablet 1     metoclopramide (REGLAN) 5 MG tablet Take 1 tablet (5 mg) by mouth 3 times daily as needed (nausea/vomiting). 30 tablet 0     metoprolol succinate ER (TOPROL XL) 200 MG 24 hr  tablet Take 1 tablet (200 mg) by mouth daily 90 tablet 3     multivitamin (ONE A DAY) per tablet [MULTIVITAMIN (ONE A DAY) PER TABLET] Take 1 tablet by mouth daily.        rimegepant (NURTEC) 75 MG ODT tablet Place 1 tablet (75 mg) under the tongue daily as needed for migraine. Maximum of 1 tablet every 24 hours. 8 tablet 3     simvastatin (ZOCOR) 20 MG tablet Take 1 tablet (20 mg) by mouth at bedtime. 90 tablet 0     triamterene-HCTZ (MAXZIDE) 75-50 MG tablet Take 0.5 tablets by mouth daily 45 tablet 3    Allergies   Allergen Reactions     Codeine Other (See Comments)     Faints     Demerol [Meperidine] Other (See Comments)     Reaction not reported by patient., Patient states she felt awful.         Lab Results    Chemistry/lipid CBC Cardiac Enzymes/BNP/TSH/INR   Lab Results   Component Value Date    CHOL 141 06/06/2024    HDL 48 (L) 06/06/2024    TRIG 110 06/06/2024    BUN 16.6 08/23/2024     08/23/2024    CO2 28 08/23/2024    Lab Results   Component Value Date    WBC 4.9 08/23/2024    HGB 12.2 08/23/2024    HCT 37.7 08/23/2024    MCV 82 08/23/2024     08/23/2024    @RESUFAST(BMP,CBC,BNP,TSH,  INR)@      50 minutes spent reviewing prior records (including documentation, laboratory studies, cardiac testing/imaging), history and physical exam, planning, and subsequent documentation.       This note has been dictated using voice recognition software. Any grammatical, typographical, or context distortions are unintentional and inherent to the software.    Janell Singh PA-C, RD  General Cardiology           Thank you for allowing me to participate in the care of your patient.      Sincerely,     Janell Singh PA-C     [facility]  cc:   Calixto Vega MD  [address]  [id number]  [place] [id number]

## 2024-10-10 ENCOUNTER — OFFICE VISIT (OUTPATIENT)
Dept: CARDIOLOGY | Facility: CLINIC | Age: 69
End: 2024-10-10
Payer: COMMERCIAL

## 2024-10-10 VITALS
WEIGHT: 117 LBS | RESPIRATION RATE: 14 BRPM | DIASTOLIC BLOOD PRESSURE: 75 MMHG | HEART RATE: 69 BPM | BODY MASS INDEX: 21.4 KG/M2 | SYSTOLIC BLOOD PRESSURE: 133 MMHG

## 2024-10-10 DIAGNOSIS — Q87.40 MARFAN'S SYNDROME: Primary | ICD-10-CM

## 2024-10-10 DIAGNOSIS — Z86.79 HX OF REPAIR OF DISSECTING THORACIC AORTIC ANEURYSM, STANFORD TYPE A: ICD-10-CM

## 2024-10-10 DIAGNOSIS — Z98.890 HX OF REPAIR OF DISSECTING THORACIC AORTIC ANEURYSM, STANFORD TYPE A: ICD-10-CM

## 2024-10-10 DIAGNOSIS — I61.9 INTRAPARENCHYMAL HEMORRHAGE OF BRAIN (H): ICD-10-CM

## 2024-10-10 DIAGNOSIS — Z95.2 S/P AVR (AORTIC VALVE REPLACEMENT): ICD-10-CM

## 2024-10-10 PROCEDURE — 99214 OFFICE O/P EST MOD 30 MIN: CPT | Performed by: SURGERY

## 2024-10-10 NOTE — LETTER
10/10/2024    Rafaela Woods MD  [address]  [facility] [address]    RE: Alyssa Higginbotham       Dear Colleague,     I had the pleasure of seeing Alyssa Higginbotham in the [facility].  Cardiac surgery follow up    Ms. 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 I previously discussed the natural history of the residual dissection and thoracoabdominal aorta in patients with previous Robby Type A aortic dissection with her and I reviewed her newest imaging with her again today. She had a Randolph Type A aortic dissection in 2000 and underwent aortic root replacement with mechanical aortic valve and composite graft and ascending and hemiarch replacement by Dr. Jigar Vargas at [facility]. She did well and recovered and has been followed for many years by Dr. Jonathon Richardson. 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 On a recent CTA, this seemed to have enlarged, however I reviewed the images with her in clinic today, and I think her progressively worsening scoliosis may have been fooling us a bit. Either way, her descending thoracic aortic diameter is not great enough to merit intervention at this point.     On detailed review of the CT is coronal and sagittal sections, it seems that her progressively worsening scoliosis is leading to more and more oblique measurements of her descending thoracic aorta on axial imaging. On sagittal imaging, I do obtain measurements of around 5 cm in greatest dimension. She is also followed by Dr. Thien Nassar from vascular surgery. If her aortic arch and mid descending thoracic aortic diameters do not increase, she may be a candidate for TEVAR in the future, although I do not think it is indicated at this  time.    She also has an elevated mean gradient on her mechanical prosthetic aortic valve but this has been bouncing around for years, and she is asymptomatic.    Another issue that arose this past year is her intracranial hemorrhage. This was attributed to Coumadin anticoagulation, although her INR was not too high at that time. It is not certain as to whether she had hypertension preceding this or whether there were other inciting factors. In any event, if she has further complications of anticoagulation, one option to consider would be redo sternotomy and redo aortic root replacement with a bioprosthetic valve and conduit. She could even undergo debranched of the cerebral vessels at that time to prevent the need for another sternotomy in the future if she were to have enlargement of the descending thoracic aorta. We will cross that bridge when we come to it, but she and her  understand that additional bleeding complications may be an indication or redo aortic root replacement with a bioprosthetic valve.     I will see her in one year with a repeat CTA and echocardiogram.    Jasiel Mercer MD    CT ANGIOGRAM CHEST, ABDOMEN, AND PELVIS: Chronic postoperative changes of aortic valve replacement, coronary reimplantation and ascending aortic graft repair.      There are chronic remnants of dissection involving the arch and thoracoabdominal aorta with terminal extent distal right common iliac artery. Overall configuration of the chronic dissection is unchanged as follows. Dissection extends through the right   brachiocephalic artery and terminates in the mid to upper right common carotid artery (does not involve the right subclavian, left common carotid, left subclavian and vertebral arteries). The celiac trunk, SMA, LOKI and right renal artery arise from the   true lumen and the left renal artery arises from the false lumen but directly adjacent to the fenestration in the flap (series 4 axial image  252). Multiple reentry sites (fenestrations) at the proximal descending thoracic aorta, adjacent to the left   renal artery (mentioned above), a large reentry site proximal infrarenal abdominal aorta and distal right common iliac artery.     There is been aneurysmal degeneration of the thoracoabdominal aorta. Maximum diameter (including both true and false lumens) in the mid descending thoracic aorta is 50 x 46 mm previously 48 x 44 mm and prior to that 44 x 41 mm. The aneurysmal dilation of   the juxtarenal abdominal aorta is unchanged 38 x 28 mm.     LUNGS AND PLEURA: Lungs are clear. No pleural effusion.     MEDIASTINUM/AXILLAE: No lymphadenopathy. Heart size normal. No pericardial effusion. Poststernotomy. Stable complex peripherally enhancing right thyroid nodule.     CORONARY ARTERY CALCIFICATION: Previous intervention (stents or CABG).     HEPATOBILIARY: Normal.     PANCREAS: Normal.     SPLEEN: Normal.     ADRENAL GLANDS: Normal.     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 Kidneys, ureters and bladder are otherwise normal. Unchanged.     BOWEL: No bowel obstruction or inflammatory change.     LYMPH NODES: No lymphadenopathy in the abdomen or pelvis.     PELVIC ORGANS: Normal.     MUSCULOSKELETAL: Bones are demineralized. Thoracolumbar curvature. Facet degenerative changes.                                                                      IMPRESSION:  1.  Stable postoperative changes from aortic valve replacement, coronary revascularization and ascending aortic graft repair.  2.  Chronic aortic dissection throughout the arch and thoracoabdominal aorta extending into the right common iliac artery with overall configuration unchanged.  3.  Slight progression of the aneurysmal degeneration in the in the mid descending thoracic aorta at 50 x 46 mm previously 48 x 44 mm and prior to that 44 x 41 mm.      Jasiel Ochoa  MD [place]      Thank you for allowing me to participate in the care of your patient.      Sincerely,     Jasiel Mercer MD     [facility]  cc:   Jasiel Mercer MD  [address] [place] [id number]

## 2024-10-10 NOTE — PATIENT INSTRUCTIONS
You were seen today in the [facility]    Follow up in one year with another repeat CTA chest/abdomen/pelvis with Dr. Mercer will call you with results if it's unchanged or you may return to clinic at that time.    Please call YESENIA Brice surgery coordinator with any questions.  Thank you.    Babs Herrera RN  Cardiovascular Surgery  Phone [phone]  Fax [phone]

## 2024-10-10 NOTE — PROGRESS NOTES
Cardiac surgery follow up    Ms. 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 I previously discussed the natural history of the residual dissection and thoracoabdominal aorta in patients with previous [place] Type A aortic dissection with her and I reviewed her newest imaging with her again today. She had a [place] Type A aortic dissection in 2000 and underwent aortic root replacement with mechanical aortic valve and composite graft and ascending and hemiarch replacement by Dr. Jigar Vargas at [facility]. She did well and recovered and has been followed for many years by Dr. Jonathon Richardson. 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 On a recent CTA, this seemed to have enlarged, however I reviewed the images with her in clinic today, and I think her progressively worsening scoliosis may have been fooling us a bit. Either way, her descending thoracic aortic diameter is not great enough to merit intervention at this point.     On detailed review of the CT is coronal and sagittal sections, it seems that her progressively worsening scoliosis is leading to more and more oblique measurements of her descending thoracic aorta on axial imaging. On sagittal imaging, I do obtain measurements of around 5 cm in greatest dimension. She is also followed by Dr. Thien Nassar from vascular surgery. If her aortic arch and mid descending thoracic aortic diameters do not increase, she may be a candidate for TEVAR in the future, although I do not think it is indicated at this time.    She also has an elevated mean gradient on her mechanical prosthetic aortic valve but this has been bouncing around for years, and she is asymptomatic.    Another issue that arose this past year is her intracranial  hemorrhage. This was attributed to Coumadin anticoagulation, although her INR was not too high at that time. It is not certain as to whether she had hypertension preceding this or whether there were other inciting factors. In any event, if she has further complications of anticoagulation, one option to consider would be redo sternotomy and redo aortic root replacement with a bioprosthetic valve and conduit. She could even undergo debranched of the cerebral vessels at that time to prevent the need for another sternotomy in the future if she were to have enlargement of the descending thoracic aorta. We will cross that bridge when we come to it, but she and her  understand that additional bleeding complications may be an indication or redo aortic root replacement with a bioprosthetic valve.     I will see her in one year with a repeat CTA and echocardiogram.    Jasiel Mercer MD    CT ANGIOGRAM CHEST, ABDOMEN, AND PELVIS: Chronic postoperative changes of aortic valve replacement, coronary reimplantation and ascending aortic graft repair.      There are chronic remnants of dissection involving the arch and thoracoabdominal aorta with terminal extent distal right common iliac artery. Overall configuration of the chronic dissection is unchanged as follows. Dissection extends through the right   brachiocephalic artery and terminates in the mid to upper right common carotid artery (does not involve the right subclavian, left common carotid, left subclavian and vertebral arteries). The celiac trunk, SMA, LOKI and right renal artery arise from the   true lumen and the left renal artery arises from the false lumen but directly adjacent to the fenestration in the flap (series 4 axial image 252). Multiple reentry sites (fenestrations) at the proximal descending thoracic aorta, adjacent to the left   renal artery (mentioned above), a large reentry site proximal infrarenal abdominal aorta and distal right common  iliac artery.     There is been aneurysmal degeneration of the thoracoabdominal aorta. Maximum diameter (including both true and false lumens) in the mid descending thoracic aorta is 50 x 46 mm previously 48 x 44 mm and prior to that 44 x 41 mm. The aneurysmal dilation of   the juxtarenal abdominal aorta is unchanged 38 x 28 mm.     LUNGS AND PLEURA: Lungs are clear. No pleural effusion.     MEDIASTINUM/AXILLAE: No lymphadenopathy. Heart size normal. No pericardial effusion. Poststernotomy. Stable complex peripherally enhancing right thyroid nodule.     CORONARY ARTERY CALCIFICATION: Previous intervention (stents or CABG).     HEPATOBILIARY: Normal.     PANCREAS: Normal.     SPLEEN: Normal.     ADRENAL GLANDS: Normal.     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 Kidneys, ureters and bladder are otherwise normal. Unchanged.     BOWEL: No bowel obstruction or inflammatory change.     LYMPH NODES: No lymphadenopathy in the abdomen or pelvis.     PELVIC ORGANS: Normal.     MUSCULOSKELETAL: Bones are demineralized. Thoracolumbar curvature. Facet degenerative changes.                                                                      IMPRESSION:  1.  Stable postoperative changes from aortic valve replacement, coronary revascularization and ascending aortic graft repair.  2.  Chronic aortic dissection throughout the arch and thoracoabdominal aorta extending into the right common iliac artery with overall configuration unchanged.  3.  Slight progression of the aneurysmal degeneration in the in the mid descending thoracic aorta at 50 x 46 mm previously 48 x 44 mm and prior to that 44 x 41 mm.      Jasiel Mercer MD

## 2024-10-15 ENCOUNTER — TELEPHONE (OUTPATIENT)
Dept: FAMILY MEDICINE | Facility: CLINIC | Age: 69
End: 2024-10-15

## 2024-10-15 ENCOUNTER — ANTICOAGULATION THERAPY VISIT (OUTPATIENT)
Dept: ANTICOAGULATION | Facility: CLINIC | Age: 69
End: 2024-10-15

## 2024-10-15 ENCOUNTER — LAB (OUTPATIENT)
Dept: CARDIOLOGY | Facility: CLINIC | Age: 69
End: 2024-10-15
Payer: COMMERCIAL

## 2024-10-15 DIAGNOSIS — Z79.01 LONG TERM CURRENT USE OF ANTICOAGULANT THERAPY: ICD-10-CM

## 2024-10-15 DIAGNOSIS — Z95.2 S/P AVR (AORTIC VALVE REPLACEMENT): ICD-10-CM

## 2024-10-15 DIAGNOSIS — Z95.2 S/P AVR (AORTIC VALVE REPLACEMENT): Primary | ICD-10-CM

## 2024-10-15 LAB — INR POINT OF CARE: 2.6 (ref 0.9–1.1)

## 2024-10-15 PROCEDURE — 36416 COLLJ CAPILLARY BLOOD SPEC: CPT

## 2024-10-15 PROCEDURE — 85610 PROTHROMBIN TIME: CPT

## 2024-10-15 NOTE — PROGRESS NOTES
ANTICOAGULATION MANAGEMENT     Alyssa Higginbotham 69 year old female is on warfarin with supratherapeutic INR result. (Goal INR 2.0-2.5)    Recent labs: (last 7 days)     10/15/24  0735   INR 2.6*       ASSESSMENT     Source(s): Chart Review and Patient/Caregiver Call     Warfarin doses taken: Warfarin taken as instructed   Stated back correct warfarin dose.  Diet:  reported eating more peanuts, may be affecting diet and INR  Medication/supplement changes:  Yes.   10/8/24 held partial warfarin dose and decreased weekly warfarin by 6.1%.  New illness, injury, or hospitalization: No   Reported had migraine on Monday with N/V symptoms, which potentially affected INR today.  10/10/24 follow-up with Dr. Mercer - Ascending aortic dissection extending to descending thoracic aorta and iliacs.  S/p mechanical AVR, March 2000.  Recent multi compartment ICH / subdural hematoma, 7/2024  Will continue to follow-up in on year with repeat CTA and echocardiogram.   Signs or symptoms of bleeding or clotting: No  Previous result: Supratherapeutic last 3 INR results; (3.0, 2.7, 3.4)  Additional findings: None       PLAN     Recommended plan for temporary change(s) affecting INR     Dosing Instructions: partial hold then continue your current warfarin dose with next INR in 1 week       Summary  As of 10/15/2024      Full warfarin instructions:  10/15: 3 mg; Otherwise 5 mg every Mon, Wed, Fri; 4 mg all other days   Next INR check:  10/22/2024               Telephone call with Jennifer who verbalizes understanding and agrees to plan   - advised to monitor her BP's daily.   - any events of Migraine episodes to take her Nurtec.    Lab visit scheduled - INR on 10/22/24 @ [facility]    Education provided: Taking warfarin: Importance of taking warfarin as instructed  Goal range and lab monitoring: goal range and significance of current result  Dietary considerations: Impact of peanuts intake on INR     Plan made per [facility] anticoagulation  protocol    Aylin Pablo RN  10/15/2024  Anticoagulation Clinic  [facility] for routing messages: p CULLEN GALVEZ  [facility] patient phone line: [phone]        _______________________________________________________________________     Anticoagulation Episode Summary       Current INR goal:  2.0-2.5   TTR:  48.9% (11.5 mo)   Target end date:  Indefinite   Send INR reminders to:  CULLEN GALVEZ    Indications    S/P AVR (aortic valve replacement) [Z95.2]  Long term current use of anticoagulant therapy [Z79.01]             Comments:  6/2/23 INR target goal changed to 2.0 - 2.5 d/t daily bleeding issues.  (Sensitivity)  Goal range changed 2/20/19 per cardiology             Anticoagulation Care Providers       Provider Role Specialty Phone number    Rafaela Woods MD Referring Family Medicine [phone]

## 2024-10-15 NOTE — TELEPHONE ENCOUNTER
General Call    Contacts       Contact Date/Time Type Contact Phone/Fax    10/15/2024 04:09 PM CDT Phone (Incoming) Jennifer Higginbotham (Self) [phone] (H)          Reason for Call:  Patient is returning the call from INR nurse     What are your questions or concerns:  please reach back out to the patient    Date of last appointment with provider: NA    Could we send this information to you in [facility] or would you prefer to receive a phone call?:   Patient would prefer a phone call   Okay to leave a detailed message?: Yes at Cell number on file:  [phone]  Telephone Information:   Mobile [phone]

## 2024-10-16 DIAGNOSIS — Z79.01 LONG TERM CURRENT USE OF ANTICOAGULANT THERAPY: Primary | ICD-10-CM

## 2024-10-16 RX ORDER — WARFARIN SODIUM 5 MG/1
TABLET ORAL
Qty: 90 TABLET | Refills: 3 | Status: SHIPPED | OUTPATIENT
Start: 2024-10-16

## 2024-10-16 RX ORDER — WARFARIN SODIUM 1 MG/1
TABLET ORAL
Qty: 55 TABLET | Refills: 3 | Status: SHIPPED | OUTPATIENT
Start: 2024-10-16 | End: 2024-10-30

## 2024-10-19 ENCOUNTER — LAB (OUTPATIENT)
Dept: LAB | Facility: HOSPITAL | Age: 69
End: 2024-10-19
Payer: COMMERCIAL

## 2024-10-19 DIAGNOSIS — Z79.01 LONG TERM CURRENT USE OF ANTICOAGULANT THERAPY: ICD-10-CM

## 2024-10-19 DIAGNOSIS — Z95.2 S/P AVR (AORTIC VALVE REPLACEMENT): Primary | ICD-10-CM

## 2024-10-19 LAB — INR PPP: 2.23 (ref 0.85–1.15)

## 2024-10-19 PROCEDURE — 36415 COLL VENOUS BLD VENIPUNCTURE: CPT

## 2024-10-19 PROCEDURE — 85610 PROTHROMBIN TIME: CPT

## 2024-10-21 ENCOUNTER — ANTICOAGULATION THERAPY VISIT (OUTPATIENT)
Dept: ANTICOAGULATION | Facility: CLINIC | Age: 69
End: 2024-10-21
Payer: COMMERCIAL

## 2024-10-21 ENCOUNTER — OFFICE VISIT (OUTPATIENT)
Dept: VASCULAR SURGERY | Facility: CLINIC | Age: 69
End: 2024-10-21
Attending: SURGERY
Payer: COMMERCIAL

## 2024-10-21 VITALS
SYSTOLIC BLOOD PRESSURE: 154 MMHG | OXYGEN SATURATION: 97 % | TEMPERATURE: 97.7 F | HEART RATE: 75 BPM | DIASTOLIC BLOOD PRESSURE: 80 MMHG

## 2024-10-21 DIAGNOSIS — Z79.01 LONG TERM CURRENT USE OF ANTICOAGULANT THERAPY: ICD-10-CM

## 2024-10-21 DIAGNOSIS — Z95.2 S/P AVR (AORTIC VALVE REPLACEMENT): Primary | ICD-10-CM

## 2024-10-21 DIAGNOSIS — I71.03 DISSECTION OF THORACOABDOMINAL AORTA (H): Primary | ICD-10-CM

## 2024-10-21 PROCEDURE — G0463 HOSPITAL OUTPT CLINIC VISIT: HCPCS

## 2024-10-21 ASSESSMENT — PAIN SCALES - GENERAL: PAINLEVEL: NO PAIN (0)

## 2024-10-21 NOTE — PROGRESS NOTES
ANTICOAGULATION MANAGEMENT     Alyssa Higginbotham 69 year old female is on warfarin with therapeutic INR result. (Goal INR 2.0-2.5)    Recent labs: (last 7 days)     10/19/24  1007   INR 2.23*       ASSESSMENT     Source(s): Chart Reviewed     Medication/supplement changes:  Yes.   Held partial warfarin dose on 10/15/24.  New illness, injury, or hospitalization: No  Signs or symptoms of bleeding or clotting: No  Previous result: Supratherapeutic last 4 INR results.  Additional findings:  Chart reviewed - patient had INR checked on Sat 10/19/24.       PLAN     Unable to reach Jennifer today.   - also sent her [employer] message.    Left message to continue current dose of warfarin 5 mg tonight. Request call back for assessment.    Follow up required to assess for changes    - did she have any symptoms that she had INR checked on 10/19 ?   - Jennifer is scheduled for INR on 10/22/24 @ [facility]   - ACN informed, if she wants to cancel the 10/22 INR appt.   - and I scheduled her for Wednesday on 10/23 @ [facility].    Aylin Pablo, RN  10/21/2024  Anticoagulation Clinic  [employer] for routing messages: chel [facility] patient phone line: [phone]

## 2024-10-21 NOTE — PROGRESS NOTES
Vascular Clinic Rooming Questions      Patient is here for 1 year follow up  for Abdominal Aortic Aneurysm.      BP (!) 154/80   Pulse 75   Temp 97.7  F (36.5  C)   SpO2 97%     The provider has been notified that the patient has no concerns.     Questions patient would like addressed today are: N/A.    Refills are needed: No    Has homecare services and agency name:  No

## 2024-10-21 NOTE — PROGRESS NOTES
Vascular Surgery Clinic Followup Note    LOCATION:  [facility]    Alyssa Higginbotham  Medical Record #:  [id number]  YOB: 1955  Age:  69 year old     Date of Service: 10/21/2024         Assessment and Plan:    69 year old female with history of type A aortic dissection s/p ascending repair and mechanical AVR on warfarmin, seen today for routine surveillance of residual type B [0-10] aortic dissection with thoracoabdominal aneurysm. Dissection is stable, slow aneurysmal growth of 1-2 mm over the last year with now maximum aortic diameter of 49 mm.   I reviewed the patient's CT scan with her and her  and discussed with them that no repair is indicated at her current size. She will continue her current medical treatment including lifelong antiimpulse therapy and warfarin for mechanical aortic valve.     She follows with Dr. Mercer in cardiac surgery clinic and will follow-up with repeat CTA chest abdomen pelvis and CTA head and neck in 1 year time.      Patient was discussed with staff, Dr. Nassar.    30 min spent on encounter.    Porter York MD           Interval History:   Alyssa Higginbotham is a 69 year old female with a history of type A aortic dissection s/p ascending repair and mechanical AVR 24 yrs prior, returning for routine annual surveillance of her residual type B [0-10] aortic dissection with thoracoabdominal aneurysm.  She reports to be doing well, has chronic back pain related to scoliosis with nothing out of the usual for her.  Blood pressure is well-controlled at home with systolics typically below 120s.  Walks and rides bike for exercise nearly every day.  No acute or new back, abdominal, or chest pain.  She did have a spontaneous intracranial hemorrhage that was attributed to warfarin anticoagulation although her INR is not supratherapeutic at the time.  She continues on warfarin today and has had no other bleeding events.          Medications:      Current Outpatient Medications:     amitriptyline (ELAVIL) 10 MG tablet, Take 1 tablet (10 mg) by mouth at bedtime, Disp: 90 tablet, Rfl: 3    clonazePAM (KLONOPIN) 0.5 MG tablet, TAKE ONE TABLET BY MOUTH ONCE NIGHTLY AS NEEDED FOR ANXIETY OR SLEEP, Disp: 90 tablet, Rfl: 0    levothyroxine (SYNTHROID/LEVOTHROID) 75 MCG tablet, TAKE 1 TABLET ON WEDNESDAY, SATURDAY, Disp: 52 tablet, Rfl: 3    levothyroxine (SYNTHROID/LEVOTHROID) 88 MCG tablet, Take 1 tablet of 88mcg by mouth Monday, Tuesday, Thursday, Friday and Sunday. Will take 75mcg the other two days., Disp: 90 tablet, Rfl: 1    metoclopramide (REGLAN) 5 MG tablet, Take 1 tablet (5 mg) by mouth 3 times daily as needed (nausea/vomiting)., Disp: 30 tablet, Rfl: 0    metoprolol succinate ER (TOPROL XL) 200 MG 24 hr tablet, Take 1 tablet (200 mg) by mouth daily, Disp: 90 tablet, Rfl: 3    multivitamin (ONE A DAY) per tablet, [MULTIVITAMIN (ONE A DAY) PER TABLET] Take 1 tablet by mouth daily. , Disp: , Rfl:     rimegepant (NURTEC) 75 MG ODT tablet, Place 1 tablet (75 mg) under the tongue daily as needed for migraine. Maximum of 1 tablet every 24 hours., Disp: 8 tablet, Rfl: 3    simvastatin (ZOCOR) 20 MG tablet, Take 1 tablet (20 mg) by mouth at bedtime., Disp: 90 tablet, Rfl: 0    triamterene-HCTZ (MAXZIDE) 75-50 MG tablet, Take 0.5 tablets by mouth daily, Disp: 45 tablet, Rfl: 3    warfarin ANTICOAGULANT (COUMADIN) 1 MG tablet, TAKE 4 TABLETS (4MG TOTAL) BY MOUTH ON FRIDAYS OR AS DIRECTED. ADJUST DOSE BASED ON INR RESULTS AS DIRECTED., Disp: 55 tablet, Rfl: 3    warfarin ANTICOAGULANT (COUMADIN) 5 MG tablet, TAKE ONE TABLET BY MOUTH 6 DAYS OF THE WEEK- SUN, MON, TUES, WED, THURS AND SAT OR AS DIRECTED.  ADJUST DOSE BASED ON INR RESULTS., Disp: 90 tablet, Rfl: 3         Physical Exam:   BP (!) 154/80   Pulse 75   Temp 97.7  F (36.5  C)   SpO2 97%   Wt Readings from Last 1 Encounters:   10/10/24 53.1 kg (117 lb)     There is no height or weight on file to  calculate BMI.    EXAM:  Adult female sitting Complan's after  Abdomen abdomen soft, nontender, nondistended palpable bilateral radial, posterior tibial, and dorsalis pedis arteries.         Data:   Imaging:  We reviewed the results of her CTA chest abdomen and pelvis from 9/27/2024 as well as CTA head and neck performed 8/23/2024.  He show elevated chronic dissection distally from zone 0 to zone 10 segment of the right common iliac artery.  Dissection extends into the right brachycephalic and right common carotid artery.  Celiac, superior mesenteric, right renal, and especially right renal artery filled from the true lumen, left renal artery fills from right false lumen.  Maximal aortic diameter at the distal zone 3 is 49 mm by my measurement. This is about 1-2 mm of growth as compared to prior CTA from 9/21/2023.

## 2024-10-22 NOTE — PROGRESS NOTES
Jennifer responded back.     - she stated she had epistaxis on Saturday and needed to know INR status.     - rescheduled INR recheck on 10/23/24 @ [facility].

## 2024-10-23 ENCOUNTER — ANTICOAGULATION THERAPY VISIT (OUTPATIENT)
Dept: ANTICOAGULATION | Facility: CLINIC | Age: 69
End: 2024-10-23

## 2024-10-23 ENCOUNTER — LAB (OUTPATIENT)
Dept: CARDIOLOGY | Facility: CLINIC | Age: 69
End: 2024-10-23
Payer: COMMERCIAL

## 2024-10-23 DIAGNOSIS — Z95.2 S/P AVR (AORTIC VALVE REPLACEMENT): Primary | ICD-10-CM

## 2024-10-23 DIAGNOSIS — Z95.2 S/P AVR (AORTIC VALVE REPLACEMENT): ICD-10-CM

## 2024-10-23 DIAGNOSIS — Z79.01 LONG TERM CURRENT USE OF ANTICOAGULANT THERAPY: ICD-10-CM

## 2024-10-23 LAB — INR POINT OF CARE: 2.5 (ref 0.9–1.1)

## 2024-10-23 PROCEDURE — 36416 COLLJ CAPILLARY BLOOD SPEC: CPT

## 2024-10-23 PROCEDURE — 85610 PROTHROMBIN TIME: CPT

## 2024-10-23 NOTE — PROGRESS NOTES
ANTICOAGULATION MANAGEMENT     Alyssa Higginbotham 69 year old female is on warfarin with therapeutic INR result. (Goal INR 2.0-2.5   - per Neurologist (9/26/24) - suggest TIGHT  INR range (i.e. 2-2.5) if possible to try and reduce risk of further hemorrhage    Recent labs: (last 7 days)     10/23/24  0736   INR 2.5*       ASSESSMENT     Source(s): Chart Review and Patient/Caregiver Call     Warfarin doses taken: Warfarin taken as instructed  Diet: No new diet changes identified  Medication/supplement changes: None noted  New illness, injury, or hospitalization: No  Signs or symptoms of bleeding or clotting: Yes:    Epistaxis event on 10/19/24.  Previous result: Therapeutic last visit at 2.23; previously outside of goal range supratherapeutic last 4 INR results.  Additional findings: None         PLAN     Recommended plan for temporary change(s) affecting INR     Dosing Instructions: decrease your warfarin dose (3.2% change) with next INR in 1 week       Summary  As of 10/23/2024      Full warfarin instructions:  5 mg every Mon, Fri; 4 mg all other days   Next INR check:  10/30/2024               Telephone call with Jennifer who verbalizes understanding and agrees to plan   - informed Jennifer if she is interested in enrolling for home INR monitor.  (She will consider this option, due to frequency or epistaxis)   - per Neurologist (9/26/24) - suggest TIGHT  INR range (i.e. 2-2.5) if possible to try and reduce risk of further hemorrhage.    Lab visit scheduled - INR on 10/30/24 @ [facility].    Education provided: Taking warfarin: Importance of taking warfarin as instructed  Goal range and lab monitoring: goal range and significance of current result  Symptom monitoring: monitoring for bleeding signs and symptoms    Plan made per [facility] anticoagulation protocol    Aylin Pablo RN  10/23/2024  Anticoagulation Clinic  [employer] for routing messages: chel GALVEZ  [facility] patient phone line:  [phone]        _______________________________________________________________________     Anticoagulation Episode Summary       Current INR goal:  2.0-2.5   TTR:  50.9% (11.5 mo)   Target end date:  Indefinite   Send INR reminders to:  [facility]    Indications    S/P AVR (aortic valve replacement) [Z95.2]  Long term current use of anticoagulant therapy [Z79.01]             Comments:  6/2/23 INR target goal changed to 2.0 - 2.5 d/t daily bleeding issues.  (Sensitivity)  Goal range changed 2/20/19 per cardiology             Anticoagulation Care Providers       Provider Role Specialty Phone number    Rafaela Woods MD Referring Family Medicine [phone]

## 2024-10-25 ENCOUNTER — TELEPHONE (OUTPATIENT)
Dept: NEUROLOGY | Facility: CLINIC | Age: 69
End: 2024-10-25
Payer: COMMERCIAL

## 2024-10-25 NOTE — TELEPHONE ENCOUNTER
"Called pt to schedule stroke return visit, left message to call back. Patient was seen by the stroke team about 1 months ago, at that time it was recommended patient follow up with the stroke team to check in, review plan and optimize overall stroke risk management and we would like to offer an appointment.     When patient calls back: please schedule Stroke Return with Rody Medeiros CNP however if no availability with this provider, ok to schedule with a different stroke CORINA, any visit type as per patient preference, appointment notes \"3 month follow up for stroke\".    Looping in stroke RNCC to review and advise as patient has upcoming general neurology visit scheduled, is additional stroke CORINA visit indicated.    ADAMA SANCHEZ CMA    "

## 2024-10-29 NOTE — TELEPHONE ENCOUNTER
Pt has been added to recall list for July 2025 follow-up.      Lynette Nicholson BS, RN, SCRN  RN Stroke Neurology Care Coordinator  [facility] Neuroscience Service Line

## 2024-10-29 NOTE — TELEPHONE ENCOUNTER
Per my review of previous visit recommendations and recent appt notes, pt has been managing vascular risk factors and has followed up with both cardiology and vascular surgery who plan to see her in 1 year. She is scheduled to see general neurology for headache management in December and has PCP annual wellness appt in the spring.     I do not see a clear need for an additional stroke appt at this time. Routing to CORINA to please advise if you do want her to be seen soon or if we could plan to see her in July 2025 for an annual follow up?      Lynette Nicholson BS, RN, SCRN  RN Stroke Neurology Care Coordinator  [facility] Neuroscience Service Line

## 2024-10-30 ENCOUNTER — ANTICOAGULATION THERAPY VISIT (OUTPATIENT)
Dept: ANTICOAGULATION | Facility: CLINIC | Age: 69
End: 2024-10-30

## 2024-10-30 ENCOUNTER — LAB (OUTPATIENT)
Dept: LAB | Facility: CLINIC | Age: 69
End: 2024-10-30
Payer: COMMERCIAL

## 2024-10-30 DIAGNOSIS — Z79.01 LONG TERM CURRENT USE OF ANTICOAGULANT THERAPY: ICD-10-CM

## 2024-10-30 DIAGNOSIS — Z95.2 S/P AVR (AORTIC VALVE REPLACEMENT): ICD-10-CM

## 2024-10-30 DIAGNOSIS — Z95.2 S/P AVR (AORTIC VALVE REPLACEMENT): Primary | ICD-10-CM

## 2024-10-30 LAB — INR BLD: 2.6 (ref 0.9–1.1)

## 2024-10-30 PROCEDURE — 85610 PROTHROMBIN TIME: CPT

## 2024-10-30 PROCEDURE — 36416 COLLJ CAPILLARY BLOOD SPEC: CPT

## 2024-10-30 RX ORDER — WARFARIN SODIUM 1 MG/1
TABLET ORAL
Qty: 300 TABLET | Refills: 3 | Status: SHIPPED | OUTPATIENT
Start: 2024-10-30

## 2024-10-30 NOTE — TELEPHONE ENCOUNTER
Refilled 1mg Warfarin RX.     - sent to [email or web address] mail order.     - she previously requested refill on her 1mg on 10/21/24, however, she was only given a QTY of 55 pills.  Good for 12-13 days.

## 2024-10-30 NOTE — PROGRESS NOTES
ANTICOAGULATION MANAGEMENT     Alyssa Higginbotham 69 year old female is on warfarin with supratherapeutic INR result. (Goal INR 2.0-2.5)   - per Neurologist (9/26/24) - suggest TIGHT  INR range (i.e. 2-2.5) if possible to try and reduce risk of further hemorrhage     Recent labs: (last 7 days)     10/30/24  0722   INR 2.6*       ASSESSMENT     Source(s): Chart Review and Patient/Caregiver Call     Warfarin doses taken: Warfarin taken as instructed  Diet: No new diet changes identified  Medication/supplement changes:  Yes   10/23/24 decreased weekly warfarin dose by 3.2%.  New illness, injury, or hospitalization: No  Signs or symptoms of bleeding or clotting: Yes:    Reported one episode of espistaxis.  Previous result: Therapeutic last 2 INR visits; (2.5, 2.23)  Additional findings: Yes.  - reported a refill  requestl on her 1mg warfarin RX.  However, on 10/21/24 they only gave her #55 pills.which is only 12-13 days.   - sent new warfarin 1mg script for 90 day supply and send to [employer] mail order.       PLAN     Recommended plan for temporary change(s) affecting INR     Dosing Instructions: partial hold then decrease your warfarin dose (3.3% change) with next INR in 1 week       Summary  As of 10/30/2024      Full warfarin instructions:  10/30: 3 mg; Otherwise 5 mg every Mon; 4 mg all other days   Next INR check:  11/6/2024               Telephone call with Jennifer who verbalizes understanding and agrees to plan    Lab visit scheduled - INR on 11/6/24  at her oncology lab orders @ [facility]    Education provided: Taking warfarin: Importance of taking warfarin as instructed  Goal range and lab monitoring: goal range and significance of current result  Symptom monitoring: monitoring for bleeding signs and symptoms    Plan made per [facility] anticoagulation protocol    Aylin Pablo RN  10/30/2024  Anticoagulation Clinic  [employer] for routing messages: chel [facility] patient phone line:  [phone]        _______________________________________________________________________     Anticoagulation Episode Summary       Current INR goal:  2.0-2.5   TTR:  50.3% (11.5 mo)   Target end date:  Indefinite   Send INR reminders to:  [facility]    Indications    S/P AVR (aortic valve replacement) [Z95.2]  Long term current use of anticoagulant therapy [Z79.01]             Comments:  6/2/23 INR target goal changed to 2.0 - 2.5 d/t daily bleeding issues.  (Sensitivity)  Goal range changed 2/20/19 per cardiology             Anticoagulation Care Providers       Provider Role Specialty Phone number    Rafaela Woods MD Referring Family Medicine [phone]

## 2024-11-06 ENCOUNTER — LAB (OUTPATIENT)
Dept: INFUSION THERAPY | Facility: HOSPITAL | Age: 69
End: 2024-11-06
Attending: INTERNAL MEDICINE
Payer: COMMERCIAL

## 2024-11-06 ENCOUNTER — ANTICOAGULATION THERAPY VISIT (OUTPATIENT)
Dept: ANTICOAGULATION | Facility: CLINIC | Age: 69
End: 2024-11-06

## 2024-11-06 DIAGNOSIS — Z95.2 S/P AVR (AORTIC VALVE REPLACEMENT): Primary | ICD-10-CM

## 2024-11-06 DIAGNOSIS — D47.2 MGUS (MONOCLONAL GAMMOPATHY OF UNKNOWN SIGNIFICANCE): ICD-10-CM

## 2024-11-06 DIAGNOSIS — Z95.2 S/P AVR (AORTIC VALVE REPLACEMENT): ICD-10-CM

## 2024-11-06 DIAGNOSIS — Z79.01 LONG TERM CURRENT USE OF ANTICOAGULANT THERAPY: ICD-10-CM

## 2024-11-06 LAB
ANION GAP SERPL CALCULATED.3IONS-SCNC: 11 MMOL/L (ref 7–15)
BASOPHILS # BLD AUTO: 0 10E3/UL (ref 0–0.2)
BASOPHILS NFR BLD AUTO: 1 %
BUN SERPL-MCNC: 20.9 MG/DL (ref 8–23)
CALCIUM SERPL-MCNC: 9.6 MG/DL (ref 8.8–10.4)
CHLORIDE SERPL-SCNC: 100 MMOL/L (ref 98–107)
CREAT SERPL-MCNC: 0.93 MG/DL (ref 0.51–0.95)
EGFRCR SERPLBLD CKD-EPI 2021: 66 ML/MIN/1.73M2
EOSINOPHIL # BLD AUTO: 0.1 10E3/UL (ref 0–0.7)
EOSINOPHIL NFR BLD AUTO: 1 %
ERYTHROCYTE [DISTWIDTH] IN BLOOD BY AUTOMATED COUNT: 13.7 % (ref 10–15)
GLUCOSE SERPL-MCNC: 82 MG/DL (ref 70–99)
HCO3 SERPL-SCNC: 29 MMOL/L (ref 22–29)
HCT VFR BLD AUTO: 43.7 % (ref 35–47)
HGB BLD-MCNC: 13.8 G/DL (ref 11.7–15.7)
IMM GRANULOCYTES # BLD: 0 10E3/UL
IMM GRANULOCYTES NFR BLD: 0 %
INR PPP: 2 (ref 0.85–1.15)
KAPPA LC FREE SER-MCNC: 1.19 MG/DL (ref 0.33–1.94)
KAPPA LC FREE/LAMBDA FREE SER NEPH: 0.69 {RATIO} (ref 0.26–1.65)
LAMBDA LC FREE SERPL-MCNC: 1.73 MG/DL (ref 0.57–2.63)
LYMPHOCYTES # BLD AUTO: 0.9 10E3/UL (ref 0.8–5.3)
LYMPHOCYTES NFR BLD AUTO: 21 %
MCH RBC QN AUTO: 25.8 PG (ref 26.5–33)
MCHC RBC AUTO-ENTMCNC: 31.6 G/DL (ref 31.5–36.5)
MCV RBC AUTO: 82 FL (ref 78–100)
MONOCYTES # BLD AUTO: 0.4 10E3/UL (ref 0–1.3)
MONOCYTES NFR BLD AUTO: 9 %
NEUTROPHILS # BLD AUTO: 2.9 10E3/UL (ref 1.6–8.3)
NEUTROPHILS NFR BLD AUTO: 69 %
NRBC # BLD AUTO: 0 10E3/UL
NRBC BLD AUTO-RTO: 0 /100
PLATELET # BLD AUTO: 212 10E3/UL (ref 150–450)
POTASSIUM SERPL-SCNC: 3.5 MMOL/L (ref 3.4–5.3)
RBC # BLD AUTO: 5.35 10E6/UL (ref 3.8–5.2)
SODIUM SERPL-SCNC: 140 MMOL/L (ref 135–145)
TOTAL PROTEIN SERUM FOR ELP: 7.2 G/DL (ref 6.4–8.3)
WBC # BLD AUTO: 4.3 10E3/UL (ref 4–11)

## 2024-11-06 PROCEDURE — 84155 ASSAY OF PROTEIN SERUM: CPT

## 2024-11-06 PROCEDURE — 36415 COLL VENOUS BLD VENIPUNCTURE: CPT

## 2024-11-06 PROCEDURE — 80048 BASIC METABOLIC PNL TOTAL CA: CPT

## 2024-11-06 PROCEDURE — 85610 PROTHROMBIN TIME: CPT

## 2024-11-06 PROCEDURE — 85004 AUTOMATED DIFF WBC COUNT: CPT

## 2024-11-06 PROCEDURE — 84165 PROTEIN E-PHORESIS SERUM: CPT | Mod: TC | Performed by: PATHOLOGY

## 2024-11-06 PROCEDURE — 83521 IG LIGHT CHAINS FREE EACH: CPT

## 2024-11-06 NOTE — PROGRESS NOTES
ANTICOAGULATION MANAGEMENT     Alyssa Higginbotham 69 year old female is on warfarin with therapeutic INR result. (Goal INR 2.0-2.5)     - per Neurologist (9/26/24) - suggest TIGHT  INR range (i.e. 2-2.5) if possible to try and reduce risk of further hemorrhage     Recent labs: (last 7 days)     11/06/24  0804   INR 2.00*       ASSESSMENT     Source(s): Chart Review and Patient/Caregiver Call     Warfarin doses taken: Held partial dose on 10/30/24, recently which may be affecting INR  Diet: No new diet changes identified  Medication/supplement changes:  Yes   Decreased weekly warfarin dose by 3.3% on 10/30/24; (has epistaxis event).  New illness, injury, or hospitalization: No  Signs or symptoms of bleeding or clotting: No  Previous result: Supratherapeutic at 2.6 on 10/30/24.  Additional findings: None       PLAN     Recommended plan for no diet, medication or health factor changes affecting INR     Dosing Instructions: Continue your current warfarin dose with next INR in 1 week       Summary  As of 11/6/2024      Full warfarin instructions:  5 mg every Mon; 4 mg all other days   Next INR check:  11/13/2024               Telephone call with Jennifer who verbalizes understanding and agrees to plan   - will continue same dose, since she is due to the 5mg warfarin dose tonight.    Lab visit scheduled - INRon 11/13/24 @ [facility]    Education provided: Taking warfarin: Importance of taking warfarin as instructed  Goal range and lab monitoring: goal range and significance of current result  Dietary considerations: importance of consistent vitamin K intake and impact of vitamin K foods on INR    Plan made per [facility] anticoagulation protocol    Aylin Pablo RN  11/6/2024  Anticoagulation Clinic  [employer] for routing messages: chel PINEDA [facility] patient phone line: [phone]        _______________________________________________________________________     Anticoagulation Episode Summary       Current INR goal:   2.0-2.5   TTR:  51.4% (11.5 mo)   Target end date:  Indefinite   Send INR reminders to:  CULLEN GALVEZ    Indications    S/P AVR (aortic valve replacement) [Z95.2]  Long term current use of anticoagulant therapy [Z79.01]             Comments:  6/2/23 INR target goal changed to 2.0 - 2.5 d/t daily bleeding issues.  (Sensitivity)  Goal range changed 2/20/19 per cardiology             Anticoagulation Care Providers       Provider Role Specialty Phone number    Rafaela Woods MD Referring Family Medicine [phone]

## 2024-11-07 LAB
ALBUMIN SERPL ELPH-MCNC: 4.6 G/DL (ref 3.7–5.1)
ALPHA1 GLOB SERPL ELPH-MCNC: 0.3 G/DL (ref 0.2–0.4)
ALPHA2 GLOB SERPL ELPH-MCNC: 0.6 G/DL (ref 0.5–0.9)
B-GLOBULIN SERPL ELPH-MCNC: 0.9 G/DL (ref 0.6–1)
GAMMA GLOB SERPL ELPH-MCNC: 0.8 G/DL (ref 0.7–1.6)
M PROTEIN SERPL ELPH-MCNC: 0.1 G/DL
PROT PATTERN SERPL ELPH-IMP: ABNORMAL

## 2024-11-07 PROCEDURE — 84165 PROTEIN E-PHORESIS SERUM: CPT | Mod: 26 | Performed by: PATHOLOGY

## 2024-11-13 ENCOUNTER — ONCOLOGY VISIT (OUTPATIENT)
Dept: ONCOLOGY | Facility: HOSPITAL | Age: 69
End: 2024-11-13
Attending: INTERNAL MEDICINE
Payer: COMMERCIAL

## 2024-11-13 ENCOUNTER — APPOINTMENT (OUTPATIENT)
Dept: LAB | Facility: HOSPITAL | Age: 69
End: 2024-11-13
Attending: INTERNAL MEDICINE
Payer: COMMERCIAL

## 2024-11-13 ENCOUNTER — LAB (OUTPATIENT)
Dept: LAB | Facility: CLINIC | Age: 69
End: 2024-11-13
Payer: COMMERCIAL

## 2024-11-13 ENCOUNTER — ANTICOAGULATION THERAPY VISIT (OUTPATIENT)
Dept: ANTICOAGULATION | Facility: CLINIC | Age: 69
End: 2024-11-13

## 2024-11-13 VITALS
RESPIRATION RATE: 18 BRPM | TEMPERATURE: 97.6 F | WEIGHT: 118 LBS | OXYGEN SATURATION: 98 % | SYSTOLIC BLOOD PRESSURE: 168 MMHG | HEART RATE: 62 BPM | HEIGHT: 62 IN | BODY MASS INDEX: 21.71 KG/M2 | DIASTOLIC BLOOD PRESSURE: 79 MMHG

## 2024-11-13 DIAGNOSIS — Z95.2 S/P AVR (AORTIC VALVE REPLACEMENT): ICD-10-CM

## 2024-11-13 DIAGNOSIS — Z79.01 LONG TERM CURRENT USE OF ANTICOAGULANT THERAPY: ICD-10-CM

## 2024-11-13 DIAGNOSIS — D47.2 MGUS (MONOCLONAL GAMMOPATHY OF UNKNOWN SIGNIFICANCE): Primary | ICD-10-CM

## 2024-11-13 DIAGNOSIS — Z95.2 S/P AVR (AORTIC VALVE REPLACEMENT): Primary | ICD-10-CM

## 2024-11-13 LAB — INR PPP: 2.19 (ref 0.85–1.15)

## 2024-11-13 PROCEDURE — 99213 OFFICE O/P EST LOW 20 MIN: CPT | Performed by: INTERNAL MEDICINE

## 2024-11-13 PROCEDURE — G2211 COMPLEX E/M VISIT ADD ON: HCPCS | Performed by: INTERNAL MEDICINE

## 2024-11-13 PROCEDURE — 85610 PROTHROMBIN TIME: CPT

## 2024-11-13 PROCEDURE — G0463 HOSPITAL OUTPT CLINIC VISIT: HCPCS | Performed by: INTERNAL MEDICINE

## 2024-11-13 PROCEDURE — 36415 COLL VENOUS BLD VENIPUNCTURE: CPT

## 2024-11-13 ASSESSMENT — PAIN SCALES - GENERAL: PAINLEVEL_OUTOF10: MODERATE PAIN (5)

## 2024-11-13 NOTE — PROGRESS NOTES
"Oncology Rooming Note    November 13, 2024 9:10 AM   Alyssa Higginbotham is a 69 year old female who presents for:    Chief Complaint   Patient presents with    Oncology Clinic Visit     1 year follow up      Initial Vitals: BP (!) 168/79 (BP Location: Left arm, Patient Position: Sitting, Cuff Size: Adult Regular)   Pulse 62   Temp 97.6  F (36.4  C) (Tympanic)   Resp 18   Ht 1.568 m (5' 1.75\")   Wt 53.5 kg (118 lb)   SpO2 98%   BMI 21.76 kg/m   Estimated body mass index is 21.76 kg/m  as calculated from the following:    Height as of this encounter: 1.568 m (5' 1.75\").    Weight as of this encounter: 53.5 kg (118 lb). Body surface area is 1.53 meters squared.  Moderate Pain (5) Comment: Data Unavailable   No LMP recorded. Patient is postmenopausal.  Allergies reviewed: Yes  Medications reviewed: Yes    Medications: Medication refills not needed today.  Pharmacy name entered into [facility]:    [facility][id number] - [place] [id number] AT [facility] & [address] [facility] [address] [id number] [place] [facility] [address]    Frailty Screening:   Is the patient here for a new oncology consult visit in cancer care? 2. No      Clinical concerns: None      ARUNA MAYER CMA              "

## 2024-11-13 NOTE — PROGRESS NOTES
ANTICOAGULATION MANAGEMENT     Alyssa Higginbotham 69 year old female is on warfarin with therapeutic INR result. (Goal INR 2.0-2.5)    Recent labs: (last 7 days)     11/13/24  0936   INR 2.19*       ASSESSMENT     Source(s): Chart Review  Previous INR was Therapeutic last visit; previously outside of goal range  Medication, diet, health changes since last INR chart reviewed; none identified         PLAN     Recommended plan for no diet, medication or health factor changes affecting INR     Dosing Instructions: Continue your current warfarin dose with next INR in 1 week       Summary  As of 11/13/2024      Full warfarin instructions:  5 mg every Mon; 4 mg all other days   Next INR check:  11/20/2024               Detailed voice message left for Jennifer with dosing instructions and follow up date.     Contact [phone] to schedule and with any changes, questions or concerns.     Education provided: Please call back if any changes to your diet, medications or how you've been taking warfarin    Plan made per [facility] anticoagulation protocol    Eneida Suarez RN  11/13/2024  Anticoagulation Clinic  [facility] for routing messages: p CULLEN GALVEZ  [facility] patient phone line: [phone]        _______________________________________________________________________     Anticoagulation Episode Summary       Current INR goal:  2.0-2.5   TTR:  51.9% (11.5 mo)   Target end date:  Indefinite   Send INR reminders to:  CULLEN GALVEZ    Indications    S/P AVR (aortic valve replacement) [Z95.2]  Long term current use of anticoagulant therapy [Z79.01]             Comments:  6/2/23 INR target goal changed to 2.0 - 2.5 d/t daily bleeding issues.  (Sensitivity)  Goal range changed 2/20/19 per cardiology             Anticoagulation Care Providers       Provider Role Specialty Phone number    Rafaela Woods MD Referring Family Medicine [phone]          ANTICOAGULATION MANAGEMENT     Alyssa Higginbotham 69 year  old female is on warfarin with therapeutic INR result. (Goal INR 2.0-2.5)    Recent labs: (last 7 days)     11/13/24  0936   INR 2.19*       ASSESSMENT     Source(s): Chart Review  Previous INR was Therapeutic last visit; previously outside of goal range  Medication, diet, health changes since last INR chart reviewed; none identified         PLAN     Recommended plan for no diet, medication or health factor changes affecting INR     Dosing Instructions: Continue your current warfarin dose with next INR in 1 week       Summary  As of 11/13/2024      Full warfarin instructions:  5 mg every Mon; 4 mg all other days   Next INR check:  11/20/2024               Detailed voice message left for Jennifer with dosing instructions and follow up date.     Contact [phone] to schedule and with any changes, questions or concerns.     Education provided: Please call back if any changes to your diet, medications or how you've been taking warfarin    Plan made per [facility] anticoagulation protocol    Eneida Suarez RN  11/13/2024  Anticoagulation Clinic  [facility] for routing messages: chel GALVEZ  [facility] patient phone line: [phone]        _______________________________________________________________________     Anticoagulation Episode Summary       Current INR goal:  2.0-2.5   TTR:  51.9% (11.5 mo)   Target end date:  Indefinite   Send INR reminders to:  CULLEN GALVEZ    Indications    S/P AVR (aortic valve replacement) [Z95.2]  Long term current use of anticoagulant therapy [Z79.01]             Comments:  6/2/23 INR target goal changed to 2.0 - 2.5 d/t daily bleeding issues.  (Sensitivity)  Goal range changed 2/20/19 per cardiology             Anticoagulation Care Providers       Provider Role Specialty Phone number    Rafaela Woods MD Referring Family Medicine [phone]

## 2024-11-13 NOTE — LETTER
11/13/2024      Alyssa Higginbotham  [address] [place] [address]      Dear Colleague,    Thank you for referring your patient, Alyssa Higginbotham, to the [facility] [place]. Please see a copy of my visit note below.    [facility] Hematology and Oncology Progress Note    Patient: Alyssa Higginbotham  MRN: [id number]  11/13/24        Reason for Visit    Chief Complaint   Patient presents with     Oncology Clinic Visit     1 year follow up          Problem List Items Addressed This Visit    None  Visit Diagnoses       MGUS (monoclonal gammopathy of unknown significance)    -  Primary                Assessment and Plan  MGUS  IgG lambda  Labs on 11/6/2024 reviewed today.  SPEP continues to show stable but small monoclonal peak of 0.1 g/dL.  Normal light chains.  CBC shows normal hemoglobin 13.8.  RBC count was minimally up at 5.35 probably counts within normal limits.  Serum chemistry normal.  Overall no concerns for disease progression.  I do not think she needs regular follow-up with hematology since over the last year or 2 her monoclonal peak has been stone cold stable. I do not think there is any concern for amyloidosis based on her clinical presentation.  She is on chronic anticoagulant warfarin for mechanical heart valves and her intraparenchymal and subarachnoid hemorrhages that was seen recently was probably due to that..  There was no mention of cerebral amyloid angiopathy on the recent MRI.  Typically monoclonal proteins are not associated with intracranial hemorrhages.  She has no evidence of any systemic amyloidosis.  So considering all these things I do not think she needs regular follow-ups with me.  Recommend continued follow-up with PCP with yearly SPEP, serum light chains, serum immunofixation, CBC and BMP.  If her monoclonal peak is going progressively up then we can see her back in the clinic.  But I do expect mild fluctuations in the levels and if there is a  slow increase that there is no need to worry unless it is associated with other symptoms and signs of plasma proliferative disorders (cytopenias, bone pain, fracture, weight loss etc.).    She is agreeable to the plan.     Cancer Staging   No matching staging information was found for the patient.      ECOG Performance    0 - Independent         Problem List    Patient Active Problem List   Diagnosis     Mitral Valve Disorder     Dissection Of The Aorta     Anxiety     Tension-type Headache     Marfan Syndrome     Hypothyroidism     Hyperlipidemia     Depression     Migraine     Aortic Valve Disorder     Myalgia And Myositis     Meningioma (H)-initially seen on CT of head that was obtained after a fall. Frontal lobe, confirmed on an MRI Mar 2015, 3 mm     Concussion syndrome     Vertigo     PTSD (post-traumatic stress disorder)     Thyroid nodule     Family history of ovarian cancer     PADDY (generalized anxiety disorder)     Presbyopia     Long term current use of anticoagulant therapy     S/P AVR (aortic valve replacement)     Essential hypertension     Hallux limitus, right     Adenomatous colon polyp-about 2010, scope clear in 2016     Benign essential hypertension     Abnormal SPEP     Osteopenia of multiple sites     Subarachnoid hemorrhage (H)     Intraparenchymal hemorrhage of brain (H)          Interval History   Alyssa Higginbotham is a 69 female with history of MGUS who is here for follow-up.    Continues to do well.  Denies any new issues today.  No fever chills or night sweats.  No weight loss.  No enlarging lymph nodes.  No new bone pain.    Had labs done about a week ago.    Was hospitalized in July for stroke like symptoms and was found to have subarachnoid and bifrontal intraparenchymal hematomas.  She is on chronic anticoagulation with warfarin for mechanical heart valves.  She does not remember having any trauma.  Managed conservatively.  Warfarin has been resumed.  No new issues since then.  Follows  with cardiology and neurology.    Review of Systems  A comprehensive review of systems was negative except for what is noted in the interval history    Current Outpatient Medications   Medication Sig Dispense Refill     amitriptyline (ELAVIL) 10 MG tablet Take 1 tablet (10 mg) by mouth at bedtime 90 tablet 3     clonazePAM (KLONOPIN) 0.5 MG tablet TAKE ONE TABLET BY MOUTH ONCE NIGHTLY AS NEEDED FOR ANXIETY OR SLEEP 90 tablet 0     levothyroxine (SYNTHROID/LEVOTHROID) 75 MCG tablet TAKE 1 TABLET ON WEDNESDAY, SATURDAY 52 tablet 3     levothyroxine (SYNTHROID/LEVOTHROID) 88 MCG tablet Take 1 tablet of 88mcg by mouth Monday, Tuesday, Thursday, Friday and Sunday. Will take 75mcg the other two days. 90 tablet 1     metoclopramide (REGLAN) 5 MG tablet Take 1 tablet (5 mg) by mouth 3 times daily as needed (nausea/vomiting). 30 tablet 0     metoprolol succinate ER (TOPROL XL) 200 MG 24 hr tablet Take 1 tablet (200 mg) by mouth daily 90 tablet 3     multivitamin (ONE A DAY) per tablet [MULTIVITAMIN (ONE A DAY) PER TABLET] Take 1 tablet by mouth daily.        rimegepant (NURTEC) 75 MG ODT tablet Place 1 tablet (75 mg) under the tongue daily as needed for migraine. Maximum of 1 tablet every 24 hours. 8 tablet 3     simvastatin (ZOCOR) 20 MG tablet Take 1 tablet (20 mg) by mouth at bedtime. 90 tablet 0     triamterene-HCTZ (MAXZIDE) 75-50 MG tablet Take 0.5 tablets by mouth daily 45 tablet 3     warfarin ANTICOAGULANT (COUMADIN) 1 MG tablet Takes 4 tablets of her 1mg on 6 days of the week on Sun, Tues, Wed, Thurs, Fri, Sat, by mouth daily or as directed.  Adjust dose based on INR results or INR Clinic. 300 tablet 3     warfarin ANTICOAGULANT (COUMADIN) 5 MG tablet TAKE ONE TABLET BY MOUTH 6 DAYS OF THE WEEK- SUN, MON, TUES, WED, THURS AND SAT OR AS DIRECTED.  ADJUST DOSE BASED ON INR RESULTS. 90 tablet 3     No current facility-administered medications for this visit.        Physical Exam        General: alert and  "cooperative  HEENT: Head: Normal, normocephalic, atraumatic.  Eye: Normal external eye, conjunctiva, lids cornea, CARLOS.  Extremities: atraumatic, no peripheral edema:   CNS: Alert and oriented x3, neurologic exam grossly normal.      Lab Results    No results found for this or any previous visit (from the past week).      Imaging    No results found.    The longitudinal plan of care for the diagnosis(es)/condition(s) as documented were addressed during this visit. Due to the added complexity in care, I will continue to support Jennifer in the subsequent management and with ongoing continuity of care.    Signed by: Katrin Cabrera MD      Oncology Rooming Note    November 13, 2024 9:10 AM   Alyssa Higginbotham is a 69 year old female who presents for:    Chief Complaint   Patient presents with     Oncology Clinic Visit     1 year follow up      Initial Vitals: BP (!) 168/79 (BP Location: Left arm, Patient Position: Sitting, Cuff Size: Adult Regular)   Pulse 62   Temp 97.6  F (36.4  C) (Tympanic)   Resp 18   Ht 1.568 m (5' 1.75\")   Wt 53.5 kg (118 lb)   SpO2 98%   BMI 21.76 kg/m   Estimated body mass index is 21.76 kg/m  as calculated from the following:    Height as of this encounter: 1.568 m (5' 1.75\").    Weight as of this encounter: 53.5 kg (118 lb). Body surface area is 1.53 meters squared.  Moderate Pain (5) Comment: Data Unavailable   No LMP recorded. Patient is postmenopausal.  Allergies reviewed: Yes  Medications reviewed: Yes    Medications: Medication refills not needed today.  Pharmacy name entered into [facility]:    [id number] - [place], [place] - [address] [facility] [address]  [facility] [address] [facility] [place] [address] [id number] [place] - [address]    Frailty Screening:   Is the patient here for a new oncology consult visit in cancer " care? 2. No      Clinical concerns: None      ARUNA MAYER CMA                Again, thank you for allowing me to participate in the care of your patient.        Sincerely,        Katrin Cabrera MD

## 2024-11-13 NOTE — PROGRESS NOTES
[facility] Hematology and Oncology Progress Note    Patient: Alyssa Higginbotham  MRN: [id number]  11/13/24        Reason for Visit    Chief Complaint   Patient presents with    Oncology Clinic Visit     1 year follow up          Problem List Items Addressed This Visit    None  Visit Diagnoses       MGUS (monoclonal gammopathy of unknown significance)    -  Primary                Assessment and Plan  MGUS  IgG lambda  Labs on 11/6/2024 reviewed today.  SPEP continues to show stable but small monoclonal peak of 0.1 g/dL.  Normal light chains.  CBC shows normal hemoglobin 13.8.  RBC count was minimally up at 5.35 probably counts within normal limits.  Serum chemistry normal.  Overall no concerns for disease progression.  I do not think she needs regular follow-up with hematology since over the last year or 2 her monoclonal peak has been stone cold stable. I do not think there is any concern for amyloidosis based on her clinical presentation.  She is on chronic anticoagulant warfarin for mechanical heart valves and her intraparenchymal and subarachnoid hemorrhages that was seen recently was probably due to that..  There was no mention of cerebral amyloid angiopathy on the recent MRI.  Typically monoclonal proteins are not associated with intracranial hemorrhages.  She has no evidence of any systemic amyloidosis.  So considering all these things I do not think she needs regular follow-ups with me.  Recommend continued follow-up with PCP with yearly SPEP, serum light chains, serum immunofixation, CBC and BMP.  If her monoclonal peak is going progressively up then we can see her back in the clinic.  But I do expect mild fluctuations in the levels and if there is a slow increase that there is no need to worry unless it is associated with other symptoms and signs of plasma proliferative disorders (cytopenias, bone pain, fracture, weight loss etc.).    She is agreeable to the plan.     Cancer Staging   No matching  staging information was found for the patient.      ECOG Performance    0 - Independent         Problem List    Patient Active Problem List   Diagnosis    Mitral Valve Disorder    Dissection Of The Aorta    Anxiety    Tension-type Headache    Marfan Syndrome    Hypothyroidism    Hyperlipidemia    Depression    Migraine    Aortic Valve Disorder    Myalgia And Myositis    Meningioma (H)-initially seen on CT of head that was obtained after a fall. Frontal lobe, confirmed on an MRI Mar 2015, 3 mm    Concussion syndrome    Vertigo    PTSD (post-traumatic stress disorder)    Thyroid nodule    Family history of ovarian cancer    PADDY (generalized anxiety disorder)    Presbyopia    Long term current use of anticoagulant therapy    S/P AVR (aortic valve replacement)    Essential hypertension    Hallux limitus, right    Adenomatous colon polyp-about 2010, scope clear in 2016    Benign essential hypertension    Abnormal SPEP    Osteopenia of multiple sites    Subarachnoid hemorrhage (H)    Intraparenchymal hemorrhage of brain (H)          Interval History   Alyssa Higginbotham is a 69 female with history of MGUS who is here for follow-up.    Continues to do well.  Denies any new issues today.  No fever chills or night sweats.  No weight loss.  No enlarging lymph nodes.  No new bone pain.    Had labs done about a week ago.    Was hospitalized in July for stroke like symptoms and was found to have subarachnoid and bifrontal intraparenchymal hematomas.  She is on chronic anticoagulation with warfarin for mechanical heart valves.  She does not remember having any trauma.  Managed conservatively.  Warfarin has been resumed.  No new issues since then.  Follows with cardiology and neurology.    Review of Systems  A comprehensive review of systems was negative except for what is noted in the interval history    Current Outpatient Medications   Medication Sig Dispense Refill    amitriptyline (ELAVIL) 10 MG tablet Take 1 tablet (10 mg) by  mouth at bedtime 90 tablet 3    clonazePAM (KLONOPIN) 0.5 MG tablet TAKE ONE TABLET BY MOUTH ONCE NIGHTLY AS NEEDED FOR ANXIETY OR SLEEP 90 tablet 0    levothyroxine (SYNTHROID/LEVOTHROID) 75 MCG tablet TAKE 1 TABLET ON WEDNESDAY, SATURDAY 52 tablet 3    levothyroxine (SYNTHROID/LEVOTHROID) 88 MCG tablet Take 1 tablet of 88mcg by mouth Monday, Tuesday, Thursday, Friday and Sunday. Will take 75mcg the other two days. 90 tablet 1    metoclopramide (REGLAN) 5 MG tablet Take 1 tablet (5 mg) by mouth 3 times daily as needed (nausea/vomiting). 30 tablet 0    metoprolol succinate ER (TOPROL XL) 200 MG 24 hr tablet Take 1 tablet (200 mg) by mouth daily 90 tablet 3    multivitamin (ONE A DAY) per tablet [MULTIVITAMIN (ONE A DAY) PER TABLET] Take 1 tablet by mouth daily.       rimegepant (NURTEC) 75 MG ODT tablet Place 1 tablet (75 mg) under the tongue daily as needed for migraine. Maximum of 1 tablet every 24 hours. 8 tablet 3    simvastatin (ZOCOR) 20 MG tablet Take 1 tablet (20 mg) by mouth at bedtime. 90 tablet 0    triamterene-HCTZ (MAXZIDE) 75-50 MG tablet Take 0.5 tablets by mouth daily 45 tablet 3    warfarin ANTICOAGULANT (COUMADIN) 1 MG tablet Takes 4 tablets of her 1mg on 6 days of the week on Sun, Tues, Wed, Thurs, Fri, Sat, by mouth daily or as directed.  Adjust dose based on INR results or INR Clinic. 300 tablet 3    warfarin ANTICOAGULANT (COUMADIN) 5 MG tablet TAKE ONE TABLET BY MOUTH 6 DAYS OF THE WEEK- SUN, MON, TUES, WED, THURS AND SAT OR AS DIRECTED.  ADJUST DOSE BASED ON INR RESULTS. 90 tablet 3     No current facility-administered medications for this visit.        Physical Exam        General: alert and cooperative  HEENT: Head: Normal, normocephalic, atraumatic.  Eye: Normal external eye, conjunctiva, lids cornea, CARLOS.  Extremities: atraumatic, no peripheral edema:   CNS: Alert and oriented x3, neurologic exam grossly normal.      Lab Results    No results found for this or any previous visit (from  the past week).      Imaging    No results found.    The longitudinal plan of care for the diagnosis(es)/condition(s) as documented were addressed during this visit. Due to the added complexity in care, I will continue to support Jennifer in the subsequent management and with ongoing continuity of care.    Signed by: Katrin Cabrera MD

## 2024-11-20 ENCOUNTER — LAB (OUTPATIENT)
Dept: CARDIOLOGY | Facility: CLINIC | Age: 69
End: 2024-11-20
Payer: COMMERCIAL

## 2024-11-20 ENCOUNTER — ANTICOAGULATION THERAPY VISIT (OUTPATIENT)
Dept: ANTICOAGULATION | Facility: CLINIC | Age: 69
End: 2024-11-20

## 2024-11-20 DIAGNOSIS — Z95.2 S/P AVR (AORTIC VALVE REPLACEMENT): Primary | ICD-10-CM

## 2024-11-20 DIAGNOSIS — Z95.2 S/P AVR (AORTIC VALVE REPLACEMENT): ICD-10-CM

## 2024-11-20 DIAGNOSIS — Z79.01 LONG TERM CURRENT USE OF ANTICOAGULANT THERAPY: ICD-10-CM

## 2024-11-20 LAB — INR POINT OF CARE: 2.1 (ref 0.9–1.1)

## 2024-11-20 NOTE — PROGRESS NOTES
ANTICOAGULATION MANAGEMENT     Alyssa Higginbotham 69 year old female is on warfarin with therapeutic INR result. (Goal INR 2.0-2.5)   - per Neurologist (9/26/24) - suggest TIGHT  INR range (i.e. 2-2.5) if possible to try and reduce risk of further hemorrhage     Recent labs: (last 7 days)     11/20/24  0733   INR 2.1*       ASSESSMENT     Source(s): Chart Review and Patient/Caregiver Call     Warfarin doses taken: Warfarin taken as instructed  Diet: No new diet changes identified  Medication/supplement changes: None noted  New illness, injury, or hospitalization: No.   11/6/24 yearly follow-up with Oncologist - MGUS; (monoclonal gammopathy of unknown significance), stable.  Signs or symptoms of bleeding or clotting: No  Previous result: Therapeutic last 2 INR visits; (2.19, 2.00)  Additional findings: Chart reviewed       PLAN     Recommended plan for no diet, medication or health factor changes affecting INR     Dosing Instructions: Continue your current warfarin dose with next INR in 1-2 weeks       Summary  As of 11/20/2024      Full warfarin instructions:  5 mg every Mon; 4 mg all other days   Next INR check:  11/27/2024               Detailed voice message left for Jennifer with dosing instructions and follow up date.   - Sent [employer] message with dosing and follow up instructions    Lab visit scheduled - INR on 11/27/24 @ [facility].    Education provided: Please call back if any changes to your diet, medications or how you've been taking warfarin  Taking warfarin: Importance of taking warfarin as instructed  Goal range and lab monitoring: goal range and significance of current result    Plan made per [facility] anticoagulation protocol    Aylin Pablo RN  11/20/2024  Anticoagulation Clinic  [employer] for routing messages: chel PINEDA [facility] patient phone line: [phone]        _______________________________________________________________________     Anticoagulation Episode Summary       Current INR  goal:  2.0-2.5   TTR:  51.9% (11.5 mo)   Target end date:  Indefinite   Send INR reminders to:  CULLEN GALVEZ    Indications    S/P AVR (aortic valve replacement) [Z95.2]  Long term current use of anticoagulant therapy [Z79.01]             Comments:  6/2/23 INR target goal changed to 2.0 - 2.5 d/t daily bleeding issues.  (Sensitivity)  Goal range changed 2/20/19 per cardiology             Anticoagulation Care Providers       Provider Role Specialty Phone number    Rafaela Woods MD Referring Family Medicine [phone]

## 2024-11-22 ENCOUNTER — MYC MEDICAL ADVICE (OUTPATIENT)
Dept: NEUROLOGY | Facility: CLINIC | Age: 69
End: 2024-11-22
Payer: COMMERCIAL

## 2024-11-25 NOTE — TELEPHONE ENCOUNTER
Order can be faxed to [facility] at [phone].     YESENIA Urbina, BSN  [facility]

## 2024-11-25 NOTE — TELEPHONE ENCOUNTER
Faxed BP cuff DME order November 25, 2024 to fax number [phone] - [facility]    Right Fax confirmed at 1:09 PM    Antonio Martins

## 2024-11-27 ENCOUNTER — LAB (OUTPATIENT)
Dept: LAB | Facility: CLINIC | Age: 69
End: 2024-11-27
Payer: COMMERCIAL

## 2024-11-27 ENCOUNTER — ANTICOAGULATION THERAPY VISIT (OUTPATIENT)
Dept: ANTICOAGULATION | Facility: CLINIC | Age: 69
End: 2024-11-27

## 2024-11-27 DIAGNOSIS — Z79.01 LONG TERM CURRENT USE OF ANTICOAGULANT THERAPY: ICD-10-CM

## 2024-11-27 DIAGNOSIS — Z95.2 S/P AVR (AORTIC VALVE REPLACEMENT): Primary | ICD-10-CM

## 2024-11-27 DIAGNOSIS — Z95.2 S/P AVR (AORTIC VALVE REPLACEMENT): ICD-10-CM

## 2024-11-27 LAB — INR BLD: 2.5 (ref 0.9–1.1)

## 2024-11-27 PROCEDURE — 36415 COLL VENOUS BLD VENIPUNCTURE: CPT

## 2024-11-27 PROCEDURE — 85610 PROTHROMBIN TIME: CPT

## 2024-11-27 NOTE — PROGRESS NOTES
ANTICOAGULATION MANAGEMENT     Alyssa Higginbotham 69 year old female is on warfarin with therapeutic INR result. (Goal INR 2.0-2.5)     - per Neurologist (9/26/24) - suggest TIGHT  INR range (i.e. 2-2.5) if possible to try and reduce risk of further hemorrhage     Recent labs: (last 7 days)     11/27/24  0718   INR 2.5*       ASSESSMENT     Source(s): Chart Review and Patient/Caregiver Call     Warfarin doses taken: Warfarin taken as instructed  Diet:  reported eating pecans / pecan pie may be affecting diet and INR  Medication/supplement changes: None noted  New illness, injury, or hospitalization: No  Signs or symptoms of bleeding or clotting: No  Previous result: Therapeutic last 3 INR visits; (2.1, 2.19, 2.0)  Additional findings: None      Recommended plan for temporary change(s) affecting INR     Dosing Instructions: partial hold then continue your current warfarin dose with next INR in 1-2 weeks       Summary  As of 11/27/2024      Full warfarin instructions:  11/27: 3 mg; Otherwise 5 mg every Mon; 4 mg all other days   Next INR check:  12/9/2024               Telephone call with Jennifer who verbalizes understanding and agrees to plan    Lab visit scheduled - INR on 12/6/24 @ [facility].    Education provided: Taking warfarin: Importance of taking warfarin as instructed  Goal range and lab monitoring: goal range and significance of current result  Dietary considerations: vitamin K content of foods    Plan made per [facility] anticoagulation protocol    Aylin Pablo RN  11/27/2024  Anticoagulation Clinic  [facility] for routing messages: p CULLEN GALVEZ  [facility] patient phone line: [phone]        _______________________________________________________________________     Anticoagulation Episode Summary       Current INR goal:  2.0-2.5   TTR:  51.9% (11.5 mo)   Target end date:  Indefinite   Send INR reminders to:  CULLEN GALVEZ    Indications    S/P AVR (aortic valve replacement) [Z95.2]  Long  term current use of anticoagulant therapy [Z79.01]             Comments:  6/2/23 INR target goal changed to 2.0 - 2.5 d/t daily bleeding issues.  (Sensitivity)  Goal range changed 2/20/19 per cardiology             Anticoagulation Care Providers       Provider Role Specialty Phone number    Rafaela Woods MD Referring Family Medicine [phone]

## 2024-12-14 ENCOUNTER — MYC REFILL (OUTPATIENT)
Dept: FAMILY MEDICINE | Facility: CLINIC | Age: 69
End: 2024-12-14
Payer: COMMERCIAL

## 2024-12-14 DIAGNOSIS — G43.009 MIGRAINE WITHOUT AURA AND WITHOUT STATUS MIGRAINOSUS, NOT INTRACTABLE: ICD-10-CM

## 2024-12-20 ENCOUNTER — VIRTUAL VISIT (OUTPATIENT)
Dept: FAMILY MEDICINE | Facility: CLINIC | Age: 69
End: 2024-12-20
Payer: COMMERCIAL

## 2024-12-20 DIAGNOSIS — U07.1 INFECTION DUE TO 2019 NOVEL CORONAVIRUS: Primary | ICD-10-CM

## 2024-12-20 PROCEDURE — 99214 OFFICE O/P EST MOD 30 MIN: CPT | Mod: 95 | Performed by: FAMILY MEDICINE

## 2024-12-20 NOTE — PROGRESS NOTES
Jennifer is a 69 year old who is being evaluated via a billable video visit.    How would you like to obtain your AVS? MyChart  If the video visit is dropped, the invitation should be resent by: Text to cell phone: [phone]  Will anyone else be joining your video visit? No      Assessment & Plan     Infection due to 2019 novel coronavirus  - nirmatrelvir and ritonavir (PAXLOVID) 300 mg/100 mg therapy pack  Dispense: 30 tablet; Refill: 0    Discussed do not take simvastatin while taking Paxlovid    Discussed warfarin interaction  -this has the potential to both increase and decrease INR.  However,-she is high risk for COVID-19 so she does need to be treated    I asked her to consider INR check on Monday, and to contact the lab who usually does her INR although they may exclude her because of COVID    She will of course call or contact us immediately if she has any bleeding or excessive bruising          Subjective   Jennifer is a 69 year old, presenting for the following health issues:  Covid Concern (Tested positive today, only symptoms are sore throat, nasal drip)      12/20/2024    11:25 AM   Additional Questions   Roomed by ramy milligan     Video Start Time: 11:58 AM    HPI     COVID-19 Symptom Review  How many days ago did these symptoms start? Today - tested positive this morning          Are any of the following symptoms significant for you?  New or worsening difficulty breathing? No  Worsening cough? No  Fever or chills? No  Headache: no  Sore throat: YES  Chest pain: No  Diarrhea: No  Body aches? No    What treatments has patient tried? none   Does patient live in a nursing home, group home, or shelter? No  Does patient have a way to get food/medications during quarantined? Yes, I have a friend or family member who can help me.    Patient Active Problem List   Diagnosis    Mitral Valve Disorder    Dissection Of The Aorta    Anxiety    Tension-type Headache    Marfan Syndrome    Hypothyroidism    Hyperlipidemia     Depression    Migraine    Aortic Valve Disorder    Myalgia And Myositis    Meningioma (H)-initially seen on CT of head that was obtained after a fall. Frontal lobe, confirmed on an MRI Mar 2015, 3 mm    Concussion syndrome    Vertigo    PTSD (post-traumatic stress disorder)    Thyroid nodule    Family history of ovarian cancer    PADDY (generalized anxiety disorder)    Presbyopia    Long term current use of anticoagulant therapy    S/P AVR (aortic valve replacement)    Essential hypertension    Hallux limitus, right    Adenomatous colon polyp-about 2010, scope clear in 2016    Benign essential hypertension    Abnormal SPEP    Osteopenia of multiple sites    Subarachnoid hemorrhage (H)    Intraparenchymal hemorrhage of brain (H)          Objective    Vitals - Patient Reported  Pain Score: No Pain (0)        Physical Exam   GENERAL: alert and no distress  EYES: Eyes grossly normal to inspection.  No discharge or erythema, or obvious scleral/conjunctival abnormalities.  RESP: Appears to be breathing normally, occasional cough  NEURO: Cranial nerves grossly intact.  Mentation and speech appropriate for age.  PSYCH: Appropriate affect, tone, and pace of words          Video-Visit Details    Type of service:  Video Visit   Video End Time:12:15 PM  Originating Location (pt. Location): Home    Distant Location (provider location):  On-site  Platform used for Video Visit: [employer]  Signed Electronically by: Nuvia Bhakta MD

## 2024-12-20 NOTE — PATIENT INSTRUCTIONS
Regarding your covid-19 infection:  - get back to normal activities slowly   - rebound Covid is unlikely but if this happen we don't treat again   - you can end isolation when you feel better and have a negative home test   - cough and fatigue can sometimes persist

## 2024-12-24 DIAGNOSIS — F41.1 ANXIETY STATE: ICD-10-CM

## 2024-12-24 RX ORDER — CLONAZEPAM 0.5 MG/1
TABLET ORAL
Qty: 90 TABLET | Refills: 0 | Status: SHIPPED | OUTPATIENT
Start: 2024-12-24

## 2024-12-26 ENCOUNTER — ANTICOAGULATION THERAPY VISIT (OUTPATIENT)
Dept: ANTICOAGULATION | Facility: CLINIC | Age: 69
End: 2024-12-26

## 2024-12-26 ENCOUNTER — TELEPHONE (OUTPATIENT)
Dept: FAMILY MEDICINE | Facility: CLINIC | Age: 69
End: 2024-12-26

## 2024-12-26 ENCOUNTER — LAB (OUTPATIENT)
Dept: CARDIOLOGY | Facility: CLINIC | Age: 69
End: 2024-12-26
Payer: COMMERCIAL

## 2024-12-26 DIAGNOSIS — Z95.2 S/P AVR (AORTIC VALVE REPLACEMENT): Primary | ICD-10-CM

## 2024-12-26 DIAGNOSIS — Z95.2 S/P AVR (AORTIC VALVE REPLACEMENT): ICD-10-CM

## 2024-12-26 DIAGNOSIS — Z79.01 LONG TERM CURRENT USE OF ANTICOAGULANT THERAPY: ICD-10-CM

## 2024-12-26 LAB — INR POINT OF CARE: 1.6 (ref 0.9–1.1)

## 2024-12-26 PROCEDURE — 85610 PROTHROMBIN TIME: CPT

## 2024-12-26 PROCEDURE — 36416 COLLJ CAPILLARY BLOOD SPEC: CPT

## 2024-12-26 NOTE — PROGRESS NOTES
ANTICOAGULATION MANAGEMENT     Alyssa Higginbotham 69 year old female is on warfarin with subtherapeutic INR result. (Goal INR 2.0-2.5)    Recent labs: (last 7 days)     12/26/24  0739   INR 1.6*       ASSESSMENT     Source(s): Chart Review and Patient/Caregiver Call     Warfarin doses taken: Missed dose(s) may be affecting INR   Reported she skipped one dose on 12/21/24 due to bleeding issues, that she thought could have been caused by Paxlovid.  Diet: No new diet changes identified  Medication/supplement changes:  Yes    Has resumed Simvastatin after she completed Simvastatin.  (Can increase INR).   12/20 - 25/24 Paxlovid   300/100mg take 3 tabs 2x/daily for 5 days,              Stop Simvastatin while on Paxlovid.    (Interaction known with Paxlovid and Simvastatin that altered INR results today, in addition to missing one dose)  New illness, injury, or hospitalization: Yes:    Residual symptoms from COVID, and improving   5 days ymptoms of sore throat, congestion, and runny nose.  Tested positive for COVID-19 on 12/20/24.  Signs or symptoms of bleeding or clotting: Yes   Reported some bleeding issues.  Previous result: Therapeutic last 6 INR visits  Additional findings:  patient filled out paper questionnaire (template).       PLAN     Recommended plan for temporary change(s) affecting INR     Dosing Instructions: booster dose then continue your current warfarin dose with next INR in 1 week       Summary  As of 12/26/2024      Full warfarin instructions:  12/26: 6 mg; Otherwise 4 mg every day   Next INR check:  1/2/2025               Telephone call with Jennifer who verbalizes understanding and agrees to plan   - sensitive to increased warfarin dose.    Lab visit scheduled - INR on 12/31/24 @ [facility].    Education provided: Taking warfarin: Importance of taking warfarin as instructed  Goal range and lab monitoring: goal range and significance of current result  Interaction IS anticipated between warfarin and  Paxlovid and Simvastatin.    Plan made per [facility] anticoagulation protocol    Aylin Pablo RN  12/26/2024  Anticoagulation Clinic  [facility] for routing messages: chel GALVEZ  [facility] patient phone line: [phone]        _______________________________________________________________________     Anticoagulation Episode Summary       Current INR goal:  2.0-2.5   TTR:  51.5% (11.5 mo)   Target end date:  Indefinite   Send INR reminders to:  CULLEN GALVEZ    Indications    S/P AVR (aortic valve replacement) [Z95.2]  Long term current use of anticoagulant therapy [Z79.01]             Comments:  6/2/23 INR target goal changed to 2.0 - 2.5 d/t daily bleeding issues.  (Sensitivity)  Goal range changed 2/20/19 per cardiology             Anticoagulation Care Providers       Provider Role Specialty Phone number    Rafaela Woods MD Referring Family Medicine [phone]

## 2024-12-26 NOTE — TELEPHONE ENCOUNTER
General Call      Reason for Call:  returning call     What are your questions or concerns:  Discuss inr    Date of last appointment with provider: n/a    Could we send this information to you in [employer] or would you prefer to receive a phone call?:   Patient would prefer a phone call   Okay to leave a detailed message?: Yes at Home number on file [phone] (home)

## 2024-12-31 ENCOUNTER — LAB (OUTPATIENT)
Dept: CARDIOLOGY | Facility: CLINIC | Age: 69
End: 2024-12-31
Payer: COMMERCIAL

## 2024-12-31 ENCOUNTER — ANTICOAGULATION THERAPY VISIT (OUTPATIENT)
Dept: ANTICOAGULATION | Facility: CLINIC | Age: 69
End: 2024-12-31

## 2024-12-31 DIAGNOSIS — Z79.01 LONG TERM CURRENT USE OF ANTICOAGULANT THERAPY: ICD-10-CM

## 2024-12-31 DIAGNOSIS — Z95.2 S/P AVR (AORTIC VALVE REPLACEMENT): Primary | ICD-10-CM

## 2024-12-31 DIAGNOSIS — Z95.2 S/P AVR (AORTIC VALVE REPLACEMENT): ICD-10-CM

## 2024-12-31 PROBLEM — E85.4 CEREBRAL AMYLOID ANGIOPATHY (H): Status: ACTIVE | Noted: 2024-12-31

## 2024-12-31 PROBLEM — I68.0 CEREBRAL AMYLOID ANGIOPATHY (H): Status: ACTIVE | Noted: 2024-12-31

## 2024-12-31 PROBLEM — Z86.79 HISTORY OF SPONTANEOUS INTRAPARENCHYMAL INTRACRANIAL HEMORRHAGE: Status: ACTIVE | Noted: 2024-07-25

## 2024-12-31 LAB — INR POINT OF CARE: 1.4 (ref 0.9–1.1)

## 2024-12-31 NOTE — PROGRESS NOTES
"ANTICOAGULATION MANAGEMENT     I spoke with Jennifer      ASSESSMENT     Source(s): Chart Review and Patient/Caregiver Call     Warfarin doses taken: Warfarin taken as instructed  Diet:  some more protein than usual with the holidays but not super crazy . Jennifer is really surprised INR dropped. Doesn't want INR too high because of bleeding issues before but is very worried about INR too low and getting a clot.  Medication/supplement changes: None noted  New illness, injury, or hospitalization: No  Signs or symptoms of bleeding or clotting: No  Previous result: Subtherapeutic-boost dose 12/26  Additional findings: Has 1 mg and 5 mg tabs on hand. We are switching to using 5 mg tabs right now.    With the boost dose last week she's had 30 mg warfarin in 7 days. She also had Paxlovid (last dose 12/24) which could be causing INR to be lower today as well as a missed dose of Warfarin on 12/21.     PLAN     Recommended plan for no significant diet, medication or health factor changes affecting INR     Dosing Instructions: Start using Warfarin 5 mg tablets. Take a boost dose today of 5 mg then increase maintenance dose by 7% with next INR in 1 week       Summary  As of 12/31/2024      Full warfarin instructions:  12/31: 5 mg; Otherwise 2.5 mg every Tue, Fri; 5 mg all other days   Next INR check:  1/7/2025               Telephone call with Jennifer who verbalizes understanding and agrees to plan.   Sent [employer] message with dosing and follow up instructions as well as update on today being a \"boost dose\".    Lab visit scheduled    Education provided: Please call back if any changes to your diet, medications or how you've been taking warfarin  Taking warfarin: prescribed tablet strength and color and warfarin tablet strength change; remove and/or discard previous strength from medication supply  Goal range and lab monitoring: goal range and significance of current result, Importance of therapeutic range, and Importance of " following up at instructed interval  Dietary considerations: Impact of protein intake on INR   Symptom monitoring: monitoring for bleeding signs and symptoms and monitoring for clotting signs and symptoms  Written instructions provided  Contact [phone] with any changes, questions or concerns.     Plan made with Jennifer and with [facility] Pharmacist Zoraida ludwig.    Izzy Gomez RN  12/31/2024  Anticoagulation Clinic  [email or web address] for routing messages: p CULLEN GALVEZ  [facility] patient phone line: [phone]        _______________________________________________________________________     Anticoagulation Episode Summary       Current INR goal:  2.0-2.5   TTR:  51.5% (11.5 mo)   Target end date:  Indefinite   Send INR reminders to:  CULLEN GALVEZ    Indications    S/P AVR (aortic valve replacement) [Z95.2]  Long term current use of anticoagulant therapy [Z79.01]             Comments:  6/2/23 INR target goal changed to 2.0 - 2.5 d/t daily bleeding issues.  (Sensitivity)  Goal range changed 2/20/19 per cardiology             Anticoagulation Care Providers       Provider Role Specialty Phone number    Rafaela Woods MD Referring Family Medicine [phone]

## 2024-12-31 NOTE — PROGRESS NOTES
ANTICOAGULATION MANAGEMENT     Alyssa Higginbotham 69 year old female is on warfarin with subtherapeutic INR result. (Goal INR 2.0-2.5)    Recent labs: (last 7 days)     12/31/24  0741   INR 1.4*       ASSESSMENT     Source(s): Chart Review  Previous INR was Subtherapeutic  Medication, diet, health changes since last INR chart reviewed; none identified         PLAN     Unable to reach Jennifer by phone today.    Left message to continue current dose of warfarin 4 mg tonight. Request call back for assessment.    Follow up required to confirm warfarin dose taken and assess for changes, discuss out of range result , and discuss dosing instructions and confirm understanding of instructions    Izzy Gomez RN  12/31/2024  Anticoagulation Clinic  [facility] for routing messages: chel GALVEZ  [facility] patient phone line: [phone]

## 2024-12-31 NOTE — PROGRESS NOTES
NEUROLOGY CONSULTATION NOTE       [facility] NEUROLOGY [place]  [address] [place] [address]  Tel: [phone]  Fax: [phone]  [email or web address]     Alyssa Higginbotham,  1955, MRN [id number]  PCP: Rafaela Woods  Date: 2025     ASSESSMENT & PLAN     Visit Diagnosis  Cerebral amyloid angiopathy  History of spontaneous intraparenchymal intracranial hemorrhage  Chronic tension-type headache, intractable     Migraine headache with aura  69-year-old female with history of HTN, HLD, aortic dissection, Marfan syndrome, mechanical AVR on Coumadin, migraine headache with aura, intracranial hemorrhage and amyloid angiopathy who was referred for evaluation and management of migraine headaches.  In the past she has tried metoprolol, amitriptyline, Depakote and continues to be symptomatic.  Currently she is using Nurtec for abortive treatment.  I have recommended:    1.  Emgality 240 mg loading dose followed by maintenance dose of 120 mg q. 28 days  2.  Continue to use Nurtec as needed  3.  We discussed amyloid angiopathy and its natural course.  I have informed her due to her amyloid angiopathy she is at increased risk of intracranial hemorrhage that is made worse as she is on Coumadin for AVR.  4.  Follow-up in 3 months    Thank you again for this referral, please feel free to contact me if you have any questions.    Yuri Vazquez MD  [facility]     REASON FOR CONSULTATION Headache        HISTORY OF PRESENT ILLNESS     We have been requested by Dr. Woods to evaluate Alyssa Higginbotham who is a 69 year old  female for history of intraparenchymal hemorrhage and headache    Patient is a 69-year-old female with history of HTN, HLD, aortic dissection, Marfan syndrome, mechanical AVR on Coumadin, migraine headache with aura who was referred for evaluation management of worsening headaches.  According to patient her headaches started when she was a  child.  These migraine headaches are preceded by visual aura followed by photophobia, phonophobia.  She denies any focal weakness or numbness.  Occasionally she gets nauseated.  In the past she had tried amitriptyline, metoprolol, Depakote but continued to have frequent headaches.  Her headaches got worse in July 2024 and she was brought to the emergency room and was found to have bilateral bifrontal, right greater than left intracranial hemorrhage along with subarachnoid hemorrhage.  There was no history of trauma.  Subsequent MRI scan done in September 2024 showed resolution of the intracranial hemorrhage but changes were noted that suggests cerebral amyloid angiopathy.  According to patient she gets these headaches at least once or twice a week and takes Nurtec.  In the past she was on butalbital stop using     PROBLEM LIST   Patient Active Problem List   Diagnosis     Mitral Valve Disorder     Dissection Of The Aorta     Anxiety     Tension-type Headache     Marfan Syndrome     Hypothyroidism     Hyperlipidemia     Depression     Migraine     Aortic Valve Disorder     Myalgia And Myositis     Meningioma (H)-initially seen on CT of head that was obtained after a fall. Frontal lobe, confirmed on an MRI Mar 2015, 3 mm     Vertigo     PTSD (post-traumatic stress disorder)     Thyroid nodule     Family history of ovarian cancer     PADDY (generalized anxiety disorder)     Presbyopia     Long term current use of anticoagulant therapy     S/P AVR (aortic valve replacement)     Essential hypertension     Hallux limitus, right     Adenomatous colon polyp-about 2010, scope clear in 2016     Benign essential hypertension     Abnormal SPEP     Osteopenia of multiple sites     Subarachnoid hemorrhage (H)     H/O Bifrontal IPH     Cerebral amyloid angiopathy (H)         PAST MEDICAL & SURGICAL HISTORY     Past Medical History:   Patient  has a past medical history of Benign meningioma of brain (H) (03/2015), Bunion, Chronic  headache, Depression, H/O Bifrontal IPH (7/25/2024), Heart murmur, Hepatitis C, Hyperlipidemia, Hypothyroid, Irregular heart rate, Nasal fracture (02/28/2015), Osteoporosis, Other acquired deformity of toe, and Stroke (H).    Surgical History:  She  has a past surgical history that includes aortic valve replacement ( 2000); Colonoscopy (2011); Repair Thoracic Aorta ( 2000); Biopsy breast (Left, 2005); and Arthroplasty toe(s) (Right, 2/13/2023).     SOCIAL HISTORY     Reviewed, and she  reports that she has never smoked. She has been exposed to tobacco smoke. She has never used smokeless tobacco. She reports that she does not drink alcohol and does not use drugs.     FAMILY HISTORY     Reviewed, and family history includes Atrial fibrillation in her sister; Heart Disease in her mother; Ovarian Cancer (age of onset: 67) in her sister; Pancreatic Cancer (age of onset: 70) in her father; Skin Cancer in her sister.     ALLERGIES     Allergies   Allergen Reactions     Codeine Other (See Comments)     Faints     Demerol [Meperidine] Other (See Comments)     Reaction not reported by patient., Patient states she felt awful.         REVIEW OF SYSTEMS     A 12 point review of system was performed and was negative except as outlined in the history of present illness.     HOME MEDICATIONS     Current Outpatient Rx   Medication Sig Dispense Refill     amitriptyline (ELAVIL) 10 MG tablet Take 1 tablet (10 mg) by mouth at bedtime 90 tablet 3     clonazePAM (KLONOPIN) 0.5 MG tablet TAKE ONE TABLET BY MOUTH ONCE NIGHTLY AS NEEDED FOR ANXIETY OR SLEEP 90 tablet 0     levothyroxine (SYNTHROID/LEVOTHROID) 75 MCG tablet TAKE 1 TABLET ON WEDNESDAY, SATURDAY 52 tablet 3     levothyroxine (SYNTHROID/LEVOTHROID) 88 MCG tablet Take 1 tablet of 88mcg by mouth Monday, Tuesday, Thursday, Friday and Sunday. Will take 75mcg the other two days. 90 tablet 1     metoclopramide (REGLAN) 5 MG tablet Take 1 tablet (5 mg) by mouth 3 times daily as needed  "(nausea/vomiting). 30 tablet 0     metoprolol succinate ER (TOPROL XL) 200 MG 24 hr tablet Take 1 tablet (200 mg) by mouth daily 90 tablet 3     multivitamin (ONE A DAY) per tablet [MULTIVITAMIN (ONE A DAY) PER TABLET] Take 1 tablet by mouth daily.        rimegepant (NURTEC) 75 MG ODT tablet Place 1 tablet (75 mg) under the tongue daily as needed for migraine. Maximum of 1 tablet every 24 hours. 8 tablet 3     simvastatin (ZOCOR) 20 MG tablet TAKE ONE TABLET BY MOUTH ONCE DAILY AT BEDTIME..WILL NEED TO REQUEST REFILLS AT UPCOMING APPOINTMENT 90 tablet 0     triamterene-HCTZ (MAXZIDE) 75-50 MG tablet Take 0.5 tablets by mouth daily 45 tablet 3     warfarin ANTICOAGULANT (COUMADIN) 1 MG tablet Takes 4 tablets of her 1mg on 6 days of the week on Sun, Tues, Wed, Thurs, Fri, Sat, by mouth daily or as directed.  Adjust dose based on INR results or INR Clinic. 300 tablet 3     warfarin ANTICOAGULANT (COUMADIN) 5 MG tablet TAKE ONE TABLET BY MOUTH 6 DAYS OF THE WEEK- SUN, MON, TUES, WED, THURS AND SAT OR AS DIRECTED.  ADJUST DOSE BASED ON INR RESULTS. 90 tablet 3         PHYSICAL EXAM     Vital signs  BP (!) 155/87 (BP Location: Right arm, Patient Position: Sitting)   Pulse 63   Ht 1.588 m (5' 2.5\")   Wt 52.2 kg (115 lb)   LMP  (LMP Unknown)   BMI 20.70 kg/m      Weight:   115 lbs 0 oz    Elderly female who is alert and oriented vital signs are reviewed and documented in electronic medical record.  Neck supple.  Neurologically speech was normal.  Cranial nerves II through XII are intact.  Motor strength 5/5.  Reflexes 1+.  Sensation intact.  Minimal dysmetria on finger-nose testing.  Gait normal.     PERTINENT DIAGNOSTIC STUDIES     Following studies were reviewed:     CTA HEAD AND NECK 7/25/2024  HEAD CT:  1.  Bifrontal right greater than left intraparenchymal hemorrhages (hemorrhage volume estimated at 18 cc and 1 cc respectively); as well as scattered subarachnoid hemorrhage.  2.  Negative for acute infarct. " Negative for herniation changes or evidence of acute hydrocephalus.     HEAD CTA:   1.  No large vessel occlusion. No severe stenosis.  2.  No aneurysm, or high flow vascular malformation identified.     NECK CTA:  1.  No hemodynamically significant stenosis in the neck vessels.   2.  No evidence for acute internal carotid or vertebral artery dissection.   3.  Suspect fibromuscular dysplasia involving bilateral internal carotid arteries and possibly the left vertebral artery.  4.  Chronic aortic dissection extending into the right proximal common carotid artery not significantly changed, and better appreciated on dedicated imaging     CT PERFUSION:  1.  No core infarct on perfusion images.    MRI BRAIN 9/19/2024  1. Interim evolution of bifrontal parenchymal hematomas with decreased size and edema as described.  2. Evolution of extra-axial collections and gyriform cortical hemorrhage is in the frontal lobes anteriorly. No other new parenchymal lesions. Findings are compatible with cerebral amyloid angiography.    MRI BRAIN 7/26/2024  HEAD MRI:   1.  No acute infarct or new hemorrhage.   2.  Stable and similar bifrontal intraparenchymal hematomas, the degree of mass effect, unchanged compared to CT of 7/25/2024.  3.  Stable and similar moderate volume subarachnoid hemorrhage in the left parietal sulci and bilateral anterior inferior frontal sulci.     HEAD MRV:   1.  No dural venous sinus thrombosis or significant stenosis.  2.  Limited visualization of cortical superficial veins in the right frontal convexity. This could be secondary to cortical vein thrombosis or limited venous filling/venous compression resulting from the regional hematoma and vasogenic edema.      PERTINENT LABS  Following labs were reviewed:  Lab on 12/31/2024   Component Date Value Ref Range Status     INR Point of Care 12/31/2024 1.4 (A)  0.9 - 1.1 Final   Lab on 12/26/2024   Component Date Value Ref Range Status     INR Point of Care  12/26/2024 1.6 (A)  0.9 - 1.1 Final   Lab on 12/13/2024   Component Date Value Ref Range Status     INR 12/13/2024 2.0 (H)  0.9 - 1.1 Final   Lab on 12/06/2024   Component Date Value Ref Range Status     INR 12/06/2024 2.5 (H)  0.9 - 1.1 Final     TSH 12/06/2024 3.53  0.30 - 4.20 uIU/mL Final   Lab on 11/27/2024   Component Date Value Ref Range Status     INR 11/27/2024 2.5 (H)  0.9 - 1.1 Final   Lab on 11/20/2024   Component Date Value Ref Range Status     INR Point of Care 11/20/2024 2.1 (A)  0.9 - 1.1 Final   Lab on 11/13/2024   Component Date Value Ref Range Status     INR 11/13/2024 2.19 (H)  0.85 - 1.15 Final   Lab on 11/06/2024   Component Date Value Ref Range Status     Kappa Free Light Chains 11/06/2024 1.19  0.33 - 1.94 mg/dL Final     Lambda Free Light Chains 11/06/2024 1.73  0.57 - 2.63 mg/dL Final     Kappa /Lambda Ratio 11/06/2024 0.69  0.26 - 1.65 Final     Sodium 11/06/2024 140  135 - 145 mmol/L Final     Potassium 11/06/2024 3.5  3.4 - 5.3 mmol/L Final     Chloride 11/06/2024 100  98 - 107 mmol/L Final     Carbon Dioxide (CO2) 11/06/2024 29  22 - 29 mmol/L Final     Anion Gap 11/06/2024 11  7 - 15 mmol/L Final     Urea Nitrogen 11/06/2024 20.9  8.0 - 23.0 mg/dL Final     Creatinine 11/06/2024 0.93  0.51 - 0.95 mg/dL Final     GFR Estimate 11/06/2024 66  >60 mL/min/1.73m2 Final     Calcium 11/06/2024 9.6  8.8 - 10.4 mg/dL Final     Glucose 11/06/2024 82  70 - 99 mg/dL Final     INR 11/06/2024 2.00 (H)  0.85 - 1.15 Final     Total Protein Serum for ELP 11/06/2024 7.2  6.4 - 8.3 g/dL Final     Albumin 11/06/2024 4.6  3.7 - 5.1 g/dL Final     Alpha 1 11/06/2024 0.3  0.2 - 0.4 g/dL Final     Alpha 2 11/06/2024 0.6  0.5 - 0.9 g/dL Final     Beta Globulin 11/06/2024 0.9  0.6 - 1.0 g/dL Final     Gamma Globulin 11/06/2024 0.8  0.7 - 1.6 g/dL Final     Monoclonal Peak 11/06/2024 0.1 (H)  <=0.0 g/dL Final     ELP Interpretation 11/06/2024    Final                    Value:Very small monoclonal protein (0.1  g/dL) seen in the gamma fraction. 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 Pathologic significance requires clinical correlation. Fanta Carrera M.D.     WBC Count 11/06/2024 4.3  4.0 - 11.0 10e3/uL Final     RBC Count 11/06/2024 5.35 (H)  3.80 - 5.20 10e6/uL Final     Hemoglobin 11/06/2024 13.8  11.7 - 15.7 g/dL Final     Hematocrit 11/06/2024 43.7  35.0 - 47.0 % Final     MCV 11/06/2024 82  78 - 100 fL Final     MCH 11/06/2024 25.8 (L)  26.5 - 33.0 pg Final     MCHC 11/06/2024 31.6  31.5 - 36.5 g/dL Final     RDW 11/06/2024 13.7  10.0 - 15.0 % Final     Platelet Count 11/06/2024 212  150 - 450 10e3/uL Final     % Neutrophils 11/06/2024 69  % Final     % Lymphocytes 11/06/2024 21  % Final     % Monocytes 11/06/2024 9  % Final     % Eosinophils 11/06/2024 1  % Final     % Basophils 11/06/2024 1  % Final     % Immature Granulocytes 11/06/2024 0  % Final     NRBCs per 100 WBC 11/06/2024 0  <1 /100 Final     Absolute Neutrophils 11/06/2024 2.9  1.6 - 8.3 10e3/uL Final     Absolute Lymphocytes 11/06/2024 0.9  0.8 - 5.3 10e3/uL Final     Absolute Monocytes 11/06/2024 0.4  0.0 - 1.3 10e3/uL Final     Absolute Eosinophils 11/06/2024 0.1  0.0 - 0.7 10e3/uL Final     Absolute Basophils 11/06/2024 0.0  0.0 - 0.2 10e3/uL Final     Absolute Immature Granulocytes 11/06/2024 0.0  <=0.4 10e3/uL Final     Absolute NRBCs 11/06/2024 0.0  10e3/uL Final   Lab on 10/30/2024   Component Date Value Ref Range Status     INR 10/30/2024 2.6 (H)  0.9 - 1.1 Final   Lab on 10/23/2024   Component Date Value Ref Range Status     INR Point of Care 10/23/2024 2.5 (A)  0.9 - 1.1 Final   There maybe more visits with results that are not included.        Total time spent for face to face visit, reviewing labs/imaging studies, counseling and coordination of care was: 1 Hour spent on the date of the encounter doing chart review, review of outside records, review of test  results, interpretation of tests, patient visit, and documentation     The longitudinal plan of care for the diagnosis(es)/condition(s) as documented were addressed during this visit. Due to the added complexity in care, I will continue to support Jennifer in the subsequent management and with ongoing continuity of care.    This note was dictated using voice recognition software.  Any grammatical or context distortions are unintentional and inherent to the software.    No orders of the defined types were placed in this encounter.     New Prescriptions    No medications on file      Modified Medications    No medications on file

## 2025-01-02 ENCOUNTER — TELEPHONE (OUTPATIENT)
Dept: NEUROLOGY | Facility: CLINIC | Age: 70
End: 2025-01-02

## 2025-01-02 ENCOUNTER — OFFICE VISIT (OUTPATIENT)
Dept: NEUROLOGY | Facility: CLINIC | Age: 70
End: 2025-01-02
Attending: NURSE PRACTITIONER
Payer: COMMERCIAL

## 2025-01-02 VITALS
SYSTOLIC BLOOD PRESSURE: 155 MMHG | DIASTOLIC BLOOD PRESSURE: 87 MMHG | BODY MASS INDEX: 20.38 KG/M2 | HEART RATE: 63 BPM | HEIGHT: 63 IN | WEIGHT: 115 LBS

## 2025-01-02 DIAGNOSIS — I68.0 CEREBRAL AMYLOID ANGIOPATHY (H): ICD-10-CM

## 2025-01-02 DIAGNOSIS — Z86.79 HISTORY OF SPONTANEOUS INTRAPARENCHYMAL INTRACRANIAL HEMORRHAGE: Primary | ICD-10-CM

## 2025-01-02 DIAGNOSIS — I61.9 INTRAPARENCHYMAL HEMORRHAGE OF BRAIN (H): ICD-10-CM

## 2025-01-02 DIAGNOSIS — G43.109 MIGRAINE WITH AURA AND WITHOUT STATUS MIGRAINOSUS, NOT INTRACTABLE: ICD-10-CM

## 2025-01-02 DIAGNOSIS — E85.4 CEREBRAL AMYLOID ANGIOPATHY (H): ICD-10-CM

## 2025-01-02 NOTE — TELEPHONE ENCOUNTER
[facility] Call Center    Phone Message    May a detailed message be left on voicemail: yes     Reason for Call: Pt asking to schedule, writer wasn't able to schedule due to no template. Please call Alyssa at [phone] for scheduling.    Action Taken: Message routed to:  Other: [facility]    Travel Screening: Not Applicable     Date of Service:

## 2025-01-02 NOTE — TELEPHONE ENCOUNTER
Patient was seen by Dr. Vazquez today and is scheduled to follow up with him again in April per his note's instruction. I do not think she also needs to see stroke APPs.       Lynette Nicholson BS, RN, SCRN  RN Stroke Neurology Care Coordinator  [facility]

## 2025-01-02 NOTE — PATIENT INSTRUCTIONS
INSTRUCTIONS FOR USE                                                               EMGALITY  (em-GAL-it-?)   (galcanezumab-gnlm)   injection, for subcutaneous use   Prefilled Pen           This Instructions for Use is for patients with migraine.   For subcutaneous injection only.      Before you use the EMGALITY prefilled pen (Pen), read and carefully follow all the step-by-step instructions.      Important Information      Your healthcare provider or nurse should show you how to prepare and inject EMGALITY using the Pen. Do not inject yourself or someone else until you have been shown how to inject EMGALITY.   Keep this Instructions for Use and refer to it as needed.   Each EMGALITY Pen is for one-time use only. Do not share or reuse your EMGALITY Pen. You may give or get an infection.   The Pen contains glass parts. Handle it carefully. If you drop it on a hard surface, do not use it. Use a new Pen for your injection.   Your healthcare provider may help you decide where on your body to inject your dose. You can also read the  Choose your injection site  section of these instructions to help you choose which area can work best for you.   If you have vision or hearing problems, do not use EMGALITY Pen without help from a caregiver.   See  Storage and Handling Information  for important storage information.     INSTRUCTIONS FOR USE   Before you use the EMGALITY Pen, read and carefully follow all the step-by-step instructions.   Parts of the EMGALITY Pen             Before You Get Started   Take the Pen from the refrigerator Check your prescription.  EMGALITY comes as a single-dose prefilled Pen.  You will need 2 Pens for your first dose (1-time loading dose). You will need 1 Pen for your monthly dose.    Put the original package with any unused Pens back in the refrigerator.    Leave the base cap on until you are ready to inject.    Leave the Pen at room temperature for 30 minutes before injecting.    Do not  microwave the Pen, run hot water over it, or leave it in direct sunlight.    Do not shake.   Gather Supplies For each injection you will need:  1 alcohol wipe  1 cotton ball or piece of gauze  1 sharps disposal container. See  After You Inject Your Medicine.    Inspect the Pen and the medicine Make sure you have the right medicine. The medicine inside should be clear. Its color may be colorless to slightly yellow to slightly brown.    Do not use the Pen, and throw away (dispose of) as directed by your healthcare provider or pharmacist if:  it looks damaged  the medicine is cloudy, is discolored, or has small particles  the Expiration Date (Exp.) printed on the label has passed  the medicine is frozen                 Prepare for injection Wash your hands with soap and water before you inject your EMGALITY. Make sure a sharps disposal container is close by.   Choose your injection site Your healthcare provider can help you choose the injection site that is best for you.      You may inject the medicine into your stomach area (abdomen). Do not inject within 2 inches of the belly button (navel).    You may inject the medicine into the front of your thighs. This area should be at least 2 inches above the knee and 2 inches below the groin.    Another person may give you the injection in the back of your upper arm, or buttocks.    Do not inject in the exact same spot. For example, if you are giving 2 injections for your first dose (1-time loading dose) and want to use the same body site for the two separate injections, choose a different injection spot. If your first injection was in your abdomen, your next injection could be in another area of your abdomen.    Do not inject into areas where the skin is tender, bruised, red, or hard.    Clean your injection site with an alcohol wipe. Let the injection site dry before you inject.     1 Uncap the Pen            Make sure the Pen is locked. Leave the base cap on until you are  ready to inject.     Twist off the base cap and throw it away in your household trash.     Do not put the base cap back on - this could damage the needle.     Do not touch the needle.         2 Place and Unlock       Place and hold the clear base flat and firmly against your skin.          Turn the lock ring to the unlock position.         3 Press and Hold for 10 Seconds       Press and hold the teal injection button; you will hear a loud click.     Keep holding the clear base firmly against your skin. You will hear a second click in about 10 seconds after the first one. This second click tells you that your injection is complete.     Remove the Pen from your skin.    If you have bleeding at the injection site, press a cotton ball or gauze over the injection site. Do not rub the injection site.         After You Inject Your Medicine      Throw away the used Pen   Put the used EMGALITY Pen in an FDA-cleared sharps disposal container right away after use. Do not throw away (dispose of) the EMGALITY Pen in your household trash.      If you do not have an FDA-cleared sharps disposal container, you may use a household container that is:            - made of a heavy-duty plastic,            - can be closed with a tight-fitting, puncture-resistant lid, without sharps being able to come out,            - upright and stable during use,            - leak-resistant, and            - properly labeled to warn of hazardous waste inside the container.      When your sharps disposal container is almost full, you will need to follow your community guidelines for the right way to dispose of your sharps disposal container. There may be state or local laws about how you should throw away needles and syringes. For more information about safe sharps disposal, and for specific information about sharps disposal in the state you live in, go to the FDA's website at: http://www.fda.gov/safesharpsdisposal.    Do not recycle your used sharps  disposal container.          Commonly Asked Questions       Q. What if I see bubbles in the Pen?   A. It is normal to have air bubbles in the Pen. EMGALITY is injected under your skin (subcutaneous injection), so these air bubbles will not harm you.       Q. What if there is a drop of liquid on the tip of the needle when I remove the base cap?   A. It is okay to see a drop of liquid on the tip of the needle.       Q. What if I unlocked the Pen and pressed the teal injection button before I twisted off the base cap?   A. Do not remove the base cap. Dispose of the Pen and get a new one.       Q. Do I need to hold the injection button down until the injection is complete?   A. This is not necessary, but it may help you keep the Pen steady and firm against your skin.       Q. What if the needle did not retract after my injection?   A. Do not touch the needle or replace the base cap. Store in a safe place to avoid an accidental needlestick and contact [phone] for instructions on how to return the Pen.       Q. What if there is a drop of liquid or blood on my skin after my injection?   A. This is normal. Press a cotton ball or gauze over the injection site. Do not rub the injection site.       Q. What if I heard more than 2 clicks during my injection - 2 loud clicks and a soft one. Did I get my complete injection?   A. Some patients may hear a soft click right before the second loud click. That is the normal operation of the Pen. Do not remove the Pen from your skin until you hear the second loud click.       Q. How can I tell if my injection is complete?   A. After you press the teal injection button, you will hear 2 loud clicks. The second click tells you that your injection is complete. You will also see the gray plunger at the top of the clear base.        If you have more questions about how to use the EMGALITY Pen:    Call your healthcare provider         Call [phone] ([phone])     Visit  [email or web address]      Storage and Handling Information    Store your Pen in the refrigerator between 36 F to 46 F (2 C to 8 C).   Your Pen may be stored out of the refrigerator in the original carton at temperatures up to 86 F (30 C) for up to 7 days. After storing out of the refrigerator, do not place EMGALITY back in the refrigerator.   Do not freeze your Pen.   Keep your Pen in the carton it comes in to protect it from light until time of use.   Do not shake your Pen.   Throw away your Pen if any of the above conditions are not followed.   Keep your Pen and all medicines out of the reach of children.      Read the full Prescribing Information and Patient Information for EMGALITY inside this box to learn more about your medicine.       This Instructions for Use has been approved by the U.S. Food and Drug Administration.   [place] [address], [place]

## 2025-01-02 NOTE — TELEPHONE ENCOUNTER
"Patient reports she was advised to follow up with her after seeing Dr. Vazquez  Per 9/26/24 OV with Rody Medeiros:    \"5. Migraine  -recommend nurtec ODT + metoclopramide  5mg at onset of migraine  -referral to headache specialist      - Return in about 3 months (around 12/26/2024) for hemorrhage, with RAYMOND Medeiros only.     Stroke Education provided.  She will call us with any questions.  For any acute neurologic deficits she was advised to  go directly to the hospital rather than call the clinic.        Rody Medeiros, BET[facility]  Neurology  09/26/2024\"   "

## 2025-01-02 NOTE — NURSING NOTE
Chief Complaint   Patient presents with    Headache     Patient reports she is having migraines 4x per month- had a cerebral hemorrhage July 2024 and does not feel there has been any change in migraine pattern since then      Mago SWENSON CMA on 1/2/2025 at 11:29 AM  [facility]

## 2025-01-02 NOTE — LETTER
2025      Alyssa Higginbotham  [address]  [place] [address]      Dear Colleague,    Thank you for referring your patient, Alyssa Higginbotham, to the [facility] [place]. Please see a copy of my visit note below.    NEUROLOGY CONSULTATION NOTE       [facility] [place]  [address] [place] [address]  Tel: [phone]  Fax: [phone]  [email or web address]     Alyssa Higginbotham,  1955, MRN [id number]  PCP: Rafaela Woods  Date: 2025     ASSESSMENT & PLAN     Visit Diagnosis  Cerebral amyloid angiopathy  History of spontaneous intraparenchymal intracranial hemorrhage  Chronic tension-type headache, intractable     Migraine headache with aura  69-year-old female with history of HTN, HLD, aortic dissection, Marfan syndrome, mechanical AVR on Coumadin, migraine headache with aura, intracranial hemorrhage and amyloid angiopathy who was referred for evaluation and management of migraine headaches.  In the past she has tried metoprolol, amitriptyline, Depakote and continues to be symptomatic.  Currently she is using Nurtec for abortive treatment.  I have recommended:    1.  Emgality 240 mg loading dose followed by maintenance dose of 120 mg q. 28 days  2.  Continue to use Nurtec as needed  3.  We discussed amyloid angiopathy and its natural course.  I have informed her due to her amyloid angiopathy she is at increased risk of intracranial hemorrhage that is made worse as she is on Coumadin for AVR.  4.  Follow-up in 3 months    Thank you again for this referral, please feel free to contact me if you have any questions.    Yuri Vazquez MD  [facility], [place]     REASON FOR CONSULTATION Headache        HISTORY OF PRESENT ILLNESS     We have been requested by Dr. Woods to evaluate Alyssa Higginbotham who is a 69 year old  female for history of intraparenchymal hemorrhage and headache    Patient is a 69-year-old  female with history of HTN, HLD, aortic dissection, Marfan syndrome, mechanical AVR on Coumadin, migraine headache with aura who was referred for evaluation management of worsening headaches.  According to patient her headaches started when she was a child.  These migraine headaches are preceded by visual aura followed by photophobia, phonophobia.  She denies any focal weakness or numbness.  Occasionally she gets nauseated.  In the past she had tried amitriptyline, metoprolol, Depakote but continued to have frequent headaches.  Her headaches got worse in July 2024 and she was brought to the emergency room and was found to have bilateral bifrontal, right greater than left intracranial hemorrhage along with subarachnoid hemorrhage.  There was no history of trauma.  Subsequent MRI scan done in September 2024 showed resolution of the intracranial hemorrhage but changes were noted that suggests cerebral amyloid angiopathy.  According to patient she gets these headaches at least once or twice a week and takes Nurtec.  In the past she was on butalbital stop using     PROBLEM LIST   Patient Active Problem List   Diagnosis     Mitral Valve Disorder     Dissection Of The Aorta     Anxiety     Tension-type Headache     Marfan Syndrome     Hypothyroidism     Hyperlipidemia     Depression     Migraine     Aortic Valve Disorder     Myalgia And Myositis     Meningioma (H)-initially seen on CT of head that was obtained after a fall. Frontal lobe, confirmed on an MRI Mar 2015, 3 mm     Vertigo     PTSD (post-traumatic stress disorder)     Thyroid nodule     Family history of ovarian cancer     PADDY (generalized anxiety disorder)     Presbyopia     Long term current use of anticoagulant therapy     S/P AVR (aortic valve replacement)     Essential hypertension     Hallux limitus, right     Adenomatous colon polyp-about 2010, scope clear in 2016     Benign essential hypertension     Abnormal SPEP     Osteopenia of multiple sites      Subarachnoid hemorrhage (H)     H/O Bifrontal IPH     Cerebral amyloid angiopathy (H)         PAST MEDICAL & SURGICAL HISTORY     Past Medical History:   Patient  has a past medical history of Benign meningioma of brain (H) (03/2015), Bunion, Chronic headache, Depression, H/O Bifrontal IPH (7/25/2024), Heart murmur, Hepatitis C, Hyperlipidemia, Hypothyroid, Irregular heart rate, Nasal fracture (02/28/2015), Osteoporosis, Other acquired deformity of toe, and Stroke (H).    Surgical History:  She  has a past surgical history that includes aortic valve replacement ( 2000); Colonoscopy (2011); Repair Thoracic Aorta ( 2000); Biopsy breast (Left, 2005); and Arthroplasty toe(s) (Right, 2/13/2023).     SOCIAL HISTORY     Reviewed, and she  reports that she has never smoked. She has been exposed to tobacco smoke. She has never used smokeless tobacco. She reports that she does not drink alcohol and does not use drugs.     FAMILY HISTORY     Reviewed, and family history includes Atrial fibrillation in her sister; Heart Disease in her mother; Ovarian Cancer (age of onset: 67) in her sister; Pancreatic Cancer (age of onset: 70) in her father; Skin Cancer in her sister.     ALLERGIES     Allergies   Allergen Reactions     Codeine Other (See Comments)     Faints     Demerol [Meperidine] Other (See Comments)     Reaction not reported by patient., Patient states she felt awful.         REVIEW OF SYSTEMS     A 12 point review of system was performed and was negative except as outlined in the history of present illness.     HOME MEDICATIONS     Current Outpatient Rx   Medication Sig Dispense Refill     amitriptyline (ELAVIL) 10 MG tablet Take 1 tablet (10 mg) by mouth at bedtime 90 tablet 3     clonazePAM (KLONOPIN) 0.5 MG tablet TAKE ONE TABLET BY MOUTH ONCE NIGHTLY AS NEEDED FOR ANXIETY OR SLEEP 90 tablet 0     levothyroxine (SYNTHROID/LEVOTHROID) 75 MCG tablet TAKE 1 TABLET ON WEDNESDAY, SATURDAY 52 tablet 3     levothyroxine  "(SYNTHROID/LEVOTHROID) 88 MCG tablet Take 1 tablet of 88mcg by mouth Monday, Tuesday, Thursday, Friday and Sunday. Will take 75mcg the other two days. 90 tablet 1     metoclopramide (REGLAN) 5 MG tablet Take 1 tablet (5 mg) by mouth 3 times daily as needed (nausea/vomiting). 30 tablet 0     metoprolol succinate ER (TOPROL XL) 200 MG 24 hr tablet Take 1 tablet (200 mg) by mouth daily 90 tablet 3     multivitamin (ONE A DAY) per tablet [MULTIVITAMIN (ONE A DAY) PER TABLET] Take 1 tablet by mouth daily.        rimegepant (NURTEC) 75 MG ODT tablet Place 1 tablet (75 mg) under the tongue daily as needed for migraine. Maximum of 1 tablet every 24 hours. 8 tablet 3     simvastatin (ZOCOR) 20 MG tablet TAKE ONE TABLET BY MOUTH ONCE DAILY AT BEDTIME..WILL NEED TO REQUEST REFILLS AT UPCOMING APPOINTMENT 90 tablet 0     triamterene-HCTZ (MAXZIDE) 75-50 MG tablet Take 0.5 tablets by mouth daily 45 tablet 3     warfarin ANTICOAGULANT (COUMADIN) 1 MG tablet Takes 4 tablets of her 1mg on 6 days of the week on Sun, Tues, Wed, Thurs, Fri, Sat, by mouth daily or as directed.  Adjust dose based on INR results or INR Clinic. 300 tablet 3     warfarin ANTICOAGULANT (COUMADIN) 5 MG tablet TAKE ONE TABLET BY MOUTH 6 DAYS OF THE WEEK- SUN, MON, TUES, WED, THURS AND SAT OR AS DIRECTED.  ADJUST DOSE BASED ON INR RESULTS. 90 tablet 3         PHYSICAL EXAM     Vital signs  BP (!) 155/87 (BP Location: Right arm, Patient Position: Sitting)   Pulse 63   Ht 1.588 m (5' 2.5\")   Wt 52.2 kg (115 lb)   LMP  (LMP Unknown)   BMI 20.70 kg/m      Weight:   115 lbs 0 oz    Elderly female who is alert and oriented vital signs are reviewed and documented in electronic medical record.  Neck supple.  Neurologically speech was normal.  Cranial nerves II through XII are intact.  Motor strength 5/5.  Reflexes 1+.  Sensation intact.  Minimal dysmetria on finger-nose testing.  Gait normal.     PERTINENT DIAGNOSTIC STUDIES     Following studies were reviewed: "     CTA HEAD AND NECK 7/25/2024  HEAD CT:  1.  Bifrontal right greater than left intraparenchymal hemorrhages (hemorrhage volume estimated at 18 cc and 1 cc respectively); as well as scattered subarachnoid hemorrhage.  2.  Negative for acute infarct. Negative for herniation changes or evidence of acute hydrocephalus.     HEAD CTA:   1.  No large vessel occlusion. No severe stenosis.  2.  No aneurysm, or high flow vascular malformation identified.     NECK CTA:  1.  No hemodynamically significant stenosis in the neck vessels.   2.  No evidence for acute internal carotid or vertebral artery dissection.   3.  Suspect fibromuscular dysplasia involving bilateral internal carotid arteries and possibly the left vertebral artery.  4.  Chronic aortic dissection extending into the right proximal common carotid artery not significantly changed, and better appreciated on dedicated imaging     CT PERFUSION:  1.  No core infarct on perfusion images.    MRI BRAIN 9/19/2024  1. Interim evolution of bifrontal parenchymal hematomas with decreased size and edema as described.  2. Evolution of extra-axial collections and gyriform cortical hemorrhage is in the frontal lobes anteriorly. No other new parenchymal lesions. Findings are compatible with cerebral amyloid angiography.    MRI BRAIN 7/26/2024  HEAD MRI:   1.  No acute infarct or new hemorrhage.   2.  Stable and similar bifrontal intraparenchymal hematomas, the degree of mass effect, unchanged compared to CT of 7/25/2024.  3.  Stable and similar moderate volume subarachnoid hemorrhage in the left parietal sulci and bilateral anterior inferior frontal sulci.     HEAD MRV:   1.  No dural venous sinus thrombosis or significant stenosis.  2.  Limited visualization of cortical superficial veins in the right frontal convexity. This could be secondary to cortical vein thrombosis or limited venous filling/venous compression resulting from the regional hematoma and vasogenic edema.       PERTINENT LABS  Following labs were reviewed:  Lab on 12/31/2024   Component Date Value Ref Range Status     INR Point of Care 12/31/2024 1.4 (A)  0.9 - 1.1 Final   Lab on 12/26/2024   Component Date Value Ref Range Status     INR Point of Care 12/26/2024 1.6 (A)  0.9 - 1.1 Final   Lab on 12/13/2024   Component Date Value Ref Range Status     INR 12/13/2024 2.0 (H)  0.9 - 1.1 Final   Lab on 12/06/2024   Component Date Value Ref Range Status     INR 12/06/2024 2.5 (H)  0.9 - 1.1 Final     TSH 12/06/2024 3.53  0.30 - 4.20 uIU/mL Final   Lab on 11/27/2024   Component Date Value Ref Range Status     INR 11/27/2024 2.5 (H)  0.9 - 1.1 Final   Lab on 11/20/2024   Component Date Value Ref Range Status     INR Point of Care 11/20/2024 2.1 (A)  0.9 - 1.1 Final   Lab on 11/13/2024   Component Date Value Ref Range Status     INR 11/13/2024 2.19 (H)  0.85 - 1.15 Final   Lab on 11/06/2024   Component Date Value Ref Range Status     Kappa Free Light Chains 11/06/2024 1.19  0.33 - 1.94 mg/dL Final     Lambda Free Light Chains 11/06/2024 1.73  0.57 - 2.63 mg/dL Final     Kappa /Lambda Ratio 11/06/2024 0.69  0.26 - 1.65 Final     Sodium 11/06/2024 140  135 - 145 mmol/L Final     Potassium 11/06/2024 3.5  3.4 - 5.3 mmol/L Final     Chloride 11/06/2024 100  98 - 107 mmol/L Final     Carbon Dioxide (CO2) 11/06/2024 29  22 - 29 mmol/L Final     Anion Gap 11/06/2024 11  7 - 15 mmol/L Final     Urea Nitrogen 11/06/2024 20.9  8.0 - 23.0 mg/dL Final     Creatinine 11/06/2024 0.93  0.51 - 0.95 mg/dL Final     GFR Estimate 11/06/2024 66  >60 mL/min/1.73m2 Final     Calcium 11/06/2024 9.6  8.8 - 10.4 mg/dL Final     Glucose 11/06/2024 82  70 - 99 mg/dL Final     INR 11/06/2024 2.00 (H)  0.85 - 1.15 Final     Total Protein Serum for ELP 11/06/2024 7.2  6.4 - 8.3 g/dL Final     Albumin 11/06/2024 4.6  3.7 - 5.1 g/dL Final     Alpha 1 11/06/2024 0.3  0.2 - 0.4 g/dL Final     Alpha 2 11/06/2024 0.6  0.5 - 0.9 g/dL Final     Beta Globulin  11/06/2024 0.9  0.6 - 1.0 g/dL Final     Gamma Globulin 11/06/2024 0.8  0.7 - 1.6 g/dL Final     Monoclonal Peak 11/06/2024 0.1 (H)  <=0.0 g/dL Final     ELP Interpretation 11/06/2024    Final                    Value:Very small monoclonal protein (0.1 g/dL) seen in the gamma fraction. 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 Pathologic significance requires clinical correlation. Fanta Carrera M.D.     WBC Count 11/06/2024 4.3  4.0 - 11.0 10e3/uL Final     RBC Count 11/06/2024 5.35 (H)  3.80 - 5.20 10e6/uL Final     Hemoglobin 11/06/2024 13.8  11.7 - 15.7 g/dL Final     Hematocrit 11/06/2024 43.7  35.0 - 47.0 % Final     MCV 11/06/2024 82  78 - 100 fL Final     MCH 11/06/2024 25.8 (L)  26.5 - 33.0 pg Final     MCHC 11/06/2024 31.6  31.5 - 36.5 g/dL Final     RDW 11/06/2024 13.7  10.0 - 15.0 % Final     Platelet Count 11/06/2024 212  150 - 450 10e3/uL Final     % Neutrophils 11/06/2024 69  % Final     % Lymphocytes 11/06/2024 21  % Final     % Monocytes 11/06/2024 9  % Final     % Eosinophils 11/06/2024 1  % Final     % Basophils 11/06/2024 1  % Final     % Immature Granulocytes 11/06/2024 0  % Final     NRBCs per 100 WBC 11/06/2024 0  <1 /100 Final     Absolute Neutrophils 11/06/2024 2.9  1.6 - 8.3 10e3/uL Final     Absolute Lymphocytes 11/06/2024 0.9  0.8 - 5.3 10e3/uL Final     Absolute Monocytes 11/06/2024 0.4  0.0 - 1.3 10e3/uL Final     Absolute Eosinophils 11/06/2024 0.1  0.0 - 0.7 10e3/uL Final     Absolute Basophils 11/06/2024 0.0  0.0 - 0.2 10e3/uL Final     Absolute Immature Granulocytes 11/06/2024 0.0  <=0.4 10e3/uL Final     Absolute NRBCs 11/06/2024 0.0  10e3/uL Final   Lab on 10/30/2024   Component Date Value Ref Range Status     INR 10/30/2024 2.6 (H)  0.9 - 1.1 Final   Lab on 10/23/2024   Component Date Value Ref Range Status     INR Point of Care 10/23/2024 2.5 (A)  0.9 - 1.1 Final   There maybe more visits with  results that are not included.        Total time spent for face to face visit, reviewing labs/imaging studies, counseling and coordination of care was: 1 Hour spent on the date of the encounter doing chart review, review of outside records, review of test results, interpretation of tests, patient visit, and documentation     The longitudinal plan of care for the diagnosis(es)/condition(s) as documented were addressed during this visit. Due to the added complexity in care, I will continue to support Jennifer in the subsequent management and with ongoing continuity of care.    This note was dictated using voice recognition software.  Any grammatical or context distortions are unintentional and inherent to the software.    No orders of the defined types were placed in this encounter.     New Prescriptions    No medications on file      Modified Medications    No medications on file                Again, thank you for allowing me to participate in the care of your patient.        Sincerely,        Yuri Vazquez MD    Electronically signed

## 2025-01-07 ENCOUNTER — LAB (OUTPATIENT)
Dept: CARDIOLOGY | Facility: CLINIC | Age: 70
End: 2025-01-07
Payer: COMMERCIAL

## 2025-01-07 ENCOUNTER — TELEPHONE (OUTPATIENT)
Dept: NEUROLOGY | Facility: CLINIC | Age: 70
End: 2025-01-07

## 2025-01-07 ENCOUNTER — ANTICOAGULATION THERAPY VISIT (OUTPATIENT)
Dept: ANTICOAGULATION | Facility: CLINIC | Age: 70
End: 2025-01-07

## 2025-01-07 DIAGNOSIS — Z95.2 S/P AVR (AORTIC VALVE REPLACEMENT): Primary | ICD-10-CM

## 2025-01-07 DIAGNOSIS — Z95.2 S/P AVR (AORTIC VALVE REPLACEMENT): ICD-10-CM

## 2025-01-07 DIAGNOSIS — Z79.01 LONG TERM CURRENT USE OF ANTICOAGULANT THERAPY: ICD-10-CM

## 2025-01-07 LAB — INR POINT OF CARE: 2.5 (ref 0.9–1.1)

## 2025-01-07 NOTE — PROGRESS NOTES
ANTICOAGULATION MANAGEMENT     Alyssa Higginbotham 69 year old female is on warfarin with therapeutic INR result. (Goal INR 2.0-2.5)    Recent labs: (last 7 days)     01/07/25  0748   INR 2.5*       ASSESSMENT     Source(s): Chart Review and Patient/Caregiver Call     Warfarin doses taken: Booster dose on 12/31/24  recently taken which may be affecting INR  Diet: No new diet changes identified   High protein intake was only during the holiday, otherwise, she is back on her usual regimen.  Medication/supplement changes:  Yes   On 1/2/25 was prescribed Emgality (Galcanezumba) 240mg one time loading dose, followed by maintenance dose of 120mg every 28 days.  (Not known to interact with warfarin).   1/2/25 Will continue with Nurtec as needed.   Weekly warfarin dose was increased by 7% on 12/31/24.  New illness, injury, or hospitalization: No   1/2/2025 with Neurologist Dr. Vazquez - referred for evaluation and management of migraine headaches 4x per month. Currently taking Nurtec for migraines.   Has fully recovered from COVID after treatment with Paxlovid.  Signs or symptoms of bleeding or clotting: Yes:    Reported bleeding gums and bloody nurse  Previous result: Subtherapeutic last 2 INR results: (1.4, 1.6)  Additional findings:  reported concerns of bleeding with new increased warfarin dose.   - Jennifer expressed she preferred a decreased of her weekly warfarin dose.       PLAN     Recommended plan for temporary change(s) and ongoing change(s) affecting INR     Dosing Instructions: decrease your warfarin dose (8.3% change) with next INR in 1 week       Summary  As of 1/7/2025      Full warfarin instructions:  2.5 mg every Tue, Thu, Sat; 5 mg all other days   Next INR check:  1/14/2025               Telephone call with Jennifer who verbalizes understanding and agrees to plan    Lab visit scheduled - INR on 1/13/25 @ [facility].   - prefers an early morning appt.    Education provided: Taking warfarin: Importance of  taking warfarin as instructed  Goal range and lab monitoring: goal range and significance of current result  Symptom monitoring: monitoring for bleeding signs and symptoms    Plan made with [facility] Pharmacist Zoraida Pablo RN  1/7/2025  Anticoagulation Clinic  [facility] for routing messages: chel GALVEZ  [facility] patient phone line: [phone]        _______________________________________________________________________     Anticoagulation Episode Summary       Current INR goal:  2.0-2.5   TTR:  50.4% (11.5 mo)   Target end date:  Indefinite   Send INR reminders to:  CULLEN GALVEZ    Indications    S/P AVR (aortic valve replacement) [Z95.2]  Long term current use of anticoagulant therapy [Z79.01]             Comments:  6/2/23 INR target goal changed to 2.0 - 2.5 d/t daily bleeding issues.  (Sensitivity)  Goal range changed 2/20/19 per cardiology             Anticoagulation Care Providers       Provider Role Specialty Phone number    Rafaela Woods MD Referring Family Medicine [phone]

## 2025-01-07 NOTE — TELEPHONE ENCOUNTER
Prior Authorization Retail Medication Request    **LOADING AND MAINTENANCE DOSE- There is an approval on file but unsure for which dose? Received rejection from pharmacy    Medication/Dose: galcanezumab-gnlm (EMGALITY) 120 MG/ML injection  Diagnosis and ICD code (if different than what is on RX):    New/renewal/insurance change PA/secondary ins. PA:  Previously Tried and Failed:    Rationale:      Insurance   Primary:   Insurance ID:      Secondary (if applicable):  Insurance ID:      Pharmacy Information (if different than what is on RX)  Name:    Phone:    Fax:    Clinic Information  Preferred routing pool for dept communication:

## 2025-01-13 ENCOUNTER — ANTICOAGULATION THERAPY VISIT (OUTPATIENT)
Dept: ANTICOAGULATION | Facility: CLINIC | Age: 70
End: 2025-01-13

## 2025-01-13 ENCOUNTER — LAB (OUTPATIENT)
Dept: CARDIOLOGY | Facility: CLINIC | Age: 70
End: 2025-01-13
Payer: COMMERCIAL

## 2025-01-13 DIAGNOSIS — Z79.01 LONG TERM CURRENT USE OF ANTICOAGULANT THERAPY: ICD-10-CM

## 2025-01-13 DIAGNOSIS — Z95.2 S/P AVR (AORTIC VALVE REPLACEMENT): Primary | ICD-10-CM

## 2025-01-13 DIAGNOSIS — Z95.2 S/P AVR (AORTIC VALVE REPLACEMENT): ICD-10-CM

## 2025-01-13 LAB — INR POINT OF CARE: 1.9 (ref 0.9–1.1)

## 2025-01-13 NOTE — PROGRESS NOTES
ANTICOAGULATION MANAGEMENT     Alyssa Higginbotham 69 year old female is on warfarin with subtherapeutic INR result. (Goal INR 2.0-2.5)    Recent labs: (last 7 days)     01/13/25  0736   INR 1.9*       ASSESSMENT     Source(s): Chart Review and Patient/Caregiver Call     Warfarin doses taken: Warfarin taken as instructed  Diet: No new diet changes identified  Medication/supplement changes:  Yes   Has not started yet.  Awaiting PA approval - On 1/2/25 was prescribed Emgality (Galcanezumba) 240mg one time loading dose, followed by maintenance dose of 120mg every 28 days. (Not known to interact with warfarin).    Per request from patient - she expressed a decrease in her weekly warfarin dose, and decreased by 8.3% on 1/7/25.  New illness, injury, or hospitalization: No  Signs or symptoms of bleeding or clotting: No  Previous result: Therapeutic last visit at 2.5; previously outside of goal range last 2 subtherapeutic.  Additional findings:  reviewed her INR trends:   - with 28mg to 29mg per week, INRs were therapeutic.   - however, since she had COVID her INR trends have varied to low or high.   - will continue to monitor INR closely, until maintenance dose have been achieved.       PLAN     Recommended plan for temporary change(s) affecting INR     Dosing Instructions: Increase your warfarin dose (1.8% change) with next INR in 1 week       Summary  As of 1/13/2025      Full warfarin instructions:  1/14: 3 mg; Otherwise 2.5 mg every Thu, Sat; 3 mg every Tue; 5 mg all other days   Next INR check:  1/20/2025               Telephone call with Jennifer who verbalizes understanding and agrees to plan    Lab visit scheduled - INR on 1/20/25 @ [facility].    Education provided: Taking warfarin: Importance of taking warfarin as instructed  Goal range and lab monitoring: goal range and significance of current result    Plan made per [facility] anticoagulation protocol    Aylin Pablo RN  1/13/2025  Anticoagulation Clinic  [facility]  pool for routing messages: p ANGIEMAMADOU MIRAIM GALVEZ  [facility] patient phone line: [phone]        _______________________________________________________________________     Anticoagulation Episode Summary       Current INR goal:  2.0-2.5   TTR:  50.1% (11.5 mo)   Target end date:  Indefinite   Send INR reminders to:  CULLEN GALVEZ    Indications    S/P AVR (aortic valve replacement) [Z95.2]  Long term current use of anticoagulant therapy [Z79.01]             Comments:  6/2/23 INR target goal changed to 2.0 - 2.5 d/t daily bleeding issues.  (Sensitivity)  Goal range changed 2/20/19 per cardiology             Anticoagulation Care Providers       Provider Role Specialty Phone number    Rafaela Woods MD Referring Family Medicine [phone]

## 2025-01-21 ENCOUNTER — LAB (OUTPATIENT)
Dept: CARDIOLOGY | Facility: CLINIC | Age: 70
End: 2025-01-21
Payer: COMMERCIAL

## 2025-01-21 ENCOUNTER — ANTICOAGULATION THERAPY VISIT (OUTPATIENT)
Dept: ANTICOAGULATION | Facility: CLINIC | Age: 70
End: 2025-01-21

## 2025-01-21 DIAGNOSIS — Z79.01 LONG TERM CURRENT USE OF ANTICOAGULANT THERAPY: ICD-10-CM

## 2025-01-21 DIAGNOSIS — Z95.2 S/P AVR (AORTIC VALVE REPLACEMENT): ICD-10-CM

## 2025-01-21 DIAGNOSIS — Z95.2 S/P AVR (AORTIC VALVE REPLACEMENT): Primary | ICD-10-CM

## 2025-01-21 LAB — INR POINT OF CARE: 2.3 (ref 0.9–1.1)

## 2025-01-21 NOTE — PROGRESS NOTES
ANTICOAGULATION MANAGEMENT     Alyssa Higginbotham 69 year old female is on warfarin with therapeutic INR result. (Goal INR 2.0-2.5)    Recent labs: (last 7 days)     01/21/25  0733   INR 2.3*       ASSESSMENT     Source(s): Chart Review and Patient/Caregiver Call     Warfarin doses taken: Warfarin taken as instructed  Diet: No new diet changes identified  Medication/supplement changes:  Yes   Prescribed Emgality 120 mg/ml inject [id number], however, has not started yet.  (For migraine prevention and cluster headaches).   Weekly warfarin dose was slightly increased by 1.8% on 1/13/25.  New illness, injury, or hospitalization: No  Signs or symptoms of bleeding or clotting: No  Previous result: Subtherapeutic at 1.9 on 1/13/25.  Additional findings:  patient filled out paper questionnaire.       PLAN     Recommended plan for no diet, medication or health factor changes affecting INR     Dosing Instructions: Continue your current warfarin dose with next INR in 1-2 weeks       Summary  As of 1/21/2025      Full warfarin instructions:  2.5 mg every Thu, Sat; 3 mg every Tue; 5 mg all other days   Next INR check:  1/31/2025               Telephone call with Jennifer who verbalizes understanding and agrees to plan    Lab visit scheduled - INR on 1/30/25 @ [facility].    Education provided: Taking warfarin: Importance of taking warfarin as instructed  Goal range and lab monitoring: goal range and significance of current result    Plan made per [facility] anticoagulation protocol    Aylin Pablo, RN  1/21/2025  Anticoagulation Clinic  [facility] for routing messages: p CULLEN GALVEZ  [facility] patient phone line: [phone]        _______________________________________________________________________     Anticoagulation Episode Summary       Current INR goal:  2.0-2.5   TTR:  49.5% (11.5 mo)   Target end date:  Indefinite   Send INR reminders to:  CULLEN GALVEZ    Indications    S/P AVR (aortic valve replacement)  [Z95.2]  Long term current use of anticoagulant therapy [Z79.01]             Comments:  6/2/23 INR target goal changed to 2.0 - 2.5 d/t daily bleeding issues.  (Sensitivity)  Goal range changed 2/20/19 per cardiology             Anticoagulation Care Providers       Provider Role Specialty Phone number    Rafaela Woods MD Referring Family Medicine [phone]

## 2025-01-30 ENCOUNTER — LAB (OUTPATIENT)
Dept: LAB | Facility: CLINIC | Age: 70
End: 2025-01-30
Payer: COMMERCIAL

## 2025-01-30 ENCOUNTER — ANTICOAGULATION THERAPY VISIT (OUTPATIENT)
Dept: ANTICOAGULATION | Facility: CLINIC | Age: 70
End: 2025-01-30

## 2025-01-30 DIAGNOSIS — Z79.01 LONG TERM CURRENT USE OF ANTICOAGULANT THERAPY: ICD-10-CM

## 2025-01-30 DIAGNOSIS — Z95.2 S/P AVR (AORTIC VALVE REPLACEMENT): Primary | ICD-10-CM

## 2025-01-30 DIAGNOSIS — Z95.2 S/P AVR (AORTIC VALVE REPLACEMENT): ICD-10-CM

## 2025-01-30 LAB — INR BLD: 1.8 (ref 0.9–1.1)

## 2025-01-30 NOTE — PROGRESS NOTES
ANTICOAGULATION MANAGEMENT     Alyssa Higginbotham 69 year old female is on warfarin with subtherapeutic INR result. (Goal INR 2.0-2.5)    Recent labs: (last 7 days)     01/30/25  0737   INR 1.8*       ASSESSMENT     Source(s): Chart Review and Patient/Caregiver Call     Warfarin doses taken: Warfarin taken as instructed  Diet: No new diet changes identified  Medication/supplement changes:  Yes   Prescribed Emgality 120 mg/ml inject [id number] started on 1/10/25. (Not known to interact with warfarin).  (For migraine prevention and cluster headaches).   New illness, injury, or hospitalization: No  Signs or symptoms of bleeding or clotting: No  Previous result: Therapeutic last visit at 2.3; previously outside of goal range at 1.9  Additional findings: None       PLAN     Recommended plan for ongoing change(s) affecting INR     Dosing Instructions: Increase your warfarin dose (1.8% change) with next INR in 1-2 weeks       Summary  As of 1/30/2025      Full warfarin instructions:  2.5 mg every Sat; 3 mg every Mon, Thu; 5 mg all other days   Next INR check:  2/7/2025               Telephone call with Jennifer who verbalizes understanding and agrees to plan    Lab visit scheduled - INR on 2/7/25 @ [facility]    Education provided: Taking warfarin: Importance of taking warfarin as instructed  Goal range and lab monitoring: goal range and significance of current result  Interaction IS NOT anticipated between warfarin and Emgality    Plan made per [facility] anticoagulation protocol    Aylin Pablo RN  1/30/2025  Anticoagulation Clinic  [facility] for routing messages: p CULLEN GALVEZ  [facility] patient phone line: [phone]        _______________________________________________________________________     Anticoagulation Episode Summary       Current INR goal:  2.0-2.5   TTR:  49.5% (11.5 mo)   Target end date:  Indefinite   Send INR reminders to:  CULLEN GALVEZ    Indications    S/P AVR (aortic valve  replacement) [Z95.2]  Long term current use of anticoagulant therapy [Z79.01]             Comments:  6/2/23 INR target goal changed to 2.0 - 2.5 d/t daily bleeding issues.  (Sensitivity)  Goal range changed 2/20/19 per cardiology             Anticoagulation Care Providers       Provider Role Specialty Phone number    Rafaela Woods MD Referring Family Medicine [phone]

## 2025-02-03 DIAGNOSIS — F41.1 ANXIETY STATE: ICD-10-CM

## 2025-02-03 DIAGNOSIS — I10 ESSENTIAL HYPERTENSION, MALIGNANT: ICD-10-CM

## 2025-02-03 DIAGNOSIS — I10 ESSENTIAL HYPERTENSION: ICD-10-CM

## 2025-02-03 RX ORDER — TRIAMTERENE AND HYDROCHLOROTHIAZIDE 75; 50 MG/1; MG/1
TABLET ORAL
Qty: 45 TABLET | Refills: 0 | Status: SHIPPED | OUTPATIENT
Start: 2025-02-03

## 2025-02-03 RX ORDER — AMITRIPTYLINE HYDROCHLORIDE 10 MG/1
TABLET ORAL
Qty: 90 TABLET | Refills: 0 | Status: SHIPPED | OUTPATIENT
Start: 2025-02-03

## 2025-02-03 RX ORDER — METOPROLOL SUCCINATE 200 MG/1
TABLET, EXTENDED RELEASE ORAL
Qty: 90 TABLET | Refills: 0 | Status: SHIPPED | OUTPATIENT
Start: 2025-02-03

## 2025-02-05 ENCOUNTER — MYC REFILL (OUTPATIENT)
Dept: FAMILY MEDICINE | Facility: CLINIC | Age: 70
End: 2025-02-05
Payer: COMMERCIAL

## 2025-02-05 DIAGNOSIS — F41.1 ANXIETY STATE: ICD-10-CM

## 2025-02-05 DIAGNOSIS — I10 ESSENTIAL HYPERTENSION: ICD-10-CM

## 2025-02-05 DIAGNOSIS — E04.1 THYROID NODULE: ICD-10-CM

## 2025-02-05 DIAGNOSIS — I10 ESSENTIAL HYPERTENSION, MALIGNANT: ICD-10-CM

## 2025-02-05 RX ORDER — METOPROLOL SUCCINATE 200 MG/1
TABLET, EXTENDED RELEASE ORAL
Qty: 90 TABLET | Refills: 0 | OUTPATIENT
Start: 2025-02-05

## 2025-02-05 RX ORDER — TRIAMTERENE AND HYDROCHLOROTHIAZIDE 75; 50 MG/1; MG/1
TABLET ORAL
Qty: 45 TABLET | Refills: 0 | OUTPATIENT
Start: 2025-02-05

## 2025-02-05 RX ORDER — LEVOTHYROXINE SODIUM 75 UG/1
TABLET ORAL
Qty: 52 TABLET | Refills: 3 | Status: SHIPPED | OUTPATIENT
Start: 2025-02-05

## 2025-02-05 RX ORDER — LEVOTHYROXINE SODIUM 88 UG/1
TABLET ORAL
Qty: 90 TABLET | Refills: 1 | Status: SHIPPED | OUTPATIENT
Start: 2025-02-05

## 2025-02-05 RX ORDER — AMITRIPTYLINE HYDROCHLORIDE 10 MG/1
TABLET ORAL
Qty: 90 TABLET | Refills: 0 | OUTPATIENT
Start: 2025-02-05

## 2025-02-07 ENCOUNTER — LAB (OUTPATIENT)
Dept: CARDIOLOGY | Facility: CLINIC | Age: 70
End: 2025-02-07
Payer: COMMERCIAL

## 2025-02-07 DIAGNOSIS — Z95.2 S/P AVR (AORTIC VALVE REPLACEMENT): ICD-10-CM

## 2025-02-07 DIAGNOSIS — Z79.01 LONG TERM CURRENT USE OF ANTICOAGULANT THERAPY: ICD-10-CM

## 2025-02-07 LAB — INR POINT OF CARE: 2.2 (ref 0.9–1.1)

## 2025-02-10 NOTE — PROGRESS NOTES
Virtual Visit Details    Type of service:  Video Visit   Video Start Time: 11:00 AM  Video End Time:11:20 AM    Originating Location (pt. Location): Home    Distant Location (provider location):  On-site  Platform used for Video Visit: Mumtaz    __________________________________________________________      [facility] [place]   [phone]  __________________________________________________________           History of Present Illness   Chief Complaint: Patient presents with:  Stroke      Alyssa Higginbotham is a 69 year old female presenting for follow-up for bifrontal IPH/SAH.     She was hospitalized at [facility] on 7/25/24. Prior to the hospital stay, she had a past medical history of f migraine, HLD, HTN, aortic dissection (2000), marfan syndrome, s/p AVR on coumadin. She initially presented to the [facility] ED with 2 day history of severe headache, confusion, L facial droop and L hand weakness. CT with R>L bifrontal hemorrhage + SAH. Warfarin was reversed with Kcentra and vitamin K (INR 2.2-->1.24) and she was transferred to [facility] for further management. It was recommended that she restart 2 weeks from bleed onset (Aug 8). Statin was held. Etiology of bleed was unclear. Outpatient 7T MRI was recommended but unable to be completed due to sternal wires; 3T MRI interpreted as compatible with CAA; however on personal/neurologist review does not have extensive white matter disease, dilated perivascular spaces or chronic microhemorrhage so she meets criteria for possible CAA.     Most recent stroke follow-up was with myself 9/26/2024.  That time she reported general improvement in stroke symptoms; she denied any significant residual dizziness or cognitive changes and had a complete resolution of left face/hand weakness.  She did however report that she was struggling significantly with migraines; she reported a lifelong history of migraine is unsure of what to use for migraine  "management following her stroke.  Blood pressure was significantly elevated in office; it was recommended that she be seen at the medical supply store for a custom fitted blood pressure cuff (unfortunately this was unsuccessful-she was given a 1 size fits all cuff and also incurred a high out-of-pocket cost).  She continues on Coumadin with tight INR range (2-2.5).  It was recommended that she manage blood pressure very closely.  She was also provided with Nurtec and metoclopramide for migraine management and referred to a headache specialist.    She subsequently saw Dr. Vazquez for headache management who recommended trial of Emgality.    Today, Alyssa reports that she has been doing well since her most recent visit together.  She denies any residual symptoms related to her hemorrhage.  She denies any new neurological symptoms or concerns.  She does continue to have intermittent migraines, approximately 4/month.  She did try Emgality for migraine prophylaxis but did not tolerate it well reporting that she \"felt highly medicated.\"  At present she is happy with just using Nurtec for abortive therapy as needed as she is finding this very helpful.    She continues on Coumadin with a tight INR goal of 2-2.5.  She reports that this has been going reasonably well.  No recent significant elevations in INR.  She reports that she is checking her blood pressure daily, generally after she exercises.  She reports a systolic blood pressure is generally 110-120s, occasionally into the 130s.  This is much improved over prior.    Modified [place] Scale  Score: 0-No symptoms    Stroke Evaluation Summarized:  New results resulted and reviewed by me today are in BOLD below.      MRI/Head CT MRI Brain 7/26: stable bifrontal IPH and SAH; no interval change or new hemorrhage     Brain MRI 9/19/24 ([facility]):  \"interim evolution of bifrontal prenchymal hematomas with decreased size and edema. Evolution of extra-axial collections and gyriform " "cortical hemorrhage is in the frontal lobes anteriorly.  No other new parenchymal lesions.  Findings are compatible with cerebral amyloid angiopathy.\"  However on personal and neurologist review, no clear evidence of dilated perivascular space, extensive white matter disease or chronic microhemorrhage; with daily overall clinical and radiographic findings are consistent with possible CAA (not probable)     CTH 7/25/24 : Bifrontal right greater than left intraparenchymal hemorrhages (hemorrhage volume estimated at 18 cc and 1 cc respectively); as well as scattered subarachnoid hemorrhage      CTH 8/9/24: decreased density of bifrontal IPH with decreased mass effect/edema    Intracranial Vasculature CTA Head: No LVO or severe stenosis.     MRV: no dural venous sinus thrombosis   Cervical Vasculature CTA Neck: No LVO. Chronic arotic dissection extending into the right proximal common carotid artery    CT Perfusion No core infarct       Echocardiogram TTE:  LVEF 65-70%   EKG/Telemetry Sinus rhythm   Septal infarct (cited on or before 30-Jan-2023)    Other Testing INR: 2.27 ->1.24  CRP: 13.80 (elevated)  ESR: 13     Blood cultures: no growth      EEG 7/26: No interictal epileptiform abnormalities and no electrographic seizures were observed.      Labs Lab Results   Component Value Date    LDL 71 06/06/2024    A1C 5.5 07/25/2024    CTROPT 100 (HH) 07/27/2024    INR 2.2 (A) 02/07/2025    INR 1.8 (H) 01/30/2025                 Home Medications     Current Outpatient Medications   Medication Sig Dispense Refill    amitriptyline (ELAVIL) 10 MG tablet TAKE ONE TABLET (10 MG) BY MOUTH ONCE DAILY AT BEDTIME 90 tablet 0    clonazePAM (KLONOPIN) 0.5 MG tablet TAKE ONE TABLET BY MOUTH ONCE NIGHTLY AS NEEDED FOR ANXIETY OR SLEEP 90 tablet 0    levothyroxine (SYNTHROID/LEVOTHROID) 75 MCG tablet TAKE 1 TABLET ON WEDNESDAY, SATURDAY 52 tablet 3    levothyroxine (SYNTHROID/LEVOTHROID) 88 MCG tablet Take 1 tablet of 88mcg by mouth " "Monday, Tuesday, Thursday, Friday and Sunday. Will take 75mcg the other two days. 90 tablet 1    metoclopramide (REGLAN) 5 MG tablet Take 1 tablet (5 mg) by mouth 3 times daily as needed (nausea/vomiting). 30 tablet 0    metoprolol succinate ER (TOPROL XL) 200 MG 24 hr tablet TAKE ONE TABLET (200 MG) BY MOUTH ONE TIME DAILY 90 tablet 0    multivitamin (ONE A DAY) per tablet [MULTIVITAMIN (ONE A DAY) PER TABLET] Take 1 tablet by mouth daily.       rimegepant (NURTEC) 75 MG ODT tablet Place 1 tablet (75 mg) under the tongue daily as needed for migraine. Maximum of 1 tablet every 24 hours. 8 tablet 3    simvastatin (ZOCOR) 20 MG tablet TAKE ONE TABLET BY MOUTH ONCE DAILY AT BEDTIME..WILL NEED TO REQUEST REFILLS AT UPCOMING APPOINTMENT 90 tablet 0    triamterene-HCTZ (MAXZIDE) 75-50 MG tablet TAKE 1/2 TABLET(S) BY MOUTH ONCE DAILY 45 tablet 0    warfarin ANTICOAGULANT (COUMADIN) 1 MG tablet Takes 4 tablets of her 1mg on 6 days of the week on Sun, Tues, Wed, Thurs, Fri, Sat, by mouth daily or as directed.  Adjust dose based on INR results or INR Clinic. 300 tablet 3    warfarin ANTICOAGULANT (COUMADIN) 5 MG tablet TAKE ONE TABLET BY MOUTH 6 DAYS OF THE WEEK- SUN, MON, TUES, WED, THURS AND SAT OR AS DIRECTED.  ADJUST DOSE BASED ON INR RESULTS. 90 tablet 3     No current facility-administered medications for this visit.            Physical Examination     Vitals:  Vitals - Patient Reported  Systolic (Patient Reported): 112  Diastolic (Patient Reported): 60  Weight (Patient Reported): 53.1 kg (117 lb)  Height (Patient Reported): 156.8 cm (5' 1.75\")  BMI (Based on Pt Reported Ht/Wt): 21.57  Pain Score: No Pain (0)         BMI Readings from Last 1 Encounters:   01/02/25 20.70 kg/m        Neurologic: Completed via telemedicine video call  Mental Status:  alert, oriented x 3, speech clear and fluent  Cranial Nerves:  EOMI with normal smooth pursuit, facial movements symmetric, hearing not formally tested but intact to " conversation, no dysarthria, shoulder shrug equal bilaterally, tongue protrusion midline  Motor:  no abnormal movements, able to move all limbs antigravity spontaneously with no signs of hemiparesis observed, no pronator drift         Screenings and Questionnaires:     Tobacco:    Tobacco Use      Smoking status: Never        Passive exposure: Past      Smokeless tobacco: Never      Sleep Apnea:       Depression:      2/12/2025    10:49 AM 2/12/2025    10:22 AM   PHQ-2 ( 1999 [employer])   Q1: Little interest or pleasure in doing things 0 0   Q2: Feeling down, depressed or hopeless 0 0   PHQ-2 Score 0 0    Q1: Little interest or pleasure in doing things  Not at all   Q2: Feeling down, depressed or hopeless  Not at all   PHQ-2 Score  0       Patient-reported       Stroke Recovery and Risk Factors:      9/26/2024     7:49 AM   Stroke Questionnaire   Residual effects: Any residual effects from stroke? I have some symptoms, but I'm not sure if they're residual effects of the stroke   Residual effects: Describe headaches   Level of independence: Could you live alone? Yes   Quality of Life: What work now or before stroke Retired   Medication: How do you take your meds Myself, from individual bottles   Risk Factor: Checking blood pressure at home Yes   Risk Factor: Usual blood pressure numbers 120/70   Risk Factor: Checking blood sugar at home No   Risk Factor: Second-hand smoke at home No   Risk Factor: How much caffeine per day went off caffeine   Who completed this questionnaire? The patient independently           Assessment and Plan   Non traumatic, bifrontal Right > Left intraparenchymal hemorrhage with scattered SAH while on warfarin.  2. Possible CAA; per outside radiology report there area features of CAA however on personal and vascular neurologist review no clear evidence of microhemorrhage, dilated perivascular spaces or extensive white matter disease so would classify this a possible (not probable) CAA.    -continues on warfarin for s/p AVR; avoid other antiplatelet/blood thinning medications and supplements    3. S/p AVR on lifelong coumadin therapy  -Continue following with cardiology  -  tight INR range of 2-2.5     4. HTN  - Goal BP is <130/80 with tighter control associated with improved vascular outcomes; recommend home blood pressure monitoring and keeping a BP log for primary care follow up     5. Migraine  -continue following with Dr. Vazquez     - Return in about 1 year (around 2/12/2026) for hemorrhage, with RAYMOND Medeiros only, 60 min.    Stroke Education provided.  She will call us with any questions.  For any acute neurologic deficits she was advised to  go directly to the hospital rather than call the clinic.      BETSY Cota [facility]  Neurology  02/12/2025         ____________________________________________________________________    Billing:  Please note: for coding purposes this visit should be billed only as established and not new, since this patient was seen by my same subspecialty team ([facility]) during the hospitalization that this visit is a follow up for.  I spent a total of 35 minutes on the day of the visit.   Time spent by me today doing chart review, history and exam, documentation and further activities per the note

## 2025-02-11 ENCOUNTER — TELEPHONE (OUTPATIENT)
Dept: NEUROLOGY | Facility: CLINIC | Age: 70
End: 2025-02-11
Payer: COMMERCIAL

## 2025-02-11 NOTE — TELEPHONE ENCOUNTER
Attempted to reach patient to remind them about appointment scheduled with BETSY Cota CNP on 2/12/25 in our Virtual clinic.    A voicemail was left with a call back number if the patient has questions or would like to reschedule.

## 2025-02-12 ENCOUNTER — VIRTUAL VISIT (OUTPATIENT)
Dept: NEUROLOGY | Facility: CLINIC | Age: 70
End: 2025-02-12
Payer: COMMERCIAL

## 2025-02-12 DIAGNOSIS — G43.109 MIGRAINE WITH AURA AND WITHOUT STATUS MIGRAINOSUS, NOT INTRACTABLE: ICD-10-CM

## 2025-02-12 DIAGNOSIS — G43.009 MIGRAINE WITHOUT AURA AND WITHOUT STATUS MIGRAINOSUS, NOT INTRACTABLE: ICD-10-CM

## 2025-02-12 DIAGNOSIS — Z86.79 HISTORY OF SPONTANEOUS INTRAPARENCHYMAL INTRACRANIAL HEMORRHAGE: Primary | ICD-10-CM

## 2025-02-12 NOTE — LETTER
2/12/2025      Alyssa Higginbotham  [address] [place] [address]      Dear Colleague,    Thank you for referring your patient, Alyssa Higginbotham, to the [facility] NEUROLOGY [facility]. Please see a copy of my visit note below.    Virtual Visit Details    Type of service:  Video Visit   Video Start Time: 11:00 AM  Video End Time:11:20 AM    Originating Location (pt. Location): Home    Distant Location (provider location):  On-site  Platform used for Video Visit: Jump On It    __________________________________________________________      [facility] Neurology [facility]   [phone]  __________________________________________________________           History of Present Illness   Chief Complaint: Patient presents with:  Stroke      Alyssa Higginbotham is a 69 year old female presenting for follow-up for bifrontal IPH/SAH.     She was hospitalized at [facility] on 7/25/24. Prior to the hospital stay, she had a past medical history of f migraine, HLD, HTN, aortic dissection (2000), marfan syndrome, s/p AVR on coumadin. She initially presented to the [facility] ED with 2 day history of severe headache, confusion, L facial droop and L hand weakness. CT with R>L bifrontal hemorrhage + SAH. Warfarin was reversed with Kcentra and vitamin K (INR 2.2-->1.24) and she was transferred to [facility] for further management. It was recommended that she restart 2 weeks from bleed onset (Aug 8). Statin was held. Etiology of bleed was unclear. Outpatient 7T MRI was recommended but unable to be completed due to sternal wires; 3T MRI interpreted as compatible with CAA; however on personal/neurologist review does not have extensive white matter disease, dilated perivascular spaces or chronic microhemorrhage so she meets criteria for possible CAA.     Most recent stroke follow-up was with [facility].  That time she reported general improvement in stroke symptoms; she denied any significant  "residual dizziness or cognitive changes and had a complete resolution of left face/hand weakness.  She did however report that she was struggling significantly with migraines; she reported a lifelong history of migraine is unsure of what to use for migraine management following her stroke.  Blood pressure was significantly elevated in office; it was recommended that she be seen at the medical supply store for a custom fitted blood pressure cuff (unfortunately this was unsuccessful-she was given a 1 size fits all cuff and also incurred a high out-of-pocket cost).  She continues on Coumadin with tight INR range (2-2.5).  It was recommended that she manage blood pressure very closely.  She was also provided with Nurtec and metoclopramide for migraine management and referred to a headache specialist.    She subsequently saw Dr. Vazquez for headache management who recommended trial of Emgality.    Today, Alyssa reports that she has been doing well since her most recent visit together.  She denies any residual symptoms related to her hemorrhage.  She denies any new neurological symptoms or concerns.  She does continue to have intermittent migraines, approximately 4/month.  She did try Emgality for migraine prophylaxis but did not tolerate it well reporting that she \"felt highly medicated.\"  At present she is happy with just using Nurtec for abortive therapy as needed as she is finding this very helpful.    She continues on Coumadin with a tight INR goal of 2-2.5.  She reports that this has been going reasonably well.  No recent significant elevations in INR.  She reports that she is checking her blood pressure daily, generally after she exercises.  She reports a systolic blood pressure is generally 110-120s, occasionally into the 130s.  This is much improved over prior.    Modified [place] Scale  Score: 0-No symptoms    Stroke Evaluation Summarized:  New results resulted and reviewed by me today are in BOLD below.  " "    MRI/Head CT MRI Brain 7/26: stable bifrontal IPH and SAH; no interval change or new hemorrhage     Brain MRI 9/19/24 ([facility]):  \"interim evolution of bifrontal prenchymal hematomas with decreased size and edema. Evolution of extra-axial collections and gyriform cortical hemorrhage is in the frontal lobes anteriorly.  No other new parenchymal lesions.  Findings are compatible with cerebral amyloid angiopathy.\"  However on personal and neurologist review, no clear evidence of dilated perivascular space, extensive white matter disease or chronic microhemorrhage; with daily overall clinical and radiographic findings are consistent with possible CAA (not probable)     CTH 7/25/24 : Bifrontal right greater than left intraparenchymal hemorrhages (hemorrhage volume estimated at 18 cc and 1 cc respectively); as well as scattered subarachnoid hemorrhage      CTH 8/9/24: decreased density of bifrontal IPH with decreased mass effect/edema    Intracranial Vasculature CTA Head: No LVO or severe stenosis.     MRV: no dural venous sinus thrombosis   Cervical Vasculature CTA Neck: No LVO. Chronic arotic dissection extending into the right proximal common carotid artery    CT Perfusion No core infarct       Echocardiogram TTE:  LVEF 65-70%   EKG/Telemetry Sinus rhythm   Septal infarct (cited on or before 30-Jan-2023)    Other Testing INR: 2.27 ->1.24  CRP: 13.80 (elevated)  ESR: 13     Blood cultures: no growth      EEG 7/26: No interictal epileptiform abnormalities and no electrographic seizures were observed.      Labs Lab Results   Component Value Date    LDL 71 06/06/2024    A1C 5.5 07/25/2024    CTROPT 100 (HH) 07/27/2024    INR 2.2 (A) 02/07/2025    INR 1.8 (H) 01/30/2025                 Home Medications     Current Outpatient Medications   Medication Sig Dispense Refill     amitriptyline (ELAVIL) 10 MG tablet TAKE ONE TABLET (10 MG) BY MOUTH ONCE DAILY AT BEDTIME 90 tablet 0     clonazePAM (KLONOPIN) 0.5 MG tablet TAKE ONE " "TABLET BY MOUTH ONCE NIGHTLY AS NEEDED FOR ANXIETY OR SLEEP 90 tablet 0     levothyroxine (SYNTHROID/LEVOTHROID) 75 MCG tablet TAKE 1 TABLET ON WEDNESDAY, SATURDAY 52 tablet 3     levothyroxine (SYNTHROID/LEVOTHROID) 88 MCG tablet Take 1 tablet of 88mcg by mouth Monday, Tuesday, Thursday, Friday and Sunday. Will take 75mcg the other two days. 90 tablet 1     metoclopramide (REGLAN) 5 MG tablet Take 1 tablet (5 mg) by mouth 3 times daily as needed (nausea/vomiting). 30 tablet 0     metoprolol succinate ER (TOPROL XL) 200 MG 24 hr tablet TAKE ONE TABLET (200 MG) BY MOUTH ONE TIME DAILY 90 tablet 0     multivitamin (ONE A DAY) per tablet [MULTIVITAMIN (ONE A DAY) PER TABLET] Take 1 tablet by mouth daily.        rimegepant (NURTEC) 75 MG ODT tablet Place 1 tablet (75 mg) under the tongue daily as needed for migraine. Maximum of 1 tablet every 24 hours. 8 tablet 3     simvastatin (ZOCOR) 20 MG tablet TAKE ONE TABLET BY MOUTH ONCE DAILY AT BEDTIME..WILL NEED TO REQUEST REFILLS AT UPCOMING APPOINTMENT 90 tablet 0     triamterene-HCTZ (MAXZIDE) 75-50 MG tablet TAKE 1/2 TABLET(S) BY MOUTH ONCE DAILY 45 tablet 0     warfarin ANTICOAGULANT (COUMADIN) 1 MG tablet Takes 4 tablets of her 1mg on 6 days of the week on Sun, Tues, Wed, Thurs, Fri, Sat, by mouth daily or as directed.  Adjust dose based on INR results or INR Clinic. 300 tablet 3     warfarin ANTICOAGULANT (COUMADIN) 5 MG tablet TAKE ONE TABLET BY MOUTH 6 DAYS OF THE WEEK- SUN, MON, TUES, WED, THURS AND SAT OR AS DIRECTED.  ADJUST DOSE BASED ON INR RESULTS. 90 tablet 3     No current facility-administered medications for this visit.            Physical Examination     Vitals:  Vitals - Patient Reported  Systolic (Patient Reported): 112  Diastolic (Patient Reported): 60  Weight (Patient Reported): 53.1 kg (117 lb)  Height (Patient Reported): 156.8 cm (5' 1.75\")  BMI (Based on Pt Reported Ht/Wt): 21.57  Pain Score: No Pain (0)         BMI Readings from Last 1 Encounters: "   01/02/25 20.70 kg/m        Neurologic: Completed via telemedicine video call  Mental Status:  alert, oriented x 3, speech clear and fluent  Cranial Nerves:  EOMI with normal smooth pursuit, facial movements symmetric, hearing not formally tested but intact to conversation, no dysarthria, shoulder shrug equal bilaterally, tongue protrusion midline  Motor:  no abnormal movements, able to move all limbs antigravity spontaneously with no signs of hemiparesis observed, no pronator drift         Screenings and Questionnaires:     Tobacco:    Tobacco Use      Smoking status: Never        Passive exposure: Past      Smokeless tobacco: Never      Sleep Apnea:       Depression:      2/12/2025    10:49 AM 2/12/2025    10:22 AM   PHQ-2 ( 1999 [employer])   Q1: Little interest or pleasure in doing things 0 0   Q2: Feeling down, depressed or hopeless 0 0   PHQ-2 Score 0 0    Q1: Little interest or pleasure in doing things  Not at all   Q2: Feeling down, depressed or hopeless  Not at all   PHQ-2 Score  0       Patient-reported       Stroke Recovery and Risk Factors:      9/26/2024     7:49 AM   Stroke Questionnaire   Residual effects: Any residual effects from stroke? I have some symptoms, but I'm not sure if they're residual effects of the stroke   Residual effects: Describe headaches   Level of independence: Could you live alone? Yes   Quality of Life: What work now or before stroke Retired   Medication: How do you take your meds Myself, from individual bottles   Risk Factor: Checking blood pressure at home Yes   Risk Factor: Usual blood pressure numbers 120/70   Risk Factor: Checking blood sugar at home No   Risk Factor: Second-hand smoke at home No   Risk Factor: How much caffeine per day went off caffeine   Who completed this questionnaire? The patient independently           Assessment and Plan   Non traumatic, bifrontal Right > Left intraparenchymal hemorrhage with scattered SAH while on warfarin.  2. Possible CAA; per  outside radiology report there area features of CAA however on personal and vascular neurologist review no clear evidence of microhemorrhage, dilated perivascular spaces or extensive white matter disease so would classify this a possible (not probable) CAA.   -continues on warfarin for s/p AVR; avoid other antiplatelet/blood thinning medications and supplements    3. S/p AVR on lifelong coumadin therapy  -Continue following with cardiology  -  tight INR range of 2-2.5     4. HTN  - Goal BP is <130/80 with tighter control associated with improved vascular outcomes; recommend home blood pressure monitoring and keeping a BP log for primary care follow up     5. Migraine  -continue following with Dr. Vazquez     - Return in about 1 year (around 2/12/2026) for hemorrhage, with RAYMOND Medeiros only, 60 min.    Stroke Education provided.  She will call us with any questions.  For any acute neurologic deficits she was advised to  go directly to the hospital rather than call the clinic.      BETSY Cota CNP  Neurology  02/12/2025         ____________________________________________________________________    Billing:  Please note: for coding purposes this visit should be billed only as established and not new, since this patient was seen by my same subspecialty team (ealth Vascular Neurology) during the hospitalization that this visit is a follow up for.  I spent a total of 35 minutes on the day of the visit.   Time spent by me today doing chart review, history and exam, documentation and further activities per the note      Again, thank you for allowing me to participate in the care of your patient.        Sincerely,        BETSY Cota CNP    Electronically signed

## 2025-02-12 NOTE — NURSING NOTE
Current patient location: [address] [place], [place] [address]  [place] [address]    Is the patient currently in the state of [place]? YES    Visit mode:VIDEO    If the visit is dropped, the patient can be reconnected by: VIDEO VISIT: Text to cell phone:   Telephone Information:   Mobile [phone]       Will anyone else be joining the visit? NO  (If patient encounters technical issues they should call [phone]    How would you like to obtain your AVS? MyChart    Are changes needed to the allergy or medication list? No    Are refills needed on medications prescribed by this physician? NO    Reason for visit: Stroke    Helen Spence MA

## 2025-02-14 ENCOUNTER — LAB (OUTPATIENT)
Dept: CARDIOLOGY | Facility: CLINIC | Age: 70
End: 2025-02-14
Payer: COMMERCIAL

## 2025-02-14 DIAGNOSIS — Z95.2 S/P AVR (AORTIC VALVE REPLACEMENT): ICD-10-CM

## 2025-02-14 DIAGNOSIS — Z79.01 LONG TERM CURRENT USE OF ANTICOAGULANT THERAPY: ICD-10-CM

## 2025-02-14 LAB — INR POINT OF CARE: 2.4 (ref 0.9–1.1)

## 2025-02-28 ENCOUNTER — LAB (OUTPATIENT)
Dept: CARDIOLOGY | Facility: CLINIC | Age: 70
End: 2025-02-28
Payer: COMMERCIAL

## 2025-02-28 DIAGNOSIS — Z79.01 LONG TERM CURRENT USE OF ANTICOAGULANT THERAPY: ICD-10-CM

## 2025-02-28 DIAGNOSIS — Z95.2 S/P AVR (AORTIC VALVE REPLACEMENT): ICD-10-CM

## 2025-02-28 LAB — INR POINT OF CARE: 2.3 (ref 0.9–1.1)

## 2025-03-07 ENCOUNTER — LAB (OUTPATIENT)
Dept: CARDIOLOGY | Facility: CLINIC | Age: 70
End: 2025-03-07
Payer: COMMERCIAL

## 2025-03-07 DIAGNOSIS — Z95.2 S/P AVR (AORTIC VALVE REPLACEMENT): ICD-10-CM

## 2025-03-07 DIAGNOSIS — Z79.01 LONG TERM CURRENT USE OF ANTICOAGULANT THERAPY: ICD-10-CM

## 2025-03-07 LAB — INR POINT OF CARE: 2.3 (ref 0.9–1.1)

## 2025-03-11 ENCOUNTER — PATIENT OUTREACH (OUTPATIENT)
Dept: CARE COORDINATION | Facility: CLINIC | Age: 70
End: 2025-03-11
Payer: COMMERCIAL

## 2025-03-14 ENCOUNTER — LAB (OUTPATIENT)
Dept: CARDIOLOGY | Facility: CLINIC | Age: 70
End: 2025-03-14
Payer: COMMERCIAL

## 2025-03-14 DIAGNOSIS — Z95.2 S/P AVR (AORTIC VALVE REPLACEMENT): ICD-10-CM

## 2025-03-14 DIAGNOSIS — Z79.01 LONG TERM CURRENT USE OF ANTICOAGULANT THERAPY: ICD-10-CM

## 2025-03-14 LAB — INR POINT OF CARE: 2.3 (ref 0.9–1.1)

## 2025-03-19 ENCOUNTER — MYC MEDICAL ADVICE (OUTPATIENT)
Dept: FAMILY MEDICINE | Facility: CLINIC | Age: 70
End: 2025-03-19
Payer: COMMERCIAL

## 2025-03-19 ENCOUNTER — MYC REFILL (OUTPATIENT)
Dept: FAMILY MEDICINE | Facility: CLINIC | Age: 70
End: 2025-03-19
Payer: COMMERCIAL

## 2025-03-19 DIAGNOSIS — G43.009 MIGRAINE WITHOUT AURA AND WITHOUT STATUS MIGRAINOSUS, NOT INTRACTABLE: ICD-10-CM

## 2025-03-19 DIAGNOSIS — F41.1 ANXIETY STATE: ICD-10-CM

## 2025-03-19 RX ORDER — CLONAZEPAM 0.5 MG/1
TABLET ORAL
Qty: 90 TABLET | Refills: 0 | Status: SHIPPED | OUTPATIENT
Start: 2025-03-19

## 2025-03-19 NOTE — TELEPHONE ENCOUNTER
Please review patient message. Med and pharmacy pended if appropriate. Thank you    Juarez Chowdhury RN

## 2025-03-24 ENCOUNTER — LAB (OUTPATIENT)
Dept: CARDIOLOGY | Facility: CLINIC | Age: 70
End: 2025-03-24
Payer: COMMERCIAL

## 2025-03-24 ENCOUNTER — ANTICOAGULATION THERAPY VISIT (OUTPATIENT)
Dept: ANTICOAGULATION | Facility: CLINIC | Age: 70
End: 2025-03-24

## 2025-03-24 DIAGNOSIS — Z79.01 LONG TERM CURRENT USE OF ANTICOAGULANT THERAPY: ICD-10-CM

## 2025-03-24 DIAGNOSIS — Z95.2 S/P AVR (AORTIC VALVE REPLACEMENT): Primary | ICD-10-CM

## 2025-03-24 DIAGNOSIS — Z95.2 S/P AVR (AORTIC VALVE REPLACEMENT): ICD-10-CM

## 2025-03-24 LAB — INR POINT OF CARE: 1.8 (ref 0.9–1.1)

## 2025-03-24 PROCEDURE — 36416 COLLJ CAPILLARY BLOOD SPEC: CPT

## 2025-03-24 PROCEDURE — 85610 PROTHROMBIN TIME: CPT

## 2025-03-24 NOTE — PROGRESS NOTES
ANTICOAGULATION MANAGEMENT     Alyssa Higginbotham 69 year old female is on warfarin with subtherapeutic INR result. (Goal INR 2.0-2.5)    Recent labs: (last 7 days)     03/24/25  0740   INR 1.8*       ASSESSMENT     Source(s): Chart Review  Previous INR was Therapeutic last 2(+) visits  Medication, diet, health changes since last INR chart reviewed; none identified  Template filled out for no changes         PLAN     Recommended plan for no diet, medication or health factor changes affecting INR     Dosing Instructions: Continue your current warfarin dose with next INR in 1 week       Summary  As of 3/24/2025      Full warfarin instructions:  2.5 mg every Sat; 3 mg every Mon, Thu; 5 mg all other days   Next INR check:  3/31/2025               Detailed voice message left for Jennifer with dosing instructions and follow up date.     Check at provider office visit    Education provided: Please call back if any changes to your diet, medications or how you've been taking warfarin    Plan made per [facility] anticoagulation protocol    Eneida Suarez RN  3/24/2025  Anticoagulation Clinic  [employer] [place] for routing messages: p CULLEN GALVEZ  [facility] patient phone line: [phone]        _______________________________________________________________________     Anticoagulation Episode Summary       Current INR goal:  2.0-2.5   TTR:  57.1% (11.5 mo)   Target end date:  Indefinite   Send INR reminders to:  CULLEN GALVEZ    Indications    S/P AVR (aortic valve replacement) [Z95.2]  Long term current use of anticoagulant therapy [Z79.01]             Comments:  6/2/23 INR target goal changed to 2.0 - 2.5 d/t daily bleeding issues.  (Sensitivity)  Goal range changed 2/20/19 per cardiology             Anticoagulation Care Providers       Provider Role Specialty Phone number    Rafaela Woods MD Referring Family Medicine [phone]

## 2025-03-31 ENCOUNTER — TELEPHONE (OUTPATIENT)
Dept: FAMILY MEDICINE | Facility: CLINIC | Age: 70
End: 2025-03-31

## 2025-03-31 ENCOUNTER — ANCILLARY PROCEDURE (OUTPATIENT)
Dept: GENERAL RADIOLOGY | Facility: CLINIC | Age: 70
End: 2025-03-31
Attending: FAMILY MEDICINE
Payer: COMMERCIAL

## 2025-03-31 ENCOUNTER — OFFICE VISIT (OUTPATIENT)
Dept: FAMILY MEDICINE | Facility: CLINIC | Age: 70
End: 2025-03-31
Attending: PHYSICIAN ASSISTANT
Payer: COMMERCIAL

## 2025-03-31 ENCOUNTER — ANTICOAGULATION THERAPY VISIT (OUTPATIENT)
Dept: ANTICOAGULATION | Facility: CLINIC | Age: 70
End: 2025-03-31

## 2025-03-31 VITALS
HEART RATE: 66 BPM | TEMPERATURE: 97.5 F | OXYGEN SATURATION: 98 % | WEIGHT: 121.4 LBS | BODY MASS INDEX: 21.51 KG/M2 | DIASTOLIC BLOOD PRESSURE: 92 MMHG | HEIGHT: 63 IN | SYSTOLIC BLOOD PRESSURE: 178 MMHG | RESPIRATION RATE: 16 BRPM

## 2025-03-31 DIAGNOSIS — E03.9 HYPOTHYROIDISM, UNSPECIFIED TYPE: ICD-10-CM

## 2025-03-31 DIAGNOSIS — E85.4 CEREBRAL AMYLOID ANGIOPATHY (H): ICD-10-CM

## 2025-03-31 DIAGNOSIS — M25.562 CHRONIC PAIN OF LEFT KNEE: ICD-10-CM

## 2025-03-31 DIAGNOSIS — Z79.01 LONG TERM CURRENT USE OF ANTICOAGULANT THERAPY: ICD-10-CM

## 2025-03-31 DIAGNOSIS — Z00.00 ENCOUNTER FOR ANNUAL WELLNESS EXAM IN MEDICARE PATIENT: Primary | ICD-10-CM

## 2025-03-31 DIAGNOSIS — M79.10 MYALGIA: ICD-10-CM

## 2025-03-31 DIAGNOSIS — D32.9 MENINGIOMA (H): ICD-10-CM

## 2025-03-31 DIAGNOSIS — R79.9 ABNORMAL FINDING OF BLOOD CHEMISTRY, UNSPECIFIED: ICD-10-CM

## 2025-03-31 DIAGNOSIS — G89.29 CHRONIC PAIN OF RIGHT KNEE: ICD-10-CM

## 2025-03-31 DIAGNOSIS — M25.561 CHRONIC PAIN OF RIGHT KNEE: ICD-10-CM

## 2025-03-31 DIAGNOSIS — I10 BENIGN ESSENTIAL HYPERTENSION: ICD-10-CM

## 2025-03-31 DIAGNOSIS — E55.9 VITAMIN D DEFICIENCY: ICD-10-CM

## 2025-03-31 DIAGNOSIS — R77.8 ABNORMAL SPEP: ICD-10-CM

## 2025-03-31 DIAGNOSIS — G89.29 CHRONIC PAIN OF LEFT KNEE: ICD-10-CM

## 2025-03-31 DIAGNOSIS — I68.0 CEREBRAL AMYLOID ANGIOPATHY (H): ICD-10-CM

## 2025-03-31 DIAGNOSIS — E78.5 HYPERLIPIDEMIA, UNSPECIFIED HYPERLIPIDEMIA TYPE: ICD-10-CM

## 2025-03-31 DIAGNOSIS — Z95.2 S/P AVR (AORTIC VALVE REPLACEMENT): Primary | ICD-10-CM

## 2025-03-31 DIAGNOSIS — Z29.11 NEED FOR VACCINATION AGAINST RESPIRATORY SYNCYTIAL VIRUS: ICD-10-CM

## 2025-03-31 DIAGNOSIS — I71.03 DISSECTION OF THORACOABDOMINAL AORTA (H): ICD-10-CM

## 2025-03-31 DIAGNOSIS — I60.9 SUBARACHNOID HEMORRHAGE (H): ICD-10-CM

## 2025-03-31 DIAGNOSIS — I35.9 AORTIC VALVE DISORDER: ICD-10-CM

## 2025-03-31 DIAGNOSIS — E04.1 THYROID NODULE: ICD-10-CM

## 2025-03-31 DIAGNOSIS — Z12.31 VISIT FOR SCREENING MAMMOGRAM: ICD-10-CM

## 2025-03-31 DIAGNOSIS — Z95.2 S/P AVR (AORTIC VALVE REPLACEMENT): ICD-10-CM

## 2025-03-31 DIAGNOSIS — Z83.2 FAMILY HISTORY OF FACTOR V LEIDEN MUTATION: ICD-10-CM

## 2025-03-31 LAB
ALBUMIN SERPL BCG-MCNC: 4.8 G/DL (ref 3.5–5.2)
ALP SERPL-CCNC: 74 U/L (ref 40–150)
ALT SERPL W P-5'-P-CCNC: 21 U/L (ref 0–50)
ANION GAP SERPL CALCULATED.3IONS-SCNC: 13 MMOL/L (ref 7–15)
AST SERPL W P-5'-P-CCNC: 32 U/L (ref 0–45)
BILIRUB SERPL-MCNC: 0.5 MG/DL
BUN SERPL-MCNC: 20 MG/DL (ref 8–23)
CALCIUM SERPL-MCNC: 10.6 MG/DL (ref 8.8–10.4)
CHLORIDE SERPL-SCNC: 99 MMOL/L (ref 98–107)
CHOLEST SERPL-MCNC: 189 MG/DL
CK SERPL-CCNC: 75 U/L (ref 26–192)
CREAT SERPL-MCNC: 0.93 MG/DL (ref 0.51–0.95)
EGFRCR SERPLBLD CKD-EPI 2021: 66 ML/MIN/1.73M2
ERYTHROCYTE [DISTWIDTH] IN BLOOD BY AUTOMATED COUNT: 13.4 % (ref 10–15)
EST. AVERAGE GLUCOSE BLD GHB EST-MCNC: 108 MG/DL
FASTING STATUS PATIENT QL REPORTED: ABNORMAL
FASTING STATUS PATIENT QL REPORTED: ABNORMAL
GLUCOSE SERPL-MCNC: 98 MG/DL (ref 70–99)
HBA1C MFR BLD: 5.4 % (ref 0–5.6)
HCO3 SERPL-SCNC: 28 MMOL/L (ref 22–29)
HCT VFR BLD AUTO: 41.6 % (ref 35–47)
HDLC SERPL-MCNC: 55 MG/DL
HGB BLD-MCNC: 13.7 G/DL (ref 11.7–15.7)
INR BLD: 2.1 (ref 0.9–1.1)
LDLC SERPL CALC-MCNC: 101 MG/DL
MCH RBC QN AUTO: 26.2 PG (ref 26.5–33)
MCHC RBC AUTO-ENTMCNC: 32.9 G/DL (ref 31.5–36.5)
MCV RBC AUTO: 80 FL (ref 78–100)
NONHDLC SERPL-MCNC: 134 MG/DL
PLATELET # BLD AUTO: 194 10E3/UL (ref 150–450)
POTASSIUM SERPL-SCNC: 3.7 MMOL/L (ref 3.4–5.3)
PROT SERPL-MCNC: 7.9 G/DL (ref 6.4–8.3)
RBC # BLD AUTO: 5.22 10E6/UL (ref 3.8–5.2)
SODIUM SERPL-SCNC: 140 MMOL/L (ref 135–145)
TRIGL SERPL-MCNC: 164 MG/DL
TSH SERPL DL<=0.005 MIU/L-ACNC: 3.35 UIU/ML (ref 0.3–4.2)
VIT D+METAB SERPL-MCNC: 62 NG/ML (ref 20–50)
WBC # BLD AUTO: 4.5 10E3/UL (ref 4–11)

## 2025-03-31 PROCEDURE — 73562 X-RAY EXAM OF KNEE 3: CPT | Mod: TC | Performed by: STUDENT IN AN ORGANIZED HEALTH CARE EDUCATION/TRAINING PROGRAM

## 2025-03-31 PROCEDURE — 86334 IMMUNOFIX E-PHORESIS SERUM: CPT | Performed by: PATHOLOGY

## 2025-03-31 PROCEDURE — 84165 PROTEIN E-PHORESIS SERUM: CPT | Performed by: PATHOLOGY

## 2025-03-31 SDOH — HEALTH STABILITY: PHYSICAL HEALTH: ON AVERAGE, HOW MANY MINUTES DO YOU ENGAGE IN EXERCISE AT THIS LEVEL?: 90 MIN

## 2025-03-31 SDOH — HEALTH STABILITY: PHYSICAL HEALTH: ON AVERAGE, HOW MANY DAYS PER WEEK DO YOU ENGAGE IN MODERATE TO STRENUOUS EXERCISE (LIKE A BRISK WALK)?: 7 DAYS

## 2025-03-31 ASSESSMENT — SOCIAL DETERMINANTS OF HEALTH (SDOH): HOW OFTEN DO YOU GET TOGETHER WITH FRIENDS OR RELATIVES?: MORE THAN THREE TIMES A WEEK

## 2025-03-31 NOTE — PROGRESS NOTES
Preventive Care Visit  [facility]  Rafaela Woods MD, Family Medicine  Mar 31, 2025      Assessment & Plan       ICD-10-CM    1. Encounter for annual wellness exam in Medicare patient  Z00.00       2. Need for vaccination against respiratory syncytial virus  Z29.11       3. Benign essential hypertension  I10 Comprehensive metabolic panel (BMP + Alb, Alk Phos, ALT, AST, Total. Bili, TP)     CBC with platelets     Comprehensive metabolic panel (BMP + Alb, Alk Phos, ALT, AST, Total. Bili, TP)     CBC with platelets      4. Hyperlipidemia, unspecified hyperlipidemia type  E78.5 Hemoglobin A1c     Lipid panel reflex to direct LDL Fasting     Hemoglobin A1c     Lipid panel reflex to direct LDL Fasting      5. Hypothyroidism, unspecified type  E03.9 TSH with free T4 reflex     TSH with free T4 reflex      6. Thyroid nodule  E04.1       7. Aortic Valve Disorder-Repalced 2000  I35.9       8. Subarachnoid hemorrhage (H)  I60.9       9. Dissection of thoracoabdominal aorta (H)-srugically repaired 2000  I71.03       10. Meningioma (H)  D32.9       11. Cerebral amyloid angiopathy (H)-saw neurology and this is not likely  E85.4     I68.0       12. Visit for screening mammogram  Z12.31 MA Screen Bilateral w/Herb      13. Vitamin D deficiency  E55.9 Vitamin D Deficiency     Vitamin D Deficiency      14. Family history of factor V Leiden mutation  Z83.2 Factor 5 leiden mutation analysis     Factor 5 leiden mutation analysis      15. Abnormal finding of blood chemistry, unspecified  R79.9 Hemoglobin A1c     Hemoglobin A1c      16. Abnormal SPEP-Saw hematology and stable, PCP to check SPEP, serum immonofixation, and serum light chains yearly, and if increasing, back to hem  R77.8 Protein electrophoresis     Protein Immunofixation Serum     Kappa and lambda light chain     Protein electrophoresis     Protein Immunofixation Serum     Kappa and lambda light chain      17. Chronic pain of left knee   M25.562 XR Knee Bilateral 3 Views    G89.29       18. Chronic pain of right knee  M25.561 XR Knee Bilateral 3 Views    G89.29       19. Myalgia  M79.10 CK total     CK total      20. S/P AVR (aortic valve replacement)  Z95.2 INR point of care (finger stick)      21. Long term current use of anticoagulant therapy  Z79.01 INR point of care (finger stick)        You can get the RSV shot at your pharmacy if you wish.    No further thyroid ultrasounds needed unless compressive symptoms.  That was being checked because of the thyroid nodule.    On levothyroxine for hypothyroidism.    Follows with cardiology with history of aortic dissection and aortic valve replacement.  Also on Coumadin.    For hypertension on blood pressure medications.  Patient does have whitecoat hypertension as blood pressure is normal at home and cuff has been checked and accurate.    Follows with neurology for migraines.    Today we will get bilateral knee x-rays and radiology will read those and will send a message.  Certainly if the knee pain is persisting or getting worse or there is abnormal x-rays we will send you to an orthopedic doctor.    If you are having pain after exercising the 90 minutes a day that you are exercising I would have you cut that back maybe to 60 minutes.    With the history of the mildly abnormal serum protein electrophoresis hematology recommended getting some extra labs yearly which we will get today.    I reminded he see dermatology every 1 to 2 years for full body skin checks.    The longitudinal plan of care for the diagnosis(es)/condition(s) as documented were addressed during this visit. Due to the added complexity in care, I will continue to support Jennifer in the subsequent management and with ongoing continuity of care.  Helping to manage hyperlipidemia, hypertension and hypothyroidism.    Patient has been advised of split billing requirements and indicates understanding: Yes        Counseling  Appropriate  preventive services were addressed with this patient via screening, questionnaire, or discussion as appropriate for fall prevention, nutrition, physical activity, Tobacco-use cessation, social engagement, weight loss and cognition.  Checklist reviewing preventive services available has been given to the patient.  Reviewed patient's diet, addressing concerns and/or questions.   The patient was provided with written information regarding signs of hearing loss.           Palmer Rodriguez is a 70 year old, presenting for the following:  Physical (Fasting for blood work today ) and Coagulation Disorder (Patient states that several members of her family have tested positive for Factor 5 and would like to discuss getting tested as well. )        3/31/2025     8:21 AM   Additional Questions   Roomed by Rut BOLAÑOS CMA           HPI             Nontraumatic, bifrontal intraparenchymal hemorrhage (IPH) with scattered subarachnoid hemorrhage (SAH):  July 2024.  She has followed up with neurology. Symptoms resolved while she was in the hospital.  She did have a follow-up MRI of the brain.    Nosebleeds, left nostril:  over the last 2 years.  Cut down on cafeeine and has helped.  Did have cautery in right nostril about 10 years ago and no bleeding rom that nostril.     Hypertension: Currently on treatment.  Blood pressure is always elevated when she comes into the clinic as she does also have a diagnosis of whitecoat hypertension.  She does have a home cuff and it was about 114/70 yesterday.  This has been compared to a cuff at clinic and is accurate.    Hypothyroidism: On levothyroxine.    Thyroid nodule: Stable on ultrasound and negative biopsy, no compressive symptoms.    Aortic valve placed:  Follows with cardiology.     History of aortic dissection: She did have this repaired in 2000.  Follows closely with cardiology.    Migraine:  Follows with neurology and a lot better.    Possible cerebral amyloid angiopathy:   Neurology feels she does not have this per patient.    Abnormal SPEP: She has seen hematology and they recommended SPEP and some other labs yearly.    Bilateral knee pain: Patient exercises 90 minutes a day and has done this for quite some time.  Recently her knees have been hurting after exercise and sometimes with steps.  No locking catching or instability.  Also sometimes she will have all over body pain after exercise.  She wonders if she is overdoing it.    Advance Care Planning  Patient does not have a Health Care Directive: Discussed advance care planning with patient; however, patient declined at this time.      3/31/2025   General Health   How would you rate your overall physical health? Good   Feel stress (tense, anxious, or unable to sleep) Only a little   (!) STRESS CONCERN      3/31/2025   Nutrition   Diet: Regular (no restrictions)         3/31/2025   Exercise   Days per week of moderate/strenous exercise 7 days   Average minutes spent exercising at this level 90 min         3/31/2025   Social Factors   Frequency of gathering with friends or relatives More than three times a week   Worry food won't last until get money to buy more Patient declined   Food not last or not have enough money for food? No   Do you have housing? (Housing is defined as stable permanent housing and does not include staying ouside in a car, in a tent, in an abandoned building, in an overnight shelter, or couch-surfing.) Yes   Are you worried about losing your housing? No   Lack of transportation? No   Unable to get utilities (heat,electricity)? No         3/31/2025   Fall Risk   Fallen 2 or more times in the past year? No    No   Trouble with walking or balance? No    No       Multiple values from one day are sorted in reverse-chronological order          3/31/2025   Activities of Daily Living- Home Safety   Needs help with the following daily activites None of the above   Safety concerns in the home None of the above          3/31/2025   Dental   Dentist two times every year? Yes         3/31/2025   Hearing Screening   Hearing concerns? (!) IT'S HARD TO FOLLOW A CONVERSATION IN A NOISY RESTAURANT OR CROWDED ROOM.         3/31/2025   Driving Risk Screening   Patient/family members have concerns about driving No         3/31/2025   General Alertness/Fatigue Screening   Have you been more tired than usual lately? No         3/31/2025   Urinary Incontinence Screening   Bothered by leaking urine in past 6 months No           3/19/2024   TB Screening   Were you born outside of the [place]? No           Today's PHQ-2 Score:       3/31/2025     8:17 AM   PHQ-2 ( 1999 Pf[employer])   Q1: Little interest or pleasure in doing things 0   Q2: Feeling down, depressed or hopeless 0   PHQ-2 Score 0    Q1: Little interest or pleasure in doing things Not at all   Q2: Feeling down, depressed or hopeless Not at all   PHQ-2 Score 0       Patient-reported           3/31/2025   Substance Use   Alcohol more than 3/day or more than 7/wk Not Applicable   Do you have a current opioid prescription? No   How severe/bad is pain from 1 to 10? 0/10 (No Pain)   Do you use any other substances recreationally? No     Social History     Tobacco Use    Smoking status: Never     Passive exposure: Past    Smokeless tobacco: Never   Vaping Use    Vaping status: Never Used   Substance Use Topics    Alcohol use: No    Drug use: No           5/20/2024   LAST FHS-7 RESULTS   1st degree relative breast or ovarian cancer No   Any relative bilateral breast cancer No   Any male have breast cancer No   Any ONE woman have BOTH breast AND ovarian cancer No   Any woman with breast cancer before 50yrs No   2 or more relatives with breast AND/OR ovarian cancer No   2 or more relatives with breast AND/OR bowel cancer No        Mammogram Screening - Mammogram every 1-2 years updated in Health Maintenance based on mutual decision making      History of abnormal Pap smear: see below        Latest Ref Rng  & Units 12/13/2021     9:15 AM 12/1/2016     9:50 AM 11/6/2014    11:06 AM   PAP / HPV   PAP  Negative for Intraepithelial Lesion or Malignancy (NILM)  Negative for squamous intraepithelial lesion or malignancy  Electronically signed by Courtney Cano CT (ASCP) on 12/7/2016 at  4:14 PM    Negative for squamous intraepithelial lesion or malignancy  Electronically signed by Babs Lemus CT (ASCP) on 11/18/2014 at  8:55 AM      HPV 16 DNA Negative Negative      HPV 18 DNA Negative Negative      Other HR HPV Negative Negative        ASCVD Risk   The ASCVD Risk score (Alber NEWBERRY, et al., 2019) failed to calculate for the following reasons:    Risk score cannot be calculated because patient has a medical history suggesting prior/existing ASCVD            Reviewed and updated as needed this visit by Provider                      Current providers sharing in care for this patient include:  Patient Care Team:  Rafaela Woods MD as PCP - General (Family Medicine)  Katrin Cabrera MD as MD (Medical Oncology)  Katrin Cabrera MD as Assigned Cancer Care Provider  Janell Singh PA-C as Physician Assistant (Cardiology)  Rody Medeiros APRN CNP as Nurse Practitioner (Psychiatry & Neurology Vascular Neurology)  Rhea Meza APRN CNP as Nurse Practitioner (Neurology)  Rody Medeiros APRN CNP as Assigned Surgical Provider  Dawn Goff PA-C as Assigned PCP  Yuri Vazquez MD as Assigned Neuroscience Provider  Jasiel Mercer MD as Assigned Heart and Vascular Surgical Provider  Janell Singh PA-C as Assigned Heart and Vascular Provider    The following health maintenance items are reviewed in Epic and correct as of today:  Health Maintenance   Topic Date Due    RSV VACCINE (1 - Risk 60-74 years 1-dose series) Can get at a pharmacy if you wish    COVID-19 Vaccine (4 - 2024-25 season) Declined    MEDICARE ANNUAL WELLNESS VISIT  Today    ANNUAL REVIEW OF   "ORDERS  Today    MAMMO SCREENING  05/20/2025, ordered    LIPID  Today    BMP  Today    TSH W/FREE T4 REFLEX  Today    COLORECTAL CANCER SCREENING  03/03/2026    FALL RISK ASSESSMENT  Today    DEXA  04/23/2026    DIABETES SCREENING  Today    ADVANCE CARE PLANNING  Declined    DTAP/TDAP/TD IMMUNIZATION (3 - Td or Tdap) 05/08/2029    PHQ-2 (once per calendar year)  Completed    INFLUENZA VACCINE  Completed    Pneumococcal Vaccine: 50+ Years  Completed    ZOSTER IMMUNIZATION  Completed    HPV IMMUNIZATION  Aged Out    MENINGITIS IMMUNIZATION  Aged Out    HEPATITIS C SCREENING  Discontinued            Objective    Exam  BP (!) 178/92 (BP Location: Left arm, Patient Position: Sitting, Cuff Size: Adult Large)   Pulse 66   Temp 97.5  F (36.4  C) (Oral)   Resp 16   Ht 1.588 m (5' 2.5\")   Wt 55.1 kg (121 lb 6.4 oz)   LMP  (LMP Unknown)   SpO2 98%   BMI 21.85 kg/m     Estimated body mass index is 21.85 kg/m  as calculated from the following:    Height as of this encounter: 1.588 m (5' 2.5\").    Weight as of this encounter: 55.1 kg (121 lb 6.4 oz).    Physical Exam  GENERAL: alert and no distress  EYES: Eyes grossly normal to inspection, PERRL and conjunctivae and sclerae normal  HENT: ear canals and TM's normal, nose and mouth without ulcers or lesions  NECK: no adenopathy, no asymmetry, masses, or scars  RESP: lungs clear to auscultation - no rales, rhonchi or wheezes  BREAST: normal without masses, tenderness or nipple discharge and no palpable axillary masses or adenopathy  CV: regular rate and rhythm, normal S1 S2, no S3 or S4, 2/6 systolic murmur, and diastolic click, no peripheral edema  ABDOMEN: soft, nontender, no hepatosplenomegaly, no masses and bowel sounds normal  MS: no gross musculoskeletal defects noted, no edema  SKIN: no suspicious lesions or rashes  NEURO: Normal strength and tone, mentation intact and speech normal  PSYCH: mentation appears normal, affect normal/bright        3/31/2025   Mini Cog "   Clock Draw Score 2 Normal   3 Item Recall 3 objects recalled   Mini Cog Total Score 5              Signed Electronically by: Rafaela Woods MD

## 2025-03-31 NOTE — PROGRESS NOTES
ANTICOAGULATION MANAGEMENT     Alyssa Higginbotham 70 year old female is on warfarin with therapeutic INR result. (Goal INR 2.0-2.5)    Recent labs: (last 7 days)     03/31/25  0954   INR 2.1*       ASSESSMENT     Source(s): Chart Review and Patient/Caregiver Call     Warfarin doses taken: Warfarin taken as instructed  Diet: No new diet changes identified  Medication/supplement changes: None noted  New illness, injury, or hospitalization: No  Signs or symptoms of bleeding or clotting: No  Previous result: Subtherapeutic  Additional findings:  Verified her warfarin dose as per calendar. She prefers to check INR weekly due to INR being so labile.        PLAN     Recommended plan for no diet, medication or health factor changes affecting INR     Dosing Instructions: Continue your current warfarin dose with next INR in 2 weeks       Summary  As of 3/31/2025      Full warfarin instructions:  2.5 mg every Sat; 3 mg every Mon, Thu; 5 mg all other days   Next INR check:  4/14/2025               Telephone call with Jennifer who agrees to plan and repeated back plan correctly    Lab visit scheduled    Education provided: Please call back if any changes to your diet, medications or how you've been taking warfarin  Goal range and lab monitoring: goal range and significance of current result and Importance of therapeutic range  Contact [phone] with any changes, questions or concerns.     Plan made per [facility] anticoagulation protocol    Jordana Tyler RN  3/31/2025  Anticoagulation Clinic  [facility] for routing messages: chel GALVEZ  [facility] patient phone line: [phone]        _______________________________________________________________________     Anticoagulation Episode Summary       Current INR goal:  2.0-2.5   TTR:  55.9% (11.5 mo)   Target end date:  Indefinite   Send INR reminders to:  CULLEN GALVEZ    Indications    S/P AVR (aortic valve replacement) [Z95.2]  Long term current use of  anticoagulant therapy [Z79.01]             Comments:  6/2/23 INR target goal changed to 2.0 - 2.5 d/t daily bleeding issues.  (Sensitivity)  Goal range changed 2/20/19 per cardiology             Anticoagulation Care Providers       Provider Role Specialty Phone number    Rafaela Woods MD Referring Family Medicine [phone]

## 2025-03-31 NOTE — PATIENT INSTRUCTIONS
You can get the RSV shot at your pharmacy if you wish.    Nosebleeds she could put Vaseline in her nostrils at bedtime.  Refer to ENT if needed.    No further thyroid ultrasounds needed unless compressive symptoms.  That was being checked because of the thyroid nodule.    On levothyroxine for hypothyroidism.    Follows with cardiology with history of aortic dissection and aortic valve replacement.  Also on Coumadin.    On blood pressure medications.  Patient does have whitecoat hypertension as blood pressure is normal at home and cuff has been checked and accurate.    Follows with neurology for migraines.    Today we will get bilateral knee x-rays and radiology will read those and will send a message.  Certainly if the knee pain is persisting or getting worse or there is abnormal x-rays we will send you to an orthopedic doctor.    If you are having pain after exercising the 90 minutes a day that you are exercising I would have you cut that back maybe to 60 minutes.    With the history of the mildly abnormal serum protein electrophoresis hematology recommended getting some extra labs yearly which we will get today.    I reminded he see dermatology every 1 to 2 years for full body skin checks.      Health Maintenance   Topic Date Due    RSV VACCINE (1 - Risk 60-74 years 1-dose series) Can get at a pharmacy if you wish    COVID-19 Vaccine (4 - 2024-25 season) Declined    MEDICARE ANNUAL WELLNESS VISIT  Today    ANNUAL REVIEW OF HM ORDERS  Today    MAMMO SCREENING  05/20/2025, ordered    LIPID  Today    BMP  Today    TSH W/FREE T4 REFLEX  Today    COLORECTAL CANCER SCREENING  03/03/2026    FALL RISK ASSESSMENT  Today    DEXA  04/23/2026    DIABETES SCREENING  Today    ADVANCE CARE PLANNING  Declined    DTAP/TDAP/TD IMMUNIZATION (3 - Td or Tdap) 05/08/2029    PHQ-2 (once per calendar year)  Completed    INFLUENZA VACCINE  Completed    Pneumococcal Vaccine: 50+ Years  Completed    ZOSTER IMMUNIZATION  Completed    HPV  IMMUNIZATION  Aged Out    MENINGITIS IMMUNIZATION  Aged Out    HEPATITIS C SCREENING  Discontinued

## 2025-03-31 NOTE — TELEPHONE ENCOUNTER
Forms/Letter Request    Type of form/letter: OTHER: [facility] Reward for annual wellness visit        Do we have the form/letter: Yes: [facility] Annual Wellness reward form      Who is the form from? Patient    Where did/will the form come from? Patient or family brought in       When is form/letter needed by: ASAP    How would you like the form/letter returned: Mail To address on Envelope provided.   Is this the correct address?:     Envelope provided- [employer] [facility] [address] [place] [address]    Patient Notified form requests are processed in 5-7 business days:No

## 2025-04-01 LAB
ALBUMIN SERPL ELPH-MCNC: 4.7 G/DL (ref 3.7–5.1)
ALPHA1 GLOB SERPL ELPH-MCNC: 0.3 G/DL (ref 0.2–0.4)
ALPHA2 GLOB SERPL ELPH-MCNC: 0.7 G/DL (ref 0.5–0.9)
B-GLOBULIN SERPL ELPH-MCNC: 0.9 G/DL (ref 0.6–1)
GAMMA GLOB SERPL ELPH-MCNC: 0.9 G/DL (ref 0.7–1.6)
KAPPA LC FREE SER-MCNC: 1.56 MG/DL (ref 0.33–1.94)
KAPPA LC FREE/LAMBDA FREE SER NEPH: 0.93 {RATIO} (ref 0.26–1.65)
LAMBDA LC FREE SERPL-MCNC: 1.68 MG/DL (ref 0.57–2.63)
M PROTEIN SERPL ELPH-MCNC: 0.1 G/DL
PROT PATTERN SERPL ELPH-IMP: ABNORMAL
PROT PATTERN SERPL IFE-IMP: NORMAL
TOTAL PROTEIN SERUM FOR ELP: 7.5 G/DL (ref 6.4–8.3)

## 2025-04-07 PROBLEM — D47.2 MGUS (MONOCLONAL GAMMOPATHY OF UNKNOWN SIGNIFICANCE): Status: ACTIVE | Noted: 2025-04-07

## 2025-04-09 ENCOUNTER — LAB (OUTPATIENT)
Dept: CARDIOLOGY | Facility: CLINIC | Age: 70
End: 2025-04-09
Payer: COMMERCIAL

## 2025-04-09 ENCOUNTER — MYC MEDICAL ADVICE (OUTPATIENT)
Dept: FAMILY MEDICINE | Facility: CLINIC | Age: 70
End: 2025-04-09

## 2025-04-09 ENCOUNTER — ANTICOAGULATION THERAPY VISIT (OUTPATIENT)
Dept: ANTICOAGULATION | Facility: CLINIC | Age: 70
End: 2025-04-09

## 2025-04-09 DIAGNOSIS — Z79.01 LONG TERM (CURRENT) USE OF ANTICOAGULANTS: Primary | ICD-10-CM

## 2025-04-09 DIAGNOSIS — Z79.01 LONG TERM CURRENT USE OF ANTICOAGULANT THERAPY: ICD-10-CM

## 2025-04-09 DIAGNOSIS — Z95.2 S/P AVR (AORTIC VALVE REPLACEMENT): ICD-10-CM

## 2025-04-09 DIAGNOSIS — Z95.2 S/P AVR (AORTIC VALVE REPLACEMENT): Primary | ICD-10-CM

## 2025-04-09 LAB — INR POINT OF CARE: 2.1 (ref 0.9–1.1)

## 2025-04-09 NOTE — PROGRESS NOTES
ANTICOAGULATION MANAGEMENT     Alyssa Higginbotham 70 year old female is on warfarin with therapeutic INR result. (Goal INR 2.0-2.5)    Recent labs: (last 7 days)     04/09/25  0740   INR 2.1*       ASSESSMENT     Source(s): Chart Review  Previous INR was Therapeutic last visit; previously outside of goal range  Medication, diet, health changes since last INR chart reviewed; none identified         PLAN     Recommended plan for no diet, medication or health factor changes affecting INR     Dosing Instructions: Continue your current warfarin dose with next INR in 1-2 weeks       Summary  As of 4/9/2025      Full warfarin instructions:  2.5 mg every Sat; 3 mg every Mon, Thu; 5 mg all other days   Next INR check:  4/23/2025               Detailed voice message left for Jennifer with dosing instructions and follow up date.   Sent [employer] message with dosing and follow up instructions    Contact [phone] to schedule and with any changes, questions or concerns.     Education provided: Please call back if any changes to your diet, medications or how you've been taking warfarin    Plan made per [facility] anticoagulation protocol    Kimberly Jimenes RN  4/9/2025  Anticoagulation Clinic  [facility] for routing messages: p CULLEN GALVEZ  [facility] patient phone line: [phone]        _______________________________________________________________________     Anticoagulation Episode Summary       Current INR goal:  2.0-2.5   TTR:  55.9% (11.5 mo)   Target end date:  Indefinite   Send INR reminders to:  CULLEN GALVEZ    Indications    S/P AVR (aortic valve replacement) [Z95.2]  Long term current use of anticoagulant therapy [Z79.01]             Comments:  6/2/23 INR target goal changed to 2.0 - 2.5 d/t daily bleeding issues.  (Sensitivity)  Goal range changed 2/20/19 per cardiology             Anticoagulation Care Providers       Provider Role Specialty Phone number    Rafaela Woods MD Referring Family Medicine  [phone]

## 2025-04-17 ENCOUNTER — ANTICOAGULATION THERAPY VISIT (OUTPATIENT)
Dept: ANTICOAGULATION | Facility: CLINIC | Age: 70
End: 2025-04-17

## 2025-04-17 ENCOUNTER — LAB (OUTPATIENT)
Dept: LAB | Facility: CLINIC | Age: 70
End: 2025-04-17
Payer: COMMERCIAL

## 2025-04-17 DIAGNOSIS — Z79.01 LONG TERM CURRENT USE OF ANTICOAGULANT THERAPY: ICD-10-CM

## 2025-04-17 DIAGNOSIS — Z95.2 S/P AVR (AORTIC VALVE REPLACEMENT): Primary | ICD-10-CM

## 2025-04-17 DIAGNOSIS — Z95.2 S/P AVR (AORTIC VALVE REPLACEMENT): ICD-10-CM

## 2025-04-17 LAB — INR BLD: 2.3 (ref 0.9–1.1)

## 2025-04-17 NOTE — PROGRESS NOTES
ANTICOAGULATION MANAGEMENT     Alyssa Higginbotham 70 year old female is on warfarin with therapeutic INR result. (Goal INR 2.0-2.5)    Recent labs: (last 7 days)     04/17/25  1441   INR 2.3*       ASSESSMENT     Source(s): Chart Reviewed    Medication/supplement changes: None noted  New illness, injury, or hospitalization: No  Previous result: Therapeutic last 2 INR visits  Additional findings: None       PLAN     Recommended plan for no diet, medication or health factor changes affecting INR     Dosing Instructions: Continue your current warfarin dose with next INR in 1 week       Summary  As of 4/17/2025      Full warfarin instructions:  2.5 mg every Sat; 3 mg every Mon, Thu; 5 mg all other days   Next INR check:  4/24/2025               Detailed voice message left for Jennifer with dosing instructions and follow up date.   Sent [employer] message with dosing and follow up instructions    Lab visit scheduled - INR on 4/24/25 @ [facility]    Education provided: Please call back if any changes to your diet, medications or how you've been taking warfarin  Taking warfarin: Importance of taking warfarin as instructed  Goal range and lab monitoring: goal range and significance of current result    Plan made per [facility] anticoagulation protocol    Aylin Pablo, RN  4/17/2025  Anticoagulation Clinic  [employer] for routing messages: chel GALVEZ  [facility] patient phone line: [phone]        _______________________________________________________________________     Anticoagulation Episode Summary       Current INR goal:  2.0-2.5   TTR:  56.0% (11.5 mo)   Target end date:  Indefinite   Send INR reminders to:  CULLEN GALVEZ    Indications    S/P AVR (aortic valve replacement) [Z95.2]  Long term current use of anticoagulant therapy [Z79.01]             Comments:  6/2/23 INR target goal changed to 2.0 - 2.5 d/t daily bleeding issues.  (Sensitivity)  Goal range changed 2/20/19 per cardiology              Anticoagulation Care Providers       Provider Role Specialty Phone number    Rafaela Woods MD Referring Family Medicine [phone]

## 2025-04-24 ENCOUNTER — ANTICOAGULATION THERAPY VISIT (OUTPATIENT)
Dept: ANTICOAGULATION | Facility: CLINIC | Age: 70
End: 2025-04-24

## 2025-04-24 ENCOUNTER — LAB (OUTPATIENT)
Dept: CARDIOLOGY | Facility: CLINIC | Age: 70
End: 2025-04-24
Payer: COMMERCIAL

## 2025-04-24 DIAGNOSIS — Z95.2 S/P AVR (AORTIC VALVE REPLACEMENT): ICD-10-CM

## 2025-04-24 DIAGNOSIS — Z95.2 S/P AVR (AORTIC VALVE REPLACEMENT): Primary | ICD-10-CM

## 2025-04-24 DIAGNOSIS — Z79.01 LONG TERM CURRENT USE OF ANTICOAGULANT THERAPY: ICD-10-CM

## 2025-04-24 LAB — INR POINT OF CARE: 2.4 (ref 0.9–1.1)

## 2025-04-24 NOTE — PROGRESS NOTES
ANTICOAGULATION MANAGEMENT     Alyssa Higginbotham 70 year old female is on warfarin with therapeutic INR result. (Goal INR 2.0-2.5)    Recent labs: (last 7 days)     04/24/25  0743   INR 2.4*       ASSESSMENT     Source(s): Chart Review and Patient/Caregiver Call     Warfarin doses taken: Warfarin taken as instructed  Diet: No new diet changes identified  Medication/supplement changes: None noted  New illness, injury, or hospitalization: No  Signs or symptoms of bleeding or clotting: No  Previous result: Therapeutic last 3 INR visits  Additional findings: filled out paper template questionnaire.       PLAN     Recommended plan for no diet, medication or health factor changes affecting INR     Dosing Instructions: Continue your current warfarin dose with next INR in 7-10 days       Summary  As of 4/24/2025      Full warfarin instructions:  2.5 mg every Sat; 3 mg every Mon, Thu; 5 mg all other days   Next INR check:  5/5/2025               Telephone call with Jennifer who verbalizes understanding and agrees to plan   - preferred 1 week, however, no early appt (7a) at [facility] or [facility].   - advised to come in sooner if any s/sx of any active bleeding.    Lab visit scheduled - INR on 5/5/25 @ [facility]    Education provided: Taking warfarin: Importance of taking warfarin as instructed  Goal range and lab monitoring: goal range and significance of current result    Plan made per [facility] anticoagulation protocol    Aylin Pablo RN  4/24/2025  Anticoagulation Clinic  [employer] for routing messages: p CULLEN GALVEZ  [facility] patient phone line: [phone]        _______________________________________________________________________     Anticoagulation Episode Summary       Current INR goal:  2.0-2.5   TTR:  55.9% (11.5 mo)   Target end date:  Indefinite   Send INR reminders to:  CULLEN GALVEZ    Indications    S/P AVR (aortic valve replacement) [Z95.2]  Long term current use of anticoagulant therapy  [Z79.01]             Comments:  6/2/23 INR target goal changed to 2.0 - 2.5 d/t daily bleeding issues.  (Sensitivity)  Goal range changed 2/20/19 per cardiology             Anticoagulation Care Providers       Provider Role Specialty Phone number    Rafaela Woods MD Referring Family Medicine [phone]

## 2025-04-25 DIAGNOSIS — F41.1 ANXIETY STATE: ICD-10-CM

## 2025-04-25 DIAGNOSIS — I10 ESSENTIAL HYPERTENSION: ICD-10-CM

## 2025-04-25 DIAGNOSIS — I10 ESSENTIAL HYPERTENSION, MALIGNANT: ICD-10-CM

## 2025-04-28 RX ORDER — AMITRIPTYLINE HYDROCHLORIDE 10 MG/1
TABLET ORAL
Qty: 90 TABLET | Refills: 0 | Status: SHIPPED | OUTPATIENT
Start: 2025-04-28

## 2025-04-28 RX ORDER — TRIAMTERENE AND HYDROCHLOROTHIAZIDE 75; 50 MG/1; MG/1
0.5 TABLET ORAL
Qty: 45 TABLET | Refills: 0 | Status: SHIPPED | OUTPATIENT
Start: 2025-04-28

## 2025-04-28 RX ORDER — METOPROLOL SUCCINATE 200 MG/1
200 TABLET, EXTENDED RELEASE ORAL
Qty: 90 TABLET | Refills: 0 | Status: SHIPPED | OUTPATIENT
Start: 2025-04-28

## 2025-04-28 RX ORDER — CLONAZEPAM 0.5 MG/1
TABLET ORAL
Qty: 90 TABLET | Refills: 0 | Status: SHIPPED | OUTPATIENT
Start: 2025-04-28

## 2025-05-02 ENCOUNTER — LAB (OUTPATIENT)
Dept: LAB | Facility: HOSPITAL | Age: 70
End: 2025-05-02
Payer: COMMERCIAL

## 2025-05-02 DIAGNOSIS — Z95.2 S/P AVR (AORTIC VALVE REPLACEMENT): ICD-10-CM

## 2025-05-02 DIAGNOSIS — Z79.01 LONG TERM CURRENT USE OF ANTICOAGULANT THERAPY: ICD-10-CM

## 2025-05-02 LAB
INR PPP: 2.13 (ref 0.85–1.15)
PROTHROMBIN TIME: 23.8 SECONDS (ref 11.8–14.8)

## 2025-05-02 PROCEDURE — 85610 PROTHROMBIN TIME: CPT

## 2025-05-02 PROCEDURE — 36415 COLL VENOUS BLD VENIPUNCTURE: CPT

## 2025-05-05 ENCOUNTER — TELEPHONE (OUTPATIENT)
Dept: PHARMACY | Facility: OTHER | Age: 70
End: 2025-05-05

## 2025-05-05 NOTE — TELEPHONE ENCOUNTER
SHELLY Recruitment: [facility] insurance     Referral outreach attempt #1 on May 5, 2025      Outcome: left voicemail- Call back number [phone] and [employer] message sent    Teresa Santos CMA  [place]

## 2025-05-08 ENCOUNTER — TRANSFERRED RECORDS (OUTPATIENT)
Dept: HEALTH INFORMATION MANAGEMENT | Facility: CLINIC | Age: 70
End: 2025-05-08

## 2025-05-09 ENCOUNTER — LAB (OUTPATIENT)
Dept: CARDIOLOGY | Facility: CLINIC | Age: 70
End: 2025-05-09
Payer: COMMERCIAL

## 2025-05-09 DIAGNOSIS — Z79.01 LONG TERM CURRENT USE OF ANTICOAGULANT THERAPY: ICD-10-CM

## 2025-05-09 DIAGNOSIS — Z95.2 S/P AVR (AORTIC VALVE REPLACEMENT): ICD-10-CM

## 2025-05-09 LAB — INR POINT OF CARE: 2.3 (ref 0.9–1.1)

## 2025-05-19 ENCOUNTER — ANTICOAGULATION THERAPY VISIT (OUTPATIENT)
Dept: ANTICOAGULATION | Facility: CLINIC | Age: 70
End: 2025-05-19

## 2025-05-19 ENCOUNTER — LAB (OUTPATIENT)
Dept: CARDIOLOGY | Facility: CLINIC | Age: 70
End: 2025-05-19
Payer: COMMERCIAL

## 2025-05-19 DIAGNOSIS — Z79.01 LONG TERM CURRENT USE OF ANTICOAGULANT THERAPY: ICD-10-CM

## 2025-05-19 DIAGNOSIS — Z95.2 S/P AVR (AORTIC VALVE REPLACEMENT): ICD-10-CM

## 2025-05-19 DIAGNOSIS — Z95.2 S/P AVR (AORTIC VALVE REPLACEMENT): Primary | ICD-10-CM

## 2025-05-19 LAB — INR POINT OF CARE: 1.7 (ref 0.9–1.1)

## 2025-05-19 NOTE — PROGRESS NOTES
ANTICOAGULATION MANAGEMENT     Alyssa Higginbotham 70 year old female is on warfarin with subtherapeutic INR result. (Goal INR 2.0-2.5)    Recent labs: (last 7 days)     05/19/25  0741   INR 1.7*       ASSESSMENT     Source(s): Chart Review and Patient/Caregiver Call     Warfarin doses taken: Missed dose(s) may be affecting INR   Did reported one missed 5mg dose in the last one week.  (She got interrupted taking her warfarin, and forgot to take it.  She was unsure of day, but it was within one week.)   Paper template questionnaire had correct warfarin dose.  Diet: No new diet changes identified  Medication/supplement changes: None noted  New illness, injury, or hospitalization: No  Signs or symptoms of bleeding or clotting: No  Previous result: Therapeutic last 6 INR visits  Additional findings: None       PLAN     Recommended plan for temporary change(s) affecting INR     Dosing Instructions: booster dose then continue your current warfarin dose with next INR in 1 week       Summary  As of 5/19/2025      Full warfarin instructions:  5/19: 4 mg; Otherwise 2.5 mg every Sat; 3 mg every Mon, Thu; 5 mg all other days   Next INR check:  5/26/2025               Telephone call with Jennifer who verbalizes understanding and agrees to plan   - patient is sensitive to warfarin increase.    Lab visit scheduled - INR on 5/23/25 @ [facility]    Education provided: Taking warfarin: take warfarin at same time each day; preferably in the evening and Importance of taking warfarin as instructed  Goal range and lab monitoring: goal range and significance of current result    Plan made per [facility] anticoagulation protocol    Aylin Pablo RN  5/19/2025  Anticoagulation Clinic  [employer] for routing messages: chel GALVEZ  [facility] patient phone line: [phone]        _______________________________________________________________________     Anticoagulation Episode Summary       Current INR goal:  2.0-2.5   TTR:  56.1%  (11.5 mo)   Target end date:  Indefinite   Send INR reminders to:  CULLEN GALVEZ    Indications    S/P AVR (aortic valve replacement) [Z95.2]  Long term current use of anticoagulant therapy [Z79.01]             Comments:  6/2/23 INR target goal changed to 2.0 - 2.5 d/t daily bleeding issues.  (Sensitivity)  Goal range changed 2/20/19 per cardiology             Anticoagulation Care Providers       Provider Role Specialty Phone number    Rafaela Woods MD Referring [facility] [phone]

## 2025-05-22 ENCOUNTER — ANCILLARY PROCEDURE (OUTPATIENT)
Dept: MAMMOGRAPHY | Facility: CLINIC | Age: 70
End: 2025-05-22
Attending: FAMILY MEDICINE
Payer: COMMERCIAL

## 2025-05-22 DIAGNOSIS — Z12.31 VISIT FOR SCREENING MAMMOGRAM: ICD-10-CM

## 2025-05-22 PROCEDURE — 77063 BREAST TOMOSYNTHESIS BI: CPT

## 2025-05-23 ENCOUNTER — LAB (OUTPATIENT)
Dept: CARDIOLOGY | Facility: CLINIC | Age: 70
End: 2025-05-23
Payer: COMMERCIAL

## 2025-05-23 DIAGNOSIS — Z79.01 LONG TERM CURRENT USE OF ANTICOAGULANT THERAPY: ICD-10-CM

## 2025-05-23 DIAGNOSIS — Z95.2 S/P AVR (AORTIC VALVE REPLACEMENT): ICD-10-CM

## 2025-05-23 LAB — INR POINT OF CARE: 2 (ref 0.9–1.1)

## 2025-05-30 ENCOUNTER — RESULTS FOLLOW-UP (OUTPATIENT)
Dept: ANTICOAGULATION | Facility: CLINIC | Age: 70
End: 2025-05-30

## 2025-05-30 ENCOUNTER — LAB (OUTPATIENT)
Dept: CARDIOLOGY | Facility: CLINIC | Age: 70
End: 2025-05-30
Payer: COMMERCIAL

## 2025-05-30 DIAGNOSIS — Z79.01 LONG TERM CURRENT USE OF ANTICOAGULANT THERAPY: ICD-10-CM

## 2025-05-30 DIAGNOSIS — Z95.2 S/P AVR (AORTIC VALVE REPLACEMENT): ICD-10-CM

## 2025-05-30 LAB — INR POINT OF CARE: 2.6 (ref 0.9–1.1)

## 2025-06-09 ENCOUNTER — ANTICOAGULATION THERAPY VISIT (OUTPATIENT)
Dept: ANTICOAGULATION | Facility: CLINIC | Age: 70
End: 2025-06-09

## 2025-06-09 ENCOUNTER — LAB (OUTPATIENT)
Dept: CARDIOLOGY | Facility: CLINIC | Age: 70
End: 2025-06-09
Payer: COMMERCIAL

## 2025-06-09 DIAGNOSIS — Z79.01 LONG TERM CURRENT USE OF ANTICOAGULANT THERAPY: ICD-10-CM

## 2025-06-09 DIAGNOSIS — Z95.2 S/P AVR (AORTIC VALVE REPLACEMENT): Primary | ICD-10-CM

## 2025-06-09 DIAGNOSIS — Z95.2 S/P AVR (AORTIC VALVE REPLACEMENT): ICD-10-CM

## 2025-06-09 LAB — INR POINT OF CARE: 1.9 (ref 0.9–1.1)

## 2025-06-09 NOTE — PROGRESS NOTES
ANTICOAGULATION MANAGEMENT     Alyssa Higginbotham 70 year old female is on warfarin with subtherapeutic INR result. (Goal INR 2.0-2.5)    Recent labs: (last 7 days)     06/09/25  0805   INR 1.9*       ASSESSMENT     Source(s): Chart Review and Patient/Caregiver Call     Warfarin doses taken: Warfarin taken differently, but did not change total weekly dose  Diet: No new diet changes identified  Medication/supplement changes: None noted  New illness, injury, or hospitalization: No  Signs or symptoms of bleeding or clotting: No  Previous result: Therapeutic last 2(+) visits  Additional findings: None       PLAN     Recommended plan for no diet, medication or health factor changes affecting INR     Dosing Instructions: Continue your current warfarin dose with next INR in 2 weeks       Summary  As of 6/9/2025      Full warfarin instructions:  2.5 mg every Sat; 3 mg every Mon, Thu; 5 mg all other days   Next INR check:  6/23/2025               Detailed voice message left for Jennifer with dosing instructions and follow up date.     Contact [phone] to schedule and with any changes, questions or concerns.     Education provided: Please call back if any changes to your diet, medications or how you've been taking warfarin    Plan made per [facility] anticoagulation protocol    Eneida Suarez RN  6/9/2025  Anticoagulation Clinic  [facility] for routing messages: chel GALVEZ  [facility] patient phone line: [phone]        _______________________________________________________________________     Anticoagulation Episode Summary       Current INR goal:  2.0-2.5   TTR:  59.9% (11.5 mo)   Target end date:  Indefinite   Send INR reminders to:  CULLEN GALVEZ    Indications    S/P AVR (aortic valve replacement) [Z95.2]  Long term current use of anticoagulant therapy [Z79.01]             Comments:  6/2/23 INR target goal changed to 2.0 - 2.5 d/t daily bleeding issues.  (Sensitivity)  Goal range changed 2/20/19 per  cardiology             Anticoagulation Care Providers       Provider Role Specialty Phone number    Rafaela Woods MD Referring Family Medicine [phone]

## 2025-06-16 ENCOUNTER — ANTICOAGULATION THERAPY VISIT (OUTPATIENT)
Dept: ANTICOAGULATION | Facility: CLINIC | Age: 70
End: 2025-06-16

## 2025-06-16 ENCOUNTER — LAB (OUTPATIENT)
Dept: LAB | Facility: CLINIC | Age: 70
End: 2025-06-16
Payer: COMMERCIAL

## 2025-06-16 DIAGNOSIS — Z95.2 S/P AVR (AORTIC VALVE REPLACEMENT): ICD-10-CM

## 2025-06-16 DIAGNOSIS — Z79.01 LONG TERM CURRENT USE OF ANTICOAGULANT THERAPY: ICD-10-CM

## 2025-06-16 DIAGNOSIS — Z95.2 S/P AVR (AORTIC VALVE REPLACEMENT): Primary | ICD-10-CM

## 2025-06-16 LAB — INR BLD: 2.2 (ref 0.9–1.1)

## 2025-06-16 PROCEDURE — 36415 COLL VENOUS BLD VENIPUNCTURE: CPT

## 2025-06-16 PROCEDURE — 85610 PROTHROMBIN TIME: CPT

## 2025-06-16 NOTE — PROGRESS NOTES
ANTICOAGULATION MANAGEMENT     Alyssa Higginbotham 70 year old female is on warfarin with therapeutic INR result. (Goal INR 2.0-2.5)    Recent labs: (last 7 days)     06/16/25  1543   INR 2.2*       ASSESSMENT     Source(s): Chart Review  Previous INR was Therapeutic last 2(+) visits  Medication, diet, health changes since last INR chart reviewed; none identified         PLAN     Recommended plan for no diet, medication or health factor changes affecting INR     Dosing Instructions: Continue your current warfarin dose with next INR in 2 weeks       Summary  As of 6/16/2025      Full warfarin instructions:  2.5 mg every Sat; 3 mg every Mon, Thu; 5 mg all other days   Next INR check:  6/30/2025               Detailed voice message left for Jennifer with dosing instructions and follow up date.     Lab visit scheduled    Education provided: Contact [phone] with any changes, questions or concerns.     Plan made per [facility] anticoagulation protocol    Eneida Suarez RN  6/16/2025  Anticoagulation Clinic  [email or web address] for routing messages: p CULLEN GALVEZ  [facility] patient phone line: [phone]        _______________________________________________________________________     Anticoagulation Episode Summary       Current INR goal:  2.0-2.5   TTR:  61.2% (11.5 mo)   Target end date:  Indefinite   Send INR reminders to:  CULLEN GALVEZ    Indications    S/P AVR (aortic valve replacement) [Z95.2]  Long term current use of anticoagulant therapy [Z79.01]             Comments:  6/2/23 INR target goal changed to 2.0 - 2.5 d/t daily bleeding issues.  (Sensitivity)  Goal range changed 2/20/19 per cardiology             Anticoagulation Care Providers       Provider Role Specialty Phone number    Rafaela Woods MD Referring Family Medicine [phone]

## 2025-06-17 ENCOUNTER — MYC MEDICAL ADVICE (OUTPATIENT)
Dept: FAMILY MEDICINE | Facility: CLINIC | Age: 70
End: 2025-06-17
Payer: COMMERCIAL

## 2025-06-17 DIAGNOSIS — G43.009 MIGRAINE WITHOUT AURA AND WITHOUT STATUS MIGRAINOSUS, NOT INTRACTABLE: ICD-10-CM

## 2025-06-25 ENCOUNTER — LAB (OUTPATIENT)
Dept: LAB | Facility: CLINIC | Age: 70
End: 2025-06-25
Payer: COMMERCIAL

## 2025-06-25 ENCOUNTER — ANTICOAGULATION THERAPY VISIT (OUTPATIENT)
Dept: ANTICOAGULATION | Facility: CLINIC | Age: 70
End: 2025-06-25

## 2025-06-25 DIAGNOSIS — Z95.2 S/P AVR (AORTIC VALVE REPLACEMENT): ICD-10-CM

## 2025-06-25 DIAGNOSIS — Z79.01 LONG TERM CURRENT USE OF ANTICOAGULANT THERAPY: ICD-10-CM

## 2025-06-25 DIAGNOSIS — Z95.2 S/P AVR (AORTIC VALVE REPLACEMENT): Primary | ICD-10-CM

## 2025-06-25 LAB — INR BLD: 2.2 (ref 0.9–1.1)

## 2025-06-25 PROCEDURE — 85610 PROTHROMBIN TIME: CPT

## 2025-06-25 PROCEDURE — 36416 COLLJ CAPILLARY BLOOD SPEC: CPT

## 2025-06-25 NOTE — PROGRESS NOTES
ANTICOAGULATION MANAGEMENT     Alyssa Higginbotham 70 year old female is on warfarin with therapeutic INR result. (Goal INR 2.0-2.5)    Recent labs: (last 7 days)     06/25/25  0746   INR 2.2*       ASSESSMENT     Source(s): Chart Review and Patient/Caregiver Call     Warfarin doses taken: Warfarin taken as instructed  Diet: No new diet changes identified  Medication/supplement changes: None noted  New illness, injury, or hospitalization: No  Signs or symptoms of bleeding or clotting: No  Previous result: Therapeutic last visit at 2.2; previously outside of goal range at 1.9  Additional findings: None       PLAN     Recommended plan for no diet, medication or health factor changes affecting INR     Dosing Instructions: Continue your current warfarin dose with next INR in 7-10 days       Summary  As of 6/25/2025      Full warfarin instructions:  2.5 mg every Sat; 3 mg every Mon, Thu; 5 mg all other days   Next INR check:  7/4/2025               Telephone call with Jennifer who verbalizes understanding and agrees to plan    Lab visit scheduled - INR on 7/2/25 @ [facility].    Education provided: Taking warfarin: Importance of taking warfarin as instructed  Goal range and lab monitoring: goal range and significance of current result  Information on home INR monitoring - did reiterate for consideration with patient.    Plan made per [facility] anticoagulation protocol    Aylin Pablo RN  6/25/2025  Anticoagulation Clinic  [employer] for routing messages: chel GALVEZ  [facility] patient phone line: [phone]        _______________________________________________________________________     Anticoagulation Episode Summary       Current INR goal:  2.0-2.5   TTR:  63.8% (11.5 mo)   Target end date:  Indefinite   Send INR reminders to:  CULLEN GALVEZ    Indications    S/P AVR (aortic valve replacement) [Z95.2]  Long term current use of anticoagulant therapy [Z79.01]             Comments:  6/2/23 INR  target goal changed to 2.0 - 2.5 d/t daily bleeding issues.  (Sensitivity)  Goal range changed 2/20/19 per cardiology             Anticoagulation Care Providers       Provider Role Specialty Phone number    Rafaela Woods MD Referring Family Medicine [phone]

## 2025-06-30 ENCOUNTER — TELEPHONE (OUTPATIENT)
Dept: PHARMACY | Facility: OTHER | Age: 70
End: 2025-06-30
Payer: COMMERCIAL

## 2025-06-30 NOTE — TELEPHONE ENCOUNTER
SHELLY Recruitment: [facility] insurance     Referral outreach attempt #3 on June 30, 2025      Outcome: left voicemail- Call back number [phone] and [employer] message sent    Teresa Santos CMA  [place]

## 2025-07-08 ENCOUNTER — ANTICOAGULATION THERAPY VISIT (OUTPATIENT)
Dept: ANTICOAGULATION | Facility: CLINIC | Age: 70
End: 2025-07-08

## 2025-07-08 ENCOUNTER — LAB (OUTPATIENT)
Dept: LAB | Facility: CLINIC | Age: 70
End: 2025-07-08
Payer: COMMERCIAL

## 2025-07-08 DIAGNOSIS — Z95.2 S/P AVR (AORTIC VALVE REPLACEMENT): Primary | ICD-10-CM

## 2025-07-08 DIAGNOSIS — Z79.01 LONG TERM CURRENT USE OF ANTICOAGULANT THERAPY: ICD-10-CM

## 2025-07-08 DIAGNOSIS — Z95.2 S/P AVR (AORTIC VALVE REPLACEMENT): ICD-10-CM

## 2025-07-08 LAB — INR BLD: 2.3 (ref 0.9–1.1)

## 2025-07-08 PROCEDURE — 36416 COLLJ CAPILLARY BLOOD SPEC: CPT

## 2025-07-08 PROCEDURE — 85610 PROTHROMBIN TIME: CPT

## 2025-07-08 NOTE — PROGRESS NOTES
ANTICOAGULATION MANAGEMENT     Alyssa Higginbotham 70 year old female is on warfarin with therapeutic INR result. (Goal INR 2.0-2.5)    Recent labs: (last 7 days)     07/08/25  0727   INR 2.3*       ASSESSMENT     Source(s): Chart Review and Patient/Caregiver Call     Warfarin doses taken: Warfarin taken as instructed  Diet: No new diet changes identified  Medication/supplement changes: None noted  New illness, injury, or hospitalization: No   Reported Nurtec working well to control migraines.  Last migraine event was 5 wks ago.  Signs or symptoms of bleeding or clotting: No  Previous result: Therapeutic last 2 INR visits  Additional findings: None       PLAN     Recommended plan for no diet, medication or health factor changes affecting INR     Dosing Instructions: Continue your current warfarin dose with next INR in 2 weeks       Summary  As of 7/8/2025      Full warfarin instructions:  2.5 mg every Sat; 3 mg every Mon, Thu; 5 mg all other days   Next INR check:  7/18/2025               Telephone call with Jennifer who verbalizes understanding and agrees to plan    Lab visit scheduled - INR on 7/17/25 @ [facility]    Education provided: Taking warfarin: Importance of taking warfarin as instructed  Goal range and lab monitoring: goal range and significance of current result    Plan made per [facility] anticoagulation protocol    Aylin Pablo RN  7/8/2025  Anticoagulation Clinic  [employer] for routing messages: chel GALVEZ  [facility] patient phone line: [phone]        _______________________________________________________________________     Anticoagulation Episode Summary       Current INR goal:  2.0-2.5   TTR:  64.9% (11.5 mo)   Target end date:  Indefinite   Send INR reminders to:  CULLEN GALVEZ    Indications    S/P AVR (aortic valve replacement) [Z95.2]  Long term current use of anticoagulant therapy [Z79.01]             Comments:  6/2/23 INR target goal changed to 2.0 - 2.5 d/t  daily bleeding issues.  (Sensitivity)  Goal range changed 2/20/19 per cardiology             Anticoagulation Care Providers       Provider Role Specialty Phone number    Rafaela Woods MD Referring Family Medicine [phone]

## 2025-07-09 ENCOUNTER — DOCUMENTATION ONLY (OUTPATIENT)
Dept: ANTICOAGULATION | Facility: CLINIC | Age: 70
End: 2025-07-09
Payer: COMMERCIAL

## 2025-07-09 DIAGNOSIS — Z95.2 S/P AVR (AORTIC VALVE REPLACEMENT): Primary | ICD-10-CM

## 2025-07-09 NOTE — PROGRESS NOTES
ANTICOAGULATION CLINIC REFERRAL RENEWAL REQUEST       An annual renewal order is required for all patients referred to [facility].?  Please review and sign the pended referral order for Alyssa NAYELI FrazierNeftaly.       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  Mechanical AVR- Lanre-Shiley (Tilting disc), [place] ([place]aged [place]) or Monoleaflet valve       Current goal range   INR: 2.0-2.5     Goal appropriate for indication? 6/2/23 INR target goal changed to 2.0 - 2.5 d/t daily bleeding issues.  (Sensitivity)   Goal range changed 2/20/19 per cardiology      Time in Therapeutic Range (TTR)  (Goal > 60%) 64.9 %       Office visit with referring provider's group within last year yes on 3/31/2025 with Rafaela Woods M.D.   Additional standing orders None       Aylin Pablo RN  [facility]

## 2025-07-15 DIAGNOSIS — E78.5 HYPERLIPIDEMIA LDL GOAL <130: ICD-10-CM

## 2025-07-15 DIAGNOSIS — Z95.2 S/P AVR (AORTIC VALVE REPLACEMENT): Primary | ICD-10-CM

## 2025-07-16 ENCOUNTER — LAB (OUTPATIENT)
Dept: LAB | Facility: CLINIC | Age: 70
End: 2025-07-16
Payer: COMMERCIAL

## 2025-07-16 ENCOUNTER — ANTICOAGULATION THERAPY VISIT (OUTPATIENT)
Dept: ANTICOAGULATION | Facility: CLINIC | Age: 70
End: 2025-07-16

## 2025-07-16 DIAGNOSIS — Z79.01 LONG TERM CURRENT USE OF ANTICOAGULANT THERAPY: ICD-10-CM

## 2025-07-16 DIAGNOSIS — Z95.2 S/P AVR (AORTIC VALVE REPLACEMENT): ICD-10-CM

## 2025-07-16 DIAGNOSIS — Z95.2 S/P AVR (AORTIC VALVE REPLACEMENT): Primary | ICD-10-CM

## 2025-07-16 LAB — INR BLD: 2.3 (ref 0.9–1.1)

## 2025-07-16 PROCEDURE — 85610 PROTHROMBIN TIME: CPT

## 2025-07-16 PROCEDURE — 36416 COLLJ CAPILLARY BLOOD SPEC: CPT

## 2025-07-16 RX ORDER — SIMVASTATIN 20 MG
20 TABLET ORAL AT BEDTIME
Qty: 90 TABLET | Refills: 0 | Status: SHIPPED | OUTPATIENT
Start: 2025-07-16

## 2025-07-16 NOTE — PROGRESS NOTES
ANTICOAGULATION MANAGEMENT     Alyssa Higginbotham 70 year old female is on warfarin with therapeutic INR result. (Goal INR 2.0-2.5)    Recent labs: (last 7 days)     07/16/25  0720   INR 2.3*       ASSESSMENT     Source(s): Chart Review and Patient/Caregiver Call     Warfarin doses taken: Warfarin taken as instructed  Diet: No new diet changes identified  Medication/supplement changes: None noted  New illness, injury, or hospitalization: No  Signs or symptoms of bleeding or clotting: No  Previous result: Therapeutic last 3 INR visits  Additional findings: None       PLAN     Recommended plan for no diet, medication or health factor changes affecting INR     Dosing Instructions: Continue your current warfarin dose with next INR in 2 weeks       Summary  As of 7/16/2025      Full warfarin instructions:  2.5 mg every Sat; 3 mg every Mon, Thu; 5 mg all other days   Next INR check:  7/25/2025               Telephone call with Jennifer who verbalizes understanding and agrees to plan    Lab visit scheduled - INR on 7/25/25 @ [facility] .    Education provided: Taking warfarin: Importance of taking warfarin as instructed  Goal range and lab monitoring: goal range and significance of current result    Plan made per [facility] anticoagulation protocol    Aylin Pablo, RN  7/16/2025  Anticoagulation Clinic  [employer] for routing messages: p CULLEN GALVEZ  [facility] patient phone line: [phone]        _______________________________________________________________________     Anticoagulation Episode Summary       Current INR goal:  2.0-2.5   TTR:  67.2% (11.5 mo)   Target end date:  Indefinite   Send INR reminders to:  CULLEN GALVEZ    Indications    S/P AVR (aortic valve replacement) [Z95.2]  Long term current use of anticoagulant therapy [Z79.01]             Comments:  6/2/23 INR target goal changed to 2.0 - 2.5 d/t daily bleeding issues.  (Sensitivity)  Goal range changed 2/20/19 per cardiology              Anticoagulation Care Providers       Provider Role Specialty Phone number    Rafaela Woods MD Referring Family Medicine [phone]    Gayle Richardson MD Referring Family Medicine [phone]

## 2025-07-21 ENCOUNTER — TELEPHONE (OUTPATIENT)
Dept: CARDIOLOGY | Facility: CLINIC | Age: 70
End: 2025-07-21
Payer: COMMERCIAL

## 2025-07-21 DIAGNOSIS — Z95.2 S/P AVR (AORTIC VALVE REPLACEMENT): Primary | ICD-10-CM

## 2025-07-21 DIAGNOSIS — Z86.79 HX OF REPAIR OF DISSECTING THORACIC AORTIC ANEURYSM, STANFORD TYPE A: ICD-10-CM

## 2025-07-21 DIAGNOSIS — Z98.890 HX OF REPAIR OF DISSECTING THORACIC AORTIC ANEURYSM, STANFORD TYPE A: ICD-10-CM

## 2025-07-21 NOTE — TELEPHONE ENCOUNTER
"Noted order placed for CTA chest, abd, pelvis placed 10-21-24 w/ comments \"VASC TO SCHEDULE - 1yr f/u due Oct 2025 w ET. Dr Mercer order CTA too??\"    Left msg for patient informing her that question of need for echo would be forwarded to Dr. Richardson to address next wk when he returns from vacation and instructed her to contact Dr. Nassar re CT order which was previously placed.  mg  "

## 2025-07-21 NOTE — TELEPHONE ENCOUNTER
[facility] Call Center    Phone Message    May a detailed message be left on voicemail: yes     Reason for Call: Other: Pt stated she thought she needs and Echo and CT before her F/U with Dr. Richardson.  No orders have been placed.  Please review.      Action Taken: Other: Cardiology    Travel Screening: Not Applicable     Thank you!  [facility]

## 2025-07-26 DIAGNOSIS — F41.1 ANXIETY STATE: ICD-10-CM

## 2025-07-28 RX ORDER — AMITRIPTYLINE HYDROCHLORIDE 10 MG/1
TABLET ORAL
Qty: 90 TABLET | Refills: 3 | Status: SHIPPED | OUTPATIENT
Start: 2025-07-28

## 2025-07-28 NOTE — TELEPHONE ENCOUNTER
Jonathon Richardson MD to Me  (Selected Message)    7/27/25  1:17 PM  Ginna,  Would recommend an echocardiogram prior to my visit.     ThanksJonathon

## 2025-08-01 ENCOUNTER — LAB (OUTPATIENT)
Dept: CARDIOLOGY | Facility: CLINIC | Age: 70
End: 2025-08-01
Payer: COMMERCIAL

## 2025-08-01 DIAGNOSIS — Z95.2 S/P AVR (AORTIC VALVE REPLACEMENT): ICD-10-CM

## 2025-08-01 LAB — INR POINT OF CARE: 2.5 (ref 0.9–1.1)

## 2025-08-07 ENCOUNTER — LAB (OUTPATIENT)
Dept: CARDIOLOGY | Facility: CLINIC | Age: 70
End: 2025-08-07
Payer: COMMERCIAL

## 2025-08-07 ENCOUNTER — ANTICOAGULATION THERAPY VISIT (OUTPATIENT)
Dept: ANTICOAGULATION | Facility: CLINIC | Age: 70
End: 2025-08-07

## 2025-08-07 DIAGNOSIS — Z95.2 S/P AVR (AORTIC VALVE REPLACEMENT): ICD-10-CM

## 2025-08-07 DIAGNOSIS — Z79.01 LONG TERM CURRENT USE OF ANTICOAGULANT THERAPY: ICD-10-CM

## 2025-08-07 DIAGNOSIS — Z95.2 S/P AVR (AORTIC VALVE REPLACEMENT): Primary | ICD-10-CM

## 2025-08-07 LAB — INR POINT OF CARE: 2.1 (ref 0.9–1.1)

## 2025-08-11 DIAGNOSIS — I10 ESSENTIAL HYPERTENSION, MALIGNANT: ICD-10-CM

## 2025-08-11 DIAGNOSIS — I10 ESSENTIAL HYPERTENSION: ICD-10-CM

## 2025-08-12 ENCOUNTER — TELEPHONE (OUTPATIENT)
Dept: CARDIOLOGY | Facility: CLINIC | Age: 70
End: 2025-08-12
Payer: COMMERCIAL

## 2025-08-12 RX ORDER — TRIAMTERENE AND HYDROCHLOROTHIAZIDE 75; 50 MG/1; MG/1
0.5 TABLET ORAL DAILY
Qty: 45 TABLET | Refills: 1 | Status: SHIPPED | OUTPATIENT
Start: 2025-08-12

## 2025-08-12 RX ORDER — METOPROLOL SUCCINATE 200 MG/1
200 TABLET, EXTENDED RELEASE ORAL DAILY
Qty: 90 TABLET | Refills: 1 | Status: SHIPPED | OUTPATIENT
Start: 2025-08-12

## 2025-08-13 ENCOUNTER — ANTICOAGULATION THERAPY VISIT (OUTPATIENT)
Dept: ANTICOAGULATION | Facility: CLINIC | Age: 70
End: 2025-08-13

## 2025-08-13 ENCOUNTER — LAB (OUTPATIENT)
Dept: CARDIOLOGY | Facility: CLINIC | Age: 70
End: 2025-08-13
Payer: COMMERCIAL

## 2025-08-13 DIAGNOSIS — Z95.2 S/P AVR (AORTIC VALVE REPLACEMENT): Primary | ICD-10-CM

## 2025-08-13 DIAGNOSIS — Z79.01 LONG TERM CURRENT USE OF ANTICOAGULANT THERAPY: ICD-10-CM

## 2025-08-13 DIAGNOSIS — Z95.2 S/P AVR (AORTIC VALVE REPLACEMENT): ICD-10-CM

## 2025-08-13 LAB — INR POINT OF CARE: 2.1 (ref 0.9–1.1)

## 2025-08-14 ENCOUNTER — TELEPHONE (OUTPATIENT)
Dept: FAMILY MEDICINE | Facility: CLINIC | Age: 70
End: 2025-08-14
Payer: COMMERCIAL

## 2025-08-21 ENCOUNTER — LAB (OUTPATIENT)
Dept: CARDIOLOGY | Facility: CLINIC | Age: 70
End: 2025-08-21
Payer: COMMERCIAL

## 2025-08-21 ENCOUNTER — ANTICOAGULATION THERAPY VISIT (OUTPATIENT)
Dept: ANTICOAGULATION | Facility: CLINIC | Age: 70
End: 2025-08-21

## 2025-08-21 DIAGNOSIS — Z79.01 LONG TERM CURRENT USE OF ANTICOAGULANT THERAPY: ICD-10-CM

## 2025-08-21 DIAGNOSIS — Z95.2 S/P AVR (AORTIC VALVE REPLACEMENT): ICD-10-CM

## 2025-08-21 DIAGNOSIS — Z95.2 S/P AVR (AORTIC VALVE REPLACEMENT): Primary | ICD-10-CM

## 2025-08-21 LAB — INR POINT OF CARE: 2.2 (ref 0.9–1.1)

## 2025-08-28 ENCOUNTER — ANTICOAGULATION THERAPY VISIT (OUTPATIENT)
Dept: ANTICOAGULATION | Facility: CLINIC | Age: 70
End: 2025-08-28

## 2025-08-28 ENCOUNTER — LAB (OUTPATIENT)
Dept: CARDIOLOGY | Facility: CLINIC | Age: 70
End: 2025-08-28
Payer: COMMERCIAL

## 2025-08-28 DIAGNOSIS — Z79.01 LONG TERM CURRENT USE OF ANTICOAGULANT THERAPY: ICD-10-CM

## 2025-08-28 DIAGNOSIS — Z95.2 S/P AVR (AORTIC VALVE REPLACEMENT): ICD-10-CM

## 2025-08-28 DIAGNOSIS — Z95.2 S/P AVR (AORTIC VALVE REPLACEMENT): Primary | ICD-10-CM

## 2025-08-28 LAB — INR POINT OF CARE: 2.5 (ref 0.9–1.1)
